# Patient Record
Sex: FEMALE | Race: WHITE | NOT HISPANIC OR LATINO | Employment: UNEMPLOYED | URBAN - METROPOLITAN AREA
[De-identification: names, ages, dates, MRNs, and addresses within clinical notes are randomized per-mention and may not be internally consistent; named-entity substitution may affect disease eponyms.]

---

## 2017-01-04 ENCOUNTER — OFFICE VISIT (OUTPATIENT)
Dept: HEMATOLOGY/ONCOLOGY | Facility: CLINIC | Age: 67
End: 2017-01-04
Payer: MEDICARE

## 2017-01-04 VITALS
TEMPERATURE: 98 F | BODY MASS INDEX: 31.56 KG/M2 | SYSTOLIC BLOOD PRESSURE: 145 MMHG | RESPIRATION RATE: 16 BRPM | DIASTOLIC BLOOD PRESSURE: 78 MMHG | HEART RATE: 103 BPM | WEIGHT: 195.56 LBS

## 2017-01-04 DIAGNOSIS — C50.512 MALIGNANT NEOPLASM OF LOWER-OUTER QUADRANT OF LEFT FEMALE BREAST: Primary | ICD-10-CM

## 2017-01-04 PROCEDURE — 99499 UNLISTED E&M SERVICE: CPT | Mod: S$GLB,,, | Performed by: INTERNAL MEDICINE

## 2017-01-04 PROCEDURE — 3077F SYST BP >= 140 MM HG: CPT | Mod: S$GLB,,, | Performed by: INTERNAL MEDICINE

## 2017-01-04 PROCEDURE — 99999 PR PBB SHADOW E&M-EST. PATIENT-LVL III: CPT | Mod: PBBFAC,,, | Performed by: INTERNAL MEDICINE

## 2017-01-04 PROCEDURE — 1160F RVW MEDS BY RX/DR IN RCRD: CPT | Mod: S$GLB,,, | Performed by: INTERNAL MEDICINE

## 2017-01-04 PROCEDURE — 1159F MED LIST DOCD IN RCRD: CPT | Mod: S$GLB,,, | Performed by: INTERNAL MEDICINE

## 2017-01-04 PROCEDURE — 99213 OFFICE O/P EST LOW 20 MIN: CPT | Mod: S$GLB,,, | Performed by: INTERNAL MEDICINE

## 2017-01-04 PROCEDURE — 3078F DIAST BP <80 MM HG: CPT | Mod: S$GLB,,, | Performed by: INTERNAL MEDICINE

## 2017-01-04 PROCEDURE — 1157F ADVNC CARE PLAN IN RCRD: CPT | Mod: S$GLB,,, | Performed by: INTERNAL MEDICINE

## 2017-01-04 PROCEDURE — 1126F AMNT PAIN NOTED NONE PRSNT: CPT | Mod: S$GLB,,, | Performed by: INTERNAL MEDICINE

## 2017-01-04 NOTE — PROGRESS NOTES
Subjective:       Patient ID: Lima Maldonado is a 66 y.o. female.    Chief Complaint: personal history of breast cancer    HPI Ms Maldonado return to clinic for follow-up of recent diagnosis of left breast cancer.    At the time of her last visit we discussed adjuvant chemotherapy as her Oncotype score was intermediate risk.    She underwent mammography on September 20 with results as follows:  There is a new irregular mass with spiculated margins seen in the left breast at  5 o'clock along the surgical scar site.    There is stable post-operative change of the right breast.    Digital tomosynthesis was performed and used in the interpretation of the images.    These images  were processed to produce digital images analyzed for potential  abnormalities.    ULTRASOUND  Targeted ultrasound was performed along the surgical scar in the left breast.  At 5 o'clock, at the superior margin of the surgical scar, there is a spiculated  hypoechoic solid mass measuring 8 x 8 x 7mm     A needle biopsy in September 27 showed infiltrating  carcinoma, histologic grade 3, nuclear grade 2, mitotic index 1.  Tumor was 90% ER positive, 70% WV positive, and 1+ HER-2.    MRI of the breast showed a 1.7 x 1.3 cm lesion in the lower outer quadrant of the left breast.  PET/CT on October 7 was negative for any evidence of distant metastasis.  In the  On November 16 bilateral mastectomies were performed.  Left pressure 1.5 cm intermediate grade carcinoma with ductal and lobular features.  There was focal dermal invasion.  Margins were negative.  The right breast was without abnormality.  Oncotype score returned 25 -  intermediate risk.    The her all is a is a call me in her a little call in all) right    Past history:left    breast, T1cN1, ER positive, status post mastectomy with reconstruction and   postoperative chemotherapy with four cycles of Adriamycin and Cytoxan and    five years of tamoxifen.  Her original diagnosis was in  1997  Review of Systems    Objective:      Physical Exam    Assessment:       1. Malignant neoplasm of lower-outer quadrant of left female breast        Plan:       After long discussion it was decided that we will attempt to proceed with adjuvant therapy.  In the interim, we will have her see wound care.  I'll see her again in 2 weeks.  Several

## 2017-01-09 DIAGNOSIS — M81.0 OSTEOPOROSIS, SENILE: Primary | ICD-10-CM

## 2017-01-10 RX ORDER — GABAPENTIN 300 MG/1
300 CAPSULE ORAL NIGHTLY
Qty: 30 CAPSULE | Refills: 1 | Status: SHIPPED | OUTPATIENT
Start: 2017-01-10 | End: 2017-03-22 | Stop reason: SDUPTHER

## 2017-01-13 ENCOUNTER — INFUSION (OUTPATIENT)
Dept: INFECTIOUS DISEASES | Facility: HOSPITAL | Age: 67
End: 2017-01-13
Attending: INTERNAL MEDICINE
Payer: MEDICARE

## 2017-01-13 VITALS — WEIGHT: 195 LBS | BODY MASS INDEX: 31.34 KG/M2 | HEIGHT: 66 IN

## 2017-01-13 DIAGNOSIS — M81.0 SENILE OSTEOPOROSIS: Primary | ICD-10-CM

## 2017-01-13 PROCEDURE — 63600175 PHARM REV CODE 636 W HCPCS: Performed by: INTERNAL MEDICINE

## 2017-01-13 PROCEDURE — 96401 CHEMO ANTI-NEOPL SQ/IM: CPT

## 2017-01-13 RX ADMIN — DENOSUMAB 60 MG: 60 INJECTION SUBCUTANEOUS at 09:01

## 2017-01-17 ENCOUNTER — OFFICE VISIT (OUTPATIENT)
Dept: WOUND CARE | Facility: CLINIC | Age: 67
End: 2017-01-17
Payer: MEDICARE

## 2017-01-17 ENCOUNTER — OFFICE VISIT (OUTPATIENT)
Dept: HEMATOLOGY/ONCOLOGY | Facility: CLINIC | Age: 67
End: 2017-01-17
Payer: MEDICARE

## 2017-01-17 VITALS
DIASTOLIC BLOOD PRESSURE: 69 MMHG | WEIGHT: 197.69 LBS | HEART RATE: 89 BPM | HEIGHT: 66 IN | BODY MASS INDEX: 31.77 KG/M2 | TEMPERATURE: 98 F | SYSTOLIC BLOOD PRESSURE: 140 MMHG

## 2017-01-17 VITALS
BODY MASS INDEX: 31.85 KG/M2 | TEMPERATURE: 98 F | WEIGHT: 197.31 LBS | DIASTOLIC BLOOD PRESSURE: 69 MMHG | HEART RATE: 91 BPM | SYSTOLIC BLOOD PRESSURE: 131 MMHG | RESPIRATION RATE: 17 BRPM

## 2017-01-17 DIAGNOSIS — T81.31XA DISRUPTION OF EXTERNAL OPERATION (SURGICAL) WOUND: ICD-10-CM

## 2017-01-17 DIAGNOSIS — C50.512 MALIGNANT NEOPLASM OF LOWER-OUTER QUADRANT OF LEFT FEMALE BREAST: Primary | ICD-10-CM

## 2017-01-17 DIAGNOSIS — Z51.11 ENCOUNTER FOR ANTINEOPLASTIC CHEMOTHERAPY: ICD-10-CM

## 2017-01-17 PROCEDURE — 1157F ADVNC CARE PLAN IN RCRD: CPT | Mod: S$GLB,,, | Performed by: NURSE PRACTITIONER

## 2017-01-17 PROCEDURE — 3077F SYST BP >= 140 MM HG: CPT | Mod: S$GLB,,, | Performed by: NURSE PRACTITIONER

## 2017-01-17 PROCEDURE — 1125F AMNT PAIN NOTED PAIN PRSNT: CPT | Mod: S$GLB,,, | Performed by: NURSE PRACTITIONER

## 2017-01-17 PROCEDURE — 3075F SYST BP GE 130 - 139MM HG: CPT | Mod: S$GLB,,, | Performed by: INTERNAL MEDICINE

## 2017-01-17 PROCEDURE — 99213 OFFICE O/P EST LOW 20 MIN: CPT | Mod: S$GLB,,, | Performed by: INTERNAL MEDICINE

## 2017-01-17 PROCEDURE — 1126F AMNT PAIN NOTED NONE PRSNT: CPT | Mod: S$GLB,,, | Performed by: INTERNAL MEDICINE

## 2017-01-17 PROCEDURE — 99499 UNLISTED E&M SERVICE: CPT | Mod: S$GLB,,, | Performed by: INTERNAL MEDICINE

## 2017-01-17 PROCEDURE — 3078F DIAST BP <80 MM HG: CPT | Mod: S$GLB,,, | Performed by: INTERNAL MEDICINE

## 2017-01-17 PROCEDURE — 99203 OFFICE O/P NEW LOW 30 MIN: CPT | Mod: S$GLB,,, | Performed by: NURSE PRACTITIONER

## 2017-01-17 PROCEDURE — 1157F ADVNC CARE PLAN IN RCRD: CPT | Mod: S$GLB,,, | Performed by: INTERNAL MEDICINE

## 2017-01-17 PROCEDURE — 1160F RVW MEDS BY RX/DR IN RCRD: CPT | Mod: S$GLB,,, | Performed by: NURSE PRACTITIONER

## 2017-01-17 PROCEDURE — 1159F MED LIST DOCD IN RCRD: CPT | Mod: S$GLB,,, | Performed by: INTERNAL MEDICINE

## 2017-01-17 PROCEDURE — 99999 PR PBB SHADOW E&M-EST. PATIENT-LVL III: CPT | Mod: PBBFAC,,, | Performed by: INTERNAL MEDICINE

## 2017-01-17 PROCEDURE — 3078F DIAST BP <80 MM HG: CPT | Mod: S$GLB,,, | Performed by: NURSE PRACTITIONER

## 2017-01-17 PROCEDURE — 99999 PR PBB SHADOW E&M-EST. PATIENT-LVL V: CPT | Mod: PBBFAC,,, | Performed by: NURSE PRACTITIONER

## 2017-01-17 PROCEDURE — 1160F RVW MEDS BY RX/DR IN RCRD: CPT | Mod: S$GLB,,, | Performed by: INTERNAL MEDICINE

## 2017-01-17 PROCEDURE — 1159F MED LIST DOCD IN RCRD: CPT | Mod: S$GLB,,, | Performed by: NURSE PRACTITIONER

## 2017-01-17 RX ORDER — DEXAMETHASONE 4 MG/1
8 TABLET ORAL 2 TIMES DAILY WITH MEALS
Qty: 40 TABLET | Refills: 0 | Status: SHIPPED | OUTPATIENT
Start: 2017-01-17 | End: 2017-01-27

## 2017-01-17 NOTE — PATIENT INSTRUCTIONS
Skin prep to periwound area.  Mepilex foam border dressing to bilateral gluteal cleft wounds.  Change dressing every 3 days.

## 2017-01-17 NOTE — PROGRESS NOTES
Subjective:       Patient ID: Lima Maldonado is a 66 y.o. female.    Chief Complaint: Wound Check    HPI   This is a 66 year old female referred by Dr. Bains for evaluation and management of a breast wound.  She is s/p Bilateral total complete mastectomies with skin sparing technique with therapeutic completion mastectomy on the left and prophylactic mastectomy on the right on 11/16/16 with Dr. Bains.  She then underwent a Left immediate breast reconstruction with profunda artery  flap, right immediate breast reconstruction with profunda artery  flap with Dr. Arboleda on 11/16/16.  She has been using the TRACY wound vac on the wounds.  The left gluteal cleft wound is healed and the right gluteal cleft wound is healing as evidenced by wound contracture.  She is afebrile.  She denies increased redness, swelling or purulent drainage.  She has no pain.  Review of Systems   Constitutional: Negative for chills, diaphoresis and fever.   HENT: Positive for rhinorrhea. Negative for hearing loss, postnasal drip, sinus pressure, sneezing, sore throat, tinnitus and trouble swallowing.    Eyes: Negative for visual disturbance.   Respiratory: Positive for apnea (on CPAP). Negative for cough, shortness of breath and wheezing.    Cardiovascular: Negative for chest pain, palpitations and leg swelling.   Gastrointestinal: Negative for constipation, diarrhea, nausea and vomiting.   Genitourinary: Negative for difficulty urinating, dysuria, frequency and hematuria.   Musculoskeletal: Positive for arthralgias. Negative for back pain and joint swelling.   Skin: Positive for wound.   Neurological: Negative for dizziness, weakness, light-headedness and headaches.   Hematological: Does not bruise/bleed easily.   Psychiatric/Behavioral: Positive for sleep disturbance. Negative for confusion, decreased concentration and dysphoric mood. The patient is nervous/anxious.        Objective:      Physical Exam    Constitutional: She is oriented to person, place, and time. She appears well-developed and well-nourished. No distress.   HENT:   Head: Normocephalic and atraumatic.   Mouth/Throat: Oropharynx is clear and moist.   Eyes: Conjunctivae and EOM are normal. Pupils are equal, round, and reactive to light. Right eye exhibits no discharge. Left eye exhibits no discharge. No scleral icterus.   Neck: Normal range of motion. Neck supple. No JVD present. No tracheal deviation present. No thyromegaly present.   Cardiovascular: Normal rate, regular rhythm and normal heart sounds.  Exam reveals no gallop and no friction rub.    No murmur heard.  Pulmonary/Chest: Effort normal and breath sounds normal. No respiratory distress. She has no wheezes. She has no rales.   Abdominal: Soft. Bowel sounds are normal. She exhibits no distension. There is no tenderness.   Musculoskeletal: Normal range of motion. She exhibits no edema or tenderness.        Legs:  Neurological: She is alert and oriented to person, place, and time.   Skin: Skin is warm and dry. No rash noted. She is not diaphoretic. No erythema.   Psychiatric: She has a normal mood and affect. Her behavior is normal. Judgment and thought content normal.   Nursing note and vitals reviewed.      Assessment:       1. Disruption of external operation (surgical) wound        Plan:           Discontinue TRACY vac.  Skin prep to periwound area.  Mepilex foam border dressing to bilateral gluteal cleft wounds.  Change dressing every 3 days.  Return to clinic in 2 weeks or prn if healed.

## 2017-01-17 NOTE — MR AVS SNAPSHOT
[facility] - Wound Care  [address]  [facility] [address]  Phone: [phone]                  Lima Maldonado   2017 8:00 AM   Office Visit    Description:  Female : 1950   Provider:  Ro Tyler NP   Department:  [facility] - [facility]           Reason for Visit     Wound Check           Diagnoses this Visit        Comments    Disruption of external operation (surgical) wound                To Do List           Future Appointments        Provider Department Dept Phone    2017 9:45 AM MD Kris Wong - Hematology Oncology [phone]    2017 9:40 AM VAISHALI Blake [facility] Wound Care [phone]    2017 10:15 AM ECHO, [facility] - Echo/Stress Lab [phone]    2017 9:30 AM HRA, [address] Internal Medicine [phone]    3/7/2017 10:15 AM Jimmy Bains MD [facility] Breast Surgery [phone]      Goals (5 Years of Data)     None      Follow-Up and Disposition     Return in about 2 weeks (around 2017) for Wound evaluation.      [facility]     [facility] On Call Nurse Care Line -  Assistance  Registered nurses in the [facility] On Call Center provide clinical advisement, health education, appointment booking, and other advisory services.  Call for this free service at [phone].             Medications           Message regarding Medications     Verify the changes and/or additions to your medication regime listed below are the same as discussed with your clinician today.  If any of these changes or additions are incorrect, please notify your healthcare provider.             Verify that the below list of medications is an accurate representation of the medications you are currently taking.  If none reported, the list may be blank. If incorrect, please contact your healthcare provider. Carry this list with you in case of emergency.           Current Medications     alprazolam (XANAX) 0.5 MG tablet Take 1  "tablet (0.5 mg total) by mouth nightly as needed for Insomnia.    ascorbic acid (VITAMIN C) 1000 MG tablet Take 1 tablet by mouth once daily.    aspirin 81 MG chewable tablet Take 1 tablet by mouth.    CALCIUM CARBONATE/VITAMIN D3 (CALTRATE 600 + D ORAL) Take 1 tablet by mouth 2 (two) times daily.     CALCIUM POLYCARBOPHIL (FIBERCON ORAL) Take 3 tablets by mouth 2 (two) times daily.     celecoxib (CELEBREX) 100 MG capsule Take 1 capsule (100 mg total) by mouth daily as needed.    coenzyme Q10-vitamin E (COQ10 ) 100-100 mg-unit Cap Take 1 capsule by mouth once daily.     estradiol (VAGIFEM) 10 mcg Tab PLACE 1 TABLET (10 MCG TOTAL) VAGINALLY TWICE A WEEK.    gabapentin (NEURONTIN) 300 MG capsule Take 1 capsule (300 mg total) by mouth every evening.    levothyroxine (SYNTHROID) 50 MCG tablet TAKE 1 TABLET (50 MCG TOTAL) BY MOUTH ONCE DAILY.    lidocaine (LIDODERM) 5 %(700 mg/patch) Place 2 patches onto the skin daily as needed. Remove & Discard patch within 12 hours or as directed by MD    lisinopril (PRINIVIL,ZESTRIL) 20 MG tablet Take 1 tablet (20 mg total) by mouth once daily.    metformin (GLUCOPHAGE) 500 MG tablet Take 500 mg by mouth 2 (two) times daily with meals.    MULTIVITAMIN ORAL once daily.     NON FORMULARY MEDICATION Take 1 capsule by mouth once daily. Tumeric    omega-3 fatty acids-vitamin E (FISH OIL) 1,000 mg Cap Take 1 capsule by mouth once daily.     ondansetron (ZOFRAN-ODT) 8 MG TbDL     pantoprazole (PROTONIX) 40 MG tablet Take 1 tablet (40 mg total) by mouth before breakfast.    simvastatin (ZOCOR) 20 MG tablet TAKE 1 TABLET (20 MG TOTAL) BY MOUTH EVERY EVENING.    vitamin D 1000 units Tab Take 1,000 Units by mouth once daily.            Clinical Reference Information           Vital Signs - Last Recorded  Most recent update: 1/17/2017  8:11 AM by Leti Smith LPN    BP Pulse Temp Ht Wt BMI    (!) 140/69 89 97.9 °F (36.6 °C) (Oral) 5' 6" (1.676 m) 89.7 kg (197 lb 11.2 oz) 31.91 kg/m2 "      Blood Pressure          Most Recent Value    BP  (!)  140/69      Allergies as of 1/17/2017     Erythromycin    Erythromycin (Bulk)    Oxycodone    Oxycodone-acetaminophen      Immunizations Administered on Date of Encounter - 1/17/2017     None      Instructions    Skin prep to periwound area.  Mepilex foam border dressing to bilateral gluteal cleft wounds.  Change dressing every 3 days.

## 2017-01-17 NOTE — PROGRESS NOTES
Subjective:       Patient ID: Lima Maldonado is a 66 y.o. female.    Chief Complaint: No chief complaint on file.    HPI Ms Maldonado return to clinic for follow-up of recent diagnosis of left breast cancer.  She had stage I ER positive disease with an intermediate risk Oncotype score.  Thumb her last visit we had an extensive discussion regarding risk and benefit.  At the end of that discussion she indicates she would like to go ahead and go forward with chemotherapy.  She has been having some problems with wound healing in her thighs and that has delayed moving forward with her treatment.    At the time of her last visit we discussed adjuvant chemotherapy as her Oncotype score was intermediate risk  Breast history: mammography on September 20, 2016 demonstrated a new irregular mass with spiculated margins seen in the left breast at  5 o'clock along the surgical scar site.   ultrasound DEMONSTRATED A SOLID 8 X 8 X 7 MM MASS.      A needle biopsy in September 27 showed infiltrating  carcinoma, histologic grade 3, nuclear grade 2, mitotic index 1.  Tumor was 90% ER positive, 70% FL positive, and 1+ HER-2.    MRI of the breast showed a 1.7 x 1.3 cm lesion in the lower outer quadrant of the left breast.  PET/CT on October 7 was negative for any evidence of distant metastasis.  In the  On November 16 bilateral mastectomies were performed.  Left pressure 1.5 cm intermediate grade carcinoma with ductal and lobular features.  There was focal dermal invasion.  Margins were negative.  The right breast was without abnormality.  Oncotype score returned 25 -  intermediate risk.    Past history:left    breast, T1cN1, ER positive, status post mastectomy with reconstruction and   postoperative chemotherapy with four cycles of Adriamycin and Cytoxan and    five years of tamoxifen.  Her original diagnosis was in 1997  Review of Systems    Objective:      Physical Exam    Assessment:       1. Malignant neoplasm of lower-outer  quadrant of left female breast    2. Encounter for antineoplastic chemotherapy        Plan:       I reviewed again plan for chemotherapy.  She will have 4 cycles of Taxotere and Cytoxan  by 3 weeks each.  She will have Neulasta on day 2.  She will need a port placed.

## 2017-01-17 NOTE — Clinical Note
To start Taxotere and Cytoxan therapy with day 2 Neulasta after port is placed by Dr. Bains, we'll need labs and a brief visit with me that day

## 2017-01-17 NOTE — LETTER
January 17, 2017      Francisco Baker MD  [address]  [facility] [address]           [facility] - Wound Care  [address]  [facility] [address]  Phone: [phone]          Patient: Lima Maldonado   MR Number: [id number]   YOB: 1950   Date of Visit: 1/17/2017       Dear Dr. Francisco Baker:    Thank you for referring Lima Maldonado to me for evaluation. Attached you will find relevant portions of my assessment and plan of care.    If you have questions, please do not hesitate to call me. I look forward to following Lima Maldonado along with you.    Sincerely,    Ro Tyler, VAISHALI    Enclosure  CC:  No Recipients    If you would like to receive this communication electronically, please contact [email or web address] or [phone] to request more information on [employer] Link access.    For providers and/or their staff who would like to refer a patient to [facility], please contact us through our one-stop-shop provider referral line, [facility], at [phone].    If you feel you have received this communication in error or would no longer like to receive these types of communications, please e-mail [email or web address]

## 2017-01-18 DIAGNOSIS — C50.812 MALIGNANT NEOPLASM OF OVERLAPPING SITES OF LEFT FEMALE BREAST: Primary | ICD-10-CM

## 2017-01-19 ENCOUNTER — TELEPHONE (OUTPATIENT)
Dept: HEMATOLOGY/ONCOLOGY | Facility: CLINIC | Age: 67
End: 2017-01-19

## 2017-01-19 DIAGNOSIS — C50.512 MALIGNANT NEOPLASM OF LOWER-OUTER QUADRANT OF LEFT FEMALE BREAST: Primary | ICD-10-CM

## 2017-01-20 ENCOUNTER — TELEPHONE (OUTPATIENT)
Dept: SURGERY | Facility: CLINIC | Age: 67
End: 2017-01-20

## 2017-01-20 NOTE — TELEPHONE ENCOUNTER
Spoke with pt she is to arrive to the 2nd floor DOCS for port placement with Dr Chau on 1/23/2016 @ 10 am , pt verbally stated understanding

## 2017-01-23 ENCOUNTER — SURGERY (OUTPATIENT)
Age: 67
End: 2017-01-23

## 2017-01-23 ENCOUNTER — TELEPHONE (OUTPATIENT)
Dept: CARDIOLOGY | Facility: CLINIC | Age: 67
End: 2017-01-23

## 2017-01-23 ENCOUNTER — ANESTHESIA (OUTPATIENT)
Dept: SURGERY | Facility: HOSPITAL | Age: 67
End: 2017-01-23
Payer: MEDICARE

## 2017-01-23 ENCOUNTER — HOSPITAL ENCOUNTER (OUTPATIENT)
Facility: HOSPITAL | Age: 67
Discharge: HOME | End: 2017-01-23
Attending: SURGERY | Admitting: SURGERY
Payer: MEDICARE

## 2017-01-23 ENCOUNTER — ANESTHESIA EVENT (OUTPATIENT)
Dept: SURGERY | Facility: HOSPITAL | Age: 67
End: 2017-01-23
Payer: MEDICARE

## 2017-01-23 DIAGNOSIS — E66.9 DIABETES MELLITUS TYPE 2 IN OBESE: Primary | ICD-10-CM

## 2017-01-23 DIAGNOSIS — C50.512 PRIMARY CANCER OF LOWER OUTER QUADRANT OF LEFT FEMALE BREAST: Primary | ICD-10-CM

## 2017-01-23 DIAGNOSIS — C50.912 BREAST CANCER, STAGE 1, LEFT: ICD-10-CM

## 2017-01-23 DIAGNOSIS — E11.69 DIABETES MELLITUS TYPE 2 IN OBESE: Primary | ICD-10-CM

## 2017-01-23 PROBLEM — C50.919 BREAST CANCER, STAGE 1: Status: ACTIVE | Noted: 2017-01-23

## 2017-01-23 LAB — POCT GLUCOSE: 132 MG/DL (ref 70–110)

## 2017-01-23 PROCEDURE — 25000003 PHARM REV CODE 250: Performed by: ANESTHESIOLOGY

## 2017-01-23 PROCEDURE — D9220A PRA ANESTHESIA: Mod: CRNA,,, | Performed by: NURSE ANESTHETIST, CERTIFIED REGISTERED

## 2017-01-23 PROCEDURE — 77001 FLUOROGUIDE FOR VEIN DEVICE: CPT | Mod: 26,LT,, | Performed by: SURGERY

## 2017-01-23 PROCEDURE — 37000009 HC ANESTHESIA EA ADD 15 MINS: Performed by: SURGERY

## 2017-01-23 PROCEDURE — 36561 INSERT TUNNELED CV CATH: CPT | Mod: 58,,, | Performed by: SURGERY

## 2017-01-23 PROCEDURE — 25000003 PHARM REV CODE 250: Performed by: NURSE ANESTHETIST, CERTIFIED REGISTERED

## 2017-01-23 PROCEDURE — 25000003 PHARM REV CODE 250: Performed by: SURGERY

## 2017-01-23 PROCEDURE — 71000044 HC DOSC ROUTINE RECOVERY FIRST HOUR: Performed by: SURGERY

## 2017-01-23 PROCEDURE — 37000008 HC ANESTHESIA 1ST 15 MINUTES: Performed by: SURGERY

## 2017-01-23 PROCEDURE — C1788 PORT, INDWELLING, IMP: HCPCS | Performed by: SURGERY

## 2017-01-23 PROCEDURE — 36000707: Performed by: SURGERY

## 2017-01-23 PROCEDURE — 71000015 HC POSTOP RECOV 1ST HR: Performed by: SURGERY

## 2017-01-23 PROCEDURE — 36000706: Performed by: SURGERY

## 2017-01-23 PROCEDURE — 63600175 PHARM REV CODE 636 W HCPCS: Performed by: NURSE ANESTHETIST, CERTIFIED REGISTERED

## 2017-01-23 PROCEDURE — D9220A PRA ANESTHESIA: Mod: ANES,,, | Performed by: ANESTHESIOLOGY

## 2017-01-23 DEVICE — KIT POWERPORT SINGLE 8FR: Type: IMPLANTABLE DEVICE | Site: CHEST | Status: FUNCTIONAL

## 2017-01-23 RX ORDER — MIDAZOLAM HYDROCHLORIDE 1 MG/ML
INJECTION, SOLUTION INTRAMUSCULAR; INTRAVENOUS
Status: DISCONTINUED | OUTPATIENT
Start: 2017-01-23 | End: 2017-01-23

## 2017-01-23 RX ORDER — LIDOCAINE HYDROCHLORIDE 10 MG/ML
1 INJECTION, SOLUTION EPIDURAL; INFILTRATION; INTRACAUDAL; PERINEURAL ONCE
Status: COMPLETED | OUTPATIENT
Start: 2017-01-23 | End: 2017-01-23

## 2017-01-23 RX ORDER — PROPOFOL 10 MG/ML
VIAL (ML) INTRAVENOUS CONTINUOUS PRN
Status: DISCONTINUED | OUTPATIENT
Start: 2017-01-23 | End: 2017-01-23

## 2017-01-23 RX ORDER — SODIUM CHLORIDE 9 MG/ML
INJECTION, SOLUTION INTRAVENOUS CONTINUOUS
Status: DISCONTINUED | OUTPATIENT
Start: 2017-01-23 | End: 2017-01-23 | Stop reason: HOSPADM

## 2017-01-23 RX ORDER — BUPIVACAINE HYDROCHLORIDE 5 MG/ML
INJECTION, SOLUTION EPIDURAL; INTRACAUDAL
Status: DISCONTINUED | OUTPATIENT
Start: 2017-01-23 | End: 2017-01-23 | Stop reason: HOSPADM

## 2017-01-23 RX ORDER — KETAMINE HYDROCHLORIDE 100 MG/ML
INJECTION, SOLUTION INTRAMUSCULAR; INTRAVENOUS
Status: DISCONTINUED | OUTPATIENT
Start: 2017-01-23 | End: 2017-01-23

## 2017-01-23 RX ORDER — HYDROCODONE BITARTRATE AND ACETAMINOPHEN 5; 325 MG/1; MG/1
1 TABLET ORAL EVERY 4 HOURS PRN
Qty: 6 TABLET | Refills: 0 | Status: SHIPPED | OUTPATIENT
Start: 2017-01-23 | End: 2017-02-16 | Stop reason: SDUPTHER

## 2017-01-23 RX ORDER — HEPARIN SODIUM (PORCINE) LOCK FLUSH IV SOLN 100 UNIT/ML 100 UNIT/ML
SOLUTION INTRAVENOUS
Status: DISCONTINUED | OUTPATIENT
Start: 2017-01-23 | End: 2017-01-23 | Stop reason: HOSPADM

## 2017-01-23 RX ORDER — HYDROCODONE BITARTRATE AND ACETAMINOPHEN 5; 325 MG/1; MG/1
1 TABLET ORAL EVERY 4 HOURS PRN
Status: DISCONTINUED | OUTPATIENT
Start: 2017-01-23 | End: 2017-01-23 | Stop reason: HOSPADM

## 2017-01-23 RX ADMIN — SODIUM CHLORIDE: 0.9 INJECTION, SOLUTION INTRAVENOUS at 03:01

## 2017-01-23 RX ADMIN — MIDAZOLAM HYDROCHLORIDE 2 MG: 1 INJECTION, SOLUTION INTRAMUSCULAR; INTRAVENOUS at 02:01

## 2017-01-23 RX ADMIN — HEPARIN SODIUM (PORCINE) LOCK FLUSH IV SOLN 100 UNIT/ML 2 ML: 100 SOLUTION at 03:01

## 2017-01-23 RX ADMIN — LIDOCAINE HYDROCHLORIDE 1 MG: 10 INJECTION, SOLUTION EPIDURAL; INFILTRATION; INTRACAUDAL; PERINEURAL at 01:01

## 2017-01-23 RX ADMIN — PROPOFOL 100 MCG/KG/MIN: 10 INJECTION, EMULSION INTRAVENOUS at 02:01

## 2017-01-23 RX ADMIN — KETAMINE HYDROCHLORIDE 10 MG: 100 INJECTION, SOLUTION, CONCENTRATE INTRAMUSCULAR; INTRAVENOUS at 02:01

## 2017-01-23 RX ADMIN — Medication 2 G: at 02:01

## 2017-01-23 RX ADMIN — BUPIVACAINE HYDROCHLORIDE 11 ML: 5 INJECTION, SOLUTION EPIDURAL; INTRACAUDAL; PERINEURAL at 03:01

## 2017-01-23 RX ADMIN — SODIUM CHLORIDE: 0.9 INJECTION, SOLUTION INTRAVENOUS at 01:01

## 2017-01-23 RX ADMIN — KETAMINE HYDROCHLORIDE 10 MG: 100 INJECTION, SOLUTION, CONCENTRATE INTRAMUSCULAR; INTRAVENOUS at 03:01

## 2017-01-23 NOTE — ANESTHESIA RELEASE NOTE
"Anesthesia Release from PACU Note    Patient: Lima Maldonado    Procedure(s) Performed: Procedure(s) (LRB):  [id number] (Right)    Anesthesia type: general    Post pain: Adequate analgesia    Post assessment: no apparent anesthetic complications, tolerated procedure well and no evidence of recall    Last Vitals:   Visit Vitals    /69    Pulse 84    Temp 36.6 °C (97.9 °F) (Oral)    Resp 16    Ht 5' 6" (1.676 m)    Wt 89.5 kg (197 lb 5 oz)    SpO2 95%    Breastfeeding No    BMI 31.85 kg/m2       Post vital signs: stable    Level of consciousness: awake, alert  and oriented    Nausea/Vomiting: no nausea/no vomiting    Complications: none    Airway Patency: patent    Respiratory: unassisted    Cardiovascular: stable and blood pressure at baseline    Hydration: euvolemic  "

## 2017-01-23 NOTE — TELEPHONE ENCOUNTER
----- Message from Spring Kieta MA sent at 1/23/2017 10:00 AM CST -----  Contact: patient called      ----- Message -----     From: Lisa Bergeron MA     Sent: 1/23/2017   9:24 AM       To: Jose RIVER Staff    Please call Jessie from  Pre service at ext. [phone] she need to talk to you about this patient. Thank you.

## 2017-01-23 NOTE — H&P (VIEW-ONLY)
Subjective:       Patient ID: Lima Maldonado is a 66 y.o. female.    Chief Complaint: No chief complaint on file.    HPI Ms Maldonado return to clinic for follow-up of recent diagnosis of left breast cancer.  She had stage I ER positive disease with an intermediate risk Oncotype score.  Thumb her last visit we had an extensive discussion regarding risk and benefit.  At the end of that discussion she indicates she would like to go ahead and go forward with chemotherapy.  She has been having some problems with wound healing in her thighs and that has delayed moving forward with her treatment.    At the time of her last visit we discussed adjuvant chemotherapy as her Oncotype score was intermediate risk  Breast history: mammography on September 20, 2016 demonstrated a new irregular mass with spiculated margins seen in the left breast at  5 o'clock along the surgical scar site.   ultrasound DEMONSTRATED A SOLID 8 X 8 X 7 MM MASS.      A needle biopsy in September 27 showed infiltrating  carcinoma, histologic grade 3, nuclear grade 2, mitotic index 1.  Tumor was 90% ER positive, 70% WI positive, and 1+ HER-2.    MRI of the breast showed a 1.7 x 1.3 cm lesion in the lower outer quadrant of the left breast.  PET/CT on October 7 was negative for any evidence of distant metastasis.  In the  On November 16 bilateral mastectomies were performed.  Left pressure 1.5 cm intermediate grade carcinoma with ductal and lobular features.  There was focal dermal invasion.  Margins were negative.  The right breast was without abnormality.  Oncotype score returned 25 -  intermediate risk.    Past history:left    breast, T1cN1, ER positive, status post mastectomy with reconstruction and   postoperative chemotherapy with four cycles of Adriamycin and Cytoxan and    five years of tamoxifen.  Her original diagnosis was in 1997  Review of Systems    Objective:      Physical Exam    Assessment:       1. Malignant neoplasm of lower-outer  quadrant of left female breast    2. Encounter for antineoplastic chemotherapy        Plan:       I reviewed again plan for chemotherapy.  She will have 4 cycles of Taxotere and Cytoxan  by 3 weeks each.  She will have Neulasta on day 2.  She will need a port placed.

## 2017-01-23 NOTE — BRIEF OP NOTE
[facility]  Brief Operative Note     SUMMARY     Surgery Date: 1/23/2017     Surgeon(s) and Role:     * Yue Hernandez MD - Resident - Assisting     * Jimmy Bains MD - Primary    Pre-op Diagnosis:  Malignant neoplasm of overlapping sites of left female breast [C50.812]    Post-op Diagnosis:  Post-Op Diagnosis Codes:     * Malignant neoplasm of overlapping sites of left female breast [C50.812]    Procedure(s) (LRB):  [id number] (Right)    Anesthesia: Local MAC    Description of the findings of the procedure: right- port placed, IJ approach. Tip visualized in SVC under fluoroscopy.     Estimated Blood Loss: * No values recorded between 1/23/2017  3:02 PM and 1/23/2017  4:00 PM *         Specimens:   Specimen     None        Discharge Note    SUMMARY     Admit Date: 1/23/2017    Discharge Date and Time: 1/23/2017 4:12 PM    Hospital Course (synopsis of major diagnoses, care, treatment, and services provided during the course of the hospital stay): Tolerated procedure well, was transferred to . No immediate complications. Post-op CXR with no evidence of pneumothorax.   Pain well controlled with oral medications.   Deemed suitable for discharge on Day of Surgery    Final Diagnosis: Post-Op Diagnosis Codes:     * Malignant neoplasm of overlapping sites of left female breast [C50.812]    Disposition: Home or Self Care    Medications:  Reconciled Home Medications:   Current Discharge Medication List      START taking these medications    Details   hydrocodone-acetaminophen 5-325mg (NORCO) 5-325 mg per tablet Take 1 tablet by mouth every 4 (four) hours as needed for Pain.  Qty: 6 tablet, Refills: 0         CONTINUE these medications which have NOT CHANGED    Details   alprazolam (XANAX) 0.5 MG tablet Take 1 tablet (0.5 mg total) by mouth nightly as needed for Insomnia.  Qty: 30 tablet, Refills: 2    Associated Diagnoses: Primary insomnia      ascorbic acid (VITAMIN C) 1000 MG  tablet Take 1 tablet by mouth once daily.      CALCIUM CARBONATE/VITAMIN D3 (CALTRATE 600 + D ORAL) Take 1 tablet by mouth 2 (two) times daily.       CALCIUM POLYCARBOPHIL (FIBERCON ORAL) Take 3 tablets by mouth 2 (two) times daily.       gabapentin (NEURONTIN) 300 MG capsule Take 1 capsule (300 mg total) by mouth every evening.  Qty: 30 capsule, Refills: 1      levothyroxine (SYNTHROID) 50 MCG tablet TAKE 1 TABLET (50 MCG TOTAL) BY MOUTH ONCE DAILY.  Qty: 90 tablet, Refills: 3      lisinopril (PRINIVIL,ZESTRIL) 20 MG tablet Take 1 tablet (20 mg total) by mouth once daily.  Qty: 90 tablet, Refills: 3    Associated Diagnoses: Diabetic peripheral neuropathy associated with type 2 diabetes mellitus      metformin (GLUCOPHAGE) 500 MG tablet Take 500 mg by mouth 2 (two) times daily with meals.      MULTIVITAMIN ORAL once daily.       NON FORMULARY MEDICATION Take 1 capsule by mouth once daily. Tumeric      pantoprazole (PROTONIX) 40 MG tablet Take 1 tablet (40 mg total) by mouth before breakfast.  Qty: 90 tablet, Refills: 3    Associated Diagnoses: Gastroesophageal reflux disease without esophagitis; Low bone mass; Use of proton pump inhibitor therapy      simvastatin (ZOCOR) 20 MG tablet TAKE 1 TABLET (20 MG TOTAL) BY MOUTH EVERY EVENING.  Qty: 90 tablet, Refills: 3      vitamin D 1000 units Tab Take 1,000 Units by mouth once daily.       aspirin 81 MG chewable tablet Take 1 tablet by mouth.      celecoxib (CELEBREX) 100 MG capsule Take 1 capsule (100 mg total) by mouth daily as needed.  Qty: 30 capsule, Refills: 6      coenzyme Q10-vitamin E (COQ10 ) 100-100 mg-unit Cap Take 1 capsule by mouth once daily.       dexamethasone (DECADRON) 4 MG Tab Take 2 tablets (8 mg total) by mouth 2 (two) times daily with meals. Take the day before and the day after chemotherapy  Qty: 40 tablet, Refills: 0    Associated Diagnoses: Malignant neoplasm of lower-outer quadrant of left female breast; Encounter for antineoplastic  chemotherapy      estradiol (VAGIFEM) 10 mcg Tab PLACE 1 TABLET (10 MCG TOTAL) VAGINALLY TWICE A WEEK.  Qty: 8 tablet, Refills: 12    Associated Diagnoses: Vaginal atrophy      lidocaine (LIDODERM) 5 %(700 mg/patch) Place 2 patches onto the skin daily as needed. Remove & Discard patch within 12 hours or as directed by MD      omega-3 fatty acids-vitamin E (FISH OIL) 1,000 mg Cap Take 1 capsule by mouth once daily.       ondansetron (ZOFRAN-ODT) 8 MG TbDL              Discharge Procedure Orders  Diet general     Shower on day dressing removed (No bath)   Order Comments: No baths or submerging incision in water x 2 weeks.     Call MD for:  temperature >100.4     Call MD for:  persistent nausea and vomiting     Call MD for:  severe uncontrolled pain     Call MD for:  redness, tenderness, or signs of infection (pain, swelling, redness, odor or green/yellow discharge around incision site)     Call MD for:  difficulty breathing, headache or visual disturbances     Remove dressing in 48 hours   Order Comments: Steri-strips will fall off in 7-10 days. If not, okay to remove.       Follow-up Information     Follow up with Jimmy Bains MD.    Specialty:  General Surgery    Why:  As needed    Contact information:    [address]  [facility] [address] [phone]

## 2017-01-23 NOTE — INTERVAL H&P NOTE
The patient has been examined and the H&P has been reviewed:    For right chest wall portacath insertion today with either SCV of IJV approach on right side.  Anesthesia/Surgery risks, benefits and alternative options discussed and understood by patient/family.          There are no hospital problems to display for this patient.

## 2017-01-23 NOTE — INTERVAL H&P NOTE
The patient has been examined and the H&P has been reviewed:    For right side portacath placement today for adjuvant systemic chemotherapy.    Anesthesia/Surgery risks, benefits and alternative options discussed and understood by patient/family.          There are no hospital problems to display for this patient.

## 2017-01-23 NOTE — ANESTHESIA POSTPROCEDURE EVALUATION
"Anesthesia Post Evaluation    Patient: Lima Maldonado    Procedure(s) Performed: Procedure(s) (LRB):  [id number] (Right)    Final Anesthesia Type: general  Patient location during evaluation: PACU  Patient participation: Yes- Able to Participate  Level of consciousness: awake and alert and oriented  Post-procedure vital signs: reviewed and stable  Pain management: adequate  Airway patency: patent  PONV status at discharge: No PONV  Anesthetic complications: no      Cardiovascular status: stable  Respiratory status: unassisted  Hydration status: euvolemic  Follow-up not needed.        Visit Vitals    /69    Pulse 84    Temp 36.6 °C (97.9 °F) (Oral)    Resp 16    Ht 5' 6" (1.676 m)    Wt 89.5 kg (197 lb 5 oz)    SpO2 95%    Breastfeeding No    BMI 31.85 kg/m2       Pain/Nolvia Score: Pain Assessment Performed: Yes (1/23/2017  4:05 PM)  Presence of Pain: denies (1/23/2017  4:05 PM)      "

## 2017-01-23 NOTE — TRANSFER OF CARE
"Anesthesia Transfer of Care Note    Patient: Lima Maldonado    Procedure(s) Performed: Procedure(s) (LRB):  [id number] (Right)    Patient location: PACU    Anesthesia Type: general    Transport from OR: Transported from OR on room air with adequate spontaneous ventilation    Post pain: adequate analgesia    Post assessment: no apparent anesthetic complications    Post vital signs: stable    Level of consciousness: awake, alert and oriented    Nausea/Vomiting: no nausea/vomiting    Complications: none          Last vitals:   Visit Vitals    BP (!) 144/63 (BP Location: Right arm, Patient Position: Lying, BP Method: Automatic)    Pulse 88    Temp 36.6 °C (97.8 °F) (Oral)    Resp 20    Ht 5' 6" (1.676 m)    Wt 89.5 kg (197 lb 5 oz)    SpO2 95%    Breastfeeding No    BMI 31.85 kg/m2     "

## 2017-01-23 NOTE — IP AVS SNAPSHOT
[facility]  [address]  [facility] [address]  Phone: [phone]           Patient Discharge Instructions     Our goal is to set you up for success. This packet includes information on your condition, medications, and your home care. It will help you to care for yourself so you don't get sicker and need to go back to the hospital.     Please ask your nurse if you have any questions.        There are many details to remember when preparing to leave the hospital. Here is what you will need to do:    1. Take your medicine. If you are prescribed medications, review your Medication List in the following pages. You may have new medications to  at the pharmacy and others that you'll need to stop taking. Review the instructions for how and when to take your medications. Talk with your doctor or nurses if you are unsure of what to do.     2. Go to your follow-up appointments. Specific follow-up information is listed in the following pages. Your may be contacted by a transition nurse or clinical provider about future appointments. Be sure we have all of the phone numbers to reach you, if needed. Please contact your provider's office if you are unable to make an appointment.     3. Watch for warning signs. Your doctor or nurse will give you detailed warning signs to watch for and when to call for assistance. These instructions may also include educational information about your condition. If you experience any of warning signs to your health, call your doctor.               [facility]  Unless otherwise directed by your provider, please contact [facility], our nurse care line that is available for 24/7 assistance.     [phone] (toll-free)    Registered nurses in the [facility] provide clinical advisement, health education, appointment booking, and other advisory services.                    ** Verify the list of medication(s) below is accurate and up  to date. Carry this with you in case of emergency. If your medications have changed, please notify your healthcare provider.             Medication List      START taking these medications        Additional Info                      hydrocodone-acetaminophen 5-325mg 5-325 mg per tablet   Commonly known as:  NORCO   Quantity:  6 tablet   Refills:  0   Dose:  1 tablet    Instructions:  Take 1 tablet by mouth every 4 (four) hours as needed for Pain.     Begin Date    AM    Noon    PM    Bedtime         CONTINUE taking these medications        Additional Info                      alprazolam 0.5 MG tablet   Commonly known as:  XANAX   Quantity:  30 tablet   Refills:  2   Dose:  0.5 mg    Instructions:  Take 1 tablet (0.5 mg total) by mouth nightly as needed for Insomnia.     Begin Date    AM    Noon    PM    Bedtime       aspirin 81 MG Chew   Refills:  0   Dose:  1 tablet    Instructions:  Take 1 tablet by mouth.     Begin Date    AM    Noon    PM    Bedtime       CALTRATE 600 + D ORAL   Refills:  0   Dose:  1 tablet    Instructions:  Take 1 tablet by mouth 2 (two) times daily.     Begin Date    AM    Noon    PM    Bedtime       celecoxib 100 MG capsule   Commonly known as:  CELEBREX   Quantity:  30 capsule   Refills:  6   Dose:  100 mg    Instructions:  Take 1 capsule (100 mg total) by mouth daily as needed.     Begin Date    AM    Noon    PM    Bedtime       COQ10  100-100 mg-unit Cap   Refills:  0   Dose:  1 capsule   Generic drug:  coenzyme Q10-vitamin E    Instructions:  Take 1 capsule by mouth once daily.     Begin Date    AM    Noon    PM    Bedtime       dexamethasone 4 MG Tab   Commonly known as:  DECADRON   Quantity:  40 tablet   Refills:  0   Dose:  8 mg    Instructions:  Take 2 tablets (8 mg total) by mouth 2 (two) times daily with meals. Take the day before and the day after chemotherapy     Begin Date    AM    Noon    PM    Bedtime       estradiol 10 mcg Tab   Commonly known as:  VAGIFEM   Quantity:   8 tablet   Refills:  12    Instructions:  PLACE 1 TABLET (10 MCG TOTAL) VAGINALLY TWICE A WEEK.     Begin Date    AM    Noon    PM    Bedtime       FIBERCON ORAL   Refills:  0   Dose:  3 tablet    Instructions:  Take 3 tablets by mouth 2 (two) times daily.     Begin Date    AM    Noon    PM    Bedtime       FISH OIL 1,000 mg Cap   Refills:  0   Dose:  1 capsule   Generic drug:  omega-3 fatty acids-vitamin E    Instructions:  Take 1 capsule by mouth once daily.     Begin Date    AM    Noon    PM    Bedtime       gabapentin 300 MG capsule   Commonly known as:  NEURONTIN   Quantity:  30 capsule   Refills:  1   Dose:  300 mg    Instructions:  Take 1 capsule (300 mg total) by mouth every evening.     Begin Date    AM    Noon    PM    Bedtime       levothyroxine 50 MCG tablet   Commonly known as:  SYNTHROID   Quantity:  90 tablet   Refills:  3    Instructions:  TAKE 1 TABLET (50 MCG TOTAL) BY MOUTH ONCE DAILY.     Begin Date    AM    Noon    PM    Bedtime       lidocaine 5 %   Commonly known as:  LIDODERM   Refills:  0   Dose:  2 patch    Instructions:  Place 2 patches onto the skin daily as needed. Remove & Discard patch within 12 hours or as directed by MD     Begin Date    AM    Noon    PM    Bedtime       lisinopril 20 MG tablet   Commonly known as:  PRINIVIL,ZESTRIL   Quantity:  90 tablet   Refills:  3   Dose:  20 mg    Instructions:  Take 1 tablet (20 mg total) by mouth once daily.     Begin Date    AM    Noon    PM    Bedtime       metformin 500 MG tablet   Commonly known as:  GLUCOPHAGE   Refills:  0   Dose:  500 mg    Instructions:  Take 500 mg by mouth 2 (two) times daily with meals.     Begin Date    AM    Noon    PM    Bedtime       MULTIVITAMIN ORAL   Refills:  0    Instructions:  once daily.     Begin Date    AM    Noon    PM    Bedtime       NON FORMULARY MEDICATION   Refills:  0   Dose:  1 capsule    Instructions:  Take 1 capsule by mouth once daily. Tumeric     Begin Date    AM    Noon    PM    Bedtime        ondansetron 8 MG Tbdl   Commonly known as:  ZOFRAN-ODT   Refills:  0      Begin Date    AM    Noon    PM    Bedtime       pantoprazole 40 MG tablet   Commonly known as:  PROTONIX   Quantity:  90 tablet   Refills:  3   Dose:  40 mg    Instructions:  Take 1 tablet (40 mg total) by mouth before breakfast.     Begin Date    AM    Noon    PM    Bedtime       simvastatin 20 MG tablet   Commonly known as:  ZOCOR   Quantity:  90 tablet   Refills:  3    Instructions:  TAKE 1 TABLET (20 MG TOTAL) BY MOUTH EVERY EVENING.     Begin Date    AM    Noon    PM    Bedtime       VITAMIN C 1000 MG tablet   Refills:  0   Dose:  1 tablet   Generic drug:  ascorbic acid (vitamin C)    Instructions:  Take 1 tablet by mouth once daily.     Begin Date    AM    Noon    PM    Bedtime       vitamin D 1000 units Tab   Refills:  0   Dose:  1000 Units    Instructions:  Take 1,000 Units by mouth once daily.     Begin Date    AM    Noon    PM    Bedtime            Where to Get Your Medications      You can get these medications from any pharmacy     Bring a paper prescription for each of these medications     hydrocodone-acetaminophen 5-325mg 5-325 mg per tablet                  Please bring to all follow up appointments:    1. A copy of your discharge instructions.  2. All medicines you are currently taking in their original bottles.  3. Identification and insurance card.    Please arrive 15 minutes ahead of scheduled appointment time.    Please call 24 hours in advance if you must reschedule your appointment and/or time.        Your Scheduled Appointments     Jan 23, 2017  4:40 PM CST   Diagnostic Xray with [facility] PORTD[facility] ([facility])    [address] [facility] [id number] [phone]            Jan 24, 2017  1:20 PM CST   Neurology - Established Patient with Eli Bolton NP   [facility] Neurology ([facility])    [address]  [facility] [address] [phone]            Jan  27, 2017  8:10 AM CST   Lab & Visit with LAB, HEMONC CANCER BLDG   [facility] ([facility])    [address]  [place] [id number] [phone]            Jan 27, 2017  9:30 AM CST   Established Patient Visit with MD Kris Wong - Hematology Oncology ([facility])    [facility]  [place] [id number] [phone]            Jan 27, 2017 10:30 AM CST   Infusion 240 Min with [facility], CHEMO   [facility] ([facility])    [address] [facility] [id number] [phone]              Follow-up Information     Follow up with Jimmy Bains MD.    Specialty:  General Surgery    Why:  As needed    Contact information:    [address] [facility] [address]  [phone]          Discharge Instructions     Future Orders    Call MD for:  difficulty breathing, headache or visual disturbances     Call MD for:  persistent nausea and vomiting     Call MD for:  redness, tenderness, or signs of infection (pain, swelling, redness, odor or green/yellow discharge around incision site)     Call MD for:  severe uncontrolled pain     Call MD for:  temperature >100.4     Diet general     Questions:    Total calories:      Fat restriction, if any:      Protein restriction, if any:      Na restriction, if any:      Fluid restriction:      Additional restrictions:      Remove dressing in 48 hours     Comments:    Steri-strips will fall off in 7-10 days. If not, okay to remove.    Shower on day dressing removed (No bath)     Comments:    No baths or submerging incision in water x 2 weeks.        Primary Diagnosis     Your primary diagnosis was:  Malignant Neoplasm Of Breast, Stage 1      Admission Information     Date & Time Provider Department CSN    1/23/2017 10:07 AM Jimmy Bains MD [facility] [id number]      Care Providers     Provider Role Specialty Primary office phone    Jimmy Bains MD  "Attending Provider General Surgery [phone]    Jimmy Bains MD Surgeon  General Surgery [phone]      Your Vitals Were     BP Pulse Temp Resp Height Weight    134/76 84 97.9 °F (36.6 °C) (Oral) 15 5' 6" (1.676 m) 89.5 kg (197 lb 5 oz)    SpO2 BMI             97% 31.85 kg/m2         Recent Lab Values        12/10/2007 11/3/2008 2/8/2011 8/9/2011 4/30/2013 5/9/2016 8/23/2016 10/26/2016      9:30 AM  9:10 AM  9:55 AM  8:40 AM  9:35 AM  9:33 AM  8:09 AM  9:27 AM    A1C 5.4 5.7 6.0 5.7 5.5 7.2 (H) 6.3 (H) 6.7 (H)    Comment for A1C at  8:09 AM on 8/23/2016:  According to ADA guidelines, hemoglobin A1C <7.0% represents  optimal control in non-pregnant diabetic patients.  Different  metrics may apply to specific populations.   Standards of Medical Care in Diabetes - 2016.  For the purpose of screening for the presence of diabetes:  <5.7%     Consistent with the absence of diabetes  5.7-6.4%  Consistent with increasing risk for diabetes   (prediabetes)  >or=6.5%  Consistent with diabetes  Currently no consensus exists for use of hemoglobin A1C  for diagnosis of diabetes for children.      Comment for A1C at  9:27 AM on 10/26/2016:  According to ADA guidelines, hemoglobin A1C <7.0% represents  optimal control in non-pregnant diabetic patients.  Different  metrics may apply to specific populations.   Standards of Medical Care in Diabetes - 2016.  For the purpose of screening for the presence of diabetes:  <5.7%     Consistent with the absence of diabetes  5.7-6.4%  Consistent with increasing risk for diabetes   (prediabetes)  >or=6.5%  Consistent with diabetes  Currently no consensus exists for use of hemoglobin A1C  for diagnosis of diabetes for children.        Allergies as of 1/23/2017        Reactions    Erythromycin     Other reaction(s): Nausea    Erythromycin (Bulk)     Other reaction(s): Nausea    Oxycodone Nausea And Vomiting    Oxycodone-acetaminophen Nausea And Vomiting    Other reaction(s): Nausea    "   Advance Directives     An advance directive is a document which, in the event you are no longer able to make decisions for yourself, tells your healthcare team what kind of treatment you do or do not want to receive, or who you would like to make those decisions for you.  If you do not currently have an advance directive, [facility] encourages you to create one.  For more information call:  [phone]), [phone] ([phone]),  or log on to [email or web address].        Smoking Cessation     If you would like to quit smoking:   You may be eligible for free services if you are a [place] resident and started smoking cigarettes before September 1, 1988.  Call the Smoking Cessation Trust (SCT) toll free at (458) 055-1201 or (762) 783-7402.   Call 4-479-QUIT-NOW if you do not meet the above criteria.            Language Assistance Services     ATTENTION: Language assistance services are available, free of charge. Please call [phone].      ATENCIÓN: Si habla español, tiene a abdi disposición servicios gratuitos de asistencia lingüística. Llame al [phone].     CHÚ Ý: N?u b?n nói Ti?ng Vi?t, có các d?ch v? h? tr? ngôn ng? mi?n phí dành cho b?n. G?i s? [phone].         [facility] complies with applicable Federal civil rights laws and does not discriminate on the basis of race, color, national origin, age, disability, or sex.

## 2017-01-23 NOTE — ANESTHESIA PREPROCEDURE EVALUATION
01/23/2017  Lima Maldonado is a 66 y.o., female.    [facility] Anesthesia Evaluation    I have reviewed the Patient Summary Reports.    I have reviewed the Nursing Notes.   I have reviewed the Medications.     Review of Systems      Physical Exam  General:  Well nourished    Airway/Jaw/Neck:  Airway Findings: Mouth Opening: Normal Tongue: Normal  General Airway Assessment: Average  Mallampati: III  Improves to II with phonation.  TM Distance: Normal, at least 6 cm  Jaw/Neck Findings:  Neck ROM: Normal ROM            Mental Status:  Mental Status Findings:  Alert and Oriented         Anesthesia Plan  Type of Anesthesia, risks & benefits discussed:  Anesthesia Type:  general, MAC  Patient's Preference:   Intra-op Monitoring Plan:   Intra-op Monitoring Plan Comments:   Post Op Pain Control Plan:   Post Op Pain Control Plan Comments:   Induction:   IV  Beta Blocker:  Patient is not currently on a Beta-Blocker (No further documentation required).       Informed Consent: Patient understands risks and agrees with Anesthesia plan.  Questions answered. Anesthesia consent signed with patient.  ASA Score: 2     Day of Surgery Review of History & Physical:    H&P update referred to the surgeon.         Ready For Surgery From Anesthesia Perspective.

## 2017-01-23 NOTE — PLAN OF CARE
Problem: Patient Care Overview  Goal: Plan of Care Review  Outcome: Outcome(s) achieved Date Met:  01/23/17  Awake and alert. VSS. Denies pain or nausea. Tolerating liquids well. Voiding well. DC instructions given to patient and family and they verbalize understanding.CXR reviewed by MD and states OK for discharge.

## 2017-01-23 NOTE — TELEPHONE ENCOUNTER
----- Message from Vandana Short sent at 1/20/2017  3:37 PM CST -----  Contact: Self  X   1st Request  _  2nd Request  _  3rd Request        Who: LORNE ALBA [[id number]]    Why: Pt states she needs a referral for diabetes management. Please call pt, thanks!    What Number to Call Back: [phone]    When to Expect a call back: (Before the end of the day)   -- if call after 3:00 call back will be tomorrow.

## 2017-01-24 ENCOUNTER — OFFICE VISIT (OUTPATIENT)
Dept: SLEEP MEDICINE | Facility: CLINIC | Age: 67
End: 2017-01-24
Payer: MEDICARE

## 2017-01-24 ENCOUNTER — TELEPHONE (OUTPATIENT)
Dept: SURGERY | Facility: CLINIC | Age: 67
End: 2017-01-24

## 2017-01-24 VITALS
HEART RATE: 85 BPM | RESPIRATION RATE: 20 BRPM | OXYGEN SATURATION: 97 % | BODY MASS INDEX: 31.71 KG/M2 | SYSTOLIC BLOOD PRESSURE: 124 MMHG | HEIGHT: 66 IN | TEMPERATURE: 98 F | DIASTOLIC BLOOD PRESSURE: 70 MMHG | WEIGHT: 197.31 LBS

## 2017-01-24 VITALS
WEIGHT: 199.06 LBS | BODY MASS INDEX: 31.99 KG/M2 | OXYGEN SATURATION: 95 % | DIASTOLIC BLOOD PRESSURE: 70 MMHG | SYSTOLIC BLOOD PRESSURE: 108 MMHG | HEIGHT: 66 IN | HEART RATE: 91 BPM

## 2017-01-24 DIAGNOSIS — G47.33 OBSTRUCTIVE SLEEP APNEA SYNDROME: Primary | ICD-10-CM

## 2017-01-24 PROCEDURE — 1159F MED LIST DOCD IN RCRD: CPT | Mod: S$GLB,,, | Performed by: NURSE PRACTITIONER

## 2017-01-24 PROCEDURE — 1160F RVW MEDS BY RX/DR IN RCRD: CPT | Mod: S$GLB,,, | Performed by: NURSE PRACTITIONER

## 2017-01-24 PROCEDURE — 3074F SYST BP LT 130 MM HG: CPT | Mod: S$GLB,,, | Performed by: NURSE PRACTITIONER

## 2017-01-24 PROCEDURE — 1126F AMNT PAIN NOTED NONE PRSNT: CPT | Mod: S$GLB,,, | Performed by: NURSE PRACTITIONER

## 2017-01-24 PROCEDURE — 3078F DIAST BP <80 MM HG: CPT | Mod: S$GLB,,, | Performed by: NURSE PRACTITIONER

## 2017-01-24 PROCEDURE — 99213 OFFICE O/P EST LOW 20 MIN: CPT | Mod: S$GLB,,, | Performed by: NURSE PRACTITIONER

## 2017-01-24 PROCEDURE — 99999 PR PBB SHADOW E&M-EST. PATIENT-LVL V: CPT | Mod: PBBFAC,,, | Performed by: NURSE PRACTITIONER

## 2017-01-24 PROCEDURE — 99499 UNLISTED E&M SERVICE: CPT | Mod: S$GLB,,, | Performed by: NURSE PRACTITIONER

## 2017-01-24 PROCEDURE — 1157F ADVNC CARE PLAN IN RCRD: CPT | Mod: S$GLB,,, | Performed by: NURSE PRACTITIONER

## 2017-01-24 NOTE — TELEPHONE ENCOUNTER
Pt stop by office today stating that she was recently dx with breast cancer again and wasn't able to make diabetic education class because she's starting chemo     Pt's DM education order has  please auth to have scheduled per pt's request    LCV 10/25/16    Please auth request

## 2017-01-24 NOTE — TELEPHONE ENCOUNTER
----- Message from Leonidas Bhatti sent at 1/24/2017 12:25 PM CST -----  Patient states that she needs to speak with nurse in ref to the area around the port that was placed being very red and soreness//please call back at [phone]//thank you

## 2017-01-24 NOTE — PATIENT INSTRUCTIONS
Your prescription for CPAP has been sent through our system. You should receive a call from [facility] in the next few days regarding your CPAP set up.

## 2017-01-24 NOTE — PROGRESS NOTES
"This 66 y.o. year-old female returns for management of obstructive sleep apnea and CPAP equipment check. Last seen in clinic by JUANCHO Bolton NP 10/12/2015. This is her initial visit with me.      The patient has symptoms of snoring, interrupted sleep, excessive daytime sleepiness now resolved with CPAP use.  Denies oral/nasal drying. Pressure feels good. Not a fan of CPAP but continues to wear it as she is experiencing symptomatic benefits from use.     Medical co-morbidities include: HTN, HLD, GERD    Have not been back to Sleep Clinic in over a year due to new dx of breast cancer. Had mastectomy 2016. Got port placed  to start chemo .     Overall CPAP use: nightly   Mask comfort/fit: Wisp mask with chin strap  Pressure tolerance: good  Humidification: set at 2  Nasal/Oral drying: Denied    CPAP Interrogation: Total usage: 6215 hours, nightly average 6.4 hours Manometer readin cm H20     On today's Wadsworth Sleepiness Scale the patient scores a 8.     Baseline Sleep Study: 11: +NELDA, AHI 54.2, low 65%, split, titrated 11 cm (AHI<5); wt 193 lbs     Review of Systems:   Sleep related symptoms as per HPI. Otherwise, a balance of 10 systems reviewed is negative.    Physical Exam:   Visit Vitals    /70    Pulse 91    Ht 5' 6" (1.676 m)    Wt 90.3 kg (199 lb 1.2 oz)    SpO2 95%    BMI 32.13 kg/m2        GENERAL: Well groomed  Wt 199 lb (prior 188 lb)    Assessment:     Obstructive sleep apnea, severe by AHI criteria,  with prior symptoms of  snoring, interrupted sleep, excessive daytime sleepiness , now resolved with CPAP use. The patient is adherent on CPAP and experiencing symptomatic benefit. Medical co-mobidities: HTN, HLD, GERD. CPAP machine has reached reasonable useful lifetime and needs to be replaced.     Plan:     - New CPAP machine set at 11 cm H20, DME: [facility]. Meanwhile, continue CPAP therapy at 11 cm H2O. RTC 31 - 90 days after set up of new machine.     -Education: During our " discussion today, we talked about the etiology of obstructive sleep apnea as well as the potential ramifications of untreated sleep apnea, which could include daytime sleepiness, hypertension, heart disease and/or stroke. We discussed potential treatment options, which could include weight loss, body positioning, continuous positive airway pressure (CPAP), or referral for surgical consideration.     -Behavior modification which includes losing weight, exercising, changing the sleep position, abstaining from alcohol, and avoiding certain medications    -Precautions: The patient was advised to abstain from driving should they feel sleepy or drowsy

## 2017-01-24 NOTE — TELEPHONE ENCOUNTER
Spoke with pt, she noted some redness around the taped area over the newly inserted port a cath, I instructed her to remove the Tegaderm  As thought she might have a reaction to it or the clora-prep she was cleaned with during surgery she will call back in it continues to the redness get worse

## 2017-01-24 NOTE — OP NOTE
DATE OF PROCEDURE:  01/23/2017.    PRIMARY SURGEON:  Jimmy Bains M.D.    ASSISTANT:  Yue Hernandez M.D. (RES).    PREOPERATIVE DIAGNOSIS:  Invasive carcinoma of the left breast lower outer   quadrant with need for adjuvant systemic cytotoxic chemotherapy and central   venous access.    POSTOPERATIVE DIAGNOSIS:  Invasive carcinoma of the left breast lower outer   quadrant with need for adjuvant systemic cytotoxic chemotherapy and central   venous access.    PROCEDURES PERFORMED:  Insertion of right internal jugular vein 8-Romanian MRI   compatible PowerPort Port-A-Cath with intraoperative ultrasound and   intraoperative fluoroscopy placed by Seldinger technique; attempted right   subclavian vein access.    PROCEDURE IN DETAIL:  The patient underwent informed consent.  The right side   was marked.  The history and physical examination was reviewed and updated.  She   was brought to the Operating Room.  She was in the supine position under   monitored anesthesia care and IV sedation.  A vertical roll was placed parallel   to her spine between her shoulder blades.  Both arms were tucked.  The right   neck and anterior chest were prepped and draped in a sterile fashion.  An Ioban   drape was placed.  Local anesthesia was infiltrated at the midclavicular line at   the infraclavicular region.  Attempted right subclavian vein access was   attempted percutaneously.  We achieved good venous blood return, but we could   not get the wire to cannulate into the central venous system.  After multiple   attempts, the approach was converted to the right internal jugular vein   approach.  Using ultrasound guidance, we identified the landmarks of the carotid   artery and right internal jugular vein.  Under local anesthesia and ultrasound   guidance, the percutaneous access needle was used to cannulate the internal   jugular vein without difficulty.  Good venous blood return was noted.  The   guidewire was advanced easily into  the central venous system on the right side.    The track was enlarged with #11 blade stab incision.  We removed the needle.    We then created a subcutaneous pocket on the right anterior chest wall at the   infraclavicular region at the midclavicular line and the subcutaneous pocket was   created with cautery, creating the skin flap and subcutaneous tissue   approximately 1/4 inch in thickness.  The port was anchored to the catheter with   the locking hub and flushed with injectable saline.  The port and catheter   system were tunneled from the pocket site on the right anterior chest wall up to   the right internal jugular vein access site in the subcutaneous fashion   anterior to the clavicle.  The tunneler was cut off of the catheter.  The port   was anchored into the pocket with interrupted 2-0 Vicryl suture x3 for   three-point fixation.  The catheter tip was cut to the appropriate length so its   tip would be at the superior vena cava near the right atrial junction.  The   dilator and peel-away sheath were advanced over the guidewire under fluoroscopic   guidance.  The dilator and guidewire were removed.  The catheter was advanced   through the peel-away sheath without difficulty.  The peel-away sheath was   withdrawn.  The port was accessed with the Maradiaga needle.  It flushed and   aspirated easily.  It was flushed with a final solution of 100 units of heparin   per milliliter of saline.  The percutaneous access site was closed with a 4-0   Monocryl subcuticular horizontal mattress suture.  The port pocket site was   closed in two layers with a deep dermal and subcutaneous 3-0 Vicryl suture layer   followed by running 4-0 Monocryl subcuticular skin closure.  Mastisol,   Steri-Strips, sterile Telfa gauze and Tegaderm dressings were applied to both   sites.  She tolerated the procedure well without complication.  Estimated blood   loss was minimal.  All needle, instrument and sponge counts were correct.  The    patient was turned over to Anesthesia for transport to the Recovery area in a   satisfactory condition.  Postop chest x-ray would be pending.      MARE/JE  dd: 01/24/2017 08:32:51 (CST)  td: 01/24/2017 09:14:43 (CST)  Doc ID   #[id number]  Job ID #[id number]    CC:

## 2017-01-25 NOTE — TELEPHONE ENCOUNTER
Contacted pt and informed her of her current order for diabetes education. Pt states that she will rtc to office when she's ready to schedule an appointment for diabetes education.Pt has no further questions/concerns at this time.

## 2017-01-27 ENCOUNTER — OFFICE VISIT (OUTPATIENT)
Dept: HEMATOLOGY/ONCOLOGY | Facility: CLINIC | Age: 67
End: 2017-01-27
Payer: MEDICARE

## 2017-01-27 ENCOUNTER — INFUSION (OUTPATIENT)
Dept: INFUSION THERAPY | Facility: HOSPITAL | Age: 67
End: 2017-01-27
Attending: INTERNAL MEDICINE
Payer: MEDICARE

## 2017-01-27 VITALS
RESPIRATION RATE: 16 BRPM | BODY MASS INDEX: 31.7 KG/M2 | WEIGHT: 196.44 LBS | TEMPERATURE: 98 F | DIASTOLIC BLOOD PRESSURE: 73 MMHG | HEART RATE: 116 BPM | SYSTOLIC BLOOD PRESSURE: 157 MMHG

## 2017-01-27 VITALS
BODY MASS INDEX: 31.85 KG/M2 | TEMPERATURE: 98 F | WEIGHT: 198.19 LBS | HEIGHT: 66 IN | HEART RATE: 94 BPM | SYSTOLIC BLOOD PRESSURE: 142 MMHG | DIASTOLIC BLOOD PRESSURE: 77 MMHG | RESPIRATION RATE: 18 BRPM

## 2017-01-27 DIAGNOSIS — Z51.11 ENCOUNTER FOR ANTINEOPLASTIC CHEMOTHERAPY: ICD-10-CM

## 2017-01-27 DIAGNOSIS — Z51.11 ENCOUNTER FOR ANTINEOPLASTIC CHEMOTHERAPY: Primary | ICD-10-CM

## 2017-01-27 DIAGNOSIS — C50.512 MALIGNANT NEOPLASM OF LOWER-OUTER QUADRANT OF LEFT FEMALE BREAST: ICD-10-CM

## 2017-01-27 DIAGNOSIS — C50.512 MALIGNANT NEOPLASM OF LOWER-OUTER QUADRANT OF LEFT FEMALE BREAST: Primary | ICD-10-CM

## 2017-01-27 PROCEDURE — 96375 TX/PRO/DX INJ NEW DRUG ADDON: CPT

## 2017-01-27 PROCEDURE — 1159F MED LIST DOCD IN RCRD: CPT | Mod: S$GLB,,, | Performed by: INTERNAL MEDICINE

## 2017-01-27 PROCEDURE — 99213 OFFICE O/P EST LOW 20 MIN: CPT | Mod: S$GLB,,, | Performed by: INTERNAL MEDICINE

## 2017-01-27 PROCEDURE — 96413 CHEMO IV INFUSION 1 HR: CPT

## 2017-01-27 PROCEDURE — 96417 CHEMO IV INFUS EACH ADDL SEQ: CPT

## 2017-01-27 PROCEDURE — 1157F ADVNC CARE PLAN IN RCRD: CPT | Mod: S$GLB,,, | Performed by: INTERNAL MEDICINE

## 2017-01-27 PROCEDURE — 96367 TX/PROPH/DG ADDL SEQ IV INF: CPT

## 2017-01-27 PROCEDURE — 63600175 PHARM REV CODE 636 W HCPCS: Performed by: INTERNAL MEDICINE

## 2017-01-27 PROCEDURE — 1160F RVW MEDS BY RX/DR IN RCRD: CPT | Mod: S$GLB,,, | Performed by: INTERNAL MEDICINE

## 2017-01-27 PROCEDURE — 99999 PR PBB SHADOW E&M-EST. PATIENT-LVL III: CPT | Mod: PBBFAC,,, | Performed by: INTERNAL MEDICINE

## 2017-01-27 PROCEDURE — 1126F AMNT PAIN NOTED NONE PRSNT: CPT | Mod: S$GLB,,, | Performed by: INTERNAL MEDICINE

## 2017-01-27 PROCEDURE — 99499 UNLISTED E&M SERVICE: CPT | Mod: S$GLB,,, | Performed by: INTERNAL MEDICINE

## 2017-01-27 PROCEDURE — 3077F SYST BP >= 140 MM HG: CPT | Mod: S$GLB,,, | Performed by: INTERNAL MEDICINE

## 2017-01-27 PROCEDURE — 3078F DIAST BP <80 MM HG: CPT | Mod: S$GLB,,, | Performed by: INTERNAL MEDICINE

## 2017-01-27 PROCEDURE — 25000003 PHARM REV CODE 250: Performed by: INTERNAL MEDICINE

## 2017-01-27 RX ORDER — ONDANSETRON 4 MG/1
4 TABLET, ORALLY DISINTEGRATING ORAL EVERY 8 HOURS PRN
Qty: 20 TABLET | Refills: 3 | Status: SHIPPED | OUTPATIENT
Start: 2017-01-27 | End: 2017-02-03

## 2017-01-27 RX ORDER — SODIUM CHLORIDE 0.9 % (FLUSH) 0.9 %
10 SYRINGE (ML) INJECTION
Status: CANCELLED | OUTPATIENT
Start: 2017-01-27

## 2017-01-27 RX ORDER — LORAZEPAM 2 MG/ML
1 INJECTION INTRAMUSCULAR
Status: DISCONTINUED | OUTPATIENT
Start: 2017-01-27 | End: 2017-02-03 | Stop reason: ALTCHOICE

## 2017-01-27 RX ORDER — HEPARIN 100 UNIT/ML
500 SYRINGE INTRAVENOUS
Status: DISCONTINUED | OUTPATIENT
Start: 2017-01-27 | End: 2017-01-27 | Stop reason: HOSPADM

## 2017-01-27 RX ORDER — HEPARIN 100 UNIT/ML
500 SYRINGE INTRAVENOUS
Status: CANCELLED | OUTPATIENT
Start: 2017-01-27

## 2017-01-27 RX ORDER — SODIUM CHLORIDE 0.9 % (FLUSH) 0.9 %
10 SYRINGE (ML) INJECTION
Status: DISCONTINUED | OUTPATIENT
Start: 2017-01-27 | End: 2017-01-27 | Stop reason: HOSPADM

## 2017-01-27 RX ORDER — LORAZEPAM 2 MG/ML
1 INJECTION INTRAMUSCULAR
Status: CANCELLED
Start: 2017-01-27

## 2017-01-27 RX ORDER — LORAZEPAM 1 MG/1
1 TABLET ORAL EVERY 6 HOURS PRN
Qty: 30 TABLET | Refills: 3 | Status: SHIPPED | OUTPATIENT
Start: 2017-01-27 | End: 2017-02-06

## 2017-01-27 RX ADMIN — SODIUM CHLORIDE 150 MG: 9 INJECTION, SOLUTION INTRAVENOUS at 12:01

## 2017-01-27 RX ADMIN — HEPARIN 500 UNITS: 100 SYRINGE at 03:01

## 2017-01-27 RX ADMIN — SODIUM CHLORIDE: 0.9 INJECTION, SOLUTION INTRAVENOUS at 11:01

## 2017-01-27 RX ADMIN — LORAZEPAM 1 MG: 2 INJECTION, SOLUTION INTRAMUSCULAR; INTRAVENOUS at 12:01

## 2017-01-27 RX ADMIN — CYCLOPHOSPHAMIDE 1230 MG: 1 INJECTION, POWDER, FOR SOLUTION INTRAVENOUS; ORAL at 02:01

## 2017-01-27 RX ADMIN — SODIUM CHLORIDE, PRESERVATIVE FREE 10 ML: 5 INJECTION INTRAVENOUS at 03:01

## 2017-01-27 RX ADMIN — DOCETAXEL ANHYDROUS 155 MG: 10 INJECTION, SOLUTION INTRAVENOUS at 12:01

## 2017-01-27 RX ADMIN — PALONOSETRON HYDROCHLORIDE 0.25 MG: 0.25 INJECTION INTRAVENOUS at 11:01

## 2017-01-27 NOTE — PLAN OF CARE
Problem: Patient Care Overview (Adult)  Goal: Plan of Care Review  Outcome: Ongoing (interventions implemented as appropriate)  All questions answered to patient's satisfaction. Pt. Tolerated treatment well. Discharged without complaints or signs of adverse effects. Pt. aware of Neulasta injection appointment for tomorrow.

## 2017-01-27 NOTE — MR AVS SNAPSHOT
Patient Information     Patient Name Sex Lima Tom Female 1950      Visit Information        Provider Department Dept Phone Center    2017 10:30 AM BERNY, CHEMO [facility] [phone] Kris Carver      Patient Instructions      Discharge Instructions for Chemotherapy  Your healthcare provider prescribed a type of medicine therapy for you called chemotherapy. Healthcare providers prescribe chemotherapy for many different types of illnesses, including cancer. There are many types of chemotherapy. This sheet provides general guidelines on how you can take care of yourself after your chemotherapy.  Mouth care  Dont be discouraged if you get mouth sores, even if you are following all your healthcare providers instructions. Many people get mouth sores as a side effect of chemotherapy. Heres what you can do to prevent mouth sores:  · Keep your mouth clean. Brush your teeth with a soft-bristle toothbrush after every meal.  · Ask if you should use a toothpaste with fluoride, or a mixture of 1 teaspoon of salt in 8-ounces of water to brush your teeth.   · Use an oral swab or special soft toothbrush if your gums bleed during regular brushing.  · Don't use dental floss if it causes your gums to bleed.  · Use any mouthwashes given to you as directed.  · If you cant tolerate regular methods, use salt and baking soda to clean your mouth. Mix 1 teaspoon of salt and 1 teaspoon of baking soda into an 8-ounce glass of warm water. Swish and spit.  · If you wear dentures, you may be told to wear them only when you eat, ask your healthcare provider. Clean dentures twice a day and soak in antimicrobial solution when you aren't wearing them. Rinse your mouth after each meal.   · Watch your mouth and tongue for white patches. This may be a sign of a type of yeast infection (thrush), a common side effect of chemotherapy. Be sure to tell your healthcare provider about these patches. Medicine can be  prescribed to treat it.  Other home care  Here's what else you can do:  · Try to exercise. Exercise keeps you strong and keeps your heart and lungs active. Walking and yoga are good types of exercise.   · Keep clean. During chemotherapy, your body cant fight infection very well. Take short baths or showers.  ¨ Wash your hands before you eat and after going to the bathroom.  ¨ Use moisturizing soap. Chemotherapy can make your skin dry.  ¨ Apply moisturizing lotion several times a day to help relieve dry skin.  ¨ Dont take very hot or very cold showers or baths.  · Dont be surprised if your chemotherapy causes slight burns to your skin--usually on the hands and feet. Some medicines used in high doses cause this to happen. Ask for a special cream to help relieve the burn and protect your skin.  · Avoid people who are sick with illnesses and diseases you could catch, such as colds, flu, measles, or chicken pox as well as people who have recently had vaccinations for these illnesses.   · Let your healthcare provider know if your throat is sore. You may have an infection that needs treatment.  · Remember, many patients feel sick and lose their appetites during treatment. Eat small meals several times a day to keep your strength up:  ¨ Choose bland foods with little taste or smell if you are reacting strongly to food.  ¨ Be sure to cook all food thoroughly. This kills bacteria and helps you avoid infection.  ¨ Eat foods that are soft. Soft foods are less likely to cause stomach irritation.  ¨ Try to eat a variety of foods for a well-balanced diet. Drink plenty of fluids and eat foods with fiber to avoid constipation.      When to call your healthcare provider  Call your healthcare provider right away if you have any of the following:  · Unexplained bleeding  · Trouble concentrating  · Ongoing fatigue  · Shortness of breath, wheezing, trouble breathing, or bad cough  · Rapid, irregular heartbeat, or chest  pain  · Dizziness, lightheadedness  · Constant feeling of being cold  · Hives or a cut or rash that swells, turns red, feels hot or painful, or begins to ooze  · Burning when you urinate  · Fever of 100.4°F (38°C) or higher, or chills   © 9285-1818 [employer]. [address] [place] [address]. All rights reserved. This information is not intended as a substitute for professional medical care. Always follow your healthcare professional's instructions.             Your Current Medications Are     alprazolam (XANAX) 0.5 MG tablet    ascorbic acid (VITAMIN C) 1000 MG tablet    aspirin 81 MG chewable tablet    CALCIUM CARBONATE/VITAMIN D3 (CALTRATE 600 + D ORAL)    CALCIUM POLYCARBOPHIL (FIBERCON ORAL)    celecoxib (CELEBREX) 100 MG capsule    coenzyme Q10-vitamin E (COQ10 ) 100-100 mg-unit Cap    dexamethasone (DECADRON) 4 MG Tab    estradiol (VAGIFEM) 10 mcg Tab    gabapentin (NEURONTIN) 300 MG capsule    hydrocodone-acetaminophen 5-325mg (NORCO) 5-325 mg per tablet    levothyroxine (SYNTHROID) 50 MCG tablet    lidocaine (LIDODERM) 5 %(700 mg/patch)    lisinopril (PRINIVIL,ZESTRIL) 20 MG tablet    metformin (GLUCOPHAGE) 500 MG tablet    MULTIVITAMIN ORAL    NON FORMULARY MEDICATION    omega-3 fatty acids-vitamin E (FISH OIL) 1,000 mg Cap    pantoprazole (PROTONIX) 40 MG tablet    simvastatin (ZOCOR) 20 MG tablet    vitamin D 1000 units Tab    ondansetron (ZOFRAN-ODT) 8 MG TbDL (Discontinued)    lorazepam (ATIVAN) 1 MG tablet    ondansetron (ZOFRAN-ODT) 4 MG TbDL      Facility-Administered Medications     cyclophosphamide (CYTOXAN) 600 mg/m2 = 1,230 mg in sodium chloride 0.9% 250 mL chemo infusion    denosumab (PROLIA) injection 60 mg    docetaxel (TAXOTERE) 75 mg/m2 = 155 mg in sodium chloride 0.9% 250 mL chemo infusion    fosaprepitant 150 mg in sodium chloride 0.9% 150 mL IVPB    heparin, porcine (PF) 100 unit/mL injection flush 500 Units    lorazepam injection 1 mg    palonosetron  0.25mg/dexamethasone 10mg IVPB    sodium chloride 0.9% 250 mL flush bag    sodium chloride 0.9% flush 10 mL      Appointments for Next Year     1/28/2017  9:45 AM INJECTION (15 min.) [facility], [facility]    Arrive at check-in approximately 15 minutes before your scheduled appointment time. Bring all outside medical records and imaging, along with a list of your current medications and insurance card.    [facility], 5th Floor    2/6/2017 10:15 AM COLOR FLOW DOPPLER ECHO (45 min.) [facility] E[facility], MAIN [place]    INSTRUCTIONS FOR COMPLETE ECHOCARDIOGRAM EXAM WITH COLOR FLOW DOPPLER You have been scheduled for a Complete Color Flow Doppler Echocardiogram. This test uses ultrasound waves (harmless sound waves) to give detailed images of heart size, function, and structure, and blood flow through the valves and great vessels.  NO PREPARATION IS NECESSARY. YOU MAY EAT AND TAKE YOUR MEDICATIONS AS USUAL.  Please wear a two-piece outfit. You will be asked to undress from the waist up. Women will put on a patient gown opening to the front. Small pads (electrodes) will be put on your chest to monitor your heart beat. You will be asked to lie on your left side, and will need to remain quiet during the exam.  A transducer coated with a cool gel will be moved firmly over your chest. This transducer creates sound waves that make images of your heart. A computer changes the sound waves into images that are seen on a screen. You may hear loud noises from the echo machine; this is a normal occurrence. You may be asked to exhale and hold your breath for a few seconds. Air in your lungs can affect the images.  The images of your heart are recorded on video tape so the cardiologist can review them later. Your doctor will be informed of the test results. The test will take approximately one hour to complete.  If you are unable to keep your appointment, please call us at  [phone] or Gee call [phone] so that another appointment can be scheduled for you. Please note that family members or friends are not allowed to accompany you while you are in the testing area.  Check your appointment slip for the location where the test will be done.    3rd Floor - [facility] [place]    2/14/2017  9:30 AM HEALTH ASSESSMENT (60 min.) [facility] HRA, [facility] 1    Arrive at check-in approximately 15 minutes before your scheduled appointment time. Bring all outside medical records and imaging, along with a list of your current medications and insurance card. We are committed to providing you with the care you need. One way we do this is through a free and personalized annual Health Risk Assessment (HRA). As a reminder, an HRA determines your current overall health status, identifies possible chronic diseases and potential risks, and supports development of a plan to maintain and improve your health. During this visit, you will be seen by an advanced practice provider who will provide your primary care physician with more detailed information than your regular annual physical.    [facility], 8th Floor, [address] Please park in [facility] Parking Garage.    2/21/2017  9:00 AM EDUCATION (60 min.) [facility] DIABETES EDUCATOR, INT MED 1    Arrive at check-in approximately 15 minutes before your scheduled appointment time. Bring all outside medical records and imaging, along with a list of your current medications and insurance card.    [facility] Bldg.    3/7/2017 10:15 AM ESTABLISHED PATIENT (15 min.) [facility] Jimmy Bains MD    Arrive at check-in approximately 15 minutes before your scheduled appointment time. Bring all outside medical records and imaging, along with a list of your current medications and insurance card.    [facility]    3/13/2017 10:00 AM NON  "FASTING LAB (5 min.) [facility], APPOINTMENT [place]    Arrive at check-in approximately 15 minutes before your scheduled appointment time. Bring all outside medical records and imaging, along with a list of your current medications and insurance card.    2nd Floor    3/21/2017  9:40 AM ESTABLISHED PATIENT (20 min.) [facility] Radha Beth NP    Arrive at check-in approximately 15 minutes before your scheduled appointment time. Bring all outside medical records and imaging, along with a list of your current medications and insurance card.    [facility] - 8th Floor Please park in [facility] Parking Garage.         Default Flowsheet Data (last 24 hours)      Amb Complex Vitals Rhys        01/27/17 1309 01/27/17 1253 01/27/17 1126 01/27/17 1010       Measurements    Weight   89.9 kg (198 lb 3.1 oz) 89.1 kg (196 lb 6.9 oz)     Height   5' 6" (1.676 m)      BSA (Calculated - sq m)   2.05 sq meters      BMI (Calculated)   32.1      BP (!)  142/77 138/68  (!)  157/73     Temp  97.8 °F (36.6 °C)  97.8 °F (36.6 °C)     Pulse 94 91  (!)  116     Resp 18 18  16     Pain Assessment    Pain Score Zero Zero  Zero             Allergies     Erythromycin     Other reaction(s): Nausea    Erythromycin (Bulk)     Other reaction(s): Nausea    Oxycodone Nausea And Vomiting    Oxycodone-acetaminophen Nausea And Vomiting    Other reaction(s): Nausea      Medications You Received from 01/26/2017 1533 to 01/27/2017 1533        Date/Time Order Dose Route Action     01/27/2017 1409 cyclophosphamide (CYTOXAN) 600 mg/m2 = 1,230 mg in sodium chloride 0.9% 250 mL chemo infusion 1,230 mg Intravenous New Bag     01/27/2017 1247 docetaxel (TAXOTERE) 75 mg/m2 = 155 mg in sodium chloride 0.9% 250 mL chemo infusion 155 mg Intravenous New Bag     01/27/2017 1210 fosaprepitant 150 mg in sodium chloride 0.9% 150 mL IVPB 150 mg Intravenous New Bag     01/27/2017 1530 heparin, porcine (PF) 100 unit/mL " injection flush 500 Units 500 Units Intravenous Given     01/27/2017 1242 lorazepam injection 1 mg 1 mg Intravenous Given     01/27/2017 1135 palonosetron 0.25mg/dexamethasone 10mg IVPB 0.25 mg Intravenous New Bag     01/27/2017 1130 sodium chloride 0.9% 250 mL flush bag   Intravenous New Bag     01/27/2017 1530 sodium chloride 0.9% flush 10 mL 10 mL Intravenous Given      Current Discharge Medication List     Cannot display discharge medications since this is not an admission.

## 2017-01-27 NOTE — PROGRESS NOTES
Subjective:       Patient ID: Lima Maldonado is a 66 y.o. female.    Chief Complaint: Chemotherapy    HPI Ms Maldonado return to clinic for follow-up of recent diagnosis of left breast cancer.  She had stage I ER positive disease with an intermediate risk Oncotype score.         At the time of her last visit we discussed adjuvant chemotherapy as her Oncotype score was intermediate risk  Breast history: mammography on September 20, 2016 demonstrated a new irregular mass with spiculated margins seen in the left breast at  5 o'clock along the surgical scar site.   ultrasound DEMONSTRATED A SOLID 8 X 8 X 7 MM MASS.      A needle biopsy in September 27 showed infiltrating  carcinoma, histologic grade 3, nuclear grade 2, mitotic index 1.  Tumor was 90% ER positive, 70% MD positive, and 1+ HER-2.    MRI of the breast showed a 1.7 x 1.3 cm lesion in the lower outer quadrant of the left breast.  PET/CT on October 7 was negative for any evidence of distant metastasis.  In the  On November 16 bilateral mastectomies were performed.  Left pressure 1.5 cm intermediate grade carcinoma with ductal and lobular features.  There was focal dermal invasion.  Margins were negative.  The right breast was without abnormality.  Oncotype score returned 25 -  intermediate risk.    Past history:left    breast, T1cN1, ER positive, status post mastectomy with reconstruction and   postoperative chemotherapy with four cycles of Adriamycin and Cytoxan and    five years of tamoxifen.  Her original diagnosis was in 1997  Review of Systems    Objective:      Physical Exam    Assessment:       1. Malignant neoplasm of lower-outer quadrant of left female breast    2. Encounter for antineoplastic chemotherapy        Plan:       I reviewed side effects of chemotherapy including alopecia, peripheral neuropathy, bone marrow and GI effects.  Written informed consent was signed.

## 2017-01-27 NOTE — PATIENT INSTRUCTIONS
Discharge Instructions for Chemotherapy  Your healthcare provider prescribed a type of medicine therapy for you called chemotherapy. Healthcare providers prescribe chemotherapy for many different types of illnesses, including cancer. There are many types of chemotherapy. This sheet provides general guidelines on how you can take care of yourself after your chemotherapy.  Mouth care  Dont be discouraged if you get mouth sores, even if you are following all your healthcare providers instructions. Many people get mouth sores as a side effect of chemotherapy. Heres what you can do to prevent mouth sores:  · Keep your mouth clean. Brush your teeth with a soft-bristle toothbrush after every meal.  · Ask if you should use a toothpaste with fluoride, or a mixture of 1 teaspoon of salt in 8-ounces of water to brush your teeth.   · Use an oral swab or special soft toothbrush if your gums bleed during regular brushing.  · Don't use dental floss if it causes your gums to bleed.  · Use any mouthwashes given to you as directed.  · If you cant tolerate regular methods, use salt and baking soda to clean your mouth. Mix 1 teaspoon of salt and 1 teaspoon of baking soda into an 8-ounce glass of warm water. Swish and spit.  · If you wear dentures, you may be told to wear them only when you eat, ask your healthcare provider. Clean dentures twice a day and soak in antimicrobial solution when you aren't wearing them. Rinse your mouth after each meal.   · Watch your mouth and tongue for white patches. This may be a sign of a type of yeast infection (thrush), a common side effect of chemotherapy. Be sure to tell your healthcare provider about these patches. Medicine can be prescribed to treat it.  Other home care  Here's what else you can do:  · Try to exercise. Exercise keeps you strong and keeps your heart and lungs active. Walking and yoga are good types of exercise.   · Keep clean. During chemotherapy, your body cant fight  infection very well. Take short baths or showers.  ¨ Wash your hands before you eat and after going to the bathroom.  ¨ Use moisturizing soap. Chemotherapy can make your skin dry.  ¨ Apply moisturizing lotion several times a day to help relieve dry skin.  ¨ Dont take very hot or very cold showers or baths.  · Dont be surprised if your chemotherapy causes slight burns to your skin--usually on the hands and feet. Some medicines used in high doses cause this to happen. Ask for a special cream to help relieve the burn and protect your skin.  · Avoid people who are sick with illnesses and diseases you could catch, such as colds, flu, measles, or chicken pox as well as people who have recently had vaccinations for these illnesses.   · Let your healthcare provider know if your throat is sore. You may have an infection that needs treatment.  · Remember, many patients feel sick and lose their appetites during treatment. Eat small meals several times a day to keep your strength up:  ¨ Choose bland foods with little taste or smell if you are reacting strongly to food.  ¨ Be sure to cook all food thoroughly. This kills bacteria and helps you avoid infection.  ¨ Eat foods that are soft. Soft foods are less likely to cause stomach irritation.  ¨ Try to eat a variety of foods for a well-balanced diet. Drink plenty of fluids and eat foods with fiber to avoid constipation.      When to call your healthcare provider  Call your healthcare provider right away if you have any of the following:  · Unexplained bleeding  · Trouble concentrating  · Ongoing fatigue  · Shortness of breath, wheezing, trouble breathing, or bad cough  · Rapid, irregular heartbeat, or chest pain  · Dizziness, lightheadedness  · Constant feeling of being cold  · Hives or a cut or rash that swells, turns red, feels hot or painful, or begins to ooze  · Burning when you urinate  · Fever of 100.4°F (38°C) or higher, or chills   © 1837-2577 [employer]  [address] [place] [address]. All rights reserved. This information is not intended as a substitute for professional medical care. Always follow your healthcare professional's instructions.

## 2017-01-28 ENCOUNTER — INFUSION (OUTPATIENT)
Dept: INFUSION THERAPY | Facility: HOSPITAL | Age: 67
End: 2017-01-28
Attending: INTERNAL MEDICINE
Payer: MEDICARE

## 2017-01-28 DIAGNOSIS — Z51.11 ENCOUNTER FOR ANTINEOPLASTIC CHEMOTHERAPY: Primary | ICD-10-CM

## 2017-01-28 DIAGNOSIS — C50.512 MALIGNANT NEOPLASM OF LOWER-OUTER QUADRANT OF LEFT FEMALE BREAST: ICD-10-CM

## 2017-01-28 PROCEDURE — 63600175 PHARM REV CODE 636 W HCPCS: Performed by: INTERNAL MEDICINE

## 2017-01-28 PROCEDURE — 96372 THER/PROPH/DIAG INJ SC/IM: CPT

## 2017-01-28 RX ADMIN — PEGFILGRASTIM 6 MG: 6 INJECTION SUBCUTANEOUS at 09:01

## 2017-01-30 ENCOUNTER — TELEPHONE (OUTPATIENT)
Dept: HEMATOLOGY/ONCOLOGY | Facility: CLINIC | Age: 67
End: 2017-01-30

## 2017-01-30 NOTE — TELEPHONE ENCOUNTER
Informed patient she can take pain meds left from surgery and she voiced understanding. Patient said she will check to see if she has any left and let us know if something need to be called in.

## 2017-01-30 NOTE — TELEPHONE ENCOUNTER
----- Message from Teresita Vines sent at 1/30/2017 10:31 AM CST -----  Contact: self  Pt is experiencing bone pain all over and has a few questions.    Contact number [phone]

## 2017-01-30 NOTE — TELEPHONE ENCOUNTER
Patient states she had bone pain last night #10 and today it is #7. She know it's from the neulasta, but does not know what she can take. She did take 2/ 500 mg tylenol and a ativan. She would like to know what she can do to help with the pain.

## 2017-02-02 ENCOUNTER — TELEPHONE (OUTPATIENT)
Dept: HEMATOLOGY/ONCOLOGY | Facility: CLINIC | Age: 67
End: 2017-02-02

## 2017-02-02 ENCOUNTER — HOSPITAL ENCOUNTER (INPATIENT)
Facility: HOSPITAL | Age: 67
LOS: 1 days | Discharge: HOME | DRG: 864 | End: 2017-02-03
Attending: EMERGENCY MEDICINE | Admitting: INTERNAL MEDICINE
Payer: MEDICARE

## 2017-02-02 DIAGNOSIS — Z51.11 ENCOUNTER FOR ANTINEOPLASTIC CHEMOTHERAPY: ICD-10-CM

## 2017-02-02 DIAGNOSIS — C50.512 PRIMARY CANCER OF LOWER OUTER QUADRANT OF LEFT FEMALE BREAST: ICD-10-CM

## 2017-02-02 DIAGNOSIS — T81.31XA DISRUPTION OF EXTERNAL OPERATION (SURGICAL) WOUND: ICD-10-CM

## 2017-02-02 DIAGNOSIS — K76.0 FATTY LIVER: ICD-10-CM

## 2017-02-02 DIAGNOSIS — M85.80 OSTEOPENIA: ICD-10-CM

## 2017-02-02 DIAGNOSIS — E66.9 OBESITY, CLASS I, BMI 30-34.9: ICD-10-CM

## 2017-02-02 DIAGNOSIS — C50.512 MALIGNANT NEOPLASM OF LOWER-OUTER QUADRANT OF LEFT FEMALE BREAST: ICD-10-CM

## 2017-02-02 DIAGNOSIS — R50.9 FEVER: ICD-10-CM

## 2017-02-02 DIAGNOSIS — Z85.3 PERSONAL HISTORY OF BREAST CANCER: Primary | ICD-10-CM

## 2017-02-02 DIAGNOSIS — R50.81 FEVER IN OTHER DISEASES: ICD-10-CM

## 2017-02-02 DIAGNOSIS — N95.2 VAGINAL ATROPHY: ICD-10-CM

## 2017-02-02 DIAGNOSIS — M81.0 SENILE OSTEOPOROSIS: ICD-10-CM

## 2017-02-02 DIAGNOSIS — F51.01 PRIMARY INSOMNIA: ICD-10-CM

## 2017-02-02 DIAGNOSIS — R16.0 HEPATOMEGALY: ICD-10-CM

## 2017-02-02 LAB
ALBUMIN SERPL BCP-MCNC: 3.8 G/DL
ALP SERPL-CCNC: 117 U/L
ALT SERPL W/O P-5'-P-CCNC: 35 U/L
ANION GAP SERPL CALC-SCNC: 10 MMOL/L
AST SERPL-CCNC: 31 U/L
BASOPHILS # BLD AUTO: ABNORMAL K/UL
BASOPHILS NFR BLD: 0 %
BILIRUB SERPL-MCNC: 1.1 MG/DL
BILIRUB UR QL STRIP: NEGATIVE
BUN SERPL-MCNC: 8 MG/DL
CALCIUM SERPL-MCNC: 9.2 MG/DL
CHLORIDE SERPL-SCNC: 101 MMOL/L
CLARITY UR REFRACT.AUTO: CLEAR
CO2 SERPL-SCNC: 24 MMOL/L
COLOR UR AUTO: YELLOW
CREAT SERPL-MCNC: 0.8 MG/DL
DIFFERENTIAL METHOD: ABNORMAL
EOSINOPHIL # BLD AUTO: ABNORMAL K/UL
EOSINOPHIL NFR BLD: 3 %
ERYTHROCYTE [DISTWIDTH] IN BLOOD BY AUTOMATED COUNT: 12.5 %
EST. GFR  (AFRICAN AMERICAN): >60 ML/MIN/1.73 M^2
EST. GFR  (NON AFRICAN AMERICAN): >60 ML/MIN/1.73 M^2
FLUAV AG SPEC QL IA: NEGATIVE
FLUBV AG SPEC QL IA: NEGATIVE
GLUCOSE SERPL-MCNC: 120 MG/DL
GLUCOSE UR QL STRIP: NEGATIVE
HCT VFR BLD AUTO: 41.5 %
HGB BLD-MCNC: 14.2 G/DL
HGB UR QL STRIP: NEGATIVE
HYALINE CASTS UR QL AUTO: 1 /LPF
INR PPP: 1
KETONES UR QL STRIP: NEGATIVE
LACTATE SERPL-SCNC: 1.3 MMOL/L
LEUKOCYTE ESTERASE UR QL STRIP: ABNORMAL
LYMPHOCYTES # BLD AUTO: ABNORMAL K/UL
LYMPHOCYTES NFR BLD: 27 %
MCH RBC QN AUTO: 28.7 PG
MCHC RBC AUTO-ENTMCNC: 34.2 %
MCV RBC AUTO: 84 FL
METAMYELOCYTES NFR BLD MANUAL: 5 %
MICROSCOPIC COMMENT: NORMAL
MONOCYTES # BLD AUTO: ABNORMAL K/UL
MONOCYTES NFR BLD: 18 %
MYELOCYTES NFR BLD MANUAL: 4 %
NEUTROPHILS NFR BLD: 41 %
NEUTS BAND NFR BLD MANUAL: 2 %
NITRITE UR QL STRIP: NEGATIVE
NRBC BLD-RTO: ABNORMAL /100 WBC
PH UR STRIP: 7 [PH] (ref 5–8)
PLATELET # BLD AUTO: 163 K/UL
PLATELET BLD QL SMEAR: ABNORMAL
PMV BLD AUTO: 10.2 FL
POTASSIUM SERPL-SCNC: 4.4 MMOL/L
PROT SERPL-MCNC: 7.1 G/DL
PROT UR QL STRIP: NEGATIVE
PROTHROMBIN TIME: 11.1 SEC
RBC # BLD AUTO: 4.94 M/UL
RBC #/AREA URNS AUTO: 2 /HPF (ref 0–4)
SODIUM SERPL-SCNC: 135 MMOL/L
SP GR UR STRIP: 1 (ref 1–1.03)
SPECIMEN SOURCE: NORMAL
SQUAMOUS #/AREA URNS AUTO: 1 /HPF
URN SPEC COLLECT METH UR: ABNORMAL
UROBILINOGEN UR STRIP-ACNC: NEGATIVE EU/DL
WBC # BLD AUTO: 3.38 K/UL
WBC #/AREA URNS AUTO: 3 /HPF (ref 0–5)

## 2017-02-02 PROCEDURE — 99285 EMERGENCY DEPT VISIT HI MDM: CPT | Mod: ,,, | Performed by: EMERGENCY MEDICINE

## 2017-02-02 PROCEDURE — 93010 ELECTROCARDIOGRAM REPORT: CPT | Mod: ,,, | Performed by: INTERNAL MEDICINE

## 2017-02-02 PROCEDURE — 87400 INFLUENZA A/B EACH AG IA: CPT | Mod: 59

## 2017-02-02 PROCEDURE — 25000003 PHARM REV CODE 250: Performed by: EMERGENCY MEDICINE

## 2017-02-02 PROCEDURE — 96361 HYDRATE IV INFUSION ADD-ON: CPT

## 2017-02-02 PROCEDURE — 85007 BL SMEAR W/DIFF WBC COUNT: CPT

## 2017-02-02 PROCEDURE — 93005 ELECTROCARDIOGRAM TRACING: CPT

## 2017-02-02 PROCEDURE — 63600175 PHARM REV CODE 636 W HCPCS: Performed by: EMERGENCY MEDICINE

## 2017-02-02 PROCEDURE — 96375 TX/PRO/DX INJ NEW DRUG ADDON: CPT

## 2017-02-02 PROCEDURE — 96366 THER/PROPH/DIAG IV INF ADDON: CPT

## 2017-02-02 PROCEDURE — 81001 URINALYSIS AUTO W/SCOPE: CPT

## 2017-02-02 PROCEDURE — 99285 EMERGENCY DEPT VISIT HI MDM: CPT | Mod: 25

## 2017-02-02 PROCEDURE — 96365 THER/PROPH/DIAG IV INF INIT: CPT | Mod: XS

## 2017-02-02 PROCEDURE — 87040 BLOOD CULTURE FOR BACTERIA: CPT

## 2017-02-02 PROCEDURE — 83605 ASSAY OF LACTIC ACID: CPT

## 2017-02-02 PROCEDURE — 87086 URINE CULTURE/COLONY COUNT: CPT

## 2017-02-02 PROCEDURE — 80053 COMPREHEN METABOLIC PANEL: CPT

## 2017-02-02 PROCEDURE — 20600001 HC STEP DOWN PRIVATE ROOM

## 2017-02-02 PROCEDURE — 87205 SMEAR GRAM STAIN: CPT

## 2017-02-02 PROCEDURE — 85610 PROTHROMBIN TIME: CPT

## 2017-02-02 PROCEDURE — 85027 COMPLETE CBC AUTOMATED: CPT

## 2017-02-02 PROCEDURE — 25000003 PHARM REV CODE 250: Performed by: INTERNAL MEDICINE

## 2017-02-02 RX ORDER — HYDROCODONE BITARTRATE AND ACETAMINOPHEN 5; 325 MG/1; MG/1
1 TABLET ORAL
Status: COMPLETED | OUTPATIENT
Start: 2017-02-02 | End: 2017-02-02

## 2017-02-02 RX ORDER — IPRATROPIUM BROMIDE AND ALBUTEROL SULFATE 2.5; .5 MG/3ML; MG/3ML
3 SOLUTION RESPIRATORY (INHALATION) EVERY 4 HOURS PRN
Status: DISCONTINUED | OUTPATIENT
Start: 2017-02-02 | End: 2017-02-03 | Stop reason: HOSPADM

## 2017-02-02 RX ORDER — INSULIN ASPART 100 [IU]/ML
0-5 INJECTION, SOLUTION INTRAVENOUS; SUBCUTANEOUS
Status: DISCONTINUED | OUTPATIENT
Start: 2017-02-02 | End: 2017-02-03 | Stop reason: HOSPADM

## 2017-02-02 RX ORDER — PANTOPRAZOLE SODIUM 40 MG/1
40 TABLET, DELAYED RELEASE ORAL
Status: DISCONTINUED | OUTPATIENT
Start: 2017-02-03 | End: 2017-02-03 | Stop reason: HOSPADM

## 2017-02-02 RX ORDER — IBUPROFEN 200 MG
24 TABLET ORAL
Status: DISCONTINUED | OUTPATIENT
Start: 2017-02-02 | End: 2017-02-03 | Stop reason: HOSPADM

## 2017-02-02 RX ORDER — ASCORBIC ACID 500 MG
1000 TABLET ORAL DAILY
Status: DISCONTINUED | OUTPATIENT
Start: 2017-02-03 | End: 2017-02-03

## 2017-02-02 RX ORDER — ONDANSETRON 8 MG/1
8 TABLET, ORALLY DISINTEGRATING ORAL EVERY 8 HOURS PRN
Status: DISCONTINUED | OUTPATIENT
Start: 2017-02-02 | End: 2017-02-03 | Stop reason: HOSPADM

## 2017-02-02 RX ORDER — GLUCAGON 1 MG
1 KIT INJECTION
Status: DISCONTINUED | OUTPATIENT
Start: 2017-02-02 | End: 2017-02-03 | Stop reason: HOSPADM

## 2017-02-02 RX ORDER — CEFEPIME HYDROCHLORIDE 2 G/50ML
2 INJECTION, SOLUTION INTRAVENOUS
Status: COMPLETED | OUTPATIENT
Start: 2017-02-02 | End: 2017-02-02

## 2017-02-02 RX ORDER — ONDANSETRON 2 MG/ML
4 INJECTION INTRAMUSCULAR; INTRAVENOUS EVERY 12 HOURS PRN
Status: DISCONTINUED | OUTPATIENT
Start: 2017-02-02 | End: 2017-02-03 | Stop reason: HOSPADM

## 2017-02-02 RX ORDER — CHOLECALCIFEROL (VITAMIN D3) 25 MCG
1000 TABLET ORAL DAILY
Status: DISCONTINUED | OUTPATIENT
Start: 2017-02-03 | End: 2017-02-03

## 2017-02-02 RX ORDER — HYDROCODONE BITARTRATE AND ACETAMINOPHEN 5; 325 MG/1; MG/1
1 TABLET ORAL EVERY 4 HOURS PRN
Status: DISCONTINUED | OUTPATIENT
Start: 2017-02-02 | End: 2017-02-03 | Stop reason: HOSPADM

## 2017-02-02 RX ORDER — LORAZEPAM 1 MG/1
1 TABLET ORAL EVERY 6 HOURS PRN
Status: DISCONTINUED | OUTPATIENT
Start: 2017-02-02 | End: 2017-02-03 | Stop reason: HOSPADM

## 2017-02-02 RX ORDER — ACETAMINOPHEN 325 MG/1
650 TABLET ORAL EVERY 6 HOURS PRN
Status: DISCONTINUED | OUTPATIENT
Start: 2017-02-02 | End: 2017-02-03 | Stop reason: HOSPADM

## 2017-02-02 RX ORDER — ENOXAPARIN SODIUM 100 MG/ML
40 INJECTION SUBCUTANEOUS EVERY 24 HOURS
Status: DISCONTINUED | OUTPATIENT
Start: 2017-02-03 | End: 2017-02-03

## 2017-02-02 RX ORDER — CEFEPIME HYDROCHLORIDE 2 G/50ML
2 INJECTION, SOLUTION INTRAVENOUS
Status: DISCONTINUED | OUTPATIENT
Start: 2017-02-03 | End: 2017-02-03

## 2017-02-02 RX ORDER — IBUPROFEN 200 MG
16 TABLET ORAL
Status: DISCONTINUED | OUTPATIENT
Start: 2017-02-02 | End: 2017-02-03 | Stop reason: HOSPADM

## 2017-02-02 RX ORDER — NAPROXEN SODIUM 220 MG/1
81 TABLET, FILM COATED ORAL DAILY
Status: DISCONTINUED | OUTPATIENT
Start: 2017-02-03 | End: 2017-02-03 | Stop reason: HOSPADM

## 2017-02-02 RX ORDER — LEVOTHYROXINE SODIUM 50 UG/1
50 TABLET ORAL
Status: DISCONTINUED | OUTPATIENT
Start: 2017-02-03 | End: 2017-02-03 | Stop reason: HOSPADM

## 2017-02-02 RX ORDER — SODIUM CHLORIDE 9 MG/ML
INJECTION, SOLUTION INTRAVENOUS CONTINUOUS
Status: ACTIVE | OUTPATIENT
Start: 2017-02-02 | End: 2017-02-03

## 2017-02-02 RX ORDER — ONDANSETRON 2 MG/ML
4 INJECTION INTRAMUSCULAR; INTRAVENOUS ONCE
Status: COMPLETED | OUTPATIENT
Start: 2017-02-02 | End: 2017-02-02

## 2017-02-02 RX ORDER — SIMVASTATIN 20 MG/1
20 TABLET, FILM COATED ORAL NIGHTLY
Status: DISCONTINUED | OUTPATIENT
Start: 2017-02-02 | End: 2017-02-03 | Stop reason: HOSPADM

## 2017-02-02 RX ORDER — LISINOPRIL 10 MG/1
20 TABLET ORAL DAILY
Status: DISCONTINUED | OUTPATIENT
Start: 2017-02-03 | End: 2017-02-03 | Stop reason: HOSPADM

## 2017-02-02 RX ORDER — GABAPENTIN 300 MG/1
300 CAPSULE ORAL NIGHTLY
Status: DISCONTINUED | OUTPATIENT
Start: 2017-02-02 | End: 2017-02-03 | Stop reason: HOSPADM

## 2017-02-02 RX ADMIN — HYDROCODONE BITARTRATE AND ACETAMINOPHEN 1 TABLET: 5; 325 TABLET ORAL at 08:02

## 2017-02-02 RX ADMIN — ONDANSETRON 4 MG: 2 INJECTION INTRAMUSCULAR; INTRAVENOUS at 08:02

## 2017-02-02 RX ADMIN — VANCOMYCIN HYDROCHLORIDE 1750 MG: 1 INJECTION, POWDER, LYOPHILIZED, FOR SOLUTION INTRAVENOUS at 10:02

## 2017-02-02 RX ADMIN — SODIUM CHLORIDE 2586 ML: 0.9 INJECTION, SOLUTION INTRAVENOUS at 08:02

## 2017-02-02 RX ADMIN — GABAPENTIN 300 MG: 300 CAPSULE ORAL at 10:02

## 2017-02-02 RX ADMIN — CEFEPIME HYDROCHLORIDE 2 G: 2 INJECTION, SOLUTION INTRAVENOUS at 08:02

## 2017-02-02 RX ADMIN — SIMVASTATIN 20 MG: 20 TABLET, FILM COATED ORAL at 10:02

## 2017-02-02 NOTE — TELEPHONE ENCOUNTER
----- Message from Teresita Vines sent at 2/2/2017  3:25 PM CST -----  Contact: Pt's  Emery  Pt has a temp of 100.5 and her  would like to know what to do    Contact number [phone]  Or [phone]

## 2017-02-02 NOTE — IP AVS SNAPSHOT
[facility]  [address]  [facility] [address]  Phone: [phone]           Patient Discharge Instructions     Our goal is to set you up for success. This packet includes information on your condition, medications, and your home care. It will help you to care for yourself so you don't get sicker and need to go back to the hospital.     Please ask your nurse if you have any questions.        There are many details to remember when preparing to leave the hospital. Here is what you will need to do:    1. Take your medicine. If you are prescribed medications, review your Medication List in the following pages. You may have new medications to  at the pharmacy and others that you'll need to stop taking. Review the instructions for how and when to take your medications. Talk with your doctor or nurses if you are unsure of what to do.     2. Go to your follow-up appointments. Specific follow-up information is listed in the following pages. Your may be contacted by a transition nurse or clinical provider about future appointments. Be sure we have all of the phone numbers to reach you, if needed. Please contact your provider's office if you are unable to make an appointment.     3. Watch for warning signs. Your doctor or nurse will give you detailed warning signs to watch for and when to call for assistance. These instructions may also include educational information about your condition. If you experience any of warning signs to your health, call your doctor.               [facility]  Unless otherwise directed by your provider, please contact [facility], our nurse care line that is available for 24/7 assistance.     [phone] (toll-free)    Registered nurses in the [facility] provide clinical advisement, health education, appointment booking, and other advisory services.                    ** Verify the list of medication(s) below is accurate and up  to date. Carry this with you in case of emergency. If your medications have changed, please notify your healthcare provider.             Medication List      START taking these medications        Additional Info                      amoxicillin-clavulanate 500-125mg 500-125 mg Tab   Commonly known as:  AUGMENTIN   Quantity:  27 tablet   Refills:  0   Dose:  1 tablet   Indications:  Opportunistic infection prophylaxis    Last time this was given:  500 mg on 2/3/2017  2:31 PM   Instructions:  Take 1 tablet (500 mg total) by mouth 3 (three) times daily.     Begin Date    AM    Noon    PM    Bedtime       ciprofloxacin HCl 500 MG tablet   Commonly known as:  CIPRO   Quantity:  18 tablet   Refills:  0   Dose:  500 mg   Indications:  Opportunistic infection prophylaxis    Last time this was given:  500 mg on 2/3/2017 10:54 AM   Instructions:  Take 1 tablet (500 mg total) by mouth every 12 (twelve) hours.     Begin Date    AM    Noon    PM    Bedtime         CONTINUE taking these medications        Additional Info                      aspirin 81 MG Chew   Refills:  0   Dose:  1 tablet    Last time this was given:  81 mg on 2/3/2017  8:38 AM   Instructions:  Take 1 tablet by mouth.     Begin Date    AM    Noon    PM    Bedtime       CALTRATE 600 + D ORAL   Refills:  0   Dose:  1 tablet    Instructions:  Take 1 tablet by mouth 2 (two) times daily.     Begin Date    AM    Noon    PM    Bedtime       celecoxib 100 MG capsule   Commonly known as:  CELEBREX   Quantity:  30 capsule   Refills:  6   Dose:  100 mg    Instructions:  Take 1 capsule (100 mg total) by mouth daily as needed.     Begin Date    AM    Noon    PM    Bedtime       COQ10  100-100 mg-unit Cap   Refills:  0   Dose:  1 capsule   Generic drug:  coenzyme Q10-vitamin E    Instructions:  Take 1 capsule by mouth once daily.     Begin Date    AM    Noon    PM    Bedtime       estradiol 10 mcg Tab   Commonly known as:  VAGIFEM   Quantity:  8 tablet   Refills:  12     Instructions:  PLACE 1 TABLET (10 MCG TOTAL) VAGINALLY TWICE A WEEK.     Begin Date    AM    Noon    PM    Bedtime       FISH OIL 1,000 mg Cap   Refills:  0   Dose:  1 capsule   Generic drug:  omega-3 fatty acids-vitamin E    Instructions:  Take 1 capsule by mouth once daily.     Begin Date    AM    Noon    PM    Bedtime       gabapentin 300 MG capsule   Commonly known as:  NEURONTIN   Quantity:  30 capsule   Refills:  1   Dose:  300 mg    Last time this was given:  300 mg on 2/2/2017 10:13 PM   Instructions:  Take 1 capsule (300 mg total) by mouth every evening.     Begin Date    AM    Noon    PM    Bedtime       hydrocodone-acetaminophen 5-325mg 5-325 mg per tablet   Commonly known as:  NORCO   Quantity:  6 tablet   Refills:  0   Dose:  1 tablet    Last time this was given:  1 tablet on 2/3/2017 10:54 AM   Instructions:  Take 1 tablet by mouth every 4 (four) hours as needed for Pain.     Begin Date    AM    Noon    PM    Bedtime       levothyroxine 50 MCG tablet   Commonly known as:  SYNTHROID   Quantity:  90 tablet   Refills:  3    Last time this was given:  50 mcg on 2/3/2017  5:53 AM   Instructions:  TAKE 1 TABLET (50 MCG TOTAL) BY MOUTH ONCE DAILY.     Begin Date    AM    Noon    PM    Bedtime       lidocaine 5 %   Commonly known as:  LIDODERM   Refills:  0   Dose:  2 patch    Instructions:  Place 2 patches onto the skin daily as needed. Remove & Discard patch within 12 hours or as directed by MD     Begin Date    AM    Noon    PM    Bedtime       lisinopril 20 MG tablet   Commonly known as:  PRINIVIL,ZESTRIL   Quantity:  90 tablet   Refills:  3   Dose:  20 mg    Last time this was given:  20 mg on 2/3/2017  8:38 AM   Instructions:  Take 1 tablet (20 mg total) by mouth once daily.     Begin Date    AM    Noon    PM    Bedtime       lorazepam 1 MG tablet   Commonly known as:  ATIVAN   Quantity:  30 tablet   Refills:  3   Dose:  1 mg    Instructions:  Take 1 tablet (1 mg total) by mouth every 6 (six) hours as  needed for Anxiety.     Begin Date    AM    Noon    PM    Bedtime       metformin 500 MG tablet   Commonly known as:  GLUCOPHAGE   Refills:  0   Dose:  500 mg    Instructions:  Take 500 mg by mouth 2 (two) times daily with meals.     Begin Date    AM    Noon    PM    Bedtime       MULTIVITAMIN ORAL   Refills:  0    Instructions:  once daily.     Begin Date    AM    Noon    PM    Bedtime       ondansetron 4 MG Tbdl   Commonly known as:  ZOFRAN-ODT   Quantity:  20 tablet   Refills:  3   Dose:  4 mg    Instructions:  Take 1 tablet (4 mg total) by mouth every 8 (eight) hours as needed.     Begin Date    AM    Noon    PM    Bedtime       pantoprazole 40 MG tablet   Commonly known as:  PROTONIX   Quantity:  90 tablet   Refills:  3   Dose:  40 mg    Last time this was given:  40 mg on 2/3/2017  5:53 AM   Instructions:  Take 1 tablet (40 mg total) by mouth before breakfast.     Begin Date    AM    Noon    PM    Bedtime       simvastatin 20 MG tablet   Commonly known as:  ZOCOR   Quantity:  90 tablet   Refills:  3    Last time this was given:  20 mg on 2/2/2017 10:13 PM   Instructions:  TAKE 1 TABLET (20 MG TOTAL) BY MOUTH EVERY EVENING.     Begin Date    AM    Noon    PM    Bedtime       VITAMIN C 1000 MG tablet   Refills:  0   Dose:  1 tablet   Generic drug:  ascorbic acid (vitamin C)    Instructions:  Take 1 tablet by mouth once daily.     Begin Date    AM    Noon    PM    Bedtime       vitamin D 1000 units Tab   Refills:  0   Dose:  1000 Units    Last time this was given:  1,000 Units on 2/3/2017  8:38 AM   Instructions:  Take 1,000 Units by mouth once daily.     Begin Date    AM    Noon    PM    Bedtime            Where to Get Your Medications      These medications were sent to [id number] - [place], [place] - [address] [facility]  [address], [facility] [address]     Phone:  [phone]     amoxicillin-clavulanate 500-125mg 500-125 mg Tab    ciprofloxacin HCl 500 MG tablet                   Please bring to all follow up appointments:    1. A copy of your discharge instructions.  2. All medicines you are currently taking in their original bottles.  3. Identification and insurance card.    Please arrive 15 minutes ahead of scheduled appointment time.    Please call 24 hours in advance if you must reschedule your appointment and/or time.        Your Scheduled Appointments     Feb 06, 2017 10:15 AM CST   Color Flow Doppler Echo with ECHO, [facility] - Echo/Stress Lab ([facility] )    [address]  [place] [id number] [phone]            Feb 14, 2017  9:30 AM CST   Health Assessment with ALTAGRACIA ESPINOZAT 1   [facility] Medicine ([facility])    [address]  [facility] [id number] [phone]            Feb 16, 2017  9:00 AM CST   Non-Fasting Lab with LAB, HEMONC CANCER BLDG   [facility] ([facility])    [address]  [place] [id number] [phone]            Feb 16, 2017 10:00 AM CST   Established Patient Visit with KAYLEN To - Hematology Oncology ([facility])    [address]  [place] [id number] [phone]            Feb 17, 2017 10:30 AM CST   Infusion 240 Min with NOMRADHA, CHEMO   [facility] ([facility])    [address] [facility] [id number] [phone]              Follow-up Information     Follow up with Melissa Lagunas PA-C.    Specialties:  Hematology and Oncology, Hematology    Why:  as scheduled     Contact information:    [address] [facility] [id number] [phone]          Discharge Instructions     Future Orders    Activity as tolerated     Call MD for:  temperature >100.4     Call MD for:  worsening rash     Diet general     Questions:    Total calories:      Fat restriction, if any:      Protein restriction, if any:      Na restriction, if any:      Fluid restriction:      Additional  "restrictions:          Primary Diagnosis     Your primary diagnosis was:  Fever      Admission Information     Date & Time Provider Department CSN    2/2/2017  6:51 PM Maicol Rendon MD [facility] [id number]      Care Providers     Provider Role Specialty Primary office phone    Maicol Rendon MD Attending Provider Hematology and Oncology [phone]    Nimisha Zeng MD Team Attending  Hematology and Oncology [phone]      Your Vitals Were     BP Pulse Temp Resp Height Weight    136/61 (BP Location: Right arm, Patient Position: Sitting, BP Method: Automatic) 103 98.3 °F (36.8 °C) (Oral) 18 5' 6" (1.676 m) 88 kg (194 lb 0.1 oz)    SpO2 BMI             95% 31.31 kg/m2         Recent Lab Values        11/3/2008 2/8/2011 8/9/2011 4/30/2013 5/9/2016 8/23/2016 10/26/2016 2/3/2017      9:10 AM  9:55 AM  8:40 AM  9:35 AM  9:33 AM  8:09 AM  9:27 AM  4:00 AM    A1C 5.7 6.0 5.7 5.5 7.2 (H) 6.3 (H) 6.7 (H) 6.7 (H)    Comment for A1C at  8:09 AM on 8/23/2016:  According to ADA guidelines, hemoglobin A1C <7.0% represents  optimal control in non-pregnant diabetic patients.  Different  metrics may apply to specific populations.   Standards of Medical Care in Diabetes - 2016.  For the purpose of screening for the presence of diabetes:  <5.7%     Consistent with the absence of diabetes  5.7-6.4%  Consistent with increasing risk for diabetes   (prediabetes)  >or=6.5%  Consistent with diabetes  Currently no consensus exists for use of hemoglobin A1C  for diagnosis of diabetes for children.      Comment for A1C at  9:27 AM on 10/26/2016:  According to ADA guidelines, hemoglobin A1C <7.0% represents  optimal control in non-pregnant diabetic patients.  Different  metrics may apply to specific populations.   Standards of Medical Care in Diabetes - 2016.  For the purpose of screening for the presence of diabetes:  <5.7%     Consistent with the absence of diabetes  5.7-6.4%  Consistent with increasing risk for " diabetes   (prediabetes)  >or=6.5%  Consistent with diabetes  Currently no consensus exists for use of hemoglobin A1C  for diagnosis of diabetes for children.      Comment for A1C at  4:00 AM on 2/3/2017:  According to ADA guidelines, hemoglobin A1C <7.0% represents  optimal control in non-pregnant diabetic patients.  Different  metrics may apply to specific populations.   Standards of Medical Care in Diabetes - 2016.  For the purpose of screening for the presence of diabetes:  <5.7%     Consistent with the absence of diabetes  5.7-6.4%  Consistent with increasing risk for diabetes   (prediabetes)  >or=6.5%  Consistent with diabetes  Currently no consensus exists for use of hemoglobin A1C  for diagnosis of diabetes for children.        Pending Labs     Order Current Status    Urine culture In process    Blood culture x two cultures. Draw prior to antibiotics. Preliminary result    Blood culture x two cultures. Draw prior to antibiotics. Preliminary result      Allergies as of 2/3/2017        Reactions    Erythromycin     Other reaction(s): Nausea    Erythromycin (Bulk)     Other reaction(s): Nausea    Oxycodone Nausea And Vomiting    Oxycodone-acetaminophen Nausea And Vomiting    Other reaction(s): Nausea      Advance Directives     An advance directive is a document which, in the event you are no longer able to make decisions for yourself, tells your healthcare team what kind of treatment you do or do not want to receive, or who you would like to make those decisions for you.  If you do not currently have an advance directive, [facility] encourages you to create one.  For more information call:  [phone]), [phone] ([phone]),  or log on to [email or web address].        Smoking Cessation     If you would like to quit smoking:   You may be eligible for free services if you are a [place] resident and started smoking cigarettes before September 1, 1988.  Call the Smoking Cessation Trust  (SCT) toll free at (682) 269-6481 or (950) 302-4205.   Call 1-800-QUIT-NOW if you do not meet the above criteria.            Language Assistance Services     ATTENTION: Language assistance services are available, free of charge. Please call [phone].      ATENCIÓN: Si habla fernando, tiene a abdi disposición servicios gratuitos de asistencia lingüística. Llame al [phone].     CHÚ Ý: N?u b?n nói Ti?ng Vi?t, có các d?ch v? h? tr? ngôn ng? mi?n phí dành cho b?n. G?i s? [phone].         [facility] complies with applicable Federal civil rights laws and does not discriminate on the basis of race, color, national origin, age, disability, or sex.

## 2017-02-03 ENCOUNTER — TELEPHONE (OUTPATIENT)
Dept: HEMATOLOGY/ONCOLOGY | Facility: CLINIC | Age: 67
End: 2017-02-03

## 2017-02-03 VITALS
RESPIRATION RATE: 18 BRPM | SYSTOLIC BLOOD PRESSURE: 143 MMHG | BODY MASS INDEX: 31.18 KG/M2 | DIASTOLIC BLOOD PRESSURE: 68 MMHG | TEMPERATURE: 99 F | OXYGEN SATURATION: 95 % | HEART RATE: 99 BPM | WEIGHT: 194 LBS | HEIGHT: 66 IN

## 2017-02-03 LAB
ALBUMIN SERPL BCP-MCNC: 3.1 G/DL
ALP SERPL-CCNC: 89 U/L
ALT SERPL W/O P-5'-P-CCNC: 26 U/L
ANION GAP SERPL CALC-SCNC: 10 MMOL/L
ANISOCYTOSIS BLD QL SMEAR: SLIGHT
AST SERPL-CCNC: 24 U/L
BASOPHILS # BLD AUTO: ABNORMAL K/UL
BASOPHILS NFR BLD: 1 %
BILIRUB SERPL-MCNC: 0.6 MG/DL
BUN SERPL-MCNC: 7 MG/DL
BURR CELLS BLD QL SMEAR: ABNORMAL
CALCIUM SERPL-MCNC: 7.8 MG/DL
CHLORIDE SERPL-SCNC: 105 MMOL/L
CO2 SERPL-SCNC: 21 MMOL/L
CREAT SERPL-MCNC: 0.7 MG/DL
DIFFERENTIAL METHOD: ABNORMAL
EOSINOPHIL # BLD AUTO: ABNORMAL K/UL
EOSINOPHIL NFR BLD: 1 %
ERYTHROCYTE [DISTWIDTH] IN BLOOD BY AUTOMATED COUNT: 12.6 %
EST. GFR  (AFRICAN AMERICAN): >60 ML/MIN/1.73 M^2
EST. GFR  (NON AFRICAN AMERICAN): >60 ML/MIN/1.73 M^2
ESTIMATED AVG GLUCOSE: 146 MG/DL
GLUCOSE SERPL-MCNC: 125 MG/DL
GRAM STN SPEC: NORMAL
HBA1C MFR BLD HPLC: 6.7 %
HCT VFR BLD AUTO: 35.6 %
HGB BLD-MCNC: 12.1 G/DL
HYPOCHROMIA BLD QL SMEAR: ABNORMAL
LYMPHOCYTES # BLD AUTO: ABNORMAL K/UL
LYMPHOCYTES NFR BLD: 28 %
MAGNESIUM SERPL-MCNC: 1.6 MG/DL
MCH RBC QN AUTO: 28.9 PG
MCHC RBC AUTO-ENTMCNC: 34 %
MCV RBC AUTO: 85 FL
METAMYELOCYTES NFR BLD MANUAL: 2 %
MONOCYTES # BLD AUTO: ABNORMAL K/UL
MONOCYTES NFR BLD: 16 %
MYELOCYTES NFR BLD MANUAL: 3 %
NEUTROPHILS NFR BLD: 37 %
NEUTS BAND NFR BLD MANUAL: 12 %
OVALOCYTES BLD QL SMEAR: ABNORMAL
PHOSPHATE SERPL-MCNC: 3.5 MG/DL
PLATELET # BLD AUTO: 154 K/UL
PMV BLD AUTO: 10 FL
POCT GLUCOSE: 103 MG/DL (ref 70–110)
POCT GLUCOSE: 155 MG/DL (ref 70–110)
POIKILOCYTOSIS BLD QL SMEAR: SLIGHT
POLYCHROMASIA BLD QL SMEAR: ABNORMAL
POTASSIUM SERPL-SCNC: 3.9 MMOL/L
PROT SERPL-MCNC: 5.7 G/DL
RBC # BLD AUTO: 4.19 M/UL
SODIUM SERPL-SCNC: 136 MMOL/L
WBC # BLD AUTO: 4.25 K/UL

## 2017-02-03 PROCEDURE — 25000003 PHARM REV CODE 250: Performed by: STUDENT IN AN ORGANIZED HEALTH CARE EDUCATION/TRAINING PROGRAM

## 2017-02-03 PROCEDURE — 99223 1ST HOSP IP/OBS HIGH 75: CPT | Mod: ,,, | Performed by: INTERNAL MEDICINE

## 2017-02-03 PROCEDURE — 25000003 PHARM REV CODE 250: Performed by: INTERNAL MEDICINE

## 2017-02-03 PROCEDURE — 85027 COMPLETE CBC AUTOMATED: CPT

## 2017-02-03 PROCEDURE — 83036 HEMOGLOBIN GLYCOSYLATED A1C: CPT

## 2017-02-03 PROCEDURE — 85007 BL SMEAR W/DIFF WBC COUNT: CPT

## 2017-02-03 PROCEDURE — 63600175 PHARM REV CODE 636 W HCPCS: Performed by: INTERNAL MEDICINE

## 2017-02-03 PROCEDURE — 83735 ASSAY OF MAGNESIUM: CPT

## 2017-02-03 PROCEDURE — 80053 COMPREHEN METABOLIC PANEL: CPT

## 2017-02-03 PROCEDURE — 84100 ASSAY OF PHOSPHORUS: CPT

## 2017-02-03 RX ORDER — AMOXICILLIN AND CLAVULANATE POTASSIUM 500; 125 MG/1; MG/1
1 TABLET, FILM COATED ORAL 3 TIMES DAILY
Qty: 27 TABLET | Refills: 0 | Status: SHIPPED | OUTPATIENT
Start: 2017-02-03 | End: 2017-02-12

## 2017-02-03 RX ORDER — CIPROFLOXACIN 500 MG/1
500 TABLET ORAL EVERY 12 HOURS
Qty: 18 TABLET | Refills: 0 | Status: SHIPPED | OUTPATIENT
Start: 2017-02-03 | End: 2017-02-12

## 2017-02-03 RX ORDER — HEPARIN 100 UNIT/ML
300 SYRINGE INTRAVENOUS ONCE AS NEEDED
Status: COMPLETED | OUTPATIENT
Start: 2017-02-03 | End: 2017-02-03

## 2017-02-03 RX ORDER — AMOXICILLIN AND CLAVULANATE POTASSIUM 500; 125 MG/1; MG/1
1 TABLET, FILM COATED ORAL 3 TIMES DAILY
Status: DISCONTINUED | OUTPATIENT
Start: 2017-02-03 | End: 2017-02-03 | Stop reason: HOSPADM

## 2017-02-03 RX ORDER — CIPROFLOXACIN 500 MG/1
500 TABLET ORAL EVERY 12 HOURS
Status: DISCONTINUED | OUTPATIENT
Start: 2017-02-03 | End: 2017-02-03 | Stop reason: HOSPADM

## 2017-02-03 RX ADMIN — PANTOPRAZOLE SODIUM 40 MG: 40 TABLET, DELAYED RELEASE ORAL at 05:02

## 2017-02-03 RX ADMIN — SODIUM CHLORIDE: 0.9 INJECTION, SOLUTION INTRAVENOUS at 12:02

## 2017-02-03 RX ADMIN — HYDROCODONE BITARTRATE AND ACETAMINOPHEN 1 TABLET: 5; 325 TABLET ORAL at 12:02

## 2017-02-03 RX ADMIN — CIPROFLOXACIN HYDROCHLORIDE 500 MG: 500 TABLET, FILM COATED ORAL at 10:02

## 2017-02-03 RX ADMIN — HEPARIN SODIUM (PORCINE) LOCK FLUSH IV SOLN 100 UNIT/ML 300 UNITS: 100 SOLUTION at 04:02

## 2017-02-03 RX ADMIN — CEFEPIME HYDROCHLORIDE 2 G: 2 INJECTION, SOLUTION INTRAVENOUS at 04:02

## 2017-02-03 RX ADMIN — HYDROCODONE BITARTRATE AND ACETAMINOPHEN 1 TABLET: 5; 325 TABLET ORAL at 10:02

## 2017-02-03 RX ADMIN — LISINOPRIL 20 MG: 10 TABLET ORAL at 08:02

## 2017-02-03 RX ADMIN — LEVOTHYROXINE SODIUM 50 MCG: 50 TABLET ORAL at 05:02

## 2017-02-03 RX ADMIN — AMOXICILLIN AND CLAVULANATE POTASSIUM 500 MG: 500; 125 TABLET, FILM COATED ORAL at 02:02

## 2017-02-03 RX ADMIN — VITAMIN D, TAB 1000IU (100/BT) 1000 UNITS: 25 TAB at 08:02

## 2017-02-03 RX ADMIN — ASPIRIN 81 MG CHEWABLE TABLET 81 MG: 81 TABLET CHEWABLE at 08:02

## 2017-02-03 RX ADMIN — HYDROCODONE BITARTRATE AND ACETAMINOPHEN 1 TABLET: 5; 325 TABLET ORAL at 04:02

## 2017-02-03 NOTE — PLAN OF CARE
Pt aaox4, VSS, up to chair, bed in locked low position, call light within reach, non slip socks on feet. Blood glucose ACHS. R. Subclavian port a cath accessed. NS continuous @ 100 cc/hr. Pt c/o bone pain- PRN medicine administered. Pt remains afebrile thus far. IV antibiotics transitioned to PO. Possible d/c today. Pt denies further pain or discomfort at this time. Will continue to monitor.

## 2017-02-03 NOTE — ED PROVIDER NOTES
"Encounter Date: 2/2/2017    SCRIBE #1 NOTE: I, Arianna Grewal, am scribing for, and in the presence of,  Dr. Hawthorne. I have scribed the entire note.       History     Chief Complaint   Patient presents with    Fever Post Chemo     temp at home 100.9, on chemo mask applied     Review of patient's allergies indicates:   Allergen Reactions    Erythromycin      Other reaction(s): Nausea    Erythromycin (bulk)      Other reaction(s): Nausea    Oxycodone Nausea And Vomiting    Oxycodone-acetaminophen Nausea And Vomiting     Other reaction(s): Nausea     HPI Comments: Time seen by provider: 6:55 PM    This is a 66 y.o. female with PM Hx of HTN and recent diagnosis of left breast cancer on chemotherapy who presents with complaint of fever of 100.9 at home. Last chemo six days ago. She c/o bone pain particularly hip pain, lower back pain, ankle pain, and shoulder pain. "Everything hurts." Pain 10/10. She also endorses nonproductive cough, dull HA, nausea, and decreased PO intake (particularly today). Pt denies CP, SOB, difficulty urinating, dysuria, constipation, diarrhea, or any other symptoms at this time.       The history is provided by the patient and medical records.     Past Medical History   Diagnosis Date    Arthritis     Back pain     Breast cancer      originally dx in 02/1997- treated with surgery, chemo and radiation     Elevated bilirubin 11/19/2013    Fatty liver     GERD (gastroesophageal reflux disease)     Glucose intolerance (impaired glucose tolerance)     Hyperlipidemia     Hypertension     Hypothyroid     Obesity     Osteopenia     Osteoporosis     Primary insomnia 11/2/2016    Sleep apnea      wears CPAP nightly     Squamous cell carcinoma 2011     right forearm, sx by Dr. Corinne Ray    Thyrotoxicosis without mention of goiter or other cause, without mention of thyrotoxic crisis or storm      hypothyroidism    Trouble in sleeping     Urinary incontinence     Well woman exam " with routine gynecological exam 11/2/2016     Past Medical History Pertinent Negatives   Diagnosis Date Noted    Acute leukemia 4/20/2016    Acute pancreatitis 4/20/2016    Alcohol dependence 4/20/2016    Alzheimer's disease 4/20/2016    Anemia 4/20/2016    Angina pectoris 4/20/2016    Anxiety 4/20/2016    Asthma 4/20/2016    Atrial fibrillation 4/20/2016    Bladder cancer 4/20/2016    Bone cancer 4/20/2016    Bone marrow transplant status 4/20/2016    Brain cancer 4/20/2016    Cancer of lymphatic and hematopoietic tissue 4/20/2016    Cerebral palsy 4/20/2016    Cervical cancer 4/20/2016    Chronic bronchitis 4/20/2016    Cirrhosis 4/20/2016    Colon cancer 4/20/2016    COPD (chronic obstructive pulmonary disease) 8/22/2014    Coronary artery disease 4/20/2016    Cystic fibrosis 4/20/2016    Depression 8/22/2014    Diabetes with neurologic complications 4/20/2016    Emphysema of lung 8/22/2014    Endometrial cancer 4/20/2016    Esophageal cancer 4/20/2016    Fallopian tube cancer, carcinoma 4/20/2016    Fibromyalgia 4/20/2016    Gastrostomy status 4/20/2016    Glaucoma 4/20/2016    Glomerulonephritis 4/20/2016    Heart failure 4/20/2016    Heart transplanted 4/20/2016    Hemolytic anemia 4/20/2016    Hepatitis B 4/20/2016    Hepatitis C 4/20/2016    HIV infection 4/20/2016    Immune deficiency disorder 4/20/2016    Inflammatory bowel disease 4/20/2016    Interstitial nephritis chronic 4/20/2016    Liver cancer 4/20/2016    Liver transplanted 4/20/2016    Lung cancer 4/20/2016    Lung transplanted 4/20/2016    Malnutrition 4/20/2016    Melanoma 4/20/2016    Multiple sclerosis 4/20/2016    Myocardial infarction 8/22/2014    Ovarian cancer 4/20/2016    Pancreatic cancer 4/20/2016    Paranoia 4/20/2016    Parkinson disease 4/20/2016    Peripheral vascular disease 4/20/2016    Pneumonia 4/20/2016    Pneumonia due to other staphylococcus 4/20/2016    Pulmonary  embolism 4/20/2016    Rectal cancer 4/20/2016    Renal cancer 4/20/2016    Renal manifestation of secondary diabetes mellitus 4/20/2016    Respiratory failure 4/20/2016    Rheumatoid arthritis 4/20/2016    Seizures 8/22/2014    Septic shock 4/20/2016    Sickle cell anemia 4/20/2016    Skin ulcer 4/20/2016    Small intestine cancer 4/20/2016    Stomach cancer 4/20/2016    Stroke 8/22/2014    Thyroid cancer 4/20/2016    Tobacco dependence 4/20/2016    Type 2 diabetes mellitus with ophthalmic manifestations 4/20/2016    Type 2 diabetes with peripheral circulatory disorder, controlled 4/20/2016    Vaginal cancer 4/20/2016    Vulvar cancer 4/20/2016     Past Surgical History   Procedure Laterality Date    Left modified radical mastectomy  Feb 1997     for treatment of breast cancer     Breast implant Bilateral 1998     implants removed in 2003    Left breast reconstruction  2005    Pelvic laparoscopy  1978     pt had endometriosis     Robotic lap  hysterectomy with bso  2008    Lipoma resection  2010     removed from upper back area     Reduction of mons pubis  2011    Hysterectomy  2008     benign    Pr removal of ovary/tube(s)  2008      benign    Modified radical mastectomy w/ axillary lymph node dissection  02/1997    Appendectomy  2008     removed during hysterectomy surgery     Breast surgery       see details below     Upper gastrointestinal endoscopy  8/05/2013     Family History   Problem Relation Age of Onset    Heart attack Father 51    Heart disease Father     Breast cancer Sister 51     BRCA 1/2 negative    Cancer Mother 65     malignant melanoma     No Known Problems Brother     No Known Problems Daughter     No Known Problems Son     No Known Problems Daughter     No Known Problems Son     Uterine cancer Neg Hx     Colon cancer Neg Hx     Celiac disease Neg Hx     Esophageal cancer Neg Hx     Inflammatory bowel disease Neg Hx     Liver cancer Neg Hx     Liver  disease Neg Hx     Stomach cancer Neg Hx     Ulcerative colitis Neg Hx     Hypertension Neg Hx     Cirrhosis Neg Hx     Crohn's disease Neg Hx     Rectal cancer Neg Hx     Ovarian cancer Neg Hx      Social History   Substance Use Topics    Smoking status: Former Smoker     Packs/day: 0.50     Years: 3.00     Start date: 4/15/1969     Quit date: 4/15/1972    Smokeless tobacco: Never Used      Comment: started at age 18 during college     Alcohol use 0.0 oz/week     0 Standard drinks or equivalent per week      Comment: occasional, 1 cocktail once weekly      Review of Systems   Constitutional: Positive for appetite change (decreased appetite and PO intake) and fever.   HENT: Negative for congestion and sore throat.    Eyes: Negative for visual disturbance.   Respiratory: Positive for cough (nonproductive).    Cardiovascular: Negative for chest pain.   Gastrointestinal: Positive for nausea. Negative for constipation, diarrhea and vomiting.   Genitourinary: Negative for difficulty urinating and dysuria.   Musculoskeletal: Positive for back pain (lower back pain).        (+) Bone pain: shoulders, ankles, shoulders, hips   Skin: Negative for rash.   Neurological: Positive for headaches (dull).       Physical Exam   Initial Vitals   BP Pulse Resp Temp SpO2   02/02/17 1729 02/02/17 1729 02/02/17 1729 02/02/17 1729 02/02/17 1729   146/73 120 18 99.7 °F (37.6 °C) 95 %     Physical Exam    Nursing note and vitals reviewed.  Constitutional: She appears well-developed and well-nourished. She is not diaphoretic. No distress.   HENT:   Head: Normocephalic and atraumatic.   Mouth/Throat: Oropharynx is clear and moist.   Neck: Normal range of motion. Neck supple. No JVD present.   Cardiovascular: Regular rhythm, normal heart sounds and intact distal pulses. Tachycardia present.    Pulmonary/Chest: Breath sounds normal. No respiratory distress. She has no wheezes. She has no rhonchi. She has no rales.   Abdominal: Soft.  She exhibits no distension. There is no tenderness.   Musculoskeletal: Normal range of motion. She exhibits no edema.   Lymphadenopathy:     She has no cervical adenopathy.   Neurological: She is alert and oriented to person, place, and time. She has normal strength. No cranial nerve deficit or sensory deficit.   Skin: Skin is warm and dry.         ED Course   Procedures  Labs Reviewed   CBC W/ AUTO DIFFERENTIAL - Abnormal; Notable for the following:        Result Value    WBC 3.38 (*)     nRBC 1@L=100 (*)     Mono% 18.0 (*)     All other components within normal limits   COMPREHENSIVE METABOLIC PANEL - Abnormal; Notable for the following:     Sodium 135 (*)     Glucose 120 (*)     Total Bilirubin 1.1 (*)     All other components within normal limits   LACTIC ACID, PLASMA   PROTIME-INR   INFLUENZA A AND B ANTIGEN     EKG Readings: (Independently Interpreted)   Sinus tachycardia at 118 with no ischemic changes. RBBB.        X-Rays:   Independently Interpreted Readings:   Chest X-Ray: No focal consolidation.      Medical Decision Making:   History:   Old Medical Records: I decided to obtain old medical records.  Initial Assessment:   Emergent evaluation of 65 yo female presenting with further evaluation of fever on chemotherapy.   Differential Diagnosis:   Differential dx includes neutropenic fever, UTI, PNA, flu, bacteremia, and dehydration. Labs ordered. Pt treated with vancomycin, cefepime, and IV fluids. Will continue to closely monitor. Once labs reviewed will consult oncology.   Independently Interpreted Test(s):   I have ordered and independently interpreted X-rays - see prior notes.  I have ordered and independently interpreted EKG Reading(s) - see prior notes  Clinical Tests:   Lab Tests: Ordered and Reviewed  Radiological Study: Ordered and Reviewed  ED Management:  Labs reviewed. No significant findings. Pending UA. White count 3.3. Oncology contacted, case discussed, and labs reviewed. Pt will be admitted  for further treatment and evaluation.   Other:   I have discussed this case with another health care provider.            Scribe Attestation:   Scribe #1: I performed the above scribed service and the documentation accurately describes the services I performed. I attest to the accuracy of the note.    Attending Attestation:           Physician Attestation for Scribe:  Physician Attestation Statement for Scribe #1: I, Dr. Hawthorne, reviewed documentation, as scribed by Arianna Grewal in my presence, and it is both accurate and complete.                 ED Course     Clinical Impression:   The primary encounter diagnosis was Personal history of breast cancer. Diagnoses of Fever, Fever in other diseases, Osteopenia, Hepatomegaly, Fatty liver, Vaginal atrophy, Obesity, Class I, BMI 30-34.9, Senile osteoporosis, Primary cancer of lower outer quadrant of left female breast, Primary insomnia, Malignant neoplasm of lower-outer quadrant of left female breast, Disruption of external operation (surgical) wound, and Encounter for antineoplastic chemotherapy were also pertinent to this visit.    Disposition:   Disposition: Admitted       Shayla Hawthorne MD  02/06/17 [id number]

## 2017-02-03 NOTE — PROGRESS NOTES
Progress Note      Patient Name: Lima Maldonado  YOB: 1950    Admit Date: 2/2/2017                     LOS: 1    SUBJECTIVE:     Reason for Admission:  Fever    This is Lima Maldonado     Over the night, the patient has no complain, no active event happened and change in the treatment plan occurred. Patient denies chest pain, SOB, difficulty in breathing. She has been afebrile since she came to the hospital.       OBJECTIVE:     Vital Signs (Most Recent):    Temp: 98.3 °F (36.8 °C) (02/03/17 0430)  Pulse: 96 (02/03/17 0430)  Resp: 18 (02/03/17 0430)  BP: 129/67 (02/03/17 0430)  SpO2: 95 % (02/03/17 0430) Vital Signs Range (Last 24H):  Temp:  [98.3 °F (36.8 °C)-99.7 °F (37.6 °C)]   Pulse:  []   Resp:  [16-18]   BP: (129-146)/(67-78)   SpO2:  [95 %-97 %]        I & O (Last 24H):  Intake/Output Summary (Last 24 hours) at 02/03/17 0652  Last data filed at 02/03/17 0500   Gross per 24 hour   Intake             1665 ml   Output             1225 ml   Net              440 ml    Body mass index is 31.31 kg/(m^2).     Physical Exam:  General: Well developed, oriented to person, place, and time. Not in distress  Cardiovascular: Normal rate, regular rhythm, normal heart sounds and intact distal pulses. No murmur heard. DP pulses 2+  Pulmonary: Effort normal and breath sounds normal. No respiratory distress. No wheezes, no rales, no rhonchi.  Abdominal: Bowel sounds are normal. exhibits no distension. There is no tenderness. There is no rebound and no guarding.    skin: Warm and dry, No bruising, rash, ulceration, or jaundice   Neurological: No obvious focal motor or sensory defects, normal muscle tone.   Chest wall: port on right upper area. No hotness, redness or tenderness     Diagnostic Results:    Labs:    Complete Blood Count Coagulation Profile       Recent Labs  Lab 01/27/17  0815 02/02/17  1955 02/03/17  0400   WBC 12.34 3.38*  --    HGB 14.7 14.2 12.1   HCT 42.0 41.5 35.6*    163 154    MCV 83 84 85   RDW 12.7 12.5 12.6      Recent Labs  Lab 02/02/17 1955   INR 1.0        Basic Metabolic Panel  Liver Function Test     Recent Labs  Lab 01/27/17 0815 02/02/17 1955    135*   K 4.8 4.4    101   CO2 27 24   BUN 20 8   CREATININE 0.9 0.8   CALCIUM 10.2 9.2      Recent Labs  Lab 01/27/17 0815 02/02/17 1955   PROT 8.2 7.1   BILITOT 1.2* 1.1*   ALKPHOS 92 117   ALT 40 35   AST 29 31        POCT Glucose: HbA1c:    No results for input(s): POCTGLUCOSE in the last 168 hours. Hemoglobin A1C   Date Value Ref Range Status   10/26/2016 6.7 (H) 4.5 - 6.2 % Final     Comment:     According to ADA guidelines, hemoglobin A1C <7.0% represents  optimal control in non-pregnant diabetic patients.  Different  metrics may apply to specific populations.   Standards of Medical Care in Diabetes - 2016.  For the purpose of screening for the presence of diabetes:  <5.7%     Consistent with the absence of diabetes  5.7-6.4%  Consistent with increasing risk for diabetes   (prediabetes)  >or=6.5%  Consistent with diabetes  Currently no consensus exists for use of hemoglobin A1C  for diagnosis of diabetes for children.     08/23/2016 6.3 (H) 4.5 - 6.2 % Final     Comment:     According to ADA guidelines, hemoglobin A1C <7.0% represents  optimal control in non-pregnant diabetic patients.  Different  metrics may apply to specific populations.   Standards of Medical Care in Diabetes - 2016.  For the purpose of screening for the presence of diabetes:  <5.7%     Consistent with the absence of diabetes  5.7-6.4%  Consistent with increasing risk for diabetes   (prediabetes)  >or=6.5%  Consistent with diabetes  Currently no consensus exists for use of hemoglobin A1C  for diagnosis of diabetes for children.     05/09/2016 7.2 (H) 4.5 - 6.2 % Final        Estimated Creatinine Clearance: 76.3 mL/min (based on Cr of 0.8).    Imaging:   Imaging Results         X-Ray Chest AP Portable (Final result) Result time:  02/02/17  19:53:17    Final result by Abdullahi Wong MD (02/02/17 19:53:17)    Impression:      Prominent interstitial attenuation, similar to the previous exam, no large focal consolidation.        Electronically signed by: ABDULLAHI WONG MD  Date:     02/02/17  Time:    19:53     Narrative:    Chest AP portable    Indication:Sepsis    Comparison:1/23/2017    Findings: Right chest wall port catheter tip projects over the proximal SVC. The cardiomediastinal silhouette is not enlarged node in postsurgical changes.  There is no pleural effusion.  The trachea is midline.  The lungs are symmetrically expanded bilaterally with coarse interstitial attenuation, similar to the previous exam. No large focal consolidation seen.  There is no pneumothorax.  The osseous structures are remarkable for degenerative changes of the spine and shoulders.              In-Hospital Medication:  Scheduled Meds:   ascorbic acid (vitamin C)  1,000 mg Oral Daily    aspirin  81 mg Oral Daily    ceFEPime (MAXIPIME) IVPB  2 g Intravenous Q8H    enoxaparin  40 mg Subcutaneous Daily    gabapentin  300 mg Oral QHS    levothyroxine  50 mcg Oral Before breakfast    lisinopril  20 mg Oral Daily    multivitamin  1 tablet Oral Daily    pantoprazole  40 mg Oral Before breakfast    simvastatin  20 mg Oral QHS    vitamin D  1,000 Units Oral Daily     Continuous Infusions:   sodium chloride 0.9% 100 mL/hr at 02/03/17 0021     PRN Meds:.acetaminophen, albuterol-ipratropium 2.5mg-0.5mg/3mL, dextrose 50%, dextrose 50%, glucagon (human recombinant), glucose, glucose, hydrocodone-acetaminophen 5-325mg, insulin aspart, lorazepam, ondansetron, ondansetron    ASSESSMENT/PLAN:     66 year old female with brest cancer s/p chemo Friday 01/27/2017 who presents with fevers.     #Fever  --100.9 at home. Currently afebrile  --Pt also with associated tachycardia on arrival to ED. Pt received 30 cc/kg with appropriate response in HR. Continue maintenance fluids.    --Pt received vanc/cefepime in the ED.  --Unclear source. Pt reporting upper respiratory symptoms. Possibly viral infection. Viral panel negative.   -- Continue cefepime for now.   --Blood cultures, urine Cx and respiratory cultures sent and pending. CXR negative for pna.   -- U/A -ve for infection   --WCC 4.25 with 12.0 bands      #L breast cancer  --Sees Dr. Baker in clinic  --Had first chemotherapy on Friday with neulasta injection on Saturday     #HTN  --Continue home lisinopril     #NELDA  --Continue home CPAP at night     #hypothyroidism  --Continue home synthroid     #HLD  --Continue home simvastatin     #T2DM  --Pt on metformin at home. Will hold while in hospital  --Hb A1c on 10/26 6.7  --Start low dose ssi  --Check HbA1c     #GERD  --continue home ppi         Diet: Diabetic diet  Code: Full  #DVT ppx: lovenox 40  #Dispo: Pending clinical improvement     Case discussed with Dr. SWARTZ during round     Helen Bautista MD  Internal Medicine Resident (PGY-I)    ATTENDING NOTE, ONCOLOGY INPATIENT TEAM    As above; events of last 24 hours noted.  Patient seen and examined, chart reviewed.  Appears comfortable, in NAD.  Lungs are clear to auscultation.  Abdomen is soft, nontender.  Labs reviewed.    PLAN  F/u bcxs, if negative, we will discharge home at the end of the day   Patient advised to remain ambulatory, no need for dvt ppx.   All questions were answered.   Discharge on cipro/augmentin x10 days.     Maicol Swartz MD

## 2017-02-03 NOTE — PLAN OF CARE
Problem: Fall Risk (Adult)  Goal: Absence of Falls  Patient will demonstrate the desired outcomes by discharge/transition of care.   Outcome: Ongoing (interventions implemented as appropriate)  -pt is free of falls/injuries during shift  -pt ambulates independently   -pt knows to call if assistance is needed and/or if feeling weak  -call light is within reach     Problem: Patient Care Overview  Goal: Plan of Care Review  Outcome: Ongoing (interventions implemented as appropriate)  -AAOx4  -Rt subclavian portacath  -NS @ 100cc/hr   -CXR done in ER did not show pneumonia or anything significant  -VSS  -c/o pain once so far, bone pain  -plan to continue to monitor for S/S of infection     Problem: Infection, Risk/Actual (Adult)  Goal: Identify Related Risk Factors and Signs and Symptoms  Related risk factors and signs and symptoms are identified upon initiation of Human Response Clinical Practice Guideline (CPG)   Outcome: Ongoing (interventions implemented as appropriate)  -pt is afebrile so far during night with Tmax of 98.5  -pt started on cefepime IVPB and received 1x dose of vanc in ED  -UA looks clear  -blood and urine cultures are still pending  -viral panel was negative   -respiratory culture still needs to be obtained     Problem: Diabetes, Type 2 (Adult)  Goal: Signs and Symptoms of Listed Potential Problems Will be Absent, Minimized or Managed (Diabetes, Type 2)  Signs and symptoms of listed potential problems will be absent, minimized or managed by discharge/transition of care (reference Diabetes, Type 2 (Adult) CPG).  Outcome: Ongoing (interventions implemented as appropriate)  -pt BG with nighttime labs was 120, no coverage needed

## 2017-02-03 NOTE — ED TRIAGE NOTES
Pt reports to ED with c/o fever post chemo. Last chemo was Friday, Pt also received Neulasta shot the next day. Pt reports generalized body aches and a dull headache. Pt reports fever up to 100.9 at home. Pt took Tylenol 1000mg, today at home to treat fever symptoms. Pt also reports taking Ativan.    LOC: Patient name and date of birth verified.  The patient is awake, alert and aware of environment with an appropriate affect, the patient is oriented x 3 and speaking appropriately.  Pt in NAD.    APPEARANCE: Patient resting comfortably and in no acute distress, patient is clean and well groomed, patient's clothing is properly fastened.  SKIN: The skin is warm and dry, color consistent with ethnicity, patient has normal skin turgor and moist mucus membranes, skin intact, no breakdown or bruising noted.  MUSCULOSKELETAL: Patient moving all extremities well, no obvious swelling or deformities noted. Pt reports generalized body aches, and bone pain  RESPIRATORY: Airway is open and patent, respirations are spontaneous, patient has a normal effort and rate, no accessory muscle use noted.  CARDIAC: Patient has a normal rate and rhythm, no peripheral edema noted, capillary refill < 3 seconds.  ABDOMEN: Soft and non tender to palpation, no distention noted. Bowel sounds present in all four quadrants.  NEUROLOGIC: Eyes open spontaneously, behavior appropriate to situation, follows commands, facial expression symmetrical, bilateral hand grasp equal and even, purposeful motor response noted, normal sensation in all extremities when touched with a finger. Pt reports dull headache.

## 2017-02-03 NOTE — DISCHARGE SUMMARY
"DISCHARGE SUMMARY  Medical Oncology     Team: Networked reference to record PCT     Patient Name: Lima Maldonado  YOB: 1950    Admit Date: 2/2/2017    Discharge Date: 02/03/2017    Discharge Attending Physician: Maicol Rendon MD     Resident on Service: Helen Centeno    Chief Complaint: fever and malaise     Princilpal Diagnoses:  Active Hospital Problems    Diagnosis  POA    *Fever [R50.9]  Yes    HTN (hypertension) [I10]  Yes    Hypothyroid [E03.9]  Yes    Sleep apnea [G47.30]  Yes    GERD (gastroesophageal reflux disease) [K21.9]  Yes    Hyperlipidemia [E78.5]  Yes    Personal history of breast cancer [Z85.3]  Not Applicable     Stage IIA carcinoma of the left breast 1997        Resolved Hospital Problems    Diagnosis Date Resolved POA   No resolved problems to display.       Discharged Condition: Admit problems have stabilized      HOSPITAL COURSE:      Initial Presentation:    Ms. Lima Maldonado is a 66 y.o. female with L breast cancer (last chemo last Friday 01/27/2017), HTN, T2DM, HLD, NELDA who presents with fevers and general malaise. Pt reports receiving chemo on Friday and a neulasta injection on Saturday. She reports developing generalized "bone pain" on Sunday. She also states temperatures that have been in the low 100s, but less than 100.4. She reports feeling "run down". She states that she had been feeling slightly better, but then felt worse today. She decided to take her temperature and found that it was 100.9. She took tylenol for the fever. She called Dr. Baker, who instructed pt to come to the ED. Pt also reports nasal congestion, nonproductive cough, mild frontal HA and nausea. She also reports decreased appetite that started today. She denies any chest pain, shortness of breath, urinary symptoms, constipation, diarrhea.    Course of Principle Problem for Admission:    #Fever  --100.9 at home. Currently afebrile  --Pt also with associated tachycardia on arrival to " ED. Pt received 30 cc/kg with appropriate response in HR. Continue maintenance fluids.   --Pt received vanc/cefepime in the ED.  --Unclear source. Pt reporting upper respiratory symptoms. Possibly viral infection. Viral panel negative.   -- d/c cefepime   -- F/u bcxs, if negative, we will discharge home at the end of the day    -- Discharge on cipro/augmentin x10 days starting date is 02/03/2017 .   --Blood cultures, urine Cx and respiratory cultures sent and pending. CXR negative for pna.   -- U/A -ve for infection   --WCC 4.25 with 12.0 bands . She is NOT neutropenic     Other Medical Problems Addressed in the Hospital:     #L breast cancer  --Sees Dr. Baker in clinic  --Had first chemotherapy on Friday 01/27/2017 with neulasta injection on Saturday      #HTN  --home lisinopril      #NELDA  --home CPAP at night      #hypothyroidism  --home synthroid      #HLD  --home simvastatin      #T2DM  --Pt on metformin at home. hold while in hospital  --Hb A1c on 10/26 6.7  --Start low dose ssi  --Check HbA1c      #GERD  -- home ppi        CONSULTS:   - none     Last CBC/BMP/HgbA1c (if applicable):  Recent Results (from the past 336 hour(s))   CBC with Automated Differential    Collection Time: 02/03/17  4:00 AM   Result Value Ref Range    WBC 4.25 3.90 - 12.70 K/uL    Hemoglobin 12.1 12.0 - 16.0 g/dL    Hematocrit 35.6 (L) 37.0 - 48.5 %    Platelets 154 150 - 350 K/uL   CBC auto differential    Collection Time: 02/02/17  7:55 PM   Result Value Ref Range    WBC 3.38 (L) 3.90 - 12.70 K/uL    Hemoglobin 14.2 12.0 - 16.0 g/dL    Hematocrit 41.5 37.0 - 48.5 %    Platelets 163 150 - 350 K/uL     No results found for this or any previous visit (from the past 336 hour(s)).  Lab Results   Component Value Date    HGBA1C 6.7 (H) 02/03/2017       Other Pertinent Lab Findings:       Gram stain [[id number]] Collected: 02/02/17 6391       Order Status: Completed Specimen: Urine from Urine Updated: 02/03/17 0513        Gram Stain Result No  WBC's         Rare Gram positive cocci         Rare Gram negative rods       Urine culture [[id number]] Collected: 02/02/17 2312       Order Status: Sent Specimen: Urine from Urine, Clean Catch Updated: 02/02/17 2318       Culture, Respiratory with Gram Stain [[id number]]        Order Status: No result Specimen: Respiratory        Blood culture x two cultures. Draw prior to antibiotics. [[id number]] Collected: 02/02/17 2018       Order Status: Completed Specimen: Blood from Peripheral, Hand, Right Updated: 02/03/17 0515        Blood Culture, Routine No Growth to date       Narrative:         Aerobic and anaerobic       Blood culture x two cultures. Draw prior to antibiotics. [[id number]] Collected: 02/02/17 1958       Order Status: Completed Specimen: Blood from Line, Port A Cath Updated: 02/03/17 0315        Blood Culture, Routine No Growth to date       Narrative:         Aerobic and anaerobic        Disposition:  Home      Future Scheduled Appointments:  Future Appointments  Date Time Provider Department Center   2/6/2017 10:15 AM ECHO, [facility] ECHOLAB [facility]   2/14/2017 9:30 AM HRA, Confucianism 1 [facility] IM [facility]   2/16/2017 9:00 AM LAB, HEMON CANCER BLDG [facility] [place] [facility]   2/16/2017 10:00 AM Melissa Lagunas PA-C [facility] HEM ONC [place] [facility]   2/17/2017 10:30 AM [facility], CHEMO [facility] CHE[facility] [place] [facility]   2/18/2017 10:00 AM [facility] [place]   2/21/2017 9:00 AM DIABETES EDUCATOR, INT MED 1 [facility] INTLDIA [facility] PCW   3/7/2017 10:15 AM Jimmy Bains MD [facility] BRSTSUR [facility] y   3/13/2017 10:00 AM LAB, APPOINTMENT [place] [facility]   3/21/2017 9:40 AM Radha Beth NP [facility] SLEEP [facility]         Discharge Medication List:     Lima Maldonado   Home Medication Instructions ANABELA:[id number]    Printed on:02/03/17 0399   Medication Information                      amoxicillin-clavulanate 500-125mg (AUGMENTIN) 500-125 mg Tab  Take 1  tablet (500 mg total) by mouth 3 (three) times daily.             ascorbic acid (VITAMIN C) 1000 MG tablet  Take 1 tablet by mouth once daily.             aspirin 81 MG chewable tablet  Take 1 tablet by mouth.             CALCIUM CARBONATE/VITAMIN D3 (CALTRATE 600 + D ORAL)  Take 1 tablet by mouth 2 (two) times daily.              celecoxib (CELEBREX) 100 MG capsule  Take 1 capsule (100 mg total) by mouth daily as needed.             ciprofloxacin HCl (CIPRO) 500 MG tablet  Take 1 tablet (500 mg total) by mouth every 12 (twelve) hours.             coenzyme Q10-vitamin E (COQ10 ) 100-100 mg-unit Cap  Take 1 capsule by mouth once daily.              estradiol (VAGIFEM) 10 mcg Tab  PLACE 1 TABLET (10 MCG TOTAL) VAGINALLY TWICE A WEEK.             gabapentin (NEURONTIN) 300 MG capsule  Take 1 capsule (300 mg total) by mouth every evening.             hydrocodone-acetaminophen 5-325mg (NORCO) 5-325 mg per tablet  Take 1 tablet by mouth every 4 (four) hours as needed for Pain.             levothyroxine (SYNTHROID) 50 MCG tablet  TAKE 1 TABLET (50 MCG TOTAL) BY MOUTH ONCE DAILY.             lidocaine (LIDODERM) 5 %(700 mg/patch)  Place 2 patches onto the skin daily as needed. Remove & Discard patch within 12 hours or as directed by MD             lisinopril (PRINIVIL,ZESTRIL) 20 MG tablet  Take 1 tablet (20 mg total) by mouth once daily.             lorazepam (ATIVAN) 1 MG tablet  Take 1 tablet (1 mg total) by mouth every 6 (six) hours as needed for Anxiety.             metformin (GLUCOPHAGE) 500 MG tablet  Take 500 mg by mouth 2 (two) times daily with meals.             MULTIVITAMIN ORAL  once daily.              omega-3 fatty acids-vitamin E (FISH OIL) 1,000 mg Cap  Take 1 capsule by mouth once daily.              ondansetron (ZOFRAN-ODT) 4 MG TbDL  Take 1 tablet (4 mg total) by mouth every 8 (eight) hours as needed.             pantoprazole (PROTONIX) 40 MG tablet  Take 1 tablet (40 mg total) by mouth before  breakfast.             simvastatin (ZOCOR) 20 MG tablet  TAKE 1 TABLET (20 MG TOTAL) BY MOUTH EVERY EVENING.             vitamin D 1000 units Tab  Take 1,000 Units by mouth once daily.                  Patient Instructions:    Discharge Procedure Orders  Diet general     Activity as tolerated     Call MD for:  temperature >100.4     Call MD for:  worsening rash         At the time of discharge patient was told to take all medications as prescribed, to keep all followup appointments, and to call their primary care physician or return to the emergency room if they have any worsening or concerning symptoms.    Signing Physician:   Helen Bautista M.D.  Internal Medicine, PGY-1

## 2017-02-03 NOTE — PROGRESS NOTES
Pt arrived to floor, VSS, no c/o of pain or discomfort at this time.  Vanc and last bag of NS bolus are currently infusing at this time, once finished, will start continuous NS @ 100cc/hr.

## 2017-02-03 NOTE — TELEPHONE ENCOUNTER
----- Message from Ignacio Garcia sent at 2/2/2017  4:43 PM CST -----  Contact: self  Pt states she was asked to call office back if temp goes over 100.4, pt has a temp of 100.9, pt would like to know what should she do at this point.  Contact number [phone]

## 2017-02-03 NOTE — PROGRESS NOTES
Pt given discharge instructions- pt verbalizes understanding, pt and  deny further questions at this time. Port a cath to r. Subclavian deaccessed- flushed with heparin per orders, pt tolerated well. VSS (see flowsheet). Pt refusing wheelchair- pt safely ambulated off unit with  at side.

## 2017-02-03 NOTE — H&P
"HISTORY & PHYSICAL  Hematology and Oncology    Team: Networked reference to record PCT     PRESENTING HISTORY     Chief Complaint/Reason for Admission:  fever    History of Present Illness:  Ms. Lima Maldonado is a 66 y.o. female with L breast cancer (last chemo 6 days ago), HTN, T2DM, HLD, NELDA who presents with fevers and general malaise. Pt reports receiving chemo on Friday and a neulasta injection on Saturday. She reports developing generalized "bone pain" on Sunday. She also states temperatures that have been in the low 100s, but less than 100.4. She reports feeling "run down". She states that she had been feeling slightly better, but then felt worse today. She decided to take her temperature and found that it was 100.9. She took tylenol for the fever. She called Dr. Baker, who instructed pt to come to the ED. Pt also reports nasal congestion, nonproductive cough, mild frontal HA and nausea. She also reports decreased appetite that started today. She denies any chest pain, shortness of breath, urinary symptoms, constipation, diarrhea.    Oncologic History (From Dr. Baker's clinic note 1/27/17): "mammography on September 20, 2016 demonstrated a new irregular mass with spiculated margins seen in the left breast at  5 o'clock along the surgical scar site.  ultrasound DEMONSTRATED A SOLID 8 X 8 X 7 MM MASS.    A needle biopsy in September 27 showed infiltrating carcinoma, histologic grade 3, nuclear grade 2, mitotic index 1. Tumor was 90% ER positive, 70% NJ positive, and 1+ HER-2.     MRI of the breast showed a 1.7 x 1.3 cm lesion in the lower outer quadrant of the left breast.  PET/CT on October 7 was negative for any evidence of distant metastasis.  In the  On November 16 bilateral mastectomies were performed. Left pressure 1.5 cm intermediate grade carcinoma with ductal and lobular features. There was focal dermal invasion. Margins were negative. The right breast was without abnormality.  Oncotype score " "returned 25 - intermediate risk."    Review of Systems:  Constitutional: positive for anorexia, fatigue, fevers and malaise, negative for chills and night sweats  Eyes: no visual changes  ENT: positive for nasal congestion, negative for ear drainage, earaches and epistaxis  Respiratory: positive for cough, negative for dyspnea on exertion, pleurisy/chest pain, sputum and wheezing  Cardiovascular: no chest pain or palpitations  Gastrointestinal: no nausea or vomiting, no abdominal pain or change in bowel habits  Genitourinary: no hematuria or dysuria  Hematologic/Lymphatic: no easy bruising or lymphadenopathy  Musculoskeletal: positive for arthralgias, back pain, bone pain and myalgias  Neurological: no seizures or tremors    PAST HISTORY:     Past Medical History   Diagnosis Date    Arthritis     Back pain     Breast cancer      originally dx in 02/1997- treated with surgery, chemo and radiation     Elevated bilirubin 11/19/2013    Fatty liver     GERD (gastroesophageal reflux disease)     Glucose intolerance (impaired glucose tolerance)     Hyperlipidemia     Hypertension     Hypothyroid     Obesity     Osteopenia     Osteoporosis     Primary insomnia 11/2/2016    Sleep apnea      wears CPAP nightly     Squamous cell carcinoma 2011     right forearm, sx by Dr. Corinne Ray    Thyrotoxicosis without mention of goiter or other cause, without mention of thyrotoxic crisis or storm      hypothyroidism    Trouble in sleeping     Urinary incontinence     Well woman exam with routine gynecological exam 11/2/2016       Past Surgical History   Procedure Laterality Date    Left modified radical mastectomy  Feb 1997     for treatment of breast cancer     Breast implant Bilateral 1998     implants removed in 2003    Left breast reconstruction  2005    Pelvic laparoscopy  1978     pt had endometriosis     Robotic lap  hysterectomy with bso  2008    Lipoma resection  2010     removed from upper back " area     Reduction of mons pubis  2011    Hysterectomy  2008     benign    Pr removal of ovary/tube(s)  2008      benign    Modified radical mastectomy w/ axillary lymph node dissection  02/1997    Appendectomy  2008     removed during hysterectomy surgery     Breast surgery       see details below     Upper gastrointestinal endoscopy  8/05/2013       Family History   Problem Relation Age of Onset    Heart attack Father 51    Heart disease Father     Breast cancer Sister 51     BRCA 1/2 negative    Cancer Mother 65     malignant melanoma     No Known Problems Brother     No Known Problems Daughter     No Known Problems Son     No Known Problems Daughter     No Known Problems Son     Uterine cancer Neg Hx     Colon cancer Neg Hx     Celiac disease Neg Hx     Esophageal cancer Neg Hx     Inflammatory bowel disease Neg Hx     Liver cancer Neg Hx     Liver disease Neg Hx     Stomach cancer Neg Hx     Ulcerative colitis Neg Hx     Hypertension Neg Hx     Cirrhosis Neg Hx     Crohn's disease Neg Hx     Rectal cancer Neg Hx     Ovarian cancer Neg Hx        Social History     Social History    Marital status:      Spouse name: N/A    Number of children: N/A    Years of education: N/A     Social History Main Topics    Smoking status: Former Smoker     Packs/day: 0.50     Years: 3.00     Start date: 4/15/1969     Quit date: 4/15/1972    Smokeless tobacco: Never Used      Comment: started at age 18 during college     Alcohol use 0.0 oz/week     0 Standard drinks or equivalent per week      Comment: occasional, 1 cocktail once weekly     Drug use: No    Sexual activity: Yes     Partners: Male     Other Topics Concern    None     Social History Narrative       MEDICATIONS & ALLERGIES:     Current Facility-Administered Medications on File Prior to Encounter   Medication Dose Route Frequency Provider Last Rate Last Dose    denosumab (PROLIA) injection 60 mg  60 mg Subcutaneous Q6  Months Kaleb Horta MD   60 mg at 01/13/17 0953    lorazepam injection 1 mg  1 mg Intravenous PRN Francisco Baker MD   1 mg at 01/27/17 1242     Current Outpatient Prescriptions on File Prior to Encounter   Medication Sig Dispense Refill    alprazolam (XANAX) 0.5 MG tablet Take 1 tablet (0.5 mg total) by mouth nightly as needed for Insomnia. 30 tablet 2    ascorbic acid (VITAMIN C) 1000 MG tablet Take 1 tablet by mouth once daily.      aspirin 81 MG chewable tablet Take 1 tablet by mouth.      CALCIUM CARBONATE/VITAMIN D3 (CALTRATE 600 + D ORAL) Take 1 tablet by mouth 2 (two) times daily.       CALCIUM POLYCARBOPHIL (FIBERCON ORAL) Take 3 tablets by mouth 2 (two) times daily.       celecoxib (CELEBREX) 100 MG capsule Take 1 capsule (100 mg total) by mouth daily as needed. 30 capsule 6    coenzyme Q10-vitamin E (COQ10 ) 100-100 mg-unit Cap Take 1 capsule by mouth once daily.       estradiol (VAGIFEM) 10 mcg Tab PLACE 1 TABLET (10 MCG TOTAL) VAGINALLY TWICE A WEEK. 8 tablet 12    gabapentin (NEURONTIN) 300 MG capsule Take 1 capsule (300 mg total) by mouth every evening. 30 capsule 1    hydrocodone-acetaminophen 5-325mg (NORCO) 5-325 mg per tablet Take 1 tablet by mouth every 4 (four) hours as needed for Pain. 6 tablet 0    levothyroxine (SYNTHROID) 50 MCG tablet TAKE 1 TABLET (50 MCG TOTAL) BY MOUTH ONCE DAILY. 90 tablet 3    lidocaine (LIDODERM) 5 %(700 mg/patch) Place 2 patches onto the skin daily as needed. Remove & Discard patch within 12 hours or as directed by MD      lisinopril (PRINIVIL,ZESTRIL) 20 MG tablet Take 1 tablet (20 mg total) by mouth once daily. 90 tablet 3    lorazepam (ATIVAN) 1 MG tablet Take 1 tablet (1 mg total) by mouth every 6 (six) hours as needed for Anxiety. 30 tablet 3    metformin (GLUCOPHAGE) 500 MG tablet Take 500 mg by mouth 2 (two) times daily with meals.      MULTIVITAMIN ORAL once daily.       NON FORMULARY MEDICATION Take 1 capsule by mouth once daily.  Tumeric      omega-3 fatty acids-vitamin E (FISH OIL) 1,000 mg Cap Take 1 capsule by mouth once daily.       ondansetron (ZOFRAN-ODT) 4 MG TbDL Take 1 tablet (4 mg total) by mouth every 8 (eight) hours as needed. 20 tablet 3    pantoprazole (PROTONIX) 40 MG tablet Take 1 tablet (40 mg total) by mouth before breakfast. 90 tablet 3    simvastatin (ZOCOR) 20 MG tablet TAKE 1 TABLET (20 MG TOTAL) BY MOUTH EVERY EVENING. 90 tablet 3    vitamin D 1000 units Tab Take 1,000 Units by mouth once daily.           Review of patient's allergies indicates:   Allergen Reactions    Erythromycin      Other reaction(s): Nausea    Erythromycin (bulk)      Other reaction(s): Nausea    Oxycodone Nausea And Vomiting    Oxycodone-acetaminophen Nausea And Vomiting     Other reaction(s): Nausea       OBJECTIVE:     Vital Signs:  Temp:  [99.7 °F (37.6 °C)] 99.7 °F (37.6 °C)  Pulse:  [110-120] 110  Resp:  [18] 18  SpO2:  [95 %-97 %] 97 %  BP: (145-146)/(73-78) 145/78  Body mass index is 30.67 kg/(m^2).     Physical Exam:  General: well developed, well nourished, no distress  Eyes: conjunctivae/corneas clear. PERRL..  HENT: Head:normocephalic, atraumatic. Ears:bilateral TM's and external ear canals normal. Nose: Nares normal. Septum midline. Mucosa normal. No drainage or sinus tenderness., no discharge, no sinus tenderness. Throat: lips, mucosa, and tongue normal; teeth and gums normal and no throat erythema.  Neck: supple, symmetrical, trachea midline, no JVD and thyroid not enlarged, symmetric, no tenderness/mass/nodules  Lungs:  clear to auscultation bilaterally and normal respiratory effort  Cardiovascular: Heart: regular rate and rhythm, S1, S2 normal, no murmur, click, rub or gallop. Chest Wall: no tenderness. Extremities: no cyanosis or edema, or clubbing. Pulses: 2+ and symmetric.  Abdomen/Rectal: Abdomen: soft, non-tender non-distented; bowel sounds normal; no masses,  no organomegaly. Rectal: not examined  Skin: Skin color,  texture, turgor normal. No rashes or lesions  Musculoskeletal:full range of motion of joints: right upper extremity, left upper extremity, right lower extremity and left lower extremity    Lymph Nodes: No cervical or supraclavicular adenopathy  Neurologic: Normal strength and tone. No focal numbness or weakness    Laboratory  Lab Results   Component Value Date    WBC 3.38 (L) 02/02/2017    HGB 14.2 02/02/2017    HCT 41.5 02/02/2017    MCV 84 02/02/2017     02/02/2017       Recent Labs  Lab 02/02/17  1955   *   *   K 4.4      CO2 24   BUN 8   CREATININE 0.8   CALCIUM 9.2     Lab Results   Component Value Date    INR 1.0 02/02/2017    INR 1.0 02/03/2014     Lab Results   Component Value Date    HGBA1C 6.7 (H) 10/26/2016     No results for input(s): POCTGLUCOSE in the last 72 hours.    Diagnostic Results:  ECG: Reviewed  X-Ray: Reviewed    ASSESSMENT & PLAN:     Current Problems List:  Active Hospital Problems    Diagnosis  POA    *Fever [R50.9]  Yes    HTN (hypertension) [I10]  Yes    Hypothyroid [E03.9]  Yes    Sleep apnea [G47.30]  Yes    GERD (gastroesophageal reflux disease) [K21.9]  Yes    Hyperlipidemia [E78.5]  Yes    Personal history of breast cancer [Z85.3]  Not Applicable     Stage IIA carcinoma of the left breast 1997        Resolved Hospital Problems    Diagnosis Date Resolved POA   No resolved problems to display.     Problem Assessment & Treatment Plan:  66 year old female who presents with fevers.    #Fever  --100.9 at home. Currently afebrile  --Pt also with associated tachycardia on arrival to ED. Pt received 30 cc/kg with appropriate response in HR. Continue maintenance fluids  --Pt received vanc/cefepime in the ED.  --Unclear source. Pt reporting upper respiratory symptoms. Possibly viral infection. Viral panel negative. Check respiratory cultures. Continue cefepime for now. Can likely de-escalate in the am.  --Blood cultures sent and pending. CXR negative for pna.  Will check U/A  --WCC 3.38 with 2% bands after neulasta injection on Saturday    #L breast cancer  --Sees Dr. Baker in clinic  --Had first chemotherapy on Friday with neulasta injection on Saturday    #HTN  --Continue home lisinopril    #NELDA  --Continue home CPAP at night    #hypothyroidism  --Continue home synthroid    #HLD  --Continue home simvastatin    #T2DM  --Pt on metformin at home. Will hold while in hospital  --Hb A1c on 10/26 6.7  --Start low dose ssi  --Check HbA1c    #GERD  --continue home ppi    #DVT ppx  --lovenox 40    #Dispo  --Pending clinical improvement.    Stephanie Hale MD PGY 3  [phone]

## 2017-02-04 LAB — BACTERIA UR CULT: NO GROWTH

## 2017-02-06 ENCOUNTER — PATIENT MESSAGE (OUTPATIENT)
Dept: ADMINISTRATIVE | Facility: OTHER | Age: 67
End: 2017-02-06

## 2017-02-07 LAB
BACTERIA BLD CULT: NORMAL
BACTERIA BLD CULT: NORMAL

## 2017-02-08 ENCOUNTER — TELEPHONE (OUTPATIENT)
Dept: INTERNAL MEDICINE | Facility: CLINIC | Age: 67
End: 2017-02-08

## 2017-02-16 ENCOUNTER — OFFICE VISIT (OUTPATIENT)
Dept: HEMATOLOGY/ONCOLOGY | Facility: CLINIC | Age: 67
End: 2017-02-16
Payer: MEDICARE

## 2017-02-16 ENCOUNTER — LAB VISIT (OUTPATIENT)
Dept: LAB | Facility: HOSPITAL | Age: 67
End: 2017-02-16
Attending: INTERNAL MEDICINE
Payer: MEDICARE

## 2017-02-16 VITALS
TEMPERATURE: 99 F | HEART RATE: 93 BPM | DIASTOLIC BLOOD PRESSURE: 79 MMHG | HEIGHT: 66 IN | SYSTOLIC BLOOD PRESSURE: 135 MMHG | RESPIRATION RATE: 20 BRPM | OXYGEN SATURATION: 97 % | WEIGHT: 192.25 LBS | BODY MASS INDEX: 30.9 KG/M2

## 2017-02-16 DIAGNOSIS — Z51.11 ENCOUNTER FOR ANTINEOPLASTIC CHEMOTHERAPY: ICD-10-CM

## 2017-02-16 DIAGNOSIS — C50.512 MALIGNANT NEOPLASM OF LOWER-OUTER QUADRANT OF LEFT FEMALE BREAST: ICD-10-CM

## 2017-02-16 LAB
ALBUMIN SERPL BCP-MCNC: 3.9 G/DL
ALP SERPL-CCNC: 79 U/L
ALT SERPL W/O P-5'-P-CCNC: 33 U/L
ANION GAP SERPL CALC-SCNC: 5 MMOL/L
AST SERPL-CCNC: 27 U/L
BASOPHILS # BLD AUTO: 0.15 K/UL
BASOPHILS NFR BLD: 2.4 %
BILIRUB SERPL-MCNC: 0.8 MG/DL
BUN SERPL-MCNC: 15 MG/DL
CALCIUM SERPL-MCNC: 9.2 MG/DL
CHLORIDE SERPL-SCNC: 106 MMOL/L
CO2 SERPL-SCNC: 28 MMOL/L
CREAT SERPL-MCNC: 0.8 MG/DL
DIFFERENTIAL METHOD: ABNORMAL
EOSINOPHIL # BLD AUTO: 0.1 K/UL
EOSINOPHIL NFR BLD: 1 %
ERYTHROCYTE [DISTWIDTH] IN BLOOD BY AUTOMATED COUNT: 13.3 %
EST. GFR  (AFRICAN AMERICAN): >60 ML/MIN/1.73 M^2
EST. GFR  (NON AFRICAN AMERICAN): >60 ML/MIN/1.73 M^2
GLUCOSE SERPL-MCNC: 151 MG/DL
HCT VFR BLD AUTO: 40.8 %
HGB BLD-MCNC: 13.7 G/DL
LYMPHOCYTES # BLD AUTO: 1.8 K/UL
LYMPHOCYTES NFR BLD: 28.3 %
MCH RBC QN AUTO: 28.5 PG
MCHC RBC AUTO-ENTMCNC: 33.6 %
MCV RBC AUTO: 85 FL
MONOCYTES # BLD AUTO: 0.6 K/UL
MONOCYTES NFR BLD: 9.7 %
NEUTROPHILS # BLD AUTO: 3.6 K/UL
NEUTROPHILS NFR BLD: 57.2 %
PLATELET # BLD AUTO: 226 K/UL
PMV BLD AUTO: 10.1 FL
POTASSIUM SERPL-SCNC: 4.6 MMOL/L
PROT SERPL-MCNC: 6.7 G/DL
RBC # BLD AUTO: 4.8 M/UL
SODIUM SERPL-SCNC: 139 MMOL/L
WBC # BLD AUTO: 6.21 K/UL

## 2017-02-16 PROCEDURE — 1157F ADVNC CARE PLAN IN RCRD: CPT | Mod: S$GLB,,, | Performed by: PHYSICIAN ASSISTANT

## 2017-02-16 PROCEDURE — 99499 UNLISTED E&M SERVICE: CPT | Mod: S$GLB,,, | Performed by: PHYSICIAN ASSISTANT

## 2017-02-16 PROCEDURE — 99999 PR PBB SHADOW E&M-EST. PATIENT-LVL IV: CPT | Mod: PBBFAC,,, | Performed by: PHYSICIAN ASSISTANT

## 2017-02-16 PROCEDURE — 85025 COMPLETE CBC W/AUTO DIFF WBC: CPT

## 2017-02-16 PROCEDURE — 1125F AMNT PAIN NOTED PAIN PRSNT: CPT | Mod: S$GLB,,, | Performed by: PHYSICIAN ASSISTANT

## 2017-02-16 PROCEDURE — 99214 OFFICE O/P EST MOD 30 MIN: CPT | Mod: S$GLB,,, | Performed by: PHYSICIAN ASSISTANT

## 2017-02-16 PROCEDURE — 80053 COMPREHEN METABOLIC PANEL: CPT

## 2017-02-16 PROCEDURE — 3075F SYST BP GE 130 - 139MM HG: CPT | Mod: S$GLB,,, | Performed by: PHYSICIAN ASSISTANT

## 2017-02-16 PROCEDURE — 36415 COLL VENOUS BLD VENIPUNCTURE: CPT

## 2017-02-16 PROCEDURE — 3078F DIAST BP <80 MM HG: CPT | Mod: S$GLB,,, | Performed by: PHYSICIAN ASSISTANT

## 2017-02-16 PROCEDURE — 1159F MED LIST DOCD IN RCRD: CPT | Mod: S$GLB,,, | Performed by: PHYSICIAN ASSISTANT

## 2017-02-16 PROCEDURE — 1160F RVW MEDS BY RX/DR IN RCRD: CPT | Mod: S$GLB,,, | Performed by: PHYSICIAN ASSISTANT

## 2017-02-16 RX ORDER — HEPARIN 100 UNIT/ML
500 SYRINGE INTRAVENOUS
Status: CANCELLED | OUTPATIENT
Start: 2017-02-17

## 2017-02-16 RX ORDER — HYDROCODONE BITARTRATE AND ACETAMINOPHEN 5; 325 MG/1; MG/1
1 TABLET ORAL EVERY 4 HOURS PRN
Qty: 40 TABLET | Refills: 0 | Status: SHIPPED | OUTPATIENT
Start: 2017-02-16 | End: 2017-09-27

## 2017-02-16 RX ORDER — SODIUM CHLORIDE 0.9 % (FLUSH) 0.9 %
10 SYRINGE (ML) INJECTION
Status: CANCELLED | OUTPATIENT
Start: 2017-02-17

## 2017-02-16 RX ORDER — LORAZEPAM 2 MG/ML
1 INJECTION INTRAMUSCULAR
Status: CANCELLED
Start: 2017-02-17

## 2017-02-16 NOTE — LETTER
[facility]  [address]  [facility] [address]  Phone: [phone] February 16, 2017    Lima Maldonado  [address] [facility] [address]      To Whom It May Concern:    Lima Maldonado is unable to participate in jury duty as she is currently receiving chemotherapy for breast cancer.     If you have any questions or concerns, please feel free to call my office.    Sincerely,        Melissa Lagunas PA-C

## 2017-02-16 NOTE — PROGRESS NOTES
Subjective:       Patient ID: Lima Maldonado is a 66 y.o. female.    Chief Complaint: Follow-up    HPI Comments: Dr. Baker's patient    She had stage I ER positive disease with an intermediate risk Oncotype score.     Here for adjuvant TC #2 today.    Had hospitalization after cycle #1 for fever.  Cultures were negative. She states she had general malaise and bone pain.  Cultures were negative and patient dc'd on oral antibiotics. Has been recuperating at home without further fever.  Generalized bone pain has resolved, she did have to take Norco for that pain initially; she also took claritin.  No nausea, vomiting, mucositis, neuropathy. Energy level and appetite are good. No breast/chest wall complaints.  Bowel and urinary function is normal.           Breast history: mammography on September 20, 2016 demonstrated a new irregular mass with spiculated margins seen in the left breast at  5 o'clock along the surgical scar site.  ultrasound DEMONSTRATED A SOLID 8 X 8 X 7 MM MASS.    A needle biopsy in September 27 showed infiltrating carcinoma, histologic grade 3, nuclear grade 2, mitotic index 1. Tumor was 90% ER positive, 70% VT positive, and 1+ HER-2.     MRI of the breast showed a 1.7 x 1.3 cm lesion in the lower outer quadrant of the left breast.  PET/CT on October 7 was negative for any evidence of distant metastasis.  In the  On November 16 bilateral mastectomies were performed. Left pressure 1.5 cm intermediate grade carcinoma with ductal and lobular features. There was focal dermal invasion. Margins were negative. The right breast was without abnormality.  Oncotype score returned 25 - intermediate risk    Review of Systems   Constitutional: Positive for fatigue. Negative for activity change, appetite change, chills, diaphoresis, fever and unexpected weight change.   HENT: Negative for congestion, rhinorrhea, sore throat and trouble swallowing.    Eyes: Negative for visual disturbance.   Respiratory: Negative  for cough, chest tightness and shortness of breath.    Cardiovascular: Negative for chest pain, palpitations and leg swelling.   Gastrointestinal: Negative for abdominal pain, blood in stool, constipation, diarrhea, nausea and vomiting.   Genitourinary: Negative for dysuria, hematuria and vaginal bleeding.   Musculoskeletal: Positive for arthralgias and myalgias. Negative for back pain.   Skin: Negative for pallor and rash.   Neurological: Negative for dizziness, weakness and headaches.   Hematological: Negative for adenopathy. Does not bruise/bleed easily.   Psychiatric/Behavioral: Negative for dysphoric mood and suicidal ideas. The patient is not nervous/anxious.        Objective:      Physical Exam   Constitutional: She is oriented to person, place, and time. She appears well-developed and well-nourished. No distress.   HENT:   Head: Normocephalic.   Mouth/Throat: Oropharynx is clear and moist. No oropharyngeal exudate.   Eyes: Conjunctivae are normal. Pupils are equal, round, and reactive to light. No scleral icterus.   Neck: Normal range of motion. Neck supple. No thyromegaly present.   Cardiovascular: Normal rate, regular rhythm, normal heart sounds and intact distal pulses.    Pulmonary/Chest: Effort normal and breath sounds normal. No respiratory distress.   Right breast reconstruction without mass or nodule.  Left breast with    Abdominal: Soft. Bowel sounds are normal. She exhibits no distension and no mass. There is no tenderness.   Musculoskeletal: Normal range of motion. She exhibits no edema or tenderness.   Lymphadenopathy:     She has no cervical adenopathy.   Neurological: She is alert and oriented to person, place, and time. No cranial nerve deficit.   No spinal or paraspinal tenderness to palpation     Skin: Skin is warm and dry.   Psychiatric: She has a normal mood and affect. Her behavior is normal. Thought content normal.   Vitals reviewed.      Assessment:       1. Malignant neoplasm of  lower-outer quadrant of left female breast    2. Encounter for antineoplastic chemotherapy        Plan:       Continue with adjuvant TC #2 tomorrow followed by neulasta.  We discussed scheduled use of Aleve for relief of neulasta related arthalgias. We also discussed monitoring temperature in between dosing. Patient knows to call if any infectious signs/symptoms.  RX for Norco in event pain is not controlled with Aleve; she will also continue claritin.   Return to clinic in 3 weeks with labs in anticipation of next treatment.

## 2017-02-16 NOTE — Clinical Note
Can you sign her TC #2? Thanks. She had pretty bad myalgias/arthalgias from chemo/neulasta combo- I assume you want to keep neulasta on. Also- please [facility] in [place] [facility] for bony pain, i've also asked her to try aleve this time.thanks.

## 2017-02-17 ENCOUNTER — INFUSION (OUTPATIENT)
Dept: INFUSION THERAPY | Facility: HOSPITAL | Age: 67
End: 2017-02-17
Attending: INTERNAL MEDICINE
Payer: MEDICARE

## 2017-02-17 VITALS
DIASTOLIC BLOOD PRESSURE: 62 MMHG | SYSTOLIC BLOOD PRESSURE: 128 MMHG | HEART RATE: 101 BPM | RESPIRATION RATE: 20 BRPM | TEMPERATURE: 99 F

## 2017-02-17 DIAGNOSIS — Z51.11 ENCOUNTER FOR ANTINEOPLASTIC CHEMOTHERAPY: Primary | ICD-10-CM

## 2017-02-17 DIAGNOSIS — C50.512 MALIGNANT NEOPLASM OF LOWER-OUTER QUADRANT OF LEFT FEMALE BREAST: ICD-10-CM

## 2017-02-17 PROCEDURE — 63600175 PHARM REV CODE 636 W HCPCS: Performed by: INTERNAL MEDICINE

## 2017-02-17 PROCEDURE — 96367 TX/PROPH/DG ADDL SEQ IV INF: CPT

## 2017-02-17 PROCEDURE — 96375 TX/PRO/DX INJ NEW DRUG ADDON: CPT

## 2017-02-17 PROCEDURE — 96413 CHEMO IV INFUSION 1 HR: CPT

## 2017-02-17 PROCEDURE — 96417 CHEMO IV INFUS EACH ADDL SEQ: CPT

## 2017-02-17 PROCEDURE — 25000003 PHARM REV CODE 250: Performed by: INTERNAL MEDICINE

## 2017-02-17 RX ORDER — LORAZEPAM 2 MG/ML
1 INJECTION INTRAMUSCULAR
Status: DISCONTINUED | OUTPATIENT
Start: 2017-02-17 | End: 2017-02-17 | Stop reason: HOSPADM

## 2017-02-17 RX ORDER — SODIUM CHLORIDE 0.9 % (FLUSH) 0.9 %
10 SYRINGE (ML) INJECTION
Status: DISCONTINUED | OUTPATIENT
Start: 2017-02-17 | End: 2017-02-17 | Stop reason: HOSPADM

## 2017-02-17 RX ORDER — HEPARIN 100 UNIT/ML
500 SYRINGE INTRAVENOUS
Status: DISCONTINUED | OUTPATIENT
Start: 2017-02-17 | End: 2017-02-17 | Stop reason: HOSPADM

## 2017-02-17 RX ADMIN — CYCLOPHOSPHAMIDE 1205 MG: 1 INJECTION, POWDER, FOR SOLUTION INTRAVENOUS; ORAL at 01:02

## 2017-02-17 RX ADMIN — LORAZEPAM 1 MG: 2 INJECTION, SOLUTION INTRAMUSCULAR; INTRAVENOUS at 11:02

## 2017-02-17 RX ADMIN — SODIUM CHLORIDE 150 MG: 9 INJECTION, SOLUTION INTRAVENOUS at 11:02

## 2017-02-17 RX ADMIN — SODIUM CHLORIDE, PRESERVATIVE FREE 10 ML: 5 INJECTION INTRAVENOUS at 02:02

## 2017-02-17 RX ADMIN — DEXAMETHASONE SODIUM PHOSPHATE 0.25 MG: 4 INJECTION, SOLUTION INTRAMUSCULAR; INTRAVENOUS at 11:02

## 2017-02-17 RX ADMIN — SODIUM CHLORIDE: 0.9 INJECTION, SOLUTION INTRAVENOUS at 10:02

## 2017-02-17 RX ADMIN — DOCETAXEL ANHYDROUS 150 MG: 10 INJECTION, SOLUTION INTRAVENOUS at 11:02

## 2017-02-17 RX ADMIN — HEPARIN 500 UNITS: 100 SYRINGE at 02:02

## 2017-02-17 NOTE — PLAN OF CARE
Problem: Patient Care Overview (Adult)  Goal: Plan of Care Review  Outcome: Ongoing (interventions implemented as appropriate)  Tolerated treatment well.  Advised to call MD for any problems or concerns.  AVS given.  RTC on tomorrow for Neulasta.  NAD noted upon discharge.

## 2017-02-17 NOTE — MR AVS SNAPSHOT
Patient Information     Patient Name Sex Lima Tom Female 1950      Visit Information        Provider Department Dept Phone Center    2017 10:30 AM [facility], CHEMO [facility] [phone] Kris Carver      Patient Instructions     None      Your Current Medications Are     ascorbic acid (VITAMIN C) 1000 MG tablet    aspirin 81 MG chewable tablet    CALCIUM CARBONATE/VITAMIN D3 (CALTRATE 600 + D ORAL)    celecoxib (CELEBREX) 100 MG capsule    coenzyme Q10-vitamin E (COQ10 ) 100-100 mg-unit Cap    estradiol (VAGIFEM) 10 mcg Tab    gabapentin (NEURONTIN) 300 MG capsule    hydrocodone-acetaminophen 5-325mg (NORCO) 5-325 mg per tablet    levothyroxine (SYNTHROID) 50 MCG tablet    lidocaine (LIDODERM) 5 %(700 mg/patch)    lisinopril (PRINIVIL,ZESTRIL) 20 MG tablet    lorazepam (ATIVAN) 1 MG tablet    metformin (GLUCOPHAGE) 500 MG tablet    MULTIVITAMIN ORAL    omega-3 fatty acids-vitamin E (FISH OIL) 1,000 mg Cap    pantoprazole (PROTONIX) 40 MG tablet    simvastatin (ZOCOR) 20 MG tablet    vitamin D 1000 units Tab      Facility-Administered Medications     cyclophosphamide (CYTOXAN) 600 mg/m2 = 1,205 mg in sodium chloride 0.9% 250 mL chemo infusion    docetaxel (TAXOTERE) 75 mg/m2 = 150 mg in sodium chloride 0.9% 250 mL chemo infusion    fosaprepitant 150 mg in sodium chloride 0.9% 150 mL IVPB    heparin, porcine (PF) 100 unit/mL injection flush 500 Units    lorazepam injection 1 mg    palonosetron 0.25mg/dexamethasone 10mg IVPB    sodium chloride 0.9% 250 mL flush bag    sodium chloride 0.9% flush 10 mL      Appointments for Next Year     2017 10:00 AM INJECTION (15 min.) [facility], [facility] INFUSION    Arrive at check-in approximately 15 minutes before your scheduled appointment time. Bring all outside medical records and imaging, along with a list of your current medications and insurance card.    [facility], 5th Floor    2017   9:00 AM EDUCATION (60 min.) [facility] DIABETES EDUCATOR, INT MED 1    Arrive at check-in approximately 15 minutes before your scheduled appointment time. Bring all outside medical records and imaging, along with a list of your current medications and insurance card.    [facility] Bldg.    3/7/2017 10:15 AM ESTABLISHED PATIENT (15 min.) [facility] Jimmy Bains MD    Arrive at check-in approximately 15 minutes before your scheduled appointment time. Bring all outside medical records and imaging, along with a list of your current medications and insurance card.    [facility]    3/9/2017  8:50 AM NON FASTING LAB (10 min.) [facility]    Arrive at check-in approximately 15 minutes before your scheduled appointment time. Bring all outside medical records and imaging, along with a list of your current medications and insurance card.    [facility] 3rd Floor    3/9/2017  9:45 AM ESTABLISHED PATIENT SHORT (15 min.) [facility] Hematology Oncology Francisco Baker MD    Arrive at check-in approximately 15 minutes before your scheduled appointment time. Bring all outside medical records and imaging, along with a list of your current medications and insurance card.    [facility] - 3rd Floor    3/10/2017 10:30 AM [facility]ION 240 MIN (240 min.) [facility]    Arrive at check-in approximately 15 minutes before your scheduled appointment time. Bring all outside medical records and imaging, along with a list of your current medications and insurance card.    [facility], 5th Floor    3/11/2017 10:15 AM [facility]CTION (15 min.) [facility]    Arrive at check-in approximately 15 minutes before your scheduled appointment time. Bring all outside medical records and imaging, along with a list of your  current medications and insurance card.    [facility], 5th Floor    3/13/2017 10:00 AM NON FASTING LAB (5 min.) [facility], APPOINTMENT [place]    Arrive at check-in approximately 15 minutes before your scheduled appointment time. Bring all outside medical records and imaging, along with a list of your current medications and insurance card.    2nd Floor    3/21/2017  9:40 AM ESTABLISHED PATIENT (20 min.) [facility] Radha Beth NP    Arrive at check-in approximately 15 minutes before your scheduled appointment time. Bring all outside medical records and imaging, along with a list of your current medications and insurance card.    [facility] - 8th Floor Please park in [facility] Parking [facility]age.         Default Flowsheet Data (last 24 hours)      Amb Complex Vitals Rhys        02/17/17 1033                Measurements    BP (!)  140/75        Temp 98.9 °F (37.2 °C)        Pulse (!)  116        Resp 20        Pain Assessment    Pain Score Zero                Allergies     Erythromycin     Other reaction(s): Nausea    Erythromycin (Bulk)     Other reaction(s): Nausea    Oxycodone Nausea And Vomiting    Oxycodone-acetaminophen Nausea And Vomiting    Other reaction(s): Nausea      Medications You Received from 02/16/2017 1152 to 02/17/2017 1152        Date/Time Order Dose Route Action     02/17/2017 1148 docetaxel (TAXOTERE) 75 mg/m2 = 150 mg in sodium chloride 0.9% 250 mL chemo infusion 150 mg Intravenous New Bag     02/17/2017 1130 fosaprepitant 150 mg in sodium chloride 0.9% 150 mL IVPB 150 mg Intravenous New Bag     02/17/2017 1108 lorazepam injection 1 mg 1 mg Intravenous Given     02/17/2017 1107 palonosetron 0.25mg/dexamethasone 10mg IVPB 0.25 mg Intravenous New Bag     02/17/2017 1050 sodium chloride 0.9% 250 mL flush bag   Intravenous New Bag      Current Discharge Medication List     Cannot display discharge medications since this is not an  admission.

## 2017-02-18 ENCOUNTER — INFUSION (OUTPATIENT)
Dept: INFUSION THERAPY | Facility: HOSPITAL | Age: 67
End: 2017-02-18
Attending: INTERNAL MEDICINE
Payer: MEDICARE

## 2017-02-18 DIAGNOSIS — C50.512 MALIGNANT NEOPLASM OF LOWER-OUTER QUADRANT OF LEFT FEMALE BREAST: ICD-10-CM

## 2017-02-18 DIAGNOSIS — Z51.11 ENCOUNTER FOR ANTINEOPLASTIC CHEMOTHERAPY: Primary | ICD-10-CM

## 2017-02-18 PROCEDURE — 96372 THER/PROPH/DIAG INJ SC/IM: CPT

## 2017-02-18 PROCEDURE — 63600175 PHARM REV CODE 636 W HCPCS: Performed by: INTERNAL MEDICINE

## 2017-02-18 RX ADMIN — PEGFILGRASTIM 6 MG: 6 INJECTION SUBCUTANEOUS at 10:02

## 2017-03-02 ENCOUNTER — PATIENT MESSAGE (OUTPATIENT)
Dept: HEMATOLOGY/ONCOLOGY | Facility: CLINIC | Age: 67
End: 2017-03-02

## 2017-03-06 ENCOUNTER — HOSPITAL ENCOUNTER (OUTPATIENT)
Dept: CARDIOLOGY | Facility: CLINIC | Age: 67
Discharge: HOME | End: 2017-03-06
Payer: MEDICARE

## 2017-03-06 DIAGNOSIS — I45.10 RBBB: ICD-10-CM

## 2017-03-06 LAB
DIASTOLIC DYSFUNCTION: NO
MITRAL VALVE MOBILITY: NORMAL
RETIRED EF AND QEF - SEE NOTES: 60 (ref 55–65)

## 2017-03-06 PROCEDURE — 93306 TTE W/DOPPLER COMPLETE: CPT | Mod: S$GLB,,, | Performed by: INTERNAL MEDICINE

## 2017-03-07 ENCOUNTER — OFFICE VISIT (OUTPATIENT)
Dept: SURGERY | Facility: CLINIC | Age: 67
End: 2017-03-07
Payer: MEDICARE

## 2017-03-07 VITALS
SYSTOLIC BLOOD PRESSURE: 128 MMHG | HEIGHT: 66 IN | HEART RATE: 118 BPM | TEMPERATURE: 98 F | BODY MASS INDEX: 30.7 KG/M2 | WEIGHT: 191 LBS | DIASTOLIC BLOOD PRESSURE: 68 MMHG

## 2017-03-07 DIAGNOSIS — C50.512 PRIMARY CANCER OF LOWER OUTER QUADRANT OF LEFT FEMALE BREAST: Primary | ICD-10-CM

## 2017-03-07 PROCEDURE — 1126F AMNT PAIN NOTED NONE PRSNT: CPT | Mod: S$GLB,,, | Performed by: SURGERY

## 2017-03-07 PROCEDURE — 3074F SYST BP LT 130 MM HG: CPT | Mod: S$GLB,,, | Performed by: SURGERY

## 2017-03-07 PROCEDURE — 1160F RVW MEDS BY RX/DR IN RCRD: CPT | Mod: S$GLB,,, | Performed by: SURGERY

## 2017-03-07 PROCEDURE — 99499 UNLISTED E&M SERVICE: CPT | Mod: S$PBB,,, | Performed by: SURGERY

## 2017-03-07 PROCEDURE — 3078F DIAST BP <80 MM HG: CPT | Mod: S$GLB,,, | Performed by: SURGERY

## 2017-03-07 PROCEDURE — 1159F MED LIST DOCD IN RCRD: CPT | Mod: S$GLB,,, | Performed by: SURGERY

## 2017-03-07 PROCEDURE — 1157F ADVNC CARE PLAN IN RCRD: CPT | Mod: S$GLB,,, | Performed by: SURGERY

## 2017-03-07 PROCEDURE — 99214 OFFICE O/P EST MOD 30 MIN: CPT | Mod: S$GLB,,, | Performed by: SURGERY

## 2017-03-07 PROCEDURE — 99999 PR PBB SHADOW E&M-EST. PATIENT-LVL III: CPT | Mod: PBBFAC,,, | Performed by: SURGERY

## 2017-03-07 RX ORDER — LORAZEPAM 1 MG/1
TABLET ORAL
COMMUNITY
Start: 2017-02-27 | End: 2017-09-27
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
## 2017-03-07 NOTE — LETTER
[facility]  [address]  [facility] [address]  Phone: [phone] March 27, 2017      Joie Mccall MD  [address]  [facility] [address]  [facility] [address]    Patient: Lima Maldonado   MR Number: [id number]   YOB: 1950   Date of Visit: 3/7/2017     Dear Dr. Mccall:    I saw your patient Lima Maldonado in my Breast Surgery Clinic. Below are the relevant portions of my assessment and plan of care.    Ms. Maldonado is a 66-year-old female with invasive lobular carcinoma of the left breast at the prior lumpectomy scar (ER/MN+, HER-2/harriett neg) status-post non-nipple sparing bilateral mastectomy with PAP flaps. Left breast submitted for Oncotype analysis which has not resulted. Uncomplicated post-op course.    Incision sites CDI, well healed without signs of infection. Some fat necrosis B status post B autologous tissue reconstruction.  Will need delayed fat grafting injection per Dr. Arboleda once completed CTX and recovered.  Oncotype score was 25 so patient receiving 4 cycles of TC of which she has 2 remaining via R PAC.  No signs of infection.  She has some bone pain related to Neulasta.  Will eventually get endocrine therapy with AI.  No role for XRT.    F/U with me in 6 months for B CBE.  Doing well, currently JACKI.    If you have questions, please do not hesitate to call me. I look forward to following Lima along with you.    Sincerely,      Jimmy Bains MD  Medical Director, [facility] [place]  Section of General and Oncologic Surgery  [facility]    RLC/hcr     CC  John T. Cole, MD Jules A. Walters, MD Kamran Khoobehi, MD

## 2017-03-07 NOTE — LETTER
[facility]  [address]  [facility] [address]  Phone: [phone] March 13, 2017      Krunal Arboleda MD  [address]   [address]  [place] [facility] [address]    Patient: Lima Maldonado   MR Number: [id number]   YOB: 1950   Date of Visit: 3/7/2017     Dear Dr. Arboleda:    I saw your patient Lima Maldonado in my Breast Surgery Clinic. Below are the relevant portions of my assessment and plan of care.    Lima Maldonado is a 66-year-old female who is status post-op bilateral non-nipple sparing mastectomy with PAP flaps 11/16/16 secondary to invasive lobular carcinoma of the left breast at the prior lumpectomy scar (ER/NE+, HER-2/harriett neg). No current complaints.      Patient is doing well today.  Incision sites CDI, well healed without signs of infection. Some fat necrosis B status post B autologous tissue reconstruction. Will need delayed fat grafting injection per Dr. Arboleda once completed CTX and recovered. Oncotype score was 25 so patient receiving 4 cycles of TC of which she has 2 remaining via R PAC.  No signs of infection.    Patient with some bone pain related to Neulasta.  Will eventually get endocrine therapy with AI.  No role for XRT.    F/U with me in 6 months for B CBE.  Doing well, currently JACKI.    If you have questions, please do not hesitate to call me. I look forward to following Lima along with you.    Sincerely,      Jimmy Bains MD  Medical Director, [facility]  Section of General and Oncologic Surgery  [facility]    RLC/hcr    CC  Kamran Khoobehi, MD

## 2017-03-07 NOTE — LETTER
March 7, 2017        Joie Mccall MD  [address]  [address]  [facility] [address]             [facility]  [address]  [facility] [address]  Phone: [phone]   Patient: Lima Maldonado   MR Number: [id number]   YOB: 1950   Date of Visit: 3/7/2017       Dear Dr. Mccall:    Thank you for referring Lima Maldonado to me for evaluation. Attached you will find relevant portions of my assessment and plan of care.    If you have questions, please do not hesitate to call me. I look forward to following Lima Maldonado along with you.    Sincerely,      Jimmy Bains MD              John T. Cole, MD Jules A. Walters, MD Kamran Khoobehi, MD    [facility]

## 2017-03-09 ENCOUNTER — OFFICE VISIT (OUTPATIENT)
Dept: HEMATOLOGY/ONCOLOGY | Facility: CLINIC | Age: 67
End: 2017-03-09
Payer: MEDICARE

## 2017-03-09 ENCOUNTER — LAB VISIT (OUTPATIENT)
Dept: LAB | Facility: HOSPITAL | Age: 67
End: 2017-03-09
Attending: INTERNAL MEDICINE
Payer: MEDICARE

## 2017-03-09 VITALS
BODY MASS INDEX: 31.14 KG/M2 | TEMPERATURE: 98 F | SYSTOLIC BLOOD PRESSURE: 123 MMHG | RESPIRATION RATE: 16 BRPM | HEART RATE: 106 BPM | DIASTOLIC BLOOD PRESSURE: 62 MMHG | WEIGHT: 192.88 LBS

## 2017-03-09 DIAGNOSIS — Z51.11 ENCOUNTER FOR ANTINEOPLASTIC CHEMOTHERAPY: ICD-10-CM

## 2017-03-09 DIAGNOSIS — C50.512 PRIMARY CANCER OF LOWER OUTER QUADRANT OF LEFT FEMALE BREAST: Primary | ICD-10-CM

## 2017-03-09 LAB
ALBUMIN SERPL BCP-MCNC: 3.8 G/DL
ALP SERPL-CCNC: 99 U/L
ALT SERPL W/O P-5'-P-CCNC: 33 U/L
ANION GAP SERPL CALC-SCNC: 9 MMOL/L
AST SERPL-CCNC: 32 U/L
BILIRUB SERPL-MCNC: 1.2 MG/DL
BUN SERPL-MCNC: 16 MG/DL
CALCIUM SERPL-MCNC: 9.2 MG/DL
CHLORIDE SERPL-SCNC: 106 MMOL/L
CO2 SERPL-SCNC: 25 MMOL/L
CREAT SERPL-MCNC: 0.8 MG/DL
ERYTHROCYTE [DISTWIDTH] IN BLOOD BY AUTOMATED COUNT: 14.9 %
EST. GFR  (AFRICAN AMERICAN): >60 ML/MIN/1.73 M^2
EST. GFR  (NON AFRICAN AMERICAN): >60 ML/MIN/1.73 M^2
GLUCOSE SERPL-MCNC: 167 MG/DL
HCT VFR BLD AUTO: 37.1 %
HGB BLD-MCNC: 12.6 G/DL
MCH RBC QN AUTO: 29 PG
MCHC RBC AUTO-ENTMCNC: 34 %
MCV RBC AUTO: 86 FL
NEUTROPHILS # BLD AUTO: 5.8 K/UL
PLATELET # BLD AUTO: 182 K/UL
PMV BLD AUTO: 9.5 FL
POTASSIUM SERPL-SCNC: 5.1 MMOL/L
PROT SERPL-MCNC: 6.7 G/DL
RBC # BLD AUTO: 4.34 M/UL
SODIUM SERPL-SCNC: 140 MMOL/L
WBC # BLD AUTO: 8.21 K/UL

## 2017-03-09 PROCEDURE — 99214 OFFICE O/P EST MOD 30 MIN: CPT | Mod: S$GLB,,, | Performed by: INTERNAL MEDICINE

## 2017-03-09 PROCEDURE — 1157F ADVNC CARE PLAN IN RCRD: CPT | Mod: S$GLB,,, | Performed by: INTERNAL MEDICINE

## 2017-03-09 PROCEDURE — 85027 COMPLETE CBC AUTOMATED: CPT

## 2017-03-09 PROCEDURE — 3074F SYST BP LT 130 MM HG: CPT | Mod: S$GLB,,, | Performed by: INTERNAL MEDICINE

## 2017-03-09 PROCEDURE — 80053 COMPREHEN METABOLIC PANEL: CPT

## 2017-03-09 PROCEDURE — 1159F MED LIST DOCD IN RCRD: CPT | Mod: S$GLB,,, | Performed by: INTERNAL MEDICINE

## 2017-03-09 PROCEDURE — 36415 COLL VENOUS BLD VENIPUNCTURE: CPT

## 2017-03-09 PROCEDURE — 99999 PR PBB SHADOW E&M-EST. PATIENT-LVL III: CPT | Mod: PBBFAC,,, | Performed by: INTERNAL MEDICINE

## 2017-03-09 PROCEDURE — 3078F DIAST BP <80 MM HG: CPT | Mod: S$GLB,,, | Performed by: INTERNAL MEDICINE

## 2017-03-09 PROCEDURE — 99499 UNLISTED E&M SERVICE: CPT | Mod: S$PBB,,, | Performed by: INTERNAL MEDICINE

## 2017-03-09 PROCEDURE — 1126F AMNT PAIN NOTED NONE PRSNT: CPT | Mod: S$GLB,,, | Performed by: INTERNAL MEDICINE

## 2017-03-09 RX ORDER — LORAZEPAM 2 MG/ML
1 INJECTION INTRAMUSCULAR
Status: CANCELLED
Start: 2017-03-10

## 2017-03-09 RX ORDER — HEPARIN 100 UNIT/ML
500 SYRINGE INTRAVENOUS
Status: CANCELLED | OUTPATIENT
Start: 2017-03-10

## 2017-03-09 RX ORDER — SODIUM CHLORIDE 0.9 % (FLUSH) 0.9 %
10 SYRINGE (ML) INJECTION
Status: CANCELLED | OUTPATIENT
Start: 2017-03-10

## 2017-03-09 NOTE — PROGRESS NOTES
Subjective:       Patient ID: Lima Maldonado is a 66 y.o. female.    Chief Complaint: No chief complaint on file.    HPI Ms Maldonado return to clinic for follow-up of recent diagnosis of left breast cancer.  She had stage I ER positive disease with an intermediate risk Oncotype score.   She has had 2 cycles of Taxotere and Cytoxan therapy.  Her initial cycle was completed by hospitalization for neutropenia and fever.  She tolerated her second cycle better.  She has had significant bone pain for 4-5 days after Neulasta which she took Claritin some pain medication.  She does have some mild constipation for which she took a stool softener.  She's had no shortness of breath which she has had some, chronic cough and congestion.  Energy is about a 7 out of 10.      Breast history: mammography on September 20, 2016 demonstrated a new irregular mass with spiculated margins seen in the left breast at  5 o'clock along the surgical scar site.   ultrasound DEMONSTRATED A SOLID 8 X 8 X 7 MM MASS.      A needle biopsy in September 27 showed infiltrating  carcinoma, histologic grade 3, nuclear grade 2, mitotic index 1.  Tumor was 90% ER positive, 70% VT positive, and 1+ HER-2.    MRI of the breast showed a 1.7 x 1.3 cm lesion in the lower outer quadrant of the left breast.  PET/CT on October 7 was negative for any evidence of distant metastasis.  In the  On November 16 bilateral mastectomies were performed.  Left pressure 1.5 cm intermediate grade carcinoma with ductal and lobular features.  There was focal dermal invasion.  Margins were negative.  The right breast was without abnormality.  Oncotype score returned 25 -  intermediate risk.    Past history:left    breast, T1cN1, ER positive, status post mastectomy with reconstruction and   postoperative chemotherapy with four cycles of Adriamycin and Cytoxan and    five years of tamoxifen.  Her original diagnosis was in 1997  Review of Systems   Constitutional: Positive for  fatigue. Negative for activity change, appetite change, fever and unexpected weight change.   HENT: Negative for mouth sores and trouble swallowing.    Respiratory: Positive for cough. Negative for shortness of breath.    Cardiovascular: Positive for chest pain.   Gastrointestinal: Positive for constipation and nausea (mild). Negative for abdominal pain and diarrhea.   Musculoskeletal: Negative for back pain.   Neurological: Negative for headaches.   Psychiatric/Behavioral: Negative for dysphoric mood. The patient is not nervous/anxious.        Objective:      Physical Exam   Constitutional: She is oriented to person, place, and time. She appears well-developed and well-nourished. No distress.   HENT:   Mouth/Throat: Oropharynx is clear and moist. No oropharyngeal exudate.   Eyes: No scleral icterus.   Cardiovascular: Normal rate, regular rhythm and normal heart sounds.    Pulmonary/Chest: Effort normal and breath sounds normal. She has no wheezes. She has no rales.       Abdominal: Soft. She exhibits no mass. There is no tenderness.   Lymphadenopathy:     She has no cervical adenopathy.     She has no axillary adenopathy.        Right: No supraclavicular adenopathy present.        Left: No supraclavicular adenopathy present.   Neurological: She is alert and oriented to person, place, and time.   Psychiatric: She has a normal mood and affect. Her behavior is normal. Thought content normal.   Vitals reviewed.      Assessment:       1. Primary cancer of lower outer quadrant of left female breast    2. Encounter for antineoplastic chemotherapy        Plan:       Cycle 3 of taxotere and cytoxan.

## 2017-03-10 ENCOUNTER — INFUSION (OUTPATIENT)
Dept: INFUSION THERAPY | Facility: HOSPITAL | Age: 67
End: 2017-03-10
Attending: INTERNAL MEDICINE
Payer: MEDICARE

## 2017-03-10 ENCOUNTER — TELEPHONE (OUTPATIENT)
Dept: HEMATOLOGY/ONCOLOGY | Facility: CLINIC | Age: 67
End: 2017-03-10

## 2017-03-10 VITALS
SYSTOLIC BLOOD PRESSURE: 134 MMHG | HEIGHT: 66 IN | TEMPERATURE: 98 F | HEART RATE: 96 BPM | WEIGHT: 193.13 LBS | DIASTOLIC BLOOD PRESSURE: 72 MMHG | BODY MASS INDEX: 31.04 KG/M2 | RESPIRATION RATE: 20 BRPM

## 2017-03-10 DIAGNOSIS — C50.512 MALIGNANT NEOPLASM OF LOWER-OUTER QUADRANT OF LEFT FEMALE BREAST: ICD-10-CM

## 2017-03-10 DIAGNOSIS — Z51.11 ENCOUNTER FOR ANTINEOPLASTIC CHEMOTHERAPY: Primary | ICD-10-CM

## 2017-03-10 DIAGNOSIS — C50.512 MALIGNANT NEOPLASM OF LOWER-OUTER QUADRANT OF LEFT FEMALE BREAST: Primary | ICD-10-CM

## 2017-03-10 PROCEDURE — 96413 CHEMO IV INFUSION 1 HR: CPT

## 2017-03-10 PROCEDURE — 25000003 PHARM REV CODE 250: Performed by: INTERNAL MEDICINE

## 2017-03-10 PROCEDURE — 96375 TX/PRO/DX INJ NEW DRUG ADDON: CPT

## 2017-03-10 PROCEDURE — 96417 CHEMO IV INFUS EACH ADDL SEQ: CPT

## 2017-03-10 PROCEDURE — 63600175 PHARM REV CODE 636 W HCPCS: Performed by: INTERNAL MEDICINE

## 2017-03-10 PROCEDURE — 96367 TX/PROPH/DG ADDL SEQ IV INF: CPT

## 2017-03-10 RX ORDER — SODIUM CHLORIDE 0.9 % (FLUSH) 0.9 %
10 SYRINGE (ML) INJECTION
Status: DISCONTINUED | OUTPATIENT
Start: 2017-03-10 | End: 2017-03-10 | Stop reason: HOSPADM

## 2017-03-10 RX ORDER — HEPARIN 100 UNIT/ML
500 SYRINGE INTRAVENOUS
Status: DISCONTINUED | OUTPATIENT
Start: 2017-03-10 | End: 2017-03-10 | Stop reason: HOSPADM

## 2017-03-10 RX ORDER — LORAZEPAM 2 MG/ML
1 INJECTION INTRAMUSCULAR
Status: DISCONTINUED | OUTPATIENT
Start: 2017-03-10 | End: 2017-03-10 | Stop reason: HOSPADM

## 2017-03-10 RX ADMIN — SODIUM CHLORIDE: 0.9 INJECTION, SOLUTION INTRAVENOUS at 10:03

## 2017-03-10 RX ADMIN — SODIUM CHLORIDE, PRESERVATIVE FREE 10 ML: 5 INJECTION INTRAVENOUS at 02:03

## 2017-03-10 RX ADMIN — HEPARIN 500 UNITS: 100 SYRINGE at 02:03

## 2017-03-10 RX ADMIN — SODIUM CHLORIDE 150 MG: 9 INJECTION, SOLUTION INTRAVENOUS at 11:03

## 2017-03-10 RX ADMIN — DOCETAXEL ANHYDROUS 152.5 MG: 10 INJECTION, SOLUTION INTRAVENOUS at 11:03

## 2017-03-10 RX ADMIN — CYCLOPHOSPHAMIDE 1210 MG: 1 INJECTION, POWDER, FOR SOLUTION INTRAVENOUS; ORAL at 12:03

## 2017-03-10 RX ADMIN — DEXAMETHASONE SODIUM PHOSPHATE 0.25 MG: 4 INJECTION, SOLUTION INTRAMUSCULAR; INTRAVENOUS at 10:03

## 2017-03-10 RX ADMIN — LORAZEPAM 1 MG: 2 INJECTION INTRAMUSCULAR; INTRAVENOUS at 10:03

## 2017-03-10 NOTE — MR AVS SNAPSHOT
Patient Information     Patient Name Sex Lima Tom Female 1950      Visit Information        Provider Department Dept Phone Center    3/10/2017 10:30 AM [facility], [facility] [phone] Kris Carver      Patient Instructions     None      Your Current Medications Are     ascorbic acid (VITAMIN C) 1000 MG tablet    aspirin 81 MG chewable tablet    CALCIUM CARBONATE/VITAMIN D3 (CALTRATE 600 + D ORAL)    celecoxib (CELEBREX) 100 MG capsule    coenzyme Q10-vitamin E (COQ10 ) 100-100 mg-unit Cap    estradiol (VAGIFEM) 10 mcg Tab    gabapentin (NEURONTIN) 300 MG capsule    hydrocodone-acetaminophen 5-325mg (NORCO) 5-325 mg per tablet    levothyroxine (SYNTHROID) 50 MCG tablet    lidocaine (LIDODERM) 5 %(700 mg/patch)    lisinopril (PRINIVIL,ZESTRIL) 20 MG tablet    lorazepam (ATIVAN) 1 MG tablet    metformin (GLUCOPHAGE) 500 MG tablet    MULTIVITAMIN ORAL    omega-3 fatty acids-vitamin E (FISH OIL) 1,000 mg Cap    pantoprazole (PROTONIX) 40 MG tablet    simvastatin (ZOCOR) 20 MG tablet    vitamin D 1000 units Tab      Facility-Administered Medications     alteplase injection 2 mg    cyclophosphamide (CYTOXAN) 600 mg/m2 = 1,210 mg in sodium chloride 0.9% 250 mL chemo infusion    docetaxel (TAXOTERE) 75 mg/m2 = 152.5 mg in sodium chloride 0.9% 250 mL chemo infusion    fosaprepitant 150 mg in sodium chloride 0.9% 150 mL IVPB    heparin, porcine (PF) 100 unit/mL injection flush 500 Units    lorazepam injection 1 mg    palonosetron 0.25mg/dexamethasone 10mg IVPB    sodium chloride 0.9% 250 mL flush bag    sodium chloride 0.9% flush 10 mL      Appointments for Next Year     3/11/2017 10:15 AM INJECTION (15 min.) [facility] INJECTION, [facility] INFUSION    Arrive at check-in approximately 15 minutes before your scheduled appointment time. Bring all outside medical records and imaging, along with a list of your current medications and insurance card.    [facility]  [place], 5th Floor    3/21/2017  1:40 PM ESTABLISHED PATIENT (20 min.) [facility] Radha Beth NP    Arrive at check-in approximately 15 minutes before your scheduled appointment time. Bring all outside medical records and imaging, along with a list of your current medications and insurance card.    [facility] - 8th Floor Please park in [place] [employer] [address] [address].    3/24/2017  8:15 AM NON FASTING LAB (5 min.) [facility], APPOINTMENT [place]    Arrive at check-in approximately 15 minutes before your scheduled appointment time. Bring all outside medical records and imaging, along with a list of your current medications and insurance card.    2nd Floor    3/30/2017  7:30 AM EDUCATION (60 min.) [facility] DIABETES EDUCATOR, INT MED 2    Arrive at check-in approximately 15 minutes before your scheduled appointment time. Bring all outside medical records and imaging, along with a list of your current medications and insurance card.    [facility] Bldg.    3/30/2017  2:30 PM NON FASTING LAB (10 min.) [facility], HEMONC CANCER BLDG    Arrive at check-in approximately 15 minutes before your scheduled appointment time. Bring all outside medical records and imaging, along with a list of your current medications and insurance card.    [facility] 3rd Floor    3/30/2017  3:30 PM ESTABLISHED PATIENT (30 min.) [facility] Hematology Oncology Melissa Lagunas PA-C    Arrive at check-in approximately 15 minutes before your scheduled appointment time. Bring all outside medical records and imaging, along with a list of your current medications and insurance card.    [facility] - 3rd Floor    3/31/2017 10:30 AM INFUSION 240 MIN (240 min.) [facility], CHEMO    Arrive at check-in approximately 15 minutes before your scheduled appointment time. Bring all outside  "medical records and imaging, along with a list of your current medications and insurance card.    [facility], 5th Floor    4/1/2017 10:15 AM INJECTION (15 min.) [facility] INJECTION, [facility]    Arrive at check-in approximately 15 minutes before your scheduled appointment time. Bring all outside medical records and imaging, along with a list of your current medications and insurance card.    [facility], 5th Floor         Default Flowsheet Data (last 24 hours)      Amb Complex Vitals Rhys        03/10/17 1251 03/10/17 1207 03/10/17 1047          Measurements    Weight   87.6 kg (193 lb 2 oz)      Height   5' 6" (1.676 m)      BSA (Calculated - sq m)   2.02 sq meters      BMI (Calculated)   31.2      /72   Taxotere infusion completed 128/76   Taxol infusing for 15 mins (!)  143/74      Temp   98.2 °F (36.8 °C)      Pulse 96 102 (!)  112      Resp   20      Pain Assessment    Pain Score   Zero              Allergies     Erythromycin     Other reaction(s): Nausea    Erythromycin (Bulk)     Other reaction(s): Nausea    Oxycodone Nausea And Vomiting    Oxycodone-acetaminophen Nausea And Vomiting    Other reaction(s): Nausea      Medications You Received from 03/09/2017 1353 to 03/10/2017 1353        Date/Time Order Dose Route Action     03/10/2017 1254 cyclophosphamide (CYTOXAN) 600 mg/m2 = 1,210 mg in sodium chloride 0.9% 250 mL chemo infusion 1,210 mg Intravenous New Bag     03/10/2017 1151 docetaxel (TAXOTERE) 75 mg/m2 = 152.5 mg in sodium chloride 0.9% 250 mL chemo infusion 152.5 mg Intravenous New Bag     03/10/2017 1110 fosaprepitant 150 mg in sodium chloride 0.9% 150 mL IVPB 150 mg Intravenous New Bag     03/10/2017 1056 lorazepam injection 1 mg 1 mg Intravenous Given     03/10/2017 1054 palonosetron 0.25mg/dexamethasone 10mg IVPB 0.25 mg Intravenous New Bag     03/10/2017 1054 sodium chloride 0.9% 250 mL flush bag   Intravenous New Bag      Current Discharge " Medication List     Cannot display discharge medications since this is not an admission.

## 2017-03-10 NOTE — PLAN OF CARE
Problem: Patient Care Overview (Adult)  Goal: Plan of Care Review  Outcome: Ongoing (interventions implemented as appropriate)  Pt tolerated Taxoter and Cytoxan infusion with VSS. Pt instructed to call MD with any problems and RTC tomorrow for injection. AVS given and pt discharged home

## 2017-03-11 ENCOUNTER — INFUSION (OUTPATIENT)
Dept: INFUSION THERAPY | Facility: HOSPITAL | Age: 67
End: 2017-03-11
Attending: INTERNAL MEDICINE
Payer: MEDICARE

## 2017-03-11 DIAGNOSIS — C50.512 MALIGNANT NEOPLASM OF LOWER-OUTER QUADRANT OF LEFT FEMALE BREAST: ICD-10-CM

## 2017-03-11 DIAGNOSIS — Z51.11 ENCOUNTER FOR ANTINEOPLASTIC CHEMOTHERAPY: Primary | ICD-10-CM

## 2017-03-11 PROCEDURE — 63600175 PHARM REV CODE 636 W HCPCS: Performed by: INTERNAL MEDICINE

## 2017-03-11 PROCEDURE — 96372 THER/PROPH/DIAG INJ SC/IM: CPT

## 2017-03-11 RX ADMIN — PEGFILGRASTIM 6 MG: 6 INJECTION SUBCUTANEOUS at 09:03

## 2017-03-17 ENCOUNTER — TELEPHONE (OUTPATIENT)
Dept: CARDIOLOGY | Facility: CLINIC | Age: 67
End: 2017-03-17

## 2017-03-21 ENCOUNTER — OFFICE VISIT (OUTPATIENT)
Dept: SLEEP MEDICINE | Facility: CLINIC | Age: 67
End: 2017-03-21
Payer: MEDICARE

## 2017-03-21 ENCOUNTER — TELEPHONE (OUTPATIENT)
Dept: SURGERY | Facility: CLINIC | Age: 67
End: 2017-03-21

## 2017-03-21 ENCOUNTER — PATIENT MESSAGE (OUTPATIENT)
Dept: SURGERY | Facility: CLINIC | Age: 67
End: 2017-03-21

## 2017-03-21 VITALS
BODY MASS INDEX: 31.04 KG/M2 | HEIGHT: 66 IN | SYSTOLIC BLOOD PRESSURE: 132 MMHG | WEIGHT: 193.13 LBS | DIASTOLIC BLOOD PRESSURE: 60 MMHG | HEART RATE: 102 BPM

## 2017-03-21 DIAGNOSIS — E66.9 OBESITY, CLASS I, BMI 30-34.9: ICD-10-CM

## 2017-03-21 DIAGNOSIS — G47.33 OBSTRUCTIVE SLEEP APNEA ON CPAP: Primary | ICD-10-CM

## 2017-03-21 PROCEDURE — 1159F MED LIST DOCD IN RCRD: CPT | Mod: S$GLB,,, | Performed by: NURSE PRACTITIONER

## 2017-03-21 PROCEDURE — 3075F SYST BP GE 130 - 139MM HG: CPT | Mod: S$GLB,,, | Performed by: NURSE PRACTITIONER

## 2017-03-21 PROCEDURE — 1126F AMNT PAIN NOTED NONE PRSNT: CPT | Mod: S$GLB,,, | Performed by: NURSE PRACTITIONER

## 2017-03-21 PROCEDURE — 99214 OFFICE O/P EST MOD 30 MIN: CPT | Mod: S$GLB,,, | Performed by: NURSE PRACTITIONER

## 2017-03-21 PROCEDURE — 99499 UNLISTED E&M SERVICE: CPT | Mod: S$PBB,,, | Performed by: NURSE PRACTITIONER

## 2017-03-21 PROCEDURE — 1160F RVW MEDS BY RX/DR IN RCRD: CPT | Mod: S$GLB,,, | Performed by: NURSE PRACTITIONER

## 2017-03-21 PROCEDURE — 3078F DIAST BP <80 MM HG: CPT | Mod: S$GLB,,, | Performed by: NURSE PRACTITIONER

## 2017-03-21 PROCEDURE — 1157F ADVNC CARE PLAN IN RCRD: CPT | Mod: S$GLB,,, | Performed by: NURSE PRACTITIONER

## 2017-03-21 PROCEDURE — 99999 PR PBB SHADOW E&M-EST. PATIENT-LVL III: CPT | Mod: PBBFAC,,, | Performed by: NURSE PRACTITIONER

## 2017-03-21 RX ORDER — DOXYCYCLINE HYCLATE 100 MG
100 TABLET ORAL 2 TIMES DAILY
COMMUNITY
Start: 2017-03-21 | End: 2017-03-31

## 2017-03-21 NOTE — PROGRESS NOTES
Lima Maldonado  66 y.o. year-old female returns for management of obstructive sleep apnea and CPAP equipment check.      2017 ELIANA Beth NP The patient has symptoms of snoring, interrupted sleep, excessive daytime sleepiness now resolved with CPAP use.  Denies oral/nasal drying. Pressure feels good. Not a fan of CPAP but continues to wear it as she is experiencing symptomatic benefits from use.     Medical co-morbidities include: HTN, HLD, GERD    Have not been back to Sleep Clinic in over a year due to new dx of breast cancer. Had mastectomy 2016. Got port placed  to start chemo .     Overall CPAP use: nightly   Mask comfort/fit: Wisp mask with chin strap  Pressure tolerance: good  Humidification: set at 2  Nasal/Oral drying: Denied    CPAP Interrogation: Total usage: 6215 hours, nightly average 6.4 hours Manometer readin cm H20     On today's [place] Sleepiness Scale the patient scores a 8.    INTERVAL HISTORY:    2017 ELIANA Beth NP: Patient returns today after set up of new CPAP machine on 2017. Patient complaints of snoring, interrupted sleep, excessive daytime sleepiness now resolved with CPAP use. Initially had issue getting used to nasal pillows + chin strap combo, but able to use it without issues now. Due for 4th cycle of chemo 2017.     Overall CPAP use: nightly   Mask comfort/fit: nasal pillows  Pressure tolerance: great  Humidification: 3  Nasal/Oral drying: Denied    CPAP Interrogation:   Set up date: 2017  Total usage: 219.7 hours   Total therapeutic: 219.3 hours   -day avg usage: 6 hours  Days >4 hours usage: 26 days   Manometer readin cm H20   AHI (predicted): 0.4   Mask fit: 100%  Periodic breathin%  Machine condition: like new    Baseline Sleep Study: 11: +NELDA, AHI 54.2, low 65%, split, titrated 11 cm (AHI<5); wt 193 lbs     Review of Systems:   Sleep related symptoms as per HPI. Otherwise, a balance of 10 systems reviewed is  "negative.    Physical Exam:   /60  Pulse (!) 126  Ht 5' 6" (1.676 m)  Wt 87.6 kg (193 lb 2 oz)  BMI 31.17 kg/m2     GENERAL: Well groomed  Wt 193 lb (prior 199 lb)    Assessment:     Obstructive sleep apnea, severe by AHI criteria,  with prior symptoms of  snoring, interrupted sleep, excessive daytime sleepiness , now resolved with CPAP use. The patient is adherent on CPAP and experiencing symptomatic benefit. Medical co-mobidities: HTN, HLD, GERD. This warrants continued treatment for NELDA.      Obesity, BMI 31.17    Plan:     - Continue CPAP 11 cm. RTC 12 months, sooner if needed.     -Education: During our discussion today, we talked about the etiology of obstructive sleep apnea as well as the potential ramifications of untreated sleep apnea, which could include daytime sleepiness, hypertension, heart disease and/or stroke. We discussed potential treatment options, which could include weight loss, body positioning, continuous positive airway pressure (CPAP), or referral for surgical consideration.     -Behavior modification which includes losing weight, exercising, changing the sleep position, abstaining from alcohol, and avoiding certain medications    -Precautions: The patient was advised to abstain from driving should they feel sleepy or drowsy            "

## 2017-03-21 NOTE — TELEPHONE ENCOUNTER
----- Message from Júnior Florentino sent at 3/21/2017 11:08 AM CDT -----  [phone]//pt states that she needs to speak with nurse in ref to a post that she thinks might be infected//please call//thank you

## 2017-03-21 NOTE — TELEPHONE ENCOUNTER
Pt called with an infection of port site, pt sent pictures, showed picture to Dr Bains who verbally gave orders to prescribe antibiotics , called in to listed pharmacy, notified pt

## 2017-03-22 RX ORDER — GABAPENTIN 300 MG/1
300 CAPSULE ORAL NIGHTLY
Qty: 30 CAPSULE | Refills: 1 | Status: SHIPPED | OUTPATIENT
Start: 2017-03-22 | End: 2017-05-31 | Stop reason: SDUPTHER

## 2017-03-24 ENCOUNTER — LAB VISIT (OUTPATIENT)
Dept: LAB | Facility: HOSPITAL | Age: 67
End: 2017-03-24
Attending: INTERNAL MEDICINE
Payer: MEDICARE

## 2017-03-24 DIAGNOSIS — E11.42 DIABETIC PERIPHERAL NEUROPATHY ASSOCIATED WITH TYPE 2 DIABETES MELLITUS: ICD-10-CM

## 2017-03-24 LAB
ESTIMATED AVG GLUCOSE: 151 MG/DL
HBA1C MFR BLD HPLC: 6.9 %

## 2017-03-24 PROCEDURE — 83036 HEMOGLOBIN GLYCOSYLATED A1C: CPT

## 2017-03-24 PROCEDURE — 36415 COLL VENOUS BLD VENIPUNCTURE: CPT

## 2017-03-30 ENCOUNTER — LAB VISIT (OUTPATIENT)
Dept: LAB | Facility: HOSPITAL | Age: 67
End: 2017-03-30
Attending: INTERNAL MEDICINE
Payer: MEDICARE

## 2017-03-30 ENCOUNTER — OFFICE VISIT (OUTPATIENT)
Dept: HEMATOLOGY/ONCOLOGY | Facility: CLINIC | Age: 67
End: 2017-03-30
Payer: MEDICARE

## 2017-03-30 ENCOUNTER — CLINICAL SUPPORT (OUTPATIENT)
Dept: DIABETES | Facility: CLINIC | Age: 67
End: 2017-03-30
Payer: MEDICARE

## 2017-03-30 VITALS
HEART RATE: 113 BPM | RESPIRATION RATE: 18 BRPM | WEIGHT: 194 LBS | TEMPERATURE: 99 F | SYSTOLIC BLOOD PRESSURE: 131 MMHG | HEIGHT: 66 IN | DIASTOLIC BLOOD PRESSURE: 73 MMHG | BODY MASS INDEX: 31.18 KG/M2

## 2017-03-30 DIAGNOSIS — M89.9 DISORDER OF BONE AND CARTILAGE: ICD-10-CM

## 2017-03-30 DIAGNOSIS — E11.9 TYPE II OR UNSPECIFIED TYPE DIABETES MELLITUS WITHOUT MENTION OF COMPLICATION, NOT STATED AS UNCONTROLLED: ICD-10-CM

## 2017-03-30 DIAGNOSIS — C50.512 MALIGNANT NEOPLASM OF LOWER-OUTER QUADRANT OF LEFT FEMALE BREAST: Primary | ICD-10-CM

## 2017-03-30 DIAGNOSIS — C50.512 MALIGNANT NEOPLASM OF LOWER-OUTER QUADRANT OF LEFT FEMALE BREAST: ICD-10-CM

## 2017-03-30 DIAGNOSIS — Z51.81 ENCOUNTER FOR MONITORING LONG-TERM PROTON PUMP INHIBITOR THERAPY: ICD-10-CM

## 2017-03-30 DIAGNOSIS — Z51.11 ENCOUNTER FOR ANTINEOPLASTIC CHEMOTHERAPY: ICD-10-CM

## 2017-03-30 DIAGNOSIS — M94.9 DISORDER OF BONE AND CARTILAGE: ICD-10-CM

## 2017-03-30 DIAGNOSIS — Z79.899 ENCOUNTER FOR MONITORING LONG-TERM PROTON PUMP INHIBITOR THERAPY: ICD-10-CM

## 2017-03-30 LAB
25(OH)D3+25(OH)D2 SERPL-MCNC: 54 NG/ML
ALBUMIN SERPL BCP-MCNC: 3.8 G/DL
ALP SERPL-CCNC: 91 U/L
ALT SERPL W/O P-5'-P-CCNC: 24 U/L
ANION GAP SERPL CALC-SCNC: 9 MMOL/L
AST SERPL-CCNC: 33 U/L
BILIRUB SERPL-MCNC: 0.9 MG/DL
BUN SERPL-MCNC: 11 MG/DL
CALCIUM SERPL-MCNC: 9.4 MG/DL
CHLORIDE SERPL-SCNC: 106 MMOL/L
CO2 SERPL-SCNC: 23 MMOL/L
CREAT SERPL-MCNC: 0.8 MG/DL
ERYTHROCYTE [DISTWIDTH] IN BLOOD BY AUTOMATED COUNT: 16.5 %
EST. GFR  (AFRICAN AMERICAN): >60 ML/MIN/1.73 M^2
EST. GFR  (NON AFRICAN AMERICAN): >60 ML/MIN/1.73 M^2
GLUCOSE SERPL-MCNC: 180 MG/DL
HCT VFR BLD AUTO: 37.1 %
HGB BLD-MCNC: 12.2 G/DL
MAGNESIUM SERPL-MCNC: 1.9 MG/DL
MCH RBC QN AUTO: 28.8 PG
MCHC RBC AUTO-ENTMCNC: 32.9 %
MCV RBC AUTO: 88 FL
NEUTROPHILS # BLD AUTO: 8.3 K/UL
PLATELET # BLD AUTO: 209 K/UL
PMV BLD AUTO: 9.4 FL
POTASSIUM SERPL-SCNC: 4.7 MMOL/L
PROT SERPL-MCNC: 6.7 G/DL
RBC # BLD AUTO: 4.24 M/UL
SODIUM SERPL-SCNC: 138 MMOL/L
VIT B12 SERPL-MCNC: >2000 PG/ML
WBC # BLD AUTO: 10.18 K/UL

## 2017-03-30 PROCEDURE — 1159F MED LIST DOCD IN RCRD: CPT | Mod: S$GLB,,, | Performed by: PHYSICIAN ASSISTANT

## 2017-03-30 PROCEDURE — 82306 VITAMIN D 25 HYDROXY: CPT

## 2017-03-30 PROCEDURE — 3078F DIAST BP <80 MM HG: CPT | Mod: S$GLB,,, | Performed by: PHYSICIAN ASSISTANT

## 2017-03-30 PROCEDURE — 1157F ADVNC CARE PLAN IN RCRD: CPT | Mod: S$GLB,,, | Performed by: PHYSICIAN ASSISTANT

## 2017-03-30 PROCEDURE — 36415 COLL VENOUS BLD VENIPUNCTURE: CPT

## 2017-03-30 PROCEDURE — 1126F AMNT PAIN NOTED NONE PRSNT: CPT | Mod: S$GLB,,, | Performed by: PHYSICIAN ASSISTANT

## 2017-03-30 PROCEDURE — 85027 COMPLETE CBC AUTOMATED: CPT

## 2017-03-30 PROCEDURE — 3075F SYST BP GE 130 - 139MM HG: CPT | Mod: S$GLB,,, | Performed by: PHYSICIAN ASSISTANT

## 2017-03-30 PROCEDURE — 99999 PR PBB SHADOW E&M-EST. PATIENT-LVL II: CPT | Mod: PBBFAC,,,

## 2017-03-30 PROCEDURE — 82607 VITAMIN B-12: CPT

## 2017-03-30 PROCEDURE — 83735 ASSAY OF MAGNESIUM: CPT

## 2017-03-30 PROCEDURE — 99499 UNLISTED E&M SERVICE: CPT | Mod: S$PBB,,, | Performed by: PHYSICIAN ASSISTANT

## 2017-03-30 PROCEDURE — 99999 PR PBB SHADOW E&M-EST. PATIENT-LVL III: CPT | Mod: PBBFAC,,, | Performed by: PHYSICIAN ASSISTANT

## 2017-03-30 PROCEDURE — G0108 DIAB MANAGE TRN  PER INDIV: HCPCS | Mod: S$GLB,,, | Performed by: INTERNAL MEDICINE

## 2017-03-30 PROCEDURE — 99214 OFFICE O/P EST MOD 30 MIN: CPT | Mod: S$GLB,,, | Performed by: PHYSICIAN ASSISTANT

## 2017-03-30 PROCEDURE — 80053 COMPREHEN METABOLIC PANEL: CPT

## 2017-03-30 RX ORDER — SODIUM CHLORIDE 0.9 % (FLUSH) 0.9 %
10 SYRINGE (ML) INJECTION
Status: CANCELLED | OUTPATIENT
Start: 2017-03-31

## 2017-03-30 RX ORDER — LORAZEPAM 2 MG/ML
1 INJECTION INTRAMUSCULAR
Status: CANCELLED
Start: 2017-03-31

## 2017-03-30 RX ORDER — HEPARIN 100 UNIT/ML
500 SYRINGE INTRAVENOUS
Status: CANCELLED | OUTPATIENT
Start: 2017-03-31

## 2017-03-30 NOTE — PROGRESS NOTES
Diabetes Education  Author: Jodie Garcia RD, CDE  Date: 3/30/2017    Diabetes Education Visit  Diabetes Education Record Assessment/Progress: Initial    Diabetes Type  Diabetes Type : Type II    Diabetes History  Diabetes Diagnosis: 0-1 year    Nutrition  Meal Planning: 3 meals per day, snacks between meal, water, diet drinks, artificial sweeteners, eats out often (Coffee and tea w/ splenda; picking up food more lately since going through treatment for breast CA)    Monitoring   Monitoring: Contour Next EZ  Self Monitoring :  (Checks daily usually first thing in the morning)  Blood Glucose Logs: No    Exercise   Exercise Type:  (None at this time)    Current Diabetes Treatment   Current Treatment: Oral Medication (Metformin - denies missing doses of Metformin)    Social History  Preferred Learning Method: Face to Face, Demonstration, Hands On, Reading Materials  Primary Support: Self  Smoking Status: Ex Smoker  Alcohol Use: Never                             Barriers to Change  Barriers to Change: None  Learning Challenges : None    Readiness to Learn   Readiness to Learn : Acceptance    Cultural Influences  Cultural Influences: No    Diabetes Education Assessment/Progress  Acute Complications (preventing, detecting, and treating acute complications): Instructed, Discussion, Individual Session, Written Materials Provided  Chronic Complications (preventing, detecting, and treating chronic complications): Instructed, Discussion, Individual Session, Written Materials Provided (UTD)  Diabetes Disease Process (diabetes disease process and treatment options): Instructed, Discussion, Individual Session, Written Materials Provided  Nutrition (Incorporating nutritional management into one's lifestyle): Instructed, Discussion, Individual Session, Written Materials Provided  Physical Activity (incorporating physical activity into one's lifestyle): Instructed, Discussion, Individual Session, Written Materials  Provided  Medications (states correct name, dose, onset, peak, duration, side effects & timing of meds): Instructed, Discussion, Individual Session, Written Materials Provided  Monitoring (monitoring blood glucose/other parameters & using results): Instructed, Discussion, Individual Session, Written Materials Provided (Patient using meter purchased from store; offered meter and Rx for testing supplies covered by [employer], but patient declined at this time)  Goal Setting and Problem Solving (verbalizes behavior change strategies & sets realistic goals): Instructed, Discussion, Individual Session  Behavior Change (developing personal strategies to health & behavior change): Instructed, Discussion, Individual Session  Psychosocial Issues (developing personal srategies to address psychosocial concerns): Instructed, Discussion, Individual Session    Goals  Other:  (Patient did not set goal at this visit)         Diabetes Care Plan/Intervention  Education Plan/Intervention:  (6 month DE follow up)    Diabetes Meal Plan  Carbohydrate Per Meal: 30-45g  Carbohydrate Per Snack : 7-15g    Education Units of Time   Time Spent: 45 min      Health Maintenance Topics with due status: Not Due       Topic Last Completion Date    TETANUS VACCINE 12/03/2013    DEXA SCAN 08/14/2015    Pneumococcal (65+) 10/12/2015    Lipid Panel 08/23/2016    Mammogram 09/27/2016     Health Maintenance Due   Topic Date Due    Colonoscopy  10/06/2016

## 2017-03-30 NOTE — Clinical Note
Samuel in about 4 weeks (doesn't have to be exact date) with cbc/cmp to discusse endocrine therapy Me in 12 weeks for 1 hour survivorship visit- please link to referral that has been placed.

## 2017-03-31 ENCOUNTER — INFUSION (OUTPATIENT)
Dept: INFUSION THERAPY | Facility: HOSPITAL | Age: 67
End: 2017-03-31
Attending: INTERNAL MEDICINE
Payer: MEDICARE

## 2017-03-31 VITALS
RESPIRATION RATE: 18 BRPM | SYSTOLIC BLOOD PRESSURE: 112 MMHG | HEART RATE: 95 BPM | DIASTOLIC BLOOD PRESSURE: 57 MMHG | TEMPERATURE: 99 F

## 2017-03-31 DIAGNOSIS — Z51.11 ENCOUNTER FOR ANTINEOPLASTIC CHEMOTHERAPY: Primary | ICD-10-CM

## 2017-03-31 DIAGNOSIS — C50.512 MALIGNANT NEOPLASM OF LOWER-OUTER QUADRANT OF LEFT FEMALE BREAST: ICD-10-CM

## 2017-03-31 PROCEDURE — 63600175 PHARM REV CODE 636 W HCPCS: Performed by: INTERNAL MEDICINE

## 2017-03-31 PROCEDURE — 25000003 PHARM REV CODE 250: Performed by: INTERNAL MEDICINE

## 2017-03-31 PROCEDURE — 96413 CHEMO IV INFUSION 1 HR: CPT

## 2017-03-31 PROCEDURE — 96367 TX/PROPH/DG ADDL SEQ IV INF: CPT

## 2017-03-31 PROCEDURE — 96375 TX/PRO/DX INJ NEW DRUG ADDON: CPT

## 2017-03-31 PROCEDURE — 96417 CHEMO IV INFUS EACH ADDL SEQ: CPT

## 2017-03-31 RX ORDER — LORAZEPAM 2 MG/ML
1 INJECTION INTRAMUSCULAR
Status: DISCONTINUED | OUTPATIENT
Start: 2017-03-31 | End: 2017-03-31 | Stop reason: HOSPADM

## 2017-03-31 RX ORDER — HEPARIN 100 UNIT/ML
500 SYRINGE INTRAVENOUS
Status: DISCONTINUED | OUTPATIENT
Start: 2017-03-31 | End: 2017-03-31 | Stop reason: HOSPADM

## 2017-03-31 RX ORDER — SODIUM CHLORIDE 0.9 % (FLUSH) 0.9 %
10 SYRINGE (ML) INJECTION
Status: DISCONTINUED | OUTPATIENT
Start: 2017-03-31 | End: 2017-03-31 | Stop reason: HOSPADM

## 2017-03-31 RX ADMIN — DOCETAXEL ANHYDROUS 150 MG: 10 INJECTION, SOLUTION INTRAVENOUS at 11:03

## 2017-03-31 RX ADMIN — SODIUM CHLORIDE, PRESERVATIVE FREE 500 UNITS: 5 INJECTION INTRAVENOUS at 01:03

## 2017-03-31 RX ADMIN — SODIUM CHLORIDE 150 MG: 9 INJECTION, SOLUTION INTRAVENOUS at 11:03

## 2017-03-31 RX ADMIN — SODIUM CHLORIDE, PRESERVATIVE FREE 10 ML: 5 INJECTION INTRAVENOUS at 01:03

## 2017-03-31 RX ADMIN — LORAZEPAM 1 MG: 2 INJECTION, SOLUTION INTRAMUSCULAR; INTRAVENOUS at 10:03

## 2017-03-31 RX ADMIN — CYCLOPHOSPHAMIDE 1210 MG: 1 INJECTION, POWDER, FOR SOLUTION INTRAVENOUS; ORAL at 12:03

## 2017-03-31 RX ADMIN — SODIUM CHLORIDE: 0.9 INJECTION, SOLUTION INTRAVENOUS at 10:03

## 2017-03-31 RX ADMIN — DEXAMETHASONE SODIUM PHOSPHATE 0.25 MG: 4 INJECTION, SOLUTION INTRAMUSCULAR; INTRAVENOUS at 10:03

## 2017-03-31 NOTE — PLAN OF CARE
Problem: Patient Care Overview (Adult)  Goal: Plan of Care Review  Outcome: Ongoing (interventions implemented as appropriate)  Tolerated treatment well.  Advised to call MD for any problems or concerns.  AVS given.  Treatment plan completed.  Bell Ringing celebration done.  NAD noted upon discharge.

## 2017-03-31 NOTE — MR AVS SNAPSHOT
Patient Information     Patient Name Sex Lima Tom Female 1950      Visit Information        Provider Department Dept Phone Center    3/31/2017 10:30 AM [facility], [facility]MO [facility] [phone] Kris Carver      Patient Instructions     None      Your Current Medications Are     ascorbic acid (VITAMIN C) 1000 MG tablet    CALCIUM CARBONATE/VITAMIN D3 (CALTRATE 600 + D ORAL)    celecoxib (CELEBREX) 100 MG capsule    coenzyme Q10-vitamin E (COQ10 ) 100-100 mg-unit Cap    doxycycline (VIBRA-TABS) 100 MG tablet    gabapentin (NEURONTIN) 300 MG capsule    hydrocodone-acetaminophen 5-325mg (NORCO) 5-325 mg per tablet    levothyroxine (SYNTHROID) 50 MCG tablet    lidocaine (LIDODERM) 5 %(700 mg/patch)    lisinopril (PRINIVIL,ZESTRIL) 20 MG tablet    lorazepam (ATIVAN) 1 MG tablet    metformin (GLUCOPHAGE) 500 MG tablet    MULTIVITAMIN ORAL    omega-3 fatty acids-vitamin E (FISH OIL) 1,000 mg Cap    pantoprazole (PROTONIX) 40 MG tablet    simvastatin (ZOCOR) 20 MG tablet    vitamin D 1000 units Tab      Facility-Administered Medications     cyclophosphamide (CYTOXAN) 600 mg/m2 = 1,210 mg in sodium chloride 0.9% 250 mL chemo infusion    docetaxel (TAXOTERE) 75 mg/m2 = 150 mg in sodium chloride 0.9% 250 mL chemo infusion    fosaprepitant 150 mg in sodium chloride 0.9% 150 mL IVPB    heparin, porcine (PF) 100 unit/mL injection flush 500 Units    lorazepam injection 1 mg    palonosetron 0.25mg/dexamethasone 10mg IVPB    sodium chloride 0.9% 250 mL flush bag    sodium chloride 0.9% flush 10 mL      Appointments for Next Year     2017 10:15 AM INJECTION (15 min.) [facility] INJECTION, [facility]    Arrive at check-in approximately 15 minutes before your scheduled appointment time. Bring all outside medical records and imaging, along with a list of your current medications and insurance card.    [facility], 5th Floor    2017  7:10 AM NON FASTING LAB (10  "min.) [facility] LAB, HEMON CANCER BLDG    Arrive at check-in approximately 15 minutes before your scheduled appointment time. Bring all outside medical records and imaging, along with a list of your current medications and insurance card.    [facility] 3rd Floor    4/26/2017  8:15 AM ESTABLISHED PATIENT (30 min.) [facility] - Hematology Oncology Francisco Baker MD    Arrive at check-in approximately 15 minutes before your scheduled appointment time. Bring all outside medical records and imaging, along with a list of your current medications and insurance card.    [facility] - 3rd Floor         Default Flowsheet Data (last 24 hours)      Amb Complex Vitals Rhys        03/31/17 1247 03/31/17 1150 03/31/17 1134 03/31/17 1037 03/30/17 1449    Measurements    Weight     88 kg (194 lb 0.1 oz)    Height     5' 6" (1.676 m)    BSA (Calculated - sq m)     2.02 sq meters    BMI (Calculated)     31.4    BP (!)  112/57   taxotere completed. (!)  110/58   15 minutes of Taxotere (!)  111/57   Start Taxotere 138/64 131/73    Temp    98.9 °F (37.2 °C) 99.3 °F (37.4 °C)    Pulse 95 104 105 (!)  116 (!)  113    Resp    18 18    Pain Assessment    Pain Score    Zero Zero            Allergies     Erythromycin     Other reaction(s): Nausea    Erythromycin (Bulk)     Other reaction(s): Nausea    Oxycodone Nausea And Vomiting    Oxycodone-acetaminophen Nausea And Vomiting    Other reaction(s): Nausea      Medications You Received from 03/30/2017 1350 to 03/31/2017 1350        Date/Time Order Dose Route Action     03/31/2017 1247 cyclophosphamide (CYTOXAN) 600 mg/m2 = 1,210 mg in sodium chloride 0.9% 250 mL chemo infusion 1,210 mg Intravenous New Bag     03/31/2017 1135 docetaxel (TAXOTERE) 75 mg/m2 = 150 mg in sodium chloride 0.9% 250 mL chemo infusion 150 mg Intravenous New Bag     03/31/2017 1116 fosaprepitant 150 mg in sodium chloride 0.9% 150 mL IVPB 150 mg Intravenous New Bag     03/31/2017 1056 " lorazepam injection 1 mg 1 mg Intravenous Given     03/31/2017 1055 palonosetron 0.25mg/dexamethasone 10mg IVPB 0.25 mg Intravenous New Bag     03/31/2017 1049 sodium chloride 0.9% 250 mL flush bag   Intravenous New Bag      Current Discharge Medication List     Cannot display discharge medications since this is not an admission.

## 2017-04-01 ENCOUNTER — INFUSION (OUTPATIENT)
Dept: INFUSION THERAPY | Facility: HOSPITAL | Age: 67
End: 2017-04-01
Attending: INTERNAL MEDICINE
Payer: MEDICARE

## 2017-04-01 DIAGNOSIS — Z51.11 ENCOUNTER FOR ANTINEOPLASTIC CHEMOTHERAPY: Primary | ICD-10-CM

## 2017-04-01 DIAGNOSIS — C50.512 MALIGNANT NEOPLASM OF LOWER-OUTER QUADRANT OF LEFT FEMALE BREAST: ICD-10-CM

## 2017-04-01 PROCEDURE — 63600175 PHARM REV CODE 636 W HCPCS: Performed by: INTERNAL MEDICINE

## 2017-04-01 PROCEDURE — 96372 THER/PROPH/DIAG INJ SC/IM: CPT

## 2017-04-01 RX ADMIN — PEGFILGRASTIM 6 MG: 6 INJECTION SUBCUTANEOUS at 10:04

## 2017-04-26 ENCOUNTER — OFFICE VISIT (OUTPATIENT)
Dept: HEMATOLOGY/ONCOLOGY | Facility: CLINIC | Age: 67
End: 2017-04-26
Payer: MEDICARE

## 2017-04-26 ENCOUNTER — LAB VISIT (OUTPATIENT)
Dept: LAB | Facility: HOSPITAL | Age: 67
End: 2017-04-26
Attending: INTERNAL MEDICINE
Payer: MEDICARE

## 2017-04-26 VITALS
HEIGHT: 66 IN | BODY MASS INDEX: 31.22 KG/M2 | WEIGHT: 194.25 LBS | SYSTOLIC BLOOD PRESSURE: 137 MMHG | DIASTOLIC BLOOD PRESSURE: 62 MMHG | TEMPERATURE: 98 F | OXYGEN SATURATION: 97 % | HEART RATE: 94 BPM | RESPIRATION RATE: 14 BRPM

## 2017-04-26 DIAGNOSIS — T38.6X5A OSTEOPOROSIS DUE TO AROMATASE INHIBITOR: ICD-10-CM

## 2017-04-26 DIAGNOSIS — C50.512 MALIGNANT NEOPLASM OF LOWER-OUTER QUADRANT OF LEFT FEMALE BREAST: Primary | ICD-10-CM

## 2017-04-26 DIAGNOSIS — M81.8 OSTEOPOROSIS DUE TO AROMATASE INHIBITOR: ICD-10-CM

## 2017-04-26 DIAGNOSIS — C50.512 MALIGNANT NEOPLASM OF LOWER-OUTER QUADRANT OF LEFT FEMALE BREAST: ICD-10-CM

## 2017-04-26 PROBLEM — Z51.11 ENCOUNTER FOR ANTINEOPLASTIC CHEMOTHERAPY: Status: RESOLVED | Noted: 2017-01-17 | Resolved: 2017-04-26

## 2017-04-26 LAB
ALBUMIN SERPL BCP-MCNC: 3.7 G/DL
ALP SERPL-CCNC: 66 U/L
ALT SERPL W/O P-5'-P-CCNC: 22 U/L
ANION GAP SERPL CALC-SCNC: 10 MMOL/L
AST SERPL-CCNC: 24 U/L
BILIRUB SERPL-MCNC: 1.2 MG/DL
BUN SERPL-MCNC: 12 MG/DL
CALCIUM SERPL-MCNC: 8.9 MG/DL
CHLORIDE SERPL-SCNC: 107 MMOL/L
CO2 SERPL-SCNC: 21 MMOL/L
CREAT SERPL-MCNC: 0.7 MG/DL
ERYTHROCYTE [DISTWIDTH] IN BLOOD BY AUTOMATED COUNT: 16.4 %
EST. GFR  (AFRICAN AMERICAN): >60 ML/MIN/1.73 M^2
EST. GFR  (NON AFRICAN AMERICAN): >60 ML/MIN/1.73 M^2
GLUCOSE SERPL-MCNC: 130 MG/DL
HCT VFR BLD AUTO: 37.4 %
HGB BLD-MCNC: 11.8 G/DL
MCH RBC QN AUTO: 29 PG
MCHC RBC AUTO-ENTMCNC: 31.6 %
MCV RBC AUTO: 92 FL
NEUTROPHILS # BLD AUTO: 2.4 K/UL
PLATELET # BLD AUTO: 167 K/UL
PMV BLD AUTO: 8.9 FL
POTASSIUM SERPL-SCNC: 4.4 MMOL/L
PROT SERPL-MCNC: 6.1 G/DL
RBC # BLD AUTO: 4.07 M/UL
SODIUM SERPL-SCNC: 138 MMOL/L
WBC # BLD AUTO: 4.1 K/UL

## 2017-04-26 PROCEDURE — 1126F AMNT PAIN NOTED NONE PRSNT: CPT | Mod: S$GLB,,, | Performed by: INTERNAL MEDICINE

## 2017-04-26 PROCEDURE — 3075F SYST BP GE 130 - 139MM HG: CPT | Mod: S$GLB,,, | Performed by: INTERNAL MEDICINE

## 2017-04-26 PROCEDURE — 1160F RVW MEDS BY RX/DR IN RCRD: CPT | Mod: S$GLB,,, | Performed by: INTERNAL MEDICINE

## 2017-04-26 PROCEDURE — 3078F DIAST BP <80 MM HG: CPT | Mod: S$GLB,,, | Performed by: INTERNAL MEDICINE

## 2017-04-26 PROCEDURE — 99999 PR PBB SHADOW E&M-EST. PATIENT-LVL III: CPT | Mod: PBBFAC,,, | Performed by: INTERNAL MEDICINE

## 2017-04-26 PROCEDURE — 1159F MED LIST DOCD IN RCRD: CPT | Mod: S$GLB,,, | Performed by: INTERNAL MEDICINE

## 2017-04-26 PROCEDURE — 99213 OFFICE O/P EST LOW 20 MIN: CPT | Mod: S$GLB,,, | Performed by: INTERNAL MEDICINE

## 2017-04-26 RX ORDER — DOXYCYCLINE 100 MG/1
CAPSULE ORAL
COMMUNITY
Start: 2017-03-21 | End: 2017-08-09 | Stop reason: ALTCHOICE

## 2017-04-26 RX ORDER — ALPRAZOLAM 0.5 MG/1
TABLET ORAL
COMMUNITY
Start: 2017-04-20 | End: 2017-09-27

## 2017-04-26 RX ORDER — LETROZOLE 2.5 MG/1
2.5 TABLET, FILM COATED ORAL DAILY
Qty: 30 TABLET | Refills: 11 | Status: SHIPPED | OUTPATIENT
Start: 2017-04-26 | End: 2018-03-27 | Stop reason: SDUPTHER

## 2017-04-26 RX ORDER — ONDANSETRON 4 MG/1
TABLET, ORALLY DISINTEGRATING ORAL
COMMUNITY
Start: 2017-04-01 | End: 2017-09-27

## 2017-04-26 NOTE — PROGRESS NOTES
Subjective:       Patient ID: Lima Maldonado is a 66 y.o. female.    Chief Complaint: No chief complaint on file.    HPI Ms Maldonado return to clinic for follow-up of recent diagnosis of left breast cancer.  She had stage I ER positive disease with an intermediate risk Oncotype score.   She completed 4 cycles of TC on 3/31/17.    She had significant fatigue after her last cycle but that is improving.  She has peripheral neuropathy in her hands and feet with the feet being worse but that seems to be a bit better as well.  She's continuing on gabapentin.  She has some exertional dyspnea.  Appetite and bowel function has been stable.      At the time of her last visit we discussed adjuvant chemotherapy as her Oncotype score was intermediate risk  Breast history: mammography on September 20, 2016 demonstrated a new irregular mass with spiculated margins seen in the left breast at  5 o'clock along the surgical scar site.   ultrasound DEMONSTRATED A SOLID 8 X 8 X 7 MM MASS.      A needle biopsy in September 27 showed infiltrating  carcinoma, histologic grade 3, nuclear grade 2, mitotic index 1.  Tumor was 90% ER positive, 70% MA positive, and 1+ HER-2.    MRI of the breast showed a 1.7 x 1.3 cm lesion in the lower outer quadrant of the left breast.  PET/CT on October 7 was negative for any evidence of distant metastasis.  In the  On November 16 bilateral mastectomies were performed.  Left pressure 1.5 cm intermediate grade carcinoma with ductal and lobular features.  There was focal dermal invasion.  Margins were negative.  The right breast was without abnormality.  Oncotype score returned 25 -  intermediate risk.    Past history:left    breast, T1cN1, ER positive, status post mastectomy with reconstruction and   postoperative chemotherapy with four cycles of Adriamycin and Cytoxan and    five years of tamoxifen.  Her original diagnosis was in 1997  Review of Systems   Constitutional: Positive for fatigue. Negative for  activity change, appetite change and unexpected weight change.   Respiratory: Positive for shortness of breath. Negative for cough.    Gastrointestinal: Negative for abdominal pain, constipation, diarrhea and nausea.   Neurological: Positive for numbness. Negative for headaches.   Psychiatric/Behavioral: Negative for dysphoric mood. The patient is not hyperactive.        Objective:      Physical Exam   Constitutional: She appears well-developed and well-nourished. No distress.   HENT:   Mouth/Throat: No oropharyngeal exudate.   Eyes: No scleral icterus.   Cardiovascular: Normal rate, regular rhythm and normal heart sounds.    Pulmonary/Chest: Effort normal and breath sounds normal. She has no wheezes. She has no rales.       Abdominal: Soft. She exhibits no mass. There is no tenderness.   Lymphadenopathy:     She has no cervical adenopathy.   Psychiatric: She has a normal mood and affect. Her behavior is normal. Thought content normal.   Vitals reviewed.      Assessment:       1. Malignant neoplasm of lower-outer quadrant of left female breast        Plan:     Will pl start aromatase inhibitor therapy with letrozole.  Side effects were discussed and written information was provided to her.    She can have her port removed.  Bone density.  Return in 3 months to survivorship clinic.

## 2017-05-02 ENCOUNTER — PATIENT MESSAGE (OUTPATIENT)
Dept: INTERNAL MEDICINE | Facility: CLINIC | Age: 67
End: 2017-05-02

## 2017-05-02 DIAGNOSIS — E11.9 CONTROLLED TYPE 2 DIABETES MELLITUS WITHOUT COMPLICATION, WITHOUT LONG-TERM CURRENT USE OF INSULIN: Primary | ICD-10-CM

## 2017-05-07 ENCOUNTER — PATIENT MESSAGE (OUTPATIENT)
Dept: HEMATOLOGY/ONCOLOGY | Facility: CLINIC | Age: 67
End: 2017-05-07

## 2017-05-08 ENCOUNTER — OFFICE VISIT (OUTPATIENT)
Dept: HEMATOLOGY/ONCOLOGY | Facility: CLINIC | Age: 67
End: 2017-05-08
Payer: MEDICARE

## 2017-05-08 ENCOUNTER — PATIENT MESSAGE (OUTPATIENT)
Dept: HEMATOLOGY/ONCOLOGY | Facility: CLINIC | Age: 67
End: 2017-05-08

## 2017-05-08 VITALS
DIASTOLIC BLOOD PRESSURE: 73 MMHG | RESPIRATION RATE: 16 BRPM | HEART RATE: 91 BPM | SYSTOLIC BLOOD PRESSURE: 146 MMHG | TEMPERATURE: 61 F | WEIGHT: 193.13 LBS | BODY MASS INDEX: 31.04 KG/M2 | HEIGHT: 66 IN

## 2017-05-08 DIAGNOSIS — B02.9 HERPES ZOSTER WITHOUT COMPLICATION: Primary | ICD-10-CM

## 2017-05-08 DIAGNOSIS — Z85.3 PERSONAL HISTORY OF BREAST CANCER: ICD-10-CM

## 2017-05-08 PROCEDURE — 1159F MED LIST DOCD IN RCRD: CPT | Mod: S$GLB,,, | Performed by: PHYSICIAN ASSISTANT

## 2017-05-08 PROCEDURE — 3078F DIAST BP <80 MM HG: CPT | Mod: S$GLB,,, | Performed by: PHYSICIAN ASSISTANT

## 2017-05-08 PROCEDURE — 3077F SYST BP >= 140 MM HG: CPT | Mod: S$GLB,,, | Performed by: PHYSICIAN ASSISTANT

## 2017-05-08 PROCEDURE — 1160F RVW MEDS BY RX/DR IN RCRD: CPT | Mod: S$GLB,,, | Performed by: PHYSICIAN ASSISTANT

## 2017-05-08 PROCEDURE — 99213 OFFICE O/P EST LOW 20 MIN: CPT | Mod: S$GLB,,, | Performed by: PHYSICIAN ASSISTANT

## 2017-05-08 PROCEDURE — 1125F AMNT PAIN NOTED PAIN PRSNT: CPT | Mod: S$GLB,,, | Performed by: PHYSICIAN ASSISTANT

## 2017-05-08 PROCEDURE — 99999 PR PBB SHADOW E&M-EST. PATIENT-LVL IV: CPT | Mod: PBBFAC,,, | Performed by: PHYSICIAN ASSISTANT

## 2017-05-08 RX ORDER — VALACYCLOVIR HYDROCHLORIDE 1 G/1
1000 TABLET, FILM COATED ORAL 3 TIMES DAILY
Qty: 21 TABLET | Refills: 1 | Status: SHIPPED | OUTPATIENT
Start: 2017-05-08 | End: 2017-12-26 | Stop reason: ALTCHOICE

## 2017-05-08 NOTE — PROGRESS NOTES
Subjective:       Patient ID: Lima Maldonado is a 66 y.o. female.    Chief Complaint: No chief complaint on file.    HPI Comments: Ms Maldonado return to clinic for follow-up of recent diagnosis of left breast cancer. She had stage I ER positive disease with an intermediate risk Oncotype score.   She completed 4 cycles of TC on 3/31/17.    Patient today complaining of red rash that developed yesterday on her left cheek below her eye.  Rash spread to tip of nose and to left scalp. Rash with small fluid filled vesicles and associated stinging. Her  had Acyclovir and she took 1000 mg last night.  No fever, chills, nausea, vomiting.   Had shingles vaccine several years ago, has not had shingles in the past.      .          Breast history: mammography on September 20, 2016 demonstrated a new irregular mass with spiculated margins seen in the left breast at  5 o'clock along the surgical scar site.  ultrasound DEMONSTRATED A SOLID 8 X 8 X 7 MM MASS.    A needle biopsy in September 27 showed infiltrating carcinoma, histologic grade 3, nuclear grade 2, mitotic index 1. Tumor was 90% ER positive, 70% MS positive, and 1+ HER-2.     MRI of the breast showed a 1.7 x 1.3 cm lesion in the lower outer quadrant of the left breast.  PET/CT on October 7 was negative for any evidence of distant metastasis.  In the  On November 16 bilateral mastectomies were performed. Left pressure 1.5 cm intermediate grade carcinoma with ductal and lobular features. There was focal dermal invasion. Margins were negative. The right breast was without abnormality.  Oncotype score returned 25 - intermediate risk.  Adjuvant TC X 4 completed 3/31/17.    Review of Systems   Constitutional: Negative for fever.   HENT: Negative for mouth sores.    Eyes: Negative for pain, redness and visual disturbance.   Skin: Positive for rash.       Objective:      Physical Exam   Constitutional: She appears well-developed and well-nourished.   HENT:   Head:  Normocephalic.   Eyes: EOM are normal. Pupils are equal, round, and reactive to light.   Cardiovascular: Normal rate and regular rhythm.    Pulmonary/Chest: Effort normal and breath sounds normal.   Skin: Skin is warm and dry. Rash noted.   Vesicular lesions on erythematous base at left scalp.  Additionally same lesions at left tip of nose.  No involvement of eye, one satellite lesion (although could be excoriation) to left of eye and on cheek.        Assessment:       1. Herpes zoster without complication    2. Personal history of breast cancer        Plan:       RX for Valtrex sent to patient's pharmacy.  She knows to call if shingles do not resolve or if there appears to be any eye involvement.  Return in June for previously scheduled breast cancer follow up.

## 2017-05-10 ENCOUNTER — PATIENT MESSAGE (OUTPATIENT)
Dept: HEMATOLOGY/ONCOLOGY | Facility: CLINIC | Age: 67
End: 2017-05-10

## 2017-05-10 ENCOUNTER — TELEPHONE (OUTPATIENT)
Dept: SURGERY | Facility: CLINIC | Age: 67
End: 2017-05-10

## 2017-05-10 NOTE — TELEPHONE ENCOUNTER
----- Message from Júnior Florentino sent at 5/10/2017  8:35 AM CDT -----  [phone]//pt states that she needs to speak with nurse in ref to her having shingles and wondering will they be able to remove her port//please call//thank you

## 2017-05-10 NOTE — TELEPHONE ENCOUNTER
Spoke with to and rescheduled pt for next removal day in hope that her shingles are cleared up, she will call closer to that date if she will need to cancel

## 2017-05-11 ENCOUNTER — OFFICE VISIT (OUTPATIENT)
Dept: OPHTHALMOLOGY | Facility: CLINIC | Age: 67
End: 2017-05-11
Payer: MEDICARE

## 2017-05-11 DIAGNOSIS — Z79.899 USE OF PROTON PUMP INHIBITOR THERAPY: ICD-10-CM

## 2017-05-11 DIAGNOSIS — K21.9 GASTROESOPHAGEAL REFLUX DISEASE WITHOUT ESOPHAGITIS: ICD-10-CM

## 2017-05-11 DIAGNOSIS — B02.39 DERMATITIS OF EYELID OF LEFT EYE DUE TO HERPES ZOSTER: ICD-10-CM

## 2017-05-11 DIAGNOSIS — M85.80 LOW BONE MASS: ICD-10-CM

## 2017-05-11 PROCEDURE — 92004 COMPRE OPH EXAM NEW PT 1/>: CPT | Mod: S$GLB,,, | Performed by: OPHTHALMOLOGY

## 2017-05-11 PROCEDURE — 99999 PR PBB SHADOW E&M-EST. PATIENT-LVL II: CPT | Mod: PBBFAC,,, | Performed by: OPHTHALMOLOGY

## 2017-05-11 RX ORDER — PANTOPRAZOLE SODIUM 40 MG/1
40 TABLET, DELAYED RELEASE ORAL
Qty: 90 TABLET | Refills: 3 | Status: SHIPPED | OUTPATIENT
Start: 2017-05-11 | End: 2018-06-20 | Stop reason: SDUPTHER

## 2017-05-11 NOTE — PROGRESS NOTES
HPI     Eye Problem    Additional comments: Shingles           Comments   Triage pt  Patient dx w/shingles x 4 days ago. Pt states OS stinging and irritated.   Few bumps around the OS.  3 on pain scale. No vision problems at this time.    No eye drops. On Valtrex TID.    I have personally interviewed the patient, reviewed the history and   examined the patient and agree with the technician's exam.         Last edited by Deven Zaman MD on 5/11/2017 10:44 AM. (History)        ROS     Positive for: Gastrointestinal, Neurological, Genitourinary,   Musculoskeletal, Endocrine, Cardiovascular    Negative for: Constitutional, Skin, HENT, Eyes, Respiratory, Psychiatric,   Allergic/Imm, Heme/Lymph    Last edited by Deven Zaman MD on 5/11/2017 10:44 AM. (History)        Assessment /Plan     For exam results, see Encounter Report.    Dermatitis of eyelid of left eye due to herpes zoster      I found no involvement of the left cornea or internal eye structures. Finish course of Valtrex. Return if any concerns.

## 2017-05-11 NOTE — MR AVS SNAPSHOT
[facility] - Ophthalmology  [address]  [facility] [address]  Phone: [phone]  Fax: [phone]                  Lima Maldonado   2017 10:30 AM   Office Visit    Description:  Female : 1950   Provider:  Deven Zaman MD   Department:  [facility]           Reason for Visit     Eye Problem           Diagnoses this Visit        Comments    Dermatitis of eyelid of left eye due to herpes zoster                To Do List           Future Appointments        Provider Department Dept Phone    2017 10:00 AM Jimmy Bains MD [facility] - General Surgery [phone]    2017 9:20 AM [facility], DEXA1 [facility]-Bone Mineral Density [phone]    2017 8:00 AM [facility] - Hematology Oncology [phone]    2017 8:40 AM LAB, APPOINTMENT [place] [facility] [phone]    2017 9:30 AM Joie Mccall MD [facility] [place] Internal Medicine [phone]      Goals (5 Years of Data)     None      Follow-Up and Disposition     Return if symptoms worsen or fail to improve.      [facility]     [facility] Nurse Care Line - 24/ Assistance  Unless otherwise directed by your provider, please contact [facility], our nurse care line that is available for / assistance.     Registered nurses in the [facility] Center provide: appointment scheduling, clinical advisement, health education, and other advisory services.  Call: [phone] (toll free)               Medications           Message regarding Medications     Verify the changes and/or additions to your medication regime listed below are the same as discussed with your clinician today.  If any of these changes or additions are incorrect, please notify your healthcare provider.             Verify that the below list of medications is an accurate representation of the medications you are currently taking.  If none reported, the list may be blank. If  incorrect, please contact your healthcare provider. Carry this list with you in case of emergency.           Current Medications     alprazolam (XANAX) 0.5 MG tablet     ascorbic acid (VITAMIN C) 1000 MG tablet Take 1 tablet by mouth once daily.    CALCIUM CARBONATE/VITAMIN D3 (CALTRATE 600 + D ORAL) Take 1 tablet by mouth 2 (two) times daily.     celecoxib (CELEBREX) 100 MG capsule Take 1 capsule (100 mg total) by mouth daily as needed.    coenzyme Q10-vitamin E (COQ10 ) 100-100 mg-unit Cap Take 1 capsule by mouth once daily.     doxycycline (VIBRAMYCIN) 100 MG Cap     gabapentin (NEURONTIN) 300 MG capsule Take 1 capsule (300 mg total) by mouth every evening.    hydrocodone-acetaminophen 5-325mg (NORCO) 5-325 mg per tablet Take 1 tablet by mouth every 4 (four) hours as needed for Pain.    letrozole (FEMARA) 2.5 mg Tab Take 1 tablet (2.5 mg total) by mouth once daily.    levothyroxine (SYNTHROID) 50 MCG tablet TAKE 1 TABLET (50 MCG TOTAL) BY MOUTH ONCE DAILY.    lidocaine (LIDODERM) 5 %(700 mg/patch) Place 2 patches onto the skin daily as needed. Remove & Discard patch within 12 hours or as directed by MD    lisinopril (PRINIVIL,ZESTRIL) 20 MG tablet Take 1 tablet (20 mg total) by mouth once daily.    lorazepam (ATIVAN) 1 MG tablet     metformin (GLUCOPHAGE) 500 MG tablet Take 500 mg by mouth 2 (two) times daily with meals.    MULTIVITAMIN ORAL once daily.     omega-3 fatty acids-vitamin E (FISH OIL) 1,000 mg Cap Take 1 capsule by mouth once daily.     ondansetron (ZOFRAN-ODT) 4 MG TbDL     pantoprazole (PROTONIX) 40 MG tablet Take 1 tablet (40 mg total) by mouth before breakfast.    simvastatin (ZOCOR) 20 MG tablet TAKE 1 TABLET (20 MG TOTAL) BY MOUTH EVERY EVENING.    valacyclovir (VALTREX) 1000 MG tablet Take 1 tablet (1,000 mg total) by mouth 3 (three) times daily.    vitamin D 1000 units Tab Take 1,000 Units by mouth once daily.            Clinical Reference Information           Allergies as of 5/11/2017      Erythromycin    Erythromycin (Bulk)    Oxycodone    Oxycodone-acetaminophen      Immunizations Administered on Date of Encounter - 5/11/2017     None      Instructions    Finish course of Valtrex  Return to me as needed.       Language Assistance Services     ATTENTION: Language assistance services are available, free of charge. Please call [phone].      ATENCIÓN: Si habla fernando, tiene a abdi disposición servicios gratuitos de asistencia lingüística. Llame al [phone].     CHÚ Ý: N?u b?n nói Ti?ng Vi?t, có các d?ch v? h? tr? ngôn ng? mi?n phí dành cho b?n. G?i s? [phone].         [facility] complies with applicable Federal civil rights laws and does not discriminate on the basis of race, color, national origin, age, disability, or sex.

## 2017-05-18 RX ORDER — GABAPENTIN 300 MG/1
300 CAPSULE ORAL NIGHTLY
Qty: 30 CAPSULE | Refills: 1 | Status: CANCELLED | OUTPATIENT
Start: 2017-05-18 | End: 2018-05-18

## 2017-05-31 ENCOUNTER — PATIENT MESSAGE (OUTPATIENT)
Dept: HEMATOLOGY/ONCOLOGY | Facility: CLINIC | Age: 67
End: 2017-05-31

## 2017-05-31 DIAGNOSIS — G62.9 NEUROPATHY: Primary | ICD-10-CM

## 2017-05-31 RX ORDER — GABAPENTIN 300 MG/1
300 CAPSULE ORAL NIGHTLY
Qty: 30 CAPSULE | Refills: 11 | Status: SHIPPED | OUTPATIENT
Start: 2017-05-31 | End: 2018-03-29 | Stop reason: SDUPTHER

## 2017-06-09 ENCOUNTER — PROCEDURE VISIT (OUTPATIENT)
Dept: SURGERY | Facility: CLINIC | Age: 67
End: 2017-06-09
Payer: MEDICARE

## 2017-06-09 VITALS
HEART RATE: 81 BPM | DIASTOLIC BLOOD PRESSURE: 68 MMHG | SYSTOLIC BLOOD PRESSURE: 126 MMHG | WEIGHT: 185 LBS | HEIGHT: 66 IN | TEMPERATURE: 98 F | BODY MASS INDEX: 29.73 KG/M2

## 2017-06-09 DIAGNOSIS — Z85.3 PERSONAL HISTORY OF BREAST CANCER: Primary | ICD-10-CM

## 2017-06-09 PROCEDURE — 36590 REMOVAL TUNNELED CV CATH: CPT | Mod: S$GLB,,, | Performed by: SURGERY

## 2017-06-09 NOTE — PROCEDURES
Procedures        PREOPERATIVE DIAGNOSIS: Left invasive breast cancer, status post systemic    chemotherapy treatment.     PROCEDURE: Removal of Right internal jugular vein anterior chest wall, 8-Polish PowerPort Port-A-Cath.     POSTOPERATIVE DIAGNOSIS: Same       PRIMARY SURGEON: Jimmy Bains M.D.     ASSISTANT: none     ANESTHESIA: Local anesthesia.     PROCEDURE IN DETAIL: The patient was seen in the clinic. She underwent    informed consent for right-sided Port-A-Cath removal. She was brought to the    minor procedure room. She was placed in a supine position. The right anterior    chest was prepped and draped in a sterile fashion after the area was marked.    Local anesthesia was infiltrated at the prior Port-A-Cath insertion site. Skin    was incised sharply with a 15 blade scalpel. The subcutaneous tissue was    dissected with electrocautery to open the fibrous capsule from around the port    and catheter system. The catheter tract was identified. A figure-of-eight 3-0    Vicryl suture was placed around the catheter tract and this was ligated as the    catheter was pulled while the patient underwent Valsalva maneuver to minimize    risk of back bleeding or air embolism. The port and catheter system were    removed intact. The port and catheter were shown for gross identification. The    deep dermal and subcutaneous layers were reapproximated with interrupted deep    three-point fixation sutures down to the posterior fibrous capsule to obliterate  any potential dead space to minimize risk of post-procedure fluid or seroma    buildup. This was done with three interrupted sutures of this kind. With the    skin reapproximated, the skin itself was closed with a running 4-0 Monocryl    subcuticular skin closure. Mastisol, Steri-Strips, sterile Telfa gauze and    Tegaderm dressing were applied. Postop wound care and analgesic instructions    were provided. The patient will follow up in clinic prn. She will  monitor for any    signs of infection. She tolerated the procedure well without complication.  No specimens were sent.

## 2017-06-28 ENCOUNTER — HOSPITAL ENCOUNTER (OUTPATIENT)
Dept: RADIOLOGY | Facility: CLINIC | Age: 67
Discharge: HOME | End: 2017-06-28
Attending: INTERNAL MEDICINE
Payer: MEDICARE

## 2017-06-28 DIAGNOSIS — T38.6X5A OSTEOPOROSIS DUE TO AROMATASE INHIBITOR: ICD-10-CM

## 2017-06-28 DIAGNOSIS — M81.8 OSTEOPOROSIS DUE TO AROMATASE INHIBITOR: ICD-10-CM

## 2017-06-28 PROCEDURE — 77080 DXA BONE DENSITY AXIAL: CPT | Mod: 26,,, | Performed by: INTERNAL MEDICINE

## 2017-06-28 PROCEDURE — 77080 DXA BONE DENSITY AXIAL: CPT | Mod: TC

## 2017-06-29 ENCOUNTER — OFFICE VISIT (OUTPATIENT)
Dept: HEMATOLOGY/ONCOLOGY | Facility: CLINIC | Age: 67
End: 2017-06-29
Payer: MEDICARE

## 2017-06-29 VITALS
BODY MASS INDEX: 29.73 KG/M2 | SYSTOLIC BLOOD PRESSURE: 125 MMHG | HEIGHT: 66 IN | RESPIRATION RATE: 18 BRPM | DIASTOLIC BLOOD PRESSURE: 61 MMHG | HEART RATE: 87 BPM | TEMPERATURE: 99 F | OXYGEN SATURATION: 100 % | WEIGHT: 185 LBS

## 2017-06-29 DIAGNOSIS — C50.512 MALIGNANT NEOPLASM OF LOWER-OUTER QUADRANT OF LEFT FEMALE BREAST: Primary | ICD-10-CM

## 2017-06-29 DIAGNOSIS — Z12.11 COLON CANCER SCREENING: ICD-10-CM

## 2017-06-29 PROCEDURE — 1159F MED LIST DOCD IN RCRD: CPT | Mod: S$GLB,,, | Performed by: PHYSICIAN ASSISTANT

## 2017-06-29 PROCEDURE — 99499 UNLISTED E&M SERVICE: CPT | Mod: S$GLB,,, | Performed by: PHYSICIAN ASSISTANT

## 2017-06-29 PROCEDURE — 99999 PR PBB SHADOW E&M-EST. PATIENT-LVL IV: CPT | Mod: PBBFAC,,, | Performed by: PHYSICIAN ASSISTANT

## 2017-06-29 PROCEDURE — 99215 OFFICE O/P EST HI 40 MIN: CPT | Mod: S$GLB,,, | Performed by: PHYSICIAN ASSISTANT

## 2017-06-29 PROCEDURE — 1126F AMNT PAIN NOTED NONE PRSNT: CPT | Mod: S$GLB,,, | Performed by: PHYSICIAN ASSISTANT

## 2017-06-29 NOTE — LETTER
July 1, 2017      Francisco Baker MD  [address]  [facility] [address]           [facility] - Hematology Oncology  [address]  [facility] [address]  Phone: [phone]          Patient: Lima Maldonado   MR Number: [id number]   YOB: 1950   Date of Visit: 6/29/2017       Dear Dr. Francisco Baker:    Thank you for referring Lima Maldonado to me for evaluation. Attached you will find relevant portions of my assessment and plan of care.    If you have questions, please do not hesitate to call me. I look forward to following Lima Maldonado along with you.    Sincerely,    Melissa Lagunas PA-C    Enclosure  CC:  No Recipients    If you would like to receive this communication electronically, please contact [email or web address] or [phone] to request more information on [facility] [employer] access.    For providers and/or their staff who would like to refer a patient to [facility], please contact us through our one-stop-shop provider referral line, [facility], at [phone].    If you feel you have received this communication in error or would no longer like to receive these types of communications, please e-mail [email or web address]

## 2017-06-29 NOTE — PROGRESS NOTES
Subjective:       Patient ID: Lima Maldonado is a 66 y.o. female.    Chief Complaint: No chief complaint on file.    Ms Maldonado return to clinic for follow-up of recent diagnosis of left breast cancer. She had stage I ER positive disease with an intermediate risk Oncotype score.   She completed 4 cycles of TC on 3/31/17.    Patient feeling well. NO fever, chills, nausea, vomiting or shortness of breath. She is tolerating letrozole well. She has some mild arthritic complaints but these do not effect ADLs. No hot flashes.  She does have some peripheral neuropathy.  Patient recently with shingles but that resolved and no post herpetic neuralgia.   Patient remains on Prolia under care of Dr. Horta for history of osteoporosis.           Breast history: mammography on September 20, 2016 demonstrated a new irregular mass with spiculated margins seen in the left breast at  5 o'clock along the surgical scar site.ultrasound DEMONSTRATED A SOLID 8 X 8 X 7 MM MASS.    A needle biopsy in September 27 showed infiltrating carcinoma, histologic grade 3, nuclear grade 2, mitotic index 1. Tumor was 90% ER positive, 70% KS positive, and 1+ HER-2.     MRI of the breast showed a 1.7 x 1.3 cm lesion in the lower outer quadrant of the left breast.  PET/CT on October 7 was negative for any evidence of distant metastasis.    On November 16 bilateral mastectomies were performed. Left pressure 1.5 cm intermediate grade carcinoma with ductal and lobular features. There was focal dermal invasion. Margins were negative. The right breast was without abnormality.  Oncotype score returned 25 - intermediate risk.  Adjuvant TC X 4 completed 3/31/17.    Patient with history of left breast cancer in 1997 when she underwent left lumpectomy with complete axillary dissection at age 46 for a pT1cN1 breast cancer (Stage IIA).  47 nodes were removed, 1 of which was positive.  She received adjuvant chemotherapy, radiation and tamoxifen.       Review of  Systems   Constitutional: Negative.  Negative for fever.   HENT: Negative for congestion, mouth sores, rhinorrhea, sore throat and trouble swallowing.    Eyes: Negative for pain, redness and visual disturbance.   Respiratory: Negative for cough, chest tightness and shortness of breath.    Cardiovascular: Negative for chest pain, palpitations and leg swelling.   Gastrointestinal: Negative for abdominal pain, blood in stool, constipation, diarrhea, nausea and vomiting.   Genitourinary: Negative for dysuria, hematuria and vaginal bleeding.   Musculoskeletal: Negative for arthralgias, back pain and myalgias.   Skin: Positive for rash. Negative for pallor.   Neurological: Negative for dizziness, weakness and headaches.   Hematological: Negative for adenopathy. Does not bruise/bleed easily.   Psychiatric/Behavioral: Negative for dysphoric mood and suicidal ideas. The patient is not nervous/anxious.        Objective:      Physical Exam   Constitutional: She is oriented to person, place, and time. She appears well-developed and well-nourished. No distress.   ECOG 0  Presents alone   HENT:   Head: Normocephalic.   Mouth/Throat: Oropharynx is clear and moist. No oropharyngeal exudate.   Eyes: Conjunctivae and EOM are normal. Pupils are equal, round, and reactive to light. No scleral icterus.   Neck: Normal range of motion. Neck supple. No thyromegaly present.   Cardiovascular: Normal rate, regular rhythm, normal heart sounds and intact distal pulses.    Pulmonary/Chest: Effort normal and breath sounds normal. No respiratory distress.   Bilateral breast reconstruction without mass, nodule or skin changes. No axillary or supraclavicular adenopathy.    Abdominal: Soft. Bowel sounds are normal. She exhibits no distension and no mass. There is no tenderness.   No hepatosplenomegaly     Musculoskeletal: Normal range of motion. She exhibits no edema or tenderness.   No spinal or paraspinal tenderness to palpation      Lymphadenopathy:     She has no cervical adenopathy.   Neurological: She is alert and oriented to person, place, and time. No cranial nerve deficit.   No spinal or paraspinal tenderness to palpation     Skin: Skin is warm and dry. No rash noted.   Psychiatric: She has a normal mood and affect. Her behavior is normal. Judgment and thought content normal.   Vitals reviewed.      Assessment:       1. Malignant neoplasm of lower-outer quadrant of left female breast        Plan:       Continue letrozole and return to clinic in 3 months.     Continue Prolia as h/o osteoporosis and currently on Aromatase inhibitor therapy.  She remains on calcium/vitamin D.    Patient due for surveillance colonoscopy.  .  We discussed the role of the survivorship clinic in her care.  Today we reviewed all aspects of treatment and the potential long term side effects of treatment.  We also discussed the emotional/psychological impact of diagnosis and treatment and I let her know about our psychosocial services (dedicated  and Psychologist).  She declines referral to these services at this time.       Written material on eating well, exercise, fear of recurrence, living with uncertainty and sadness depression provided to patient.     60 mins spent with patient, over 50% of which was in counseling and completion of treatment summary.     Please see Summary Treatment and Care plan filed under same date.

## 2017-07-16 ENCOUNTER — PATIENT MESSAGE (OUTPATIENT)
Dept: HEMATOLOGY/ONCOLOGY | Facility: CLINIC | Age: 67
End: 2017-07-16

## 2017-07-16 ENCOUNTER — PATIENT MESSAGE (OUTPATIENT)
Dept: GYNECOLOGIC ONCOLOGY | Facility: CLINIC | Age: 67
End: 2017-07-16

## 2017-07-17 ENCOUNTER — PATIENT MESSAGE (OUTPATIENT)
Dept: HEMATOLOGY/ONCOLOGY | Facility: CLINIC | Age: 67
End: 2017-07-17

## 2017-07-17 ENCOUNTER — CLINICAL SUPPORT (OUTPATIENT)
Dept: GYNECOLOGIC ONCOLOGY | Facility: CLINIC | Age: 67
End: 2017-07-17
Payer: MEDICARE

## 2017-07-17 DIAGNOSIS — N30.00 ACUTE CYSTITIS WITHOUT HEMATURIA: ICD-10-CM

## 2017-07-17 PROCEDURE — 87088 URINE BACTERIA CULTURE: CPT

## 2017-07-17 PROCEDURE — 87086 URINE CULTURE/COLONY COUNT: CPT

## 2017-07-17 PROCEDURE — 99999 PR PBB SHADOW E&M-EST. PATIENT-LVL II: CPT | Mod: PBBFAC,,,

## 2017-07-17 PROCEDURE — 87077 CULTURE AEROBIC IDENTIFY: CPT

## 2017-07-17 PROCEDURE — 87186 SC STD MICRODIL/AGAR DIL: CPT

## 2017-07-17 NOTE — ADDENDUM NOTE
Encounter addended by: Melissa Lagunas PA-C on: 7/17/2017  2:12 PM<BR>    Actions taken: Pend clinical note

## 2017-07-17 NOTE — PROGRESS NOTES
Pt came into clinic today to give a urine specimen. Pt advised Dr. Rascon will contact her with results.

## 2017-07-18 ENCOUNTER — INFUSION (OUTPATIENT)
Dept: INFECTIOUS DISEASES | Facility: HOSPITAL | Age: 67
End: 2017-07-18
Attending: INTERNAL MEDICINE
Payer: MEDICARE

## 2017-07-18 VITALS — WEIGHT: 185 LBS | HEIGHT: 66 IN | BODY MASS INDEX: 29.73 KG/M2

## 2017-07-18 DIAGNOSIS — M81.0 SENILE OSTEOPOROSIS: Primary | ICD-10-CM

## 2017-07-18 PROCEDURE — 63600175 PHARM REV CODE 636 W HCPCS: Performed by: INTERNAL MEDICINE

## 2017-07-18 PROCEDURE — 96401 CHEMO ANTI-NEOPL SQ/IM: CPT

## 2017-07-18 RX ADMIN — DENOSUMAB 60 MG: 60 INJECTION SUBCUTANEOUS at 08:07

## 2017-07-19 ENCOUNTER — PATIENT MESSAGE (OUTPATIENT)
Dept: GYNECOLOGIC ONCOLOGY | Facility: CLINIC | Age: 67
End: 2017-07-19

## 2017-07-19 DIAGNOSIS — N30.00 ACUTE CYSTITIS WITHOUT HEMATURIA: Primary | ICD-10-CM

## 2017-07-19 DIAGNOSIS — N95.2 VAGINAL ATROPHY: ICD-10-CM

## 2017-07-19 RX ORDER — ESTRADIOL 10 UG/1
10 INSERT VAGINAL
Qty: 8 TABLET | Refills: 12 | Status: SHIPPED | OUTPATIENT
Start: 2017-07-20 | End: 2018-01-11 | Stop reason: SDUPTHER

## 2017-07-19 RX ORDER — CIPROFLOXACIN 500 MG/1
500 TABLET ORAL 2 TIMES DAILY
Qty: 14 TABLET | Refills: 0 | Status: SHIPPED | OUTPATIENT
Start: 2017-07-19 | End: 2017-07-20

## 2017-07-20 DIAGNOSIS — N30.00 ACUTE CYSTITIS WITHOUT HEMATURIA: ICD-10-CM

## 2017-07-20 LAB — BACTERIA UR CULT: NORMAL

## 2017-07-20 RX ORDER — NITROFURANTOIN 25; 75 MG/1; MG/1
100 CAPSULE ORAL 2 TIMES DAILY
Qty: 14 CAPSULE | Refills: 0 | Status: SHIPPED | OUTPATIENT
Start: 2017-07-20 | End: 2017-07-21 | Stop reason: SDUPTHER

## 2017-07-21 DIAGNOSIS — N30.00 ACUTE CYSTITIS WITHOUT HEMATURIA: ICD-10-CM

## 2017-07-21 RX ORDER — NITROFURANTOIN 25; 75 MG/1; MG/1
100 CAPSULE ORAL 2 TIMES DAILY
Qty: 14 CAPSULE | Refills: 0 | Status: SHIPPED | OUTPATIENT
Start: 2017-07-21 | End: 2017-07-28

## 2017-08-08 ENCOUNTER — LAB VISIT (OUTPATIENT)
Dept: LAB | Facility: HOSPITAL | Age: 67
End: 2017-08-08
Attending: INTERNAL MEDICINE
Payer: MEDICARE

## 2017-08-08 DIAGNOSIS — E11.9 CONTROLLED TYPE 2 DIABETES MELLITUS WITHOUT COMPLICATION, WITHOUT LONG-TERM CURRENT USE OF INSULIN: ICD-10-CM

## 2017-08-08 LAB
ALBUMIN SERPL BCP-MCNC: 4 G/DL
ALP SERPL-CCNC: 69 U/L
ALT SERPL W/O P-5'-P-CCNC: 22 U/L
ANION GAP SERPL CALC-SCNC: 7 MMOL/L
AST SERPL-CCNC: 21 U/L
BILIRUB SERPL-MCNC: 1.6 MG/DL
BUN SERPL-MCNC: 9 MG/DL
CALCIUM SERPL-MCNC: 9.2 MG/DL
CHLORIDE SERPL-SCNC: 108 MMOL/L
CHOLEST/HDLC SERPL: 4.1 {RATIO}
CO2 SERPL-SCNC: 30 MMOL/L
CREAT SERPL-MCNC: 0.8 MG/DL
EST. GFR  (AFRICAN AMERICAN): >60 ML/MIN/1.73 M^2
EST. GFR  (NON AFRICAN AMERICAN): >60 ML/MIN/1.73 M^2
ESTIMATED AVG GLUCOSE: 137 MG/DL
GLUCOSE SERPL-MCNC: 110 MG/DL
HBA1C MFR BLD HPLC: 6.4 %
HDL/CHOLESTEROL RATIO: 24.5 %
HDLC SERPL-MCNC: 143 MG/DL
HDLC SERPL-MCNC: 35 MG/DL
LDLC SERPL CALC-MCNC: 75 MG/DL
NONHDLC SERPL-MCNC: 108 MG/DL
POTASSIUM SERPL-SCNC: 4.9 MMOL/L
PROT SERPL-MCNC: 6.7 G/DL
SODIUM SERPL-SCNC: 145 MMOL/L
TRIGL SERPL-MCNC: 165 MG/DL

## 2017-08-08 PROCEDURE — 80061 LIPID PANEL: CPT

## 2017-08-08 PROCEDURE — 80053 COMPREHEN METABOLIC PANEL: CPT

## 2017-08-08 PROCEDURE — 83036 HEMOGLOBIN GLYCOSYLATED A1C: CPT

## 2017-08-08 PROCEDURE — 36415 COLL VENOUS BLD VENIPUNCTURE: CPT

## 2017-08-09 ENCOUNTER — LAB VISIT (OUTPATIENT)
Dept: LAB | Facility: OTHER | Age: 67
End: 2017-08-09
Attending: INTERNAL MEDICINE
Payer: MEDICARE

## 2017-08-09 ENCOUNTER — OFFICE VISIT (OUTPATIENT)
Dept: INTERNAL MEDICINE | Facility: CLINIC | Age: 67
End: 2017-08-09
Attending: INTERNAL MEDICINE
Payer: MEDICARE

## 2017-08-09 VITALS
BODY MASS INDEX: 29.76 KG/M2 | WEIGHT: 185.19 LBS | DIASTOLIC BLOOD PRESSURE: 76 MMHG | HEIGHT: 66 IN | SYSTOLIC BLOOD PRESSURE: 128 MMHG | HEART RATE: 70 BPM

## 2017-08-09 DIAGNOSIS — E11.42 TYPE 2 DIABETES, CONTROLLED, WITH PERIPHERAL NEUROPATHY: ICD-10-CM

## 2017-08-09 DIAGNOSIS — Z01.818 PRE-OP EXAMINATION: Primary | ICD-10-CM

## 2017-08-09 DIAGNOSIS — I10 ESSENTIAL HYPERTENSION: ICD-10-CM

## 2017-08-09 DIAGNOSIS — E03.9 HYPOTHYROIDISM, UNSPECIFIED TYPE: ICD-10-CM

## 2017-08-09 DIAGNOSIS — Z01.818 PRE-OP EXAMINATION: ICD-10-CM

## 2017-08-09 DIAGNOSIS — E78.2 MIXED HYPERLIPIDEMIA: ICD-10-CM

## 2017-08-09 PROBLEM — R50.9 FEVER: Status: RESOLVED | Noted: 2017-02-02 | Resolved: 2017-08-09

## 2017-08-09 PROBLEM — N30.00 ACUTE CYSTITIS WITHOUT HEMATURIA: Status: RESOLVED | Noted: 2017-07-17 | Resolved: 2017-08-09

## 2017-08-09 PROBLEM — N30.00 ACUTE CYSTITIS WITHOUT HEMATURIA: Status: RESOLVED | Noted: 2017-07-20 | Resolved: 2017-08-09

## 2017-08-09 LAB
BASOPHILS # BLD AUTO: 0.02 K/UL
BASOPHILS NFR BLD: 0.4 %
DIFFERENTIAL METHOD: NORMAL
EOSINOPHIL # BLD AUTO: 0.1 K/UL
EOSINOPHIL NFR BLD: 1.6 %
ERYTHROCYTE [DISTWIDTH] IN BLOOD BY AUTOMATED COUNT: 14.2 %
HCT VFR BLD AUTO: 41.3 %
HGB BLD-MCNC: 13.7 G/DL
LYMPHOCYTES # BLD AUTO: 1.8 K/UL
LYMPHOCYTES NFR BLD: 32.8 %
MCH RBC QN AUTO: 28.1 PG
MCHC RBC AUTO-ENTMCNC: 33.2 G/DL
MCV RBC AUTO: 85 FL
MONOCYTES # BLD AUTO: 0.3 K/UL
MONOCYTES NFR BLD: 5.3 %
NEUTROPHILS # BLD AUTO: 3.3 K/UL
NEUTROPHILS NFR BLD: 59.5 %
PLATELET # BLD AUTO: 185 K/UL
PMV BLD AUTO: 10 FL
RBC # BLD AUTO: 4.87 M/UL
TSH SERPL DL<=0.005 MIU/L-ACNC: 1.67 UIU/ML
WBC # BLD AUTO: 5.48 K/UL

## 2017-08-09 PROCEDURE — 1159F MED LIST DOCD IN RCRD: CPT | Mod: S$GLB,,, | Performed by: INTERNAL MEDICINE

## 2017-08-09 PROCEDURE — 3008F BODY MASS INDEX DOCD: CPT | Mod: S$GLB,,, | Performed by: INTERNAL MEDICINE

## 2017-08-09 PROCEDURE — 99999 PR PBB SHADOW E&M-EST. PATIENT-LVL III: CPT | Mod: PBBFAC,,, | Performed by: INTERNAL MEDICINE

## 2017-08-09 PROCEDURE — 84443 ASSAY THYROID STIM HORMONE: CPT

## 2017-08-09 PROCEDURE — 85025 COMPLETE CBC W/AUTO DIFF WBC: CPT

## 2017-08-09 PROCEDURE — 99499 UNLISTED E&M SERVICE: CPT | Mod: S$GLB,,, | Performed by: INTERNAL MEDICINE

## 2017-08-09 PROCEDURE — 3078F DIAST BP <80 MM HG: CPT | Mod: S$GLB,,, | Performed by: INTERNAL MEDICINE

## 2017-08-09 PROCEDURE — 99214 OFFICE O/P EST MOD 30 MIN: CPT | Mod: S$GLB,,, | Performed by: INTERNAL MEDICINE

## 2017-08-09 PROCEDURE — 3074F SYST BP LT 130 MM HG: CPT | Mod: S$GLB,,, | Performed by: INTERNAL MEDICINE

## 2017-08-09 PROCEDURE — 4010F ACE/ARB THERAPY RXD/TAKEN: CPT | Mod: S$GLB,,, | Performed by: INTERNAL MEDICINE

## 2017-08-09 PROCEDURE — 3044F HG A1C LEVEL LT 7.0%: CPT | Mod: S$GLB,,, | Performed by: INTERNAL MEDICINE

## 2017-08-09 PROCEDURE — 36415 COLL VENOUS BLD VENIPUNCTURE: CPT

## 2017-08-09 PROCEDURE — 1126F AMNT PAIN NOTED NONE PRSNT: CPT | Mod: S$GLB,,, | Performed by: INTERNAL MEDICINE

## 2017-08-09 RX ORDER — LETROZOLE 2.5 MG/1
TABLET, FILM COATED ORAL
COMMUNITY
Start: 2017-07-19 | End: 2018-04-10 | Stop reason: SDUPTHER

## 2017-08-09 NOTE — PROGRESS NOTES
Subjective:       Patient ID: Lima Maldonado is a 67 y.o. female.    Chief Complaint: Flank Pain    HPI   Pt here for pre op eval and diabetes f/u.   Planning on reconstructive breast surgery after completing chemo and breast surgery..   Attended Diab educ 2-3 months ago per her  mag med and A1c improved and at goal  HTN: controlled on acei  HLD: contolled on statin  Paperwork for pre op reviewed and pt needs cbc, tsh  Other labs reviewed with pt in clinic today  Hx of hyperbili and fatty liver seen by hep in past - she reports also needs to schedule cscope - order placed this year.   She had shingles outbreak earlier this year - this has healed and no residual pain - required f/u with ophtho as involved left eye per pt - no changes in vision.   Hx of osteoporosis on prolia ordered by rheum   Hx of kelley on cpap nightly    Patient has no personal or family history of hypercoagulable disorders, pulmonary embolisms, DVT, or bleeding disorders. No family history of sudden death. No personal or family history of difficulty with anesthesia. Patient is able to climb a flight of stairs without difficulty breathing. No cp or sob. Reports able to clean home without issues.   Pt seen by [facility]ia 11/2016 after ekg showed new RBBB and echo performed and stable - no new issues since then and feeling well overall    Review of Systems    Objective:      Physical Exam   Constitutional: She is oriented to person, place, and time. She appears well-developed and well-nourished.   HENT:   Head: Normocephalic and atraumatic.   Right Ear: External ear normal.   Left Ear: External ear normal.   Nose: Nose normal.   Mouth/Throat: Oropharynx is clear and moist. No oropharyngeal exudate.   No carotid bruits   Eyes: Conjunctivae and EOM are normal.   Neck: Neck supple. No JVD present. No tracheal deviation present. No thyromegaly present.   Cardiovascular: Normal rate, regular rhythm, normal heart sounds and intact distal pulses.    Pulses:        Dorsalis pedis pulses are 2+ on the right side, and 2+ on the left side.        Posterior tibial pulses are 2+ on the right side, and 2+ on the left side.   Pulmonary/Chest: Effort normal and breath sounds normal.   Abdominal: Soft. Normal appearance and bowel sounds are normal.   Musculoskeletal: She exhibits no edema or tenderness.        Right foot: There is normal range of motion and no deformity.        Left foot: There is normal range of motion and no deformity.   Feet:   Right Foot:   Protective Sensation: 4 sites tested. 4 sites sensed.   Skin Integrity: Negative for ulcer, blister, skin breakdown, erythema, warmth, callus or dry skin.   Left Foot:   Protective Sensation: 4 sites tested. 4 sites sensed.   Skin Integrity: Negative for ulcer, blister, skin breakdown, erythema, warmth, callus or dry skin.   Lymphadenopathy:     She has no cervical adenopathy.   Neurological: She is alert and oriented to person, place, and time. She has normal strength. Coordination and gait normal.   Skin: Skin is warm, dry and intact. No cyanosis. Nails show no clubbing.   Psychiatric: She has a normal mood and affect. Her speech is normal and behavior is normal. Judgment and thought content normal. Cognition and memory are normal.   Vitals reviewed.    reduced sensation of bilateral feet at 4/4 sites   Assessment:     Pre-op examination: check cbc and tsh - Pt will need close monitoring of bg pre and post op. Hold metformin day of procedure. Will need close monitoring of resp status post op with hx of kelley. Will fill out paperwork after studies return and fax to her MD's office - pt is maximally medically managed and low risk for procedure. olivarez risk 0.03%  -     CBC auto differential; Future; Expected date: 08/09/2017    Mixed hyperlipidemia: stable, cont med    Essential hypertension: controlled, cont med    Hypothyroidism, unspecified type: cont med and check level  -     TSH; Future; Expected date: 08/09/2017    Type 2  diabetes, controlled, with peripheral neuropathy: controlled, cont med - refer to podiatry  -     Ambulatory Referral to Podiatry    Fatty liver; stable

## 2017-08-10 ENCOUNTER — TELEPHONE (OUTPATIENT)
Dept: INTERNAL MEDICINE | Facility: CLINIC | Age: 67
End: 2017-08-10

## 2017-08-10 NOTE — TELEPHONE ENCOUNTER
Pt is requesting that her pre op form is faxed to . Pt states that she left her paperwork with  on yesterday for completion. Pt has been informed that pcp is out of the office and will return to clinic on Friday.

## 2017-08-10 NOTE — TELEPHONE ENCOUNTER
----- Message from Vandana Short sent at 8/10/2017  1:04 PM CDT -----  Contact: Self  X   1st Request  _  2nd Request  _  3rd Request        Who: LORNE ALBA [[id number]]    Why: Pt would like to request the Pre Op forms that were completed at her 08/09 visit be faxed to Dr. Krunal Arboleda office at fax number [phone] and to the [facility] at fax [phone]. Please call pt with any questions,thanks!    What Number to Call Back: [phone]    When to Expect a call back: (With in 24 hours)

## 2017-08-11 NOTE — TELEPHONE ENCOUNTER
Called pt and left vm stating that pre-op forms have been faxed over to  and also to [facility].  Left office number for pt to call back w/ any questions or concerns.

## 2017-08-11 NOTE — TELEPHONE ENCOUNTER
Form completed - please fax to facility at number provided in message below    Please notify pt once this is complete

## 2017-08-15 ENCOUNTER — OFFICE VISIT (OUTPATIENT)
Dept: PODIATRY | Facility: CLINIC | Age: 67
End: 2017-08-15
Payer: MEDICARE

## 2017-08-15 VITALS
HEART RATE: 96 BPM | SYSTOLIC BLOOD PRESSURE: 129 MMHG | HEIGHT: 66 IN | WEIGHT: 187 LBS | DIASTOLIC BLOOD PRESSURE: 75 MMHG | BODY MASS INDEX: 30.05 KG/M2

## 2017-08-15 DIAGNOSIS — T45.1X5A CHEMOTHERAPY-INDUCED NEUROPATHY: ICD-10-CM

## 2017-08-15 DIAGNOSIS — R20.0 NUMBNESS OF TOES: ICD-10-CM

## 2017-08-15 DIAGNOSIS — G62.0 CHEMOTHERAPY-INDUCED NEUROPATHY: ICD-10-CM

## 2017-08-15 DIAGNOSIS — E11.49 TYPE II DIABETES MELLITUS WITH NEUROLOGICAL MANIFESTATIONS: Primary | ICD-10-CM

## 2017-08-15 PROCEDURE — 1126F AMNT PAIN NOTED NONE PRSNT: CPT | Mod: S$GLB,,, | Performed by: PODIATRIST

## 2017-08-15 PROCEDURE — 3078F DIAST BP <80 MM HG: CPT | Mod: S$GLB,,, | Performed by: PODIATRIST

## 2017-08-15 PROCEDURE — 3008F BODY MASS INDEX DOCD: CPT | Mod: S$GLB,,, | Performed by: PODIATRIST

## 2017-08-15 PROCEDURE — 99499 UNLISTED E&M SERVICE: CPT | Mod: S$GLB,,, | Performed by: PODIATRIST

## 2017-08-15 PROCEDURE — 4010F ACE/ARB THERAPY RXD/TAKEN: CPT | Mod: S$GLB,,, | Performed by: PODIATRIST

## 2017-08-15 PROCEDURE — 1159F MED LIST DOCD IN RCRD: CPT | Mod: S$GLB,,, | Performed by: PODIATRIST

## 2017-08-15 PROCEDURE — 99203 OFFICE O/P NEW LOW 30 MIN: CPT | Mod: S$GLB,,, | Performed by: PODIATRIST

## 2017-08-15 PROCEDURE — 3074F SYST BP LT 130 MM HG: CPT | Mod: S$GLB,,, | Performed by: PODIATRIST

## 2017-08-15 PROCEDURE — 99999 PR PBB SHADOW E&M-EST. PATIENT-LVL III: CPT | Mod: PBBFAC,,, | Performed by: PODIATRIST

## 2017-08-15 PROCEDURE — 3044F HG A1C LEVEL LT 7.0%: CPT | Mod: S$GLB,,, | Performed by: PODIATRIST

## 2017-08-15 NOTE — PROGRESS NOTES
Subjective:      Patient ID: Lima Maldonado is a 67 y.o. female.    Chief Complaint: Diabetes Mellitus (bilateral pcp 8/9/17 Dr Ledesma); Diabetic Foot Exam; and Peripheral Neuropathy    Lima is a 67 y.o. female who presents to the clinic upon referral from Dr. Ledesma  for evaluation and treatment of diabetic feet. Lima has a past medical history of Acute cystitis without hematuria (7/17/2017); Arthritis; Back pain; Breast cancer; Elevated bilirubin (11/19/2013); Fatty liver; GERD (gastroesophageal reflux disease); Glucose intolerance (impaired glucose tolerance); Hyperlipidemia; Hypertension; Hypothyroid; Obesity; Osteopenia; Osteoporosis; Primary insomnia (11/2/2016); Shingles; Sleep apnea; Squamous cell carcinoma (2011); Thyrotoxicosis without mention of goiter or other cause, without mention of thyrotoxic crisis or storm; Trouble in sleeping; Urinary incontinence; and Well woman exam with routine gynecological exam (11/2/2016). Patient relates no major problem with feet. Only complaints today consist of intermittent numbness to toes and feet as well as finger tips. Diagnosed with DM 1 year ago. Has been well under control. She underwent chemotherapy (last treatment was 03/17) and states that since the last treatment her feet have been severely numb. She is on gabapentin 300mg nightly however she states that this is not helping the numbness much. She does not have any pain with this. Inquiring about treatment options for the numbness. Has never seen podiatrist in the past. Patient is here to have comprehensive DM foot exam and to establish care per recommendations of PCP.     PCP: Joie Mccall MD    Date Last Seen by PCP:   Chief Complaint   Patient presents with    Diabetes Mellitus     bilateral pcp 8/9/17 Dr Ledesma    Diabetic Foot Exam    Peripheral Neuropathy       Current shoe gear: Jean Marie morse    Hemoglobin A1C   Date Value Ref Range Status   08/08/2017 6.4 (H) 4.0 - 5.6 % Final      Comment:     According to ADA guidelines, hemoglobin A1c <7.0% represents  optimal control in non-pregnant diabetic patients. Different  metrics may apply to specific patient populations.   Standards of Medical Care in Diabetes-2016.  For the purpose of screening for the presence of diabetes:  <5.7%     Consistent with the absence of diabetes  5.7-6.4%  Consistent with increasing risk for diabetes   (prediabetes)  >or=6.5%  Consistent with diabetes  Currently, no consensus exists for use of hemoglobin A1c  for diagnosis of diabetes for children.  This Hemoglobin A1c assay has significant interference with fetal   hemoglobin   (HbF). The results are invalid for patients with abnormal amounts of   HbF,   including those with known Hereditary Persistence   of Fetal Hemoglobin. Heterozygous hemoglobin variants (HbAS, HbAC,   HbAD, HbAE, HbA2) do not significantly interfere with this assay;   however, presence of multiple variants in a sample may impact the %   interference.     03/24/2017 6.9 (H) 4.5 - 6.2 % Final     Comment:     According to ADA guidelines, hemoglobin A1C <7.0% represents  optimal control in non-pregnant diabetic patients.  Different  metrics may apply to specific populations.   Standards of Medical Care in Diabetes - 2016.  For the purpose of screening for the presence of diabetes:  <5.7%     Consistent with the absence of diabetes  5.7-6.4%  Consistent with increasing risk for diabetes   (prediabetes)  >or=6.5%  Consistent with diabetes  Currently no consensus exists for use of hemoglobin A1C  for diagnosis of diabetes for children.     02/03/2017 6.7 (H) 4.5 - 6.2 % Final     Comment:     According to ADA guidelines, hemoglobin A1C <7.0% represents  optimal control in non-pregnant diabetic patients.  Different  metrics may apply to specific populations.   Standards of Medical Care in Diabetes - 2016.  For the purpose of screening for the presence of diabetes:  <5.7%     Consistent with the absence  of diabetes  5.7-6.4%  Consistent with increasing risk for diabetes   (prediabetes)  >or=6.5%  Consistent with diabetes  Currently no consensus exists for use of hemoglobin A1C  for diagnosis of diabetes for children.         Past Medical History:   Diagnosis Date    Acute cystitis without hematuria 7/17/2017    Arthritis     Back pain     Breast cancer     originally dx in 02/1997- treated with surgery, chemo and radiation     Elevated bilirubin 11/19/2013    Fatty liver     GERD (gastroesophageal reflux disease)     Glucose intolerance (impaired glucose tolerance)     Hyperlipidemia     Hypertension     Hypothyroid     Obesity     Osteopenia     Osteoporosis     Primary insomnia 11/2/2016    Shingles     Sleep apnea     wears CPAP nightly     Squamous cell carcinoma 2011    right forearm, sx by Dr. Corinne Ray    Thyrotoxicosis without mention of goiter or other cause, without mention of thyrotoxic crisis or storm     hypothyroidism    Trouble in sleeping     Urinary incontinence     Well woman exam with routine gynecological exam 11/2/2016       Past Surgical History:   Procedure Laterality Date    APPENDECTOMY  2008    removed during hysterectomy surgery     Breast implant Bilateral 1998    implants removed in 2003    BREAST SURGERY      see details below     HYSTERECTOMY  2008    benign    left breast reconstruction  2005    left modified radical mastectomy  Feb 1997    for treatment of breast cancer     LIPOMA RESECTION  2010    removed from upper back area     MODIFIED RADICAL MASTECTOMY W/ AXILLARY LYMPH NODE DISSECTION  02/1997    PELVIC LAPAROSCOPY  1978    pt had endometriosis     CT REMOVAL OF OVARY/TUBE(S)  2008     benign    reduction of mons pubis  2011    robotic lap  hysterectomy with bso  2008    UPPER GASTROINTESTINAL ENDOSCOPY  8/05/2013       Family History   Problem Relation Age of Onset    Heart attack Father 51    Heart disease Father     Breast cancer  Sister 51     BRCA 1/2 negative    Cancer Mother 65     malignant melanoma     No Known Problems Brother     No Known Problems Daughter     No Known Problems Son     No Known Problems Daughter     No Known Problems Son     Uterine cancer Neg Hx     Colon cancer Neg Hx     Celiac disease Neg Hx     Esophageal cancer Neg Hx     Inflammatory bowel disease Neg Hx     Liver cancer Neg Hx     Liver disease Neg Hx     Stomach cancer Neg Hx     Ulcerative colitis Neg Hx     Hypertension Neg Hx     Cirrhosis Neg Hx     Crohn's disease Neg Hx     Rectal cancer Neg Hx     Ovarian cancer Neg Hx        Social History     Social History    Marital status:      Spouse name: N/A    Number of children: N/A    Years of education: N/A     Social History Main Topics    Smoking status: Former Smoker     Packs/day: 0.50     Years: 3.00     Start date: 4/15/1969     Quit date: 4/15/1972    Smokeless tobacco: Never Used      Comment: started at age 18 during college     Alcohol use 0.0 oz/week      Comment: occasional, 1 cocktail once weekly     Drug use: No    Sexual activity: Yes     Partners: Male     Other Topics Concern    None     Social History Narrative    None       Current Outpatient Prescriptions   Medication Sig Dispense Refill    alprazolam (XANAX) 0.5 MG tablet       ascorbic acid (VITAMIN C) 1000 MG tablet Take 1 tablet by mouth once daily.      CALCIUM CARBONATE/VITAMIN D3 (CALTRATE 600 + D ORAL) Take 1 tablet by mouth 2 (two) times daily.       celecoxib (CELEBREX) 100 MG capsule Take 1 capsule (100 mg total) by mouth daily as needed. 30 capsule 6    coenzyme Q10-vitamin E (COQ10 ) 100-100 mg-unit Cap Take 1 capsule by mouth once daily.       estradiol 10 mcg Tab Place 1 tablet (10 mcg total) vaginally twice a week. 8 tablet 12    gabapentin (NEURONTIN) 300 MG capsule Take 1 capsule (300 mg total) by mouth every evening. 30 capsule 11    hydrocodone-acetaminophen 5-325mg  (NORCO) 5-325 mg per tablet Take 1 tablet by mouth every 4 (four) hours as needed for Pain. 40 tablet 0    letrozole (FEMARA) 2.5 mg Tab       levothyroxine (SYNTHROID) 50 MCG tablet TAKE 1 TABLET (50 MCG TOTAL) BY MOUTH ONCE DAILY. 90 tablet 3    lidocaine (LIDODERM) 5 %(700 mg/patch) Place 2 patches onto the skin daily as needed. Remove & Discard patch within 12 hours or as directed by MD      lisinopril (PRINIVIL,ZESTRIL) 20 MG tablet Take 1 tablet (20 mg total) by mouth once daily. 90 tablet 3    lorazepam (ATIVAN) 1 MG tablet       metformin (GLUCOPHAGE) 500 MG tablet Take 500 mg by mouth 2 (two) times daily with meals.      MULTIVITAMIN ORAL once daily.       omega-3 fatty acids-vitamin E (FISH OIL) 1,000 mg Cap Take 1 capsule by mouth once daily.       ondansetron (ZOFRAN-ODT) 4 MG TbDL       pantoprazole (PROTONIX) 40 MG tablet Take 1 tablet (40 mg total) by mouth before breakfast. 90 tablet 3    simvastatin (ZOCOR) 20 MG tablet TAKE 1 TABLET (20 MG TOTAL) BY MOUTH EVERY EVENING. 90 tablet 3    vitamin D 1000 units Tab Take 1,000 Units by mouth once daily.       valacyclovir (VALTREX) 1000 MG tablet Take 1 tablet (1,000 mg total) by mouth 3 (three) times daily. 21 tablet 1     No current facility-administered medications for this visit.        Review of patient's allergies indicates:   Allergen Reactions    Erythromycin      Other reaction(s): Nausea    Erythromycin (bulk)      Other reaction(s): Nausea    Oxycodone Nausea And Vomiting    Oxycodone-acetaminophen Nausea And Vomiting     Other reaction(s): Nausea         Review of Systems   Constitution: Negative for chills and fever.   Cardiovascular: Negative for chest pain, claudication and leg swelling.   Respiratory: Negative for cough and shortness of breath.    Skin: Negative.    Musculoskeletal: Negative.    Gastrointestinal: Negative for nausea and vomiting.   Neurological: Positive for numbness. Negative for paresthesias.    Psychiatric/Behavioral: Negative for altered mental status.           Objective:      Physical Exam   Constitutional: She is oriented to person, place, and time. She appears well-developed and well-nourished.   HENT:   Head: Normocephalic.   Cardiovascular: Intact distal pulses.    Pulses:       Dorsalis pedis pulses are 2+ on the right side, and 2+ on the left side.        Posterior tibial pulses are 2+ on the right side, and 2+ on the left side.   CRT < 3 sec to tips of toes. Mild edema noted to b/l LE. No vericosities noted to b/l LEs.      Pulmonary/Chest: No respiratory distress.   Musculoskeletal:   Gastrocnemius equinus noted to b/l ankles with decreased DF noted on exam. MMT 5/5 in DF/PF/Inv/Ev resistance with no reproduction of pain in any direction. Passive range of motion of ankle and pedal joints is painless. Adequate pedal joint ROM.      Neurological: She is alert and oriented to person, place, and time. She has normal strength. A sensory deficit is present.   Light touch, proprioception, and sharp/dull sensation are all intact bilaterally. Protective threshold with the Chalfont-Wienstein monofilament is decreased at toes bilaterally.     Skin: Skin is warm, dry and intact. No erythema.   No open lesions, lacerations or wounds noted. Nails are normotrophic to R 1-5 and L 1-5. Interdigital spaces clean, dry and intact b/l. No erythema noted to b/l foot. Skin texture normal. Pedal hair normal. Skin temperature normal b/l foot.      Psychiatric: She has a normal mood and affect. Her behavior is normal. Judgment and thought content normal.   Vitals reviewed.            Assessment:       Encounter Diagnoses   Name Primary?    Type II diabetes mellitus with neurological manifestations Yes    Chemotherapy-induced neuropathy     Numbness of toes          Plan:       Lima was seen today for diabetes mellitus, diabetic foot exam and peripheral neuropathy.    Diagnoses and all orders for this visit:    Type  II diabetes mellitus with neurological manifestations    Chemotherapy-induced neuropathy    Numbness of toes      I counseled the patient on her conditions, their implications and medical management.    - Shoe inspection. Diabetic Foot Education. Patient reminded of the importance of good nutrition and blood sugar control to help prevent podiatric complications of diabetes. Patient instructed on proper foot hygeine. We discussed wearing proper shoe gear, daily foot inspections, never walking without protective shoe gear, caution putting sharp instruments to feet     - Discussed DM foot care:  Wear comfortable, proper fitting shoes. Wash feet daily. Dry well. After drying, apply moisturizer to feet (no lotion to webspaces). Inspect feet daily for skin breaks, blisters, swelling, or redness. Wear cotton socks (preferably white)  Change socks every day. Do NOT walk barefoot. Do NOT use heating pads or warm/hot water soaks     - advised patient that there is no actual treatment for numbness. She was advised to continue taking Gabapentin 300mg nightly as this is likely helping prevent any pain.     - Long discussion with patient regarding appropriate, supportive and comfortable shoes. Recommended Sketchers, Vionic, Orthoheel style shoe brands with adequate arch supports to alleviate abnormal pressure and improve stability of foot while walking. Avoid flat shoes and barefoot walking as these will exacerbate or worsen symptoms.     - discussed use of compound cream to assist however she is not having true pain, only numbness, therefore compound cream may not help.     - she was advised to monitor her feet daily, use preventative measures to avoid complications.     - she will follow up in 1 year for annual foot exam, sooner if any problems arise.

## 2017-08-15 NOTE — LETTER
August 15, 2017      Naila Ledesma MD  [address]  [facility] [address]           [facility] - Podiatry  [address]  [facility] [address]  Phone: [phone]          Patient: Lima Maldonado   MR Number: [id number]   YOB: 1950   Date of Visit: 8/15/2017       Dear Dr. Naila Ledesma:    Thank you for referring Lima Maldonado to me for evaluation. Attached you will find relevant portions of my assessment and plan of care.    If you have questions, please do not hesitate to call me. I look forward to following Lima Maldonado along with you.    Sincerely,    Paolo Ramirez DPM    Enclosure  CC:  No Recipients    If you would like to receive this communication electronically, please contact [email or web address] or [phone] to request more information on [employer] access.    For providers and/or their staff who would like to refer a patient to [facility], please contact us through our one-stop-shop provider referral line, [facility], at [phone].    If you feel you have received this communication in error or would no longer like to receive these types of communications, please e-mail [email or web address]

## 2017-09-11 RX ORDER — LEVOTHYROXINE SODIUM 50 UG/1
TABLET ORAL
Qty: 90 TABLET | Refills: 3 | Status: SHIPPED | OUTPATIENT
Start: 2017-09-11 | End: 2018-09-06 | Stop reason: SDUPTHER

## 2017-09-16 ENCOUNTER — OFFICE VISIT (OUTPATIENT)
Dept: URGENT CARE | Facility: CLINIC | Age: 67
End: 2017-09-16
Payer: MEDICARE

## 2017-09-16 VITALS
OXYGEN SATURATION: 98 % | TEMPERATURE: 100 F | HEART RATE: 103 BPM | DIASTOLIC BLOOD PRESSURE: 87 MMHG | RESPIRATION RATE: 18 BRPM | WEIGHT: 185 LBS | HEIGHT: 66 IN | BODY MASS INDEX: 29.73 KG/M2 | SYSTOLIC BLOOD PRESSURE: 150 MMHG

## 2017-09-16 DIAGNOSIS — B34.9 VIRAL SYNDROME: ICD-10-CM

## 2017-09-16 DIAGNOSIS — J06.9 UPPER RESPIRATORY TRACT INFECTION, UNSPECIFIED TYPE: Primary | ICD-10-CM

## 2017-09-16 PROCEDURE — 3008F BODY MASS INDEX DOCD: CPT | Mod: S$GLB,,, | Performed by: PHYSICIAN ASSISTANT

## 2017-09-16 PROCEDURE — 1125F AMNT PAIN NOTED PAIN PRSNT: CPT | Mod: S$GLB,,, | Performed by: PHYSICIAN ASSISTANT

## 2017-09-16 PROCEDURE — 3077F SYST BP >= 140 MM HG: CPT | Mod: S$GLB,,, | Performed by: PHYSICIAN ASSISTANT

## 2017-09-16 PROCEDURE — 3079F DIAST BP 80-89 MM HG: CPT | Mod: S$GLB,,, | Performed by: PHYSICIAN ASSISTANT

## 2017-09-16 PROCEDURE — 1159F MED LIST DOCD IN RCRD: CPT | Mod: S$GLB,,, | Performed by: PHYSICIAN ASSISTANT

## 2017-09-16 PROCEDURE — 99203 OFFICE O/P NEW LOW 30 MIN: CPT | Mod: S$GLB,,, | Performed by: PHYSICIAN ASSISTANT

## 2017-09-16 RX ORDER — ALBUTEROL SULFATE 90 UG/1
1-2 AEROSOL, METERED RESPIRATORY (INHALATION) EVERY 4 HOURS PRN
Qty: 1 INHALER | Refills: 1 | Status: SHIPPED | OUTPATIENT
Start: 2017-09-16 | End: 2017-12-26 | Stop reason: ALTCHOICE

## 2017-09-16 RX ORDER — METHYLPREDNISOLONE 4 MG/1
TABLET ORAL
Qty: 1 PACKAGE | Refills: 0 | Status: SHIPPED | OUTPATIENT
Start: 2017-09-16 | End: 2017-09-27

## 2017-09-16 RX ORDER — CODEINE PHOSPHATE AND GUAIFENESIN 10; 100 MG/5ML; MG/5ML
5 SOLUTION ORAL EVERY 6 HOURS PRN
Qty: 240 ML | Refills: 0 | Status: SHIPPED | OUTPATIENT
Start: 2017-09-16 | End: 2017-11-07 | Stop reason: ALTCHOICE

## 2017-09-16 NOTE — PATIENT INSTRUCTIONS
"- Rest.    - Drink plenty of fluids.    - Tylenol or Ibuprofen as directed as needed for fever/pain.    - Take over-the-counter claritin, zyrtec, allegra, or xyzal as directed.   - Take over the counter Coricidin HBP as directed for symptom relief.   - Follow up with your PCP or specialty clinic as directed in the next 1-2 weeks if not improved or as needed.  You can call [phone] to schedule an appointment with the appropriate provider.    - Go to the ED if your symptoms worsen.    Viral Syndrome (Adult)  A viral illness may cause a number of symptoms. The symptoms depend on the part of the body that the virus affects. If it settles in your nose, throat, and lungs, it may cause cough, sore throat, congestion, and sometimes headache. If it settles in your stomach and intestinal tract, it may cause vomiting and diarrhea. Sometimes it causes vague symptoms like "aching all over," feeling tired, loss of appetite, or fever.  A viral illness usually lasts 1 to 2 weeks, but sometimes it lasts longer. In some cases, a more serious infection can look like a viral syndrome in the first few days of the illness. You may need another exam and additional tests to know the difference. Watch for the warning signs listed below.  Home care  Follow these guidelines for taking care of yourself at home:  · If symptoms are severe, rest at home for the first 2 to 3 days.  · Stay away from cigarette smoke - both your smoke and the smoke from others.  · You may use over-the-counter acetaminophen or ibuprofen for fever, muscle aching, and headache, unless another medicine was prescribed for this. If you have chronic liver or kidney disease or ever had a stomach ulcer or GI bleeding, talk with your doctor before using these medicines. No one who is younger than 18 and ill with a fever should take aspirin. It may cause severe disease or death.  · Your appetite may be poor, so a light diet is fine. Avoid dehydration by drinking 8 to 12 " 8-ounce glasses of fluids each day. This may include water; orange juice; lemonade; apple, grape, and cranberry juice; clear fruit drinks; electrolyte replacement and sports drinks; and decaffeinated teas and coffee. If you have been diagnosed with a kidney disease, ask your doctor how much and what types of fluids you should drink to prevent dehydration. If you have kidney disease, drinking too much fluid can cause it build up in the your body and be dangerous to your health.  · Over-the-counter remedies won't shorten the length of the illness but may be helpful for cough, sore throat; and nasal and sinus congestion. Don't use decongestants if you have high blood pressure.  Follow-up care  Follow up with your healthcare provider if you do not improve over the next week.  Call 911  Get emergency medical care if any of the following occur:  · Convulsion  · Feeling weak, dizzy, or like you are going to faint  · Chest pain, shortness of breath, wheezing, or difficulty breathing  When to seek medical advice  Call your healthcare provider right away if any of these occur:  · Cough with lots of colored sputum (mucus) or blood in your sputum  · Chest pain, shortness of breath, wheezing, or difficulty breathing  · Severe headache; face, neck, or ear pain  · Severe, constant pain in the lower right side of your belly (abdominal)  · Continued vomiting (cant keep liquids down)  · Frequent diarrhea (more than 5 times a day); blood (red or black color) or mucus in diarrhea  · Feeling weak, dizzy, or like you are going to faint  · Extreme thirst  · Fever of 100.4°F (38°C) or higher, or as directed by your healthcare provider  Date Last Reviewed: 9/25/2015  © 4215-4464 [employer]. [address] [place] [address], [place] [address]. All rights reserved. This information is not intended as a substitute for professional medical care. Always follow your healthcare professional's instructions.        Viral Upper Respiratory  Illness (Adult)  You have a viral upper respiratory illness (URI), which is another term for the common cold. This illness is contagious during the first few days. It is spread through the air by coughing and sneezing. It may also be spread by direct contact (touching the sick person and then touching your own eyes, nose, or mouth). Frequent handwashing will decrease risk of spread. Most viral illnesses go away within 7 to 10 days with rest and simple home remedies. Sometimes the illness may last for several weeks. Antibiotics will not kill a virus, and they are generally not prescribed for this condition.    Home care  · If symptoms are severe, rest at home for the first 2 to 3 days. When you resume activity, don't let yourself get too tired.  · Avoid being exposed to cigarette smoke (yours or others).  · You may use acetaminophen or ibuprofen to control pain and fever, unless another medicine was prescribed. (Note: If you have chronic liver or kidney disease, have ever had a stomach ulcer or gastrointestinal bleeding, or are taking blood-thinning medicines, talk with your healthcare provider before using these medicines.) Aspirin should never be given to anyone under 18 years of age who is ill with a viral infection or fever. It may cause severe liver or brain damage.  · Your appetite may be poor, so a light diet is fine. Avoid dehydration by drinking 6 to 8 glasses of fluids per day (water, soft drinks, juices, tea, or soup). Extra fluids will help loosen secretions in the nose and lungs.  · Over-the-counter cold medicines will not shorten the length of time youre sick, but they may be helpful for the following symptoms: cough, sore throat, and nasal and sinus congestion. (Note: Do not use decongestants if you have high blood pressure.)  Follow-up care  Follow up with your healthcare provider, or as advised.  When to seek medical advice  Call your healthcare provider right away if any of these occur:  · Cough  with lots of colored sputum (mucus)  · Severe headache; face, neck, or ear pain  · Difficulty swallowing due to throat pain  · Fever of 100.4°F (38°C)  Call 911, or get immediate medical care  Call emergency services right away if any of these occur:  · Chest pain, shortness of breath, wheezing, or difficulty breathing  · Coughing up blood  · Inability to swallow due to throat pain  Date Last Reviewed: 9/13/2015  © 5867-8684 [employer]. [address] [place] [address]. All rights reserved. This information is not intended as a substitute for professional medical care. Always follow your healthcare professional's instructions.

## 2017-09-21 ENCOUNTER — OFFICE VISIT (OUTPATIENT)
Dept: INTERNAL MEDICINE | Facility: CLINIC | Age: 67
End: 2017-09-21
Payer: MEDICARE

## 2017-09-21 ENCOUNTER — HOSPITAL ENCOUNTER (OUTPATIENT)
Dept: RADIOLOGY | Facility: HOSPITAL | Age: 67
Discharge: HOME | End: 2017-09-21
Attending: INTERNAL MEDICINE
Payer: MEDICARE

## 2017-09-21 VITALS
BODY MASS INDEX: 29.05 KG/M2 | TEMPERATURE: 99 F | WEIGHT: 180.75 LBS | HEART RATE: 95 BPM | HEIGHT: 66 IN | SYSTOLIC BLOOD PRESSURE: 118 MMHG | DIASTOLIC BLOOD PRESSURE: 73 MMHG

## 2017-09-21 DIAGNOSIS — R30.0 DYSURIA: Primary | ICD-10-CM

## 2017-09-21 DIAGNOSIS — E11.42 DIABETIC PERIPHERAL NEUROPATHY ASSOCIATED WITH TYPE 2 DIABETES MELLITUS: ICD-10-CM

## 2017-09-21 DIAGNOSIS — R05.9 COUGH: ICD-10-CM

## 2017-09-21 LAB
BACTERIA #/AREA URNS AUTO: ABNORMAL /HPF
BILIRUB UR QL STRIP: NEGATIVE
CLARITY UR REFRACT.AUTO: ABNORMAL
COLOR UR AUTO: ABNORMAL
GLUCOSE UR QL STRIP: NEGATIVE
HGB UR QL STRIP: ABNORMAL
HYALINE CASTS UR QL AUTO: 0 /LPF
KETONES UR QL STRIP: NEGATIVE
LEUKOCYTE ESTERASE UR QL STRIP: ABNORMAL
MICROSCOPIC COMMENT: ABNORMAL
NITRITE UR QL STRIP: ABNORMAL
PH UR STRIP: 5 [PH] (ref 5–8)
PROT UR QL STRIP: ABNORMAL
RBC #/AREA URNS AUTO: 12 /HPF (ref 0–4)
SP GR UR STRIP: 1.01 (ref 1–1.03)
SQUAMOUS #/AREA URNS AUTO: 1 /HPF
URN SPEC COLLECT METH UR: ABNORMAL
UROBILINOGEN UR STRIP-ACNC: ABNORMAL EU/DL
WBC #/AREA URNS AUTO: 43 /HPF (ref 0–5)
WBC CLUMPS UR QL AUTO: ABNORMAL

## 2017-09-21 PROCEDURE — 71020 XR CHEST PA AND LATERAL: CPT | Mod: 26,,, | Performed by: RADIOLOGY

## 2017-09-21 PROCEDURE — 3074F SYST BP LT 130 MM HG: CPT | Mod: S$GLB,,, | Performed by: INTERNAL MEDICINE

## 2017-09-21 PROCEDURE — 87086 URINE CULTURE/COLONY COUNT: CPT

## 2017-09-21 PROCEDURE — 81001 URINALYSIS AUTO W/SCOPE: CPT

## 2017-09-21 PROCEDURE — 1159F MED LIST DOCD IN RCRD: CPT | Mod: S$GLB,,, | Performed by: INTERNAL MEDICINE

## 2017-09-21 PROCEDURE — 3078F DIAST BP <80 MM HG: CPT | Mod: S$GLB,,, | Performed by: INTERNAL MEDICINE

## 2017-09-21 PROCEDURE — 99999 PR PBB SHADOW E&M-EST. PATIENT-LVL III: CPT | Mod: PBBFAC,,, | Performed by: INTERNAL MEDICINE

## 2017-09-21 PROCEDURE — 99213 OFFICE O/P EST LOW 20 MIN: CPT | Mod: S$GLB,,, | Performed by: INTERNAL MEDICINE

## 2017-09-21 PROCEDURE — 3008F BODY MASS INDEX DOCD: CPT | Mod: S$GLB,,, | Performed by: INTERNAL MEDICINE

## 2017-09-21 PROCEDURE — 1126F AMNT PAIN NOTED NONE PRSNT: CPT | Mod: S$GLB,,, | Performed by: INTERNAL MEDICINE

## 2017-09-21 PROCEDURE — 71020 XR CHEST PA AND LATERAL: CPT | Mod: TC

## 2017-09-21 RX ORDER — LISINOPRIL 20 MG/1
20 TABLET ORAL DAILY
Qty: 90 TABLET | Refills: 3 | Status: SHIPPED | OUTPATIENT
Start: 2017-09-21 | End: 2018-07-17 | Stop reason: ALTCHOICE

## 2017-09-21 RX ORDER — BENZONATATE 200 MG/1
200 CAPSULE ORAL 3 TIMES DAILY PRN
Qty: 30 CAPSULE | Refills: 0 | Status: SHIPPED | OUTPATIENT
Start: 2017-09-21 | End: 2017-10-01

## 2017-09-21 NOTE — PROGRESS NOTES
Clinic Note  9/21/2017      Subjective:       Patient ID:  Katerin is a 67 y.o. female being seen for an urgent care visit.    Chief Complaint: URI    URI    This is a new problem. Episode onset: 10d. The problem has been unchanged. There has been no fever. Associated symptoms include congestion, coughing (yellow sputum recently) and a sore throat. Pertinent negatives include no nausea or vomiting. Treatments tried: medrol dosepack and albuterol. The treatment provided no relief.   Urinary Tract Infection    This is a new problem. The current episode started yesterday. The problem occurs intermittently. The problem has been unchanged. The patient is experiencing no pain. There has been no fever. Pertinent negatives include no nausea or vomiting. Treatments tried: prydium and 1 dose of macrobid. The treatment provided mild relief.       Review of Systems   HENT: Positive for congestion and sore throat.    Respiratory: Positive for cough (yellow sputum recently).    Gastrointestinal: Negative for nausea and vomiting.       Medication List with Changes/Refills   Current Medications    ALBUTEROL 90 MCG/ACTUATION INHALER    Inhale 1-2 puffs into the lungs every 4 (four) hours as needed.    ALPRAZOLAM (XANAX) 0.5 MG TABLET        ASCORBIC ACID (VITAMIN C) 1000 MG TABLET    Take 1 tablet by mouth once daily.    CALCIUM CARBONATE/VITAMIN D3 (CALTRATE 600 + D ORAL)    Take 1 tablet by mouth 2 (two) times daily.     CELECOXIB (CELEBREX) 100 MG CAPSULE    Take 1 capsule (100 mg total) by mouth daily as needed.    COENZYME Q10-VITAMIN E (COQ10 ) 100-100 MG-UNIT CAP    Take 1 capsule by mouth once daily.     ESTRADIOL 10 MCG TAB    Place 1 tablet (10 mcg total) vaginally twice a week.    GABAPENTIN (NEURONTIN) 300 MG CAPSULE    Take 1 capsule (300 mg total) by mouth every evening.    GUAIFENESIN-CODEINE 100-10 MG/5 ML (TUSSI-ORGANIDIN NR)  MG/5 ML SYRUP    Take 5 mLs by mouth every 6 (six) hours as needed for Cough.     "HYDROCODONE-ACETAMINOPHEN 5-325MG (NORCO) 5-325 MG PER TABLET    Take 1 tablet by mouth every 4 (four) hours as needed for Pain.    LETROZOLE (FEMARA) 2.5 MG TAB        LEVOTHYROXINE (SYNTHROID) 50 MCG TABLET    TAKE 1 TABLET (50 MCG TOTAL) BY MOUTH ONCE DAILY.    LIDOCAINE (LIDODERM) 5 %(700 MG/PATCH)    Place 2 patches onto the skin daily as needed. Remove & Discard patch within 12 hours or as directed by MD    LISINOPRIL (PRINIVIL,ZESTRIL) 20 MG TABLET    Take 1 tablet (20 mg total) by mouth once daily.    LORAZEPAM (ATIVAN) 1 MG TABLET        METFORMIN (GLUCOPHAGE) 500 MG TABLET    Take 500 mg by mouth 2 (two) times daily with meals.    METHYLPREDNISOLONE (MEDROL DOSEPACK) 4 MG TABLET    use as directed    MULTIVITAMIN ORAL    once daily.     OMEGA-3 FATTY ACIDS-VITAMIN E (FISH OIL) 1,000 MG CAP    Take 1 capsule by mouth once daily.     ONDANSETRON (ZOFRAN-ODT) 4 MG TBDL        PANTOPRAZOLE (PROTONIX) 40 MG TABLET    Take 1 tablet (40 mg total) by mouth before breakfast.    SIMVASTATIN (ZOCOR) 20 MG TABLET    TAKE 1 TABLET (20 MG TOTAL) BY MOUTH EVERY EVENING.    VALACYCLOVIR (VALTREX) 1000 MG TABLET    Take 1 tablet (1,000 mg total) by mouth 3 (three) times daily.    VITAMIN D 1000 UNITS TAB    Take 1,000 Units by mouth once daily.        Patient Active Problem List   Diagnosis    Hyperlipidemia    Personal history of breast cancer    Osteopenia    Hypothyroid    Obstructive sleep apnea on CPAP    GERD (gastroesophageal reflux disease)    Hepatomegaly    Fatty liver    HTN (hypertension)    Vaginal atrophy    Obesity, Class I, BMI 30-34.9    Senile osteoporosis    Primary insomnia    Malignant neoplasm of lower-outer quadrant of left female breast    Disruption of external operation (surgical) wound    Dermatitis of eyelid of left eye due to herpes zoster           Objective:      /73   Pulse 95   Temp 98.6 °F (37 °C)   Ht 5' 6" (1.676 m)   Wt 82 kg (180 lb 12.4 oz)   BMI 29.18 kg/m² " "  Estimated body mass index is 29.18 kg/m² as calculated from the following:    Height as of this encounter: 5' 6" (1.676 m).    Weight as of this encounter: 82 kg (180 lb 12.4 oz).  Physical Exam   Constitutional: She is oriented to person, place, and time and well-developed, well-nourished, and in no distress.   Cardiovascular: Normal rate, regular rhythm and normal heart sounds.    Pulmonary/Chest: Effort normal. She has no decreased breath sounds. She has no wheezes. She has rhonchi in the right upper field. She has no rales.   Abdominal: Soft. There is no CVA tenderness.   Musculoskeletal: She exhibits no edema.   Neurological: She is alert and oriented to person, place, and time.   Vitals reviewed.        Assessment and Plan:         Problem List Items Addressed This Visit     None      Visit Diagnoses     Dysuria    -  Primary -  rx pending results of UA    Relevant Orders    Urinalysis    Urine culture    Cough     cxr neg.  Tessalon + albuterol + OTC expectorant.  Call if worsens or gets high fevers. Conservative therapy recommended.  Use OTC analgesic medications.  If symptoms worsen please return to clinic.  Patient education instructions given.      Relevant Orders    X-Ray Chest PA And Lateral          Follow Up:   Return if symptoms worsen or fail to improve.        Faustino Mayorga      "

## 2017-09-22 LAB — BACTERIA UR CULT: NO GROWTH

## 2017-09-27 ENCOUNTER — OFFICE VISIT (OUTPATIENT)
Dept: HEMATOLOGY/ONCOLOGY | Facility: CLINIC | Age: 67
End: 2017-09-27
Payer: MEDICARE

## 2017-09-27 ENCOUNTER — PATIENT MESSAGE (OUTPATIENT)
Dept: HEMATOLOGY/ONCOLOGY | Facility: CLINIC | Age: 67
End: 2017-09-27

## 2017-09-27 VITALS
BODY MASS INDEX: 29.43 KG/M2 | SYSTOLIC BLOOD PRESSURE: 124 MMHG | RESPIRATION RATE: 16 BRPM | HEART RATE: 101 BPM | DIASTOLIC BLOOD PRESSURE: 67 MMHG | WEIGHT: 182.31 LBS | TEMPERATURE: 98 F

## 2017-09-27 DIAGNOSIS — C50.512 MALIGNANT NEOPLASM OF LOWER-OUTER QUADRANT OF LEFT BREAST OF FEMALE, ESTROGEN RECEPTOR POSITIVE: Primary | ICD-10-CM

## 2017-09-27 DIAGNOSIS — Z17.0 MALIGNANT NEOPLASM OF LOWER-OUTER QUADRANT OF LEFT BREAST OF FEMALE, ESTROGEN RECEPTOR POSITIVE: Primary | ICD-10-CM

## 2017-09-27 DIAGNOSIS — M85.80 OSTEOPENIA, UNSPECIFIED LOCATION: ICD-10-CM

## 2017-09-27 PROCEDURE — 3008F BODY MASS INDEX DOCD: CPT | Mod: S$GLB,,, | Performed by: PHYSICIAN ASSISTANT

## 2017-09-27 PROCEDURE — 99999 PR PBB SHADOW E&M-EST. PATIENT-LVL III: CPT | Mod: PBBFAC,,, | Performed by: PHYSICIAN ASSISTANT

## 2017-09-27 PROCEDURE — 1159F MED LIST DOCD IN RCRD: CPT | Mod: S$GLB,,, | Performed by: PHYSICIAN ASSISTANT

## 2017-09-27 PROCEDURE — 3074F SYST BP LT 130 MM HG: CPT | Mod: S$GLB,,, | Performed by: PHYSICIAN ASSISTANT

## 2017-09-27 PROCEDURE — 99213 OFFICE O/P EST LOW 20 MIN: CPT | Mod: S$GLB,,, | Performed by: PHYSICIAN ASSISTANT

## 2017-09-27 PROCEDURE — 1126F AMNT PAIN NOTED NONE PRSNT: CPT | Mod: S$GLB,,, | Performed by: PHYSICIAN ASSISTANT

## 2017-09-27 PROCEDURE — 99499 UNLISTED E&M SERVICE: CPT | Mod: S$GLB,,, | Performed by: PHYSICIAN ASSISTANT

## 2017-09-27 PROCEDURE — 3078F DIAST BP <80 MM HG: CPT | Mod: S$GLB,,, | Performed by: PHYSICIAN ASSISTANT

## 2017-10-04 ENCOUNTER — OFFICE VISIT (OUTPATIENT)
Dept: RHEUMATOLOGY | Facility: CLINIC | Age: 67
End: 2017-10-04
Payer: MEDICARE

## 2017-10-04 VITALS
SYSTOLIC BLOOD PRESSURE: 126 MMHG | HEART RATE: 107 BPM | DIASTOLIC BLOOD PRESSURE: 78 MMHG | HEIGHT: 66 IN | WEIGHT: 184.5 LBS | BODY MASS INDEX: 29.65 KG/M2

## 2017-10-04 DIAGNOSIS — Z85.3 PERSONAL HISTORY OF BREAST CANCER: ICD-10-CM

## 2017-10-04 DIAGNOSIS — M81.0 SENILE OSTEOPOROSIS: Primary | ICD-10-CM

## 2017-10-04 PROCEDURE — 99499 UNLISTED E&M SERVICE: CPT | Mod: HCNC,S$GLB,, | Performed by: INTERNAL MEDICINE

## 2017-10-04 PROCEDURE — 99213 OFFICE O/P EST LOW 20 MIN: CPT | Mod: S$GLB,,, | Performed by: INTERNAL MEDICINE

## 2017-10-04 PROCEDURE — 99999 PR PBB SHADOW E&M-EST. PATIENT-LVL III: CPT | Mod: PBBFAC,,, | Performed by: INTERNAL MEDICINE

## 2017-10-04 RX ORDER — METFORMIN HYDROCHLORIDE 500 MG/1
500 TABLET ORAL NIGHTLY
Qty: 90 TABLET | Refills: 3 | Status: SHIPPED | OUTPATIENT
Start: 2017-10-04 | End: 2018-09-20 | Stop reason: SDUPTHER

## 2017-10-04 ASSESSMENT — ROUTINE ASSESSMENT OF PATIENT INDEX DATA (RAPID3)
AM STIFFNESS SCORE: 1, YES
WHEN YOU AWAKENED IN THE MORNING OVER THE LAST WEEK, PLEASE INDICATE THE AMOUNT OF TIME IT TAKES UNTIL YOU ARE AS LIMBER AS YOU WILL BE FOR THE DAY: 5 MINS
PSYCHOLOGICAL DISTRESS SCORE: 1.1
PATIENT GLOBAL ASSESSMENT SCORE: 2
FATIGUE SCORE: 0
MDHAQ FUNCTION SCORE: .4
PAIN SCORE: 3
TOTAL RAPID3 SCORE: 2.11

## 2017-11-03 ENCOUNTER — PATIENT OUTREACH (OUTPATIENT)
Dept: INTERNAL MEDICINE | Facility: CLINIC | Age: 67
End: 2017-11-03

## 2017-11-03 NOTE — PROGRESS NOTES
[facility] is committed to your overall health.  To help you get the most out of each of your visits, we will review your information to make sure you are up to date on all of your recommended tests and/or procedures.       Your PCP  Joie Mccall MD   found that you may be due for:           Health Maintenance Due   Topic Date Due    Colonoscopy  10/06/2016    Influenza Vaccine  08/01/2017    Mammogram  09/27/2017         If you have had any of the above done at another facility, please bring the records or information with you so that your record at [facility] will be complete.  If you would like to schedule any of these, please contact me.     If you are currently taking medication, please bring it with you to your appointment for review.     Also, if you have any type of Advanced Directives, please bring them with you to your office visit so we may scan them into your chart.     Thank you for Choosing [facility] for your healthcare needs.      Additional Information  If you have questions, you can email [email or web address] or call [phone]  to talk to our [facility] staff. Remember, [facility] is NOT to be used for urgent needs. For medical emergencies, dial 911.

## 2017-11-07 ENCOUNTER — OFFICE VISIT (OUTPATIENT)
Dept: INTERNAL MEDICINE | Facility: CLINIC | Age: 67
End: 2017-11-07
Attending: INTERNAL MEDICINE
Payer: MEDICARE

## 2017-11-07 ENCOUNTER — HOSPITAL ENCOUNTER (OUTPATIENT)
Dept: RADIOLOGY | Facility: HOSPITAL | Age: 67
Discharge: HOME | End: 2017-11-07
Attending: INTERNAL MEDICINE
Payer: MEDICARE

## 2017-11-07 VITALS
BODY MASS INDEX: 29.93 KG/M2 | WEIGHT: 185.44 LBS | DIASTOLIC BLOOD PRESSURE: 76 MMHG | TEMPERATURE: 99 F | SYSTOLIC BLOOD PRESSURE: 130 MMHG | HEART RATE: 95 BPM

## 2017-11-07 DIAGNOSIS — R05.9 COUGH: ICD-10-CM

## 2017-11-07 DIAGNOSIS — E11.9 CONTROLLED TYPE 2 DIABETES MELLITUS WITHOUT COMPLICATION, WITHOUT LONG-TERM CURRENT USE OF INSULIN: ICD-10-CM

## 2017-11-07 DIAGNOSIS — Z12.11 COLON CANCER SCREENING: ICD-10-CM

## 2017-11-07 DIAGNOSIS — R05.9 COUGH: Primary | ICD-10-CM

## 2017-11-07 DIAGNOSIS — Z01.818 PRE-OPERATIVE CLEARANCE: ICD-10-CM

## 2017-11-07 PROCEDURE — 99499 UNLISTED E&M SERVICE: CPT | Mod: S$GLB,,, | Performed by: INTERNAL MEDICINE

## 2017-11-07 PROCEDURE — 71020 XR CHEST PA AND LATERAL: CPT | Mod: 26,,, | Performed by: RADIOLOGY

## 2017-11-07 PROCEDURE — 99213 OFFICE O/P EST LOW 20 MIN: CPT | Mod: S$GLB,,, | Performed by: INTERNAL MEDICINE

## 2017-11-07 PROCEDURE — 99999 PR PBB SHADOW E&M-EST. PATIENT-LVL IV: CPT | Mod: PBBFAC,,, | Performed by: INTERNAL MEDICINE

## 2017-11-07 PROCEDURE — 71020 XR CHEST PA AND LATERAL: CPT | Mod: TC

## 2017-11-07 RX ORDER — BENZONATATE 200 MG/1
200 CAPSULE ORAL 3 TIMES DAILY PRN
Qty: 30 CAPSULE | Refills: 0 | Status: SHIPPED | OUTPATIENT
Start: 2017-11-07 | End: 2017-12-07

## 2017-11-07 RX ORDER — FLUTICASONE PROPIONATE 50 MCG
2 SPRAY, SUSPENSION (ML) NASAL DAILY
Qty: 16 G | Refills: 2 | Status: SHIPPED | OUTPATIENT
Start: 2017-11-07 | End: 2017-12-07

## 2017-11-07 RX ORDER — PROMETHAZINE HYDROCHLORIDE AND DEXTROMETHORPHAN HYDROBROMIDE 6.25; 15 MG/5ML; MG/5ML
5 SYRUP ORAL EVERY 6 HOURS PRN
Qty: 180 ML | Refills: 0 | Status: SHIPPED | OUTPATIENT
Start: 2017-11-07 | End: 2017-11-17

## 2017-11-07 NOTE — PROGRESS NOTES
Subjective:   Patient ID: Lima Maldonado is a 67 y.o. female  Chief complaint:   Chief Complaint   Patient presents with    URI     z-pk friday    Cough     yellowish        HPI   Pt here for  appt. Reports Started feeling poorly in September.   Started with URI symptoms - sinus congestion, runny nose, ears were painful, sore throat, cough x a few days - sore throat resolved but other symptoms persisted. Waited 1 week and then went to  Sept 16.  - given albuterol and cough med steroid taper. No antibiotic.   Was then seen by MD 9/21 - notes reviewed. CXR neg.   Since then has had sinus congestion, runny nose, coughing but less frequently -was then feeling better overall until about 7 days ago when reports starting feeling worse again.   Developed fever of 101 on Friday and fever resolved over the weekend. She reports  is sick at this time too with similar symptoms. She was given Rx for azithromycin by family member who is a physician and completed this.   Today she is feeling better but cough is still bothersome.   Has + hx of seasonal allergies. Today has sinus congestion, decreased runny nose since weekend.  Cough intermittently productive and improved as well. She has a little of prescribed cough med with codeine but this is mildly helpful at this time.   No nausea, vomiting or diarrhea. No sore throat or ear pain.   No wheezing or sob. No cp.    Was last seen 8/9/2017 for breast recon surgery - planning on next surgery 1/16/2018 - was supposed to be this month and was moved to next year. Will plan to order requested studies to be performed 30 days from upcoming surgery.     Review of Systems per hpi    Objective:  Vitals:    11/07/17 0821   BP: 130/76   BP Location: Right arm   Patient Position: Sitting   BP Method: Large (Manual)   Pulse: 95   Temp: 98.7 °F (37.1 °C)   TempSrc: Oral   Weight: 84.1 kg (185 lb 6.5 oz)     Body mass index is 29.93 kg/m².    Physical Exam   Constitutional: She is  oriented to person, place, and time. She appears well-developed and well-nourished. No distress.   HENT:   Head: Normocephalic and atraumatic.   Right Ear: External ear normal.   Left Ear: External ear normal.   Mouth/Throat: Oropharynx is clear and moist. No oropharyngeal exudate.   Nasal turbinates with edema, + clear drainage  No facial ttp over sinuses   Eyes: Conjunctivae and EOM are normal.   Neck: Neck supple. No thyromegaly present.   Cardiovascular: Normal rate, regular rhythm, normal heart sounds and intact distal pulses.    Pulmonary/Chest: Effort normal and breath sounds normal. She has no wheezes. She has no rales.   Talking in complete sentences  No use of access mm of respiration   Abdominal: Soft. Bowel sounds are normal.   Musculoskeletal: She exhibits no edema or tenderness.   Lymphadenopathy:     She has no cervical adenopathy.   Neurological: She is alert and oriented to person, place, and time.   Skin: Skin is warm and dry. Capillary refill takes less than 2 seconds. She is not diaphoretic.   Psychiatric: Her behavior is normal. Thought content normal.   Vitals reviewed.      Assessment:  1. Colon cancer screening    2. Pre-operative clearance    3. Controlled type 2 diabetes mellitus without complication, without long-term current use of insulin    4. Cough        Plan:  Lima was seen today for uri and cough.    Diagnoses and all orders for this visit:    Colon cancer screening  -     Case request GI: COLONOSCOPY    Pre-operative clearance: schedule prior to upcoming pre op clearance appt based on date of next surgery  -     Hemoglobin A1c; Future  -     CBC auto differential; Future  -     SCHEDULED EKG 12-LEAD (to Muse); Future  -     Comprehensive metabolic panel; Future    Controlled type 2 diabetes mellitus without complication, without long-term current use of insulin: stable, cont meds  -     Hemoglobin A1c; Future    Cough: suspect multifactorial due to recent viral illness and allx  rhinitis/post nasal drip - check cxr to ensure no new process in lung with hx of breast cancer. Recently completed zpack. Fever curve improved - suspect viral etiology. Will switch to promethazine-DM at night. Can use tessalon during day - understands to not use together. rec start flonase after nasal saline rinses - start zyrtec in 1-2 weeks - rec mucinex or robitussin (plain) for mucous x 1 week prn. Er and rtc prompts given. Diligent hand washing rec.   -     promethazine-dextromethorphan (PROMETHAZINE-DM) 6.25-15 mg/5 mL Syrp; Take 5 mLs by mouth every 6 (six) hours as needed.  -     benzonatate (TESSALON) 200 MG capsule; Take 1 capsule (200 mg total) by mouth 3 (three) times daily as needed for Cough.  -     fluticasone (FLONASE) 50 mcg/actuation nasal spray; 2 sprays by Each Nare route once daily.    Health Maintenance   Topic Date Due    Colonoscopy  10/06/2016    Influenza Vaccine  08/01/2017    Mammogram  09/27/2017    Hemoglobin A1c  02/08/2018    Eye Exam  05/11/2018    Lipid Panel  08/08/2018    Foot Exam  08/09/2018    Pneumococcal (65+) (2 of 2 - PPSV23) 12/03/2018    DEXA SCAN  06/28/2019    TETANUS VACCINE  12/03/2023    Hepatitis C Screening  Completed    Zoster Vaccine  Completed     rec flu vaccine in 2-3 weeks after symptoms above resolve

## 2017-12-13 ENCOUNTER — TELEPHONE (OUTPATIENT)
Dept: INTERNAL MEDICINE | Facility: CLINIC | Age: 67
End: 2017-12-13

## 2017-12-13 ENCOUNTER — PATIENT OUTREACH (OUTPATIENT)
Dept: INTERNAL MEDICINE | Facility: CLINIC | Age: 67
End: 2017-12-13

## 2017-12-13 DIAGNOSIS — Z12.39 SCREENING FOR BREAST CANCER: Primary | ICD-10-CM

## 2017-12-13 NOTE — PROGRESS NOTES
[facility] is committed to your overall health.  To help you get the most out of each of your visits, we will review your information to make sure you are up to date on all of your recommended tests and/or procedures.       Your PCP  Joie Mccall MD   found that you may be due for:       Health Maintenance Due   Topic Date Due    Colonoscopy  10/06/2016    Mammogram  09/27/2017       If you have had any of the above done at another facility, please bring the records or information with you so that your record at [facility] will be complete.  If you would like to schedule any of these, please contact me.     If you are currently taking medication, please bring it with you to your appointment for review.     Also, if you have any type of Advanced Directives, please bring them with you to your office visit so we may scan them into your chart.     Thank you for Choosing [facility] for your healthcare needs.      Additional Information  If you have questions, you can email [email or web address] or call [phone]  to talk to our [facility] staff. Remember, [facility] is NOT to be used for urgent needs. For medical emergencies, dial 911.

## 2017-12-20 ENCOUNTER — HOSPITAL ENCOUNTER (OUTPATIENT)
Dept: CARDIOLOGY | Facility: OTHER | Age: 67
Discharge: HOME | End: 2017-12-20
Attending: INTERNAL MEDICINE
Payer: MEDICARE

## 2017-12-20 DIAGNOSIS — Z01.818 PRE-OPERATIVE CLEARANCE: ICD-10-CM

## 2017-12-20 PROCEDURE — 93010 ELECTROCARDIOGRAM REPORT: CPT | Mod: ,,, | Performed by: INTERNAL MEDICINE

## 2017-12-20 PROCEDURE — 93005 ELECTROCARDIOGRAM TRACING: CPT

## 2017-12-26 ENCOUNTER — OFFICE VISIT (OUTPATIENT)
Dept: INTERNAL MEDICINE | Facility: CLINIC | Age: 67
End: 2017-12-26
Payer: MEDICARE

## 2017-12-26 VITALS
HEIGHT: 66 IN | WEIGHT: 190.5 LBS | HEART RATE: 94 BPM | SYSTOLIC BLOOD PRESSURE: 122 MMHG | DIASTOLIC BLOOD PRESSURE: 64 MMHG | BODY MASS INDEX: 30.62 KG/M2

## 2017-12-26 DIAGNOSIS — Z00.00 ENCOUNTER FOR PREVENTIVE HEALTH EXAMINATION: Primary | ICD-10-CM

## 2017-12-26 DIAGNOSIS — M85.80 OSTEOPENIA, UNSPECIFIED LOCATION: ICD-10-CM

## 2017-12-26 DIAGNOSIS — G62.0 CHEMOTHERAPY-INDUCED NEUROPATHY: ICD-10-CM

## 2017-12-26 DIAGNOSIS — E11.42 TYPE 2 DIABETES MELLITUS WITH DIABETIC POLYNEUROPATHY, WITHOUT LONG-TERM CURRENT USE OF INSULIN: ICD-10-CM

## 2017-12-26 DIAGNOSIS — T45.1X5A CHEMOTHERAPY-INDUCED NEUROPATHY: ICD-10-CM

## 2017-12-26 DIAGNOSIS — I70.0 AORTIC ATHEROSCLEROSIS: ICD-10-CM

## 2017-12-26 DIAGNOSIS — Z17.0 MALIGNANT NEOPLASM OF LOWER-OUTER QUADRANT OF LEFT BREAST OF FEMALE, ESTROGEN RECEPTOR POSITIVE: ICD-10-CM

## 2017-12-26 DIAGNOSIS — C50.512 MALIGNANT NEOPLASM OF LOWER-OUTER QUADRANT OF LEFT BREAST OF FEMALE, ESTROGEN RECEPTOR POSITIVE: ICD-10-CM

## 2017-12-26 DIAGNOSIS — E03.4 HYPOTHYROIDISM DUE TO ACQUIRED ATROPHY OF THYROID: ICD-10-CM

## 2017-12-26 PROCEDURE — G0439 PPPS, SUBSEQ VISIT: HCPCS | Mod: S$GLB,,, | Performed by: NURSE PRACTITIONER

## 2017-12-26 PROCEDURE — 99499 UNLISTED E&M SERVICE: CPT | Mod: S$GLB,,, | Performed by: NURSE PRACTITIONER

## 2017-12-26 PROCEDURE — 99999 PR PBB SHADOW E&M-EST. PATIENT-LVL V: CPT | Mod: PBBFAC,,, | Performed by: NURSE PRACTITIONER

## 2017-12-26 RX ORDER — SIMVASTATIN 20 MG/1
TABLET, FILM COATED ORAL
Qty: 90 TABLET | Refills: 3 | Status: SHIPPED | OUTPATIENT
Start: 2017-12-26 | End: 2018-12-19 | Stop reason: SDUPTHER

## 2017-12-26 NOTE — PROGRESS NOTES
"Lima Maldonado presented for a  Medicare AWV and comprehensive Health Risk Assessment today. The following components were reviewed and updated:    · Medical history  · Family History  · Social history  · Allergies and Current Medications  · Health Risk Assessment  · Health Maintenance  · Care Team     ** See Completed Assessments for Annual Wellness Visit within the encounter summary.**       The following assessments were completed:  · Living Situation  · CAGE  · Depression Screening  · Timed Get Up and Go  · Whisper Test  · Cognitive Function Screening  · Nutrition Screening  · ADL Screening  · PAQ Screening    Vitals:    12/26/17 1007   BP: 122/64   Pulse: 94   Weight: 86.4 kg (190 lb 7.6 oz)   Height: 5' 6" (1.676 m)     Body mass index is 30.74 kg/m².  Physical Exam   Constitutional: She is oriented to person, place, and time. She appears well-developed. No distress.   HENT:   Head: Normocephalic and atraumatic.   Eyes: Lids are normal. Pupils are equal, round, and reactive to light. No scleral icterus.   Neck: Normal range of motion. Neck supple.   Cardiovascular: Normal rate, regular rhythm and normal heart sounds.  Exam reveals no gallop and no friction rub.    No murmur heard.  Pulmonary/Chest: Effort normal and breath sounds normal. No respiratory distress. She has no wheezes. She has no rales.   Abdominal: Soft. Bowel sounds are normal. There is no hepatosplenomegaly. There is no tenderness. There is no CVA tenderness.   Musculoskeletal: Normal range of motion.   Neurological: She is alert and oriented to person, place, and time.   Skin: Skin is warm, dry and intact. She is not diaphoretic.   Psychiatric: She has a normal mood and affect. Her speech is normal and behavior is normal.   Nursing note and vitals reviewed.        Diagnoses and health risks identified today and associated recommendations/orders:    1. Encounter for preventive health examination  - Screenings performed, as noted above. " Personal preventative testing needs reviewed.     2. Malignant neoplasm of lower-outer quadrant of left breast of female, estrogen receptor positive  - Recurrent, s/p chemotherapy on lotrazole. Followed by oncology    3. Aortic atherosclerosis  - Stable, chronic finding. BP and lipids are managed by PCP    4. Type 2 diabetes mellitus with diabetic polyneuropathy, without long-term current use of insulin  - Controlled, followed by PCP.    5. Chemotherapy-induced neuropathy  - Worsening of pre-existing diabetic neuropathy following chemotherapy earlier this year.  Followed by PCP and oncology    6. Hypothyroidism due to acquired atrophy of thyroid  - Stable, followed by PCP    7. Osteopenia, unspecified location  - Stable, followed by PCP      Provided Lima with a 5-10 year written screening schedule and personal prevention plan. Recommendations were developed using the USPSTF age appropriate recommendations. Education, counseling, and referrals were provided as needed. After Visit Summary printed and given to patient which includes a list of additional screenings\tests needed.    Return in about 1 year (around 12/26/2018) for your next annual wellness visit.    Awa Olivares NP

## 2017-12-26 NOTE — PATIENT INSTRUCTIONS
Counseling and Referral of Other Preventative  (Italic type indicates deductible and co-insurance are waived)    Patient Name: Lima Maldonado  Today's Date: 12/26/2017      SERVICE LIMITATIONS RECOMMENDATION    Vaccines    · Pneumococcal (once after 65)    · Influenza (annually)    · Hepatitis B (if medium/high risk)    · Prevnar 13      Hepatitis B medium/high risk factors:       - End-stage renal disease       - Hemophiliacs who received Factor VII or         IX concentrates       - Clients of institutions for the mentally             retarded       - Persons who live in the same house as          a HepB carrier       - Homosexual men       - Illicit injectable drug abusers     Pneumococcal: Done, no repeat necessary     Influenza: Done, repeat in one year     Hepatitis B: Done, no repeat necessary     Prevnar 13: Done, no repeat necessary    Mammogram (biennial age 50-74)  Annually (age 40 or over)  N/A    Pap (up to age 70 and after 70 if unknown history or abnormal study last 10 years)    N/A     The USPSTF recommends against screening for cervical cancer in women who have had a hysterectomy with removal of the cervix and who do not have a history of a high-grade precancerous lesion (cervical intraepithelial neoplasia [FABIANA] grade 2 or 3) or cervical cancer.     Colorectal cancer screening (to age 75)    · Fecal occult blood test (annual)  · Flexible sigmoidoscopy (5y)  · Screening colonoscopy (10y)  · Barium enema   Ordered, please call [phone] to schedule with the GI department.     Diabetes self-management training (no USPSTF recommendations)  Requires referral by treating physician for patient with diabetes or renal disease. 10 hours of initial DSMT sessions of no less than 30 minutes each in a continuous 12-month period. 2 hours of follow-up DSMT in subsequent years.  Well managed diabetes, not needed at this time    Bone mass measurements (age 65 & older, biennial)  Requires diagnosis related to  osteoporosis or estrogen deficiency. Biennial benefit unless patient has history of long-term glucocorticoid  Last done 6/28/17, recommend to repeat every 2-4  years    Glaucoma screening (no USPSTF recommendation)  Diabetes mellitus, family history   , age 50 or over    American, age 65 or over  Done this year, repeat every year    Medical nutrition therapy for diabetes or renal disease (no recommended schedule)  Requires referral by treating physician for patient with diabetes or renal disease or kidney transplant within the past 3 years.  Can be provided in same year as diabetes self-management training (DSMT), and CMS recommends medical nutrition therapy take place after DSMT. Up to 3 hours for initial year and 2 hours in subsequent years. Well managed diabetes, not needed at this time    Cardiovascular screening blood tests (every 5 years)  · Fasting lipid panel  Order as a panel if possible  Done this year, repeat every year    Diabetes screening tests (at least every 3 years, Medicare covers annually or at 6-month intervals for prediabetic patients)  · Fasting blood sugar (FBS) or glucose tolerance test (GTT)  Patient must be diagnosed with one of the following:       - Hypertension       - Dyslipidemia       - Obesity (BMI 30kg/m2)       - Previous elevated impaired FBS or GTT       ... or any two of the following:       - Overweight (BMI 25 but <30)       - Family history of diabetes       - Age 65 or older       - History of gestational diabetes or birth of baby weighing more than 9 pounds  N/A    HIV screening (annually for increased risk patients)  · HIV-1 and HIV-2 by EIA, or PAULINA, rapid antibody test or oral mucosa transudate  Patients must be at increased risk for HIV infection per USPSTF guidelines or pregnant. Tests covered annually for patient at increased risk or as requested by the patient. Pregnant patients may receive up to 3 tests during pregnancy.  Risks discussed,  screening is not recommended    Smoking cessation counseling (up to 8 sessions per year)  Patients must be asymptomatic of tobacco-related conditions to receive as a preventative service.  Non-smoker    Subsequent annual wellness visit  At least 12 months since last AWV  Return in one year     The following information is provided to all patients.  This information is to help you find resources for any of the problems found today that may be affecting your health:                Living healthy guide: www.Count includes the [facility].[email or web address].[facility]      Understanding Diabetes: www.diabetes.org      Eating healthy: www.cdc.gov/healthyweight      [facility] home safety checklist: www.cdc.gov/steadi/patient.html      Agency on Aging: www.goea.[place].[facility]      Alcoholics anonymous (AA): www.aa.org      Physical Activity: www.lata.nih.gov/ws4infr      Tobacco use: www.quitwithusla.org

## 2017-12-27 ENCOUNTER — DOCUMENTATION ONLY (OUTPATIENT)
Dept: INTERNAL MEDICINE | Facility: CLINIC | Age: 67
End: 2017-12-27

## 2017-12-27 ENCOUNTER — HOSPITAL ENCOUNTER (OUTPATIENT)
Dept: CARDIOLOGY | Facility: OTHER | Age: 67
Discharge: HOME | End: 2017-12-27
Attending: INTERNAL MEDICINE
Payer: MEDICARE

## 2017-12-27 ENCOUNTER — OFFICE VISIT (OUTPATIENT)
Dept: INTERNAL MEDICINE | Facility: CLINIC | Age: 67
End: 2017-12-27
Attending: INTERNAL MEDICINE
Payer: MEDICARE

## 2017-12-27 VITALS
SYSTOLIC BLOOD PRESSURE: 120 MMHG | BODY MASS INDEX: 30.82 KG/M2 | DIASTOLIC BLOOD PRESSURE: 68 MMHG | OXYGEN SATURATION: 96 % | HEART RATE: 104 BPM | WEIGHT: 191.81 LBS | HEIGHT: 66 IN

## 2017-12-27 DIAGNOSIS — E03.4 HYPOTHYROIDISM DUE TO ACQUIRED ATROPHY OF THYROID: ICD-10-CM

## 2017-12-27 DIAGNOSIS — M81.0 SENILE OSTEOPOROSIS: ICD-10-CM

## 2017-12-27 DIAGNOSIS — E78.2 MIXED HYPERLIPIDEMIA: ICD-10-CM

## 2017-12-27 DIAGNOSIS — Z01.818 PRE-OPERATIVE CLEARANCE: ICD-10-CM

## 2017-12-27 DIAGNOSIS — E11.42 TYPE 2 DIABETES MELLITUS WITH DIABETIC POLYNEUROPATHY, WITHOUT LONG-TERM CURRENT USE OF INSULIN: ICD-10-CM

## 2017-12-27 DIAGNOSIS — Z12.11 SCREENING FOR COLON CANCER: ICD-10-CM

## 2017-12-27 DIAGNOSIS — I10 ESSENTIAL HYPERTENSION: ICD-10-CM

## 2017-12-27 DIAGNOSIS — G47.33 OBSTRUCTIVE SLEEP APNEA ON CPAP: ICD-10-CM

## 2017-12-27 DIAGNOSIS — Z01.818 PRE-OPERATIVE CLEARANCE: Primary | ICD-10-CM

## 2017-12-27 PROCEDURE — 99214 OFFICE O/P EST MOD 30 MIN: CPT | Mod: S$GLB,,, | Performed by: INTERNAL MEDICINE

## 2017-12-27 PROCEDURE — 99499 UNLISTED E&M SERVICE: CPT | Mod: S$GLB,,, | Performed by: INTERNAL MEDICINE

## 2017-12-27 PROCEDURE — 93005 ELECTROCARDIOGRAM TRACING: CPT

## 2017-12-27 PROCEDURE — 93010 ELECTROCARDIOGRAM REPORT: CPT | Mod: ,,, | Performed by: INTERNAL MEDICINE

## 2017-12-27 PROCEDURE — 99999 PR PBB SHADOW E&M-EST. PATIENT-LVL III: CPT | Mod: PBBFAC,,, | Performed by: INTERNAL MEDICINE

## 2017-12-27 NOTE — PROGRESS NOTES
Subjective:   Patient ID: Lima Maldonado is a 67 y.o. female  Chief complaint:   Chief Complaint   Patient presents with    Pre-op Exam       HPI  Pt here for pre op evaluation for breast reconstructive surgery   Diabetes: A1c 6.7% and stable on metformin   Hypothyroidism: stable on levothyroxine - last tsh wnl 8/2017  HTN: controlled on acei  HLD: contolled on statin  Paperwork for pre op reviewed and pt needs cbc, tsh  Other labs reviewed with pt in clinic today  Hx of hyperbili and fatty liver seen by hep in past - she reports also needs to schedule cscope - order placed this year.   Hx of osteoporosis on prolia ordered by rheum   Hx of kelley on cpap nightly     Patient has no personal or family history of hypercoagulable disorders, pulmonary embolisms, DVT, or bleeding disorders. No family history of sudden death. No personal or family history of difficulty with anesthesia. Patient is able to climb a flight of stairs without difficulty breathing. Reports able to clean home without issues.  No cp or sob, palpitations, orthopnea, PND, LE edema.  Pt seen by livia 11/2016 after ekg showed new RBBB and echo performed and stable - no new issues since then and feeling well overall.   Had EKG recently for pre-op and reviewed results with pt. She denies any cardiac symptoms as above.     Review of Systems   Constitutional: Negative for activity change and unexpected weight change.   HENT: Negative for hearing loss, rhinorrhea and trouble swallowing.    Eyes: Negative for discharge and visual disturbance.   Respiratory: Negative for chest tightness and wheezing.    Cardiovascular: Negative for chest pain and palpitations.   Gastrointestinal: Negative for blood in stool, constipation, diarrhea and vomiting.   Endocrine: Negative for polydipsia and polyuria.   Genitourinary: Positive for dysuria. Negative for difficulty urinating, hematuria and menstrual problem.   Musculoskeletal: Positive for arthralgias. Negative for  "neck pain.   Neurological: Negative for weakness and headaches.   Psychiatric/Behavioral: Negative for confusion and dysphoric mood.     Objective:  Vitals:    12/27/17 1456   BP: 120/68   Pulse: 104   SpO2: 96%   Weight: 87 kg (191 lb 12.8 oz)   Height: 5' 6" (1.676 m)     Body mass index is 30.96 kg/m².    Physical Exam   Constitutional: She is oriented to person, place, and time. She appears well-developed and well-nourished.   HENT:   Head: Normocephalic and atraumatic.   Right Ear: External ear normal.   Left Ear: External ear normal.   Nose: Nose normal.   Mouth/Throat: Oropharynx is clear and moist. No oropharyngeal exudate.   Eyes: Conjunctivae and EOM are normal.   Neck: Normal range of motion. Neck supple.   Cardiovascular: Normal rate, regular rhythm, normal heart sounds and intact distal pulses.  Exam reveals no friction rub.    Pulmonary/Chest: Effort normal and breath sounds normal.   Abdominal: Soft. Normal appearance and bowel sounds are normal.   Neurological: She is alert and oriented to person, place, and time. She has normal strength. Gait normal.   Skin: Skin is warm, dry and intact. Capillary refill takes less than 2 seconds. No cyanosis. Nails show no clubbing.   Psychiatric: She has a normal mood and affect. Her speech is normal and behavior is normal. Cognition and memory are normal.   Vitals reviewed.    Assessment:  1. Pre-operative clearance    2. Screening for colon cancer    3. Obstructive sleep apnea on CPAP    4. Senile osteoporosis    5. Essential hypertension    6. Mixed hyperlipidemia    7. Type 2 diabetes mellitus with diabetic polyneuropathy, without long-term current use of insulin    8. Hypothyroidism due to acquired atrophy of thyroid        Plan:  Lima was seen today for pre-op exam.    Diagnoses and all orders for this visit:    Pre-operative clearance  -     SCHEDULED EKG 12-LEAD (to Muse); Future    Screening for colon cancer  -     Fecal Immunochemical Test (iFOBT); " Future    Obstructive sleep apnea on CPAP    Senile osteoporosis    Essential hypertension    Mixed hyperlipidemia    Type 2 diabetes mellitus with diabetic polyneuropathy, without long-term current use of insulin    Hypothyroidism due to acquired atrophy of thyroid    All issues above stable - cont current meds  Pt will need close monitoring of bg pre and post op. Hold metformin day of procedure. Will need close monitoring of resp status post op with hx of kelley.   rec repeat EKG - suspect findings are due to lead placement - if still with septal infarct will consider referral to cards  Will complete paperwork if ekg stable compared to prior ekgs and notify pt    Health Maintenance   Topic Date Due    Colonoscopy  10/06/2016    Mammogram  09/27/2017    Eye Exam  05/11/2018    Hemoglobin A1c  06/20/2018    Lipid Panel  08/08/2018    Foot Exam  08/09/2018    Pneumococcal (65+) (2 of 2 - PPSV23) 12/03/2018    DEXA SCAN  06/28/2019    TETANUS VACCINE  12/03/2023    Hepatitis C Screening  Completed    Zoster Vaccine  Completed    Influenza Vaccine  Addressed

## 2017-12-29 ENCOUNTER — TELEPHONE (OUTPATIENT)
Dept: INTERNAL MEDICINE | Facility: CLINIC | Age: 67
End: 2017-12-29

## 2017-12-29 DIAGNOSIS — Z01.818 PRE-OPERATIVE CLEARANCE: ICD-10-CM

## 2017-12-29 DIAGNOSIS — R94.31 ABNORMAL EKG: Primary | ICD-10-CM

## 2018-01-02 ENCOUNTER — TELEPHONE (OUTPATIENT)
Dept: INTERNAL MEDICINE | Facility: CLINIC | Age: 68
End: 2018-01-02

## 2018-01-02 NOTE — TELEPHONE ENCOUNTER
----- Message from Nora Kinsey sent at 1/2/2018  9:27 AM CST -----  _  1st Request  _  2nd Request  _  3rd Request        Who: patient     Why: Returning a call from your office  What Number to Call Back: [phone]    When to Expect a call back: (Within 24 hours)    Please return the call at earliest convenience. Thanks!

## 2018-01-04 ENCOUNTER — OFFICE VISIT (OUTPATIENT)
Dept: CARDIOLOGY | Facility: CLINIC | Age: 68
End: 2018-01-04
Payer: MEDICARE

## 2018-01-04 VITALS
SYSTOLIC BLOOD PRESSURE: 131 MMHG | HEART RATE: 107 BPM | HEIGHT: 66 IN | WEIGHT: 192.88 LBS | DIASTOLIC BLOOD PRESSURE: 76 MMHG | BODY MASS INDEX: 31 KG/M2

## 2018-01-04 DIAGNOSIS — C50.512 MALIGNANT NEOPLASM OF LOWER-OUTER QUADRANT OF LEFT BREAST OF FEMALE, ESTROGEN RECEPTOR POSITIVE: ICD-10-CM

## 2018-01-04 DIAGNOSIS — I70.0 AORTIC ATHEROSCLEROSIS: ICD-10-CM

## 2018-01-04 DIAGNOSIS — I10 ESSENTIAL HYPERTENSION: ICD-10-CM

## 2018-01-04 DIAGNOSIS — G47.33 OBSTRUCTIVE SLEEP APNEA ON CPAP: ICD-10-CM

## 2018-01-04 DIAGNOSIS — Z17.0 MALIGNANT NEOPLASM OF LOWER-OUTER QUADRANT OF LEFT BREAST OF FEMALE, ESTROGEN RECEPTOR POSITIVE: ICD-10-CM

## 2018-01-04 DIAGNOSIS — E11.42 TYPE 2 DIABETES MELLITUS WITH DIABETIC POLYNEUROPATHY, WITHOUT LONG-TERM CURRENT USE OF INSULIN: ICD-10-CM

## 2018-01-04 DIAGNOSIS — E78.2 MIXED HYPERLIPIDEMIA: ICD-10-CM

## 2018-01-04 DIAGNOSIS — Z01.810 PREOPERATIVE CARDIOVASCULAR EXAMINATION: Primary | ICD-10-CM

## 2018-01-04 DIAGNOSIS — E66.9 OBESITY, CLASS I, BMI 30-34.9: ICD-10-CM

## 2018-01-04 PROCEDURE — 99214 OFFICE O/P EST MOD 30 MIN: CPT | Mod: S$GLB,,, | Performed by: INTERNAL MEDICINE

## 2018-01-04 PROCEDURE — 99499 UNLISTED E&M SERVICE: CPT | Mod: S$GLB,,, | Performed by: INTERNAL MEDICINE

## 2018-01-04 PROCEDURE — 99999 PR PBB SHADOW E&M-EST. PATIENT-LVL III: CPT | Mod: PBBFAC,,, | Performed by: INTERNAL MEDICINE

## 2018-01-04 NOTE — LETTER
January 4, 2018      Naila Ledesma MD  [address]  [facility] [address]           [facility] - Cardiology  [address]  [place] [address]  Phone: [phone]          Patient: Lima Maldonado   MR Number: [id number]   YOB: 1950   Date of Visit: 1/4/2018       Dear Dr. Naila Ledesma:    Thank you for referring Lima Maldonado to me for evaluation. Attached you will find relevant portions of my assessment and plan of care.    If you have questions, please do not hesitate to call me. I look forward to following Lima Maldonado along with you.    Sincerely,    Abhishek Sher MD    Enclosure  CC:  No Recipients    If you would like to receive this communication electronically, please contact [email or web address] or [phone] to request more information on [employer] access.    For providers and/or their staff who would like to refer a patient to [facility], please contact us through our one-stop-shop provider referral line, [facility], at [phone].    If you feel you have received this communication in error or would no longer like to receive these types of communications, please e-mail [email or web address]

## 2018-01-04 NOTE — PROGRESS NOTES
Subjective:   Patient ID:  Lima Maldonado is a 67 y.o. female who presents for evaluation of Abnormal ECG and Pre-op Exam (breast reconstruction)      HPI: Very pleasant woman with a history as described below here for preoperative evaluation.  She's having further breast reconstruction after double mastectomy.    She feels well and can climb a flight of stairs without stopping.  She denies chest discomfort, ADAMS, palpitations, PND/orthopnea, lightheadedness and syncope.  She does feel a little out of shape, but she's still climbing stairs whenever she can.    Her DM is well controlled generally but she did have a nonfasting glucose well over 200 recently.    Her  is Dheeraj.    Dr. Alexandria Garcia' 2016 HPI:  Ms. Maldonado is a 67 yo F with a history of HTN, DM type 2, NELDA on cpap and HLD who is followed by Dr. Keller who presents for preoperative evaluation.     She was recently diagnosed with L breast cancer and had had a previous L breast cancer 19 years ago.  She had a lumpectomy, chemotherapy and radiation.  She is planning to have bilateral mastectomies with reconstruction.     She was seen by her PCP who ordered and EKG that showed new RBBB.  She presents today for evaluation. She is asymptomatic and climbed three flights of stairs for the appointment today with no difficulties.     She denies any chest pain, dyspnea, orthopnea, PND, lower extremity swelling, palpitations, or syncope.    Past Medical History:   Diagnosis Date    Acute cystitis without hematuria 7/17/2017    Arthritis     Back pain     Breast cancer     originally dx in 02/1997- treated with surgery, chemo and radiation     Elevated bilirubin 11/19/2013    Fatty liver     GERD (gastroesophageal reflux disease)     Glucose intolerance (impaired glucose tolerance)     Hyperlipidemia     Hypertension     Hypothyroid     Obesity     Osteopenia     Osteoporosis     Primary insomnia 11/2/2016    Shingles     Sleep apnea      wears CPAP nightly     Squamous cell carcinoma 2011    right forearm, sx by Dr. Corinne Ray    Thyrotoxicosis without mention of goiter or other cause, without mention of thyrotoxic crisis or storm     hypothyroidism    Trouble in sleeping     Type 2 diabetes mellitus with diabetic polyneuropathy, without long-term current use of insulin 12/26/2017    Urinary incontinence     Well woman exam with routine gynecological exam 11/2/2016       Past Surgical History:   Procedure Laterality Date    APPENDECTOMY  2008    removed during hysterectomy surgery     Breast implant Bilateral 1998    implants removed in 2003    BREAST SURGERY      see details below     HYSTERECTOMY  2008    benign    left breast reconstruction  2005    left modified radical mastectomy  Feb 1997    for treatment of breast cancer     LIPOMA RESECTION  2010    removed from upper back area     MODIFIED RADICAL MASTECTOMY W/ AXILLARY LYMPH NODE DISSECTION  02/1997    PELVIC LAPAROSCOPY  1978    pt had endometriosis     VT REMOVAL OF OVARY/TUBE(S)  2008     benign    reduction of mons pubis  2011    robotic lap  hysterectomy with bso  2008    UPPER GASTROINTESTINAL ENDOSCOPY  8/05/2013       Social History   Substance Use Topics    Smoking status: Former Smoker     Packs/day: 0.50     Years: 3.00     Start date: 4/15/1969     Quit date: 4/15/1972    Smokeless tobacco: Never Used      Comment: started at age 18 during college     Alcohol use 0.0 oz/week      Comment: occasional, 1 cocktail once monthly       Family History   Problem Relation Age of Onset    Heart attack Father 51    Heart disease Father     Breast cancer Sister 51     BRCA 1/2 negative    Cancer Sister      breast    Cancer Mother 65     malignant melanoma     No Known Problems Brother     No Known Problems Daughter     No Known Problems Son     No Known Problems Daughter     No Known Problems Son     Uterine cancer Neg Hx     Colon cancer Neg Hx      Celiac disease Neg Hx     Esophageal cancer Neg Hx     Inflammatory bowel disease Neg Hx     Liver cancer Neg Hx     Liver disease Neg Hx     Stomach cancer Neg Hx     Ulcerative colitis Neg Hx     Hypertension Neg Hx     Cirrhosis Neg Hx     Crohn's disease Neg Hx     Rectal cancer Neg Hx     Ovarian cancer Neg Hx        Current Outpatient Prescriptions   Medication Sig    ascorbic acid (VITAMIN C) 1000 MG tablet Take 1 tablet by mouth once daily.    CALCIUM CARBONATE/VITAMIN D3 (CALTRATE 600 + D ORAL) Take 1 tablet by mouth 2 (two) times daily.     celecoxib (CELEBREX) 100 MG capsule Take 1 capsule (100 mg total) by mouth daily as needed.    coenzyme Q10-vitamin E (COQ10 ) 100-100 mg-unit Cap Take 1 capsule by mouth once daily.     estradiol 10 mcg Tab Place 1 tablet (10 mcg total) vaginally twice a week.    gabapentin (NEURONTIN) 300 MG capsule Take 1 capsule (300 mg total) by mouth every evening.    letrozole (FEMARA) 2.5 mg Tab     levothyroxine (SYNTHROID) 50 MCG tablet TAKE 1 TABLET (50 MCG TOTAL) BY MOUTH ONCE DAILY.    lidocaine (LIDODERM) 5 %(700 mg/patch) Place 2 patches onto the skin daily as needed. Remove & Discard patch within 12 hours or as directed by MD    lisinopril (PRINIVIL,ZESTRIL) 20 MG tablet TAKE 1 TABLET (20 MG TOTAL) BY MOUTH ONCE DAILY.    MULTIVITAMIN ORAL once daily.     omega-3 fatty acids-vitamin E (FISH OIL) 1,000 mg Cap Take 1 capsule by mouth once daily.     pantoprazole (PROTONIX) 40 MG tablet Take 1 tablet (40 mg total) by mouth before breakfast.    simvastatin (ZOCOR) 20 MG tablet TAKE 1 TABLET (20 MG TOTAL) BY MOUTH EVERY EVENING.    vitamin D 1000 units Tab Take 1,000 Units by mouth once daily.     metformin (GLUCOPHAGE) 500 MG tablet TAKE 1 TABLET (500 MG TOTAL) BY MOUTH EVERY EVENING.     No current facility-administered medications for this visit.      ASA 81mg daily    Review of patient's allergies indicates:   Allergen Reactions     Erythromycin      Other reaction(s): Nausea    Erythromycin (bulk)      Other reaction(s): Nausea    Oxycodone Nausea And Vomiting    Oxycodone-acetaminophen Nausea And Vomiting     Other reaction(s): Nausea       Review of Systems   Constitution: Negative.   HENT: Negative.    Eyes: Negative.    Cardiovascular: Negative.  Negative for chest pain, dyspnea on exertion, near-syncope, orthopnea and palpitations.   Respiratory: Negative.  Negative for cough, hemoptysis and shortness of breath.    Endocrine: Negative.    Hematologic/Lymphatic: Negative.    Skin: Negative.    Musculoskeletal: Negative.    Gastrointestinal: Negative.    Genitourinary: Negative.    Neurological: Negative.    Psychiatric/Behavioral: Negative.      Objective:   Physical Exam   Constitutional: She is oriented to person, place, and time. She appears well-developed and well-nourished.   HENT:   Head: Normocephalic and atraumatic.   Mouth/Throat: Oropharynx is clear and moist.   Eyes: Conjunctivae and EOM are normal. No scleral icterus.   Neck: Normal range of motion. Neck supple. No JVD present.   Cardiovascular: Regular rhythm, normal heart sounds and intact distal pulses.  Exam reveals no gallop and no friction rub.    No murmur heard.  She's tachycardic at 105-110 - regular   Pulmonary/Chest: Effort normal and breath sounds normal. She has no wheezes. She has no rales.   Abdominal: Soft. Bowel sounds are normal. She exhibits no distension. There is no tenderness.   Musculoskeletal: Normal range of motion. She exhibits no edema.   Neurological: She is alert and oriented to person, place, and time.   Skin: Skin is warm and dry. No rash noted. No erythema.   Psychiatric: She has a normal mood and affect. Her behavior is normal. Judgment and thought content normal.   Vitals reviewed.      Lab Results   Component Value Date    WBC 4.59 12/20/2017    HGB 13.1 12/20/2017    HCT 39.0 12/20/2017    MCV 85 12/20/2017     12/20/2017          "Chemistry        Component Value Date/Time     12/20/2017 0746    K 4.1 12/20/2017 0746     12/20/2017 0746    CO2 23 12/20/2017 0746    BUN 14 12/20/2017 0746    CREATININE 0.8 12/20/2017 0746     (H) 12/20/2017 0746        Component Value Date/Time    CALCIUM 9.4 12/20/2017 0746    ALKPHOS 104 12/20/2017 0746    AST 38 12/20/2017 0746    ALT 47 (H) 12/20/2017 0746    BILITOT 1.0 12/20/2017 0746    ESTGFRAFRICA >60 12/20/2017 0746    EGFRNONAA >60 12/20/2017 0746            Lab Results   Component Value Date    CHOL 143 08/08/2017    CHOL 145 08/23/2016    CHOL 145 10/05/2015     Lab Results   Component Value Date    HDL 35 (L) 08/08/2017    HDL 43 08/23/2016    HDL 36 (L) 10/05/2015     Lab Results   Component Value Date    LDLCALC 75.0 08/08/2017    LDLCALC 62.6 (L) 08/23/2016    LDLCALC 78.8 10/05/2015     Lab Results   Component Value Date    TRIG 165 (H) 08/08/2017    TRIG 197 (H) 08/23/2016    TRIG 151 (H) 10/05/2015     Lab Results   Component Value Date    CHOLHDL 24.5 08/08/2017    CHOLHDL 29.7 08/23/2016    CHOLHDL 24.8 10/05/2015       Lab Results   Component Value Date    TSH 1.674 08/09/2017       Lab Results   Component Value Date    HGBA1C 6.7 (H) 12/20/2017         Assessment:     1. Preoperative cardiovascular examination    2. Malignant neoplasm of lower-outer quadrant of left breast of female, estrogen receptor positive    3. Essential hypertension    4. Mixed hyperlipidemia    5. Aortic atherosclerosis    6. Obstructive sleep apnea on CPAP    7. Obesity, Class I, BMI 30-34.9    8. Type 2 diabetes mellitus with diabetic polyneuropathy, without long-term current use of insulin        Plan:     She has no angina, heart failure, or unstable arrhythmia.  No further cardiovascular testing is required prior to proceeding to the operating room.    Specifically, there is no evidence of prior MI.  The "septal infarct" read on the ECG is a consequence of perceived Q waves in V1 and V2, " and this is not diagnostic.  She has an incomplete RBBB as well.  These findings are of no particular clinical concern.    Continue current medicines.    Diet/exercise goals reinforced.    F/U PRN

## 2018-01-08 ENCOUNTER — PATIENT MESSAGE (OUTPATIENT)
Dept: INTERNAL MEDICINE | Facility: CLINIC | Age: 68
End: 2018-01-08

## 2018-01-08 NOTE — PROGRESS NOTES
Subjective:       Patient ID: Lima Maldonado is a 67 y.o. female.    Chief Complaint: No chief complaint on file.    HPI Ms Maldonado return to clinic for follow-up of recent diagnosis of left breast cancer.  She had stage I ER positive disease with an intermediate risk Oncotype score.   She completed 4 cycles of TC on 3/31/17.  She is currently on letrozole therapy which was started in April 2017.    She is going to have some additional reconstructive surgery and saw cardiology for preoperative clearance.  The recommended no specific interventions or additional evaluation.  She had a respiratory tract infection which lingered for several months in the fall.  As a consequent her exercise has decreased.  Appetite and bowel function have been good.  She said some mild sternal pain which recently occurred that's going away on its own.  Otherwise she has no pain.        At the time of her last visit we discussed adjuvant chemotherapy as her Oncotype score was intermediate risk  Breast history: mammography on September 20, 2016 demonstrated a new irregular mass with spiculated margins seen in the left breast at  5 o'clock along the surgical scar site.   ultrasound DEMONSTRATED A SOLID 8 X 8 X 7 MM MASS.      A needle biopsy in September 27 showed infiltrating  carcinoma, histologic grade 3, nuclear grade 2, mitotic index 1.  Tumor was 90% ER positive, 70% NC positive, and 1+ HER-2.    MRI of the breast showed a 1.7 x 1.3 cm lesion in the lower outer quadrant of the left breast.  PET/CT on October 7 was negative for any evidence of distant metastasis.  In the  On November 16 bilateral mastectomies were performed.  Left pressure 1.5 cm intermediate grade carcinoma with ductal and lobular features.  There was focal dermal invasion.  Margins were negative.  The right breast was without abnormality.  Oncotype score returned 25 -  intermediate risk.    Past history:left    breast, T1cN1, ER positive, status post mastectomy  with reconstruction and   postoperative chemotherapy with four cycles of Adriamycin and Cytoxan and    five years of tamoxifen.  Her original diagnosis was in 1997  Review of Systems   Constitutional: Positive for fatigue. Negative for activity change, appetite change and unexpected weight change.   Eyes: Negative for visual disturbance.   Respiratory: Negative for cough and shortness of breath.    Cardiovascular: Negative for chest pain.   Gastrointestinal: Negative for abdominal pain, constipation, diarrhea and nausea.   Genitourinary: Negative for frequency.   Musculoskeletal: Negative for back pain.   Skin: Negative for rash.   Neurological: Positive for numbness. Negative for headaches.   Hematological: Negative for adenopathy.   Psychiatric/Behavioral: Negative for dysphoric mood. The patient is not nervous/anxious and is not hyperactive.        Objective:      Physical Exam   Constitutional: She appears well-developed and well-nourished. No distress.   HENT:   Mouth/Throat: No oropharyngeal exudate.   Eyes: No scleral icterus.   Cardiovascular: Normal rate, regular rhythm and normal heart sounds.    Pulmonary/Chest: Effort normal and breath sounds normal. She has no wheezes. She has no rales.       Abdominal: Soft. She exhibits no mass. There is no tenderness.   Lymphadenopathy:     She has no cervical adenopathy.   Psychiatric: She has a normal mood and affect. Her behavior is normal. Thought content normal.   Vitals reviewed.      Assessment:       1. Malignant neoplasm of lower-outer quadrant of left breast of female, estrogen receptor positive        Plan:     Continue current endocrine therapy and return in 3 months.      Distress Screening Results: Psychosocial Distress screening score of Distress Score: 5 noted and reviewed. No intervention indicated.

## 2018-01-09 ENCOUNTER — PATIENT OUTREACH (OUTPATIENT)
Dept: INTERNAL MEDICINE | Facility: CLINIC | Age: 68
End: 2018-01-09

## 2018-01-09 ENCOUNTER — OFFICE VISIT (OUTPATIENT)
Dept: HEMATOLOGY/ONCOLOGY | Facility: CLINIC | Age: 68
End: 2018-01-09
Payer: MEDICARE

## 2018-01-09 VITALS
HEART RATE: 101 BPM | RESPIRATION RATE: 17 BRPM | WEIGHT: 188.5 LBS | SYSTOLIC BLOOD PRESSURE: 138 MMHG | DIASTOLIC BLOOD PRESSURE: 77 MMHG | TEMPERATURE: 98 F | BODY MASS INDEX: 30.42 KG/M2

## 2018-01-09 DIAGNOSIS — C50.512 MALIGNANT NEOPLASM OF LOWER-OUTER QUADRANT OF LEFT BREAST OF FEMALE, ESTROGEN RECEPTOR POSITIVE: Primary | ICD-10-CM

## 2018-01-09 DIAGNOSIS — Z17.0 MALIGNANT NEOPLASM OF LOWER-OUTER QUADRANT OF LEFT BREAST OF FEMALE, ESTROGEN RECEPTOR POSITIVE: Primary | ICD-10-CM

## 2018-01-09 PROCEDURE — 99999 PR PBB SHADOW E&M-EST. PATIENT-LVL III: CPT | Mod: PBBFAC,,, | Performed by: INTERNAL MEDICINE

## 2018-01-09 PROCEDURE — 99499 UNLISTED E&M SERVICE: CPT | Mod: S$GLB,,, | Performed by: INTERNAL MEDICINE

## 2018-01-09 PROCEDURE — 99213 OFFICE O/P EST LOW 20 MIN: CPT | Mod: S$GLB,,, | Performed by: INTERNAL MEDICINE

## 2018-01-09 NOTE — TELEPHONE ENCOUNTER
Form completed and given to staff - please fax to number provided and notify pt when this is complete

## 2018-01-09 NOTE — TELEPHONE ENCOUNTER
Spoke w/ pt and confirmed that form has been filled and faxed to both numbers pt provided.  Pt verbally understands and has no further questions

## 2018-01-09 NOTE — PROGRESS NOTES
Patient returned call. Spoke with patient regarding her fit kit completion. Patient did not complete it but stated that she will complete it by the end of the week. Questions and concerns addressed during call. Understanding verbalized.

## 2018-01-09 NOTE — PROGRESS NOTES
Contacted patient in efforts to encourage the completion and return of fitkit on 1/09/18. Will f/u at a later time.  Left voicemail message on cell. Awaiting call back.

## 2018-01-11 ENCOUNTER — TELEPHONE (OUTPATIENT)
Dept: UROGYNECOLOGY | Facility: CLINIC | Age: 68
End: 2018-01-11

## 2018-01-11 ENCOUNTER — OFFICE VISIT (OUTPATIENT)
Dept: GYNECOLOGIC ONCOLOGY | Facility: CLINIC | Age: 68
End: 2018-01-11
Payer: MEDICARE

## 2018-01-11 VITALS
WEIGHT: 189 LBS | BODY MASS INDEX: 30.51 KG/M2 | DIASTOLIC BLOOD PRESSURE: 58 MMHG | SYSTOLIC BLOOD PRESSURE: 122 MMHG | HEART RATE: 101 BPM

## 2018-01-11 DIAGNOSIS — N95.2 VAGINAL ATROPHY: ICD-10-CM

## 2018-01-11 DIAGNOSIS — Z01.419 WELL WOMAN EXAM WITH ROUTINE GYNECOLOGICAL EXAM: Primary | ICD-10-CM

## 2018-01-11 DIAGNOSIS — N39.3 STRESS INCONTINENCE OF URINE: ICD-10-CM

## 2018-01-11 DIAGNOSIS — Z12.89 ENCOUNTER FOR PELVIC SCREENING FOR CANCER: ICD-10-CM

## 2018-01-11 PROCEDURE — 99999 PR PBB SHADOW E&M-EST. PATIENT-LVL IV: CPT | Mod: PBBFAC,,, | Performed by: OBSTETRICS & GYNECOLOGY

## 2018-01-11 PROCEDURE — G0101 CA SCREEN;PELVIC/BREAST EXAM: HCPCS | Mod: S$GLB,,, | Performed by: OBSTETRICS & GYNECOLOGY

## 2018-01-11 RX ORDER — ESTRADIOL 10 UG/1
10 INSERT VAGINAL
Qty: 24 TABLET | Refills: 3 | Status: SHIPPED | OUTPATIENT
Start: 2018-01-11 | End: 2019-01-11 | Stop reason: SDUPTHER

## 2018-01-11 NOTE — PROGRESS NOTES
Subjective:       Patient ID: Lima Maldonado is a 67 y.o. female.    Chief Complaint: Well Woman    HPI     Patient comes in today for her WWE.       In Oct  2016 she was diagnosed with a new left breast cancer. She had a left breast cancer 19 years ago. S/P lumpectomy, chemotherapy and radiation.   Had bilateral mastectomies with Dr. Bains.   Post op cytoxan and docetaxel.       She has been diagnosed with Type 2 diabetes.     BRCA testing was negative . Sister was tested and is negative.    Has urinary discomfort every few days. Not related to intercourse.   Takes water and cranberry juice and this resolves.   Has problems with JOSE when she had a bad cold in Nov. This is better but still needs to wear a pad because she is afraid that she may leak.      Last Mammogram:Sept. 2016: normal    Colonoscopy: Oct 2006: normal.          Review of Systems   Constitutional: Negative for chills, fatigue and fever.   Respiratory: Positive for cough. Negative for shortness of breath and wheezing.    Cardiovascular: Negative for chest pain, palpitations and leg swelling.   Gastrointestinal: Negative for abdominal pain, constipation, diarrhea, nausea and vomiting.   Genitourinary: Negative for difficulty urinating, dysuria, frequency, genital sores, hematuria, urgency, vaginal bleeding, vaginal discharge and vaginal pain.   Skin: Negative.    Neurological: Negative for weakness.   Hematological: Negative for adenopathy. Does not bruise/bleed easily.   Psychiatric/Behavioral: The patient is not nervous/anxious.        Objective:   BP (!) 122/58   Pulse 101   Wt 85.7 kg (189 lb)   BMI 30.51 kg/m²      Physical Exam   Constitutional: She is oriented to person, place, and time. She appears well-developed and well-nourished.   HENT:   Head: Normocephalic and atraumatic.   Eyes: No scleral icterus.   Neck: No tracheal deviation present. No thyroid mass and no thyromegaly present.   Cardiovascular: Normal rate and regular  rhythm.    Pulmonary/Chest: Effort normal and breath sounds normal. She has no wheezes. Right breast exhibits no mass, no nipple discharge, no skin change and no tenderness. Left breast exhibits no mass, no nipple discharge, no skin change and no tenderness.       Abdominal: She exhibits no distension and no mass. There is no hepatosplenomegaly. There is no tenderness. There is no rebound and no guarding.   Genitourinary:   Genitourinary Comments: Bimanual exam:  Vulva: no lesions. Normal appearance  Urethra: Normal size and location. No lesions  Bladder: No masses or tenderness.  Vagina: normal mucosa. No lesion  Cervix: absent.   Uterus: absent.  Adnexa: no masses.  Rectovaginal: No posterior cul de sac thickening or nodularity.  Rectal: no masses. Nontender. Normal tone.      Musculoskeletal: She exhibits no edema or tenderness.   Lymphadenopathy:     She has no cervical adenopathy.     She has no axillary adenopathy.        Right: No inguinal and no supraclavicular adenopathy present.        Left: No inguinal and no supraclavicular adenopathy present.   Neurological: She is alert and oriented to person, place, and time.   Skin: Skin is warm and dry. No rash noted.   Psychiatric: She has a normal mood and affect. Her behavior is normal. Judgment and thought content normal.       Assessment:       1. Well woman exam with routine gynecological exam    2. Vaginal atrophy    3. Encounter for pelvic screening for cancer    4. Stress incontinence of urine        Plan:   Well woman exam with routine gynecological exam  Counseling time of 10 minutes discussing calcium, vitamin D and exercise. Questions answered.   RTC in 1 year.   Vaginal atrophy  -     estradiol 10 mcg Tab; Place 1 tablet (10 mcg total) vaginally twice a week.  Dispense: 24 tablet; Refill: 3    Encounter for pelvic screening for cancer    Stress incontinence of urine  -     Ambulatory consult to Urogynecology

## 2018-01-11 NOTE — TELEPHONE ENCOUNTER
----- Message from Joie Clifton sent at 1/11/2018  3:14 PM CST -----  Contact: LORNE ALBA [[id number]]  x_  1st Request  _  2nd Request  _  3rd Request        Who: LORNE ALBA [[id number]]    Why: Requesting a call back in regards to scheduling an appointment for her Dr. Rascon is referring her. Dx. Stress Incontinence     What Number to Call Back: [phone]    When to Expect a call back: (Within 24 hours)    Please return the call at earliest convenience. Thanks!

## 2018-01-11 NOTE — TELEPHONE ENCOUNTER
Returned pt call regarding scheduling an consultation apt, no answer, Left voice message for pt to give the office a call back at [phone].

## 2018-01-26 ENCOUNTER — PATIENT OUTREACH (OUTPATIENT)
Dept: INTERNAL MEDICINE | Facility: CLINIC | Age: 68
End: 2018-01-26

## 2018-01-26 NOTE — PROGRESS NOTES
Contacted patient in efforts to encourage the completion and return of fitkit ( colon/rectal screening test) on 1/26/18. Will f/u at a later time.

## 2018-02-07 ENCOUNTER — LAB VISIT (OUTPATIENT)
Dept: LAB | Facility: HOSPITAL | Age: 68
End: 2018-02-07
Attending: INTERNAL MEDICINE
Payer: MEDICARE

## 2018-02-07 DIAGNOSIS — Z12.11 SCREENING FOR COLON CANCER: ICD-10-CM

## 2018-02-07 LAB — HEMOCCULT STL QL IA: NEGATIVE

## 2018-02-07 PROCEDURE — 82274 ASSAY TEST FOR BLOOD FECAL: CPT

## 2018-02-08 ENCOUNTER — TELEPHONE (OUTPATIENT)
Dept: INTERNAL MEDICINE | Facility: CLINIC | Age: 68
End: 2018-02-08

## 2018-02-08 NOTE — TELEPHONE ENCOUNTER
Message sent to pt via my chart with lab results and updates to plan.     fitkit received > 15 days from collection  fitkit needs to be repeated.     Please notify pt and arrange repeat fitkit. Thanks!

## 2018-03-27 DIAGNOSIS — C50.512 MALIGNANT NEOPLASM OF LOWER-OUTER QUADRANT OF LEFT FEMALE BREAST: ICD-10-CM

## 2018-03-27 RX ORDER — LETROZOLE 2.5 MG/1
2.5 TABLET, FILM COATED ORAL DAILY
Qty: 30 TABLET | Refills: 11 | Status: SHIPPED | OUTPATIENT
Start: 2018-03-27 | End: 2019-03-11 | Stop reason: SDUPTHER

## 2018-03-29 DIAGNOSIS — G62.9 NEUROPATHY: ICD-10-CM

## 2018-03-29 RX ORDER — GABAPENTIN 300 MG/1
300 CAPSULE ORAL NIGHTLY
Qty: 30 CAPSULE | Refills: 5 | Status: SHIPPED | OUTPATIENT
Start: 2018-03-29 | End: 2018-09-16 | Stop reason: SDUPTHER

## 2018-04-04 ENCOUNTER — PES CALL (OUTPATIENT)
Dept: ADMINISTRATIVE | Facility: CLINIC | Age: 68
End: 2018-04-04

## 2018-04-10 ENCOUNTER — OFFICE VISIT (OUTPATIENT)
Dept: HEMATOLOGY/ONCOLOGY | Facility: CLINIC | Age: 68
End: 2018-04-10
Payer: MEDICARE

## 2018-04-10 ENCOUNTER — TELEPHONE (OUTPATIENT)
Dept: INTERNAL MEDICINE | Facility: CLINIC | Age: 68
End: 2018-04-10

## 2018-04-10 VITALS
OXYGEN SATURATION: 94 % | BODY MASS INDEX: 30.33 KG/M2 | TEMPERATURE: 98 F | HEIGHT: 66 IN | SYSTOLIC BLOOD PRESSURE: 125 MMHG | DIASTOLIC BLOOD PRESSURE: 61 MMHG | HEART RATE: 83 BPM | RESPIRATION RATE: 16 BRPM | WEIGHT: 188.69 LBS

## 2018-04-10 DIAGNOSIS — C50.512 MALIGNANT NEOPLASM OF LOWER-OUTER QUADRANT OF LEFT BREAST OF FEMALE, ESTROGEN RECEPTOR POSITIVE: Primary | ICD-10-CM

## 2018-04-10 DIAGNOSIS — Z12.11 SCREENING FOR COLON CANCER: Primary | ICD-10-CM

## 2018-04-10 DIAGNOSIS — Z17.0 MALIGNANT NEOPLASM OF LOWER-OUTER QUADRANT OF LEFT BREAST OF FEMALE, ESTROGEN RECEPTOR POSITIVE: Primary | ICD-10-CM

## 2018-04-10 DIAGNOSIS — E11.42 TYPE 2 DIABETES MELLITUS WITH DIABETIC POLYNEUROPATHY, WITHOUT LONG-TERM CURRENT USE OF INSULIN: ICD-10-CM

## 2018-04-10 PROCEDURE — 3078F DIAST BP <80 MM HG: CPT | Mod: CPTII,S$GLB,, | Performed by: PHYSICIAN ASSISTANT

## 2018-04-10 PROCEDURE — 99499 UNLISTED E&M SERVICE: CPT | Mod: S$GLB,,, | Performed by: PHYSICIAN ASSISTANT

## 2018-04-10 PROCEDURE — 99999 PR PBB SHADOW E&M-EST. PATIENT-LVL IV: CPT | Mod: PBBFAC,,, | Performed by: PHYSICIAN ASSISTANT

## 2018-04-10 PROCEDURE — 3074F SYST BP LT 130 MM HG: CPT | Mod: CPTII,S$GLB,, | Performed by: PHYSICIAN ASSISTANT

## 2018-04-10 PROCEDURE — 3044F HG A1C LEVEL LT 7.0%: CPT | Mod: CPTII,S$GLB,, | Performed by: PHYSICIAN ASSISTANT

## 2018-04-10 PROCEDURE — 99214 OFFICE O/P EST MOD 30 MIN: CPT | Mod: S$GLB,,, | Performed by: PHYSICIAN ASSISTANT

## 2018-04-10 NOTE — Clinical Note
Hi- patient said she was hesitant to get c-scope in the past but is now amenable to it- I think you had previously put the orders in, just wanted to let you know she's a go. Thanks. anurag

## 2018-04-10 NOTE — PROGRESS NOTES
Subjective:       Patient ID: Lima Maldonado is a 67 y.o. female.    Chief Complaint: Malignant neoplasm of lower-outer quadrant of left breast of    Ms Maldonado return to clinic for follow-up of  diagnosis of left breast cancer. She had stage I ER positive disease with an intermediate risk Oncotype score.   She completed 4 cycles of TC on 3/31/17 and is currently on Femara.       Patient reports she is not exercising as much as she should and has had a poor diet recently and blood sugars have been up, knows she needs to get back on track.  She has an off and on cough, non-productive. No fever, chills, nausea, vomiting or shortness of breath.   She continues with some peripheral neuropathy in her feet and fingertips for which she takes gabapentin, she is unsure if that is providing any relief.   Patient remains on Prolia under care of Dr. Horta for history of osteoporosis.   Is planning on scheduling screening C-scope through her PCP.        Breast history: mammography on September 20, 2016 demonstrated a new irregular mass with spiculated margins seen in the left breast at  5 o'clock along the surgical scar site.ultrasound DEMONSTRATED A SOLID 8 X 8 X 7 MM MASS.    A needle biopsy in September 27 showed infiltrating carcinoma, histologic grade 3, nuclear grade 2, mitotic index 1. Tumor was 90% ER positive, 70% [place] positive, and 1+ HER-2.     MRI of the breast showed a 1.7 x 1.3 cm lesion in the lower outer quadrant of the left breast.  PET/CT on October 7 was negative for any evidence of distant metastasis.    On November 16 bilateral mastectomies were performed. Left pressure 1.5 cm intermediate grade carcinoma with ductal and lobular features. There was focal dermal invasion. Margins were negative. The right breast was without abnormality.  Oncotype score returned 25 - intermediate risk.  Adjuvant TC X 4 completed 3/31/17.    Patient with history of left breast cancer in 1997 when she underwent left lumpectomy  with complete axillary dissection at age 46 for a pT1cN1 breast cancer (Stage IIA).  47 nodes were removed, 1 of which was positive.  She received adjuvant chemotherapy, radiation and tamoxifen.       Review of Systems   Constitutional: Negative for appetite change, fatigue, fever and unexpected weight change.   HENT: Negative for congestion, rhinorrhea, sore throat and trouble swallowing.    Eyes: Negative for visual disturbance.   Respiratory: Positive for cough (off and on, non-productive). Negative for shortness of breath.    Cardiovascular: Negative for chest pain.   Gastrointestinal: Negative for abdominal pain, blood in stool, constipation, diarrhea and vomiting.   Genitourinary: Negative for dysuria and frequency.   Musculoskeletal: Negative for back pain.   Skin: Negative for rash.   Neurological: Negative for headaches.   Hematological: Negative for adenopathy.   Psychiatric/Behavioral: The patient is not nervous/anxious.        Objective:      Physical Exam   Constitutional: She is oriented to person, place, and time. She appears well-developed and well-nourished. No distress.   ECOG 0  Presents alone   HENT:   Head: Normocephalic.   Mouth/Throat: Oropharynx is clear and moist. No oropharyngeal exudate.   Eyes: Conjunctivae and EOM are normal. Pupils are equal, round, and reactive to light. No scleral icterus.   Neck: Normal range of motion. Neck supple. No thyromegaly present.   Cardiovascular: Normal rate, regular rhythm, normal heart sounds and intact distal pulses.    Pulmonary/Chest: Effort normal and breath sounds normal. No respiratory distress. She has no wheezes. She has no rales. She exhibits no tenderness.   Right breast reconstruction without mass, nodule or skin changes. Left breast reconstruction with firmness at medial aspect- consistent with scar tissue. No  No axillary or supraclavicular adenopathy.   Lungs clear to auscultation bilaterally, no wheezing.   Abdominal: Soft. Bowel sounds  are normal. She exhibits no distension and no mass. There is no tenderness.   No hepatosplenomegaly     Musculoskeletal: Normal range of motion. She exhibits no edema or tenderness.   No spinal or paraspinal tenderness to palpation     Lymphadenopathy:     She has no cervical adenopathy.   Neurological: She is alert and oriented to person, place, and time. No cranial nerve deficit.   No spinal or paraspinal tenderness to palpation     Skin: Skin is warm and dry. No rash noted.   Psychiatric: She has a normal mood and affect. Her behavior is normal. Judgment and thought content normal.   Vitals reviewed.      Assessment:       1. Malignant neoplasm of lower-outer quadrant of left breast of female, estrogen receptor positive    2. Type 2 diabetes mellitus with diabetic polyneuropathy, without long-term current use of insulin        Plan:       1)Continue letrozole and return to clinic in 3 months.  2)we discussed importance of diet/exercise as it pertains to better blood sugar control. We also discussed that given she already has some neuropathy from chemotherapy, did not want that to be exacerbated with diabetes.      Distress Screening Results: Psychosocial Distress screening score of Distress Score: 2 noted and reviewed. No intervention indicated.

## 2018-04-10 NOTE — TELEPHONE ENCOUNTER
Received message from H/O that pt is amenable to cscope at this time - will order     Please notify pt that order was placed - she should expect a call to schedule within 1 week of this

## 2018-04-11 NOTE — TELEPHONE ENCOUNTER
LM stating that referral was ordered and that she should recieve a call in a week to get schedule.

## 2018-04-12 DIAGNOSIS — Z12.11 SPECIAL SCREENING FOR MALIGNANT NEOPLASMS, COLON: Primary | ICD-10-CM

## 2018-04-12 RX ORDER — SODIUM, POTASSIUM,MAG SULFATES 17.5-3.13G
SOLUTION, RECONSTITUTED, ORAL ORAL
Qty: 1 BOTTLE | Refills: 0 | Status: SHIPPED | OUTPATIENT
Start: 2018-04-12 | End: 2018-07-12

## 2018-05-04 DIAGNOSIS — K21.9 GASTROESOPHAGEAL REFLUX DISEASE WITHOUT ESOPHAGITIS: ICD-10-CM

## 2018-05-04 DIAGNOSIS — Z79.899 USE OF PROTON PUMP INHIBITOR THERAPY: ICD-10-CM

## 2018-05-04 DIAGNOSIS — M85.80 LOW BONE MASS: ICD-10-CM

## 2018-05-04 RX ORDER — PANTOPRAZOLE SODIUM 40 MG/1
40 TABLET, DELAYED RELEASE ORAL
Qty: 90 TABLET | Refills: 3 | OUTPATIENT
Start: 2018-05-04

## 2018-05-14 ENCOUNTER — ANESTHESIA EVENT (OUTPATIENT)
Dept: ENDOSCOPY | Facility: HOSPITAL | Age: 68
End: 2018-05-14
Payer: MEDICARE

## 2018-05-15 ENCOUNTER — ANESTHESIA (OUTPATIENT)
Dept: ENDOSCOPY | Facility: HOSPITAL | Age: 68
End: 2018-05-15
Payer: MEDICARE

## 2018-05-15 ENCOUNTER — SURGERY (OUTPATIENT)
Age: 68
End: 2018-05-15

## 2018-05-15 ENCOUNTER — HOSPITAL ENCOUNTER (OUTPATIENT)
Facility: HOSPITAL | Age: 68
Discharge: HOME | End: 2018-05-15
Attending: COLON & RECTAL SURGERY | Admitting: COLON & RECTAL SURGERY
Payer: MEDICARE

## 2018-05-15 VITALS
WEIGHT: 180 LBS | OXYGEN SATURATION: 97 % | DIASTOLIC BLOOD PRESSURE: 61 MMHG | RESPIRATION RATE: 15 BRPM | BODY MASS INDEX: 28.93 KG/M2 | HEIGHT: 66 IN | HEART RATE: 81 BPM | TEMPERATURE: 98 F | SYSTOLIC BLOOD PRESSURE: 126 MMHG

## 2018-05-15 DIAGNOSIS — Z12.11 SCREENING FOR COLON CANCER: ICD-10-CM

## 2018-05-15 LAB
GLUCOSE SERPL-MCNC: 152 MG/DL (ref 70–110)
POCT GLUCOSE: 152 MG/DL (ref 70–110)

## 2018-05-15 PROCEDURE — 37000008 HC ANESTHESIA 1ST 15 MINUTES: Performed by: COLON & RECTAL SURGERY

## 2018-05-15 PROCEDURE — G0121 COLON CA SCRN NOT HI RSK IND: HCPCS | Performed by: COLON & RECTAL SURGERY

## 2018-05-15 PROCEDURE — E9220 PRA ENDO ANESTHESIA: HCPCS | Mod: ,,, | Performed by: NURSE ANESTHETIST, CERTIFIED REGISTERED

## 2018-05-15 PROCEDURE — G0121 COLON CA SCRN NOT HI RSK IND: HCPCS | Mod: GC,,, | Performed by: COLON & RECTAL SURGERY

## 2018-05-15 PROCEDURE — 37000009 HC ANESTHESIA EA ADD 15 MINS: Performed by: COLON & RECTAL SURGERY

## 2018-05-15 PROCEDURE — 25000003 PHARM REV CODE 250: Performed by: NURSE PRACTITIONER

## 2018-05-15 PROCEDURE — 63600175 PHARM REV CODE 636 W HCPCS: Performed by: NURSE ANESTHETIST, CERTIFIED REGISTERED

## 2018-05-15 RX ORDER — SODIUM CHLORIDE 9 MG/ML
INJECTION, SOLUTION INTRAVENOUS CONTINUOUS
Status: DISCONTINUED | OUTPATIENT
Start: 2018-05-15 | End: 2018-05-15 | Stop reason: HOSPADM

## 2018-05-15 RX ORDER — LIDOCAINE HCL/PF 100 MG/5ML
SYRINGE (ML) INTRAVENOUS
Status: DISCONTINUED | OUTPATIENT
Start: 2018-05-15 | End: 2018-05-15

## 2018-05-15 RX ORDER — PROPOFOL 10 MG/ML
VIAL (ML) INTRAVENOUS
Status: DISCONTINUED | OUTPATIENT
Start: 2018-05-15 | End: 2018-05-15

## 2018-05-15 RX ORDER — PROPOFOL 10 MG/ML
VIAL (ML) INTRAVENOUS CONTINUOUS PRN
Status: DISCONTINUED | OUTPATIENT
Start: 2018-05-15 | End: 2018-05-15

## 2018-05-15 RX ADMIN — LIDOCAINE HYDROCHLORIDE 60 MG: 20 INJECTION, SOLUTION INTRAVENOUS at 09:05

## 2018-05-15 RX ADMIN — PROPOFOL 60 MG: 10 INJECTION, EMULSION INTRAVENOUS at 09:05

## 2018-05-15 RX ADMIN — PROPOFOL 200 MCG/KG/MIN: 10 INJECTION, EMULSION INTRAVENOUS at 09:05

## 2018-05-15 RX ADMIN — SODIUM CHLORIDE: 9 INJECTION, SOLUTION INTRAVENOUS at 09:05

## 2018-05-15 NOTE — ANESTHESIA PREPROCEDURE EVALUATION
05/15/2018  Lima Maldonado is a 67 y.o., female.    Patient Active Problem List   Diagnosis    Hyperlipidemia    Personal history of breast cancer    Hypothyroid    Obstructive sleep apnea on CPAP    GERD (gastroesophageal reflux disease)    Hepatomegaly    Fatty liver    HTN (hypertension)    Vaginal atrophy    Obesity, Class I, BMI 30-34.9    Senile osteoporosis    Primary insomnia    Malignant neoplasm of lower-outer quadrant of left female breast    Disruption of external operation (surgical) wound    Dermatitis of eyelid of left eye due to herpes zoster    Pre-operative clearance    Aortic atherosclerosis    Type 2 diabetes mellitus with diabetic polyneuropathy, without long-term current use of insulin    Chemotherapy-induced neuropathy    Stress incontinence of urine     Past Surgical History:   Procedure Laterality Date    APPENDECTOMY  2008    removed during hysterectomy surgery     Breast implant Bilateral 1998    implants removed in 2003    BREAST SURGERY      see details below     HYSTERECTOMY  2008    benign    left breast reconstruction  2005    left modified radical mastectomy  Feb 1997    for treatment of breast cancer     LIPOMA RESECTION  2010    removed from upper back area     MODIFIED RADICAL MASTECTOMY W/ AXILLARY LYMPH NODE DISSECTION  02/1997    PELVIC LAPAROSCOPY  1978    pt had endometriosis     [place] REMOVAL OF OVARY/TUBE(S)  2008     benign    reduction of mons pubis  2011    robotic lap  hysterectomy with bso  2008    UPPER GASTROINTESTINAL ENDOSCOPY  8/05/2013     Past Medical History:   Diagnosis Date    Acute cystitis without hematuria 7/17/2017    Arthritis     Back pain     Breast cancer     originally dx in 02/1997- treated with surgery, chemo and radiation     Elevated bilirubin 11/19/2013    Fatty liver     GERD (gastroesophageal  reflux disease)     Glucose intolerance (impaired glucose tolerance)     Hyperlipidemia     Hypertension     Hypothyroid     Obesity     Osteopenia     Osteoporosis     Primary insomnia 11/2/2016    Shingles     Sleep apnea     wears CPAP nightly     Squamous cell carcinoma 2011    right forearm, sx by Dr. Corinne Ray    Stress incontinence of urine 1/11/2018    Thyrotoxicosis without mention of goiter or other cause, without mention of thyrotoxic crisis or storm     hypothyroidism    Trouble in sleeping     Type 2 diabetes mellitus with diabetic polyneuropathy, without long-term current use of insulin 12/26/2017    Urinary incontinence     Well woman exam with routine gynecological exam 11/2/2016       Anesthesia Evaluation    I have reviewed the Patient Summary Reports.     I have reviewed the Medications.     Review of Systems  Anesthesia Hx:  No problems with previous Anesthesia Hx of Anesthetic complications PONV Neg history of prior surgery. Denies Family Hx of Anesthesia complications.  Personal Hx of Anesthesia complications, Post-Operative Nausea/Vomiting, in the past, but not with recent anesthetics / prophylaxis   Hematology/Oncology:  Hematology Normal      Current/Recent Cancer. Breast surgery   EENT/Dental:EENT/Dental Normal   Cardiovascular:   Exercise tolerance: good Hypertension, well controlled    Pulmonary:   Sleep Apnea, CPAP    Renal/:  Renal/ Normal     Hepatic/GI:   GERD Denies Liver Disease.    Musculoskeletal:   Arthritis     Neurological:   Denies Neuromuscular Disease.    Endocrine:   Diabetes, well controlled, type 2 Hypothyroidism Denies Hyperthyroidism.    Dermatological:  Skin Normal    Psych:  Psychiatric Normal           Physical Exam  General:  Well nourished    Airway/Jaw/Neck:  Airway Findings: Mouth Opening: Normal Tongue: Normal  General Airway Assessment: Adult  Mallampati: II  TM Distance: Normal, at least 6 cm  Jaw/Neck Findings:  Micrognathia: Negative  Mandibular Fracture: Negative    Neck ROM: Normal ROM  Neck Findings: Normal    Eyes/Ears/Nose:  EYES/EARS/NOSE FINDINGS: Normal   Dental:  Dental Findings: In tact   Chest/Lungs:  Chest/Lungs Findings: Clear to auscultation, Normal Respiratory Rate     Heart/Vascular:  Heart Findings: Rate: Normal  Rhythm: Regular Rhythm  Sounds: Normal  Vascular Findings: Normal    Abdomen:  Abdomen Findings:  Normal, Soft, Nontender     Musculoskeletal:  Musculoskeletal Findings: Normal   Skin:  Skin Findings: Normal    Mental Status:  Mental Status Findings:  Cooperative, Alert and Oriented         Anesthesia Plan  Type of Anesthesia, risks & benefits discussed:  Anesthesia Type:  general  Patient's Preference:   Intra-op Monitoring Plan: standard ASA monitors  Intra-op Monitoring Plan Comments:   Post Op Pain Control Plan:   Post Op Pain Control Plan Comments:   Induction:   IV  Beta Blocker:  Patient is not currently on a Beta-Blocker (No further documentation required).       Informed Consent: Patient understands risks and agrees with Anesthesia plan.  Questions answered. Anesthesia consent signed with patient.  ASA Score: 2     Day of Surgery Review of History & Physical:    H&P update referred to the provider.         Ready For Surgery From Anesthesia Perspective.

## 2018-05-15 NOTE — ANESTHESIA POSTPROCEDURE EVALUATION
"Anesthesia Post Evaluation    Patient: Lima Maldonado    Procedure(s) Performed: Procedure(s) (LRB):  COLONOSCOPY (N/A)    Final Anesthesia Type: general  Patient location during evaluation: GI PACU  Patient participation: Yes- Able to Participate  Level of consciousness: awake and alert  Post-procedure vital signs: reviewed and stable  Pain management: adequate  Airway patency: patent  PONV status at discharge: No PONV  Anesthetic complications: no      Cardiovascular status: hemodynamically stable  Respiratory status: unassisted  Hydration status: euvolemic  Follow-up not needed.        Visit Vitals  /61   Pulse 81   Temp 36.6 °C (97.9 °F)   Resp 15   Ht 5' 6" (1.676 m)   Wt 81.6 kg (180 lb)   SpO2 97%   Breastfeeding? No   BMI 29.05 kg/m²       Pain/Nolvia Score: Pain Assessment Performed: Yes (5/15/2018 10:20 AM)  Presence of Pain: denies (5/15/2018 10:20 AM)  Nolvia Score: 10 (5/15/2018 10:20 AM)      "

## 2018-05-15 NOTE — DISCHARGE INSTRUCTIONS
Colonoscopy     A camera attached to a flexible tube with a viewing lens is used to take video pictures.     Colonoscopy is a test to view the inside of your lower digestive tract (colon and rectum). Sometimes it can show the last part of the small intestine (ileum). During the test, small pieces of tissue may be removed for testing. This is called a biopsy. Small growths, such as polyps, may also be removed.   Why is colonoscopy done?  The test is done to help look for colon cancer. And it can help find the source of abdominal pain, bleeding, and changes in bowel habits. It may be needed once a year, depending on factors such as your:  · Age  · Health history  · Family health history  · Symptoms  · Results from any prior colonoscopy  Risks and possible complications  These include:  · Bleeding               · A puncture or tear in the colon   · Risks of anesthesia  · A cancer lesion not being seen  Getting ready   To prepare for the test:  · Talk with your healthcare provider about the risks of the test (see below). Also ask your healthcare provider about alternatives to the test.  · Tell your healthcare provider about any medicines you take. Also tell him or her about any health conditions you may have.  · Make sure your rectum and colon are empty for the test. Follow the diet and bowel prep instructions exactly. If you dont, the test may need to be rescheduled.  · Plan for a friend or family member to drive you home after the test.     Colonoscopy provides an inside view of the entire colon.     You may discuss the results with your doctor right away or at a future visit.  During the test   The test is usually done in the hospital on an outpatient basis. This means you go home the same day. The procedure takes about 30 minutes. During that time:  · You are given relaxing (sedating) medicine through an IV line. You may be drowsy, or fully asleep.  · The healthcare provider will first give you a physical exam to  check for anal and rectal problems.  · Then the anus is lubricated and the scope inserted.  · If you are awake, you may have a feeling similar to needing to have a bowel movement. You may also feel pressure as air is pumped into the colon. Its OK to pass gas during the procedure.  · Biopsy, polyp removal, or other treatments may be done during the test.  After the test   You may have gas right after the test. It can help to try to pass it to help prevent later bloating. Your healthcare provider may discuss the results with you right away. Or you may need to schedule a follow-up visit to talk about the results. After the test, you can go back to your normal eating and other activities. You may be tired from the sedation and need to rest for a few hours.  Date Last Reviewed: 11/1/2016 © 2000-2017 [employer]. [address] [place] [address], [place] [address]. All rights reserved. This information is not intended as a substitute for professional medical care. Always follow your healthcare professional's instructions.

## 2018-05-15 NOTE — TRANSFER OF CARE
"Anesthesia Transfer of Care Note    Patient: Lima Maldonado    Procedure(s) Performed: Procedure(s) (LRB):  COLONOSCOPY (N/A)    Patient location: GI    Anesthesia Type: general    Transport from OR: Transported from OR on 6-10 L/min O2 by face mask with adequate spontaneous ventilation    Post pain: adequate analgesia    Post assessment: no apparent anesthetic complications and tolerated procedure well    Post vital signs: stable    Level of consciousness: awake, alert and oriented    Nausea/Vomiting: no nausea/vomiting    Complications: none    Transfer of care protocol was followed      Last vitals:   Visit Vitals  /78 (BP Location: Right arm, Patient Position: Lying)   Pulse 90   Temp 36.7 °C (98.1 °F) (Temporal)   Resp 14   Ht 5' 6" (1.676 m)   Wt 81.6 kg (180 lb)   SpO2 96%   Breastfeeding? No   BMI 29.05 kg/m²     "

## 2018-05-15 NOTE — PROVATION PATIENT INSTRUCTIONS
Discharge Summary/Instructions after an Endoscopic Procedure  Patient Name: Lima Maldonado  Patient MRN: [id number]  Patient YOB: 1950  Tuesday, May 15, 2018  Roldan Gonzales MD  RESTRICTIONS:  During your procedure today, you received medications for sedation.  These   medications may affect your judgment, balance and coordination.  Therefore,   for 24 hours, you have the following restrictions:   - DO NOT drive a car, operate machinery, make legal/financial decisions,   sign important papers or drink alcohol.    ACTIVITY:  The following day: return to full activity including work, except no heavy   lifting, straining or running for 3 days if polyps were removed.  DIET:  Eat and drink normally unless instructed otherwise.     TREATMENT FOR COMMON SIDE EFFECTS:  - Mild abdominal pain, nausea, belching, bloating or excessive gas:  rest,   eat lightly and use a heating pad.  - Sore Throat: treat with throat lozenges and/or gargle with warm salt   water.  - Because air was used during the procedure, expelling large amounts of air   from your rectum or belching is normal.  - If a bowel prep was taken, you may not have a bowel movement for 1-3 days.    This is normal.  SYMPTOMS TO WATCH FOR AND REPORT TO YOUR PHYSICIAN:  1. Abdominal pain or bloating, other than gas cramps.  2. Chest pain.  3. Back pain.  4. Signs of infection such as: chills or fever occurring within 24 hours   after the procedure.  5. Rectal bleeding, which would show as bright red, maroon, or black stools.   (A tablespoon of blood from the rectum is not serious, especially if   hemorrhoids are present.)  6. Vomiting.  7. Weakness or dizziness.  GO DIRECTLY TO THE NEAREST EMERGENCY ROOM IF YOU HAVE ANY OF THE FOLLOWING:      Difficulty breathing              Chills and/or fever over 101 F   Persistent vomiting and/or vomiting blood   Severe abdominal pain   Severe chest pain   Black, tarry stools   Bleeding- more than one tablespoon   Any other  symptom or condition that you feel may need urgent attention  Your doctor recommends these additional instructions:  If any biopsies were taken, your doctors clinic will contact you in 1 to 2   weeks with any results.  - Discharge patient to home (ambulatory).   - Repeat colonoscopy in 10 years for screening purposes.  For questions, problems or results please call your physician - Roldan Gonzales MD at Work:  [phone].  [facility], EMERGENCY ROOM PHONE NUMBER: [phone]  IF A COMPLICATION OR EMERGENCY SITUATION ARISES AND YOU ARE UNABLE TO REACH   YOUR PHYSICIAN - GO DIRECTLY TO THE EMERGENCY ROOM.  Roldan Gonzales MD  5/15/2018 9:53:19 AM  This report has been verified and signed electronically.

## 2018-05-15 NOTE — H&P
Endoscopy H&P    Procedure : Colonoscopy      asymptomatic screening exam      Past Medical History:   Diagnosis Date    Acute cystitis without hematuria 7/17/2017    Arthritis     Back pain     Breast cancer     originally dx in 02/1997- treated with surgery, chemo and radiation     Elevated bilirubin 11/19/2013    Fatty liver     GERD (gastroesophageal reflux disease)     Glucose intolerance (impaired glucose tolerance)     Hyperlipidemia     Hypertension     Hypothyroid     Obesity     Osteopenia     Osteoporosis     Primary insomnia 11/2/2016    Shingles     Sleep apnea     wears CPAP nightly     Squamous cell carcinoma 2011    right forearm, sx by Dr. Corinne Ray    Stress incontinence of urine 1/11/2018    Thyrotoxicosis without mention of goiter or other cause, without mention of thyrotoxic crisis or storm     hypothyroidism    Trouble in sleeping     Type 2 diabetes mellitus with diabetic polyneuropathy, without long-term current use of insulin 12/26/2017    Urinary incontinence     Well woman exam with routine gynecological exam 11/2/2016         Review of patient's allergies indicates:   Allergen Reactions    Erythromycin      Other reaction(s): Nausea    Erythromycin (bulk)      Other reaction(s): Nausea    Oxycodone Nausea And Vomiting    Oxycodone-acetaminophen Nausea And Vomiting     Other reaction(s): Nausea         No current facility-administered medications on file prior to encounter.      Current Outpatient Prescriptions on File Prior to Encounter   Medication Sig Dispense Refill    ascorbic acid (VITAMIN C) 1000 MG tablet Take 1 tablet by mouth once daily.      CALCIUM CARBONATE/VITAMIN D3 (CALTRATE 600 + D ORAL) Take 1 tablet by mouth 2 (two) times daily.       coenzyme Q10-vitamin E (COQ10 ) 100-100 mg-unit Cap Take 1 capsule by mouth once daily.       estradiol 10 mcg Tab Place 1 tablet (10 mcg total) vaginally twice a week. 24 tablet 3    gabapentin  (NEURONTIN) 300 MG capsule TAKE 1 CAPSULE (300 MG TOTAL) BY MOUTH EVERY EVENING. 30 capsule 5    levothyroxine (SYNTHROID) 50 MCG tablet TAKE 1 TABLET (50 MCG TOTAL) BY MOUTH ONCE DAILY. 90 tablet 3    lisinopril (PRINIVIL,ZESTRIL) 20 MG tablet TAKE 1 TABLET (20 MG TOTAL) BY MOUTH ONCE DAILY. 90 tablet 3    MULTIVITAMIN ORAL once daily.       omega-3 fatty acids-vitamin E (FISH OIL) 1,000 mg Cap Take 1 capsule by mouth once daily.       pantoprazole (PROTONIX) 40 MG tablet Take 1 tablet (40 mg total) by mouth before breakfast. 90 tablet 3    simvastatin (ZOCOR) 20 MG tablet TAKE 1 TABLET (20 MG TOTAL) BY MOUTH EVERY EVENING. 90 tablet 3    vitamin D 1000 units Tab Take 1,000 Units by mouth once daily.       celecoxib (CELEBREX) 100 MG capsule Take 1 capsule (100 mg total) by mouth daily as needed. 30 capsule 6    lidocaine (LIDODERM) 5 %(700 mg/patch) Place 2 patches onto the skin daily as needed. Remove & Discard patch within 12 hours or as directed by MD      metformin (GLUCOPHAGE) 500 MG tablet TAKE 1 TABLET (500 MG TOTAL) BY MOUTH EVERY EVENING. 90 tablet 3         Review of Systems -ROS:  GENERAL: No fever, chills, fatigability or weight loss.  CHEST: Denies ADAMS, cyanosis, wheezing, cough and sputum production.  CARDIOVASCULAR: Denies chest pain, PND, orthopnea or reduced exercise tolerance.   Musculoskeletal ROS: negative for - gait disturbance or joint pain  Neurological ROS: negative for - confusion or memory loss        Physical Exam:  General: well developed, well nourished, no distress  Head: normocephalic  Neck: supple, symmetrical, trachea midline  Lungs:  clear to auscultation bilaterally and normal respiratory effort  Heart: regular rate and rhythm, S1, S2 normal, no murmur, rub or gallop and regular rate and rhythm  Abdomen: soft, non-tender non-distented; bowel sounds normal; no masses,  no organomegaly  Extremities: no cyanosis or edema, or clubbing       Moderate Sedation (choice):  Mallampati Score per anesthesia    ASA : II    IMP: asymptomatic screening exam    Plan: Colonoscopy with Moderate sedation.  I have explained the procedure including indications, alternatives, expected outcomes and potential complications. The patient appears to understand and gives informed consent. The patient is medically ready for surgery.      Awa Sanchez MD

## 2018-05-22 ENCOUNTER — TELEPHONE (OUTPATIENT)
Dept: ENDOSCOPY | Facility: HOSPITAL | Age: 68
End: 2018-05-22

## 2018-06-14 ENCOUNTER — TELEPHONE (OUTPATIENT)
Dept: HEMATOLOGY/ONCOLOGY | Facility: CLINIC | Age: 68
End: 2018-06-14

## 2018-06-14 NOTE — TELEPHONE ENCOUNTER
Called and spoke with patient and explained we needed to change her appointment on 7/12 due to unavoidable circumstances. Patient voiced understanding. New apt made and mailed out

## 2018-06-20 ENCOUNTER — PATIENT MESSAGE (OUTPATIENT)
Dept: HEPATOLOGY | Facility: CLINIC | Age: 68
End: 2018-06-20

## 2018-06-20 DIAGNOSIS — M85.80 LOW BONE MASS: ICD-10-CM

## 2018-06-20 DIAGNOSIS — Z79.899 USE OF PROTON PUMP INHIBITOR THERAPY: ICD-10-CM

## 2018-06-20 DIAGNOSIS — K21.9 GASTROESOPHAGEAL REFLUX DISEASE WITHOUT ESOPHAGITIS: ICD-10-CM

## 2018-06-20 RX ORDER — PANTOPRAZOLE SODIUM 40 MG/1
40 TABLET, DELAYED RELEASE ORAL
Qty: 90 TABLET | Refills: 3 | Status: SHIPPED | OUTPATIENT
Start: 2018-06-20 | End: 2019-06-05 | Stop reason: SDUPTHER

## 2018-07-11 ENCOUNTER — PES CALL (OUTPATIENT)
Dept: ADMINISTRATIVE | Facility: CLINIC | Age: 68
End: 2018-07-11

## 2018-07-12 ENCOUNTER — OFFICE VISIT (OUTPATIENT)
Dept: HEMATOLOGY/ONCOLOGY | Facility: CLINIC | Age: 68
End: 2018-07-12
Payer: MEDICARE

## 2018-07-12 ENCOUNTER — PATIENT MESSAGE (OUTPATIENT)
Dept: HEMATOLOGY/ONCOLOGY | Facility: CLINIC | Age: 68
End: 2018-07-12

## 2018-07-12 VITALS
HEART RATE: 111 BPM | WEIGHT: 183.63 LBS | OXYGEN SATURATION: 94 % | BODY MASS INDEX: 29.51 KG/M2 | TEMPERATURE: 99 F | RESPIRATION RATE: 16 BRPM | HEIGHT: 66 IN | DIASTOLIC BLOOD PRESSURE: 79 MMHG | SYSTOLIC BLOOD PRESSURE: 136 MMHG

## 2018-07-12 DIAGNOSIS — R05.9 COUGH: ICD-10-CM

## 2018-07-12 DIAGNOSIS — C50.512 MALIGNANT NEOPLASM OF LOWER-OUTER QUADRANT OF LEFT BREAST OF FEMALE, ESTROGEN RECEPTOR POSITIVE: Primary | ICD-10-CM

## 2018-07-12 DIAGNOSIS — Z17.0 MALIGNANT NEOPLASM OF LOWER-OUTER QUADRANT OF LEFT BREAST OF FEMALE, ESTROGEN RECEPTOR POSITIVE: Primary | ICD-10-CM

## 2018-07-12 PROCEDURE — 99999 PR PBB SHADOW E&M-EST. PATIENT-LVL IV: CPT | Mod: PBBFAC,,, | Performed by: PHYSICIAN ASSISTANT

## 2018-07-12 PROCEDURE — 99213 OFFICE O/P EST LOW 20 MIN: CPT | Mod: S$GLB,,, | Performed by: PHYSICIAN ASSISTANT

## 2018-07-12 PROCEDURE — 3075F SYST BP GE 130 - 139MM HG: CPT | Mod: CPTII,S$GLB,, | Performed by: PHYSICIAN ASSISTANT

## 2018-07-12 PROCEDURE — 3078F DIAST BP <80 MM HG: CPT | Mod: CPTII,S$GLB,, | Performed by: PHYSICIAN ASSISTANT

## 2018-07-12 NOTE — Clinical Note
Hi- patient is having chronic cough for several months, lungs are clear on exam.  Was wondering if possibly related to lisinopril?  Let me know if you think worth changing up or I might send her to pulmonary. Thanks!

## 2018-07-12 NOTE — PROGRESS NOTES
Subjective:       Patient ID: Lima Maldonado is a 67 y.o. female.    Chief Complaint: Malignant neoplasm of lower-outer quadrant of left breast of    Ms Mladonado return to clinic for follow-up of  diagnosis of left breast cancer. She had stage I ER positive disease with an intermediate risk Oncotype score.   She completed 4 cycles of TC on 3/31/17 and is currently on Femara.      Tolerating Femara well.  Patient reports persistent cough- at first was dry now with productive cough.   She has had some wheezing off and on.  Not currently febrile.   Two weeks ago had GI illness which only exacerbated symptoms.    No fever, chills, nausea, vomiting or shortness of breath.   She continues with some peripheral neuropathy in her feet and fingertips for which she takes gabapentin, she is unsure if that is providing any relief.   Patient remains on Prolia under care of Dr. Horta for history of osteoporosis.       Had c-scope in May that was normal.   Traveling to [place] in a few weeks for an art show.         Breast history: mammography on September 20, 2016 demonstrated a new irregular mass with spiculated margins seen in the left breast at  5 o'clock along the surgical scar site.ultrasound DEMONSTRATED A SOLID 8 X 8 X 7 MM MASS.    A needle biopsy in September 27 showed infiltrating carcinoma, histologic grade 3, nuclear grade 2, mitotic index 1. Tumor was 90% ER positive, 70% MI positive, and 1+ HER-2.     MRI of the breast showed a 1.7 x 1.3 cm lesion in the lower outer quadrant of the left breast.  PET/CT on October 7 was negative for any evidence of distant metastasis.    On November 16 bilateral mastectomies were performed. Left pressure 1.5 cm intermediate grade carcinoma with ductal and lobular features. There was focal dermal invasion. Margins were negative. The right breast was without abnormality.  Oncotype score returned 25 - intermediate risk.  Adjuvant TC X 4 completed 3/31/17.    Patient with history of left  breast cancer in 1997 when she underwent left lumpectomy with complete axillary dissection at age 46 for a pT1cN1 breast cancer (Stage IIA).  47 nodes were removed, 1 of which was positive.  She received adjuvant chemotherapy, radiation and tamoxifen.       Review of Systems   Constitutional: Negative for appetite change and unexpected weight change.   HENT: Negative for congestion.    Eyes: Negative for visual disturbance.   Respiratory: Positive for cough (X several months). Negative for shortness of breath.    Cardiovascular: Negative for chest pain.   Gastrointestinal: Negative for abdominal pain and diarrhea.   Genitourinary: Negative for frequency.   Musculoskeletal: Negative for back pain.   Skin: Negative for rash.   Neurological: Negative for headaches.   Hematological: Negative for adenopathy.   Psychiatric/Behavioral: The patient is not nervous/anxious.        Objective:      Physical Exam   Constitutional: She is oriented to person, place, and time. She appears well-developed and well-nourished. No distress.   ECOG 0  Presents alone   HENT:   Head: Normocephalic.   Mouth/Throat: Oropharynx is clear and moist. No oropharyngeal exudate.   Eyes: Conjunctivae and EOM are normal. Pupils are equal, round, and reactive to light. No scleral icterus.   Neck: Normal range of motion. Neck supple. No thyromegaly present.   Cardiovascular: Normal rate, regular rhythm, normal heart sounds and intact distal pulses.    Pulmonary/Chest: Effort normal and breath sounds normal. No respiratory distress. She has no wheezes. She has no rales. She exhibits no tenderness.   Right breast reconstruction without mass, nodule or skin changes. Left breast reconstruction with firmness at medial aspect- consistent with scar tissue. No  No axillary or supraclavicular adenopathy.   Lungs clear to auscultation bilaterally, no wheezing; no labored breathing   Abdominal: Soft. Bowel sounds are normal. She exhibits no distension and no  mass. There is no tenderness.   No hepatosplenomegaly     Musculoskeletal: Normal range of motion. She exhibits no edema or tenderness.   No spinal or paraspinal tenderness to palpation     Lymphadenopathy:     She has no cervical adenopathy.   Neurological: She is alert and oriented to person, place, and time. No cranial nerve deficit.   No spinal or paraspinal tenderness to palpation     Skin: Skin is warm and dry. No rash noted.   Psychiatric: She has a normal mood and affect. Her behavior is normal. Judgment and thought content normal.   Vitals reviewed.      Assessment:       1. Malignant neoplasm of lower-outer quadrant of left breast of female, estrogen receptor positive    2. Cough        Plan:       1)Continue Femara and return to clinic in 3 months. She has further reconstructive work with Dr. Arboleda this August.  2)will reach out to PCP about chronic cough- lungs clear on exam, possible relationship to ACE inhibitor?    Distress Screening Results: Psychosocial Distress screening score of Distress Score: 5 noted and reviewed. No intervention indicated.

## 2018-07-17 ENCOUNTER — OFFICE VISIT (OUTPATIENT)
Dept: INTERNAL MEDICINE | Facility: CLINIC | Age: 68
End: 2018-07-17
Attending: INTERNAL MEDICINE
Payer: MEDICARE

## 2018-07-17 ENCOUNTER — HOSPITAL ENCOUNTER (OUTPATIENT)
Dept: RADIOLOGY | Facility: OTHER | Age: 68
Discharge: HOME | End: 2018-07-17
Attending: INTERNAL MEDICINE
Payer: MEDICARE

## 2018-07-17 VITALS
HEIGHT: 66 IN | OXYGEN SATURATION: 97 % | BODY MASS INDEX: 29.72 KG/M2 | SYSTOLIC BLOOD PRESSURE: 130 MMHG | WEIGHT: 184.94 LBS | DIASTOLIC BLOOD PRESSURE: 60 MMHG | HEART RATE: 90 BPM | RESPIRATION RATE: 12 BRPM

## 2018-07-17 DIAGNOSIS — R05.9 COUGH: Primary | ICD-10-CM

## 2018-07-17 DIAGNOSIS — H72.92 EAR DRUM PERFORATION, LEFT: ICD-10-CM

## 2018-07-17 DIAGNOSIS — R05.9 COUGH: ICD-10-CM

## 2018-07-17 PROCEDURE — 3075F SYST BP GE 130 - 139MM HG: CPT | Mod: CPTII,S$GLB,, | Performed by: INTERNAL MEDICINE

## 2018-07-17 PROCEDURE — 99999 PR PBB SHADOW E&M-EST. PATIENT-LVL IV: CPT | Mod: PBBFAC,,, | Performed by: INTERNAL MEDICINE

## 2018-07-17 PROCEDURE — 3078F DIAST BP <80 MM HG: CPT | Mod: CPTII,S$GLB,, | Performed by: INTERNAL MEDICINE

## 2018-07-17 PROCEDURE — 99214 OFFICE O/P EST MOD 30 MIN: CPT | Mod: S$GLB,,, | Performed by: INTERNAL MEDICINE

## 2018-07-17 PROCEDURE — 71046 X-RAY EXAM CHEST 2 VIEWS: CPT | Mod: TC,FY

## 2018-07-17 PROCEDURE — 71046 X-RAY EXAM CHEST 2 VIEWS: CPT | Mod: 26,,, | Performed by: RADIOLOGY

## 2018-07-17 RX ORDER — LOSARTAN POTASSIUM 50 MG/1
50 TABLET ORAL DAILY
Qty: 90 TABLET | Refills: 0 | Status: SHIPPED | OUTPATIENT
Start: 2018-07-17 | End: 2018-10-15 | Stop reason: SDUPTHER

## 2018-07-17 RX ORDER — CODEINE PHOSPHATE AND GUAIFENESIN 10; 100 MG/5ML; MG/5ML
10 SOLUTION ORAL EVERY 8 HOURS PRN
Qty: 180 ML | Refills: 0 | Status: SHIPPED | OUTPATIENT
Start: 2018-07-17 | End: 2018-07-27

## 2018-07-17 NOTE — PATIENT INSTRUCTIONS
Twice daily nasal saline rinses (Architectural Daily nasal saline 3-4 sprays per nostril twice daily) and gently blow nose followed by flonase 1 spray twice daily for 1 month to look for improvement. Flonase takes about 1 week to start working.     mucinex

## 2018-07-17 NOTE — PROGRESS NOTES
"Subjective:   Patient ID: Lima Maldonado is a 67 y.o. female  Chief complaint:   Chief Complaint   Patient presents with    Follow-up     c/o cough       HPI  Pt here today for UC appt for cough  Pcp: Dr. Mccall    Reports cough lingered on for many months since Nov 2017  - reports in May it had improved but still present   Then 2-3 weeks had viral illness with nausea, vomiting diarrhea, those symptoms resolved   Had cough and wheezing at that time   Has coughing spells that are difficult to stop     Had oncology f/u and reported chronic cough  Received message from H/O NP about concern that lisinopril may be contributing to this.   No significant post nasal drip over past week until 2-3 days ago  Now 2-3 days had more sinus congestion, post nasal drip, runny nose with clear mucous. Taking mucinex which is minimally effective   No fevers, facial or ear pain  + productive cough - clear and green at times   No current wheezing   Using flonase intermittently   No facial or sinus pain   No fevers or chills    Hx of GERD: taking protonix rec - occ with GERD symptoms but largely controlled     Review of Systems    Objective:  Vitals:    07/17/18 1306   BP: 130/60   BP Location: Right arm   Patient Position: Sitting   BP Method: Large (Manual)   Pulse: (!) 117   SpO2: 95%   Weight: 83.9 kg (184 lb 15.5 oz)   Height: 5' 6" (1.676 m)     Body mass index is 29.85 kg/m².    Physical Exam   Constitutional: She is oriented to person, place, and time. She appears well-developed and well-nourished.   HENT:   Head: Normocephalic and atraumatic.   Right Ear: External ear normal.   Mouth/Throat: Oropharynx is clear and moist.   Nasal turbinates with edema and mild erythema, clear drainage  Left TM with small perf 1mm, no erythema or drainage present    Eyes: Conjunctivae and EOM are normal.   Neck: Normal range of motion. Neck supple.   Cardiovascular: Normal rate, regular rhythm and intact distal pulses.    Pulmonary/Chest: " Effort normal and breath sounds normal. No respiratory distress. She has no wheezes. She has no rales.   Abdominal: Soft. Normal appearance and bowel sounds are normal.   Musculoskeletal: She exhibits no edema or tenderness.   Neurological: She is alert and oriented to person, place, and time. She has normal strength. Gait normal.   Skin: Skin is warm, dry and intact. No cyanosis. Nails show no clubbing.   Psychiatric: She has a normal mood and affect. Her speech is normal and behavior is normal. Cognition and memory are normal.   Vitals reviewed.      Assessment:  1. Cough    2. Ear drum perforation, left        Plan:  Lima was seen today for follow-up.    Diagnoses and all orders for this visit:    Cough  -     X-Ray Chest PA And Lateral; Future  -     Ambulatory Referral to ENT  -     guaifenesin-codeine 100-10 mg/5 ml (TUSSI-ORGANIDIN NR)  mg/5 mL syrup; Take 10 mLs by mouth every 8 (eight) hours as needed.  - approp use of cough med reviewed     Ear drum perforation, left    Other orders  -     losartan (COZAAR) 50 MG tablet; Take 1 tablet (50 mg total) by mouth once daily.    Stop lisinopril  Start losartan  rtc in 2 weeks for bp check  rec nasal saline rinses bid and gently blow nose followed by flonase  otc anti- histamine daily in 1-2 weeks  Cont ppi and GERD diet restrictions  Refer to ENT for eval of TM and eval for ? LPR as cause of cough if does not resolve with plan above     Health Maintenance   Topic Date Due    Mammogram  09/27/2017    Foot Exam  08/09/2018    Eye Exam  08/15/2018    Influenza Vaccine  08/01/2018    Lipid Panel  08/08/2018    Hemoglobin A1c  10/10/2018    Pneumococcal (65+) (2 of 2 - PPSV23) 12/03/2018    Fecal Occult Blood Test (FOBT)/[employer]  02/07/2019    DEXA SCAN  06/28/2019    TETANUS VACCINE  12/03/2023    Colonoscopy  05/15/2028    Hepatitis C Screening  Completed    Zoster Vaccine  Completed

## 2018-07-24 ENCOUNTER — PES CALL (OUTPATIENT)
Dept: ADMINISTRATIVE | Facility: CLINIC | Age: 68
End: 2018-07-24

## 2018-07-31 ENCOUNTER — CLINICAL SUPPORT (OUTPATIENT)
Dept: INTERNAL MEDICINE | Facility: CLINIC | Age: 68
End: 2018-07-31
Payer: MEDICARE

## 2018-07-31 VITALS — DIASTOLIC BLOOD PRESSURE: 62 MMHG | SYSTOLIC BLOOD PRESSURE: 122 MMHG | HEART RATE: 86 BPM | OXYGEN SATURATION: 96 %

## 2018-07-31 PROCEDURE — 99999 PR PBB SHADOW E&M-EST. PATIENT-LVL II: CPT | Mod: PBBFAC,,,

## 2018-07-31 NOTE — PROGRESS NOTES
Lima Maldonado 67 y.o. female is here today for Blood Pressure check.   History of HTN yes.    Review of patient's allergies indicates:   Allergen Reactions    Erythromycin      Other reaction(s): Nausea    Erythromycin (bulk)      Other reaction(s): Nausea    Oxycodone Nausea And Vomiting    Oxycodone-acetaminophen Nausea And Vomiting     Other reaction(s): Nausea     Creatinine   Date Value Ref Range Status   12/20/2017 0.8 0.5 - 1.4 mg/dL Final     Sodium   Date Value Ref Range Status   12/20/2017 137 136 - 145 mmol/L Final     Potassium   Date Value Ref Range Status   12/20/2017 4.1 3.5 - 5.1 mmol/L Final   ]  Patient verifies taking blood pressure medications on a regular basis at the same time of the day.     Current Outpatient Prescriptions:     losartan (COZAAR) 50 MG tablet, Take 1 tablet (50 mg total) by mouth once daily., Disp: 90 tablet, Rfl: 0    ascorbic acid (VITAMIN C) 1000 MG tablet, Take 1 tablet by mouth once daily., Disp: , Rfl:     CALCIUM CARBONATE/VITAMIN D3 (CALTRATE 600 + D ORAL), Take 1 tablet by mouth 2 (two) times daily. , Disp: , Rfl:     celecoxib (CELEBREX) 100 MG capsule, Take 1 capsule (100 mg total) by mouth daily as needed., Disp: 30 capsule, Rfl: 6    coenzyme Q10-vitamin E (COQ10 ) 100-100 mg-unit Cap, Take 1 capsule by mouth once daily. , Disp: , Rfl:     estradiol 10 mcg Tab, Place 1 tablet (10 mcg total) vaginally twice a week., Disp: 24 tablet, Rfl: 3    gabapentin (NEURONTIN) 300 MG capsule, TAKE 1 CAPSULE (300 MG TOTAL) BY MOUTH EVERY EVENING., Disp: 30 capsule, Rfl: 5    levothyroxine (SYNTHROID) 50 MCG tablet, TAKE 1 TABLET (50 MCG TOTAL) BY MOUTH ONCE DAILY., Disp: 90 tablet, Rfl: 3    lidocaine (LIDODERM) 5 %(700 mg/patch), Place 2 patches onto the skin daily as needed. Remove & Discard patch within 12 hours or as directed by MD, Disp: , Rfl:     metformin (GLUCOPHAGE) 500 MG tablet, TAKE 1 TABLET (500 MG TOTAL) BY MOUTH EVERY EVENING., Disp: 90  tablet, Rfl: 3    MULTIVITAMIN ORAL, once daily. , Disp: , Rfl:     omega-3 fatty acids-vitamin E (FISH OIL) 1,000 mg Cap, Take 1 capsule by mouth once daily. , Disp: , Rfl:     pantoprazole (PROTONIX) 40 MG tablet, Take 1 tablet (40 mg total) by mouth before breakfast., Disp: 90 tablet, Rfl: 3    simvastatin (ZOCOR) 20 MG tablet, TAKE 1 TABLET (20 MG TOTAL) BY MOUTH EVERY EVENING., Disp: 90 tablet, Rfl: 3    vitamin D 1000 units Tab, Take 1,000 Units by mouth once daily. , Disp: , Rfl:   Does patient have record of home blood pressure readings yes. Readings have been averaging 133/75 - 141/83.   Last dose of blood pressure medication was taken at 8:00pm on 07/30/2018.  Patient is asymptomatic.   Complains of slight cough which she is being treated for states it has gotten better.    BP: 122/62 , Pulse: 86 .      Dr. Ledesma notified.

## 2018-08-23 ENCOUNTER — OFFICE VISIT (OUTPATIENT)
Dept: OTOLARYNGOLOGY | Facility: CLINIC | Age: 68
End: 2018-08-23
Attending: INTERNAL MEDICINE
Payer: MEDICARE

## 2018-08-23 ENCOUNTER — CLINICAL SUPPORT (OUTPATIENT)
Dept: OTOLARYNGOLOGY | Facility: CLINIC | Age: 68
End: 2018-08-23
Payer: MEDICARE

## 2018-08-23 VITALS
DIASTOLIC BLOOD PRESSURE: 81 MMHG | BODY MASS INDEX: 29.72 KG/M2 | WEIGHT: 184.94 LBS | TEMPERATURE: 98 F | HEIGHT: 66 IN | SYSTOLIC BLOOD PRESSURE: 137 MMHG | HEART RATE: 93 BPM

## 2018-08-23 DIAGNOSIS — J34.2 NASAL SEPTAL DEVIATION: ICD-10-CM

## 2018-08-23 DIAGNOSIS — H93.13 TINNITUS OF BOTH EARS: ICD-10-CM

## 2018-08-23 DIAGNOSIS — J30.9 ALLERGIC RHINITIS, UNSPECIFIED SEASONALITY, UNSPECIFIED TRIGGER: Primary | ICD-10-CM

## 2018-08-23 DIAGNOSIS — H69.93 DYSFUNCTION OF BOTH EUSTACHIAN TUBES: ICD-10-CM

## 2018-08-23 DIAGNOSIS — H93.293 ABNORMAL AUDITORY PERCEPTION OF BOTH EARS: ICD-10-CM

## 2018-08-23 DIAGNOSIS — H65.92 MIDDLE EAR EFFUSION, LEFT: Primary | ICD-10-CM

## 2018-08-23 PROCEDURE — 92567 TYMPANOMETRY: CPT | Mod: S$GLB,,, | Performed by: AUDIOLOGIST-HEARING AID FITTER

## 2018-08-23 PROCEDURE — 99204 OFFICE O/P NEW MOD 45 MIN: CPT | Mod: S$GLB,,, | Performed by: SPECIALIST

## 2018-08-23 PROCEDURE — 3075F SYST BP GE 130 - 139MM HG: CPT | Mod: CPTII,S$GLB,, | Performed by: SPECIALIST

## 2018-08-23 PROCEDURE — 3079F DIAST BP 80-89 MM HG: CPT | Mod: CPTII,S$GLB,, | Performed by: SPECIALIST

## 2018-08-23 RX ORDER — LETROZOLE 2.5 MG/1
2.5 TABLET, FILM COATED ORAL NIGHTLY
COMMUNITY
Start: 2018-08-18 | End: 2019-03-11 | Stop reason: SDUPTHER

## 2018-08-23 RX ORDER — HYDROCODONE BITARTRATE AND ACETAMINOPHEN 10; 325 MG/1; MG/1
TABLET ORAL
COMMUNITY
Start: 2018-07-31 | End: 2019-01-11 | Stop reason: ALTCHOICE

## 2018-08-23 RX ORDER — AZELASTINE 1 MG/ML
SPRAY, METERED NASAL
Qty: 30 ML | Refills: 11 | Status: SHIPPED | OUTPATIENT
Start: 2018-08-23 | End: 2021-05-19

## 2018-08-23 RX ORDER — ONDANSETRON 8 MG/1
TABLET, ORALLY DISINTEGRATING ORAL
COMMUNITY
Start: 2018-07-31 | End: 2019-01-11 | Stop reason: ALTCHOICE

## 2018-08-23 NOTE — LETTER
August 23, 2018      Naila Ledesma MD  [address]  [facility] [address]           [facility] - Otorhinolaryngology  [address], [facility] [address]  [facility] [address]  Phone: [phone]  Fax: [phone]          Patient: Lima Maldonado   MR Number: [id number]   YOB: 1950   Date of Visit: 8/23/2018       Dear Dr. Naila Ledesma:    Thank you for referring Lima Maldonado to me for evaluation. Attached you will find relevant portions of my assessment and plan of care.    If you have questions, please do not hesitate to call me. I look forward to following Lima Maldonado along with you.    Sincerely,    RENA Myers MD    Enclosure  CC:  No Recipients    If you would like to receive this communication electronically, please contact [email or web address] or [phone] to request more information on [employer] access.    For providers and/or their staff who would like to refer a patient to [facility], please contact us through our one-stop-shop provider referral line, [facility], at [phone].    If you feel you have received this communication in error or would no longer like to receive these types of communications, please e-mail [email or web address]

## 2018-08-24 ENCOUNTER — TELEPHONE (OUTPATIENT)
Dept: OTOLARYNGOLOGY | Facility: CLINIC | Age: 68
End: 2018-08-24

## 2018-08-24 NOTE — TELEPHONE ENCOUNTER
Attempted to call pt today letting her know per Dr. Myers faxed Rx azelastine (ASTELIN) 137 mcg (0.1 %) nasal spray to   [facility]/pharmacy #[id number] - [facility] [address] AT [facility] [phone] (Phone)  [phone] (Fax)     Per Dr. Myers.

## 2018-08-24 NOTE — PROGRESS NOTES
Serena Munoz, CCC-A  Audiologist - [facility] [address] [place] [address]  [email or web address]  [phone]    Patient: Lima Maldonado   MRN: [id number]  : 1950  ADAMS: 2018      AUDIOLOGICAL EVALUATION    CASE HISTORY:    Lima Maldonado, 68 y.o., was seen for tympanometry only, per Dr. Myers's orders during today's office visit.    TEST RESULTS:    -Immittance testing revealed normal middle ear compliance (Type A tympanogram) in the right ear and a flat (Type B tympanogram) in the left ear.    IMPRESSION:   Audiological testing suggested that Lima Maldonado has middle ear effusion in the left ear.     RECOMMENDATIONS:   It is recommended that she:  Follow up medically with a physician to assess abnormal tympanogram in the left ear.  Use precaution and/or hearing protection in noisy environments.    If you should have any questions or concerns regarding the above information, please do not hesitate to contact me at [phone].      _______________________________  Serena Munoz, CCC-A  Audiologist    enclosure: audiogram

## 2018-08-24 NOTE — PROGRESS NOTES
Subjective:       Patient ID: Lima Maldonado is a 68 y.o. female.    Chief Complaint: Hold In Left Ear    The patient is referred to me by her primary care physician for evaluation of abnormal otoscopic findings suggesting a perforation of left tympanic membrane. She has no drainage from the ear.  She is not having tenderness in the ear she is not having pain in the ear. She does have bilateral tinnitus and notices significant problems with her hearing when there is background noise.  She particularly has problems in restaurants.  She does turn television excessively loud.  She is also having headaches in the forehead and brow area.  She has been using Flonase, which has been beneficial to controlling and diminishing her symptoms.    For the last year she has had a chronic cough in addition to her sinus symptoms.  It would very in intensity.  She was recently taken off of lisinopril and the cough has continuously improved.  Her cough has improved since she started using Flonase and her allergy symptoms have diminished.      Review of Systems   Constitutional: Positive for fatigue. Negative for activity change, appetite change, chills, fever and unexpected weight change.   HENT: Positive for congestion, hearing loss, postnasal drip, rhinorrhea and tinnitus. Negative for ear discharge, ear pain, facial swelling, mouth sores, sinus pressure, sinus pain, sneezing, sore throat, trouble swallowing and voice change.    Eyes: Negative for photophobia, pain, discharge, redness, itching and visual disturbance.   Respiratory: Positive for cough and shortness of breath. Negative for apnea, choking and wheezing.    Cardiovascular: Negative for chest pain and palpitations.   Gastrointestinal: Negative for abdominal distention, abdominal pain, nausea and vomiting.   Musculoskeletal: Negative for arthralgias, myalgias, neck pain and neck stiffness.   Skin: Negative.  Negative for color change, pallor and rash.    Allergic/Immunologic: Negative for environmental allergies, food allergies and immunocompromised state.   Neurological: Positive for headaches. Negative for dizziness, facial asymmetry, speech difficulty, weakness, light-headedness and numbness.   Hematological: Negative for adenopathy. Does not bruise/bleed easily.   Psychiatric/Behavioral: Negative for confusion, decreased concentration and sleep disturbance.       Objective:      Physical Exam   Constitutional: She is oriented to person, place, and time. She appears well-developed and well-nourished. She is cooperative.   HENT:   Head: Normocephalic.   Right Ear: External ear and ear canal normal. Tympanic membrane is retracted.   Left Ear: External ear and ear canal normal. Tympanic membrane is retracted (In the anterior/inferior quadrant as picture it in red). Tympanic membrane is not scarred ( monomeric membrane in the anterior inferior quadrant as picture did black).   Ears:    Nose: Mucosal edema (cyanotic, boggy inferior turbinates bilaterally), rhinorrhea (clear mucus bilaterally) and septal deviation present.   Mouth/Throat: Uvula is midline, oropharynx is clear and moist and mucous membranes are normal. No oral lesions.   Eyes: EOM and lids are normal. Pupils are equal, round, and reactive to light. Right eye exhibits no discharge and no exudate. Left eye exhibits no discharge and no exudate. Right conjunctiva is injected. Left conjunctiva is injected.   Neck: Trachea normal and normal range of motion. No muscular tenderness present. No tracheal deviation present. No thyroid mass and no thyromegaly present.   Cardiovascular: Normal rate, regular rhythm, normal heart sounds and normal pulses.   Pulmonary/Chest: Effort normal and breath sounds normal. No stridor. She has no decreased breath sounds. She has no wheezes. She has no rhonchi. She has no rales.   Abdominal: Soft. Bowel sounds are normal. There is no tenderness.   Musculoskeletal: Normal range  of motion.   Lymphadenopathy:        Head (right side): No submental, no submandibular, no preauricular, no posterior auricular and no occipital adenopathy present.        Head (left side): No submental, no submandibular, no preauricular, no posterior auricular and no occipital adenopathy present.     She has no cervical adenopathy.   Neurological: She is alert and oriented to person, place, and time. She has normal strength. No cranial nerve deficit or sensory deficit. Gait normal.   Skin: Skin is warm and dry. No petechiae and no rash noted. No cyanosis. Nails show no clubbing.   Psychiatric: She has a normal mood and affect. Her speech is normal and behavior is normal. Judgment and thought content normal. Cognition and memory are normal.            Assessment:       1. Allergic rhinitis, unspecified seasonality, unspecified trigger    2. Dysfunction of both eustachian tubes    3. Abnormal auditory perception of both ears    4. Tinnitus of both ears    5. Nasal septal deviation        Plan:       I am placing the patient on Astelin in addition to her nasal steroid.  Both are to be used twice daily.  I will recheck her in 2 weeks.  She is to undergo complete audiologic evaluation prior to seeing me on that day.

## 2018-09-04 ENCOUNTER — CLINICAL SUPPORT (OUTPATIENT)
Dept: OTOLARYNGOLOGY | Facility: CLINIC | Age: 68
End: 2018-09-04
Payer: MEDICARE

## 2018-09-04 DIAGNOSIS — H72.92 UNSPECIFIED PERFORATION OF TYMPANIC MEMBRANE, LEFT EAR: ICD-10-CM

## 2018-09-04 DIAGNOSIS — H90.A31 MIXED CONDUCTIVE AND SENSORINEURAL HEARING LOSS OF RIGHT EAR WITH RESTRICTED HEARING OF LEFT EAR: Primary | ICD-10-CM

## 2018-09-04 DIAGNOSIS — H90.A22 SENSORINEURAL HEARING LOSS (SNHL) OF LEFT EAR WITH RESTRICTED HEARING OF RIGHT EAR: ICD-10-CM

## 2018-09-04 DIAGNOSIS — H74.8X2 TYPE B TYMPANOGRAM, LEFT: ICD-10-CM

## 2018-09-04 PROCEDURE — 92550 TYMPANOMETRY & REFLEX THRESH: CPT | Mod: S$GLB,,, | Performed by: AUDIOLOGIST-HEARING AID FITTER

## 2018-09-04 PROCEDURE — 92557 COMPREHENSIVE HEARING TEST: CPT | Mod: S$GLB,,, | Performed by: AUDIOLOGIST-HEARING AID FITTER

## 2018-09-06 RX ORDER — LEVOTHYROXINE SODIUM 50 UG/1
TABLET ORAL
Qty: 90 TABLET | Refills: 0 | Status: SHIPPED | OUTPATIENT
Start: 2018-09-06 | End: 2018-12-01 | Stop reason: SDUPTHER

## 2018-09-06 NOTE — PROGRESS NOTES
Serena Munoz, CCC-A  Audiologist - [facility] [address] [place] [address]  [email or web address]  [phone]    Patient: Lima Maldonado   MRN: [id number]  : 1950  ADAMS: 2018      AUDIOLOGICAL EVALUATION              RECOMMENDATIONS:   It is recommended that she:  Follow up medically with a physician.    Receive binaural hearing aids to improve speech understanding.  Continue to receive audiological monitoring annually.  Use precaution and/or hearing protection in noisy environments.    If you should have any questions or concerns regarding the above information, please do not hesitate to contact me at [phone].      _______________________________  Serena Munoz, CCC-A  Audiologist    enclosure: audiogram

## 2018-09-07 ENCOUNTER — OFFICE VISIT (OUTPATIENT)
Dept: OTOLARYNGOLOGY | Facility: CLINIC | Age: 68
End: 2018-09-07
Payer: MEDICARE

## 2018-09-07 VITALS
DIASTOLIC BLOOD PRESSURE: 74 MMHG | HEART RATE: 98 BPM | WEIGHT: 184 LBS | HEIGHT: 66 IN | SYSTOLIC BLOOD PRESSURE: 119 MMHG | BODY MASS INDEX: 29.57 KG/M2 | TEMPERATURE: 99 F

## 2018-09-07 DIAGNOSIS — J34.2 NASAL SEPTAL DEVIATION: ICD-10-CM

## 2018-09-07 DIAGNOSIS — H93.13 TINNITUS OF BOTH EARS: ICD-10-CM

## 2018-09-07 DIAGNOSIS — H90.A22 SENSORINEURAL HEARING LOSS (SNHL) OF LEFT EAR WITH RESTRICTED HEARING OF RIGHT EAR: Primary | ICD-10-CM

## 2018-09-07 DIAGNOSIS — H72.92 PERFORATION OF LEFT TYMPANIC MEMBRANE: ICD-10-CM

## 2018-09-07 DIAGNOSIS — H69.91 DYSFUNCTION OF RIGHT EUSTACHIAN TUBE: ICD-10-CM

## 2018-09-07 DIAGNOSIS — J30.9 ALLERGIC RHINITIS, UNSPECIFIED SEASONALITY, UNSPECIFIED TRIGGER: ICD-10-CM

## 2018-09-07 PROCEDURE — 3078F DIAST BP <80 MM HG: CPT | Mod: CPTII,S$GLB,, | Performed by: SPECIALIST

## 2018-09-07 PROCEDURE — 99214 OFFICE O/P EST MOD 30 MIN: CPT | Mod: S$GLB,,, | Performed by: SPECIALIST

## 2018-09-07 PROCEDURE — 1101F PT FALLS ASSESS-DOCD LE1/YR: CPT | Mod: CPTII,S$GLB,, | Performed by: SPECIALIST

## 2018-09-07 PROCEDURE — 3074F SYST BP LT 130 MM HG: CPT | Mod: CPTII,S$GLB,, | Performed by: SPECIALIST

## 2018-09-07 RX ORDER — MONTELUKAST SODIUM 10 MG/1
10 TABLET ORAL NIGHTLY
Qty: 30 TABLET | Refills: 11 | Status: SHIPPED | OUTPATIENT
Start: 2018-09-07 | End: 2019-07-23 | Stop reason: SDUPTHER

## 2018-09-07 RX ORDER — FLUTICASONE PROPIONATE 50 MCG
1 SPRAY, SUSPENSION (ML) NASAL 2 TIMES DAILY
COMMUNITY
Start: 2018-08-29

## 2018-09-07 NOTE — PATIENT INSTRUCTIONS
Eardrum Rupture (Perforation)    Your eardrum is a thin membrane between your outer and middle ear. Sound waves entering your ear cause the membrane to vibrate. This helps you hear. An injury or infection can cause your eardrum to tear (rupture). This creates a hole (perforation) that may affect your hearing.  Causes of eardrum perforation  Causes of a ruptured eardrum include:  · Pressure from an ear infection  · Putting an object such as a cotton swab or pencil into the ear  · A very loud noise such as a gunshot close to the ear  · Rapid changes in air pressure. These can happen during scuba diving or traveling at high altitudes.  · A slap or blow to the ear  When to go to the emergency room (ER)  Seek medical care right away if you:  · Have severe pain, bleeding, or ringing in your ear.  · Lose your hearing suddenly.  · Become very dizzy for no reason.  · Have an object lodged in your ear.  A ruptured eardrum from an ear infection usually isn't an emergency. In fact, the rupture often relieves pressure and pain. It usually heals within hours or days. But you should have the ear looked at by a healthcare provider within 24 hours.  What to expect in the ER  Your ear will be examined. Treatment will depend on how severe the damage is. Small holes often heal on their own. A small patch may be placed over a minor eardrum tear. Large tears may need to be repaired during an operation. If you are very dizzy or have severe hearing loss, you are likely to stay in the hospital for treatment for one or more days.  Don't clean inside the ear canal with cotton swabs or any other object.   Date Last Reviewed: 10/1/2016  © 1083-2939 [employer]. [address] [place] [address], [place] [address]. All rights reserved. This information is not intended as a substitute for professional medical care. Always follow your healthcare professional's instructions.

## 2018-09-08 PROBLEM — H93.13 TINNITUS OF BOTH EARS: Status: ACTIVE | Noted: 2018-09-08

## 2018-09-08 PROBLEM — J30.9 ALLERGIC RHINITIS: Status: ACTIVE | Noted: 2018-09-08

## 2018-09-08 PROBLEM — J34.2 NASAL SEPTAL DEVIATION: Status: ACTIVE | Noted: 2018-09-08

## 2018-09-08 PROBLEM — H90.A22 SENSORINEURAL HEARING LOSS (SNHL) OF LEFT EAR WITH RESTRICTED HEARING OF RIGHT EAR: Status: ACTIVE | Noted: 2018-09-08

## 2018-09-08 PROBLEM — H72.92 PERFORATION OF LEFT TYMPANIC MEMBRANE: Status: ACTIVE | Noted: 2018-09-08

## 2018-09-08 NOTE — PROGRESS NOTES
Subjective:       Patient ID: Lima Maldonado is a 68 y.o. female.    Chief Complaint: Follow-up    The patient is returning for follow-up visit.  She has not had any further drainage from her left ear.  She is not having any pain in the left ear. She continues to have some congestion and coughing.  There is no fever.  She is also having headaches in the forehead and brow area.  She feels that her hearing is not as good as it used to be.  There is no recent history of ear infection, although, her problems really began after she developed the sinus problems a few months ago.  She is taking Astelin and Flonase on a regular basis.  They are helping with her nasal congestion.  Nasal secretions are currently clear.      Review of Systems   Constitutional: Negative for activity change, appetite change, chills, fatigue, fever and unexpected weight change.   HENT: Positive for congestion, ear discharge, ear pain, hearing loss, postnasal drip, rhinorrhea, sinus pressure, sinus pain and sore throat. Negative for facial swelling, mouth sores, sneezing, tinnitus, trouble swallowing and voice change.    Eyes: Negative for photophobia, pain, discharge, redness, itching and visual disturbance.   Respiratory: Positive for cough. Negative for apnea, choking, shortness of breath and wheezing.    Cardiovascular: Negative for chest pain and palpitations.   Gastrointestinal: Negative for abdominal distention, abdominal pain, nausea and vomiting.   Musculoskeletal: Negative for arthralgias, myalgias, neck pain and neck stiffness.   Skin: Negative.  Negative for color change, pallor and rash.   Allergic/Immunologic: Negative for environmental allergies, food allergies and immunocompromised state.   Neurological: Positive for headaches. Negative for dizziness, facial asymmetry, speech difficulty, weakness, light-headedness and numbness.   Hematological: Negative for adenopathy. Does not bruise/bleed easily.   Psychiatric/Behavioral:  Negative for confusion, decreased concentration and sleep disturbance.       Objective:      Physical Exam   Constitutional: She is oriented to person, place, and time. She appears well-developed and well-nourished. She is cooperative.   HENT:   Head: Normocephalic.   Right Ear: External ear and ear canal normal. Tympanic membrane is perforated (Small anterior inferior central perforation).   Left Ear: External ear and ear canal normal. Tympanic membrane is retracted.   Ears:    Nose: Mucosal edema (cyanotic, boggy inferior turbinates bilaterally), rhinorrhea (clear mucus bilaterally) and septal deviation present.   Mouth/Throat: Uvula is midline, oropharynx is clear and moist and mucous membranes are normal. No oral lesions.   Eyes: EOM and lids are normal. Pupils are equal, round, and reactive to light. Right eye exhibits no discharge and no exudate. Left eye exhibits no discharge and no exudate. Right conjunctiva is injected. Left conjunctiva is injected.   Neck: Trachea normal and normal range of motion. No muscular tenderness present. No tracheal deviation present. No thyroid mass and no thyromegaly present.   Cardiovascular: Normal rate, regular rhythm, normal heart sounds and normal pulses.   Pulmonary/Chest: Effort normal and breath sounds normal. No stridor. She has no decreased breath sounds. She has no wheezes. She has no rhonchi. She has no rales.   Abdominal: Soft. Bowel sounds are normal. There is no tenderness.   Musculoskeletal: Normal range of motion.   Lymphadenopathy:        Head (right side): No submental, no submandibular, no preauricular, no posterior auricular and no occipital adenopathy present.        Head (left side): No submental, no submandibular, no preauricular, no posterior auricular and no occipital adenopathy present.     She has no cervical adenopathy.   Neurological: She is alert and oriented to person, place, and time. She has normal strength. No cranial nerve deficit or sensory  deficit. Gait normal.   Skin: Skin is warm and dry. No petechiae and no rash noted. No cyanosis. Nails show no clubbing.   Psychiatric: She has a normal mood and affect. Her speech is normal and behavior is normal. Judgment and thought content normal. Cognition and memory are normal.                 Assessment:       1. Sensorineural hearing loss (SNHL) of left ear with restricted hearing of right ear    2. Perforation of left tympanic membrane    3. Allergic rhinitis, unspecified seasonality, unspecified trigger    4. Nasal septal deviation    5. Dysfunction of right eustachian tube    6. Tinnitus of both ears        Plan:       I am encouraging the patient to use water protection in her right ear when she washes her hair.  I am adding an nighttime dose of Singulair to her twice daily Astelin Flonase.  I will recheck her in 4 weeks

## 2018-09-16 DIAGNOSIS — G62.9 NEUROPATHY: ICD-10-CM

## 2018-09-17 RX ORDER — GABAPENTIN 300 MG/1
300 CAPSULE ORAL NIGHTLY
Qty: 30 CAPSULE | Refills: 11 | Status: SHIPPED | OUTPATIENT
Start: 2018-09-17 | End: 2019-05-10

## 2018-09-20 RX ORDER — METFORMIN HYDROCHLORIDE 500 MG/1
500 TABLET ORAL NIGHTLY
Qty: 90 TABLET | Refills: 3 | Status: SHIPPED | OUTPATIENT
Start: 2018-09-20 | End: 2018-12-13

## 2018-10-04 ENCOUNTER — PATIENT MESSAGE (OUTPATIENT)
Dept: OTOLARYNGOLOGY | Facility: CLINIC | Age: 68
End: 2018-10-04

## 2018-10-04 ENCOUNTER — TELEPHONE (OUTPATIENT)
Dept: OTOLARYNGOLOGY | Facility: CLINIC | Age: 68
End: 2018-10-04

## 2018-10-11 NOTE — PROGRESS NOTES
Subjective:       Patient ID: Lima Maldonado is a 68 y.o. female.    Chief Complaint: No chief complaint on file.    HPI Ms Maldonado return to clinic for follow-up of recent diagnosis of left breast cancer.  She had stage I ER positive disease with an intermediate risk Oncotype score.     She is currently on letrozole therapy which was started in April 2017.    She reports she has had several different side effects from her medication.  This includes some pain in her ankles and feet, some hot flashes, and some thinning of her hair.  She had some additional breast reconstructive surgery about 5 weeks ago.      Breast history: mammography on September 20, 2016 demonstrated a new irregular mass with spiculated margins seen in the left breast at  5 o'clock along the surgical scar site.   ultrasound DEMONSTRATED A SOLID 8 X 8 X 7 MM MASS.      A needle biopsy in September 27 showed infiltrating  carcinoma, histologic grade 3, nuclear grade 2, mitotic index 1.  Tumor was 90% ER positive, 70% VT positive, and 1+ HER-2.    MRI of the breast showed a 1.7 x 1.3 cm lesion in the lower outer quadrant of the left breast.  PET/CT on October 7 was negative for any evidence of distant metastasis.  In the  On November 16 bilateral mastectomies were performed.  Left pressure 1.5 cm intermediate grade carcinoma with ductal and lobular features.  There was focal dermal invasion.  Margins were negative.  The right breast was without abnormality.  Oncotype score returned 25 -  intermediate risk.    She received 4 cycles of adjuvant docetaxel and cyclophosphamide completed March 31, 2017.    Past history:left    breast, T1cN1, ER positive, status post mastectomy with reconstruction and   postoperative chemotherapy with four cycles of Adriamycin and Cytoxan and    five years of tamoxifen.  Her original diagnosis was in 1997  Review of Systems   Constitutional: Positive for fatigue. Negative for activity change, appetite change and  unexpected weight change.        Hot flashes   Eyes: Negative for visual disturbance.   Respiratory: Negative for cough and shortness of breath.    Cardiovascular: Negative for chest pain.   Gastrointestinal: Negative for abdominal pain, constipation, diarrhea and nausea.   Genitourinary: Negative for frequency.   Musculoskeletal: Positive for arthralgias (Ft and ankles). Negative for back pain.   Skin: Negative for rash.   Neurological: Positive for numbness. Negative for headaches.   Hematological: Negative for adenopathy.   Psychiatric/Behavioral: Negative for dysphoric mood. The patient is not nervous/anxious and is not hyperactive.        Objective:      Physical Exam   Constitutional: She appears well-developed and well-nourished. No distress.   HENT:   Mouth/Throat: No oropharyngeal exudate.   Eyes: No scleral icterus.   Cardiovascular: Normal rate, regular rhythm and normal heart sounds.   Pulmonary/Chest: Effort normal and breath sounds normal. She has no wheezes. She has no rales.       Abdominal: Soft. She exhibits no mass. There is no tenderness.   Lymphadenopathy:     She has no cervical adenopathy.   Psychiatric: She has a normal mood and affect. Her behavior is normal. Thought content normal.   Vitals reviewed.      Assessment:       1. Malignant neoplasm of lower-outer quadrant of left breast of female, estrogen receptor positive        Plan:     Continue current endocrine therapy and return in 3 months.

## 2018-10-12 ENCOUNTER — OFFICE VISIT (OUTPATIENT)
Dept: HEMATOLOGY/ONCOLOGY | Facility: CLINIC | Age: 68
End: 2018-10-12
Payer: MEDICARE

## 2018-10-12 VITALS
BODY MASS INDEX: 30.08 KG/M2 | SYSTOLIC BLOOD PRESSURE: 135 MMHG | RESPIRATION RATE: 18 BRPM | TEMPERATURE: 98 F | DIASTOLIC BLOOD PRESSURE: 76 MMHG | OXYGEN SATURATION: 95 % | WEIGHT: 187.19 LBS | HEART RATE: 86 BPM | HEIGHT: 66 IN

## 2018-10-12 DIAGNOSIS — C50.512 MALIGNANT NEOPLASM OF LOWER-OUTER QUADRANT OF LEFT BREAST OF FEMALE, ESTROGEN RECEPTOR POSITIVE: Primary | ICD-10-CM

## 2018-10-12 DIAGNOSIS — Z17.0 MALIGNANT NEOPLASM OF LOWER-OUTER QUADRANT OF LEFT BREAST OF FEMALE, ESTROGEN RECEPTOR POSITIVE: Primary | ICD-10-CM

## 2018-10-12 PROCEDURE — 3078F DIAST BP <80 MM HG: CPT | Mod: CPTII,,, | Performed by: INTERNAL MEDICINE

## 2018-10-12 PROCEDURE — 3075F SYST BP GE 130 - 139MM HG: CPT | Mod: CPTII,,, | Performed by: INTERNAL MEDICINE

## 2018-10-12 PROCEDURE — 1101F PT FALLS ASSESS-DOCD LE1/YR: CPT | Mod: CPTII,,, | Performed by: INTERNAL MEDICINE

## 2018-10-12 PROCEDURE — 99999 PR PBB SHADOW E&M-EST. PATIENT-LVL IV: CPT | Mod: PBBFAC,,, | Performed by: INTERNAL MEDICINE

## 2018-10-12 PROCEDURE — 99214 OFFICE O/P EST MOD 30 MIN: CPT | Mod: PBBFAC | Performed by: INTERNAL MEDICINE

## 2018-10-12 PROCEDURE — 99213 OFFICE O/P EST LOW 20 MIN: CPT | Mod: S$PBB,,, | Performed by: INTERNAL MEDICINE

## 2018-10-16 RX ORDER — LOSARTAN POTASSIUM 50 MG/1
TABLET ORAL
Qty: 90 TABLET | Refills: 3 | Status: SHIPPED | OUTPATIENT
Start: 2018-10-16 | End: 2019-12-23

## 2018-10-17 RX ORDER — LOSARTAN POTASSIUM 50 MG/1
TABLET ORAL
Qty: 90 TABLET | Refills: 0 | Status: SHIPPED | OUTPATIENT
Start: 2018-10-17 | End: 2018-11-29

## 2018-10-29 ENCOUNTER — PATIENT MESSAGE (OUTPATIENT)
Dept: INTERNAL MEDICINE | Facility: CLINIC | Age: 68
End: 2018-10-29

## 2018-10-29 DIAGNOSIS — E03.9 HYPOTHYROIDISM, UNSPECIFIED TYPE: ICD-10-CM

## 2018-10-29 DIAGNOSIS — E11.9 CONTROLLED TYPE 2 DIABETES MELLITUS WITHOUT COMPLICATION, WITHOUT LONG-TERM CURRENT USE OF INSULIN: Primary | ICD-10-CM

## 2018-10-30 ENCOUNTER — PATIENT MESSAGE (OUTPATIENT)
Dept: INTERNAL MEDICINE | Facility: CLINIC | Age: 68
End: 2018-10-30

## 2018-10-30 NOTE — TELEPHONE ENCOUNTER
Sent message to inform pt that her lab work is scheduled 11/20/18 and she can get her flu shot at any drug store   complains of pain/discomfort

## 2018-11-06 DIAGNOSIS — E11.42 TYPE 2 DIABETES MELLITUS WITH DIABETIC POLYNEUROPATHY, WITHOUT LONG-TERM CURRENT USE OF INSULIN: Primary | ICD-10-CM

## 2018-11-07 ENCOUNTER — OFFICE VISIT (OUTPATIENT)
Dept: SLEEP MEDICINE | Facility: CLINIC | Age: 68
End: 2018-11-07
Payer: MEDICARE

## 2018-11-07 VITALS
SYSTOLIC BLOOD PRESSURE: 148 MMHG | BODY MASS INDEX: 30.11 KG/M2 | WEIGHT: 187.38 LBS | HEIGHT: 66 IN | HEART RATE: 101 BPM | DIASTOLIC BLOOD PRESSURE: 80 MMHG

## 2018-11-07 DIAGNOSIS — G47.33 OBSTRUCTIVE SLEEP APNEA ON CPAP: Primary | ICD-10-CM

## 2018-11-07 PROCEDURE — 99999 PR PBB SHADOW E&M-EST. PATIENT-LVL IV: CPT | Mod: PBBFAC,HCNC,, | Performed by: NURSE PRACTITIONER

## 2018-11-07 PROCEDURE — 1101F PT FALLS ASSESS-DOCD LE1/YR: CPT | Mod: CPTII,HCNC,S$GLB, | Performed by: NURSE PRACTITIONER

## 2018-11-07 PROCEDURE — 3077F SYST BP >= 140 MM HG: CPT | Mod: CPTII,HCNC,S$GLB, | Performed by: NURSE PRACTITIONER

## 2018-11-07 PROCEDURE — 3079F DIAST BP 80-89 MM HG: CPT | Mod: CPTII,HCNC,S$GLB, | Performed by: NURSE PRACTITIONER

## 2018-11-07 PROCEDURE — 99214 OFFICE O/P EST MOD 30 MIN: CPT | Mod: HCNC,S$GLB,, | Performed by: NURSE PRACTITIONER

## 2018-11-07 NOTE — PROGRESS NOTES
Lima Maldonado  68 y.o. year-old female returns for management of obstructive sleep apnea and CPAP equipment check.     INTERVAL HISTORY:    2018 ELIANA Beth NP: Continues to use CPAP regularly without breakthrough symptoms. Denies oral/nasal drying with nasal pillows/chinstrap combo. Denies pressure intolerance or air hunger. ESS 7.    CPAP Interrogation: Dreamstation CPAP 11 cm, Percentage of Days >=4 Hours: 86.7%, Average AHI: 0.5, Average % of Night in Large Leak: 0.2%, Average % of Night in PB: 0.8%, Machine condition: water cover chamber dirty     2017 ELIANA Beth NP: Patient returns today after set up of new CPAP machine on 2017. Patient complaints of snoring, interrupted sleep, excessive daytime sleepiness now resolved with CPAP use. Initially had issue getting used to nasal pillows + chin strap combo, but able to use it without issues now. Due for 4th cycle of chemo 2017.     Overall CPAP use: nightly   Mask comfort/fit: nasal pillows  Pressure tolerance: great  Humidification: 3  Nasal/Oral drying: Denied    CPAP Interrogation:   Set up date: 2017  Total usage: 219.7 hours   Total therapeutic: 219.3 hours   -day avg usage: 6 hours  Days >4 hours usage: 26 days   Manometer readin cm H20   AHI (predicted): 0.4   Mask fit: 100%  Periodic breathin%  Machine condition: like new     2017 ELIANA Beth NP The patient has symptoms of snoring, interrupted sleep, excessive daytime sleepiness now resolved with CPAP use.  Denies oral/nasal drying. Pressure feels good. Not a fan of CPAP but continues to wear it as she is experiencing symptomatic benefits from use.     Medical co-morbidities include: HTN, HLD, GERD    Have not been back to Sleep Clinic in over a year due to new dx of breast cancer. Had mastectomy 2016. Got port placed  to start chemo .     Overall CPAP use: nightly   Mask comfort/fit: Wisp mask with chin strap  Pressure tolerance:  "good  Humidification: set at 2  Nasal/Oral drying: Denied    CPAP Interrogation: Total usage: 6215 hours, nightly average 6.4 hours Manometer readin cm H20     On today's [place] Sleepiness Scale the patient scores a 8.      Baseline Sleep Study: 11: +NELDA, AHI 54.2, low 65%, split, titrated 11 cm (AHI<5); wt 193 lbs     Review of Systems:   Sleep related symptoms as per HPI. Otherwise, a balance of 10 systems reviewed is negative.    Physical Exam:   BP (!) 148/80 (BP Location: Right arm, Patient Position: Sitting)   Pulse 101   Ht 5' 6" (1.676 m)   Wt 85 kg (187 lb 6.3 oz)   BMI 30.25 kg/m²      GENERAL: Well groomed  Wt 187 lb (prior 193lb)    Assessment:     Obstructive sleep apnea, severe by AHI criteria,  with prior symptoms of  snoring, interrupted sleep, excessive daytime sleepiness , now resolved with CPAP use. The patient is adherent on CPAP and experiencing symptomatic benefit. Medical co-mobidities: HTN, HLD, GERD. This warrants continued treatment for NELDA.      Obesity, BMI > 30, discussed relationship between NELDA and weight     Plan:     Continue CPAP 11 cm. Clean water chamber well and replace all supplies including water chamber.  RTC 12 months, sooner if needed.     Education: During our discussion today, we talked about the etiology of obstructive sleep apnea as well as the potential ramifications of untreated sleep apnea, which could include daytime sleepiness, hypertension, heart disease and/or stroke. We discussed potential treatment options, which could include weight loss, body positioning, continuous positive airway pressure (CPAP), or referral for surgical consideration.     Behavior modification which includes losing weight, exercising, changing the sleep position, abstaining from alcohol, and avoiding certain medications    Precautions: The patient was advised to abstain from driving should they feel sleepy or drowsy            "

## 2018-11-20 ENCOUNTER — LAB VISIT (OUTPATIENT)
Dept: LAB | Facility: HOSPITAL | Age: 68
End: 2018-11-20
Attending: INTERNAL MEDICINE
Payer: MEDICARE

## 2018-11-20 DIAGNOSIS — E11.9 CONTROLLED TYPE 2 DIABETES MELLITUS WITHOUT COMPLICATION, WITHOUT LONG-TERM CURRENT USE OF INSULIN: ICD-10-CM

## 2018-11-20 LAB
ALBUMIN/CREAT UR: 16.7 UG/MG
CREAT UR-MCNC: 114 MG/DL
MICROALBUMIN UR DL<=1MG/L-MCNC: 19 UG/ML

## 2018-11-20 PROCEDURE — 82043 UR ALBUMIN QUANTITATIVE: CPT | Mod: HCNC

## 2018-11-27 ENCOUNTER — OFFICE VISIT (OUTPATIENT)
Dept: OTOLARYNGOLOGY | Facility: CLINIC | Age: 68
End: 2018-11-27
Payer: MEDICARE

## 2018-11-27 VITALS
HEART RATE: 91 BPM | DIASTOLIC BLOOD PRESSURE: 72 MMHG | HEIGHT: 66 IN | WEIGHT: 187 LBS | SYSTOLIC BLOOD PRESSURE: 121 MMHG | BODY MASS INDEX: 30.05 KG/M2

## 2018-11-27 DIAGNOSIS — H72.92 PERFORATION OF LEFT TYMPANIC MEMBRANE: Primary | ICD-10-CM

## 2018-11-27 DIAGNOSIS — J30.9 ALLERGIC RHINITIS, UNSPECIFIED SEASONALITY, UNSPECIFIED TRIGGER: ICD-10-CM

## 2018-11-27 DIAGNOSIS — J34.2 NASAL SEPTAL DEVIATION: ICD-10-CM

## 2018-11-27 DIAGNOSIS — H93.13 TINNITUS OF BOTH EARS: ICD-10-CM

## 2018-11-27 DIAGNOSIS — H90.A22 SENSORINEURAL HEARING LOSS (SNHL) OF LEFT EAR WITH RESTRICTED HEARING OF RIGHT EAR: ICD-10-CM

## 2018-11-27 DIAGNOSIS — R05.9 COUGH: ICD-10-CM

## 2018-11-27 PROCEDURE — 3078F DIAST BP <80 MM HG: CPT | Mod: CPTII,S$GLB,, | Performed by: SPECIALIST

## 2018-11-27 PROCEDURE — 99214 OFFICE O/P EST MOD 30 MIN: CPT | Mod: S$GLB,,, | Performed by: SPECIALIST

## 2018-11-27 PROCEDURE — 1101F PT FALLS ASSESS-DOCD LE1/YR: CPT | Mod: CPTII,S$GLB,, | Performed by: SPECIALIST

## 2018-11-27 PROCEDURE — 3074F SYST BP LT 130 MM HG: CPT | Mod: CPTII,S$GLB,, | Performed by: SPECIALIST

## 2018-11-28 NOTE — PROGRESS NOTES
Subjective:       Patient ID: Lima Maldonado is a 68 y.o. female.    Chief Complaint: Follow-up    The patient is returning for a follow-up visit.  She has had no pain or drainage from her left ear. She has been having some nasal congestion and postnasal drip and coughing.  She takes Astelin, Flonase and Singulair on a daily basis.  She does have some tightness in her chest when she coughs.  Nasal secretions are clear.  She is not having fever.  Overall, she is particularly  functioning at a normal level.      Review of Systems   Constitutional: Positive for fatigue. Negative for activity change, appetite change, chills, fever and unexpected weight change.   HENT: Positive for congestion, hearing loss, postnasal drip, rhinorrhea and tinnitus. Negative for ear discharge, ear pain, facial swelling, mouth sores, sinus pressure, sinus pain, sneezing, sore throat, trouble swallowing and voice change.    Eyes: Negative for photophobia, pain, discharge, redness, itching and visual disturbance.   Respiratory: Positive for cough and shortness of breath. Negative for apnea, choking and wheezing.    Cardiovascular: Negative for chest pain and palpitations.   Gastrointestinal: Negative for abdominal distention, abdominal pain, nausea and vomiting.   Musculoskeletal: Negative for arthralgias, myalgias, neck pain and neck stiffness.   Skin: Negative.  Negative for color change, pallor and rash.   Allergic/Immunologic: Positive for environmental allergies. Negative for food allergies and immunocompromised state.   Neurological: Positive for headaches. Negative for dizziness, facial asymmetry, speech difficulty, weakness, light-headedness and numbness.   Hematological: Negative for adenopathy. Does not bruise/bleed easily.   Psychiatric/Behavioral: Negative for confusion, decreased concentration and sleep disturbance.       Objective:      Physical Exam   Constitutional: She is oriented to person, place, and time. She appears  well-developed and well-nourished. She is cooperative.   HENT:   Head: Normocephalic.   Right Ear: External ear and ear canal normal. Tympanic membrane is retracted.   Left Ear: External ear and ear canal normal. Tympanic membrane is retracted.   Nose: Mucosal edema (cyanotic, boggy inferior turbinates bilaterally), rhinorrhea (clear mucus bilaterally) and septal deviation (To the right) present.   Mouth/Throat: Uvula is midline, oropharynx is clear and moist and mucous membranes are normal. No oral lesions.   Eyes: EOM and lids are normal. Pupils are equal, round, and reactive to light. Right eye exhibits no discharge and no exudate. Left eye exhibits no discharge and no exudate. Right conjunctiva is injected. Left conjunctiva is injected.   Neck: Trachea normal and normal range of motion. No muscular tenderness present. No tracheal deviation present. No thyroid mass and no thyromegaly present.   Cardiovascular: Normal rate, regular rhythm, normal heart sounds and normal pulses.   Pulmonary/Chest: Effort normal. No stridor. She has decreased breath sounds. She has no wheezes. She has no rhonchi. She has no rales.   Abdominal: Soft. Bowel sounds are normal. There is no tenderness.   Musculoskeletal: Normal range of motion.   Lymphadenopathy:        Head (right side): No submental, no submandibular, no preauricular, no posterior auricular and no occipital adenopathy present.        Head (left side): No submental, no submandibular, no preauricular, no posterior auricular and no occipital adenopathy present.     She has no cervical adenopathy.   Neurological: She is alert and oriented to person, place, and time. She has normal strength. No cranial nerve deficit or sensory deficit. Gait normal.   Skin: Skin is warm and dry. No petechiae and no rash noted. No cyanosis. Nails show no clubbing.   Psychiatric: She has a normal mood and affect. Her speech is normal and behavior is normal. Judgment and thought content normal.  Cognition and memory are normal.       Assessment:       1. Perforation of left tympanic membrane    2. Sensorineural hearing loss (SNHL) of left ear with restricted hearing of right ear    3. Tinnitus of both ears    4. Nasal septal deviation    5. Allergic rhinitis, unspecified seasonality, unspecified trigger    6. Cough        Plan:       The patient's tympanic membrane perforation has healed.  I had a long discussion with her about her persistent cough.  It may repeat represent sino-pulmonary syndrome and reactive airway disease .  I explained how important it was for her to control her postnasal drip.  She is currently using 3 drugs for We discussed a tear to proceed allergy management with her using Flonase as a primary drug, Astelin as a 2nd drug (1st to be added on) and Singulair as a tertiary drug when other symptoms persist.  When symptoms are quite she can try dropping off 1st Singulair and then the Astelin and then the Flonase.  I will recheck her in 3 months, or sooner on an as-needed basis.  If her ier chronic coughing continues I winter return earlier.  She may need to be referred for pulmonology evaluation for asthma.

## 2018-11-29 ENCOUNTER — OFFICE VISIT (OUTPATIENT)
Dept: INTERNAL MEDICINE | Facility: CLINIC | Age: 68
End: 2018-11-29
Attending: INTERNAL MEDICINE
Payer: MEDICARE

## 2018-11-29 VITALS
WEIGHT: 188.5 LBS | DIASTOLIC BLOOD PRESSURE: 66 MMHG | OXYGEN SATURATION: 95 % | HEART RATE: 111 BPM | BODY MASS INDEX: 30.29 KG/M2 | SYSTOLIC BLOOD PRESSURE: 119 MMHG | HEIGHT: 66 IN

## 2018-11-29 DIAGNOSIS — I70.0 AORTIC ATHEROSCLEROSIS: ICD-10-CM

## 2018-11-29 DIAGNOSIS — E11.42 TYPE 2 DIABETES MELLITUS WITH DIABETIC POLYNEUROPATHY, WITHOUT LONG-TERM CURRENT USE OF INSULIN: Primary | ICD-10-CM

## 2018-11-29 DIAGNOSIS — C50.512 MALIGNANT NEOPLASM OF LOWER-OUTER QUADRANT OF LEFT BREAST OF FEMALE, ESTROGEN RECEPTOR POSITIVE: ICD-10-CM

## 2018-11-29 DIAGNOSIS — E78.00 PURE HYPERCHOLESTEROLEMIA: ICD-10-CM

## 2018-11-29 DIAGNOSIS — Z17.0 MALIGNANT NEOPLASM OF LOWER-OUTER QUADRANT OF LEFT BREAST OF FEMALE, ESTROGEN RECEPTOR POSITIVE: ICD-10-CM

## 2018-11-29 DIAGNOSIS — G62.0 CHEMOTHERAPY-INDUCED NEUROPATHY: ICD-10-CM

## 2018-11-29 DIAGNOSIS — E66.09 CLASS 1 OBESITY DUE TO EXCESS CALORIES WITH SERIOUS COMORBIDITY AND BODY MASS INDEX (BMI) OF 30.0 TO 30.9 IN ADULT: ICD-10-CM

## 2018-11-29 DIAGNOSIS — T45.1X5A CHEMOTHERAPY-INDUCED NEUROPATHY: ICD-10-CM

## 2018-11-29 DIAGNOSIS — I10 ESSENTIAL HYPERTENSION: ICD-10-CM

## 2018-11-29 PROBLEM — E66.811 CLASS 1 OBESITY DUE TO EXCESS CALORIES WITH SERIOUS COMORBIDITY AND BODY MASS INDEX (BMI) OF 30.0 TO 30.9 IN ADULT: Status: ACTIVE | Noted: 2018-11-29

## 2018-11-29 PROCEDURE — 3074F SYST BP LT 130 MM HG: CPT | Mod: CPTII,S$GLB,, | Performed by: INTERNAL MEDICINE

## 2018-11-29 PROCEDURE — 99999 PR PBB SHADOW E&M-EST. PATIENT-LVL IV: CPT | Mod: PBBFAC,,, | Performed by: INTERNAL MEDICINE

## 2018-11-29 PROCEDURE — 3045F PR MOST RECENT HEMOGLOBIN A1C LEVEL 7.0-9.0%: CPT | Mod: CPTII,S$GLB,, | Performed by: INTERNAL MEDICINE

## 2018-11-29 PROCEDURE — 1101F PT FALLS ASSESS-DOCD LE1/YR: CPT | Mod: CPTII,S$GLB,, | Performed by: INTERNAL MEDICINE

## 2018-11-29 PROCEDURE — 3078F DIAST BP <80 MM HG: CPT | Mod: CPTII,S$GLB,, | Performed by: INTERNAL MEDICINE

## 2018-11-29 PROCEDURE — 99214 OFFICE O/P EST MOD 30 MIN: CPT | Mod: S$GLB,,, | Performed by: INTERNAL MEDICINE

## 2018-11-29 NOTE — PROGRESS NOTES
Subjective:       Patient ID: Lima Maldonado is a 68 y.o. female.    Chief Complaint: Follow-up     Lima Maldonado is a 68 y.o.  female who presents for Follow-up  .  Pt has a history of HTN.  Counseled on low salt diet and graded exercise program. Denies CP, SOB, orthopnea or PND.  Currently treated with antihypertensives listed in med card and compliant most of the time. No side effects from medication noted by patient.         Diabetes   She presents for her follow-up diabetic visit. She has type 2 diabetes mellitus. Her disease course has been worsening. There are no hypoglycemic associated symptoms. Pertinent negatives for hypoglycemia include no confusion, dizziness, pallor, seizures or speech difficulty. Associated symptoms include fatigue and polyuria. Pertinent negatives for diabetes include no chest pain, no polydipsia, no polyphagia, no weakness and no weight loss. There are no hypoglycemic complications.   Thyroid Problem   Presents for follow-up visit. Symptoms include fatigue. Patient reports no cold intolerance, constipation, depressed mood, palpitations, weight gain or weight loss. The symptoms have been stable.     Review of Systems   Constitutional: Positive for fatigue. Negative for chills, fever, weight gain and weight loss.   Eyes: Negative for pain and redness.   Respiratory: Negative for cough and shortness of breath.    Cardiovascular: Negative for chest pain and palpitations.   Gastrointestinal: Negative for constipation, nausea and vomiting.   Endocrine: Positive for polyuria. Negative for cold intolerance, polydipsia and polyphagia.   Genitourinary: Negative for dysuria and hematuria.   Skin: Negative for pallor.   Neurological: Negative for dizziness, seizures, speech difficulty and weakness.   Psychiatric/Behavioral: Negative for confusion.       Patient Active Problem List   Diagnosis    Hyperlipidemia    Hypothyroid    Obstructive sleep apnea on CPAP    GERD (gastroesophageal  reflux disease)    Hepatomegaly    Fatty liver    HTN (hypertension)    Vaginal atrophy    Senile osteoporosis    Primary insomnia    Malignant neoplasm of lower-outer quadrant of left female breast    Disruption of external operation (surgical) wound    Dermatitis of eyelid of left eye due to herpes zoster    Aortic atherosclerosis    Type 2 diabetes mellitus with diabetic polyneuropathy, without long-term current use of insulin    Chemotherapy-induced neuropathy    Stress incontinence of urine    Screening for colon cancer    Sensorineural hearing loss (SNHL) of left ear with restricted hearing of right ear    Perforation of left tympanic membrane    Allergic rhinitis    Nasal septal deviation    Tinnitus of both ears    Class 1 obesity due to excess calories with serious comorbidity and body mass index (BMI) of 30.0 to 30.9 in adult    Diabetic polyneuropathy associated with type 2 diabetes mellitus    Obesity, diabetes, and hypertension syndrome       Past Medical History:   Diagnosis Date    Acute cystitis without hematuria 7/17/2017    Arthritis     Back pain     Breast cancer     originally dx in 02/1997- treated with surgery, chemo and radiation     Elevated bilirubin 11/19/2013    Fatty liver     GERD (gastroesophageal reflux disease)     Glucose intolerance (impaired glucose tolerance)     Hyperlipidemia     Hypertension     Hypothyroid     Malignant neoplasm of lower-outer quadrant of left female breast 11/15/2016    Obesity     Osteopenia     Osteoporosis     Primary insomnia 11/2/2016    Shingles     Sleep apnea     wears CPAP nightly     Squamous cell carcinoma 2011    right forearm, sx by Dr. Corinne Ray    Stress incontinence of urine 1/11/2018    Thyrotoxicosis without mention of goiter or other cause, without mention of thyrotoxic crisis or storm     hypothyroidism    Trouble in sleeping     Type 2 diabetes mellitus with diabetic polyneuropathy,  without long-term current use of insulin 12/26/2017    Urinary incontinence     Well woman exam with routine gynecological exam 11/2/2016       Past Surgical History:   Procedure Laterality Date    APPENDECTOMY  2008    removed during hysterectomy surgery     Breast implant Bilateral 1998    implants removed in 2003    BREAST SURGERY      see details below     COLONOSCOPY N/A 5/15/2018    [facility] ([facility])    EGD (ESOPHAGOGASTRODUODENOSCOPY) N/A 8/5/2013    [facility] Omar [facility]riz [facility] at [facility] (4TH [facility])    EGD (ESOPHAGOGASTRODUODENOSCOPY) N/A 6/4/2013    [facility] at [facility] (4TH F[facility])    HYSTERECTOMY  2008    benign    OUEILUTRU-ZTHO-G-CATH Right 1/23/2017    [facility] at [facility] 2ND FLR    left breast reconstruction  2005    left modified radical mastectomy  Feb 1997    for treatment of breast cancer     LIPOMA RESECTION  2010    removed from upper back area     MASTECTOMY-BREAST-BILATERAL NON-NIPPLE SPARING Bilateral 11/16/2016    [facility]    MODIFIED RADICAL MASTECTOMY W/ AXILLARY LYMPH NODE DISSECTION  02/1997    PELVIC LAPAROSCOPY  1978    pt had endometriosis     PERFUNDA ARTERY  FREE FLAP Bilateral 11/16/2016    [facility]    MA REMOVAL OF OVARY/TUBE(S)  2008     benign    reduction of mons pubis  2011    robotic lap  hysterectomy with bso  2008    UPPER GASTROINTESTINAL ENDOSCOPY  8/05/2013       Family History   Problem Relation Age of Onset    Heart attack Father 51    Heart disease Father     Breast cancer Sister 51        BRCA 1/2 negative    Cancer Sister         breast    Cancer Mother 65        malignant melanoma     No Known Problems Brother     No Known Problems Daughter     No Known Problems Son     No Known Problems Daughter     No Known Problems Son     Uterine cancer Neg Hx     Colon  "cancer Neg Hx     Celiac disease Neg Hx     Esophageal cancer Neg Hx     Inflammatory bowel disease Neg Hx     Liver cancer Neg Hx     Liver disease Neg Hx     Stomach cancer Neg Hx     Ulcerative colitis Neg Hx     Hypertension Neg Hx     Cirrhosis Neg Hx     Crohn's disease Neg Hx     Rectal cancer Neg Hx     Ovarian cancer Neg Hx        Social History     Tobacco Use    Smoking status: Former Smoker     Packs/day: 0.50     Years: 3.00     Pack years: 1.50     Start date: 4/15/1969     Last attempt to quit: 4/15/1972     Years since quittin.7    Smokeless tobacco: Never Used    Tobacco comment: started at age 18 during college    Substance Use Topics    Alcohol use: Yes     Alcohol/week: 0.0 oz     Comment: occasional, 1 cocktail once monthly    Drug use: No       Objective:   Blood pressure 119/66, pulse (!) 111, height 5' 6" (1.676 m), weight 85.5 kg (188 lb 7.9 oz), SpO2 95 %.     Physical Exam   Constitutional: She is oriented to person, place, and time. She appears well-developed and well-nourished.   HENT:   Head: Normocephalic and atraumatic.   Neck: Carotid bruit is not present. No thyromegaly present.   Cardiovascular: Normal rate and normal heart sounds. Exam reveals no gallop and no friction rub.   No murmur heard.  Pulmonary/Chest: Effort normal and breath sounds normal. She has no wheezes. She has no rales.   Abdominal: Soft. Bowel sounds are normal. She exhibits no distension. There is no tenderness.   Musculoskeletal: She exhibits no edema or tenderness.   Lymphadenopathy:     She has no cervical adenopathy.        Right: No supraclavicular adenopathy present.        Left: No supraclavicular adenopathy present.   Neurological: She is alert and oriented to person, place, and time. She has normal reflexes.   Skin: Skin is warm and dry.   Psychiatric: She has a normal mood and affect. Her behavior is normal.       Prior labs reviewed  Assessment/Plan:        Lima was seen today " for follow-up.    Diagnoses and all orders for this visit:    Type 2 diabetes mellitus with diabetic polyneuropathy, without long-term current use of insulin  -     Diabetes Digital Medicine (DDMP) Enrollment Order  -     Hemoglobin A1c; Future  -     Ambulatory Referral to Diabetes Education  -     Ambulatory Referral to Podiatry  Uncontrolled, increase metformin to 500 mg bid dosing  Increase exercise, refer to diabetic education    Aortic atherosclerosis  On statin, ASA  Recommend heart healthy plant based diet    Chemotherapy-induced neuropathy  Managed by heme/onc  Stable    Essential hypertension  -     Hypertension Digital Medicine (HDMP) Enrollment Order  -     Hypertension Digital Medicine (HDMP): Assign Onboarding Questionnaires  Well controlled  Cont current BP medication(s) and low salt diet    Class 1 obesity due to excess calories with serious comorbidity and body mass index (BMI) of 30.0 to 30.9 in adult     - rec diet and exercise  - increase intensity and duration of CV exercise to continue weight loss  - goal wt loss one pound per week  - portion control, healthy choices        Pure hypercholesterolemia  -     Comprehensive metabolic panel; Future  -     Lipid panel; Future  Above goal, strict dietary compliance    Malignant neoplasm of lower-outer quadrant of left breast of female, estrogen receptor positive  Stable  On letrozole  Followed by heme/onc           Medication List           Accurate as of 11/29/18 11:59 PM. If you have any questions, ask your nurse or doctor.               CHANGE how you take these medications    losartan 50 MG tablet  Commonly known as:  COZAAR  TAKE 1 TABLET BY MOUTH EVERY DAY  What changed:  Another medication with the same name was removed. Continue taking this medication, and follow the directions you see here.  Changed by:  Joie Mccall MD        CONTINUE taking these medications    azelastine 137 mcg (0.1 %) nasal spray  Commonly known as:  ASTELIN  Two  sprays in each nostril, sniff gently until absorbed and then follow with 1 spray of fluticasone, use both sprays twice daily as needed to control allergy symptoms     CALTRATE 600 + D ORAL     celecoxib 100 MG capsule  Commonly known as:  CeleBREX  Take 1 capsule (100 mg total) by mouth daily as needed.     COQ10  100-100 mg-unit Cap  Generic drug:  coenzyme Q10-vitamin E     estradiol 10 mcg Tab  Commonly known as:  VAGIFEM  Place 1 tablet (10 mcg total) vaginally twice a week.     FISH OIL 1,000 mg Cap  Generic drug:  omega-3 fatty acids-vitamin E     fluticasone 50 mcg/actuation nasal spray  Commonly known as:  FLONASE     gabapentin 300 MG capsule  Commonly known as:  NEURONTIN  TAKE 1 CAPSULE (300 MG TOTAL) BY MOUTH EVERY EVENING.     HYDROcodone-acetaminophen  mg per tablet  Commonly known as:  NORCO     letrozole 2.5 mg Tab  Commonly known as:  FEMARA     levothyroxine 50 MCG tablet  Commonly known as:  SYNTHROID  TAKE 1 TABLET (50 MCG TOTAL) BY MOUTH ONCE DAILY.     metFORMIN 500 MG tablet  Commonly known as:  GLUCOPHAGE  TAKE 1 TABLET (500 MG TOTAL) BY MOUTH EVERY EVENING.     MULTIVITAMIN ORAL     ondansetron 8 MG Tbdl  Commonly known as:  ZOFRAN-ODT     pantoprazole 40 MG tablet  Commonly known as:  PROTONIX  Take 1 tablet (40 mg total) by mouth before breakfast.     simvastatin 20 MG tablet  Commonly known as:  ZOCOR  TAKE 1 TABLET (20 MG TOTAL) BY MOUTH EVERY EVENING.     VITAMIN C 1000 MG tablet  Generic drug:  ascorbic acid (vitamin C)     vitamin D 1000 units Tab  Commonly known as:  VITAMIN D3

## 2018-11-30 ENCOUNTER — TELEPHONE (OUTPATIENT)
Dept: INTERNAL MEDICINE | Facility: CLINIC | Age: 68
End: 2018-11-30

## 2018-11-30 NOTE — TELEPHONE ENCOUNTER
Left voice message for patient to schedule appointment from referral to Podiatry Clinic.  Goyo MALHOTRA  [phone]

## 2018-12-03 RX ORDER — LEVOTHYROXINE SODIUM 50 UG/1
TABLET ORAL
Qty: 90 TABLET | Refills: 3 | Status: SHIPPED | OUTPATIENT
Start: 2018-12-03 | End: 2019-11-18 | Stop reason: SDUPTHER

## 2018-12-11 ENCOUNTER — OFFICE VISIT (OUTPATIENT)
Dept: PODIATRY | Facility: CLINIC | Age: 68
End: 2018-12-11
Payer: MEDICARE

## 2018-12-11 VITALS
HEIGHT: 66 IN | DIASTOLIC BLOOD PRESSURE: 78 MMHG | BODY MASS INDEX: 30.42 KG/M2 | SYSTOLIC BLOOD PRESSURE: 128 MMHG | HEART RATE: 102 BPM

## 2018-12-11 DIAGNOSIS — E11.9 COMPREHENSIVE DIABETIC FOOT EXAMINATION, TYPE 2 DM, ENCOUNTER FOR: ICD-10-CM

## 2018-12-11 DIAGNOSIS — E11.42 TYPE 2 DIABETES MELLITUS WITH DIABETIC POLYNEUROPATHY, WITHOUT LONG-TERM CURRENT USE OF INSULIN: Primary | ICD-10-CM

## 2018-12-11 DIAGNOSIS — R20.2 PARESTHESIA OF BOTH LOWER EXTREMITIES: ICD-10-CM

## 2018-12-11 DIAGNOSIS — T45.1X5A CHEMOTHERAPY-INDUCED NEUROPATHY: ICD-10-CM

## 2018-12-11 DIAGNOSIS — G62.0 CHEMOTHERAPY-INDUCED NEUROPATHY: ICD-10-CM

## 2018-12-11 PROCEDURE — 1101F PT FALLS ASSESS-DOCD LE1/YR: CPT | Mod: CPTII,HCNC,S$GLB, | Performed by: PODIATRIST

## 2018-12-11 PROCEDURE — 3074F SYST BP LT 130 MM HG: CPT | Mod: CPTII,HCNC,S$GLB, | Performed by: PODIATRIST

## 2018-12-11 PROCEDURE — 99213 OFFICE O/P EST LOW 20 MIN: CPT | Mod: HCNC,S$GLB,, | Performed by: PODIATRIST

## 2018-12-11 PROCEDURE — 99999 PR PBB SHADOW E&M-EST. PATIENT-LVL III: CPT | Mod: PBBFAC,HCNC,, | Performed by: PODIATRIST

## 2018-12-11 PROCEDURE — 3045F PR MOST RECENT HEMOGLOBIN A1C LEVEL 7.0-9.0%: CPT | Mod: CPTII,HCNC,S$GLB, | Performed by: PODIATRIST

## 2018-12-11 PROCEDURE — 3078F DIAST BP <80 MM HG: CPT | Mod: CPTII,HCNC,S$GLB, | Performed by: PODIATRIST

## 2018-12-11 NOTE — LETTER
December 12, 2018      Joie Mccall MD  [address]  [address]  [facility] [address]           [facility] - Podiatry  [address]  [facility] [address]  Phone: [phone]          Patient: Lima Maldonado   MR Number: [id number]   YOB: 1950   Date of Visit: 12/11/2018       Dear Dr. Joie Mccall:    Thank you for referring Lima Maldonado to me for evaluation. Attached you will find relevant portions of my assessment and plan of care.    If you have questions, please do not hesitate to call me. I look forward to following Lima Maldonado along with you.    Sincerely,    Paolo Ramirez DPM    Enclosure  CC:  No Recipients    If you would like to receive this communication electronically, please contact [email or web address] or [phone] to request more information on [email or web address] access.    For providers and/or their staff who would like to refer a patient to [facility], please contact us through our one-stop-shop provider referral line, [facility], at [phone].    If you feel you have received this communication in error or would no longer like to receive these types of communications, please e-mail [email or web address]

## 2018-12-11 NOTE — PROGRESS NOTES
Subjective:      Patient ID: Lima Maldonado is a 68 y.o. female.    Chief Complaint: Diabetes Mellitus (PCP Dr. Mccall 11/29/18); Diabetic Foot Exam; Routine Foot Care; and Peripheral Neuropathy    Lima is a 68 y.o. female who presents to the clinic upon referral from Dr. Mccall  for evaluation and treatment of diabetic feet. Lima has a past medical history of Acute cystitis without hematuria (7/17/2017), Arthritis, Back pain, Breast cancer, Elevated bilirubin (11/19/2013), Fatty liver, GERD (gastroesophageal reflux disease), Glucose intolerance (impaired glucose tolerance), Hyperlipidemia, Hypertension, Hypothyroid, Obesity, Osteopenia, Osteoporosis, Primary insomnia (11/2/2016), Shingles, Sleep apnea, Squamous cell carcinoma (2011), Stress incontinence of urine (1/11/2018), Thyrotoxicosis without mention of goiter or other cause, without mention of thyrotoxic crisis or storm, Trouble in sleeping, Type 2 diabetes mellitus with diabetic polyneuropathy, without long-term current use of insulin (12/26/2017), Urinary incontinence, and Well woman exam with routine gynecological exam (11/2/2016). She is here today for annual DM foot exam. Patient relates no major problem with feet. Only complaints today consist of continued numbness to toes and feet which she feels has gotten worse with time. Now she sometimes feels burning/tingling pain in the feet as well. She has also undergone chemotherapy which she feels made her neuropathy worse. She is on gabapentin 300mg nightly however she states that she is not sure if it is helping much. She is inquiring about treatment options for the occasional pain other than pills.     PCP: Joie Mccall MD    Date Last Seen by PCP:   Chief Complaint   Patient presents with    Diabetes Mellitus     PCP Dr. Mccall 11/29/18    Diabetic Foot Exam    Routine Foot Care    Peripheral Neuropathy       Current shoe gear: Casual shoes    Hemoglobin A1C   Date Value Ref Range Status    11/20/2018 7.7 (H) 4.0 - 5.6 % Final     Comment:     ADA Screening Guidelines:  5.7-6.4%  Consistent with prediabetes  >or=6.5%  Consistent with diabetes  High levels of fetal hemoglobin interfere with the HbA1C  assay. Heterozygous hemoglobin variants (HbS, HgC, etc)do  not significantly interfere with this assay.   However, presence of multiple variants may affect accuracy.     12/20/2017 6.7 (H) 4.0 - 5.6 % Final     Comment:     According to ADA guidelines, hemoglobin A1c <7.0% represents  optimal control in non-pregnant diabetic patients. Different  metrics may apply to specific patient populations.   Standards of Medical Care in Diabetes-2016.  For the purpose of screening for the presence of diabetes:  <5.7%     Consistent with the absence of diabetes  5.7-6.4%  Consistent with increasing risk for diabetes   (prediabetes)  >or=6.5%  Consistent with diabetes  Currently, no consensus exists for use of hemoglobin A1c  for diagnosis of diabetes for children.  This Hemoglobin A1c assay has significant interference with fetal   hemoglobin   (HbF). The results are invalid for patients with abnormal amounts of   HbF,   including those with known Hereditary Persistence   of Fetal Hemoglobin. Heterozygous hemoglobin variants (HbAS, HbAC,   HbAD, HbAE, HbA2) do not significantly interfere with this assay;   however, presence of multiple variants in a sample may impact the %   interference.     08/08/2017 6.4 (H) 4.0 - 5.6 % Final     Comment:     According to ADA guidelines, hemoglobin A1c <7.0% represents  optimal control in non-pregnant diabetic patients. Different  metrics may apply to specific patient populations.   Standards of Medical Care in Diabetes-2016.  For the purpose of screening for the presence of diabetes:  <5.7%     Consistent with the absence of diabetes  5.7-6.4%  Consistent with increasing risk for diabetes   (prediabetes)  >or=6.5%  Consistent with diabetes  Currently, no consensus exists for use  of hemoglobin A1c  for diagnosis of diabetes for children.  This Hemoglobin A1c assay has significant interference with fetal   hemoglobin   (HbF). The results are invalid for patients with abnormal amounts of   HbF,   including those with known Hereditary Persistence   of Fetal Hemoglobin. Heterozygous hemoglobin variants (HbAS, HbAC,   HbAD, HbAE, HbA2) do not significantly interfere with this assay;   however, presence of multiple variants in a sample may impact the %   interference.         Past Medical History:   Diagnosis Date    Acute cystitis without hematuria 7/17/2017    Arthritis     Back pain     Breast cancer     originally dx in 02/1997- treated with surgery, chemo and radiation     Elevated bilirubin 11/19/2013    Fatty liver     GERD (gastroesophageal reflux disease)     Glucose intolerance (impaired glucose tolerance)     Hyperlipidemia     Hypertension     Hypothyroid     Obesity     Osteopenia     Osteoporosis     Primary insomnia 11/2/2016    Shingles     Sleep apnea     wears CPAP nightly     Squamous cell carcinoma 2011    right forearm, sx by Dr. Corinne Ray    Stress incontinence of urine 1/11/2018    Thyrotoxicosis without mention of goiter or other cause, without mention of thyrotoxic crisis or storm     hypothyroidism    Trouble in sleeping     Type 2 diabetes mellitus with diabetic polyneuropathy, without long-term current use of insulin 12/26/2017    Urinary incontinence     Well woman exam with routine gynecological exam 11/2/2016       Past Surgical History:   Procedure Laterality Date    APPENDECTOMY  2008    removed during hysterectomy surgery     Breast implant Bilateral 1998    implants removed in 2003    BREAST SURGERY      see details below     COLONOSCOPY N/A 5/15/2018    Procedure: COLONOSCOPY;  Surgeon: Roldan Gonzales MD;  Location: [address]);  Service: Endoscopy;  Laterality: N/A;    COLONOSCOPY N/A 5/15/2018    Performed by Roldan STEPHENS  [facility] (4TH FLR)    EGD (ESOPHAGOGASTRODUODENOSCOPY) N/A 8/5/2013    Performed by Omar [facility]riz [facility] ENDO (4TH FLR)    EGD (ESOPHAGOGASTRODUODENOSCOPY) N/A 6/4/2013    Per[facility] by [facility] ENDO (4TH FLR)    HYSTERECTOMY  2008    benign    [id number] Right 1/23/2017    Performed by Jimmy [facility]ains [facility] OR 2ND FLR    left breast reconstruction  2005    left modified radical mastectomy  Feb 1997    for treatment of breast cancer     LIPOMA RESECTION  2010    removed from upper back area     MASTECTOMY-BREAST-BILATERAL NON-NIPPLE SPARING Bilateral 11/16/2016    [facility]    MODIFIED RADICAL MASTECTOMY W/ AXILLARY LYMPH NODE DISSECTION  02/1997    PELVIC LAPAROSCOPY  1978    pt had endometriosis     PERFUNDA ARTERY  FREE FLAP Bilateral 11/16/2016    Per[facility] Krunal Arboleda MD at [facility] OR    [place] REMOVAL OF OVARY/TUBE(S)  2008     benign    reduction of mons pubis  2011    robotic lap  hysterectomy with bso  2008    UPPER GASTROINTESTINAL ENDOSCOPY  8/05/2013       Family History   Problem Relation Age of Onset    Heart attack Father 51    Heart disease Father     Breast cancer Sister 51        BRCA 1/2 negative    Cancer Sister         breast    Cancer Mother 65        malignant melanoma     No Known Problems Brother     No Known Problems Daughter     No Known Problems Son     No Known Problems Daughter     No Known Problems Son     Uterine cancer Neg Hx     Colon cancer Neg Hx     Celiac disease Neg Hx     Esophageal cancer Neg Hx     Inflammatory bowel disease Neg Hx     Liver cancer Neg Hx     Liver disease Neg Hx     Stomach cancer Neg Hx     Ulcerative colitis Neg Hx     Hypertension Neg Hx     Cirrhosis Neg Hx     Crohn's disease Neg Hx     Rectal cancer Neg Hx     Ovarian cancer Neg Hx        Social History     Socioeconomic History    Marital status:       Spouse name: None    Number of children: None    Years of education: None    Highest education level: None   Social Needs    Financial resource strain: None    Food insecurity - worry: None    Food insecurity - inability: None    Transportation needs - medical: None    Transportation needs - non-medical: None   Occupational History    None   Tobacco Use    Smoking status: Former Smoker     Packs/day: 0.50     Years: 3.00     Pack years: 1.50     Start date: 4/15/1969     Last attempt to quit: 4/15/1972     Years since quittin.6    Smokeless tobacco: Never Used    Tobacco comment: started at age 18 during college    Substance and Sexual Activity    Alcohol use: Yes     Alcohol/week: 0.0 oz     Comment: occasional, 1 cocktail once monthly    Drug use: No    Sexual activity: Not Currently     Partners: Male   Other Topics Concern    None   Social History Narrative    None       Current Outpatient Medications   Medication Sig Dispense Refill    ascorbic acid (VITAMIN C) 1000 MG tablet Take 1 tablet by mouth once daily.      azelastine (ASTELIN) 137 mcg (0.1 %) nasal spray Two sprays in each nostril, sniff gently until absorbed and then follow with 1 spray of fluticasone, use both sprays twice daily as needed to control allergy symptoms 30 mL 11    CALCIUM CARBONATE/VITAMIN D3 (CALTRATE 600 + D ORAL) Take 1 tablet by mouth 2 (two) times daily.       celecoxib (CELEBREX) 100 MG capsule Take 1 capsule (100 mg total) by mouth daily as needed. 30 capsule 6    coenzyme Q10-vitamin E (COQ10 ) 100-100 mg-unit Cap Take 1 capsule by mouth once daily.       estradiol 10 mcg Tab Place 1 tablet (10 mcg total) vaginally twice a week. 24 tablet 3    fluticasone (FLONASE) 50 mcg/actuation nasal spray       gabapentin (NEURONTIN) 300 MG capsule TAKE 1 CAPSULE (300 MG TOTAL) BY MOUTH EVERY EVENING. 30 capsule 11    HYDROcodone-acetaminophen (NORCO)  mg per tablet       letrozole (FEMARA)  2.5 mg Tab       levothyroxine (SYNTHROID) 50 MCG tablet TAKE 1 TABLET BY MOUTH EVERY DAY 90 tablet 3    losartan (COZAAR) 50 MG tablet TAKE 1 TABLET BY MOUTH EVERY DAY 90 tablet 3    MULTIVITAMIN ORAL once daily.       omega-3 fatty acids-vitamin E (FISH OIL) 1,000 mg Cap Take 1 capsule by mouth once daily.       ondansetron (ZOFRAN-ODT) 8 MG TbDL       pantoprazole (PROTONIX) 40 MG tablet Take 1 tablet (40 mg total) by mouth before breakfast. 90 tablet 3    simvastatin (ZOCOR) 20 MG tablet TAKE 1 TABLET (20 MG TOTAL) BY MOUTH EVERY EVENING. 90 tablet 3    vitamin D 1000 units Tab Take 1,000 Units by mouth once daily.       metFORMIN (GLUCOPHAGE) 500 MG tablet TAKE 1 TABLET (500 MG TOTAL) BY MOUTH EVERY EVENING. 90 tablet 3     No current facility-administered medications for this visit.        Review of patient's allergies indicates:   Allergen Reactions    Erythromycin      Other reaction(s): Nausea    Erythromycin (bulk)      Other reaction(s): Nausea    Oxycodone Nausea And Vomiting    Oxycodone-acetaminophen Nausea And Vomiting     Other reaction(s): Nausea         Review of Systems   Constitution: Negative for chills and fever.   Cardiovascular: Negative for chest pain, claudication and leg swelling.   Respiratory: Negative for cough and shortness of breath.    Skin: Negative.    Musculoskeletal: Negative.    Gastrointestinal: Negative for nausea and vomiting.   Neurological: Positive for numbness. Negative for paresthesias.   Psychiatric/Behavioral: Negative for altered mental status.           Objective:      Physical Exam   Constitutional: She is oriented to person, place, and time. She appears well-developed and well-nourished.   HENT:   Head: Normocephalic.   Cardiovascular: Intact distal pulses.   Pulses:       Dorsalis pedis pulses are 2+ on the right side, and 2+ on the left side.        Posterior tibial pulses are 2+ on the right side, and 2+ on the left side.   CRT < 3 sec to tips of  toes. Mild edema noted to b/l LE. No vericosities noted to b/l LEs.      Pulmonary/Chest: No respiratory distress.   Musculoskeletal:   Gastrocnemius equinus noted to b/l ankles with decreased DF noted on exam. MMT 5/5 in DF/PF/Inv/Ev resistance with no reproduction of pain in any direction. Passive range of motion of ankle and pedal joints is painless. Adequate pedal joint ROM.      Neurological: She is alert and oriented to person, place, and time. She has normal strength. A sensory deficit is present.   Light touch, proprioception, and sharp/dull sensation are all intact bilaterally. Protective threshold with the [place] monofilament is intact at toes bilaterally. Subjective paresthesias with no clearly identifiable source or trigger.      Skin: Skin is warm, dry and intact. No erythema.   No open lesions, lacerations or wounds noted. Nails are normotrophic to R 1-5 and L 1-5. Interdigital spaces clean, dry and intact b/l. No erythema noted to b/l foot. Skin texture normal. Pedal hair normal. Skin temperature normal b/l foot.      Psychiatric: She has a normal mood and affect. Her behavior is normal. Judgment and thought content normal.   Vitals reviewed.            Assessment:       Encounter Diagnoses   Name Primary?    Type 2 diabetes mellitus with diabetic polyneuropathy, without long-term current use of insulin Yes    Chemotherapy-induced neuropathy     Paresthesia of both lower extremities     Comprehensive diabetic foot examination, type 2 DM, encounter for          Plan:       Lima was seen today for diabetes mellitus, diabetic foot exam, routine foot care and peripheral neuropathy.    Diagnoses and all orders for this visit:    Type 2 diabetes mellitus with diabetic polyneuropathy, without long-term current use of insulin    Chemotherapy-induced neuropathy    Paresthesia of both lower extremities    Comprehensive diabetic foot examination, type 2 DM, encounter for      I counseled the  patient on her conditions, their implications and medical management.    - Shoe inspection. Diabetic Foot Education. Patient reminded of the importance of good nutrition and blood sugar control to help prevent podiatric complications of diabetes. Patient instructed on proper foot hygeine. We discussed wearing proper shoe gear, daily foot inspections, never walking without protective shoe gear, caution putting sharp instruments to feet     - Discussed DM foot care:  Wear comfortable, proper fitting shoes. Wash feet daily. Dry well. After drying, apply moisturizer to feet (no lotion to webspaces). Inspect feet daily for skin breaks, blisters, swelling, or redness. Wear cotton socks (preferably white)  Change socks every day. Do NOT walk barefoot. Do NOT use heating pads or warm/hot water soaks     - advised patient that there is no actual treatment for numbness. She was advised to continue taking Gabapentin 300mg nightly as directed by PCP. Ok to wean off this medication as a trial to see if it makes pain worse but I advised her to continue taking it for now since she is having pain.     Rx compound pain cream to be applied to areas of paresthesias up to 4-5 x daily as needed for pain. Prescription faxed to [facility].     - Long discussion with patient regarding appropriate, supportive and comfortable shoes. Recommended Sketchers, Vionic, Orthoheel style shoe brands with adequate arch supports to alleviate abnormal pressure and improve stability of foot while walking. Avoid flat shoes and barefoot walking as these will exacerbate or worsen symptoms.     - she was advised to monitor her feet daily, use preventative measures to avoid complications.     - she will follow up in 1 year for annual foot exam, sooner if any problems arise.

## 2018-12-12 ENCOUNTER — HOSPITAL ENCOUNTER (OUTPATIENT)
Dept: DIABETES | Facility: OTHER | Age: 68
Discharge: HOME | End: 2018-12-12
Attending: INTERNAL MEDICINE
Payer: MEDICARE

## 2018-12-12 ENCOUNTER — OFFICE VISIT (OUTPATIENT)
Dept: INTERNAL MEDICINE | Facility: CLINIC | Age: 68
End: 2018-12-12
Payer: MEDICARE

## 2018-12-12 ENCOUNTER — CLINICAL SUPPORT (OUTPATIENT)
Dept: INTERNAL MEDICINE | Facility: CLINIC | Age: 68
End: 2018-12-12
Payer: MEDICARE

## 2018-12-12 VITALS
WEIGHT: 190.25 LBS | WEIGHT: 190.56 LBS | BODY MASS INDEX: 30.63 KG/M2 | BODY MASS INDEX: 30.58 KG/M2 | HEART RATE: 88 BPM | HEIGHT: 66 IN | DIASTOLIC BLOOD PRESSURE: 80 MMHG | SYSTOLIC BLOOD PRESSURE: 142 MMHG | HEIGHT: 66 IN

## 2018-12-12 DIAGNOSIS — I70.0 AORTIC ATHEROSCLEROSIS: ICD-10-CM

## 2018-12-12 DIAGNOSIS — E66.9 OBESITY, DIABETES, AND HYPERTENSION SYNDROME: ICD-10-CM

## 2018-12-12 DIAGNOSIS — Z23 NEED FOR 23-POLYVALENT PNEUMOCOCCAL POLYSACCHARIDE VACCINE: Primary | ICD-10-CM

## 2018-12-12 DIAGNOSIS — N39.3 STRESS INCONTINENCE OF URINE: ICD-10-CM

## 2018-12-12 DIAGNOSIS — E78.2 MIXED HYPERLIPIDEMIA: ICD-10-CM

## 2018-12-12 DIAGNOSIS — E03.4 HYPOTHYROIDISM DUE TO ACQUIRED ATROPHY OF THYROID: ICD-10-CM

## 2018-12-12 DIAGNOSIS — G47.33 OBSTRUCTIVE SLEEP APNEA ON CPAP: ICD-10-CM

## 2018-12-12 DIAGNOSIS — Z17.0 MALIGNANT NEOPLASM OF LOWER-OUTER QUADRANT OF LEFT BREAST OF FEMALE, ESTROGEN RECEPTOR POSITIVE: ICD-10-CM

## 2018-12-12 DIAGNOSIS — C50.512 MALIGNANT NEOPLASM OF LOWER-OUTER QUADRANT OF LEFT BREAST OF FEMALE, ESTROGEN RECEPTOR POSITIVE: ICD-10-CM

## 2018-12-12 DIAGNOSIS — E11.42 DIABETIC POLYNEUROPATHY ASSOCIATED WITH TYPE 2 DIABETES MELLITUS: ICD-10-CM

## 2018-12-12 DIAGNOSIS — M81.0 SENILE OSTEOPOROSIS: ICD-10-CM

## 2018-12-12 DIAGNOSIS — E66.09 CLASS 1 OBESITY DUE TO EXCESS CALORIES WITH SERIOUS COMORBIDITY AND BODY MASS INDEX (BMI) OF 30.0 TO 30.9 IN ADULT: ICD-10-CM

## 2018-12-12 DIAGNOSIS — I10 ESSENTIAL HYPERTENSION: ICD-10-CM

## 2018-12-12 DIAGNOSIS — T45.1X5A CHEMOTHERAPY-INDUCED NEUROPATHY: ICD-10-CM

## 2018-12-12 DIAGNOSIS — K76.0 FATTY LIVER: ICD-10-CM

## 2018-12-12 DIAGNOSIS — I15.2 OBESITY, DIABETES, AND HYPERTENSION SYNDROME: ICD-10-CM

## 2018-12-12 DIAGNOSIS — K21.9 GASTROESOPHAGEAL REFLUX DISEASE WITHOUT ESOPHAGITIS: ICD-10-CM

## 2018-12-12 DIAGNOSIS — Z00.00 ENCOUNTER FOR PREVENTIVE HEALTH EXAMINATION: Primary | ICD-10-CM

## 2018-12-12 DIAGNOSIS — E11.42 TYPE 2 DIABETES MELLITUS WITH DIABETIC POLYNEUROPATHY, WITHOUT LONG-TERM CURRENT USE OF INSULIN: ICD-10-CM

## 2018-12-12 DIAGNOSIS — E11.69 OBESITY, DIABETES, AND HYPERTENSION SYNDROME: ICD-10-CM

## 2018-12-12 DIAGNOSIS — E11.59 OBESITY, DIABETES, AND HYPERTENSION SYNDROME: ICD-10-CM

## 2018-12-12 DIAGNOSIS — G62.0 CHEMOTHERAPY-INDUCED NEUROPATHY: ICD-10-CM

## 2018-12-12 PROCEDURE — G0009 ADMIN PNEUMOCOCCAL VACCINE: HCPCS | Mod: HCNC,S$GLB,, | Performed by: NURSE PRACTITIONER

## 2018-12-12 PROCEDURE — 3045F PR MOST RECENT HEMOGLOBIN A1C LEVEL 7.0-9.0%: CPT | Mod: CPTII,HCNC,S$GLB, | Performed by: NURSE PRACTITIONER

## 2018-12-12 PROCEDURE — G0439 PPPS, SUBSEQ VISIT: HCPCS | Mod: HCNC,S$GLB,, | Performed by: NURSE PRACTITIONER

## 2018-12-12 PROCEDURE — 3079F DIAST BP 80-89 MM HG: CPT | Mod: CPTII,HCNC,S$GLB, | Performed by: NURSE PRACTITIONER

## 2018-12-12 PROCEDURE — 99999 PR PBB SHADOW E&M-EST. PATIENT-LVL V: CPT | Mod: PBBFAC,HCNC,, | Performed by: NURSE PRACTITIONER

## 2018-12-12 PROCEDURE — 3077F SYST BP >= 140 MM HG: CPT | Mod: CPTII,HCNC,S$GLB, | Performed by: NURSE PRACTITIONER

## 2018-12-12 PROCEDURE — 90732 PPSV23 VACC 2 YRS+ SUBQ/IM: CPT | Mod: HCNC,S$GLB,, | Performed by: NURSE PRACTITIONER

## 2018-12-12 PROCEDURE — G0108 DIAB MANAGE TRN  PER INDIV: HCPCS | Mod: HCNC

## 2018-12-12 NOTE — PROGRESS NOTES
"Lima Maldonado presented for a  Medicare AWV and comprehensive Health Risk Assessment today. The following components were reviewed and updated:    · Medical history  · Family History  · Social history  · Allergies and Current Medications  · Health Risk Assessment  · Health Maintenance  · Care Team     ** See Completed Assessments for Annual Wellness Visit within the encounter summary.**       The following assessments were completed:  · Living Situation  · CAGE  · Depression Screening  · Timed Get Up and Go  · Whisper Test  · Cognitive Function Screening  · Nutrition Screening  · ADL Screening  · PAQ Screening          Vitals:    12/12/18 0942 12/12/18 1020   BP: (!) 144/76 (!) 142/80   BP Location: Right arm Right arm   Patient Position: Sitting Sitting   Pulse: 88    Weight: 86.4 kg (190 lb 9.4 oz)    Height: 5' 6" (1.676 m)      Body mass index is 30.76 kg/m².  Physical Exam   Constitutional: She is oriented to person, place, and time. She appears well-developed and well-nourished. No distress.   HENT:   Head: Normocephalic and atraumatic.   Eyes: No scleral icterus.   Neck: Normal range of motion. Neck supple.   Cardiovascular: Normal rate, regular rhythm, normal heart sounds and intact distal pulses.   Pulmonary/Chest: Effort normal and breath sounds normal. No respiratory distress.   Abdominal: She exhibits no distension.   Obese stature   Musculoskeletal: Normal range of motion.   Neurological: She is alert and oriented to person, place, and time.   Skin: Skin is warm and dry. She is not diaphoretic.   Psychiatric: She has a normal mood and affect. Her behavior is normal.       Diagnoses and health risks identified today and associated recommendations/orders:    1. Encounter for preventive health examination  Assessments completed  Preventative health recommendations reviewed    2. Type 2 diabetes mellitus with diabetic polyneuropathy, without long-term current use of insulin  HgbA1C increased at " 7.7  Controlled with current medical therapy  Followed by PCP.   Seeing diabetes educator today    3. Chemotherapy-induced neuropathy  Stable.   Controlled with current medical therapy  Followed by PCP.     4. Diabetic polyneuropathy associated with type 2 diabetes mellitus  Stable.   Controlled with current medical therapy  Followed by PCP and podiatry     5. Aortic atherosclerosis  Stable. Drrn on cxr from 09/21/17  Controlled with current medical therapy  Followed by PCP.     6. Malignant neoplasm of lower-outer quadrant of left breast of female, estrogen receptor positive  Stable.   Currently on letrozole  Controlled with current medical therapy  Followed by PCP.     7. Essential hypertension  Mildly elevated at today's visit.  Per patient, has been well controlled.  She did not sleep well last night.  Instructed to maintain hydration, avoid high salt meals, avoid excess caffeine.  Pt asymptomatic.    Controlled with current medical therapy  Followed by PCP.     8. Class 1 obesity due to excess calories with serious comorbidity and body mass index (BMI) of 30.0 to 30.9 in adult  Stable.   Healthy diet and exercise encouraged  Followed by PCP.     9. Obesity, diabetes, and hypertension syndrome  Stable.   Healthy diet and exercise encouraged  Followed by PCP.     10. Mixed hyperlipidemia  Stable.   Controlled with current medical therapy  Followed by PCP.     11. Stress incontinence of urine  Stable.   Followed by PCP.     12. Hypothyroidism due to acquired atrophy of thyroid  Stable.   Controlled with current medical therapy  Followed by PCP.     13. Fatty liver  Stable.   Followed by PCP.     14. Gastroesophageal reflux disease without esophagitis  Stable.   Controlled with current medical therapy  Followed by PCP.     15. Senile osteoporosis  Stable. Was doing prolia injections at one time.    Controlled with current medical therapy  Followed by PCP.     16. Obstructive sleep apnea on CPAP  Stable.   Uses a  cpap machine  Followed by sleep medicine.     Provided Lima with a 5-10 year written screening schedule and personal prevention plan. Recommendations were developed using the USPSTF age appropriate recommendations. Education, counseling, and referrals were provided as needed. After Visit Summary printed and given to patient which includes a list of additional screenings\tests needed.    Follow-up in about 4 months (around 4/12/2019) for a routine visit with your primary care provider or sooner if problems arise. .    Cesia Lange, NP

## 2018-12-12 NOTE — PATIENT INSTRUCTIONS
Counseling and Referral of Other Preventative  (Italic type indicates deductible and co-insurance are waived)    Patient Name: Lima Maldonado  Today's Date: 12/12/2018    Health Maintenance       Date Due Completion Date    Pneumococcal Vaccine (65+ High/Highest Risk) (2 of 2 - PPSV23) 12/03/2018 10/12/2015    Fecal Occult Blood Test (FOBT)/[employer] 02/07/2019 2/7/2018    Hemoglobin A1c 05/20/2019 11/20/2018    DEXA SCAN 06/28/2019 6/28/2017    Lipid Panel 11/20/2019 11/20/2018    Eye Exam 11/29/2019 11/29/2018    Foot Exam 12/11/2019 12/11/2018 (Done)    Override on 12/11/2018: Done    Override on 8/9/2017: Done    High Dose Statin 12/12/2019 12/12/2018    TETANUS VACCINE 12/03/2023 12/3/2013    Colonoscopy 05/15/2028 5/15/2018    Override on 5/15/2018: Done    Override on 10/6/2006: Done        No orders of the defined types were placed in this encounter.    The following information is provided to all patients.  This information is to help you find resources for any of the problems found today that may be affecting your health:                Living healthy guide: www.[facility].[place].gov      Understanding Diabetes: www.diabetes.org      Eating healthy: www.cdc.gov/healthyweight      [facility] home safety checklist: www.cdc.gov/steadi/patient.html      Agency on Aging: www.goea.[place].gov      Alcoholics anonymous (AA): www.aa.org      Physical Activity: www.lata.nih.gov/si0uzpr      Tobacco use: www.quitwithusla.org

## 2018-12-13 DIAGNOSIS — E11.8 TYPE 2 DIABETES MELLITUS WITH COMPLICATION, WITHOUT LONG-TERM CURRENT USE OF INSULIN: Primary | ICD-10-CM

## 2018-12-13 RX ORDER — METFORMIN HYDROCHLORIDE 500 MG/1
500 TABLET ORAL 2 TIMES DAILY WITH MEALS
Qty: 180 TABLET | Refills: 3 | Status: SHIPPED | OUTPATIENT
Start: 2018-12-13 | End: 2019-04-10

## 2018-12-13 NOTE — PROGRESS NOTES
Diabetes Education  Author: Berenice Agustin RN  Date: 2018    Diabetes Care Management Summary  Diabetes Education Record Assessment/Progress: Initial  Current Diabetes Risk Level: Low         Diabetes Type  Diabetes Type : Type II    Diabetes History  Diabetes Diagnosis: 1-3 years  Current Treatment: Oral Medication  Reviewed Problem List with Patient: Yes    Health Maintenance was reviewed today with patient. Discussed with patient importance of routine eye exams, foot exams/foot care, blood work (i.e.: A1c, microalbumin, and lipid), dental visits, yearly flu vaccine, and pneumonia vaccine as indicated by PCP. Patient verbalized understanding.     Health Maintenance Topics with due status: Not Due       Topic Last Completion Date    TETANUS VACCINE 2013    DEXA SCAN 2017    Fecal Occult Blood Test (FOBT)/FitKit 2018    Colonoscopy 05/15/2018    Lipid Panel 2018    Hemoglobin A1c 2018    Eye Exam 2018    Foot Exam 2018    High Dose Statin 2018     There are no preventive care reminders to display for this patient.    Nutrition  Meal Plan 24 Hour Recall - Breakfast: honey bunches of oats w/ milk, added walnuts, cranraisins, blueberries - coffee w/ splenda and cream   Meal Plan 24 Hour Recall - Lunch: ham sandwich, 2 slices whole wheat bread w/ 1 slice cheese, meeks and mustard - diet dr. hannah   Meal Plan 24 Hour Recall - Dinner: 1 cup chili w/ beans, 2 hot dogs - no bun, just salsa, onions, sauerkraut, cheese - water   Meal Plan 24 Hour Recall - Snack: homemade milkshake w/ ice cream and milk     Monitoring   Self Monitoring : SMBG: AM fastin, - last 6 months has been cheating w/ meals a lot - fastin-200's   Blood Glucose Logs: No  Do you use a personal continuous glucose monitor?: No  In the last month, how often have you had a low blood sugar reaction?: never  Can you tell when your blood sugar is too high?: no    Exercise   Frequency: Never    Current  Diabetes Treatment   Current Treatment: Oral Medication    Social History  Preferred Learning Method: Reading Materials  Primary Support: Spouse  Educational Level: College Graduate  Occupation: not working   Smoking Status: Ex Smoker(only a few years in college)  Alcohol Use: Monthly                                Barriers to Change  Barriers to Change: None  Learning Challenges : None    Readiness to Learn   Readiness to Learn : Acceptance    Cultural Influences  Cultural Influences: No    Diabetes Education Assessment/Progress  Diabetes Disease Process (diabetes disease process and treatment options): Discussion, Requires Assistance Family/SO, Individual Session(ed on how DM can change over time, ed on insulin resistance and weight gain and importance of lifestyle changes and medication management)  Nutrition (Incorporating nutritional management into one's lifestyle): Discussion, Individual Session, Requires Assistance Family/SO, Demonstration(ed on importance of restricting added sugar, label reading exercise done, discussed tips to reduce added sugar in diet and create healthier, satisfying snacks)  Physical Activity (incorporating physical activity into one's lifestyle): Discussion, Individual Session, Requires Assistance Family/SO(ed on importance of activity to reduce insulin resistance - reviewed ADA recs for physical activity - encouraged to start w/ gentle activity and gradually increase intensity)  Medications (states correct name, dose, onset, peak, duration, side effects & timing of meds): Discussion, Requires Assistance Family/SO, Individual Session(discussed increasing metformin to 500mg BID, pt thought PCP had recommended previously, discussed max dose of metformin is 1000mg BID, pt very willing to work on lifestyle changes first)  Monitoring (monitoring blood glucose/other parameters & using results): Discussion, Individual Session, Requires Assistance Family/SO(discussed SMBG schedule and how to  use BG results to guide diet changes etc - reviewed BG goals )  Acute Complications (preventing, detecting, and treating acute complications): Discussion, Individual Session, Requires Assistance Family/SO(discussed low risk of low BG w/ monotherapy, discussed how to prevent and treat - encouraged to not skip meals)  Chronic Complications (preventing, detecting, and treating chronic complications): Discussion, Individual Session, Requires Assistance Family/SO(reviewed current A1C of 7.7% and goal A1C of less than 7%, discussed significant role of diet in these results)  Clinical (diabetes, other pertinent medical history, and relevant comorbidities reviewed during visit): Discussion, Individual Session, Requires Assistance Family/SO(reviewed role of weight management in blood sugar control, discussed how loss of 5-10% BF would be beneficial for BG control (11-19 pounds))  Cognitive (knowledge of self-management skills, functional health literacy): Discussion, Individual Session, Requires Assistance Family/SO  Psychosocial (emotional response to diabetes): Discussion, Individual Session, Requires Assistance Family/SO  Diabetes Distress and Support Systems: Discussion, Individual Session, Requires Assistance Family/SO  Behavioral (readiness for change, lifestyle practices, self-care behaviors): Discussion, Individual Session, Requires Assistance Family/SO(pt very ready to get remotivated to make a plan to make healthier choices, is willing to start with a few small changes and work towards getting better control)    Goals  Patient has selected/evaluated goals during today's session: Yes, selected  Healthy Eating: Set(pt will begin working on reducing added sugar to diet)  Start Date: 12/12/18  Monitoring: Set(pt will SMBG BID and bring logs to all future appointments)  Start Date: 12/12/18  Medications: Set(pt will increase metformin to BID dosing)  Start Date: 12/12/18         Diabetes Care  Plan/Intervention  Education Plan/Intervention: Individual Follow-Up DSMT    Diabetes Meal Plan  Restrictions: Restricted Carbohydrate  Carbohydrate Per Meal: 30-45g    Today's Self-Management Care Plan was developed with the patient's input and is based on barriers identified during today's assessment.    The long and short-term goals in the care plan were written with the patient/caregiver's input. The patient has agreed to work toward these goals to improve her overall diabetes control.      The patient received a copy of today's self-management plan and verbalized understanding of the care plan, goals, and all of today's instructions.      The patient was encouraged to communicate with her physician and care team regarding her condition(s) and treatment.  I provided the patient with my contact information today and encouraged her to contact me via phone or patient portal as needed.     Education Units of Time   Time Spent: 60 min

## 2018-12-19 RX ORDER — SIMVASTATIN 20 MG/1
TABLET, FILM COATED ORAL
Qty: 90 TABLET | Refills: 0 | Status: SHIPPED | OUTPATIENT
Start: 2018-12-19 | End: 2019-03-17 | Stop reason: SDUPTHER

## 2019-01-10 ENCOUNTER — OFFICE VISIT (OUTPATIENT)
Dept: HEMATOLOGY/ONCOLOGY | Facility: CLINIC | Age: 69
End: 2019-01-10
Payer: MEDICARE

## 2019-01-10 VITALS
OXYGEN SATURATION: 95 % | BODY MASS INDEX: 30.17 KG/M2 | HEART RATE: 92 BPM | DIASTOLIC BLOOD PRESSURE: 73 MMHG | WEIGHT: 186.94 LBS | RESPIRATION RATE: 20 BRPM | TEMPERATURE: 98 F | SYSTOLIC BLOOD PRESSURE: 142 MMHG

## 2019-01-10 DIAGNOSIS — Z17.0 MALIGNANT NEOPLASM OF LOWER-OUTER QUADRANT OF LEFT BREAST OF FEMALE, ESTROGEN RECEPTOR POSITIVE: Primary | ICD-10-CM

## 2019-01-10 DIAGNOSIS — C50.512 MALIGNANT NEOPLASM OF LOWER-OUTER QUADRANT OF LEFT BREAST OF FEMALE, ESTROGEN RECEPTOR POSITIVE: Primary | ICD-10-CM

## 2019-01-10 PROCEDURE — 99499 UNLISTED E&M SERVICE: CPT | Mod: HCNC,S$GLB,, | Performed by: PHYSICIAN ASSISTANT

## 2019-01-10 PROCEDURE — 99213 PR OFFICE/OUTPT VISIT, EST, LEVL III, 20-29 MIN: ICD-10-PCS | Mod: HCNC,S$GLB,, | Performed by: PHYSICIAN ASSISTANT

## 2019-01-10 PROCEDURE — 1101F PR PT FALLS ASSESS DOC 0-1 FALLS W/OUT INJ PAST YR: ICD-10-PCS | Mod: CPTII,HCNC,S$GLB, | Performed by: PHYSICIAN ASSISTANT

## 2019-01-10 PROCEDURE — 99499 RISK ADDL DX/OHS AUDIT: ICD-10-PCS | Mod: HCNC,S$GLB,, | Performed by: PHYSICIAN ASSISTANT

## 2019-01-10 PROCEDURE — 1101F PT FALLS ASSESS-DOCD LE1/YR: CPT | Mod: CPTII,HCNC,S$GLB, | Performed by: PHYSICIAN ASSISTANT

## 2019-01-10 PROCEDURE — 3078F PR MOST RECENT DIASTOLIC BLOOD PRESSURE < 80 MM HG: ICD-10-PCS | Mod: CPTII,HCNC,S$GLB, | Performed by: PHYSICIAN ASSISTANT

## 2019-01-10 PROCEDURE — 99999 PR PBB SHADOW E&M-EST. PATIENT-LVL IV: CPT | Mod: PBBFAC,HCNC,, | Performed by: PHYSICIAN ASSISTANT

## 2019-01-10 PROCEDURE — 3077F PR MOST RECENT SYSTOLIC BLOOD PRESSURE >= 140 MM HG: ICD-10-PCS | Mod: CPTII,HCNC,S$GLB, | Performed by: PHYSICIAN ASSISTANT

## 2019-01-10 PROCEDURE — 99999 PR PBB SHADOW E&M-EST. PATIENT-LVL IV: ICD-10-PCS | Mod: PBBFAC,HCNC,, | Performed by: PHYSICIAN ASSISTANT

## 2019-01-10 PROCEDURE — 3078F DIAST BP <80 MM HG: CPT | Mod: CPTII,HCNC,S$GLB, | Performed by: PHYSICIAN ASSISTANT

## 2019-01-10 PROCEDURE — 3077F SYST BP >= 140 MM HG: CPT | Mod: CPTII,HCNC,S$GLB, | Performed by: PHYSICIAN ASSISTANT

## 2019-01-10 PROCEDURE — 99213 OFFICE O/P EST LOW 20 MIN: CPT | Mod: HCNC,S$GLB,, | Performed by: PHYSICIAN ASSISTANT

## 2019-01-10 NOTE — PROGRESS NOTES
"Subjective:       Patient ID: Lima Maldonado is a 68 y.o. female.    Chief Complaint: Follow-up    Ms Maldonado return to clinic for follow-up of  diagnosis of left breast cancer. She had stage I ER positive disease with an intermediate risk Oncotype score.   She completed 4 cycles of TC on 3/31/17 and is currently on Femara.      Tolerating Femara well other than some mild generalized arthralgias.   She has an intermittent "ache" in the left upper quadrant, lasts for a few hours than resolves.    No fever, chills, nausea, vomiting or shortness of breath.   She continues with some peripheral neuropathy in her feet and fingertips for which she takes gabapentin.  She was previously on Prolia but has not received that recently.  Bone density from 6/2017 was normal.   On metformin BID for diabetes which has not been optimally controlled.   She is not exercising regularly.           Breast history: mammography on September 20, 2016 demonstrated a new irregular mass with spiculated margins seen in the left breast at  5 o'clock along the surgical scar site.ultrasound DEMONSTRATED A SOLID 8 X 8 X 7 MM MASS.    A needle biopsy in September 27 showed infiltrating carcinoma, histologic grade 3, nuclear grade 2, mitotic index 1. Tumor was 90% ER positive, 70% KY positive, and 1+ HER-2.     MRI of the breast showed a 1.7 x 1.3 cm lesion in the lower outer quadrant of the left breast.  PET/CT on October 7 was negative for any evidence of distant metastasis.    On November 16 bilateral mastectomies were performed. Left pressure 1.5 cm intermediate grade carcinoma with ductal and lobular features. There was focal dermal invasion. Margins were negative. The right breast was without abnormality.  Oncotype score returned 25 - intermediate risk.  Adjuvant TC X 4 completed 3/31/17.    Patient with history of left breast cancer in 1997 when she underwent left lumpectomy with complete axillary dissection at age 46 for a pT1cN1 breast " cancer (Stage IIA).  47 nodes were removed, 1 of which was positive.  She received adjuvant chemotherapy, radiation and tamoxifen.       Review of Systems   Constitutional: Negative for appetite change and unexpected weight change.   HENT: Negative for congestion.    Eyes: Negative for visual disturbance.   Respiratory: Negative for cough and shortness of breath.    Cardiovascular: Negative for chest pain.   Gastrointestinal: Negative for abdominal pain and diarrhea.   Genitourinary: Negative for frequency.   Musculoskeletal: Negative for back pain.   Skin: Negative for rash.   Neurological: Negative for headaches.   Hematological: Negative for adenopathy.   Psychiatric/Behavioral: The patient is not nervous/anxious.        Objective:      Physical Exam   Constitutional: She is oriented to person, place, and time. She appears well-developed and well-nourished. No distress.   ECOG 0  Presents alone   HENT:   Head: Normocephalic.   Mouth/Throat: Oropharynx is clear and moist. No oropharyngeal exudate.   Eyes: Conjunctivae and EOM are normal. Pupils are equal, round, and reactive to light. No scleral icterus.   Neck: Normal range of motion. Neck supple. No thyromegaly present.   Cardiovascular: Normal rate, regular rhythm, normal heart sounds and intact distal pulses.   Pulmonary/Chest: Effort normal and breath sounds normal. No respiratory distress. She has no wheezes. She has no rales. She exhibits no tenderness.   Right breast reconstruction without mass, nodule or skin changes. Left breast reconstruction with firmness at medial aspect- consistent with scar tissue. No  No axillary or supraclavicular adenopathy.   Lungs clear to auscultation bilaterally   Abdominal: Soft. Bowel sounds are normal. She exhibits no distension and no mass. There is no tenderness.   No hepatosplenomegaly     Musculoskeletal: Normal range of motion. She exhibits no edema or tenderness.   No spinal or paraspinal tenderness to palpation      Lymphadenopathy:     She has no cervical adenopathy.   Neurological: She is alert and oriented to person, place, and time. No cranial nerve deficit.   No spinal or paraspinal tenderness to palpation     Skin: Skin is warm and dry. No rash noted.   Psychiatric: She has a normal mood and affect. Her behavior is normal. Judgment and thought content normal.   Vitals reviewed.      Assessment:       1. Malignant neoplasm of lower-outer quadrant of left breast of female, estrogen receptor positive        Plan:       1)Continue Femara and return to clinic in 4 months      Bone density due in June 2019.      Distress Screening Results: Psychosocial Distress screening score of Distress Score: 0 noted and reviewed. No intervention indicated.

## 2019-01-11 ENCOUNTER — OFFICE VISIT (OUTPATIENT)
Dept: GYNECOLOGIC ONCOLOGY | Facility: CLINIC | Age: 69
End: 2019-01-11
Payer: MEDICARE

## 2019-01-11 VITALS
HEART RATE: 81 BPM | SYSTOLIC BLOOD PRESSURE: 138 MMHG | WEIGHT: 188.94 LBS | DIASTOLIC BLOOD PRESSURE: 81 MMHG | BODY MASS INDEX: 30.36 KG/M2 | HEIGHT: 66 IN

## 2019-01-11 DIAGNOSIS — C50.512 MALIGNANT NEOPLASM OF LOWER-OUTER QUADRANT OF LEFT BREAST OF FEMALE, ESTROGEN RECEPTOR POSITIVE: ICD-10-CM

## 2019-01-11 DIAGNOSIS — Z01.419 WELL WOMAN EXAM WITH ROUTINE GYNECOLOGICAL EXAM: Primary | ICD-10-CM

## 2019-01-11 DIAGNOSIS — N95.2 VAGINAL ATROPHY: ICD-10-CM

## 2019-01-11 DIAGNOSIS — Z17.0 MALIGNANT NEOPLASM OF LOWER-OUTER QUADRANT OF LEFT BREAST OF FEMALE, ESTROGEN RECEPTOR POSITIVE: ICD-10-CM

## 2019-01-11 DIAGNOSIS — M19.90 ARTHRITIS: ICD-10-CM

## 2019-01-11 DIAGNOSIS — N39.3 STRESS INCONTINENCE OF URINE: ICD-10-CM

## 2019-01-11 PROCEDURE — 99999 PR PBB SHADOW E&M-EST. PATIENT-LVL III: CPT | Mod: PBBFAC,HCNC,, | Performed by: OBSTETRICS & GYNECOLOGY

## 2019-01-11 PROCEDURE — G0101 CA SCREEN;PELVIC/BREAST EXAM: HCPCS | Mod: HCNC,S$GLB,, | Performed by: OBSTETRICS & GYNECOLOGY

## 2019-01-11 PROCEDURE — G0101 PR CA SCREEN;PELVIC/BREAST EXAM: ICD-10-PCS | Mod: HCNC,S$GLB,, | Performed by: OBSTETRICS & GYNECOLOGY

## 2019-01-11 PROCEDURE — 99999 PR PBB SHADOW E&M-EST. PATIENT-LVL III: ICD-10-PCS | Mod: PBBFAC,HCNC,, | Performed by: OBSTETRICS & GYNECOLOGY

## 2019-01-11 RX ORDER — CELECOXIB 100 MG/1
100 CAPSULE ORAL DAILY PRN
Qty: 30 CAPSULE | Refills: 6 | Status: SHIPPED | OUTPATIENT
Start: 2019-01-11 | End: 2019-07-05 | Stop reason: SDUPTHER

## 2019-01-11 RX ORDER — ESTRADIOL 10 UG/1
10 INSERT VAGINAL
Qty: 24 TABLET | Refills: 3 | Status: SHIPPED | OUTPATIENT
Start: 2019-01-14 | End: 2019-05-14 | Stop reason: SDUPTHER

## 2019-01-11 NOTE — PROGRESS NOTES
"Subjective:       Patient ID: Lima Maldonado is a 68 y.o. female.    Chief Complaint: Well Woman    HPI     Patient comes in today for her WWE.         Oct  2016:  she was diagnosed with a new left breast cancer. She had a left breast cancer 19 years ago. S/P lumpectomy, chemotherapy and radiation.   Had bilateral mastectomies with Dr. Bains.   Post op cytoxan and docetaxel. completed in March 2017.         BRCA testing was negative . Sister was tested and is negative.     Has urinary discomfort every few days. Not related to intercourse.   Takes water and cranberry juice and this resolves.   Has problems with JOSE when she had a bad cold in Nov. This is better but still needs to wear a pad because she is afraid that she may leak.      Last Mammogram: no longer getting  Colonoscopy: May 2010: normal. Repeat in 10 years       Review of Systems   Constitutional: Negative for chills, fatigue and fever.   Eyes: Negative for visual disturbance.   Respiratory: Negative for cough, shortness of breath and wheezing.    Cardiovascular: Negative for chest pain, palpitations and leg swelling.   Gastrointestinal: Negative for abdominal distention, abdominal pain, constipation, diarrhea, nausea and vomiting.   Genitourinary: Negative for difficulty urinating, dysuria, frequency, genital sores, hematuria, pelvic pain, urgency, vaginal bleeding, vaginal discharge and vaginal pain.   Musculoskeletal: Negative for gait problem and neck stiffness.   Skin: Negative for rash.   Neurological: Negative for seizures and weakness.   Hematological: Negative for adenopathy. Does not bruise/bleed easily.   Psychiatric/Behavioral: The patient is not nervous/anxious.        Objective:   /81   Pulse 81   Ht 5' 6" (1.676 m)   Wt 85.7 kg (188 lb 15 oz)   BMI 30.49 kg/m²      Physical Exam   Constitutional: She is oriented to person, place, and time. She appears well-developed and well-nourished.   HENT:   Head: Normocephalic and " atraumatic.   Eyes: No scleral icterus.   Neck: Neck supple. No tracheal deviation present. No thyroid mass and no thyromegaly present.   Cardiovascular: Normal rate and regular rhythm.   Pulmonary/Chest: Effort normal and breath sounds normal. She has no wheezes.   Abdominal: Soft. She exhibits no distension and no mass. There is no hepatosplenomegaly. There is no tenderness. There is no rebound and no guarding.   Genitourinary:   Genitourinary Comments: Bimanual exam:  Vulva: no lesions. Normal appearance  Urethra: Normal size and location. No lesions  Bladder: No masses or tenderness.  Vagina: normal mucosa. No lesion  Cervix: absent.   Uterus: absent.  Adnexa: no masses.  Rectovaginal: No posterior cul de sac thickening or nodularity.  Rectal: no masses. Nontender. Normal tone.      Musculoskeletal: She exhibits no edema or tenderness.   Lymphadenopathy:     She has no cervical adenopathy.     She has no axillary adenopathy.        Right: No inguinal and no supraclavicular adenopathy present.        Left: No inguinal and no supraclavicular adenopathy present.   Neurological: She is alert and oriented to person, place, and time.   Skin: Skin is warm and dry.   Psychiatric: She has a normal mood and affect. Her behavior is normal. Judgment and thought content normal.       Assessment:       1. Well woman exam with routine gynecological exam    2. Malignant neoplasm of lower-outer quadrant of left breast of female, estrogen receptor positive    3. Vaginal atrophy    4. Stress incontinence of urine    5. Arthritis        Plan:   Well woman exam with routine gynecological exam  Counseling time of 10 minutes discussing calcium, vitamin D and exercise. Questions answered.   Instructed in Kegel exercises.   RTC in 1 year  Malignant neoplasm of lower-outer quadrant of left breast of female, estrogen receptor positive    Vaginal atrophy  -     estradiol (VAGIFEM) 10 mcg Tab; Place 1 tablet (10 mcg total) vaginally twice a  week.  Dispense: 24 tablet; Refill: 3    Stress incontinence of urine    Arthritis  -     celecoxib (CELEBREX) 100 MG capsule; Take 1 capsule (100 mg total) by mouth daily as needed.  Dispense: 30 capsule; Refill: 6

## 2019-01-15 ENCOUNTER — HOSPITAL ENCOUNTER (OUTPATIENT)
Dept: DIABETES | Facility: OTHER | Age: 69
Discharge: HOME | End: 2019-01-15
Attending: INTERNAL MEDICINE
Payer: MEDICARE

## 2019-01-15 VITALS — WEIGHT: 185.63 LBS | BODY MASS INDEX: 29.96 KG/M2

## 2019-01-15 DIAGNOSIS — E66.09 CLASS 1 OBESITY DUE TO EXCESS CALORIES WITH SERIOUS COMORBIDITY AND BODY MASS INDEX (BMI) OF 30.0 TO 30.9 IN ADULT: ICD-10-CM

## 2019-01-15 DIAGNOSIS — E11.42 DIABETIC POLYNEUROPATHY ASSOCIATED WITH TYPE 2 DIABETES MELLITUS: Primary | ICD-10-CM

## 2019-01-15 PROCEDURE — G0108 DIAB MANAGE TRN  PER INDIV: HCPCS | Mod: HCNC

## 2019-01-16 NOTE — PROGRESS NOTES
Diabetes Education  Author: Berenice Agustin RN  Date: 2019    Diabetes Care Management Summary  Diabetes Education Record Assessment/Progress: Comprehensive/Group  Current Diabetes Risk Level: Low         Diabetes Type  Diabetes Type : Type II    Diabetes History  Current Treatment: Oral Medication    Health Maintenance was reviewed today with patient. Discussed with patient importance of routine eye exams, foot exams/foot care, blood work (i.e.: A1c, microalbumin, and lipid), dental visits, yearly flu vaccine, and pneumonia vaccine as indicated by PCP. Patient verbalized understanding.     Health Maintenance Topics with due status: Not Due       Topic Last Completion Date    TETANUS VACCINE 2013    DEXA SCAN 2017    Fecal Occult Blood Test (FOBT)/FitKit 2018    Colonoscopy 05/15/2018    Lipid Panel 2018    Hemoglobin A1c 2018    Eye Exam 2018    Foot Exam 2018    High Dose Statin 2019     There are no preventive care reminders to display for this patient.    Nutrition  Meal Plan 24 Hour Recall - Breakfast: skipped, just coffee w/ splenda and cream - yesterday had: 2 eggs, tomatoes and green onion, shredded cheese, 2 slices luevano  Meal Plan 24 Hour Recall - Lunch: renaldo - w/ shrimp (only put half noodles) - diet dr. hannah   Meal Plan 24 Hour Recall - Dinner: taco salad w/ lettuce, ground meat, tomatoes, cheese, green onions, salsa, sour cream and 1/2 hard taco shell - water   Meal Plan 24 Hour Recall - Snack: small piece of cake     Monitoring   Self Monitoring : SMBG: AM fastin, 187, 193, 194, 208, 181, 152, 156, 160, 173, 143, 153, 178, 192 2 hr PP: 127, 152, 183 146, 152, 152, 197 Before Dinner: 125, 179, 128, 160, 126  Blood Glucose Logs: Yes  Do you use a personal continuous glucose monitor?: No    Exercise   Frequency: Never    Current Diabetes Treatment   Current Treatment: Oral Medication                                                    Diabetes  Education Assessment/Progress  Diabetes Disease Process (diabetes disease process and treatment options): Not Covered/Deferred  Nutrition (Incorporating nutritional management into one's lifestyle): Discussion, Individual Session, Comprehends Key Points(pt has made excellent diet changes, is restricting cho's more, discussed healthy snacks to keep during busy days to help with meal planning and prevent binge eating at dinner)  Physical Activity (incorporating physical activity into one's lifestyle): Discussion, Individual Session, Requires Assistance Family/SO(pt goal to get back into walking habits)  Medications (states correct name, dose, onset, peak, duration, side effects & timing of meds): Discussion, Comprehends Key Points, Individual Session, Lecture(tolerating metformin well, only at 500mg BID, so room to increase should BG need further assistance)  Monitoring (monitoring blood glucose/other parameters & using results): Discussion, Individual Session, Comprehends Key Points, Written Materials Provided(BG generally improving - )  Acute Complications (preventing, detecting, and treating acute complications): Not Covered/Deferred  Chronic Complications (preventing, detecting, and treating chronic complications): Not Covered/Deferred  Clinical (diabetes, other pertinent medical history, and relevant comorbidities reviewed during visit): Discussion, Individual Session, Requires Assistance Family/SO(pt has already lost 5 pounds!)  Cognitive (knowledge of self-management skills, functional health literacy): Not Covered/Deferred  Psychosocial (emotional response to diabetes): Not Covered/Deferred  Diabetes Distress and Support Systems: Not Covered/Deferred  Behavioral (readiness for change, lifestyle practices, self-care behaviors): Discussion, Individual Session, Requires Assistance Family/SO, Comprehends Key Points(pt is very motivated to continue working hard to modify lifestyle and keep BG  controlled)    Goals  Patient has selected/evaluated goals during today's session: Yes, evaluated  Healthy Eating: In Progress  Monitoring: In Progress  Medications: In Progress         Diabetes Care Plan/Intervention  Education Plan/Intervention: Individual Follow-Up DSMT    Diabetes Meal Plan  Restrictions: Restricted Carbohydrate  Carbohydrate Per Meal: 30-45g    Today's Self-Management Care Plan was developed with the patient's input and is based on barriers identified during today's assessment.    The long and short-term goals in the care plan were written with the patient/caregiver's input. The patient has agreed to work toward these goals to improve her overall diabetes control.      The patient received a copy of today's self-management plan and verbalized understanding of the care plan, goals, and all of today's instructions.      The patient was encouraged to communicate with her physician and care team regarding her condition(s) and treatment.  I provided the patient with my contact information today and encouraged her to contact me via phone or patient portal as needed.     Education Units of Time   Time Spent: 30 min

## 2019-02-06 DIAGNOSIS — N95.2 VAGINAL ATROPHY: ICD-10-CM

## 2019-02-06 RX ORDER — ESTRADIOL 10 UG/1
TABLET VAGINAL
Qty: 24 TABLET | Refills: 3 | Status: SHIPPED | OUTPATIENT
Start: 2019-02-06 | End: 2020-01-15 | Stop reason: SDUPTHER

## 2019-02-12 ENCOUNTER — LAB VISIT (OUTPATIENT)
Dept: LAB | Facility: HOSPITAL | Age: 69
End: 2019-02-12
Attending: INTERNAL MEDICINE
Payer: MEDICARE

## 2019-02-12 DIAGNOSIS — E11.42 TYPE 2 DIABETES MELLITUS WITH DIABETIC POLYNEUROPATHY, WITHOUT LONG-TERM CURRENT USE OF INSULIN: ICD-10-CM

## 2019-02-12 DIAGNOSIS — E78.00 PURE HYPERCHOLESTEROLEMIA: ICD-10-CM

## 2019-02-12 LAB
ALBUMIN SERPL BCP-MCNC: 4.1 G/DL
ALP SERPL-CCNC: 122 U/L
ALT SERPL W/O P-5'-P-CCNC: 37 U/L
ANION GAP SERPL CALC-SCNC: 10 MMOL/L
AST SERPL-CCNC: 37 U/L
BILIRUB SERPL-MCNC: 1.5 MG/DL
BUN SERPL-MCNC: 13 MG/DL
CALCIUM SERPL-MCNC: 9.9 MG/DL
CHLORIDE SERPL-SCNC: 106 MMOL/L
CHOLEST SERPL-MCNC: 132 MG/DL
CHOLEST/HDLC SERPL: 3.8 {RATIO}
CO2 SERPL-SCNC: 25 MMOL/L
CREAT SERPL-MCNC: 0.8 MG/DL
EST. GFR  (AFRICAN AMERICAN): >60 ML/MIN/1.73 M^2
EST. GFR  (NON AFRICAN AMERICAN): >60 ML/MIN/1.73 M^2
ESTIMATED AVG GLUCOSE: 148 MG/DL
GLUCOSE SERPL-MCNC: 159 MG/DL
HBA1C MFR BLD HPLC: 6.8 %
HDLC SERPL-MCNC: 35 MG/DL
HDLC SERPL: 26.5 %
LDLC SERPL CALC-MCNC: 58.4 MG/DL
NONHDLC SERPL-MCNC: 97 MG/DL
POTASSIUM SERPL-SCNC: 4.4 MMOL/L
PROT SERPL-MCNC: 7.1 G/DL
SODIUM SERPL-SCNC: 141 MMOL/L
TRIGL SERPL-MCNC: 193 MG/DL

## 2019-02-12 PROCEDURE — 80053 COMPREHEN METABOLIC PANEL: CPT | Mod: HCNC

## 2019-02-12 PROCEDURE — 80061 LIPID PANEL: CPT | Mod: HCNC

## 2019-02-12 PROCEDURE — 83036 HEMOGLOBIN GLYCOSYLATED A1C: CPT | Mod: HCNC

## 2019-02-12 PROCEDURE — 36415 COLL VENOUS BLD VENIPUNCTURE: CPT | Mod: HCNC

## 2019-02-20 ENCOUNTER — HOSPITAL ENCOUNTER (OUTPATIENT)
Dept: DIABETES | Facility: OTHER | Age: 69
Discharge: HOME | End: 2019-02-20
Attending: INTERNAL MEDICINE
Payer: MEDICARE

## 2019-02-20 VITALS — WEIGHT: 184.75 LBS | BODY MASS INDEX: 29.82 KG/M2

## 2019-02-20 DIAGNOSIS — E11.42 TYPE 2 DIABETES MELLITUS WITH DIABETIC POLYNEUROPATHY, WITHOUT LONG-TERM CURRENT USE OF INSULIN: Primary | ICD-10-CM

## 2019-02-20 PROCEDURE — G0108 DIAB MANAGE TRN  PER INDIV: HCPCS | Mod: HCNC

## 2019-02-20 NOTE — PROGRESS NOTES
Diabetes Education  Author: Berenice Agustin RN  Date: 2019    Diabetes Care Management Summary  Diabetes Education Record Assessment/Progress: Comprehensive/Group  Current Diabetes Risk Level: Low         Diabetes Type  Diabetes Type : Type II    Diabetes History  Current Treatment: Oral Medication  Reviewed Problem List with Patient: Yes    Health Maintenance was reviewed today with patient. Discussed with patient importance of routine eye exams, foot exams/foot care, blood work (i.e.: A1c, microalbumin, and lipid), dental visits, yearly flu vaccine, and pneumonia vaccine as indicated by PCP. Patient verbalized understanding.     Health Maintenance Topics with due status: Not Due       Topic Last Completion Date    TETANUS VACCINE 2013    DEXA SCAN 2017    Colonoscopy 05/15/2018    Eye Exam 2018    Foot Exam 2018    High Dose Statin 2019    Lipid Panel 2019    Hemoglobin A1c 2019     Health Maintenance Due   Topic Date Due    Fecal Occult Blood Test (FOBT)/[employer]  2019       Nutrition  Meal Plan 24 Hour Recall - Breakfast: skipped, yesterday: omelet - 2 egg, tomato, green onion, cheese, 1 strip luevano - coffee w/ cream and splenda   Meal Plan 24 Hour Recall - Lunch: chicken gumbo, small portion rice - iced tea, unsweetened   Meal Plan 24 Hour Recall - Dinner: half order renaldo w/ shrimp, only used a few noodles - diet dr. hannah   Meal Plan 24 Hour Recall - Snack: hummus, cut up veggies (cucumber, celery, red bell pepper), a few crackers    Monitoring   Self Monitoring : SMBG: AM Fastin, 144, 194, 163, 163, 145, 147, 155, 149, 151, 160, 168, 166, 159, 143, 159 Before Dinner: 193, 136, 143, 178, 152, 134, 132,    Blood Glucose Logs: Yes  Do you use a personal continuous glucose monitor?: No  In the last month, how often have you had a low blood sugar reaction?: never    Exercise   Frequency: Never    Current Diabetes Treatment   Current Treatment: Oral  Medication                                                    Diabetes Education Assessment/Progress  Diabetes Disease Process (diabetes disease process and treatment options): Not Covered/Deferred  Nutrition (Incorporating nutritional management into one's lifestyle): Discussion, Individual Session, Requires Assistance Family/SO, Comprehends Key Points(pt skipping meals less, is using things like hummus+veggies for snacks, has found reliable nutritious go-to meals to incorporate)  Physical Activity (incorporating physical activity into one's lifestyle): Discussion, Individual Session, Requires Assistance Family/SO(brainstormed ways to increase activity/reduce reasons not to exercise, discussed routine and how to work in 20-30 minutes of exercise 5 days per week )  Medications (states correct name, dose, onset, peak, duration, side effects & timing of meds): Discussion, Requires Assistance Family/SO, Comprehends Key Points, Individual Session(fasting BG improving since taking 500mg metformin BID - fastings 140-160's now - has only been taking about 3 weeks this way)  Monitoring (monitoring blood glucose/other parameters & using results): Not Covered/Deferred  Acute Complications (preventing, detecting, and treating acute complications): Not Covered/Deferred  Chronic Complications (preventing, detecting, and treating chronic complications): Not Covered/Deferred  Clinical (diabetes, other pertinent medical history, and relevant comorbidities reviewed during visit): Discussion, Individual Session, Requires Assistance Family/SO(pt lost 1 pound, but feels is within fluctuation of weight on home scale - encouraged to add activity and continue w/ good habits)  Cognitive (knowledge of self-management skills, functional health literacy): Not Covered/Deferred  Psychosocial (emotional response to diabetes): Not Covered/Deferred  Diabetes Distress and Support Systems: Not Covered/Deferred  Behavioral (readiness for change,  lifestyle practices, self-care behaviors): Discussion, Individual Session, Requires Assistance Family/SO(pt was very excited to see new A1C of 6.8%!! )    Goals  Patient has selected/evaluated goals during today's session: Yes, evaluated  Healthy Eating: In Progress  Monitoring: In Progress  Medications: In Progress         Diabetes Care Plan/Intervention  Education Plan/Intervention: Individual Follow-Up DSMT    Diabetes Meal Plan  Restrictions: Restricted Carbohydrate  Carbohydrate Per Meal: 30-45g    Today's Self-Management Care Plan was developed with the patient's input and is based on barriers identified during today's assessment.    The long and short-term goals in the care plan were written with the patient/caregiver's input. The patient has agreed to work toward these goals to improve her overall diabetes control.      The patient received a copy of today's self-management plan and verbalized understanding of the care plan, goals, and all of today's instructions.      The patient was encouraged to communicate with her physician and care team regarding her condition(s) and treatment.  I provided the patient with my contact information today and encouraged her to contact me via phone or patient portal as needed.     Education Units of Time   Time Spent: 60 min

## 2019-02-26 ENCOUNTER — PATIENT MESSAGE (OUTPATIENT)
Dept: OTOLARYNGOLOGY | Facility: CLINIC | Age: 69
End: 2019-02-26

## 2019-03-11 DIAGNOSIS — C50.512 MALIGNANT NEOPLASM OF LOWER-OUTER QUADRANT OF LEFT FEMALE BREAST: ICD-10-CM

## 2019-03-11 RX ORDER — LETROZOLE 2.5 MG/1
2.5 TABLET, FILM COATED ORAL DAILY
Qty: 30 TABLET | Refills: 11 | Status: SHIPPED | OUTPATIENT
Start: 2019-03-11 | End: 2019-04-10

## 2019-03-18 RX ORDER — SIMVASTATIN 20 MG/1
TABLET, FILM COATED ORAL
Qty: 90 TABLET | Refills: 2 | Status: SHIPPED | OUTPATIENT
Start: 2019-03-18 | End: 2019-12-14 | Stop reason: SDUPTHER

## 2019-04-02 ENCOUNTER — HOSPITAL ENCOUNTER (OUTPATIENT)
Dept: DIABETES | Facility: OTHER | Age: 69
Discharge: HOME | End: 2019-04-02
Attending: INTERNAL MEDICINE
Payer: MEDICARE

## 2019-04-02 VITALS — WEIGHT: 184.94 LBS | BODY MASS INDEX: 29.85 KG/M2

## 2019-04-02 DIAGNOSIS — E11.42 TYPE 2 DIABETES MELLITUS WITH DIABETIC POLYNEUROPATHY, WITHOUT LONG-TERM CURRENT USE OF INSULIN: Primary | ICD-10-CM

## 2019-04-02 DIAGNOSIS — E66.09 CLASS 1 OBESITY DUE TO EXCESS CALORIES WITH SERIOUS COMORBIDITY AND BODY MASS INDEX (BMI) OF 30.0 TO 30.9 IN ADULT: ICD-10-CM

## 2019-04-02 PROCEDURE — G0108 DIAB MANAGE TRN  PER INDIV: HCPCS | Mod: HCNC

## 2019-04-04 NOTE — PROGRESS NOTES
Diabetes Education  Author: Berenice Agustin RN  Date: 2019    Diabetes Care Management Summary  Diabetes Education Record Assessment/Progress: Comprehensive/Group         Diabetes Type  Diabetes Type : Type II    Diabetes History  Current Treatment: Oral Medication  Reviewed Problem List with Patient: Yes    Health Maintenance was reviewed today with patient. Discussed with patient importance of routine eye exams, foot exams/foot care, blood work (i.e.: A1c, microalbumin, and lipid), dental visits, yearly flu vaccine, and pneumonia vaccine as indicated by PCP. Patient verbalized understanding.     Health Maintenance Topics with due status: Not Due       Topic Last Completion Date    TETANUS VACCINE 2013    DEXA SCAN 2017    Colonoscopy 05/15/2018    Eye Exam 2018    Foot Exam 2018    Lipid Panel 2019    Hemoglobin A1c 2019    High Dose Statin 2019     There are no preventive care reminders to display for this patient.    Nutrition  Meal Plan 24 Hour Recall - Breakfast: shake - high protein, coffee   Meal Plan 24 Hour Recall - Lunch: salad/taboulli  Meal Plan 24 Hour Recall - Dinner: steak, potatoes, small portion rice, asparagus, green salad - small spoon ice cream/cake +'s birthday dinner   Meal Plan 24 Hour Recall - Snack: cucumber slices w/ hummus, half apple w/ peanut butter    Monitoring   Self Monitoring : AM Fastin, 161, 160, 165, 175, 167, 150, 155, 172, 172, 175, 177 2hr PP lunch: 150 Before Dinner: 132, 152, 154   Blood Glucose Logs: Yes  Do you use a personal continuous glucose monitor?: No    Exercise   Exercise Type: walking(gardening)  Intensity: Low  Frequency: (1-2x/week)  Duration: 30 min    Current Diabetes Treatment   Current Treatment: Oral Medication                                                    Diabetes Education Assessment/Progress  Diabetes Disease Process (diabetes disease process and treatment options): Not  Covered/Deferred  Nutrition (Incorporating nutritional management into one's lifestyle): Discussion, Individual Session, Requires Assistance Family/SO(pt continue to watch what she eats and working on making habit changes, had more trouble when going out of town for workshop, discussed strategies to stay on track and how often the occassional treat is ok)  Physical Activity (incorporating physical activity into one's lifestyle): Discussion, Individual Session, Requires Assistance Family/SO(pt still working to figure out a time to fit into schedule)  Medications (states correct name, dose, onset, peak, duration, side effects & timing of meds): Discussion, Requires Assistance Family/SO, Individual Session(discussed next option of either increasing metformin to 1000mg BID (if tolerated) or to try a GLP-1 to help w/ weight loss + BG control)  Monitoring (monitoring blood glucose/other parameters & using results): Not Covered/Deferred  Acute Complications (preventing, detecting, and treating acute complications): Not Covered/Deferred  Chronic Complications (preventing, detecting, and treating chronic complications): Not Covered/Deferred  Clinical (diabetes, other pertinent medical history, and relevant comorbidities reviewed during visit): Discussion, Individual Session, Requires Assistance Family/SO(pt continuing to work on weight loss)  Cognitive (knowledge of self-management skills, functional health literacy): Not Covered/Deferred  Psychosocial (emotional response to diabetes): Not Covered/Deferred  Diabetes Distress and Support Systems: Not Covered/Deferred  Behavioral (readiness for change, lifestyle practices, self-care behaviors): Discussion, Individual Session, Requires Assistance Family/SO(pt motivated to continue trying new things to work on healthier habits)    Goals  Patient has selected/evaluated goals during today's session: Yes, evaluated  Healthy Eating: In Progress  Monitoring: In Progress  Medications:  In Progress         Diabetes Care Plan/Intervention  Education Plan/Intervention: Individual Follow-Up DSMT    Diabetes Meal Plan  Restrictions: Restricted Carbohydrate  Carbohydrate Per Meal: 30-45g    Today's Self-Management Care Plan was developed with the patient's input and is based on barriers identified during today's assessment.    The long and short-term goals in the care plan were written with the patient/caregiver's input. The patient has agreed to work toward these goals to improve her overall diabetes control.      The patient received a copy of today's self-management plan and verbalized understanding of the care plan, goals, and all of today's instructions.      The patient was encouraged to communicate with her physician and care team regarding her condition(s) and treatment.  I provided the patient with my contact information today and encouraged her to contact me via phone or patient portal as needed.     Education Units of Time   Time Spent: 30 min

## 2019-04-10 DIAGNOSIS — E11.8 TYPE 2 DIABETES MELLITUS WITH COMPLICATION, WITHOUT LONG-TERM CURRENT USE OF INSULIN: ICD-10-CM

## 2019-04-10 RX ORDER — METFORMIN HYDROCHLORIDE 500 MG/1
1000 TABLET ORAL 2 TIMES DAILY WITH MEALS
Qty: 180 TABLET | Refills: 3 | Status: SHIPPED | OUTPATIENT
Start: 2019-04-10 | End: 2019-08-22

## 2019-05-09 ENCOUNTER — PATIENT MESSAGE (OUTPATIENT)
Dept: INTERNAL MEDICINE | Facility: CLINIC | Age: 69
End: 2019-05-09

## 2019-05-09 NOTE — PROGRESS NOTES
Subjective:       Patient ID: Lima Maldonado is a 68 y.o. female.    Chief Complaint: No chief complaint on file.    HPI Ms Maldonado return to clinic for follow-up of recent diagnosis of left breast cancer.  She had stage I ER positive disease with an intermediate risk Oncotype score.     She is currently on letrozole therapy which was started in April 2017.    She has had some problems with pain in feet and ankles.  She has been using some topical medication for that.  She was having some leg cramps but those have resolved since she started using CBD oil.  She is planning to start a diet to lose weight.  Her only other pain is some occasional left chest wall pain.  She does note some occasional dyspnea on exertion and reports that she has been exercising only sporadically      Breast history: mammography on September 20, 2016 demonstrated a new irregular mass with spiculated margins seen in the left breast at  5 o'clock along the surgical scar site.   ultrasound DEMONSTRATED A SOLID 8 X 8 X 7 MM MASS.      A needle biopsy in September 27 showed infiltrating  carcinoma, histologic grade 3, nuclear grade 2, mitotic index 1.  Tumor was 90% ER positive, 70% KY positive, and 1+ HER-2.    MRI of the breast showed a 1.7 x 1.3 cm lesion in the lower outer quadrant of the left breast.  PET/CT on October 7 was negative for any evidence of distant metastasis.  In the  On November 16, 2016 bilateral mastectomies were performed.  Left pressure 1.5 cm intermediate grade carcinoma with ductal and lobular features.  There was focal dermal invasion.  Margins were negative.  The right breast was without abnormality.  Oncotype score returned 25 -  intermediate risk.    She received 4 cycles of adjuvant docetaxel and cyclophosphamide completed March 31, 2017.    Past history:left    breast, T1cN1, ER positive, status post mastectomy with reconstruction and   postoperative chemotherapy with four cycles of Adriamycin and Cytoxan and     five years of tamoxifen.  Her original diagnosis was in 1997  Review of Systems   Constitutional: Positive for fatigue and unexpected weight change (Gain). Negative for activity change and appetite change.        Hot flashes   Eyes: Negative for visual disturbance.   Respiratory: Positive for shortness of breath ( with exertion). Negative for cough.    Cardiovascular: Negative for chest pain.   Gastrointestinal: Negative for abdominal pain, constipation, diarrhea and nausea.   Genitourinary: Negative for frequency.   Musculoskeletal: Positive for arthralgias (Ft and ankles). Negative for back pain.   Skin: Negative for rash.   Neurological: Positive for numbness. Negative for headaches.   Hematological: Negative for adenopathy.   Psychiatric/Behavioral: Negative for dysphoric mood. The patient is not nervous/anxious and is not hyperactive.        Objective:      Physical Exam   Constitutional: She appears well-developed and well-nourished. No distress.   HENT:   Mouth/Throat: No oropharyngeal exudate.   Eyes: No scleral icterus.   Cardiovascular: Normal rate, regular rhythm and normal heart sounds.   Pulmonary/Chest: Effort normal and breath sounds normal. She has no wheezes. She has no rales.       Abdominal: Soft. She exhibits no mass. There is no tenderness.   Lymphadenopathy:     She has no cervical adenopathy.   Psychiatric: She has a normal mood and affect. Her behavior is normal. Thought content normal.   Vitals reviewed.      Assessment:       1. Malignant neoplasm of lower-outer quadrant of left breast of female, estrogen receptor positive        Plan:     Continue current endocrine therapy and return in 3 months.  Increase gabapentin to 600 mg.  Bone density.

## 2019-05-10 ENCOUNTER — OFFICE VISIT (OUTPATIENT)
Dept: HEMATOLOGY/ONCOLOGY | Facility: CLINIC | Age: 69
End: 2019-05-10
Payer: MEDICARE

## 2019-05-10 ENCOUNTER — TELEPHONE (OUTPATIENT)
Dept: HEMATOLOGY/ONCOLOGY | Facility: CLINIC | Age: 69
End: 2019-05-10

## 2019-05-10 VITALS
OXYGEN SATURATION: 95 % | WEIGHT: 187.38 LBS | DIASTOLIC BLOOD PRESSURE: 77 MMHG | RESPIRATION RATE: 20 BRPM | HEART RATE: 97 BPM | SYSTOLIC BLOOD PRESSURE: 147 MMHG | TEMPERATURE: 98 F | BODY MASS INDEX: 30.11 KG/M2 | HEIGHT: 66 IN

## 2019-05-10 DIAGNOSIS — C50.512 MALIGNANT NEOPLASM OF LOWER-OUTER QUADRANT OF LEFT BREAST OF FEMALE, ESTROGEN RECEPTOR POSITIVE: Primary | ICD-10-CM

## 2019-05-10 DIAGNOSIS — Z17.0 MALIGNANT NEOPLASM OF LOWER-OUTER QUADRANT OF LEFT BREAST OF FEMALE, ESTROGEN RECEPTOR POSITIVE: Primary | ICD-10-CM

## 2019-05-10 DIAGNOSIS — G62.9 NEUROPATHY: ICD-10-CM

## 2019-05-10 DIAGNOSIS — T38.6X5A OSTEOPOROSIS DUE TO AROMATASE INHIBITOR: ICD-10-CM

## 2019-05-10 DIAGNOSIS — M81.8 OSTEOPOROSIS DUE TO AROMATASE INHIBITOR: ICD-10-CM

## 2019-05-10 PROCEDURE — 99213 OFFICE O/P EST LOW 20 MIN: CPT | Mod: HCNC,S$GLB,, | Performed by: INTERNAL MEDICINE

## 2019-05-10 PROCEDURE — 3078F DIAST BP <80 MM HG: CPT | Mod: HCNC,CPTII,S$GLB, | Performed by: INTERNAL MEDICINE

## 2019-05-10 PROCEDURE — 99999 PR PBB SHADOW E&M-EST. PATIENT-LVL III: ICD-10-PCS | Mod: PBBFAC,HCNC,, | Performed by: INTERNAL MEDICINE

## 2019-05-10 PROCEDURE — 1101F PT FALLS ASSESS-DOCD LE1/YR: CPT | Mod: HCNC,CPTII,S$GLB, | Performed by: INTERNAL MEDICINE

## 2019-05-10 PROCEDURE — 3077F PR MOST RECENT SYSTOLIC BLOOD PRESSURE >= 140 MM HG: ICD-10-PCS | Mod: HCNC,CPTII,S$GLB, | Performed by: INTERNAL MEDICINE

## 2019-05-10 PROCEDURE — 3078F PR MOST RECENT DIASTOLIC BLOOD PRESSURE < 80 MM HG: ICD-10-PCS | Mod: HCNC,CPTII,S$GLB, | Performed by: INTERNAL MEDICINE

## 2019-05-10 PROCEDURE — 99999 PR PBB SHADOW E&M-EST. PATIENT-LVL III: CPT | Mod: PBBFAC,HCNC,, | Performed by: INTERNAL MEDICINE

## 2019-05-10 PROCEDURE — 99213 PR OFFICE/OUTPT VISIT, EST, LEVL III, 20-29 MIN: ICD-10-PCS | Mod: HCNC,S$GLB,, | Performed by: INTERNAL MEDICINE

## 2019-05-10 PROCEDURE — 3077F SYST BP >= 140 MM HG: CPT | Mod: HCNC,CPTII,S$GLB, | Performed by: INTERNAL MEDICINE

## 2019-05-10 PROCEDURE — 1101F PR PT FALLS ASSESS DOC 0-1 FALLS W/OUT INJ PAST YR: ICD-10-PCS | Mod: HCNC,CPTII,S$GLB, | Performed by: INTERNAL MEDICINE

## 2019-05-10 RX ORDER — GABAPENTIN 300 MG/1
600 CAPSULE ORAL NIGHTLY
Qty: 60 CAPSULE | Refills: 11 | Status: SHIPPED | OUTPATIENT
Start: 2019-05-10 | End: 2020-05-07

## 2019-05-10 NOTE — TELEPHONE ENCOUNTER
----- Message from Francisco Baker MD sent at 5/10/2019 11:09 AM CDT -----  Bone density next available

## 2019-05-14 ENCOUNTER — OFFICE VISIT (OUTPATIENT)
Dept: OTOLARYNGOLOGY | Facility: CLINIC | Age: 69
End: 2019-05-14
Payer: MEDICARE

## 2019-05-14 ENCOUNTER — HOSPITAL ENCOUNTER (OUTPATIENT)
Dept: DIABETES | Facility: OTHER | Age: 69
Discharge: HOME | End: 2019-05-14
Attending: INTERNAL MEDICINE
Payer: MEDICARE

## 2019-05-14 VITALS
HEIGHT: 66 IN | WEIGHT: 182.75 LBS | WEIGHT: 182 LBS | BODY MASS INDEX: 29.5 KG/M2 | SYSTOLIC BLOOD PRESSURE: 120 MMHG | HEART RATE: 105 BPM | DIASTOLIC BLOOD PRESSURE: 75 MMHG | BODY MASS INDEX: 29.25 KG/M2

## 2019-05-14 DIAGNOSIS — R05.9 COUGH: ICD-10-CM

## 2019-05-14 DIAGNOSIS — E11.42 TYPE 2 DIABETES MELLITUS WITH DIABETIC POLYNEUROPATHY, WITHOUT LONG-TERM CURRENT USE OF INSULIN: ICD-10-CM

## 2019-05-14 DIAGNOSIS — E66.09 CLASS 1 OBESITY DUE TO EXCESS CALORIES WITH SERIOUS COMORBIDITY AND BODY MASS INDEX (BMI) OF 30.0 TO 30.9 IN ADULT: Primary | ICD-10-CM

## 2019-05-14 DIAGNOSIS — R09.82 PND (POST-NASAL DRIP): ICD-10-CM

## 2019-05-14 DIAGNOSIS — J01.90 ACUTE NON-RECURRENT SINUSITIS, UNSPECIFIED LOCATION: Primary | ICD-10-CM

## 2019-05-14 DIAGNOSIS — J30.9 ALLERGIC RHINITIS, UNSPECIFIED SEASONALITY, UNSPECIFIED TRIGGER: ICD-10-CM

## 2019-05-14 DIAGNOSIS — J34.2 NASAL SEPTAL DEVIATION: ICD-10-CM

## 2019-05-14 PROCEDURE — 3074F PR MOST RECENT SYSTOLIC BLOOD PRESSURE < 130 MM HG: ICD-10-PCS | Mod: CPTII,S$GLB,, | Performed by: SPECIALIST

## 2019-05-14 PROCEDURE — 96372 PR INJECTION,THERAP/PROPH/DIAG2ST, IM OR SUBCUT: ICD-10-PCS | Mod: S$GLB,,, | Performed by: SPECIALIST

## 2019-05-14 PROCEDURE — 1101F PT FALLS ASSESS-DOCD LE1/YR: CPT | Mod: CPTII,S$GLB,, | Performed by: SPECIALIST

## 2019-05-14 PROCEDURE — 99214 PR OFFICE/OUTPT VISIT, EST, LEVL IV, 30-39 MIN: ICD-10-PCS | Mod: 25,S$GLB,, | Performed by: SPECIALIST

## 2019-05-14 PROCEDURE — 1101F PR PT FALLS ASSESS DOC 0-1 FALLS W/OUT INJ PAST YR: ICD-10-PCS | Mod: CPTII,S$GLB,, | Performed by: SPECIALIST

## 2019-05-14 PROCEDURE — 3078F PR MOST RECENT DIASTOLIC BLOOD PRESSURE < 80 MM HG: ICD-10-PCS | Mod: CPTII,S$GLB,, | Performed by: SPECIALIST

## 2019-05-14 PROCEDURE — G0108 DIAB MANAGE TRN  PER INDIV: HCPCS | Mod: HCNC

## 2019-05-14 PROCEDURE — 99214 OFFICE O/P EST MOD 30 MIN: CPT | Mod: 25,S$GLB,, | Performed by: SPECIALIST

## 2019-05-14 PROCEDURE — 3078F DIAST BP <80 MM HG: CPT | Mod: CPTII,S$GLB,, | Performed by: SPECIALIST

## 2019-05-14 PROCEDURE — 3074F SYST BP LT 130 MM HG: CPT | Mod: CPTII,S$GLB,, | Performed by: SPECIALIST

## 2019-05-14 PROCEDURE — 96372 THER/PROPH/DIAG INJ SC/IM: CPT | Mod: S$GLB,,, | Performed by: SPECIALIST

## 2019-05-14 RX ORDER — TRIAMCINOLONE ACETONIDE 40 MG/ML
40 INJECTION, SUSPENSION INTRA-ARTICULAR; INTRAMUSCULAR ONCE
Status: COMPLETED | OUTPATIENT
Start: 2019-05-14 | End: 2019-05-14

## 2019-05-14 RX ORDER — BENZONATATE 200 MG/1
200 CAPSULE ORAL 3 TIMES DAILY PRN
Qty: 40 CAPSULE | Refills: 3 | Status: SHIPPED | OUTPATIENT
Start: 2019-05-14 | End: 2019-05-24

## 2019-05-14 RX ORDER — PROMETHAZINE HYDROCHLORIDE AND CODEINE PHOSPHATE 6.25; 1 MG/5ML; MG/5ML
5 SOLUTION ORAL EVERY 4 HOURS PRN
Qty: 240 ML | Refills: 0 | Status: SHIPPED | OUTPATIENT
Start: 2019-05-14 | End: 2019-05-24

## 2019-05-14 RX ORDER — AMOXICILLIN AND CLAVULANATE POTASSIUM 875; 125 MG/1; MG/1
TABLET, FILM COATED ORAL
Qty: 20 TABLET | Refills: 0 | Status: SHIPPED | OUTPATIENT
Start: 2019-05-14 | End: 2019-08-15

## 2019-05-14 RX ORDER — CEFTRIAXONE 1 G/1
1 INJECTION, POWDER, FOR SOLUTION INTRAMUSCULAR; INTRAVENOUS
Status: COMPLETED | OUTPATIENT
Start: 2019-05-14 | End: 2019-05-14

## 2019-05-14 RX ORDER — LIDOCAINE HYDROCHLORIDE 10 MG/ML
2 INJECTION INFILTRATION; PERINEURAL
Status: COMPLETED | OUTPATIENT
Start: 2019-05-14 | End: 2019-05-14

## 2019-05-14 RX ADMIN — TRIAMCINOLONE ACETONIDE 40 MG: 40 INJECTION, SUSPENSION INTRA-ARTICULAR; INTRAMUSCULAR at 10:05

## 2019-05-14 RX ADMIN — CEFTRIAXONE 1 G: 1 INJECTION, POWDER, FOR SOLUTION INTRAMUSCULAR; INTRAVENOUS at 10:05

## 2019-05-14 RX ADMIN — LIDOCAINE HYDROCHLORIDE 2 ML: 10 INJECTION INFILTRATION; PERINEURAL at 10:05

## 2019-05-14 NOTE — PROGRESS NOTES
"Subjective:       Patient ID: Lima Maldonado is a 68 y.o. female.    Chief Complaint: Sinus Problem; Cough; Sinusitis; and Ear Fullness    The patient is having congestion, headache, postnasal drip and coughing.  She does not have fever.  She has had a recent flare and her allergy symptoms for the last few weeks and more recently her  came down with a" cold".  Nasal secretions are yellow.    Review of Systems   Constitutional: Positive for fatigue. Negative for activity change, appetite change, chills and fever.   HENT: Positive for congestion, ear pain, postnasal drip, rhinorrhea, sinus pressure, sinus pain and sore throat. Negative for ear discharge, facial swelling, hearing loss, mouth sores, sneezing, tinnitus, trouble swallowing and voice change.    Eyes: Negative for photophobia, pain, discharge, redness, itching and visual disturbance.   Respiratory: Positive for cough and shortness of breath. Negative for apnea, choking and wheezing.    Cardiovascular: Negative for chest pain and palpitations.   Gastrointestinal: Negative for abdominal distention, abdominal pain, nausea and vomiting.   Musculoskeletal: Negative for arthralgias, myalgias, neck pain and neck stiffness.   Skin: Negative.  Negative for color change, pallor and rash.   Allergic/Immunologic: Positive for environmental allergies. Negative for food allergies and immunocompromised state.   Neurological: Positive for headaches. Negative for dizziness, speech difficulty, weakness and light-headedness.   Hematological: Negative for adenopathy. Does not bruise/bleed easily.   Psychiatric/Behavioral: Negative for agitation, confusion, decreased concentration and sleep disturbance.       Objective:      Physical Exam   Constitutional: She is oriented to person, place, and time. She appears well-developed and well-nourished.   HENT:   Head: Normocephalic.   Right Ear: External ear and ear canal normal. Tympanic membrane is retracted. Tympanic " membrane mobility is abnormal.   Left Ear: External ear and ear canal normal. Tympanic membrane is retracted. Tympanic membrane mobility is abnormal.   Nose: Mucosal edema (with inflamed turbinates bilaterall), rhinorrhea (yellow pus bilaterally) and septal deviation (To the right) present. No nasal deformity.   Mouth/Throat: Uvula is midline and mucous membranes are normal. No oral lesions. Posterior oropharyngeal erythema ( mild) present. No oropharyngeal exudate (erythema turbid mucus from the nasopharynx). No tonsillar exudate.   Eyes: Pupils are equal, round, and reactive to light. EOM and lids are normal. Right eye exhibits no discharge. Left eye exhibits no discharge. Right conjunctiva is injected. Left conjunctiva is injected.   Neck: Trachea normal, normal range of motion, full passive range of motion without pain and phonation normal. Neck supple. No neck rigidity. No thyroid mass and no thyromegaly present.   Cardiovascular: Normal rate, regular rhythm and normal heart sounds.   Pulmonary/Chest: No respiratory distress. She has decreased breath sounds ( Diffusely). She has no wheezes. She has no rhonchi. She has no rales.   Abdominal: Soft. Bowel sounds are normal. There is no tenderness.   Musculoskeletal: Normal range of motion.        Right shoulder: Normal.   Lymphadenopathy:        Head (right side): No occipital adenopathy present.        Head (left side): No occipital adenopathy present.     She has no cervical adenopathy.   Neurological: She is alert and oriented to person, place, and time. She has normal strength. No cranial nerve deficit or sensory deficit. Gait normal.   Skin: Skin is warm and dry. No lesion, no petechiae and no rash noted. No cyanosis. Nails show no clubbing.   Psychiatric: She has a normal mood and affect. Her speech is normal and behavior is normal. Cognition and memory are normal.       Assessment:       1. Acute non-recurrent sinusitis, unspecified location    2. PND  (post-nasal drip)    3. Cough    4. Allergic rhinitis, unspecified seasonality, unspecified trigger    5. Nasal septal deviation        Plan:       I will have the patient use the Phenergan with codeine at night to control coughing while sleeping in the [place] during the daytime.  She needs to be recheck at the end of her antibiotics on an as-needed basis.  I have given her an Afrin for flying sheet with full instructions.

## 2019-05-16 DIAGNOSIS — E11.8 TYPE 2 DIABETES MELLITUS WITH COMPLICATION, WITHOUT LONG-TERM CURRENT USE OF INSULIN: Primary | ICD-10-CM

## 2019-05-16 NOTE — PROGRESS NOTES
Diabetes Education  Author: Berenice Agustin RN  Date: 2019    Diabetes Care Management Summary  Diabetes Education Record Assessment/Progress: Comprehensive/Group  Current Diabetes Risk Level: Low         Diabetes Type  Diabetes Type : Type II    Diabetes History  Current Treatment: Oral Medication  Reviewed Problem List with Patient: Yes    Health Maintenance was reviewed today with patient. Discussed with patient importance of routine eye exams, foot exams/foot care, blood work (i.e.: A1c, microalbumin, and lipid), dental visits, yearly flu vaccine, and pneumonia vaccine as indicated by PCP. Patient verbalized understanding.     Health Maintenance Topics with due status: Not Due       Topic Last Completion Date    TETANUS VACCINE 2013    DEXA SCAN 2017    Colonoscopy 05/15/2018    Influenza Vaccine 2018    Eye Exam 2018    Foot Exam 2018    Lipid Panel 2019    Hemoglobin A1c 2019    High Dose Statin 2019     There are no preventive care reminders to display for this patient.    Nutrition  Meal Planning: (pt has started new diet plan: [employer] to help w/ structure of meals - higher protein, lower fat, low carb)  Meal Plan 24 Hour Recall - Breakfast: biscuit w/ coffee   Meal Plan 24 Hour Recall - Lunch: chicken noodle soup - water   Meal Plan 24 Hour Recall - Dinner: grilled chicken breast, bell pepper, green salad w/ lettuce, tomato, green onion, celery w/ oil & vinegar dressing - diet sprite   Meal Plan 24 Hour Recall - Snack: protein/granola bar, protein shake, small pack snack crackers, protein brownie     Monitoring   Self Monitoring : AM Fastin, 131, 117, 149, 143, 174, 164, 194, 145, 213, 165, 173, 165, 172 Bedtime: 121, 118, 135, 1858, 184, 154, 143, 149   Blood Glucose Logs: Yes  Do you use a personal continuous glucose monitor?: No         Current Diabetes Treatment   Current Treatment: Oral Medication                                                     Diabetes Education Assessment/Progress  Diabetes Disease Process (diabetes disease process and treatment options): Not Covered/Deferred  Nutrition (Incorporating nutritional management into one's lifestyle): Discussion, Requires Assistance Family/SO, Individual Session(pt wanting to try new diet plan - [employer] - it is more structed w/ snack times and higher protein options - likes the checklist etc. hoping this will help her build good habits and stay on track more)  Physical Activity (incorporating physical activity into one's lifestyle): Discussion, Requires Assistance Family/SO, Individual Session(still working on increasing frequency/building consistency in exercise plan)  Medications (states correct name, dose, onset, peak, duration, side effects & timing of meds): Discussion, Requires Assistance Family/SO, Individual Session(could not tolerate 1000mg metformin BID, ok w/ 500mg in AM and 1000mg in PM )  Monitoring (monitoring blood glucose/other parameters & using results): Not Covered/Deferred  Acute Complications (preventing, detecting, and treating acute complications): Not Covered/Deferred  Chronic Complications (preventing, detecting, and treating chronic complications): Not Covered/Deferred  Clinical (diabetes, other pertinent medical history, and relevant comorbidities reviewed during visit): Discussion, Requires Assistance Family/SO, Individual Session(is down 2 pounds in last 6 weeks - pt hoping new diet plan will help, has noticed BG better since starting this week)  Cognitive (knowledge of self-management skills, functional health literacy): Not Covered/Deferred  Psychosocial (emotional response to diabetes): Not Covered/Deferred  Diabetes Distress and Support Systems: Not Covered/Deferred  Behavioral (readiness for change, lifestyle practices, self-care behaviors): Discussion, Requires Assistance Family/SO, Individual Session(pt continuing to try to regimes to help get her on track and motivated  to lose weight and stay on track w/ BG)    Goals  Patient has selected/evaluated goals during today's session: Yes, evaluated  Healthy Eating: In Progress  Monitoring: In Progress  Medications: In Progress         Diabetes Care Plan/Intervention  Education Plan/Intervention: Individual Follow-Up DSMT    Diabetes Meal Plan  Restrictions: Restricted Carbohydrate  Carbohydrate Per Meal: 30-45g    Today's Self-Management Care Plan was developed with the patient's input and is based on barriers identified during today's assessment.    The long and short-term goals in the care plan were written with the patient/caregiver's input. The patient has agreed to work toward these goals to improve her overall diabetes control.      The patient received a copy of today's self-management plan and verbalized understanding of the care plan, goals, and all of today's instructions.      The patient was encouraged to communicate with her physician and care team regarding her condition(s) and treatment.  I provided the patient with my contact information today and encouraged her to contact me via phone or patient portal as needed.     Education Units of Time   Time Spent: 30 min

## 2019-06-05 ENCOUNTER — HOSPITAL ENCOUNTER (OUTPATIENT)
Dept: RADIOLOGY | Facility: CLINIC | Age: 69
Discharge: HOME | End: 2019-06-05
Attending: INTERNAL MEDICINE
Payer: MEDICARE

## 2019-06-05 DIAGNOSIS — M85.80 LOW BONE MASS: ICD-10-CM

## 2019-06-05 DIAGNOSIS — C50.512 MALIGNANT NEOPLASM OF LOWER-OUTER QUADRANT OF LEFT BREAST OF FEMALE, ESTROGEN RECEPTOR POSITIVE: ICD-10-CM

## 2019-06-05 DIAGNOSIS — K21.9 GASTROESOPHAGEAL REFLUX DISEASE WITHOUT ESOPHAGITIS: ICD-10-CM

## 2019-06-05 DIAGNOSIS — Z79.899 USE OF PROTON PUMP INHIBITOR THERAPY: ICD-10-CM

## 2019-06-05 DIAGNOSIS — Z17.0 MALIGNANT NEOPLASM OF LOWER-OUTER QUADRANT OF LEFT BREAST OF FEMALE, ESTROGEN RECEPTOR POSITIVE: ICD-10-CM

## 2019-06-05 DIAGNOSIS — T38.6X5A OSTEOPOROSIS DUE TO AROMATASE INHIBITOR: ICD-10-CM

## 2019-06-05 DIAGNOSIS — M81.8 OSTEOPOROSIS DUE TO AROMATASE INHIBITOR: ICD-10-CM

## 2019-06-05 PROCEDURE — 77080 DEXA BONE DENSITY SPINE HIP: ICD-10-PCS | Mod: 26,HCNC,, | Performed by: INTERNAL MEDICINE

## 2019-06-05 PROCEDURE — 77080 DXA BONE DENSITY AXIAL: CPT | Mod: 26,HCNC,, | Performed by: INTERNAL MEDICINE

## 2019-06-05 PROCEDURE — 77080 DXA BONE DENSITY AXIAL: CPT | Mod: TC,HCNC

## 2019-06-05 RX ORDER — PANTOPRAZOLE SODIUM 40 MG/1
40 TABLET, DELAYED RELEASE ORAL
Qty: 90 TABLET | Refills: 3 | Status: SHIPPED | OUTPATIENT
Start: 2019-06-05 | End: 2020-05-31 | Stop reason: SDUPTHER

## 2019-06-10 ENCOUNTER — TELEPHONE (OUTPATIENT)
Dept: HEMATOLOGY/ONCOLOGY | Facility: CLINIC | Age: 69
End: 2019-06-10

## 2019-06-10 DIAGNOSIS — C50.512 MALIGNANT NEOPLASM OF LOWER-OUTER QUADRANT OF LEFT BREAST OF FEMALE, ESTROGEN RECEPTOR POSITIVE: Primary | ICD-10-CM

## 2019-06-10 DIAGNOSIS — Z17.0 MALIGNANT NEOPLASM OF LOWER-OUTER QUADRANT OF LEFT BREAST OF FEMALE, ESTROGEN RECEPTOR POSITIVE: Primary | ICD-10-CM

## 2019-06-10 NOTE — TELEPHONE ENCOUNTER
----- Message from Katherine Paul RN sent at 6/10/2019 12:43 PM CDT -----      ----- Message -----  From: Francisco Baker MD  Sent: 6/10/2019  12:26 PM  To: Katherine Paul RN    Will need a calcium before - set up next avail  ----- Message -----  From: Katherine Paul RN  Sent: 6/10/2019  10:48 AM  To: Francisco Baker MD        ----- Message -----  From: Orin Mariano  Sent: 6/10/2019  10:41 AM  To: [facility]OR[facility] Staff    prolia approved. When should patient get? Does she need labs or f/u    Message   Received: Today   Message Contents   MD Orin Wong           Set up [employer]

## 2019-06-10 NOTE — TELEPHONE ENCOUNTER
----- Message from Yaakov Velasquez sent at 6/10/2019  2:25 PM CDT -----  Contact: Patient  Pt returning a missed call, please contact to assist.      Contact:: [phone]

## 2019-06-12 ENCOUNTER — OFFICE VISIT (OUTPATIENT)
Dept: PODIATRY | Facility: CLINIC | Age: 69
End: 2019-06-12
Payer: MEDICARE

## 2019-06-12 ENCOUNTER — INFUSION (OUTPATIENT)
Dept: INFUSION THERAPY | Facility: HOSPITAL | Age: 69
End: 2019-06-12
Attending: INTERNAL MEDICINE
Payer: MEDICARE

## 2019-06-12 VITALS
DIASTOLIC BLOOD PRESSURE: 68 MMHG | WEIGHT: 182 LBS | HEIGHT: 67 IN | BODY MASS INDEX: 28.56 KG/M2 | HEART RATE: 83 BPM | SYSTOLIC BLOOD PRESSURE: 110 MMHG

## 2019-06-12 DIAGNOSIS — M81.0 SENILE OSTEOPOROSIS: Primary | ICD-10-CM

## 2019-06-12 DIAGNOSIS — L60.0 INGROWN NAIL: Primary | ICD-10-CM

## 2019-06-12 PROCEDURE — 99499 UNLISTED E&M SERVICE: CPT | Mod: HCNC,S$GLB,, | Performed by: PODIATRIST

## 2019-06-12 PROCEDURE — 99999 PR PBB SHADOW E&M-EST. PATIENT-LVL IV: ICD-10-PCS | Mod: PBBFAC,HCNC,, | Performed by: PODIATRIST

## 2019-06-12 PROCEDURE — 3078F DIAST BP <80 MM HG: CPT | Mod: HCNC,CPTII,S$GLB, | Performed by: PODIATRIST

## 2019-06-12 PROCEDURE — 96372 THER/PROPH/DIAG INJ SC/IM: CPT | Mod: HCNC

## 2019-06-12 PROCEDURE — 1101F PR PT FALLS ASSESS DOC 0-1 FALLS W/OUT INJ PAST YR: ICD-10-PCS | Mod: HCNC,CPTII,S$GLB, | Performed by: PODIATRIST

## 2019-06-12 PROCEDURE — 99213 OFFICE O/P EST LOW 20 MIN: CPT | Mod: HCNC,S$GLB,, | Performed by: PODIATRIST

## 2019-06-12 PROCEDURE — 63600175 PHARM REV CODE 636 W HCPCS: Mod: HCNC,JG | Performed by: INTERNAL MEDICINE

## 2019-06-12 PROCEDURE — 3078F PR MOST RECENT DIASTOLIC BLOOD PRESSURE < 80 MM HG: ICD-10-PCS | Mod: HCNC,CPTII,S$GLB, | Performed by: PODIATRIST

## 2019-06-12 PROCEDURE — 99499 RISK ADDL DX/OHS AUDIT: ICD-10-PCS | Mod: HCNC,S$GLB,, | Performed by: PODIATRIST

## 2019-06-12 PROCEDURE — 3074F SYST BP LT 130 MM HG: CPT | Mod: HCNC,CPTII,S$GLB, | Performed by: PODIATRIST

## 2019-06-12 PROCEDURE — 1101F PT FALLS ASSESS-DOCD LE1/YR: CPT | Mod: HCNC,CPTII,S$GLB, | Performed by: PODIATRIST

## 2019-06-12 PROCEDURE — 3074F PR MOST RECENT SYSTOLIC BLOOD PRESSURE < 130 MM HG: ICD-10-PCS | Mod: HCNC,CPTII,S$GLB, | Performed by: PODIATRIST

## 2019-06-12 PROCEDURE — 99213 PR OFFICE/OUTPT VISIT, EST, LEVL III, 20-29 MIN: ICD-10-PCS | Mod: HCNC,S$GLB,, | Performed by: PODIATRIST

## 2019-06-12 PROCEDURE — 99999 PR PBB SHADOW E&M-EST. PATIENT-LVL IV: CPT | Mod: PBBFAC,HCNC,, | Performed by: PODIATRIST

## 2019-06-12 RX ORDER — CEPHALEXIN 500 MG/1
500 CAPSULE ORAL EVERY 8 HOURS
Qty: 30 CAPSULE | Refills: 0 | Status: SHIPPED | OUTPATIENT
Start: 2019-06-12 | End: 2019-08-22

## 2019-06-12 RX ADMIN — DENOSUMAB 60 MG: 60 INJECTION SUBCUTANEOUS at 10:06

## 2019-06-12 NOTE — NURSING
Pt here for prolia injection. Injection given subcu in abd without complications. Pt has received medication in past. Amb off unit independently.

## 2019-06-12 NOTE — PATIENT INSTRUCTIONS
Ingrown Toenail, Infected (Antibiotics, No Excision)  An ingrown toenail occurs when the nail grows sideways into the skin alongside the nail. This can cause pain. It can also lead to an infection with redness, swelling, and sometimes drainage.  The most common cause of an ingrown toenail is trimming your nails wrong. Most people trim the nails too close to the skin and try to round the nail too tightly around the shape of the toe. When you do this, the nail can grow into the skin of your toe. It is safer to trim the nail ending in a straight line rather than a curve.  Other causes include injury or wearing shoes that are too short or tight. This can cause the same problem that happens when trimming your nails. Your genetics can also make this more likely to happen.  The following are the most common symptoms of an ingrown toenail:   · Pain  · Redness  · Swelling  · Drainage  If the infection is mild, you may be able to take care of it at home with the following measures:  · Frequent warm water soaks  · Keeping it clean  · Wearing loose, comfortable shoes or sandals  Another method involves using a small piece of cotton or waxed dental floss to gently lift up the corner of the problem nail. Change the cotton or floss frequently, especially if it gets dirty.  If your infection is mild, and the above methods arent working, or if the infection gets worse, see your healthcare provider. Signs of worsening infection include:  · Swelling  · Redness  · Pus drainage  In some cases, you may need antibiotics along with warm soaks. If after 2 to 3 days of antibiotics the toenail doesn't get better or gets worse, part of the nail may need to be removed to drain the infection. With treatment, it can take 1 to 2 weeks to clear up completely.  Home care  Wound care  For the next 3 days, soak and clean your toe in warm water a few times a day.  · Once a day for the first 3 days, clean and soak the toe as follows:  1. Soak your  foot in a tub of warm water and epsom salt for 5-10 minutes. Clean any remaining crust away with soap and water using a cotton swab.  2. Put a small amount of antibiotic ointment on the infected area.  · Change the dressing or bandage every time you soak or clean it, or whenever it becomes wet or dirty.  · If you were prescribed antibiotics, take them as directed until they are all gone.  · Wear comfortable shoes with a lot of toe room, or open-toe sandals, while your toe is healing.  Medicines  · You can take over-the-counter medicine for pain, unless you were given a different pain medicine to use. Note: Talk with your provider before using these medicines if you have chronic liver or kidney disease, ever had a stomach ulcer or GI (gastrointestinal) bleeding, or are taking blood thinner medicines.  · If you were given antibiotics, take them until they are used up or your provider tells you to stop, even if the wound looks better. This ensures that the infection clears up.  Prevention  To prevent ingrown toenails:  · Wear shoes that fit well. Avoid shoes that pinch the toes together.  · When you trim your toenails, do not cut them too short. Cut straight across at the top and dont round the edges.  · Dont use a sharp object to clean under your nail since this might cause an infection.  · If the toenail starts to grow into the skin again, put a small piece of waxed dental floss or cotton under that side of the nail to help it grow out straight.  Follow-up care  Follow up with your healthcare provider, or as advised.  When to seek medical advice  Call your healthcare provider right away if any of the following occur:  · Increasing redness, pain, or swelling of the toe  · Red streaks in the skin leading away from the wound  · Pus or fluid drainage  · Fever of 100.4°F (38°C) or higher, or as directed by your provider  Date Last Reviewed: 12/1/2016  © 7911-1886 [employer]. [address] [place] [address],  YURI[place] Mann [address]. All rights reserved. This information is not intended as a substitute for professional medical care. Always follow your healthcare professional's instructions.

## 2019-06-19 NOTE — PROGRESS NOTES
Subjective:      Patient ID: Lima Maldonado is a 68 y.o. female.    Chief Complaint: Diabetic Foot Exam (12/12/2018/dr agusto holder) and Diabetes Mellitus    Lima is a 68 y.o. female who presents to the clinic complaining of painful ingrown toenail on the left foot.    Review of Systems   Constitution: Negative for chills, fever and malaise/fatigue.   HENT: Negative for hearing loss.    Cardiovascular: Negative for claudication.   Respiratory: Negative for shortness of breath.    Skin: Positive for nail changes. Negative for flushing and rash.   Musculoskeletal: Negative for joint pain and myalgias.   Neurological: Negative for loss of balance, numbness, paresthesias and sensory change.   Psychiatric/Behavioral: Negative for altered mental status.           Objective:      Physical Exam   Cardiovascular:   Pulses:       Dorsalis pedis pulses are 2+ on the right side, and 2+ on the left side.        Posterior tibial pulses are 2+ on the right side, and 2+ on the left side.   No edema noted to b/L LEs   Musculoskeletal:   Adequate joint ROM noted to all lower extremity muscle groups with no pain or crepitation noted. Muscle strength is 5/5 in all groups bilaterally.     Feet:   Right Foot:   Protective Sensation: 5 sites tested. 5 sites sensed.   Left Foot:   Protective Sensation: 5 sites tested. 5 sites sensed.   Neurological:   Intact gross sensation noted to b/L LEs   Skin:   Left hallux, lateral border ingrown with erythema and paronychia.   Painful to palpation.             Assessment:       Encounter Diagnosis   Name Primary?    Ingrown nail - Left Foot Yes         Plan:       Lima was seen today for diabetic foot exam and diabetes mellitus.    Diagnoses and all orders for this visit:    Ingrown nail - Left Foot    Other orders  -     cephALEXin (KEFLEX) 500 MG capsule; Take 1 capsule (500 mg total) by mouth every 8 (eight) hours.      I counseled the patient on her conditions, their implications and  medical management.        Utilizing sterile toenail clippers I aggressively debrided the offending nail border approximately 3 mm from its edge and carried the nail plate incision down at an angle in order to wedge out the offending cryptotic portion of the nail plate.  The area was cleansed with betadine and DSD applied. Patient tolerated the procedure well and related significant relief. Pt advised bc of the local infection, she would need abx before a more permanent procedure was done  Rx keflex  Home instructions given on AVS  Pt will RTC in 10 days for nail avulsion procedure or sooner if condition worsens or if any new pedal problems arise.

## 2019-06-26 ENCOUNTER — OFFICE VISIT (OUTPATIENT)
Dept: PODIATRY | Facility: CLINIC | Age: 69
End: 2019-06-26
Payer: MEDICARE

## 2019-06-26 ENCOUNTER — HOSPITAL ENCOUNTER (OUTPATIENT)
Dept: DIABETES | Facility: OTHER | Age: 69
Discharge: HOME | End: 2019-06-26
Attending: INTERNAL MEDICINE
Payer: MEDICARE

## 2019-06-26 VITALS
DIASTOLIC BLOOD PRESSURE: 70 MMHG | SYSTOLIC BLOOD PRESSURE: 115 MMHG | HEART RATE: 81 BPM | WEIGHT: 164 LBS | HEIGHT: 67 IN | BODY MASS INDEX: 25.74 KG/M2

## 2019-06-26 VITALS — WEIGHT: 164.88 LBS | BODY MASS INDEX: 26.5 KG/M2 | HEIGHT: 66 IN

## 2019-06-26 DIAGNOSIS — E66.09 CLASS 1 OBESITY DUE TO EXCESS CALORIES WITH SERIOUS COMORBIDITY AND BODY MASS INDEX (BMI) OF 30.0 TO 30.9 IN ADULT: ICD-10-CM

## 2019-06-26 DIAGNOSIS — L60.0 INGROWN NAIL: Primary | ICD-10-CM

## 2019-06-26 DIAGNOSIS — E11.42 TYPE 2 DIABETES MELLITUS WITH DIABETIC POLYNEUROPATHY, WITHOUT LONG-TERM CURRENT USE OF INSULIN: Primary | ICD-10-CM

## 2019-06-26 PROCEDURE — 3078F PR MOST RECENT DIASTOLIC BLOOD PRESSURE < 80 MM HG: ICD-10-PCS | Mod: HCNC,CPTII,S$GLB, | Performed by: PODIATRIST

## 2019-06-26 PROCEDURE — 99999 PR PBB SHADOW E&M-EST. PATIENT-LVL IV: ICD-10-PCS | Mod: PBBFAC,HCNC,, | Performed by: PODIATRIST

## 2019-06-26 PROCEDURE — 99999 PR PBB SHADOW E&M-EST. PATIENT-LVL IV: CPT | Mod: PBBFAC,HCNC,, | Performed by: PODIATRIST

## 2019-06-26 PROCEDURE — 99213 OFFICE O/P EST LOW 20 MIN: CPT | Mod: HCNC,S$GLB,, | Performed by: PODIATRIST

## 2019-06-26 PROCEDURE — 1101F PR PT FALLS ASSESS DOC 0-1 FALLS W/OUT INJ PAST YR: ICD-10-PCS | Mod: HCNC,CPTII,S$GLB, | Performed by: PODIATRIST

## 2019-06-26 PROCEDURE — 1101F PT FALLS ASSESS-DOCD LE1/YR: CPT | Mod: HCNC,CPTII,S$GLB, | Performed by: PODIATRIST

## 2019-06-26 PROCEDURE — 3074F PR MOST RECENT SYSTOLIC BLOOD PRESSURE < 130 MM HG: ICD-10-PCS | Mod: HCNC,CPTII,S$GLB, | Performed by: PODIATRIST

## 2019-06-26 PROCEDURE — G0108 DIAB MANAGE TRN  PER INDIV: HCPCS | Mod: HCNC

## 2019-06-26 PROCEDURE — 99213 PR OFFICE/OUTPT VISIT, EST, LEVL III, 20-29 MIN: ICD-10-PCS | Mod: HCNC,S$GLB,, | Performed by: PODIATRIST

## 2019-06-26 PROCEDURE — 3074F SYST BP LT 130 MM HG: CPT | Mod: HCNC,CPTII,S$GLB, | Performed by: PODIATRIST

## 2019-06-26 PROCEDURE — 3078F DIAST BP <80 MM HG: CPT | Mod: HCNC,CPTII,S$GLB, | Performed by: PODIATRIST

## 2019-06-28 NOTE — PROGRESS NOTES
Diabetes Education  Author: Berenice Agustin RN  Date: 2019    Diabetes Care Management Summary  Diabetes Education Record Assessment/Progress: Comprehensive/Group  Current Diabetes Risk Level: Low         Diabetes Type  Diabetes Type : Type II    Diabetes History  Current Treatment: Oral Medication, Diet, Exercise  Reviewed Problem List with Patient: Yes    Health Maintenance was reviewed today with patient. Discussed with patient importance of routine eye exams, foot exams/foot care, blood work (i.e.: A1c, microalbumin, and lipid), dental visits, yearly flu vaccine, and pneumonia vaccine as indicated by PCP. Patient verbalized understanding.     Health Maintenance Topics with due status: Not Due       Topic Last Completion Date    TETANUS VACCINE 2013    Colonoscopy 05/15/2018    Influenza Vaccine 2018    Eye Exam 2018    Foot Exam 2018    Lipid Panel 2019    Hemoglobin A1c 2019    DEXA SCAN 2019    High Dose Statin 2019     There are no preventive care reminders to display for this patient.    Nutrition  Meal Plan 24 Hour Recall - Breakfast: yesterday: small protein pancakes, coffee   Meal Plan 24 Hour Recall - Lunch: soup mix - water   Meal Plan 24 Hour Recall - Dinner: shrimp stir olivier, veggies: bell pepper, sugar snap peas, broccoli, cauliflower, water chestnuts - water   Meal Plan 24 Hour Recall - Snack: protein bar, protein shake     Monitoring   Self Monitoring : AM Fastin,114, 107, 105, 103, 110, 109, 109, 110, 112, 106, 102, 103, 109, 109, 110, 119, 119, 117 Bedtime: 112, 98, 94, 121, 100, 105, 105, 104, 113, 101, 99, 108, 108, 90, 121, 106, 110, 116   Blood Glucose Logs: Yes  Do you use a personal continuous glucose monitor?: No    Exercise   Exercise Type: (is working on trying to be more consistent)  Frequency: Never    Current Diabetes Treatment   Current Treatment: Oral Medication, Diet, Exercise                                                 "    Diabetes Education Assessment/Progress  Diabetes Disease Process (diabetes disease process and treatment options): Not Covered/Deferred  Nutrition (Incorporating nutritional management into one's lifestyle): Discussion, Demonstrates Understanding/Competency (verbalizes/demonstrates), Individual Session(pt using Optiva diet plan w/ small high protein "fuelings" 4-5 times per day + 1-2 meals w/ lean protein and veggies - feeling very satisfied and helping stick to routine)  Physical Activity (incorporating physical activity into one's lifestyle): Discussion, Requires Assistance Family/SO, Individual Session  Medications (states correct name, dose, onset, peak, duration, side effects & timing of meds): Not Covered/Deferred  Monitoring (monitoring blood glucose/other parameters & using results): Discussion, Demonstrates Understanding/Competency (verbalizes/demonstrates), Individual Session(log sheets provided, pt able to correlate diet changes w/ BG improvement and is very motivated by numbers!)  Acute Complications (preventing, detecting, and treating acute complications): Not Covered/Deferred  Chronic Complications (preventing, detecting, and treating chronic complications): Not Covered/Deferred  Clinical (diabetes, other pertinent medical history, and relevant comorbidities reviewed during visit): Discussion, Comprehends Key Points, Individual Session(pt has lost 18 pounds in past 6 weeks!!! Feeling fantastic, BG responding, ed on ideal body weight and reviewed BMI charts)  Cognitive (knowledge of self-management skills, functional health literacy): Not Covered/Deferred  Psychosocial (emotional response to diabetes): Not Covered/Deferred  Diabetes Distress and Support Systems: Not Covered/Deferred  Behavioral (readiness for change, lifestyle practices, self-care behaviors): Discussion, Comprehends Key Points, Individual Session(pt feeling fantastic and very motivated to continue on and work on increasing " activity)    Goals  Patient has selected/evaluated goals during today's session: Yes, evaluated  Healthy Eating: In Progress  Monitoring: % Met  Met Percentage : 100%  Medications: % Met  Met Percentage : 100%         Diabetes Care Plan/Intervention  Education Plan/Intervention: Individual Follow-Up DSMT    Diabetes Meal Plan  Restrictions: Restricted Carbohydrate  Carbohydrate Per Meal: 30-45g    Today's Self-Management Care Plan was developed with the patient's input and is based on barriers identified during today's assessment.    The long and short-term goals in the care plan were written with the patient/caregiver's input. The patient has agreed to work toward these goals to improve her overall diabetes control.      The patient received a copy of today's self-management plan and verbalized understanding of the care plan, goals, and all of today's instructions.      The patient was encouraged to communicate with her physician and care team regarding her condition(s) and treatment.  I provided the patient with my contact information today and encouraged her to contact me via phone or patient portal as needed.     Education Units of Time   Time Spent: 60 min

## 2019-07-05 DIAGNOSIS — M19.90 ARTHRITIS: ICD-10-CM

## 2019-07-05 DIAGNOSIS — G62.9 NEUROPATHY: ICD-10-CM

## 2019-07-05 RX ORDER — GABAPENTIN 300 MG/1
300 CAPSULE ORAL NIGHTLY
Qty: 90 CAPSULE | Refills: 3 | Status: SHIPPED | OUTPATIENT
Start: 2019-07-05 | End: 2019-08-15

## 2019-07-05 RX ORDER — CELECOXIB 100 MG/1
100 CAPSULE ORAL DAILY PRN
Qty: 90 CAPSULE | Refills: 2 | Status: SHIPPED | OUTPATIENT
Start: 2019-07-05 | End: 2020-03-31

## 2019-07-08 ENCOUNTER — TELEPHONE (OUTPATIENT)
Dept: SLEEP MEDICINE | Facility: CLINIC | Age: 69
End: 2019-07-08

## 2019-07-08 NOTE — PROGRESS NOTES
Subjective:      Patient ID: Lima Maldonado is a 68 y.o. female.    Chief Complaint: Diabetes Mellitus (12/12/2018dr faby caban) and Diabetic Foot Exam    Lima is a 68 y.o. female who presents to the clinic complaining of painful ingrown toenail on the left foot.      6/26/2019 Pt presents today with left ingrown toenail, pt has been taking abx and soaking the foot. Pt states it has gotten much better and pain has resolved.     Review of Systems   Constitution: Negative for chills, fever and malaise/fatigue.   HENT: Negative for hearing loss.    Cardiovascular: Negative for claudication.   Respiratory: Negative for shortness of breath.    Skin: Positive for nail changes. Negative for flushing and rash.   Musculoskeletal: Negative for joint pain and myalgias.   Neurological: Negative for loss of balance, numbness, paresthesias and sensory change.   Psychiatric/Behavioral: Negative for altered mental status.           Objective:      Physical Exam   Cardiovascular:   Pulses:       Dorsalis pedis pulses are 2+ on the right side, and 2+ on the left side.        Posterior tibial pulses are 2+ on the right side, and 2+ on the left side.   No edema noted to b/L LEs   Musculoskeletal:   Adequate joint ROM noted to all lower extremity muscle groups with no pain or crepitation noted. Muscle strength is 5/5 in all groups bilaterally.     Feet:   Right Foot:   Protective Sensation: 5 sites tested. 5 sites sensed.   Left Foot:   Protective Sensation: 5 sites tested. 5 sites sensed.   Neurological:   Intact gross sensation noted to b/L LEs   Skin:   Left hallux, lateral border ingrown erythema resolved.              Assessment:       Encounter Diagnosis   Name Primary?    Ingrown nail - Left Foot Yes         Plan:       Lima was seen today for diabetes mellitus and diabetic foot exam.    Diagnoses and all orders for this visit:    Ingrown nail - Left Foot      I counseled the patient on her conditions, their  implications and medical management.      Pt states that since the toenail is doing much better and pain has resolved, no nail avulsion needed at this time.  Pt advised to complete course of abx  Call or return to clinic prn if these symptoms worsen or fail to improve as anticipated.

## 2019-07-10 ENCOUNTER — TELEPHONE (OUTPATIENT)
Dept: SLEEP MEDICINE | Facility: CLINIC | Age: 69
End: 2019-07-10

## 2019-07-10 DIAGNOSIS — G47.33 OBSTRUCTIVE SLEEP APNEA ON CPAP: Primary | ICD-10-CM

## 2019-07-10 NOTE — TELEPHONE ENCOUNTER
----- Message from Radha John sent at 7/10/2019 11:43 AM CDT -----  Contact: Khalida with [facility] [phone]  Needs a call about a cpap machine. Please advise

## 2019-07-10 NOTE — TELEPHONE ENCOUNTER
Dean, I placed an updated order for CPAP supplies. If they need anything else, please let me know.

## 2019-07-10 NOTE — TELEPHONE ENCOUNTER
Called pt to let her know that her cpap supplies were updated. She can now call her [employer] company.

## 2019-07-11 ENCOUNTER — TELEPHONE (OUTPATIENT)
Dept: SLEEP MEDICINE | Facility: CLINIC | Age: 69
End: 2019-07-11

## 2019-07-23 RX ORDER — MONTELUKAST SODIUM 10 MG/1
TABLET ORAL
Qty: 90 TABLET | Refills: 3 | Status: SHIPPED | OUTPATIENT
Start: 2019-07-23 | End: 2020-07-07

## 2019-08-14 ENCOUNTER — PES CALL (OUTPATIENT)
Dept: ADMINISTRATIVE | Facility: CLINIC | Age: 69
End: 2019-08-14

## 2019-08-15 ENCOUNTER — OFFICE VISIT (OUTPATIENT)
Dept: HEMATOLOGY/ONCOLOGY | Facility: CLINIC | Age: 69
End: 2019-08-15
Payer: MEDICARE

## 2019-08-15 ENCOUNTER — PATIENT MESSAGE (OUTPATIENT)
Dept: HEMATOLOGY/ONCOLOGY | Facility: CLINIC | Age: 69
End: 2019-08-15

## 2019-08-15 VITALS
HEART RATE: 81 BPM | TEMPERATURE: 98 F | SYSTOLIC BLOOD PRESSURE: 131 MMHG | OXYGEN SATURATION: 98 % | BODY MASS INDEX: 24.6 KG/M2 | WEIGHT: 156.75 LBS | DIASTOLIC BLOOD PRESSURE: 62 MMHG | RESPIRATION RATE: 18 BRPM | HEIGHT: 67 IN

## 2019-08-15 DIAGNOSIS — L98.8 SKIN LESION OF BREAST: ICD-10-CM

## 2019-08-15 DIAGNOSIS — Z17.0 MALIGNANT NEOPLASM OF LOWER-OUTER QUADRANT OF LEFT BREAST OF FEMALE, ESTROGEN RECEPTOR POSITIVE: Primary | ICD-10-CM

## 2019-08-15 DIAGNOSIS — C50.512 MALIGNANT NEOPLASM OF LOWER-OUTER QUADRANT OF LEFT BREAST OF FEMALE, ESTROGEN RECEPTOR POSITIVE: Primary | ICD-10-CM

## 2019-08-15 PROCEDURE — 3078F DIAST BP <80 MM HG: CPT | Mod: HCNC,CPTII,S$GLB, | Performed by: PHYSICIAN ASSISTANT

## 2019-08-15 PROCEDURE — 1101F PT FALLS ASSESS-DOCD LE1/YR: CPT | Mod: HCNC,CPTII,S$GLB, | Performed by: PHYSICIAN ASSISTANT

## 2019-08-15 PROCEDURE — 1101F PR PT FALLS ASSESS DOC 0-1 FALLS W/OUT INJ PAST YR: ICD-10-PCS | Mod: HCNC,CPTII,S$GLB, | Performed by: PHYSICIAN ASSISTANT

## 2019-08-15 PROCEDURE — 99999 PR PBB SHADOW E&M-EST. PATIENT-LVL V: ICD-10-PCS | Mod: PBBFAC,HCNC,, | Performed by: PHYSICIAN ASSISTANT

## 2019-08-15 PROCEDURE — 3078F PR MOST RECENT DIASTOLIC BLOOD PRESSURE < 80 MM HG: ICD-10-PCS | Mod: HCNC,CPTII,S$GLB, | Performed by: PHYSICIAN ASSISTANT

## 2019-08-15 PROCEDURE — 3075F SYST BP GE 130 - 139MM HG: CPT | Mod: HCNC,CPTII,S$GLB, | Performed by: PHYSICIAN ASSISTANT

## 2019-08-15 PROCEDURE — 99213 OFFICE O/P EST LOW 20 MIN: CPT | Mod: HCNC,S$GLB,, | Performed by: PHYSICIAN ASSISTANT

## 2019-08-15 PROCEDURE — 3075F PR MOST RECENT SYSTOLIC BLOOD PRESS GE 130-139MM HG: ICD-10-PCS | Mod: HCNC,CPTII,S$GLB, | Performed by: PHYSICIAN ASSISTANT

## 2019-08-15 PROCEDURE — 99999 PR PBB SHADOW E&M-EST. PATIENT-LVL V: CPT | Mod: PBBFAC,HCNC,, | Performed by: PHYSICIAN ASSISTANT

## 2019-08-15 PROCEDURE — 99213 PR OFFICE/OUTPT VISIT, EST, LEVL III, 20-29 MIN: ICD-10-PCS | Mod: HCNC,S$GLB,, | Performed by: PHYSICIAN ASSISTANT

## 2019-08-15 RX ORDER — LETROZOLE 2.5 MG/1
2.5 TABLET, FILM COATED ORAL DAILY
Refills: 11 | COMMUNITY
Start: 2019-07-06 | End: 2019-12-31

## 2019-08-15 NOTE — Clinical Note
Samuel or myself in 6 months; please refer to dermatology for lesion at left breast- can you let me know who is available- any chance we can get her in with dr. buckley (vets) or Heather (main), thanks.

## 2019-08-15 NOTE — PROGRESS NOTES
Subjective:       Patient ID: Lima Maldonado is a 69 y.o. female.    Chief Complaint: Malignant neoplasm of lower-outer quadrant of left breast of    Ms Maldonado return to clinic for follow-up of  diagnosis of left breast cancer. She had stage I ER positive disease with an intermediate risk Oncotype score.   She completed 4 cycles of TC on 3/31/17 and is currently on Femara.    She has lost 30 lbs through diet (Optivia) which is pre-packaged meals.   She is feeling great.   No fever, chills, nausea, vomiting, shortness of breath.     She continues on Femara.   Tolerating Femara well other than some mild generalized arthralgias- also improved with weight loss.   She was previously on Prolia but has not received that recently.  Bone density from 6/2017 was normal.   Notes small red area on left breast that scabs over, goes away and then comes back, has some associated pruritus.                Breast history: mammography on September 20, 2016 demonstrated a new irregular mass with spiculated margins seen in the left breast at  5 o'clock along the surgical scar site.ultrasound DEMONSTRATED A SOLID 8 X 8 X 7 MM MASS.    A needle biopsy in September 27 showed infiltrating carcinoma, histologic grade 3, nuclear grade 2, mitotic index 1. Tumor was 90% ER positive, 70% NV positive, and 1+ HER-2.     MRI of the breast showed a 1.7 x 1.3 cm lesion in the lower outer quadrant of the left breast.  PET/CT on October 7 was negative for any evidence of distant metastasis.    On November 16 bilateral mastectomies were performed. Left pressure 1.5 cm intermediate grade carcinoma with ductal and lobular features. There was focal dermal invasion. Margins were negative. The right breast was without abnormality.  Oncotype score returned 25 - intermediate risk.  Adjuvant TC X 4 completed 3/31/17.    Patient with history of left breast cancer in 1997 when she underwent left lumpectomy with complete axillary dissection at age 46 for a  pT1cN1 breast cancer (Stage IIA).  47 nodes were removed, 1 of which was positive.  She received adjuvant chemotherapy, radiation and tamoxifen.     Review of Systems   Constitutional: Negative for activity change, appetite change, fatigue and unexpected weight change (intentinal weight loss).   HENT: Negative for congestion and postnasal drip.    Eyes: Negative for visual disturbance.   Respiratory: Negative for cough and shortness of breath.    Cardiovascular: Negative for chest pain.   Gastrointestinal: Negative for abdominal pain and diarrhea.   Genitourinary: Negative for frequency.   Musculoskeletal: Negative for back pain.   Skin: Negative for rash.   Neurological: Negative for headaches.   Hematological: Negative for adenopathy.   Psychiatric/Behavioral: The patient is not nervous/anxious.        Objective:      Physical Exam   Constitutional: She is oriented to person, place, and time. She appears well-developed and well-nourished. No distress.   ECOG 0  Presents alone   HENT:   Head: Normocephalic.   Mouth/Throat: Oropharynx is clear and moist. No oropharyngeal exudate.   Eyes: Pupils are equal, round, and reactive to light. Conjunctivae and EOM are normal. No scleral icterus.   Neck: Normal range of motion. Neck supple. No thyromegaly present.   Cardiovascular: Normal rate, regular rhythm, normal heart sounds and intact distal pulses.   Pulmonary/Chest: Effort normal and breath sounds normal. No respiratory distress. She has no wheezes. She has no rales. She exhibits no tenderness.   Right breast reconstruction without mass, nodule or skin changes. Left breast reconstruction with firmness at medial aspect- consistent with scar tissue. No  No axillary or supraclavicular adenopathy.   4 mm erythematous area at upper inner quadrant of left breast, no scale.   Lungs clear to auscultation bilaterally   Abdominal: Soft. Bowel sounds are normal. She exhibits no distension and no mass. There is no tenderness.    No hepatosplenomegaly     Musculoskeletal: Normal range of motion. She exhibits no edema or tenderness.   No spinal or paraspinal tenderness to palpation     Lymphadenopathy:     She has no cervical adenopathy.   Neurological: She is alert and oriented to person, place, and time. No cranial nerve deficit.   No spinal or paraspinal tenderness to palpation     Skin: Skin is warm and dry. No rash noted.   Psychiatric: She has a normal mood and affect. Her behavior is normal. Judgment and thought content normal.   Vitals reviewed.      Assessment:       1. Malignant neoplasm of lower-outer quadrant of left breast of female, estrogen receptor positive    2. Skin lesion of breast        Plan:       Continue Femara and return to clinic in 6 months.  Referral to dermatology for evaluation of erythematous lesion at left breast and for general skin check.

## 2019-08-20 ENCOUNTER — HOSPITAL ENCOUNTER (OUTPATIENT)
Dept: DIABETES | Facility: OTHER | Age: 69
Discharge: HOME | End: 2019-08-20
Attending: INTERNAL MEDICINE
Payer: MEDICARE

## 2019-08-20 VITALS — WEIGHT: 154.13 LBS | HEIGHT: 66 IN | BODY MASS INDEX: 24.77 KG/M2

## 2019-08-20 DIAGNOSIS — E11.8 TYPE 2 DIABETES MELLITUS WITH COMPLICATION, WITHOUT LONG-TERM CURRENT USE OF INSULIN: Primary | ICD-10-CM

## 2019-08-20 DIAGNOSIS — E11.42 TYPE 2 DIABETES MELLITUS WITH DIABETIC POLYNEUROPATHY, WITHOUT LONG-TERM CURRENT USE OF INSULIN: Primary | ICD-10-CM

## 2019-08-20 PROCEDURE — G0108 DIAB MANAGE TRN  PER INDIV: HCPCS | Mod: HCNC

## 2019-08-22 ENCOUNTER — OFFICE VISIT (OUTPATIENT)
Dept: INTERNAL MEDICINE | Facility: CLINIC | Age: 69
End: 2019-08-22
Attending: INTERNAL MEDICINE
Payer: MEDICARE

## 2019-08-22 VITALS
BODY MASS INDEX: 24.8 KG/M2 | SYSTOLIC BLOOD PRESSURE: 110 MMHG | WEIGHT: 153.69 LBS | DIASTOLIC BLOOD PRESSURE: 68 MMHG | HEART RATE: 83 BPM | OXYGEN SATURATION: 98 %

## 2019-08-22 DIAGNOSIS — E11.42 DIABETIC POLYNEUROPATHY ASSOCIATED WITH TYPE 2 DIABETES MELLITUS: Primary | ICD-10-CM

## 2019-08-22 DIAGNOSIS — E78.2 MIXED HYPERLIPIDEMIA: ICD-10-CM

## 2019-08-22 DIAGNOSIS — E11.8 TYPE 2 DIABETES MELLITUS WITH COMPLICATION, WITHOUT LONG-TERM CURRENT USE OF INSULIN: ICD-10-CM

## 2019-08-22 DIAGNOSIS — E03.9 HYPOTHYROIDISM, UNSPECIFIED TYPE: ICD-10-CM

## 2019-08-22 DIAGNOSIS — M81.0 SENILE OSTEOPOROSIS: ICD-10-CM

## 2019-08-22 PROBLEM — E66.9 OBESITY, DIABETES, AND HYPERTENSION SYNDROME: Status: RESOLVED | Noted: 2018-12-12 | Resolved: 2019-08-22

## 2019-08-22 PROBLEM — E11.69 OBESITY, DIABETES, AND HYPERTENSION SYNDROME: Status: RESOLVED | Noted: 2018-12-12 | Resolved: 2019-08-22

## 2019-08-22 PROBLEM — E66.09 CLASS 1 OBESITY DUE TO EXCESS CALORIES WITH SERIOUS COMORBIDITY AND BODY MASS INDEX (BMI) OF 30.0 TO 30.9 IN ADULT: Status: RESOLVED | Noted: 2018-11-29 | Resolved: 2019-08-22

## 2019-08-22 PROBLEM — I15.2 OBESITY, DIABETES, AND HYPERTENSION SYNDROME: Status: RESOLVED | Noted: 2018-12-12 | Resolved: 2019-08-22

## 2019-08-22 PROBLEM — E11.59 OBESITY, DIABETES, AND HYPERTENSION SYNDROME: Status: RESOLVED | Noted: 2018-12-12 | Resolved: 2019-08-22

## 2019-08-22 PROBLEM — E66.811 CLASS 1 OBESITY DUE TO EXCESS CALORIES WITH SERIOUS COMORBIDITY AND BODY MASS INDEX (BMI) OF 30.0 TO 30.9 IN ADULT: Status: RESOLVED | Noted: 2018-11-29 | Resolved: 2019-08-22

## 2019-08-22 PROCEDURE — 99999 PR PBB SHADOW E&M-EST. PATIENT-LVL III: ICD-10-PCS | Mod: PBBFAC,HCNC,, | Performed by: INTERNAL MEDICINE

## 2019-08-22 PROCEDURE — 1101F PR PT FALLS ASSESS DOC 0-1 FALLS W/OUT INJ PAST YR: ICD-10-PCS | Mod: HCNC,CPTII,S$GLB, | Performed by: INTERNAL MEDICINE

## 2019-08-22 PROCEDURE — 3078F PR MOST RECENT DIASTOLIC BLOOD PRESSURE < 80 MM HG: ICD-10-PCS | Mod: HCNC,CPTII,S$GLB, | Performed by: INTERNAL MEDICINE

## 2019-08-22 PROCEDURE — 99214 PR OFFICE/OUTPT VISIT, EST, LEVL IV, 30-39 MIN: ICD-10-PCS | Mod: HCNC,S$GLB,, | Performed by: INTERNAL MEDICINE

## 2019-08-22 PROCEDURE — 3044F HG A1C LEVEL LT 7.0%: CPT | Mod: HCNC,CPTII,S$GLB, | Performed by: INTERNAL MEDICINE

## 2019-08-22 PROCEDURE — 1101F PT FALLS ASSESS-DOCD LE1/YR: CPT | Mod: HCNC,CPTII,S$GLB, | Performed by: INTERNAL MEDICINE

## 2019-08-22 PROCEDURE — 99999 PR PBB SHADOW E&M-EST. PATIENT-LVL III: CPT | Mod: PBBFAC,HCNC,, | Performed by: INTERNAL MEDICINE

## 2019-08-22 PROCEDURE — 3074F PR MOST RECENT SYSTOLIC BLOOD PRESSURE < 130 MM HG: ICD-10-PCS | Mod: HCNC,CPTII,S$GLB, | Performed by: INTERNAL MEDICINE

## 2019-08-22 PROCEDURE — 3074F SYST BP LT 130 MM HG: CPT | Mod: HCNC,CPTII,S$GLB, | Performed by: INTERNAL MEDICINE

## 2019-08-22 PROCEDURE — 3078F DIAST BP <80 MM HG: CPT | Mod: HCNC,CPTII,S$GLB, | Performed by: INTERNAL MEDICINE

## 2019-08-22 PROCEDURE — 99214 OFFICE O/P EST MOD 30 MIN: CPT | Mod: HCNC,S$GLB,, | Performed by: INTERNAL MEDICINE

## 2019-08-22 PROCEDURE — 3044F PR MOST RECENT HEMOGLOBIN A1C LEVEL <7.0%: ICD-10-PCS | Mod: HCNC,CPTII,S$GLB, | Performed by: INTERNAL MEDICINE

## 2019-08-22 RX ORDER — METFORMIN HYDROCHLORIDE 500 MG/1
500 TABLET ORAL 2 TIMES DAILY WITH MEALS
Qty: 180 TABLET | Refills: 3 | Status: SHIPPED | OUTPATIENT
Start: 2019-08-22 | End: 2020-04-15

## 2019-08-22 NOTE — PROGRESS NOTES
Diabetes Education  Author: Berenice Agustin RN  Date: 8/22/2019    Diabetes Care Management Summary  Diabetes Education Record Assessment/Progress: Comprehensive/Group  Current Diabetes Risk Level: Low         Diabetes Type  Diabetes Type : Type II    Diabetes History  Current Treatment: Oral Medication  Reviewed Problem List with Patient: Yes    Health Maintenance was reviewed today with patient. Discussed with patient importance of routine eye exams, foot exams/foot care, blood work (i.e.: A1c, microalbumin, and lipid), dental visits, yearly flu vaccine, and pneumonia vaccine as indicated by PCP. Patient verbalized understanding.     Health Maintenance Topics with due status: Not Due       Topic Last Completion Date    TETANUS VACCINE 12/03/2013    Colonoscopy 05/15/2018    Influenza Vaccine 11/07/2018    Eye Exam 11/29/2018    Foot Exam 12/11/2018    Lipid Panel 02/12/2019    DEXA SCAN 06/05/2019    High Dose Statin 08/15/2019    Hemoglobin A1c 08/20/2019     There are no preventive care reminders to display for this patient.    Nutrition  Meal Plan 24 Hour Recall - Breakfast: protein bar, coffee   Meal Plan 24 Hour Recall - Lunch: soup - water   Meal Plan 24 Hour Recall - Dinner: meatless meatballs, on cauliflower, green salad - iced tea w/ splenda   Meal Plan 24 Hour Recall - Snack: using meal plan pre-made snacks (protein shake, veggies + guacamole)      Monitoring   Self Monitoring : AM Fasting: usually low 100's, sometimes 80-90's - Max 110/120's (bedtime the same)   Blood Glucose Logs: No  Do you use a personal continuous glucose monitor?: No  In the last month, how often have you had a low blood sugar reaction?: never    Exercise   Frequency: Never    Current Diabetes Treatment   Current Treatment: Oral Medication                                                    Diabetes Education Assessment/Progress  Diabetes Disease Process (diabetes disease process and treatment options): Not Covered/Deferred  Nutrition  (Incorporating nutritional management into one's lifestyle): Discussion, Comprehends Key Points, Individual Session(doing well with optiva plan, transitioning to incorporating more homecooked meal options, feeling more confident with shopping list and cooking options)  Physical Activity (incorporating physical activity into one's lifestyle): Discussion, Requires Assistance Family/SO, Individual Session(working to be more consistent with activity, discussed importance of exercise to reduce insulin resistance if pt wants to work towards weaning medication)  Medications (states correct name, dose, onset, peak, duration, side effects & timing of meds): Discussion, Requires Assistance Family/SO, Individual Session(rec discussing w/ PCP option to wean/trial w/o metformin now that BG in 90's and pt almost to goal BMI)  Monitoring (monitoring blood glucose/other parameters & using results): Discussion, Demonstrates Understanding/Competency (verbalizes/demonstrates), Individual Session(pt doing very well w/ logs - BG significantly improved)  Acute Complications (preventing, detecting, and treating acute complications): Discussion, Requires Assistance Family/SO, Individual Session(reviewed s/s of low BG and how to prevent/treat and potential for needing to reduce med dose)  Chronic Complications (preventing, detecting, and treating chronic complications): Discussion, Requires Assistance Family/SO, Individual Session(new A1C done today)  Clinical (diabetes, other pertinent medical history, and relevant comorbidities reviewed during visit): Discussion, Requires Assistance Family/SO, Individual Session, Comprehends Key Points(reviewed goal BMI, pt would like to lose last 4 pounds to achieve ideal body weight - is feeling fantastic! has lost total of 36 pounds so far!)  Cognitive (knowledge of self-management skills, functional health literacy): Not Covered/Deferred  Psychosocial (emotional response to diabetes): Not  Covered/Deferred  Diabetes Distress and Support Systems: Not Covered/Deferred  Behavioral (readiness for change, lifestyle practices, self-care behaviors): Discussion, Requires Assistance Family/SO, Individual Session(pt is very motivated to begin incorporating routine exercise and work towards reaching goal BW and wean off medication)    Goals  Patient has selected/evaluated goals during today's session: Yes, evaluated  Healthy Eating: In Progress         Diabetes Care Plan/Intervention  Education Plan/Intervention: Individual Follow-Up DSMT    Diabetes Meal Plan  Restrictions: Restricted Carbohydrate  Carbohydrate Per Meal: 30-45g    Today's Self-Management Care Plan was developed with the patient's input and is based on barriers identified during today's assessment.    The long and short-term goals in the care plan were written with the patient/caregiver's input. The patient has agreed to work toward these goals to improve her overall diabetes control.      The patient received a copy of today's self-management plan and verbalized understanding of the care plan, goals, and all of today's instructions.      The patient was encouraged to communicate with her physician and care team regarding her condition(s) and treatment.  I provided the patient with my contact information today and encouraged her to contact me via phone or patient portal as needed.     Education Units of Time   Time Spent: 30 min

## 2019-08-22 NOTE — PROGRESS NOTES
Subjective:       Patient ID: Lima Maldonado is a 69 y.o. female.    Chief Complaint: Hypertension (f/u)     Lima Maldonado is a 69 y.o.  female who presents for Hypertension (f/u)  .  Patient has a history of hypertension. she is complaint with antihypertensive medications listed in medication list. she attempts to follow a low salt diet and has been exercising regularly.  Denies any chest pain, SOB, orthopnea or PND.       Diabetes   She presents for her follow-up diabetic visit. She has type 2 diabetes mellitus. Her disease course has been improving. Pertinent negatives for hypoglycemia include no confusion or headaches. Associated symptoms include foot paresthesias. Pertinent negatives for diabetes include no chest pain, no polydipsia, no polyuria and no weakness. Diabetic complications include peripheral neuropathy. Risk factors for coronary artery disease include diabetes mellitus, dyslipidemia, hypertension, post-menopausal and sedentary lifestyle. Current diabetic treatment includes oral agent (monotherapy). She is compliant with treatment all of the time. Her weight is decreasing rapidly (doing meal plan controlled diet). She is following a generally healthy diet. She rarely participates in exercise. Her home blood glucose trend is decreasing steadily. Her breakfast blood glucose range is generally  mg/dl. An ACE inhibitor/angiotensin II receptor blocker is being taken.     Review of Systems   Constitutional: Negative for activity change and unexpected weight change.   HENT: Negative for hearing loss, rhinorrhea and trouble swallowing.    Eyes: Negative for discharge and visual disturbance.   Respiratory: Negative for chest tightness and wheezing.    Cardiovascular: Negative for chest pain and palpitations.   Gastrointestinal: Negative for blood in stool, constipation, diarrhea and vomiting.   Endocrine: Negative for polydipsia and polyuria.   Genitourinary: Negative for difficulty urinating,  dysuria, hematuria and menstrual problem.   Musculoskeletal: Negative for arthralgias, joint swelling and neck pain.   Neurological: Negative for weakness and headaches.   Psychiatric/Behavioral: Negative for confusion and dysphoric mood.       Patient Active Problem List   Diagnosis    Hyperlipidemia    Hypothyroid    Obstructive sleep apnea on CPAP    GERD (gastroesophageal reflux disease)    Hepatomegaly    Fatty liver    HTN (hypertension)    Vaginal atrophy    Senile osteoporosis    Primary insomnia    Malignant neoplasm of lower-outer quadrant of left female breast    Disruption of external operation (surgical) wound    Dermatitis of eyelid of left eye due to herpes zoster    Aortic atherosclerosis    Type 2 diabetes mellitus with diabetic polyneuropathy, without long-term current use of insulin    Chemotherapy-induced neuropathy    Stress incontinence of urine    Screening for colon cancer    Sensorineural hearing loss (SNHL) of left ear with restricted hearing of right ear    Perforation of left tympanic membrane    Allergic rhinitis    Nasal septal deviation    Tinnitus of both ears    Diabetic polyneuropathy associated with type 2 diabetes mellitus       Past Medical History:   Diagnosis Date    Acute cystitis without hematuria 7/17/2017    Arthritis     Back pain     Breast cancer     originally dx in 02/1997- treated with surgery, chemo and radiation     Class 1 obesity due to excess calories with serious comorbidity and body mass index (BMI) of 30.0 to 30.9 in adult 11/29/2018    Elevated bilirubin 11/19/2013    Fatty liver     GERD (gastroesophageal reflux disease)     Glucose intolerance (impaired glucose tolerance)     Hyperlipidemia     Hypertension     Hypothyroid     Malignant neoplasm of lower-outer quadrant of left female breast 11/15/2016    Obesity     Obesity, diabetes, and hypertension syndrome 12/12/2018    Osteopenia     Osteoporosis     Primary  insomnia 11/2/2016    Shingles     Sleep apnea     wears CPAP nightly     Squamous cell carcinoma 2011    right forearm, sx by Dr. Corinne Ray    Stress incontinence of urine 1/11/2018    Thyrotoxicosis without mention of goiter or other cause, without mention of thyrotoxic crisis or storm     hypothyroidism    Trouble in sleeping     Type 2 diabetes mellitus with diabetic polyneuropathy, without long-term current use of insulin 12/26/2017    Urinary incontinence     Well woman exam with routine gynecological exam 11/2/2016       Past Surgical History:   Procedure Laterality Date    APPENDECTOMY  2008    removed during hysterectomy surgery     Breast implant Bilateral 1998    implants removed in 2003    BREAST SURGERY      see details below     COLONOSCOPY N/A 5/15/2018    [facility]formed [facility] at [facility] ([facility] F[facility])    EGD (ESOPHAGOGASTRODUODENOSCOPY) N/A 8/5/2013    Performed by Omar Ambriz [facility] at [facility] (4TH FLR)    EGD (ESOPHAGOGASTRODUODENOSCOPY) N/A 6/4/2013    Performed by Omar [facility]riz [facility] at [facility] (4TH FLR)    HYSTERECTOMY  2008    benign    [id number] Right 1/23/2017    Performed by Jimmy [facility]ains [facility] at [facility] OR 2ND FLR    left breast reconstruction  2005    left modified radical mastectomy  Feb 1997    for treatment of breast cancer     LIPOMA RESECTION  2010    removed from upper back area     MASTECTOMY-BREAST-BILATERAL NON-NIPPLE SPARING Bilateral 11/16/2016    [facility]formed [facility] at [facility] OR    MODIFIED RADICAL MASTECTOMY W/ AXILLARY LYMPH NODE DISSECTION  02/1997    PELVIC LAPAROSCOPY  1978    pt had endometriosis     PERFUNDA ARTERY  FREE FLAP Bilateral 11/16/2016    Performed by Krunal Arboleda MD at [facility] OR    SD REMOVAL OF OVARY/TUBE(S)  2008     benign    reduction of mons pubis  2011    robotic lap  hysterectomy with bso  2008    UPPER GASTROINTESTINAL ENDOSCOPY  8/05/2013       Family History    Problem Relation Age of Onset    Heart attack Father 51    Heart disease Father     Breast cancer Sister 51        BRCA 1/2 negative    Cancer Sister         breast    Cancer Mother 65        malignant melanoma     No Known Problems Brother     No Known Problems Daughter     No Known Problems Son     No Known Problems Daughter     No Known Problems Son     Uterine cancer Neg Hx     Colon cancer Neg Hx     Celiac disease Neg Hx     Esophageal cancer Neg Hx     Inflammatory bowel disease Neg Hx     Liver cancer Neg Hx     Liver disease Neg Hx     Stomach cancer Neg Hx     Ulcerative colitis Neg Hx     Hypertension Neg Hx     Cirrhosis Neg Hx     Crohn's disease Neg Hx     Rectal cancer Neg Hx     Ovarian cancer Neg Hx        Social History     Tobacco Use    Smoking status: Former Smoker     Packs/day: 0.50     Years: 3.00     Pack years: 1.50     Start date: 4/15/1969     Last attempt to quit: 4/15/1972     Years since quittin.3    Smokeless tobacco: Never Used    Tobacco comment: started at age 18 during college    Substance Use Topics    Alcohol use: Yes     Alcohol/week: 0.0 oz     Frequency: Monthly or less     Drinks per session: 1 or 2     Binge frequency: Never     Comment: occasional, 1 cocktail once monthly    Drug use: No       Objective:   Blood pressure 110/68, pulse 83, weight 69.7 kg (153 lb 10.6 oz), SpO2 98 %.     Physical Exam   Constitutional: She is oriented to person, place, and time. She appears well-developed and well-nourished. No distress.   HENT:   Head: Normocephalic and atraumatic.   Right Ear: External ear normal.   Left Ear: External ear normal.   Eyes: Conjunctivae are normal. No scleral icterus.   Neck: No JVD present. Carotid bruit is not present. No thyromegaly present.   Cardiovascular: Normal rate and normal heart sounds. Exam reveals no gallop and no friction rub.   No murmur heard.  Pulmonary/Chest: Effort normal and breath sounds normal. She  has no wheezes. She has no rales.   Abdominal: Soft. Bowel sounds are normal. She exhibits no distension. There is no tenderness.   Musculoskeletal: She exhibits no edema or tenderness.   Lymphadenopathy:     She has no cervical adenopathy.        Right: No supraclavicular adenopathy present.        Left: No supraclavicular adenopathy present.   Neurological: She is alert and oriented to person, place, and time. She has normal reflexes.   Skin: Skin is warm and dry.   Psychiatric: She has a normal mood and affect. Her behavior is normal. Thought content normal.       Prior labs reviewed  Assessment/Plan:        Lima was seen today for hypertension.    Diagnoses and all orders for this visit:    Diabetic polyneuropathy associated with type 2 diabetes mellitus  Type 2 diabetes mellitus with complication, without long-term current use of insulin  Comments:  Very well controlled  decrease to 500 mg bid from 1000mg and 500mg daily  repeat in 6 mo  hypoglycemic precautions  Orders:  -     metFORMIN (GLUCOPHAGE) 500 MG tablet; Take 1 tablet (500 mg total) by mouth 2 (two) times daily with meals.  -     Hemoglobin A1c; Future    Mixed hyperlipidemia  Comments:  Previously well controlled  cont statin  repeat labs in 6 mo  Orders:  -     Lipid panel; Future  -     Comprehensive metabolic panel; Future    Hypothyroidism, unspecified type  Comments:  clinicially euthyroid  labs in 6 mo  stable  Orders:  -     TSH; Future  -     T4, free; Future    Senile osteoporosis  Comments:  being treated by heme/onc  stable        Medication List with Changes/Refills   Current Medications    ASCORBIC ACID (VITAMIN C) 1000 MG TABLET    Take 1 tablet by mouth once daily.    AZELASTINE (ASTELIN) 137 MCG (0.1 %) NASAL SPRAY    Two sprays in each nostril, sniff gently until absorbed and then follow with 1 spray of fluticasone, use both sprays twice daily as needed to control allergy symptoms    CALCIUM CARBONATE/VITAMIN D3 (CALTRATE 600 + D  ORAL)    Take 1 tablet by mouth 2 (two) times daily.     CELECOXIB (CELEBREX) 100 MG CAPSULE    TAKE 1 CAPSULE (100 MG TOTAL) BY MOUTH DAILY AS NEEDED.    COENZYME Q10-VITAMIN E (COQ10 ) 100-100 MG-UNIT CAP    Take 1 capsule by mouth once daily.     FLUTICASONE (FLONASE) 50 MCG/ACTUATION NASAL SPRAY    1 spray by Each Nare route 2 (two) times daily.     GABAPENTIN (NEURONTIN) 300 MG CAPSULE    Take 2 capsules (600 mg total) by mouth every evening.    LETROZOLE (FEMARA) 2.5 MG TAB    Take 2.5 mg by mouth once daily.    LEVOTHYROXINE (SYNTHROID) 50 MCG TABLET    TAKE 1 TABLET BY MOUTH EVERY DAY    LOSARTAN (COZAAR) 50 MG TABLET    TAKE 1 TABLET BY MOUTH EVERY DAY    MONTELUKAST (SINGULAIR) 10 MG TABLET    TAKE 1 TABLET BY MOUTH EVERY DAY IN THE EVENING    MULTIVITAMIN ORAL    Take 1 tablet by mouth once daily.     OMEGA-3 FATTY ACIDS-VITAMIN E (FISH OIL) 1,000 MG CAP    Take 1 capsule by mouth once daily.     PANTOPRAZOLE (PROTONIX) 40 MG TABLET    TAKE 1 TABLET (40 MG TOTAL) BY MOUTH BEFORE BREAKFAST.    SIMVASTATIN (ZOCOR) 20 MG TABLET    TAKE 1 TABLET BY MOUTH EVERY DAY IN THE EVENING    VITAMIN D 1000 UNITS TAB    Take 1,000 Units by mouth once daily.     YUVAFEM 10 MCG TAB    PLACE 1 TABLET (10 MCG TOTAL) VAGINALLY TWICE A WEEK.   Changed and/or Refilled Medications    Modified Medication Previous Medication    METFORMIN (GLUCOPHAGE) 500 MG TABLET metFORMIN (GLUCOPHAGE) 500 MG tablet       Take 1 tablet (500 mg total) by mouth 2 (two) times daily with meals.    Take 2 tablets (1,000 mg total) by mouth 2 (two) times daily with meals.   Discontinued Medications    CEPHALEXIN (KEFLEX) 500 MG CAPSULE    Take 1 capsule (500 mg total) by mouth every 8 (eight) hours.

## 2019-08-27 ENCOUNTER — OFFICE VISIT (OUTPATIENT)
Dept: DERMATOLOGY | Facility: CLINIC | Age: 69
End: 2019-08-27
Payer: MEDICARE

## 2019-08-27 DIAGNOSIS — D48.5 NEOPLASM OF UNCERTAIN BEHAVIOR OF SKIN: Primary | ICD-10-CM

## 2019-08-27 DIAGNOSIS — D18.00 ANGIOMA: ICD-10-CM

## 2019-08-27 DIAGNOSIS — D23.9 DERMATOFIBROMA: ICD-10-CM

## 2019-08-27 DIAGNOSIS — L82.1 SK (SEBORRHEIC KERATOSIS): ICD-10-CM

## 2019-08-27 DIAGNOSIS — Z85.828 HISTORY OF NONMELANOMA SKIN CANCER: ICD-10-CM

## 2019-08-27 DIAGNOSIS — L91.8 SKIN TAG: ICD-10-CM

## 2019-08-27 PROCEDURE — 1101F PT FALLS ASSESS-DOCD LE1/YR: CPT | Mod: HCNC,CPTII,S$GLB, | Performed by: DERMATOLOGY

## 2019-08-27 PROCEDURE — 88305 TISSUE EXAM BY PATHOLOGIST: CPT | Mod: HCNC,59 | Performed by: PATHOLOGY

## 2019-08-27 PROCEDURE — 88342 TISSUE SPECIMEN TO PATHOLOGY, DERMATOLOGY: ICD-10-PCS | Mod: 26,HCNC,, | Performed by: PATHOLOGY

## 2019-08-27 PROCEDURE — 88342 IMHCHEM/IMCYTCHM 1ST ANTB: CPT | Mod: 26,HCNC,, | Performed by: PATHOLOGY

## 2019-08-27 PROCEDURE — 88312 SPECIAL STAINS GROUP 1: CPT | Mod: HCNC,59 | Performed by: PATHOLOGY

## 2019-08-27 PROCEDURE — 99203 OFFICE O/P NEW LOW 30 MIN: CPT | Mod: 25,HCNC,S$GLB, | Performed by: DERMATOLOGY

## 2019-08-27 PROCEDURE — 99999 PR PBB SHADOW E&M-EST. PATIENT-LVL III: ICD-10-PCS | Mod: PBBFAC,HCNC,, | Performed by: DERMATOLOGY

## 2019-08-27 PROCEDURE — 11103 PR TANGENTIAL BIOPSY, SKIN, EA ADDTL LESION: ICD-10-PCS | Mod: 59,HCNC,S$GLB, | Performed by: DERMATOLOGY

## 2019-08-27 PROCEDURE — 88305 TISSUE EXAM BY PATHOLOGIST: CPT | Mod: 26,HCNC,, | Performed by: PATHOLOGY

## 2019-08-27 PROCEDURE — 11104 PUNCH BX SKIN SINGLE LESION: CPT | Mod: HCNC,S$GLB,, | Performed by: DERMATOLOGY

## 2019-08-27 PROCEDURE — 88312 TISSUE SPECIMEN TO PATHOLOGY, DERMATOLOGY: ICD-10-PCS | Mod: 26,HCNC,, | Performed by: PATHOLOGY

## 2019-08-27 PROCEDURE — 88312 SPECIAL STAINS GROUP 1: CPT | Mod: 26,HCNC,, | Performed by: PATHOLOGY

## 2019-08-27 PROCEDURE — 11103 TANGNTL BX SKIN EA SEP/ADDL: CPT | Mod: 59,HCNC,S$GLB, | Performed by: DERMATOLOGY

## 2019-08-27 PROCEDURE — 99999 PR PBB SHADOW E&M-EST. PATIENT-LVL III: CPT | Mod: PBBFAC,HCNC,, | Performed by: DERMATOLOGY

## 2019-08-27 PROCEDURE — 88305 TISSUE SPECIMEN TO PATHOLOGY, DERMATOLOGY: ICD-10-PCS | Mod: 26,HCNC,, | Performed by: PATHOLOGY

## 2019-08-27 PROCEDURE — 1101F PR PT FALLS ASSESS DOC 0-1 FALLS W/OUT INJ PAST YR: ICD-10-PCS | Mod: HCNC,CPTII,S$GLB, | Performed by: DERMATOLOGY

## 2019-08-27 PROCEDURE — 99203 PR OFFICE/OUTPT VISIT, NEW, LEVL III, 30-44 MIN: ICD-10-PCS | Mod: 25,HCNC,S$GLB, | Performed by: DERMATOLOGY

## 2019-08-27 PROCEDURE — 11104 PR PUNCH BIOPSY, SKIN, SINGLE LESION: ICD-10-PCS | Mod: HCNC,S$GLB,, | Performed by: DERMATOLOGY

## 2019-08-27 NOTE — PROGRESS NOTES
Subjective:       Patient ID:  Lima Maldonado is a 69 y.o. female who presents for   Chief Complaint   Patient presents with    Skin Check     tbse    Lesion     right breast     Here for TBSE    H/o nmsc - followed by dr. FLORES in past (last seen 4 years ago)    Mother had MM - dies 2/2 to MM (30 years ago)    This is a high risk patient here to check for the development of new lesions.    Patient complains of lesion(s)  Location: right breast  Duration: several mnths  Symptoms: red and flares  Relieving factors/Previous treatments: none        Review of Systems   Skin: Positive for activity-related sunscreen use. Negative for daily sunscreen use and recent sunburn.   Hematologic/Lymphatic: Does not bruise/bleed easily.        Objective:    Physical Exam   Constitutional: She appears well-developed and well-nourished. No distress.   Neurological: She is alert and oriented to person, place, and time. She is not disoriented.   Psychiatric: She has a normal mood and affect.   Skin:   Areas Examined (abnormalities noted in diagram):   Scalp / Hair Palpated and Inspected  Head / Face Inspection Performed  Neck Inspection Performed  Chest / Axilla Inspection Performed  Abdomen Inspection Performed  Genitals / Buttocks / Groin Inspection Performed  Back Inspection Performed  RUE Inspected  LUE Inspection Performed  RLE Inspected  LLE Inspection Performed  Nails and Digits Inspection Performed                       Diagram Legend     Erythematous scaling macule/papule c/w actinic keratosis       Vascular papule c/w angioma      Pigmented verrucoid papule/plaque c/w seborrheic keratosis      Yellow umbilicated papule c/w sebaceous hyperplasia      Irregularly shaped tan macule c/w lentigo     1-2 mm smooth white papules consistent with Milia      Movable subcutaneous cyst with punctum c/w epidermal inclusion cyst      Subcutaneous movable cyst c/w pilar cyst      Firm pink to brown papule c/w dermatofibroma       Pedunculated fleshy papule(s) c/w skin tag(s)      Evenly pigmented macule c/w junctional nevus     Mildly variegated pigmented, slightly irregular-bordered macule c/w mildly atypical nevus      Flesh colored to evenly pigmented papule c/w intradermal nevus       Pink pearly papule/plaque c/w basal cell carcinoma      Erythematous hyperkeratotic cursted plaque c/w SCC      Surgical scar with no sign of skin cancer recurrence      Open and closed comedones      Inflammatory papules and pustules      Verrucoid papule consistent consistent with wart     Erythematous eczematous patches and plaques     Dystrophic onycholytic nail with subungual debris c/w onychomycosis     Umbilicated papule    Erythematous-base heme-crusted tan verrucoid plaque consistent with inflamed seborrheic keratosis     Erythematous Silvery Scaling Plaque c/w Psoriasis     See annotation              Assessment / Plan:      Pathology Orders:     Normal Orders This Visit    Tissue Specimen To Pathology, Dermatology     Questions:    Directional Terms:  Other(comment)    Clinical Information:  r/o scar vs other (pt with h/o breast cancer)    Specific Site:  right chest    Tissue Specimen To Pathology, Dermatology     Questions:    Directional Terms:  Other(comment)    Clinical Information:  r/o idn vs bcc vs other    Specific Site:  right back        Neoplasm of uncertain behavior of skin  -     Tissue Specimen To Pathology, Dermatology  -     Tissue Specimen To Pathology, Dermatology    Punch biopsy procedure note right chest  Punch biopsy performed after verbal consent obtained. Area marked and prepped with alcohol. Approximately 1cc of 1% lidocaine with epinephrine injected. 3 mm disposable punch used to remove lesion. Hemostasis obtained and biopsy site closed with 1 - 2 Prolene sutures. Wound care instructions reviewed with patient and handout given.    Shave biopsy procedure note right back    Shave biopsy performed after verbal consent  including risk of infection, scar, recurrence, need for additional treatment of site. Area prepped with alcohol, anesthetized with approximately 1.0cc of 1% lidocaine with epinephrine. Lesional tissue shaved with razor blade. Hemostasis achieved with application of aluminum chloride followed by hyfrecation. No complications. Dressing applied. Wound care explained.      Angiomas  This is a benign vascular lesion. Reassurance given. No treatment required.       SK (seborrheic keratosis)  These are benign inherited growths without a malignant potential. Reassurance given to patient. No treatment is necessary.       Dermatofibroma - right shoulder  This is a benign scar-like lesion secondary to minor trauma. No treatment required.       Skin tag  Reassurance given to patient. No treatment is necessary.   Treatment of benign, asymptomatic lesions may be considered cosmetic.      History of nonmelanoma skin cancer  Area(s) of previous NMSC evaluated with no signs of recurrence.  Total body skin examination performed today including at least 12 points as noted in physical examination. Suspicious lesions noted.               Follow up in about 1 year (around 8/27/2020).

## 2019-08-27 NOTE — PATIENT INSTRUCTIONS

## 2019-09-10 ENCOUNTER — CLINICAL SUPPORT (OUTPATIENT)
Dept: DERMATOLOGY | Facility: CLINIC | Age: 69
End: 2019-09-10
Payer: MEDICARE

## 2019-09-10 DIAGNOSIS — Z48.02 ENCOUNTER FOR REMOVAL OF SUTURES: Primary | ICD-10-CM

## 2019-09-10 PROCEDURE — 99999 PR PBB SHADOW E&M-EST. PATIENT-LVL I: CPT | Mod: PBBFAC,HCNC,,

## 2019-09-10 PROCEDURE — 99999 PR PBB SHADOW E&M-EST. PATIENT-LVL I: ICD-10-PCS | Mod: PBBFAC,HCNC,,

## 2019-09-10 PROCEDURE — 99024 POSTOP FOLLOW-UP VISIT: CPT | Mod: HCNC,S$GLB,, | Performed by: DERMATOLOGY

## 2019-09-10 PROCEDURE — 99024 PR POST-OP FOLLOW-UP VISIT: ICD-10-PCS | Mod: HCNC,S$GLB,, | Performed by: DERMATOLOGY

## 2019-09-10 NOTE — PROGRESS NOTES
Suture Removal note:  CC: 69 y.o. female patient is here for suture removal.         HPI: Patient is s/p punch biopsy from the right chest and right back on 08/27/19.  Patient reports no problems.    FINAL PATHOLOGIC DIAGNOSIS  1. Skin, right chest, punch biopsy:  -GRANULOMATOUS INFLAMMATION, see comment  COMMENT: There is granulomatous inflammation in the dermis and multifocally in the deep dermis/subcutaneous  tissue. Although AFB and PAS stains are negative for mycobacterial and fungal organisms , if an infectious etiology  is suspected, correlation with cultures is recommended. CK7 is negative for an occult breast carcinoma. Negative  for polarizable foreign material. Could represent a previously ruptured hair follicle, cyst, or sequela of prior surgery  to the area. Clinical correlation is recommended.  2. Skin, right back, shave biopsy:  -TRICHOLEMMAL CYST (ISTHMUS-CATAGEN), SURFACE OF  Diagnosed by: Stew Pulido  (Electronically Signed: 2019-09-03 16:52:52)    WOUND PE:  Sutures intact.  Wound healing well.  Good approximation of skin edges.  No signs or symptoms of infection.    IMPRESSION:  Punch biopsy from the right chest and right back on 08/27/19.    PLAN:  Sutures removed today.  Continue wound care.    RTC: In 6 months.

## 2019-09-20 ENCOUNTER — PATIENT OUTREACH (OUTPATIENT)
Dept: ADMINISTRATIVE | Facility: OTHER | Age: 69
End: 2019-09-20

## 2019-09-24 ENCOUNTER — OFFICE VISIT (OUTPATIENT)
Dept: PODIATRY | Facility: CLINIC | Age: 69
End: 2019-09-24
Payer: MEDICARE

## 2019-09-24 VITALS
SYSTOLIC BLOOD PRESSURE: 114 MMHG | BODY MASS INDEX: 24.01 KG/M2 | HEIGHT: 67 IN | HEART RATE: 84 BPM | DIASTOLIC BLOOD PRESSURE: 70 MMHG | WEIGHT: 153 LBS

## 2019-09-24 DIAGNOSIS — E11.42 DIABETIC POLYNEUROPATHY ASSOCIATED WITH TYPE 2 DIABETES MELLITUS: Primary | ICD-10-CM

## 2019-09-24 DIAGNOSIS — B35.1 ONYCHOMYCOSIS DUE TO DERMATOPHYTE: ICD-10-CM

## 2019-09-24 PROCEDURE — 99499 UNLISTED E&M SERVICE: CPT | Mod: HCNC,S$GLB,, | Performed by: PODIATRIST

## 2019-09-24 PROCEDURE — 99499 RISK ADDL DX/OHS AUDIT: ICD-10-PCS | Mod: HCNC,S$GLB,, | Performed by: PODIATRIST

## 2019-09-24 PROCEDURE — 99999 PR PBB SHADOW E&M-EST. PATIENT-LVL IV: ICD-10-PCS | Mod: PBBFAC,HCNC,, | Performed by: PODIATRIST

## 2019-09-24 PROCEDURE — 11721 DEBRIDE NAIL 6 OR MORE: CPT | Mod: Q9,HCNC,S$GLB, | Performed by: PODIATRIST

## 2019-09-24 PROCEDURE — 11721 PR DEBRIDEMENT OF NAILS, 6 OR MORE: ICD-10-PCS | Mod: Q9,HCNC,S$GLB, | Performed by: PODIATRIST

## 2019-09-24 PROCEDURE — 99999 PR PBB SHADOW E&M-EST. PATIENT-LVL IV: CPT | Mod: PBBFAC,HCNC,, | Performed by: PODIATRIST

## 2019-09-25 ENCOUNTER — TELEPHONE (OUTPATIENT)
Dept: PODIATRY | Facility: CLINIC | Age: 69
End: 2019-09-25

## 2019-09-25 NOTE — TELEPHONE ENCOUNTER
----- Message from Abbey Trevizo sent at 9/25/2019  2:10 PM CDT -----  Contact: Cally / [employer]  Cally called in regards to an Compounded medication, stated quantity is needed  and instruction           Cally can be reached at:  [phone]

## 2019-09-26 ENCOUNTER — TELEPHONE (OUTPATIENT)
Dept: PODIATRY | Facility: CLINIC | Age: 69
End: 2019-09-26

## 2019-09-26 NOTE — TELEPHONE ENCOUNTER
----- Message from Chapito Oumouj luis sent at 9/26/2019  3:38 PM CDT -----  Contact: Davina with [facility]  Rx Refill/Request     Is this a Refill or New Rx:  Refill    Rx Name and Strength:  Liquid mixture of several medications that was called in on 9/24/19    Preferred Pharmacy with phone number: [facility]    Communication Preference:[phone]    Additional Information: The caller states that they received a call back yesterday with the quantity to take, but not the directions on how to take it. Please contact the caller.

## 2019-09-30 ENCOUNTER — TELEPHONE (OUTPATIENT)
Dept: PODIATRY | Facility: CLINIC | Age: 69
End: 2019-09-30

## 2019-09-30 NOTE — TELEPHONE ENCOUNTER
----- Message from Abbey Trevizo sent at 9/30/2019 12:54 PM CDT -----  Contact: Cally / [facility]   Cally called in requesting directions on compounded medication , stated this is the 4th time calling would really like to speak with someone concerning medication         Cally can be reached at: [phone]

## 2019-10-21 DIAGNOSIS — E11.8 TYPE 2 DIABETES MELLITUS WITH COMPLICATION, WITHOUT LONG-TERM CURRENT USE OF INSULIN: ICD-10-CM

## 2019-10-21 RX ORDER — METFORMIN HYDROCHLORIDE 500 MG/1
TABLET ORAL
Qty: 180 TABLET | Refills: 3 | Status: SHIPPED | OUTPATIENT
Start: 2019-10-21 | End: 2019-10-23 | Stop reason: ALTCHOICE

## 2019-10-22 ENCOUNTER — TELEPHONE (OUTPATIENT)
Dept: INTERNAL MEDICINE | Facility: CLINIC | Age: 69
End: 2019-10-22

## 2019-10-23 ENCOUNTER — OFFICE VISIT (OUTPATIENT)
Dept: INTERNAL MEDICINE | Facility: CLINIC | Age: 69
End: 2019-10-23
Payer: MEDICARE

## 2019-10-23 ENCOUNTER — IMMUNIZATION (OUTPATIENT)
Dept: PHARMACY | Facility: CLINIC | Age: 69
End: 2019-10-23
Payer: MEDICARE

## 2019-10-23 ENCOUNTER — IMMUNIZATION (OUTPATIENT)
Dept: PHARMACY | Facility: CLINIC | Age: 69
End: 2019-10-23

## 2019-10-23 VITALS
BODY MASS INDEX: 23.67 KG/M2 | SYSTOLIC BLOOD PRESSURE: 116 MMHG | WEIGHT: 150.81 LBS | HEIGHT: 67 IN | HEART RATE: 82 BPM | DIASTOLIC BLOOD PRESSURE: 68 MMHG

## 2019-10-23 DIAGNOSIS — E11.42 DIABETIC POLYNEUROPATHY ASSOCIATED WITH TYPE 2 DIABETES MELLITUS: ICD-10-CM

## 2019-10-23 DIAGNOSIS — I10 ESSENTIAL HYPERTENSION: ICD-10-CM

## 2019-10-23 DIAGNOSIS — M81.0 SENILE OSTEOPOROSIS: ICD-10-CM

## 2019-10-23 DIAGNOSIS — G62.0 CHEMOTHERAPY-INDUCED NEUROPATHY: ICD-10-CM

## 2019-10-23 DIAGNOSIS — G47.33 OBSTRUCTIVE SLEEP APNEA ON CPAP: ICD-10-CM

## 2019-10-23 DIAGNOSIS — N39.3 STRESS INCONTINENCE OF URINE: ICD-10-CM

## 2019-10-23 DIAGNOSIS — I70.0 AORTIC ATHEROSCLEROSIS: ICD-10-CM

## 2019-10-23 DIAGNOSIS — Z17.0 MALIGNANT NEOPLASM OF LOWER-OUTER QUADRANT OF LEFT BREAST OF FEMALE, ESTROGEN RECEPTOR POSITIVE: ICD-10-CM

## 2019-10-23 DIAGNOSIS — E78.5 HYPERLIPIDEMIA, UNSPECIFIED HYPERLIPIDEMIA TYPE: ICD-10-CM

## 2019-10-23 DIAGNOSIS — K21.9 GASTROESOPHAGEAL REFLUX DISEASE, ESOPHAGITIS PRESENCE NOT SPECIFIED: ICD-10-CM

## 2019-10-23 DIAGNOSIS — T45.1X5A CHEMOTHERAPY-INDUCED NEUROPATHY: ICD-10-CM

## 2019-10-23 DIAGNOSIS — E11.42 TYPE 2 DIABETES MELLITUS WITH DIABETIC POLYNEUROPATHY, WITHOUT LONG-TERM CURRENT USE OF INSULIN: ICD-10-CM

## 2019-10-23 DIAGNOSIS — C50.512 MALIGNANT NEOPLASM OF LOWER-OUTER QUADRANT OF LEFT BREAST OF FEMALE, ESTROGEN RECEPTOR POSITIVE: ICD-10-CM

## 2019-10-23 DIAGNOSIS — K76.0 FATTY LIVER: ICD-10-CM

## 2019-10-23 DIAGNOSIS — Z00.00 ENCOUNTER FOR PREVENTIVE HEALTH EXAMINATION: Primary | ICD-10-CM

## 2019-10-23 PROBLEM — T81.31XA DISRUPTION OF EXTERNAL SURGICAL WOUND: Status: RESOLVED | Noted: 2017-01-17 | Resolved: 2019-10-23

## 2019-10-23 PROBLEM — Z12.11 SCREENING FOR COLON CANCER: Status: RESOLVED | Noted: 2018-05-15 | Resolved: 2019-10-23

## 2019-10-23 PROBLEM — E11.40 TYPE 2 DIABETES MELLITUS WITH DIABETIC NEUROPATHY: Status: ACTIVE | Noted: 2019-10-23

## 2019-10-23 PROCEDURE — 3074F SYST BP LT 130 MM HG: CPT | Mod: HCNC,CPTII,S$GLB, | Performed by: NURSE PRACTITIONER

## 2019-10-23 PROCEDURE — 3078F PR MOST RECENT DIASTOLIC BLOOD PRESSURE < 80 MM HG: ICD-10-PCS | Mod: HCNC,CPTII,S$GLB, | Performed by: NURSE PRACTITIONER

## 2019-10-23 PROCEDURE — 3044F PR MOST RECENT HEMOGLOBIN A1C LEVEL <7.0%: ICD-10-PCS | Mod: HCNC,CPTII,S$GLB, | Performed by: NURSE PRACTITIONER

## 2019-10-23 PROCEDURE — G0439 PR MEDICARE ANNUAL WELLNESS SUBSEQUENT VISIT: ICD-10-PCS | Mod: HCNC,S$GLB,, | Performed by: NURSE PRACTITIONER

## 2019-10-23 PROCEDURE — 3074F PR MOST RECENT SYSTOLIC BLOOD PRESSURE < 130 MM HG: ICD-10-PCS | Mod: HCNC,CPTII,S$GLB, | Performed by: NURSE PRACTITIONER

## 2019-10-23 PROCEDURE — 3078F DIAST BP <80 MM HG: CPT | Mod: HCNC,CPTII,S$GLB, | Performed by: NURSE PRACTITIONER

## 2019-10-23 PROCEDURE — G0439 PPPS, SUBSEQ VISIT: HCPCS | Mod: HCNC,S$GLB,, | Performed by: NURSE PRACTITIONER

## 2019-10-23 PROCEDURE — 99999 PR PBB SHADOW E&M-EST. PATIENT-LVL V: ICD-10-PCS | Mod: PBBFAC,HCNC,, | Performed by: NURSE PRACTITIONER

## 2019-10-23 PROCEDURE — 99999 PR PBB SHADOW E&M-EST. PATIENT-LVL V: CPT | Mod: PBBFAC,HCNC,, | Performed by: NURSE PRACTITIONER

## 2019-10-23 PROCEDURE — 3044F HG A1C LEVEL LT 7.0%: CPT | Mod: HCNC,CPTII,S$GLB, | Performed by: NURSE PRACTITIONER

## 2019-10-23 NOTE — PROGRESS NOTES
"Lima Maldonado presented for a  Medicare AWV and comprehensive Health Risk Assessment today. The following components were reviewed and updated:    · Medical history  · Family History  · Social history  · Allergies and Current Medications  · Health Risk Assessment  · Health Maintenance  · Care Team     ** See Completed Assessments for Annual Wellness Visit within the encounter summary.**       The following assessments were completed:  · Living Situation  · CAGE  · Depression Screening  · Timed Get Up and Go  · Whisper Test  · Cognitive Function Screening      ·   · Nutrition Screening  · ADL Screening  · PAQ Screening    Vitals:    10/23/19 0959   BP: 116/68   BP Location: Right arm   Pulse: 82   Weight: 68.4 kg (150 lb 12.7 oz)   Height: 5' 7.2" (1.707 m)     Body mass index is 23.48 kg/m².  Physical Exam   Constitutional: She is oriented to person, place, and time. She appears well-developed and well-nourished.   HENT:   Head: Normocephalic.   Cardiovascular: Normal rate, regular rhythm and intact distal pulses.   Pulmonary/Chest: Effort normal and breath sounds normal.   Abdominal: Soft. Bowel sounds are normal.   Musculoskeletal: Normal range of motion. She exhibits no edema.   Neurological: She is alert and oriented to person, place, and time.   Skin: Skin is warm and dry.   Psychiatric: She has a normal mood and affect.   Nursing note and vitals reviewed.        Diagnoses and health risks identified today and associated recommendations/orders:    1. Encounter for preventive health examination  Here for Health Risk Assessment/Annual Wellness Visit.  Health maintenance reviewed and updated. Follow up in one year..chn    2. Essential hypertension  Chronic, stable on current medications. Followed by PCP.    3. Aortic atherosclerosis  Chronic, stable on current medications. Noted CXR 9/21/17. Followed by PCP.    4. Hyperlipidemia, unspecified hyperlipidemia type  Chronic, stable on current medications. Followed " by PCP.    5. Type 2 diabetes mellitus with diabetic polyneuropathy, without long-term current use of insulin  Chronic, stable on current medication. Weight decreased 40 pounds from AWV/HRA 12/2018 with [employer] diet program. Last A1c 5.2. Followed by PCP.    6. Diabetic polyneuropathy associated with type 2 diabetes mellitus  Chronic, stable on current medications. Followed by PCP.    7. Chemotherapy-induced neuropathy  Chronic, stable on current medication. Followed by PCP.    8. Malignant neoplasm of lower-outer quadrant of left breast of female, estrogen receptor positive  Chronic, stable with letrozole. Followed by PCP, Oncology.    9. Stress incontinence of urine  Chronic, stable. Followed by PCP.    10. Gastroesophageal reflux disease, esophagitis presence not specified  Chronic, stable on current medication. Followed by PCP.    11. Fatty liver  Chronic, stable. Followed by PCP.    12. Senile osteoporosis  Chronic, stable on current medications. Followed by PCP.    13. Obstructive sleep apnea on CPAP  Chronic, stable. Followed by PCP, Sleep medicine.      Provided Lima with a 5-10 year written screening schedule and personal prevention plan. Recommendations were developed using the USPSTF age appropriate recommendations. Education, counseling, and referrals were provided as needed. After Visit Summary printed and given to patient which includes a list of additional screenings\tests needed.    Follow up in about 4 months (around 2/18/2020).with PCP    Romy Rosales NP

## 2019-10-23 NOTE — PATIENT INSTRUCTIONS
Counseling and Referral of Other Preventative  (Italic type indicates deductible and co-insurance are waived)    Patient Name: Lima Maldonado  Today's Date: 10/23/2019    Health Maintenance       Date Due Completion Date    Shingles Vaccine (2 of 3) 08/27/2010 7/2/2010 - Obtain new shingles vaccine - SHINGRIX - when available    Influenza Vaccine (1) 09/01/2019 11/7/2018    Override on 11/7/2017: Not Clinically Appropriate    Override on 9/23/2015: Done    Eye Exam 11/29/2019 11/29/2018    Lipid Panel 02/12/2020 2/12/2019    Hemoglobin A1c 02/20/2020 8/20/2019    High Dose Statin 09/24/2020 9/24/2019    Foot Exam 09/24/2020 9/24/2019 (Done)    Override on 9/24/2019: Done    Override on 12/11/2018: Done    Override on 8/9/2017: Done    DEXA SCAN 06/05/2021 6/5/2019    TETANUS VACCINE 12/03/2023 12/3/2013    Colonoscopy 05/15/2028 5/15/2018    Override on 10/6/2006: Done        No orders of the defined types were placed in this encounter.    The following information is provided to all patients.  This information is to help you find resources for any of the problems found today that may be affecting your health:                Living healthy guide: www.[facility].[place].gov      Understanding Diabetes: www.diabetes.org      Eating healthy: www.cdc.gov/healthyweight      CDC home safety checklist: www.cdc.gov/steadi/patient.html      Agency on Aging: www.goea.[place].gov      Alcoholics anonymous (AA): www.aa.org      Physical Activity: www.lata.nih.gov/dz1oqhu      Tobacco use: www.quitwithusla.org

## 2019-10-28 ENCOUNTER — HOSPITAL ENCOUNTER (OUTPATIENT)
Dept: DIABETES | Facility: OTHER | Age: 69
Discharge: HOME | End: 2019-10-28
Attending: INTERNAL MEDICINE
Payer: MEDICARE

## 2019-10-28 VITALS — HEIGHT: 66 IN | BODY MASS INDEX: 24.27 KG/M2 | WEIGHT: 151 LBS

## 2019-10-28 DIAGNOSIS — E11.8 TYPE 2 DIABETES MELLITUS WITH COMPLICATION, WITHOUT LONG-TERM CURRENT USE OF INSULIN: Primary | ICD-10-CM

## 2019-10-28 PROCEDURE — G0108 DIAB MANAGE TRN  PER INDIV: HCPCS | Mod: HCNC

## 2019-10-28 NOTE — PROGRESS NOTES
Diabetes Education  Author: Berenice Agustin RN  Date: 10/28/2019    Diabetes Care Management Summary  Diabetes Education Record Assessment/Progress: Post Program/Follow-up  Current Diabetes Risk Level: Low         Diabetes Type  Diabetes Type : Type II    Diabetes History  Current Treatment: Oral Medication, Diet, Exercise  Reviewed Problem List with Patient: Yes    Health Maintenance was reviewed today with patient. Discussed with patient importance of routine eye exams, foot exams/foot care, blood work (i.e.: A1c, microalbumin, and lipid), dental visits, yearly flu vaccine, and pneumonia vaccine as indicated by PCP. Patient verbalized understanding.     Health Maintenance Topics with due status: Not Due       Topic Last Completion Date    TETANUS VACCINE 2013    Colonoscopy 05/15/2018    Eye Exam 2018    Lipid Panel 2019    DEXA SCAN 2019    Hemoglobin A1c 2019    Foot Exam 2019    High Dose Statin 10/23/2019    Shingles Vaccine 10/23/2019     There are no preventive care reminders to display for this patient.    Nutrition  Meal Plan 24 Hour Recall - Breakfast: breakfast bar, coffee   Meal Plan 24 Hour Recall - Lunch: chicken soup - water   Meal Plan 24 Hour Recall - Dinner: flank steak, mashed potatoes, green beans, a small garbanzo bean salad - diet soda   Meal Plan 24 Hour Recall - Snack: peanuts, protein bar, celery, protein shake     Monitoring   Self Monitoring : AM Fastin-110's Bedtime: 100-110's   Blood Glucose Logs: No(forgot at home)  Do you use a personal continuous glucose monitor?: No    Exercise   Frequency: Never(has been inconsistent with exercise)    Current Diabetes Treatment   Current Treatment: Oral Medication, Diet, Exercise                                                    Diabetes Education Assessment/Progress  Diabetes Disease Process (diabetes disease process and treatment options): Not Covered/Deferred  Nutrition (Incorporating nutritional management  into one's lifestyle): Not Covered/Deferred  Physical Activity (incorporating physical activity into one's lifestyle): Discussion, Requires Assistance Family/SO, Individual Session(pt has decided to commit to scheduled classes at a gym to help get into regular exercise routine)  Medications (states correct name, dose, onset, peak, duration, side effects & timing of meds): Discussion, Requires Assistance Family/SO, Individual Session(pt has reduce metformin to 500mg BID)  Monitoring (monitoring blood glucose/other parameters & using results): Not Covered/Deferred  Acute Complications (preventing, detecting, and treating acute complications): Discussion, Requires Assistance Family/SO, Individual Session(reviewed s/s of low BG and when to call clinic for further medication reduction d/t low BG)  Chronic Complications (preventing, detecting, and treating chronic complications): Discussion, Requires Assistance Family/SO, Individual Session(most recent A1C 5.2%!! pt thrilled, is continuing to work on reducing medication)  Clinical (diabetes, other pertinent medical history, and relevant comorbidities reviewed during visit): Discussion, Requires Assistance Family/SO, Individual Session(pt BMI is now within normal range at 24.4, goal is to increase weight bearing activity to reduce complications of osteoporosis, build lean muscle mass and maintain weight)  Cognitive (knowledge of self-management skills, functional health literacy): Not Covered/Deferred  Psychosocial (emotional response to diabetes): Not Covered/Deferred  Diabetes Distress and Support Systems: Not Covered/Deferred  Behavioral (readiness for change, lifestyle practices, self-care behaviors): Discussion, Requires Assistance Family/SO, Individual Session(pt motivated to continue working towards weaning off meds and increasing activity)    Goals  Patient has selected/evaluated goals during today's session: Yes, evaluated  Healthy Eating: In Progress    Diabetes  Self-Management Support Plan  Diabetes Learning: DM websites  Exercise/Nutrition: join a gym  Stress Management: family, hobbies  Review Status: Patient has selected and agrees to support plan., Patient was provided Diabetes Self-Management Support Plan document that includes support options.         Diabetes Meal Plan  Restrictions: Restricted Carbohydrate  Carbohydrate Per Meal: 30-45g    Today's Self-Management Care Plan was developed with the patient's input and is based on barriers identified during today's assessment.    The long and short-term goals in the care plan were written with the patient/caregiver's input. The patient has agreed to work toward these goals to improve her overall diabetes control.      The patient received a copy of today's self-management plan and verbalized understanding of the care plan, goals, and all of today's instructions.      The patient was encouraged to communicate with her physician and care team regarding her condition(s) and treatment.  I provided the patient with my contact information today and encouraged her to contact me via phone or patient portal as needed.     Education Units of Time   Time Spent: 30 min

## 2019-10-28 NOTE — LETTER
October 28, 2019      Joie Mccall MD  [address]  [facility] [address]  [facility] [address]         Patient: Katerin Maldonado   MR Number: [id number]   YOB: 1950   Date of Visit: 10/28/2019       Dear Dr. Mccall:    Thank you for referring Katerin for diabetes self-management education and support. She has completed all components of our Diabetes Management Program and her Self-Management Support Plan. Below is a summary of her clinical outcomes and goal progress.    Patient Outcomes:    A1c Status:   Lab Results   Component Value Date    HGBA1C 5.2 08/20/2019    HGBA1C 6.8 (H) 02/12/2019     Goals  Patient has selected/evaluated goals during today's session: Yes, evaluated  Healthy Eating: In Progress    Diabetes Self-Management Support Plan  Diabetes Learning: DM websites  Exercise/Nutrition: join a gym  Stress Management: family, hobbies  Review Status: Patient has selected and agrees to support plan., Patient was provided Diabetes Self-Management Support Plan document that includes support options.    Follow up:   · Katerin to follow diabetes support plan indicated above  · Katerin to attend medical appointments as scheduled  · Katerin to update you on her DM education progress as needed      If you have questions, please do not hesitate to call me. I look forward to providing additional education and support as needed.    Sincerely,    Berenice Agustin RN

## 2019-11-15 RX ORDER — METFORMIN HYDROCHLORIDE 500 MG/1
500 TABLET ORAL NIGHTLY
Qty: 90 TABLET | Refills: 3 | Status: SHIPPED | OUTPATIENT
Start: 2019-11-15 | End: 2019-12-15

## 2019-11-18 RX ORDER — LEVOTHYROXINE SODIUM 50 UG/1
TABLET ORAL
Qty: 90 TABLET | Refills: 3 | Status: SHIPPED | OUTPATIENT
Start: 2019-11-18 | End: 2020-11-18

## 2019-11-19 ENCOUNTER — PROCEDURE VISIT (OUTPATIENT)
Dept: DERMATOLOGY | Facility: CLINIC | Age: 69
End: 2019-11-19

## 2019-11-19 ENCOUNTER — PATIENT MESSAGE (OUTPATIENT)
Dept: PHARMACY | Facility: CLINIC | Age: 69
End: 2019-11-19

## 2019-11-19 DIAGNOSIS — Z41.1 ELECTIVE PROCEDURE FOR UNACCEPTABLE COSMETIC APPEARANCE: Primary | ICD-10-CM

## 2019-11-19 PROCEDURE — 17110 PR DESTRUCTION BENIGN LESIONS UP TO 14: ICD-10-PCS | Mod: CSM,S$GLB,, | Performed by: DERMATOLOGY

## 2019-11-19 PROCEDURE — 99499 NO LOS: ICD-10-PCS | Mod: S$GLB,,, | Performed by: DERMATOLOGY

## 2019-11-19 PROCEDURE — 17110 DESTRUCTION B9 LES UP TO 14: CPT | Mod: CSM,S$GLB,, | Performed by: DERMATOLOGY

## 2019-11-19 PROCEDURE — 99499 UNLISTED E&M SERVICE: CPT | Mod: S$GLB,,, | Performed by: DERMATOLOGY

## 2019-11-19 NOTE — PATIENT INSTRUCTIONS

## 2019-11-19 NOTE — PROGRESS NOTES
Here for cosmetic destruction of angiomas and snip to skin tag on chest and back.    Procedure note for destruction via hyfrecation and curettage:    Verbal consent obtained. Risks of recurrence and scarring discussed.   4 lesions cleaned with alcohol and anesthetized with 1% lidocaine with epinephrine. Areas then lightly hyfrecated and curetted to remove gross lesion. Hemostasis achieved with aluminum chloride application. No complications.   Areas dressed with Vaseline jelly and bandage. Wound care discussed. $150.00    Pt tolerated well    F/u prn

## 2019-11-26 ENCOUNTER — OFFICE VISIT (OUTPATIENT)
Dept: PODIATRY | Facility: CLINIC | Age: 69
End: 2019-11-26
Payer: MEDICARE

## 2019-11-26 VITALS
HEART RATE: 91 BPM | SYSTOLIC BLOOD PRESSURE: 125 MMHG | BODY MASS INDEX: 23.7 KG/M2 | DIASTOLIC BLOOD PRESSURE: 65 MMHG | HEIGHT: 67 IN | WEIGHT: 151 LBS

## 2019-11-26 DIAGNOSIS — E11.42 DIABETIC POLYNEUROPATHY ASSOCIATED WITH TYPE 2 DIABETES MELLITUS: Primary | ICD-10-CM

## 2019-11-26 DIAGNOSIS — B35.1 ONYCHOMYCOSIS DUE TO DERMATOPHYTE: ICD-10-CM

## 2019-11-26 PROCEDURE — 99999 PR PBB SHADOW E&M-EST. PATIENT-LVL IV: ICD-10-PCS | Mod: PBBFAC,HCNC,, | Performed by: PODIATRIST

## 2019-11-26 PROCEDURE — 99499 NO LOS: ICD-10-PCS | Mod: HCNC,S$GLB,, | Performed by: PODIATRIST

## 2019-11-26 PROCEDURE — 99499 UNLISTED E&M SERVICE: CPT | Mod: HCNC,S$GLB,, | Performed by: PODIATRIST

## 2019-11-26 PROCEDURE — 99999 PR PBB SHADOW E&M-EST. PATIENT-LVL IV: CPT | Mod: PBBFAC,HCNC,, | Performed by: PODIATRIST

## 2019-11-26 PROCEDURE — 11721 DEBRIDE NAIL 6 OR MORE: CPT | Mod: Q9,HCNC,S$GLB, | Performed by: PODIATRIST

## 2019-11-26 PROCEDURE — 11721 PR DEBRIDEMENT OF NAILS, 6 OR MORE: ICD-10-PCS | Mod: Q9,HCNC,S$GLB, | Performed by: PODIATRIST

## 2019-12-05 NOTE — PROGRESS NOTES
Subjective:      Patient ID: Lima Maldonado is a 69 y.o. female.    Chief Complaint: Diabetic Foot Exam (08/22/2019 dr joie holder) and Diabetes Mellitus    Lima is a 69 y.o. female who presents to the clinic for evaluation and treatment of high risk feet. Lima has a past medical history of Acute cystitis without hematuria (7/17/2017), Arthritis, Back pain, Breast cancer, Class 1 obesity due to excess calories with serious comorbidity and body mass index (BMI) of 30.0 to 30.9 in adult (11/29/2018), Elevated bilirubin (11/19/2013), Fatty liver, GERD (gastroesophageal reflux disease), Glucose intolerance (impaired glucose tolerance), Hyperlipidemia, Hypertension, Hypothyroid, Malignant neoplasm of lower-outer quadrant of left female breast (11/15/2016), Obesity, Obesity, diabetes, and hypertension syndrome (12/12/2018), Osteopenia, Osteoporosis, Primary insomnia (11/2/2016), Shingles, Sleep apnea, Squamous cell carcinoma (2011), Stress incontinence of urine (1/11/2018), Thyrotoxicosis without mention of goiter or other cause, without mention of thyrotoxic crisis or storm, Trouble in sleeping, Type 2 diabetes mellitus with diabetic polyneuropathy, without long-term current use of insulin (12/26/2017), Urinary incontinence, and Well woman exam with routine gynecological exam (11/2/2016). The patient's chief complaint is long, thick toenails. This patient has documented high risk feet requiring routine maintenance secondary to peripheral neuropathy.    PCP: Joie Holder MD    Date Last Seen by PCP: 08/22/2019 dr joie holder    Current shoe gear:  Affected Foot: Tennis shoes     Unaffected Foot: Tennis shoes    Hemoglobin A1C   Date Value Ref Range Status   08/20/2019 5.2 4.0 - 5.6 % Final     Comment:     ADA Screening Guidelines:  5.7-6.4%  Consistent with prediabetes  >or=6.5%  Consistent with diabetes  High levels of fetal hemoglobin interfere with the HbA1C  assay. Heterozygous hemoglobin variants  (HbS, HgC, etc)do  not significantly interfere with this assay.   However, presence of multiple variants may affect accuracy.     02/12/2019 6.8 (H) 4.0 - 5.6 % Final     Comment:     ADA Screening Guidelines:  5.7-6.4%  Consistent with prediabetes  >or=6.5%  Consistent with diabetes  High levels of fetal hemoglobin interfere with the HbA1C  assay. Heterozygous hemoglobin variants (HbS, HgC, etc)do  not significantly interfere with this assay.   However, presence of multiple variants may affect accuracy.     11/20/2018 7.7 (H) 4.0 - 5.6 % Final     Comment:     ADA Screening Guidelines:  5.7-6.4%  Consistent with prediabetes  >or=6.5%  Consistent with diabetes  High levels of fetal hemoglobin interfere with the HbA1C  assay. Heterozygous hemoglobin variants (HbS, HgC, etc)do  not significantly interfere with this assay.   However, presence of multiple variants may affect accuracy.         Review of Systems   Constitution: Negative for chills, fever and malaise/fatigue.   HENT: Negative for hearing loss.    Cardiovascular: Negative for claudication.   Respiratory: Negative for shortness of breath.    Skin: Positive for color change, dry skin and nail changes. Negative for flushing and rash.   Musculoskeletal: Negative for joint pain and myalgias.   Neurological: Positive for sensory change. Negative for loss of balance, numbness and paresthesias.   Psychiatric/Behavioral: Negative for altered mental status.           Objective:      Physical Exam   Constitutional: She appears well-developed and well-nourished. She is cooperative.   Cardiovascular:   Pulses:       Dorsalis pedis pulses are 2+ on the right side, and 2+ on the left side.        Posterior tibial pulses are 2+ on the right side, and 2+ on the left side.   Musculoskeletal:        Right ankle: She exhibits decreased range of motion and abnormal pulse. Achilles tendon exhibits normal Ceja's test results.        Left ankle: She exhibits decreased range of  motion and abnormal pulse. Achilles tendon exhibits normal Ceja's test results.        Right foot: There is decreased range of motion.        Left foot: There is decreased range of motion.   Feet:   Right Foot:   Protective Sensation: 5 sites tested. 2 sites sensed.   Left Foot:   Protective Sensation: 5 sites tested. 2 sites sensed.   Neurological: She is alert.   diminished sensation noted to b/L lower extremities   Skin: Skin is dry. Capillary refill takes more than 3 seconds.   Psychiatric: Her behavior is normal.   Vitals reviewed.      Nails x10 are elongated by  4-7mm's, thickened by 2-3 mm's, dystrophic, and are yellowish in  coloration . Xerosis Bilaterally. No open lesions noted.             Assessment:       Encounter Diagnoses   Name Primary?    Diabetic polyneuropathy associated with type 2 diabetes mellitus Yes    Onychomycosis due to dermatophyte          Plan:       Lima was seen today for diabetic foot exam and diabetes mellitus.    Diagnoses and all orders for this visit:    Diabetic polyneuropathy associated with type 2 diabetes mellitus    Onychomycosis due to dermatophyte      I counseled the patient on her conditions, their implications and medical management.        - Shoe inspection. Diabetic Foot Education. Patient reminded of the importance of good nutrition and blood sugar control to help prevent podiatric complications of diabetes. Patient instructed on proper foot hygeine. We discussed wearing proper shoe gear, daily foot inspections, never walking without protective shoe gear, never putting sharp instruments to feet, routine podiatric nail visits every 2-3 months.      - With patient's permission, nails were aggressively reduced and debrided x 10 to their soft tissue attachment mechanically and with electric , removing all offending nail and debris. Patient relates relief following the procedure. She will continue to monitor the areas daily, inspect her feet, wear  protective shoe gear when ambulatory, moisturizer to maintain skin integrity and follow in this office in approximately 2-3 months, sooner p.r.n.

## 2019-12-16 RX ORDER — SIMVASTATIN 20 MG/1
TABLET, FILM COATED ORAL
Qty: 90 TABLET | Refills: 3 | Status: SHIPPED | OUTPATIENT
Start: 2019-12-16 | End: 2020-12-21

## 2019-12-23 RX ORDER — LOSARTAN POTASSIUM 50 MG/1
TABLET ORAL
Qty: 90 TABLET | Refills: 0 | Status: SHIPPED | OUTPATIENT
Start: 2019-12-23 | End: 2020-03-19

## 2019-12-31 DIAGNOSIS — Z79.811 USE OF AROMATASE INHIBITORS: Primary | ICD-10-CM

## 2019-12-31 RX ORDER — LETROZOLE 2.5 MG/1
TABLET, FILM COATED ORAL
Qty: 90 TABLET | Refills: 0 | Status: SHIPPED | OUTPATIENT
Start: 2019-12-31 | End: 2020-03-30

## 2020-01-14 ENCOUNTER — TELEPHONE (OUTPATIENT)
Dept: ADMINISTRATIVE | Facility: OTHER | Age: 70
End: 2020-01-14

## 2020-01-15 ENCOUNTER — OFFICE VISIT (OUTPATIENT)
Dept: GYNECOLOGIC ONCOLOGY | Facility: CLINIC | Age: 70
End: 2020-01-15
Payer: MEDICARE

## 2020-01-15 VITALS
BODY MASS INDEX: 24.85 KG/M2 | HEIGHT: 67 IN | HEART RATE: 85 BPM | SYSTOLIC BLOOD PRESSURE: 150 MMHG | WEIGHT: 158.31 LBS | DIASTOLIC BLOOD PRESSURE: 70 MMHG

## 2020-01-15 DIAGNOSIS — Z01.419 WELL WOMAN EXAM WITH ROUTINE GYNECOLOGICAL EXAM: Primary | ICD-10-CM

## 2020-01-15 DIAGNOSIS — N95.2 VAGINAL ATROPHY: ICD-10-CM

## 2020-01-15 PROCEDURE — 99999 PR PBB SHADOW E&M-EST. PATIENT-LVL III: CPT | Mod: PBBFAC,HCNC,, | Performed by: OBSTETRICS & GYNECOLOGY

## 2020-01-15 PROCEDURE — G0101 CA SCREEN;PELVIC/BREAST EXAM: HCPCS | Mod: HCNC,S$GLB,, | Performed by: OBSTETRICS & GYNECOLOGY

## 2020-01-15 PROCEDURE — 99999 PR PBB SHADOW E&M-EST. PATIENT-LVL III: ICD-10-PCS | Mod: PBBFAC,HCNC,, | Performed by: OBSTETRICS & GYNECOLOGY

## 2020-01-15 PROCEDURE — 3077F SYST BP >= 140 MM HG: CPT | Mod: HCNC,CPTII,S$GLB, | Performed by: OBSTETRICS & GYNECOLOGY

## 2020-01-15 PROCEDURE — 3077F PR MOST RECENT SYSTOLIC BLOOD PRESSURE >= 140 MM HG: ICD-10-PCS | Mod: HCNC,CPTII,S$GLB, | Performed by: OBSTETRICS & GYNECOLOGY

## 2020-01-15 PROCEDURE — G0101 PR CA SCREEN;PELVIC/BREAST EXAM: ICD-10-PCS | Mod: HCNC,S$GLB,, | Performed by: OBSTETRICS & GYNECOLOGY

## 2020-01-15 PROCEDURE — 3078F DIAST BP <80 MM HG: CPT | Mod: HCNC,CPTII,S$GLB, | Performed by: OBSTETRICS & GYNECOLOGY

## 2020-01-15 PROCEDURE — 3078F PR MOST RECENT DIASTOLIC BLOOD PRESSURE < 80 MM HG: ICD-10-PCS | Mod: HCNC,CPTII,S$GLB, | Performed by: OBSTETRICS & GYNECOLOGY

## 2020-01-15 RX ORDER — ESTRADIOL 10 UG/1
INSERT VAGINAL
Qty: 24 TABLET | Refills: 3 | Status: SHIPPED | OUTPATIENT
Start: 2020-01-15 | End: 2021-01-14 | Stop reason: SDUPTHER

## 2020-01-15 NOTE — PROGRESS NOTES
"Subjective:       Patient ID: Lima Maldonado is a 69 y.o. female.    Chief Complaint: well woman exam with routine gynecological exam    HPI     Patient comes in today for her WWE.         Oct  2016:  she was diagnosed with a new left breast cancer. She had a left breast cancer 19 years ago. S/P lumpectomy, chemotherapy and radiation.   Had bilateral mastectomies with Dr. Bains.   Post op cytoxan and docetaxel. completed in March 2017.          BRCA testing was negative . Sister was tested and is negative.       Last Mammogram: no longer getting  Colonoscopy: May 2018: normal. Repeat in 10 years       Review of Systems   Constitutional: Negative for chills, fatigue and fever.   Eyes: Negative for visual disturbance.   Respiratory: Negative for cough, shortness of breath and wheezing.    Cardiovascular: Negative for chest pain, palpitations and leg swelling.   Gastrointestinal: Negative for abdominal distention, abdominal pain, constipation, diarrhea, nausea and vomiting.   Genitourinary: Negative for difficulty urinating, dysuria, frequency, genital sores, hematuria, pelvic pain, urgency, vaginal bleeding, vaginal discharge and vaginal pain.   Musculoskeletal: Negative for gait problem and neck stiffness.   Skin: Negative for rash.   Neurological: Negative for seizures and weakness.   Hematological: Negative for adenopathy. Does not bruise/bleed easily.   Psychiatric/Behavioral: The patient is not nervous/anxious.        Objective:   BP (!) 150/70   Pulse 85   Ht 5' 7" (1.702 m)   Wt 71.8 kg (158 lb 4.6 oz)   BMI 24.79 kg/m²      Physical Exam   Constitutional: She is oriented to person, place, and time. She appears well-developed and well-nourished.   HENT:   Head: Normocephalic and atraumatic.   Eyes: No scleral icterus.   Neck: No tracheal deviation present. No thyroid mass and no thyromegaly present.   Cardiovascular: Normal rate and regular rhythm.   Pulmonary/Chest: Effort normal and breath sounds " normal. She has no wheezes.   Bilateral breast reconstruction. Healed scars. No masses.    Abdominal: She exhibits no distension and no mass. There is no hepatosplenomegaly. There is no tenderness. There is no rebound and no guarding.   Genitourinary:   Genitourinary Comments: Bimanual exam:  Vulva: no lesions. Normal appearance  Urethra: Normal size and location. No lesions  Bladder: No masses or tenderness.  Vagina: normal mucosa. No lesion  Cervix: absent.   Uterus: absent.  Adnexa: no masses.  Rectovaginal: No posterior cul de sac thickening or nodularity.  Rectal: no masses. Nontender. Normal tone.      Musculoskeletal: She exhibits no edema or tenderness.   Lymphadenopathy:     She has no cervical adenopathy.     She has no axillary adenopathy.        Right: No inguinal and no supraclavicular adenopathy present.        Left: No inguinal and no supraclavicular adenopathy present.   Neurological: She is alert and oriented to person, place, and time.   Skin: Skin is warm and dry. No rash noted.   Psychiatric: She has a normal mood and affect. Her behavior is normal. Judgment and thought content normal.       Assessment:       1. Well woman exam with routine gynecological exam    2. Vaginal atrophy        Plan:   Well woman exam with routine gynecological exam  Counseling time of 10 minutes discussing calcium, vitamin D and exercise. Questions answered.     Vaginal atrophy  -     estradiol (YUVAFEM) 10 mcg Tab; PLACE 1 TABLET (10 MCG TOTAL) VAGINALLY TWICE A WEEK.  Dispense: 24 tablet; Refill: 3

## 2020-02-17 ENCOUNTER — OFFICE VISIT (OUTPATIENT)
Dept: HEMATOLOGY/ONCOLOGY | Facility: CLINIC | Age: 70
End: 2020-02-17
Payer: MEDICARE

## 2020-02-17 VITALS
HEIGHT: 67 IN | DIASTOLIC BLOOD PRESSURE: 73 MMHG | OXYGEN SATURATION: 98 % | SYSTOLIC BLOOD PRESSURE: 146 MMHG | BODY MASS INDEX: 25.19 KG/M2 | WEIGHT: 160.5 LBS | HEART RATE: 83 BPM | RESPIRATION RATE: 16 BRPM | TEMPERATURE: 98 F

## 2020-02-17 DIAGNOSIS — M81.0 OSTEOPOROSIS, UNSPECIFIED OSTEOPOROSIS TYPE, UNSPECIFIED PATHOLOGICAL FRACTURE PRESENCE: ICD-10-CM

## 2020-02-17 DIAGNOSIS — Z17.0 MALIGNANT NEOPLASM OF LOWER-OUTER QUADRANT OF LEFT BREAST OF FEMALE, ESTROGEN RECEPTOR POSITIVE: Primary | ICD-10-CM

## 2020-02-17 DIAGNOSIS — C50.512 MALIGNANT NEOPLASM OF LOWER-OUTER QUADRANT OF LEFT BREAST OF FEMALE, ESTROGEN RECEPTOR POSITIVE: Primary | ICD-10-CM

## 2020-02-17 PROCEDURE — 1101F PT FALLS ASSESS-DOCD LE1/YR: CPT | Mod: HCNC,CPTII,S$GLB, | Performed by: PHYSICIAN ASSISTANT

## 2020-02-17 PROCEDURE — 99999 PR PBB SHADOW E&M-EST. PATIENT-LVL IV: ICD-10-PCS | Mod: PBBFAC,HCNC,, | Performed by: PHYSICIAN ASSISTANT

## 2020-02-17 PROCEDURE — 1159F PR MEDICATION LIST DOCUMENTED IN MEDICAL RECORD: ICD-10-PCS | Mod: HCNC,S$GLB,, | Performed by: PHYSICIAN ASSISTANT

## 2020-02-17 PROCEDURE — 99499 RISK ADDL DX/OHS AUDIT: ICD-10-PCS | Mod: HCNC,S$GLB,, | Performed by: PHYSICIAN ASSISTANT

## 2020-02-17 PROCEDURE — 3077F SYST BP >= 140 MM HG: CPT | Mod: HCNC,CPTII,S$GLB, | Performed by: PHYSICIAN ASSISTANT

## 2020-02-17 PROCEDURE — 1126F AMNT PAIN NOTED NONE PRSNT: CPT | Mod: HCNC,S$GLB,, | Performed by: PHYSICIAN ASSISTANT

## 2020-02-17 PROCEDURE — 99999 PR PBB SHADOW E&M-EST. PATIENT-LVL IV: CPT | Mod: PBBFAC,HCNC,, | Performed by: PHYSICIAN ASSISTANT

## 2020-02-17 PROCEDURE — 1126F PR PAIN SEVERITY QUANTIFIED, NO PAIN PRESENT: ICD-10-PCS | Mod: HCNC,S$GLB,, | Performed by: PHYSICIAN ASSISTANT

## 2020-02-17 PROCEDURE — 99213 OFFICE O/P EST LOW 20 MIN: CPT | Mod: HCNC,S$GLB,, | Performed by: PHYSICIAN ASSISTANT

## 2020-02-17 PROCEDURE — 3078F DIAST BP <80 MM HG: CPT | Mod: HCNC,CPTII,S$GLB, | Performed by: PHYSICIAN ASSISTANT

## 2020-02-17 PROCEDURE — 3077F PR MOST RECENT SYSTOLIC BLOOD PRESSURE >= 140 MM HG: ICD-10-PCS | Mod: HCNC,CPTII,S$GLB, | Performed by: PHYSICIAN ASSISTANT

## 2020-02-17 PROCEDURE — 1101F PR PT FALLS ASSESS DOC 0-1 FALLS W/OUT INJ PAST YR: ICD-10-PCS | Mod: HCNC,CPTII,S$GLB, | Performed by: PHYSICIAN ASSISTANT

## 2020-02-17 PROCEDURE — 3078F PR MOST RECENT DIASTOLIC BLOOD PRESSURE < 80 MM HG: ICD-10-PCS | Mod: HCNC,CPTII,S$GLB, | Performed by: PHYSICIAN ASSISTANT

## 2020-02-17 PROCEDURE — 99213 PR OFFICE/OUTPT VISIT, EST, LEVL III, 20-29 MIN: ICD-10-PCS | Mod: HCNC,S$GLB,, | Performed by: PHYSICIAN ASSISTANT

## 2020-02-17 PROCEDURE — 99499 UNLISTED E&M SERVICE: CPT | Mod: HCNC,S$GLB,, | Performed by: PHYSICIAN ASSISTANT

## 2020-02-17 PROCEDURE — 1159F MED LIST DOCD IN RCRD: CPT | Mod: HCNC,S$GLB,, | Performed by: PHYSICIAN ASSISTANT

## 2020-02-17 NOTE — PROGRESS NOTES
Subjective:       Patient ID: Lima Maldonado is a 69 y.o. female.    Chief Complaint: Follow-up    Ms Maldonado return to clinic for follow-up of  diagnosis of left breast cancer. She had stage I ER positive disease with an intermediate risk Oncotype score.   She completed 4 cycles of TC on 3/31/17 and is currently on Femara.        She is feeling great.   No fever, chills, nausea, vomiting, shortness of breath.   Had weight loss (intentional) last year which for the most part she has kept off.     She continues on Femara.   Tolerating Femara well other than some mild generalized arthralgias- also improved with weight loss.                  Breast history: mammography on September 20, 2016 demonstrated a new irregular mass with spiculated margins seen in the left breast at  5 o'clock along the surgical scar site.ultrasound DEMONSTRATED A SOLID 8 X 8 X 7 MM MASS.    A needle biopsy in September 27 showed infiltrating carcinoma, histologic grade 3, nuclear grade 2, mitotic index 1. Tumor was 90% ER positive, 70% DC positive, and 1+ HER-2.     MRI of the breast showed a 1.7 x 1.3 cm lesion in the lower outer quadrant of the left breast.  PET/CT on October 7 was negative for any evidence of distant metastasis.    On November 16 bilateral mastectomies were performed. Left pressure 1.5 cm intermediate grade carcinoma with ductal and lobular features. There was focal dermal invasion. Margins were negative. The right breast was without abnormality.  Oncotype score returned 25 - intermediate risk.  Adjuvant TC X 4 completed 3/31/17.    Patient with history of left breast cancer in 1997 when she underwent left lumpectomy with complete axillary dissection at age 46 for a pT1cN1 breast cancer (Stage IIA).  47 nodes were removed, 1 of which was positive.  She received adjuvant chemotherapy, radiation and tamoxifen.     Review of Systems   Constitutional: Negative for activity change, appetite change, fatigue and unexpected  weight change (intentinal weight loss).   HENT: Negative for congestion and postnasal drip.    Eyes: Negative for visual disturbance.   Respiratory: Negative for cough and shortness of breath.    Cardiovascular: Negative for chest pain.   Gastrointestinal: Negative for abdominal pain and diarrhea.   Genitourinary: Negative for frequency.   Musculoskeletal: Negative for back pain.   Skin: Negative for rash.   Neurological: Negative for headaches.   Hematological: Negative for adenopathy.   Psychiatric/Behavioral: The patient is not nervous/anxious.        Objective:      Physical Exam   Constitutional: She is oriented to person, place, and time. She appears well-developed and well-nourished. No distress.   ECOG 0  Presents alone   HENT:   Head: Normocephalic.   Mouth/Throat: Oropharynx is clear and moist. No oropharyngeal exudate.   Eyes: Pupils are equal, round, and reactive to light. Conjunctivae and EOM are normal. No scleral icterus.   Neck: Normal range of motion. Neck supple. No thyromegaly present.   Cardiovascular: Normal rate, regular rhythm, normal heart sounds and intact distal pulses.   Pulmonary/Chest: Effort normal and breath sounds normal. No respiratory distress. She has no wheezes. She has no rales. She exhibits no tenderness.   Right breast reconstruction without mass, nodule or skin changes. Left breast reconstruction with firmness at medial aspect- consistent with scar tissue. No  No axillary or supraclavicular adenopathy.   4 mm erythematous area at upper inner quadrant of left breast, no scale.   Lungs clear to auscultation bilaterally   Abdominal: Soft. Bowel sounds are normal. She exhibits no distension and no mass. There is no tenderness.   No hepatosplenomegaly     Musculoskeletal: Normal range of motion. She exhibits no edema or tenderness.   No spinal or paraspinal tenderness to palpation     Lymphadenopathy:     She has no cervical adenopathy.   Neurological: She is alert and oriented to  person, place, and time. No cranial nerve deficit.   No spinal or paraspinal tenderness to palpation     Skin: Skin is warm and dry. No rash noted.   Psychiatric: She has a normal mood and affect. Her behavior is normal. Judgment and thought content normal.   Vitals reviewed.      Assessment:       1. Malignant neoplasm of lower-outer quadrant of left breast of female, estrogen receptor positive    2. Osteoporosis, unspecified osteoporosis type, unspecified pathological fracture presence        Plan:       1)Continue Femara and return to clinic in 6 months.  2)Patient received Prolia in 6/2019 after bone density showed osteoporosis. She has not received treatment since, will set her up to receive Prolia q 6 months.

## 2020-02-26 ENCOUNTER — PATIENT MESSAGE (OUTPATIENT)
Dept: HEMATOLOGY/ONCOLOGY | Facility: CLINIC | Age: 70
End: 2020-02-26

## 2020-03-05 ENCOUNTER — TELEPHONE (OUTPATIENT)
Dept: HEMATOLOGY/ONCOLOGY | Facility: CLINIC | Age: 70
End: 2020-03-05

## 2020-03-05 NOTE — TELEPHONE ENCOUNTER
tried reaching out to pt to discuss Prolia injection being approved, but no answer,a detail message left on left v/m to contact office to confirm.

## 2020-03-06 ENCOUNTER — INFUSION (OUTPATIENT)
Dept: INFUSION THERAPY | Facility: HOSPITAL | Age: 70
End: 2020-03-06
Attending: PODIATRIST
Payer: MEDICARE

## 2020-03-06 DIAGNOSIS — M81.0 SENILE OSTEOPOROSIS: Primary | ICD-10-CM

## 2020-03-06 PROCEDURE — 96372 THER/PROPH/DIAG INJ SC/IM: CPT | Mod: HCNC

## 2020-03-06 PROCEDURE — 63600175 PHARM REV CODE 636 W HCPCS: Mod: JG,HCNC | Performed by: PHYSICIAN ASSISTANT

## 2020-03-06 RX ADMIN — DENOSUMAB 60 MG: 60 INJECTION SUBCUTANEOUS at 02:03

## 2020-03-19 RX ORDER — LOSARTAN POTASSIUM 50 MG/1
TABLET ORAL
Qty: 90 TABLET | Refills: 3 | Status: SHIPPED | OUTPATIENT
Start: 2020-03-19 | End: 2021-03-03

## 2020-03-26 ENCOUNTER — PATIENT OUTREACH (OUTPATIENT)
Dept: DIABETES | Facility: CLINIC | Age: 70
End: 2020-03-26

## 2020-03-30 DIAGNOSIS — Z79.811 USE OF AROMATASE INHIBITORS: ICD-10-CM

## 2020-03-30 RX ORDER — LETROZOLE 2.5 MG/1
TABLET, FILM COATED ORAL
Qty: 90 TABLET | Refills: 3 | Status: SHIPPED | OUTPATIENT
Start: 2020-03-30 | End: 2021-03-24

## 2020-03-31 ENCOUNTER — TELEPHONE (OUTPATIENT)
Dept: GYNECOLOGIC ONCOLOGY | Facility: CLINIC | Age: 70
End: 2020-03-31

## 2020-03-31 DIAGNOSIS — M19.90 ARTHRITIS: ICD-10-CM

## 2020-03-31 RX ORDER — CELECOXIB 100 MG/1
100 CAPSULE ORAL DAILY PRN
Qty: 90 CAPSULE | Refills: 0 | Status: SHIPPED | OUTPATIENT
Start: 2020-03-31 | End: 2020-06-23

## 2020-04-06 DIAGNOSIS — Z01.419 WELL WOMAN EXAM WITH ROUTINE GYNECOLOGICAL EXAM: Primary | ICD-10-CM

## 2020-04-15 DIAGNOSIS — E11.8 TYPE 2 DIABETES MELLITUS WITH COMPLICATION, WITHOUT LONG-TERM CURRENT USE OF INSULIN: ICD-10-CM

## 2020-04-15 RX ORDER — METFORMIN HYDROCHLORIDE 500 MG/1
TABLET ORAL
Qty: 180 TABLET | Refills: 0 | Status: SHIPPED | OUTPATIENT
Start: 2020-04-15 | End: 2020-05-22

## 2020-04-30 ENCOUNTER — TELEPHONE (OUTPATIENT)
Dept: PODIATRY | Facility: CLINIC | Age: 70
End: 2020-04-30

## 2020-05-07 DIAGNOSIS — G62.9 NEUROPATHY: ICD-10-CM

## 2020-05-07 RX ORDER — GABAPENTIN 300 MG/1
600 CAPSULE ORAL NIGHTLY
Qty: 180 CAPSULE | Refills: 3 | Status: SHIPPED | OUTPATIENT
Start: 2020-05-07 | End: 2021-04-26

## 2020-05-12 ENCOUNTER — RESEARCH ENCOUNTER (OUTPATIENT)
Dept: RESEARCH | Facility: HOSPITAL | Age: 70
End: 2020-05-12

## 2020-05-12 ENCOUNTER — LAB VISIT (OUTPATIENT)
Dept: PRIMARY CARE CLINIC | Facility: CLINIC | Age: 70
End: 2020-05-12
Payer: MEDICARE

## 2020-05-12 DIAGNOSIS — Z00.6 RESEARCH STUDY PATIENT: Primary | ICD-10-CM

## 2020-05-12 PROCEDURE — 86769 SARS-COV-2 COVID-19 ANTIBODY: CPT | Mod: HCNC

## 2020-05-12 PROCEDURE — U0002 COVID-19 LAB TEST NON-CDC: HCPCS | Mod: HCNC

## 2020-05-12 NOTE — PROGRESS NOTES
Date of Consent: may 12 2020    Sponsor: [facility]    Study Title/IRB Number: Observational study of Sars-CoV2 Immunoglobulin G (IgG) seroprevalence among the [facility] population over time 2020.163  Principle Investigator: Luisa Duval, PhD    Did the patient need translation services? No   name: N/A    Prior to the Informed Consent (IC) being signed, or any study protocol required data collection, testing, procedure, or intervention being performed, the following was done and/or discussed:   Patient was given a paper copy of the IC for review    Patient was given study FAQ   Purpose of the study and qualifications to participate    Study design and tests or procedures done at this visit   Confidentiality and HIPAA Authorization for Release of Medical Records for the research trial/ subject's rights/research related injury   Risk, Benefits, Alternative Treatments, Compensation and Costs   Participation in the research trial is voluntary and patient may withdraw at anytime   Contact information for study related questions    Patient verbalizes understanding of the above: Yes  Contact information for PI and IRB given to patient: Yes  Patient able to adequately summarize: the purpose of the study, the risks associated with the study, and all procedures, testing, and follow-ups associated with the study: Yes    The consent was discussed verbally with the patient and all questions were answered satisfactorily. Patient gave verbal consent for the Seroprevalence research study with an IRB approval date of 05/08/2020.      The Consent, Consent Witness and name of Clinical Research Coordinator consenting was captured and documented in REDCap.    All Inclusion and Exclusion Criteria reviewed, subject meets all Inclusion criteria and does not meet any Exclusion Criteria at this time.     Patient Eligibility was confirmed.    Patient responded to survey questions.    The following biospecimen  collection procedures were collected:    -Nasopharyngeal Swab Collection  -Blood collection

## 2020-05-13 LAB — SARS-COV-2 IGG SERPLBLD QL IA.RAPID: NEGATIVE

## 2020-05-15 LAB — SARS-COV-2 RNA RESP QL NAA+PROBE: NOT DETECTED

## 2020-05-22 DIAGNOSIS — E11.8 TYPE 2 DIABETES MELLITUS WITH COMPLICATION, WITHOUT LONG-TERM CURRENT USE OF INSULIN: ICD-10-CM

## 2020-05-22 RX ORDER — METFORMIN HYDROCHLORIDE 500 MG/1
TABLET ORAL
Qty: 180 TABLET | Refills: 0 | Status: SHIPPED | OUTPATIENT
Start: 2020-05-22 | End: 2020-06-29

## 2020-05-29 ENCOUNTER — LAB VISIT (OUTPATIENT)
Dept: LAB | Facility: HOSPITAL | Age: 70
End: 2020-05-29
Attending: INTERNAL MEDICINE
Payer: MEDICARE

## 2020-05-29 DIAGNOSIS — E11.8 TYPE 2 DIABETES MELLITUS WITH COMPLICATION, WITHOUT LONG-TERM CURRENT USE OF INSULIN: ICD-10-CM

## 2020-05-29 DIAGNOSIS — Z01.419 WELL WOMAN EXAM WITH ROUTINE GYNECOLOGICAL EXAM: ICD-10-CM

## 2020-05-29 DIAGNOSIS — E03.9 HYPOTHYROIDISM, UNSPECIFIED TYPE: ICD-10-CM

## 2020-05-29 DIAGNOSIS — E78.2 MIXED HYPERLIPIDEMIA: ICD-10-CM

## 2020-05-29 LAB
ALBUMIN SERPL BCP-MCNC: 4.2 G/DL (ref 3.5–5.2)
ALP SERPL-CCNC: 52 U/L (ref 55–135)
ALT SERPL W/O P-5'-P-CCNC: 29 U/L (ref 10–44)
ANION GAP SERPL CALC-SCNC: 6 MMOL/L (ref 8–16)
AST SERPL-CCNC: 24 U/L (ref 10–40)
BILIRUB SERPL-MCNC: 0.9 MG/DL (ref 0.1–1)
BUN SERPL-MCNC: 15 MG/DL (ref 8–23)
CALCIUM SERPL-MCNC: 9.5 MG/DL (ref 8.7–10.5)
CHLORIDE SERPL-SCNC: 109 MMOL/L (ref 95–110)
CHOLEST SERPL-MCNC: 159 MG/DL (ref 120–199)
CHOLEST/HDLC SERPL: 4.2 {RATIO} (ref 2–5)
CO2 SERPL-SCNC: 29 MMOL/L (ref 23–29)
CREAT SERPL-MCNC: 0.7 MG/DL (ref 0.5–1.4)
ERYTHROCYTE [DISTWIDTH] IN BLOOD BY AUTOMATED COUNT: 12.5 % (ref 11.5–14.5)
EST. GFR  (AFRICAN AMERICAN): >60 ML/MIN/1.73 M^2
EST. GFR  (NON AFRICAN AMERICAN): >60 ML/MIN/1.73 M^2
ESTIMATED AVG GLUCOSE: 105 MG/DL (ref 68–131)
GLUCOSE SERPL-MCNC: 110 MG/DL (ref 70–110)
HBA1C MFR BLD HPLC: 5.3 % (ref 4–5.6)
HCT VFR BLD AUTO: 43.1 % (ref 37–48.5)
HDLC SERPL-MCNC: 38 MG/DL (ref 40–75)
HDLC SERPL: 23.9 % (ref 20–50)
HGB BLD-MCNC: 13.8 G/DL (ref 12–16)
IMM GRANULOCYTES # BLD AUTO: 0.03 K/UL (ref 0–0.04)
LDLC SERPL CALC-MCNC: 88.8 MG/DL (ref 63–159)
MCH RBC QN AUTO: 29.1 PG (ref 27–31)
MCHC RBC AUTO-ENTMCNC: 32 G/DL (ref 32–36)
MCV RBC AUTO: 91 FL (ref 82–98)
NEUTROPHILS # BLD AUTO: 2.2 K/UL (ref 1.8–7.7)
NONHDLC SERPL-MCNC: 121 MG/DL
PLATELET # BLD AUTO: 180 K/UL (ref 150–350)
PMV BLD AUTO: 9.8 FL (ref 9.2–12.9)
POTASSIUM SERPL-SCNC: 4.7 MMOL/L (ref 3.5–5.1)
PROT SERPL-MCNC: 6.9 G/DL (ref 6–8.4)
RBC # BLD AUTO: 4.74 M/UL (ref 4–5.4)
SODIUM SERPL-SCNC: 144 MMOL/L (ref 136–145)
T4 FREE SERPL-MCNC: 0.93 NG/DL (ref 0.71–1.51)
TRIGL SERPL-MCNC: 161 MG/DL (ref 30–150)
TSH SERPL DL<=0.005 MIU/L-ACNC: 1.66 UIU/ML (ref 0.4–4)
WBC # BLD AUTO: 4.15 K/UL (ref 3.9–12.7)

## 2020-05-29 PROCEDURE — 84443 ASSAY THYROID STIM HORMONE: CPT | Mod: HCNC

## 2020-05-29 PROCEDURE — 83036 HEMOGLOBIN GLYCOSYLATED A1C: CPT | Mod: HCNC

## 2020-05-29 PROCEDURE — 80061 LIPID PANEL: CPT | Mod: HCNC

## 2020-05-29 PROCEDURE — 84439 ASSAY OF FREE THYROXINE: CPT | Mod: HCNC

## 2020-05-29 PROCEDURE — 85027 COMPLETE CBC AUTOMATED: CPT | Mod: HCNC

## 2020-05-29 PROCEDURE — 36415 COLL VENOUS BLD VENIPUNCTURE: CPT | Mod: HCNC

## 2020-05-29 PROCEDURE — 80053 COMPREHEN METABOLIC PANEL: CPT | Mod: HCNC

## 2020-05-31 DIAGNOSIS — M85.80 LOW BONE MASS: ICD-10-CM

## 2020-05-31 DIAGNOSIS — Z79.899 USE OF PROTON PUMP INHIBITOR THERAPY: ICD-10-CM

## 2020-05-31 DIAGNOSIS — K21.9 GASTROESOPHAGEAL REFLUX DISEASE WITHOUT ESOPHAGITIS: ICD-10-CM

## 2020-05-31 RX ORDER — PANTOPRAZOLE SODIUM 40 MG/1
40 TABLET, DELAYED RELEASE ORAL
Qty: 90 TABLET | Refills: 3 | Status: SHIPPED | OUTPATIENT
Start: 2020-05-31 | End: 2020-09-10 | Stop reason: DRUGHIGH

## 2020-06-01 ENCOUNTER — PATIENT OUTREACH (OUTPATIENT)
Dept: ADMINISTRATIVE | Facility: OTHER | Age: 70
End: 2020-06-01

## 2020-06-01 DIAGNOSIS — E11.9 TYPE 2 DIABETES MELLITUS WITHOUT COMPLICATION, WITHOUT LONG-TERM CURRENT USE OF INSULIN: Primary | ICD-10-CM

## 2020-06-01 DIAGNOSIS — E11.42 TYPE 2 DIABETES MELLITUS WITH DIABETIC POLYNEUROPATHY, WITHOUT LONG-TERM CURRENT USE OF INSULIN: Primary | ICD-10-CM

## 2020-06-01 RX ORDER — PANTOPRAZOLE SODIUM 40 MG/1
40 TABLET, DELAYED RELEASE ORAL
Qty: 90 TABLET | Refills: 3 | Status: SHIPPED | OUTPATIENT
Start: 2020-06-01 | End: 2020-09-10 | Stop reason: DRUGHIGH

## 2020-06-01 NOTE — PROGRESS NOTES
Chart reviewed.   Immunizations: Triggered Imm Registry     Orders placed: eye exam   Upcoming appts to satisfy RAQEUL topics: n/a

## 2020-06-02 ENCOUNTER — CLINICAL SUPPORT (OUTPATIENT)
Dept: DIABETES | Facility: CLINIC | Age: 70
End: 2020-06-02
Payer: MEDICARE

## 2020-06-02 VITALS — HEIGHT: 66 IN | WEIGHT: 167.56 LBS | BODY MASS INDEX: 26.93 KG/M2

## 2020-06-02 DIAGNOSIS — E11.9 TYPE 2 DIABETES MELLITUS WITHOUT COMPLICATION, WITHOUT LONG-TERM CURRENT USE OF INSULIN: ICD-10-CM

## 2020-06-02 PROCEDURE — G0108 DIAB MANAGE TRN  PER INDIV: HCPCS | Mod: HCNC,,, | Performed by: FAMILY MEDICINE

## 2020-06-02 PROCEDURE — G0108 PR DIAB MANAGE TRN  PER INDIV: ICD-10-PCS | Mod: HCNC,,, | Performed by: FAMILY MEDICINE

## 2020-06-02 PROCEDURE — 99213 OFFICE O/P EST LOW 20 MIN: CPT | Mod: HCNC

## 2020-06-03 NOTE — PROGRESS NOTES
Diabetes Education  Author: Berenice Agustin RN  Date: 6/3/2020    Diabetes Care Management Summary  Diabetes Education Record Assessment/Progress: Initial  Current Diabetes Risk Level: Low         Diabetes Type  Diabetes Type : Type II    Diabetes History  Diabetes Diagnosis: 1-3 years  Current Treatment: Oral Medication, Diet, Exercise  Reviewed Problem List with Patient: Yes    Health Maintenance was reviewed today with patient. Discussed with patient importance of routine eye exams, foot exams/foot care, blood work (i.e.: A1c, microalbumin, and lipid), dental visits, yearly flu vaccine, and pneumonia vaccine as indicated by PCP. Patient verbalized understanding.     Health Maintenance Topics with due status: Not Due       Topic Last Completion Date    TETANUS VACCINE 2013    Colonoscopy 05/15/2018    DEXA SCAN 2019    Foot Exam 2019    High Dose Statin 2020    Lipid Panel 2020    Hemoglobin A1c 2020     Health Maintenance Due   Topic Date Due    Eye Exam  2019       Nutrition  Meal Plan 24 Hour Recall - Breakfast: from diet plan: cereal package (11gm cho, 11gm protein) w/ almond milk + blueberries - coffee (w/ splenda and cream) and water   Meal Plan 24 Hour Recall - Lunch: soup - water   Meal Plan 24 Hour Recall - Dinner: wedge salad w/ onions, tomato, luevano, blue cheese dressing + grilled shrimp - diet Dr. Pepper    Meal Plan 24 Hour Recall - Snack: diet cake bar, peanuts, protein bar     Monitoring   Self Monitoring : AM Fastin, 172, 119, 104, 104, 111, 119, 114, 155, 100 Bedtime: 112, 151, 113, 165, 115, 140, 114, 120, 96, 147, 126  Blood Glucose Logs: Yes  Do you use a personal continuous glucose monitor?: No  In the last month, how often have you had a low blood sugar reaction?: never    Exercise   Exercise Type: walking(not able to walk consistently )    Current Diabetes Treatment   Current Treatment: Oral Medication, Diet, Exercise    Social History  Preferred  Learning Method: Face to Face, Reading Materials  Primary Support: Spouse  Occupation: retired  Smoking Status: Never a Smoker  Alcohol Use: Never                                Barriers to Change  Barriers to Change: None  Learning Challenges : None    Readiness to Learn   Readiness to Learn : Eager    Cultural Influences  Cultural Influences: No    Diabetes Education Assessment/Progress  Diabetes Disease Process (diabetes disease process and treatment options): Not Covered/Deferred  Nutrition (Incorporating nutritional management into one's lifestyle): Discussion, Individual Session, Requires Assistance Family/SO, Comprehends Key Points(pt had trouble transitioning from strict diet plan to regular food - discussed strategy to transition to more veggies, healthier homemade snacks and continuing to follow smaller more frequent meals )  Physical Activity (incorporating physical activity into one's lifestyle): Discussion, Individual Session, Requires Assistance Family/SO(pt still inconsistent with exercise but is working on moving more )  Medications (states correct name, dose, onset, peak, duration, side effects & timing of meds): Discussion, Individual Session, Comprehends Key Points, Demonstrates Understanding/Competency(verbalizes/demonstrates)(pt has been taking 500mg metformin BID w/o low BG issues and tolerating well)  Monitoring (monitoring blood glucose/other parameters & using results): Discussion, Individual Session, Comprehends Key Points, Demonstrates Understanding/Competency (verbalizes/demonstrates)(pt continues to SMBG, uses values to determine changes that need to be made)  Acute Complications (preventing, detecting, and treating acute complications): Discussion, Individual Session, Requires Assistance Family/SO, Comprehends Key Points(understands if issues arise w/ low BG, medications will need to be weaned further)  Chronic Complications (preventing, detecting, and treating chronic complications):  Not Covered/Deferred  Clinical (diabetes, other pertinent medical history, and relevant comorbidities reviewed during visit): Discussion, Individual Session, Requires Assistance Family/SO, Comprehends Key Points(pt working to lose weight she regained over the holidays, is focused on weight management w/o strict diet plan)  Cognitive (knowledge of self-management skills, functional health literacy): Not Covered/Deferred  Psychosocial (emotional response to diabetes): Not Covered/Deferred  Diabetes Distress and Support Systems: Not Covered/Deferred  Behavioral (readiness for change, lifestyle practices, self-care behaviors): Discussion, Individual Session, Comprehends Key Points(pt continues to make progress with ability to self-manage diabetes, is motivated to continue w/ weight loss strategies )    Goals  Patient has selected/evaluated goals during today's session: Yes, evaluated  Healthy Eating: Set(pt will trade diet plan snacks for homemade, high protein alternatives )  Start Date: 06/02/20         Diabetes Care Plan/Intervention  Education Plan/Intervention: Individual Follow-Up DSMT    Diabetes Meal Plan  Restrictions: Restricted Carbohydrate  Carbohydrate Per Meal: 30-45g    Today's Self-Management Care Plan was developed with the patient's input and is based on barriers identified during today's assessment.    The long and short-term goals in the care plan were written with the patient/caregiver's input. The patient has agreed to work toward these goals to improve her overall diabetes control.      The patient received a copy of today's self-management plan and verbalized understanding of the care plan, goals, and all of today's instructions.      The patient was encouraged to communicate with her physician and care team regarding her condition(s) and treatment.  I provided the patient with my contact information today and encouraged her to contact me via phone or patient portal as needed.     Education Units  of Time   Time Spent: 30 min

## 2020-06-15 ENCOUNTER — PATIENT OUTREACH (OUTPATIENT)
Dept: ADMINISTRATIVE | Facility: OTHER | Age: 70
End: 2020-06-15

## 2020-06-16 ENCOUNTER — OFFICE VISIT (OUTPATIENT)
Dept: OPTOMETRY | Facility: CLINIC | Age: 70
End: 2020-06-16
Payer: MEDICARE

## 2020-06-16 ENCOUNTER — LAB VISIT (OUTPATIENT)
Dept: LAB | Facility: HOSPITAL | Age: 70
End: 2020-06-16
Attending: INTERNAL MEDICINE
Payer: MEDICARE

## 2020-06-16 DIAGNOSIS — H52.03 HYPEROPIA WITH PRESBYOPIA OF BOTH EYES: ICD-10-CM

## 2020-06-16 DIAGNOSIS — E11.9 TYPE 2 DIABETES MELLITUS WITHOUT OPHTHALMIC MANIFESTATIONS: Primary | ICD-10-CM

## 2020-06-16 DIAGNOSIS — H25.13 NUCLEAR SCLEROSIS OF BOTH EYES: ICD-10-CM

## 2020-06-16 DIAGNOSIS — H52.4 HYPEROPIA WITH PRESBYOPIA OF BOTH EYES: ICD-10-CM

## 2020-06-16 DIAGNOSIS — Z79.899 USE OF PROTON PUMP INHIBITOR THERAPY: ICD-10-CM

## 2020-06-16 DIAGNOSIS — K21.9 GASTROESOPHAGEAL REFLUX DISEASE WITHOUT ESOPHAGITIS: ICD-10-CM

## 2020-06-16 LAB
25(OH)D3+25(OH)D2 SERPL-MCNC: 69 NG/ML (ref 30–96)
MAGNESIUM SERPL-MCNC: 1.8 MG/DL (ref 1.6–2.6)
VIT B12 SERPL-MCNC: 1388 PG/ML (ref 210–950)

## 2020-06-16 PROCEDURE — 99499 RISK ADDL DX/OHS AUDIT: ICD-10-PCS | Mod: HCNC,S$GLB,, | Performed by: OPTOMETRIST

## 2020-06-16 PROCEDURE — 92004 PR EYE EXAM, NEW PATIENT,COMPREHESV: ICD-10-PCS | Mod: HCNC,S$GLB,, | Performed by: OPTOMETRIST

## 2020-06-16 PROCEDURE — 83735 ASSAY OF MAGNESIUM: CPT | Mod: HCNC

## 2020-06-16 PROCEDURE — 99999 PR PBB SHADOW E&M-EST. PATIENT-LVL IV: CPT | Mod: PBBFAC,HCNC,, | Performed by: OPTOMETRIST

## 2020-06-16 PROCEDURE — 36415 COLL VENOUS BLD VENIPUNCTURE: CPT | Mod: HCNC

## 2020-06-16 PROCEDURE — 82306 VITAMIN D 25 HYDROXY: CPT | Mod: HCNC

## 2020-06-16 PROCEDURE — 99499 UNLISTED E&M SERVICE: CPT | Mod: HCNC,S$GLB,, | Performed by: OPTOMETRIST

## 2020-06-16 PROCEDURE — 99999 PR PBB SHADOW E&M-EST. PATIENT-LVL IV: ICD-10-PCS | Mod: PBBFAC,HCNC,, | Performed by: OPTOMETRIST

## 2020-06-16 PROCEDURE — 92004 COMPRE OPH EXAM NEW PT 1/>: CPT | Mod: HCNC,S$GLB,, | Performed by: OPTOMETRIST

## 2020-06-16 PROCEDURE — 82607 VITAMIN B-12: CPT | Mod: HCNC

## 2020-06-16 NOTE — PROGRESS NOTES
HPI     Last eye exam was with  in 2017 for shingles   Pt here for routine eye exam.    Pt states that her VA is not what it used to be.  Pt states she may see floaters.  Patient denies diplopia, headaches, flashes, and pain.  No eye drops, and no eye sx.      Last edited by Cici Chisholm on 6/16/2020  9:21 AM. (History)            Assessment /Plan     For exam results, see Encounter Report.    Type 2 diabetes mellitus without ophthalmic manifestations  -     Ambulatory referral/consult to Optometry    Nuclear sclerosis of both eyes    Hyperopia with presbyopia of both eyes            1.  No retinopathy--monitor yearly.  BS control.  Eye health normal OU.  2.  Educated on cataracts and affects on vision.  Patient unhappy with vision.  Consult Dr. Coleman for cataract surgery  3.   Continue w/ current rx

## 2020-06-16 NOTE — PROGRESS NOTES
Chart reviewed.   Immunizations: updated  Orders placed: n/a  Upcoming appts to satisfy RAQUEL topics: Optometry 6/16

## 2020-07-07 RX ORDER — MONTELUKAST SODIUM 10 MG/1
TABLET ORAL
Qty: 30 TABLET | Refills: 0 | Status: SHIPPED | OUTPATIENT
Start: 2020-07-07 | End: 2021-06-17

## 2020-07-15 ENCOUNTER — OFFICE VISIT (OUTPATIENT)
Dept: OPHTHALMOLOGY | Facility: CLINIC | Age: 70
End: 2020-07-15
Payer: MEDICARE

## 2020-07-15 DIAGNOSIS — H25.13 NUCLEAR SCLEROSIS, BILATERAL: Primary | ICD-10-CM

## 2020-07-15 DIAGNOSIS — Z13.9 SCREENING FOR UNSPECIFIED CONDITION: ICD-10-CM

## 2020-07-15 PROCEDURE — 92014 PR EYE EXAM, EST PATIENT,COMPREHESV: ICD-10-PCS | Mod: HCNC,S$GLB,, | Performed by: OPHTHALMOLOGY

## 2020-07-15 PROCEDURE — 92014 COMPRE OPH EXAM EST PT 1/>: CPT | Mod: HCNC,S$GLB,, | Performed by: OPHTHALMOLOGY

## 2020-07-15 PROCEDURE — 99999 PR PBB SHADOW E&M-EST. PATIENT-LVL IV: CPT | Mod: PBBFAC,HCNC,, | Performed by: OPHTHALMOLOGY

## 2020-07-15 PROCEDURE — 99999 PR PBB SHADOW E&M-EST. PATIENT-LVL IV: ICD-10-PCS | Mod: PBBFAC,HCNC,, | Performed by: OPHTHALMOLOGY

## 2020-07-15 RX ORDER — PREDNISOLONE ACETATE-GATIFLOXACIN-BROMFENAC .75; 5; 1 MG/ML; MG/ML; MG/ML
1 SUSPENSION/ DROPS OPHTHALMIC 3 TIMES DAILY
Qty: 5 ML | Refills: 3 | Status: SHIPPED | OUTPATIENT
Start: 2020-07-15 | End: 2021-02-25

## 2020-07-15 RX ORDER — MOXIFLOXACIN 5 MG/ML
1 SOLUTION/ DROPS OPHTHALMIC
Status: CANCELLED | OUTPATIENT
Start: 2020-07-15

## 2020-07-15 RX ORDER — SODIUM CHLORIDE 0.9 % (FLUSH) 0.9 %
10 SYRINGE (ML) INJECTION
Status: DISCONTINUED | OUTPATIENT
Start: 2020-07-15 | End: 2022-06-23

## 2020-07-15 RX ORDER — TROPICAMIDE 10 MG/ML
1 SOLUTION/ DROPS OPHTHALMIC
Status: CANCELLED | OUTPATIENT
Start: 2020-07-15

## 2020-07-15 RX ORDER — TETRACAINE HYDROCHLORIDE 5 MG/ML
1 SOLUTION OPHTHALMIC
Status: CANCELLED | OUTPATIENT
Start: 2020-07-15

## 2020-07-15 RX ORDER — PHENYLEPHRINE HYDROCHLORIDE 25 MG/ML
1 SOLUTION/ DROPS OPHTHALMIC
Status: CANCELLED | OUTPATIENT
Start: 2020-07-15

## 2020-07-15 NOTE — PROGRESS NOTES
HPI     DEMARCO: 06/16/2020  Dr. RINCON     69 year old female here today for a cataract evaluation in both eyes due   to a gradual decrease in vision over the past year which is the time frame   she noticed her vision getting worse even with her glasses on, she reports   tht she has not had a new Glasses RX in over 1 year. Also glare is   bothersome at night, patient has to limit her night driving due to it   being difficult to see.     Drops: None at this time     Hemoglobin A1C       Date                     Value               Ref Range             Status                05/29/2020               5.3                 4.0 - 5.6 %           Final              Comment:    ADA Screening Guidelines:  5.7-6.4%  Consistent with   prediabetes  >or=6.5%  Consistent with diabetes  High levels of fetal   hemoglobin interfere with the HbA1C  assay. Heterozygous hemoglobin   variants (HbS, HgC, etc)do  not significantly interfere with this assay.     However, presence of multiple variants may affect accuracy.         08/20/2019               5.2                 4.0 - 5.6 %           Final              Comment:    ADA Screening Guidelines:  5.7-6.4%  Consistent with   prediabetes  >or=6.5%  Consistent with diabetes  High levels of fetal   hemoglobin interfere with the HbA1C  assay. Heterozygous hemoglobin   variants (HbS, HgC, etc)do  not significantly interfere with this assay.     However, presence of multiple variants may affect accuracy.         02/12/2019               6.8 (H)             4.0 - 5.6 %           Final              Comment:    ADA Screening Guidelines:  5.7-6.4%  Consistent with   prediabetes  >or=6.5%  Consistent with diabetes  High levels of fetal   hemoglobin interfere with the HbA1C  assay. Heterozygous hemoglobin   variants (HbS, HgC, etc)do  not significantly interfere with this assay.     However, presence of multiple variants may affect accuracy.    ----------          Last edited by Turner Boykin on  7/15/2020  9:14 AM. (History)            Assessment /Plan     For exam results, see Encounter Report.    Nuclear sclerosis, bilateral      Visually Significant Cataract: Patient reports decreased vision consistent with the clinical amount of lenticular opacity, which reaches the level of visual significance and affects activities of daily living. Risks, benefits, and alternatives to cataract surgery were discussed and the consent reviewed. IOL options were discussed, including ATIOLs and the associated side effects and additional patient cost associated with them.   IOL Selections:   Right eye  IOL: TFNT00 21.5     Left eye  IOL: TFNT00 21.0    Pt wishes to have left eye done first.  The patient expresses a desire to reduce spectacle dependence. I reviewed various IOL and LASER refractive surgical options and we will attempt to minimize spectacle dependence by managing astigmatism and optimizing IOL selection. Femtosecond LASER assisted cataract surgery (FLACS) technology was explained to the patient with educational videos and discussion.  The patient voices understanding and wishes to implement this technology during the cataract procedure.  I explained the increased precision of the LASER versus manual techniques, especially as it relates to astigmatism reduction with arcuate incisions.  I emphasized that although our goal is to reduce the need for refractive correction after surgery, there may still be a need for spectacle correction to achieve optimal visual acuity, and that a reasonable range of functional vision should be the expectation.  No guarantees are made about post operative refraction or visual acuity, as the eye may heal in unpredictable ways, and the standard risks, benefits, and alternatives to cataract surgery were explained.  The patient understands that the refractive portions of this cataract procedure are not covered by insurance, and that there is an out of pocket expense of $2250 per eye. I  also explained that even though our pre-operative plan is to utilize advanced refractive technologies during surgery, that I may decide to eliminate part or all of this plan if surgical challenges or complications arise, or I feel that it is not in the patient's best interest. Consent forms and an ABN form were given to the patient to review.      Catalys Parameters:  Right Eye:   DEWAYNE:  12mm   ?Need to geovanna patient sitting up?: n  Capsulotomy: Scanned Capsule   stGstrstastdstest:st st1st Arcuate: OFF  Incisions: OFF  Left Eye:   DEWAYNE:  12mm   ?Need to geovanna patient sitting up?: n  Capsulotomy: Scanned Capsule  stGstrstastdstest:st st1st Arcuate:  OFF  Incisions:  OFF

## 2020-07-15 NOTE — H&P (VIEW-ONLY)
HPI     DEMARCO: 06/16/2020  Dr. RINCON     69 year old female here today for a cataract evaluation in both eyes due   to a gradual decrease in vision over the past year which is the time frame   she noticed her vision getting worse even with her glasses on, she reports   tht she has not had a new Glasses RX in over 1 year. Also glare is   bothersome at night, patient has to limit her night driving due to it   being difficult to see.     Drops: None at this time     Hemoglobin A1C       Date                     Value               Ref Range             Status                05/29/2020               5.3                 4.0 - 5.6 %           Final              Comment:    ADA Screening Guidelines:  5.7-6.4%  Consistent with   prediabetes  >or=6.5%  Consistent with diabetes  High levels of fetal   hemoglobin interfere with the HbA1C  assay. Heterozygous hemoglobin   variants (HbS, HgC, etc)do  not significantly interfere with this assay.     However, presence of multiple variants may affect accuracy.         08/20/2019               5.2                 4.0 - 5.6 %           Final              Comment:    ADA Screening Guidelines:  5.7-6.4%  Consistent with   prediabetes  >or=6.5%  Consistent with diabetes  High levels of fetal   hemoglobin interfere with the HbA1C  assay. Heterozygous hemoglobin   variants (HbS, HgC, etc)do  not significantly interfere with this assay.     However, presence of multiple variants may affect accuracy.         02/12/2019               6.8 (H)             4.0 - 5.6 %           Final              Comment:    ADA Screening Guidelines:  5.7-6.4%  Consistent with   prediabetes  >or=6.5%  Consistent with diabetes  High levels of fetal   hemoglobin interfere with the HbA1C  assay. Heterozygous hemoglobin   variants (HbS, HgC, etc)do  not significantly interfere with this assay.     However, presence of multiple variants may affect accuracy.    ----------          Last edited by Turner Boykin on  7/15/2020  9:14 AM. (History)            Assessment /Plan     For exam results, see Encounter Report.    Nuclear sclerosis, bilateral      Visually Significant Cataract: Patient reports decreased vision consistent with the clinical amount of lenticular opacity, which reaches the level of visual significance and affects activities of daily living. Risks, benefits, and alternatives to cataract surgery were discussed and the consent reviewed. IOL options were discussed, including ATIOLs and the associated side effects and additional patient cost associated with them.   IOL Selections:   Right eye  IOL: TFNT00 21.5     Left eye  IOL: TFNT00 21.0    Pt wishes to have left eye done first.  The patient expresses a desire to reduce spectacle dependence. I reviewed various IOL and LASER refractive surgical options and we will attempt to minimize spectacle dependence by managing astigmatism and optimizing IOL selection. Femtosecond LASER assisted cataract surgery (FLACS) technology was explained to the patient with educational videos and discussion.  The patient voices understanding and wishes to implement this technology during the cataract procedure.  I explained the increased precision of the LASER versus manual techniques, especially as it relates to astigmatism reduction with arcuate incisions.  I emphasized that although our goal is to reduce the need for refractive correction after surgery, there may still be a need for spectacle correction to achieve optimal visual acuity, and that a reasonable range of functional vision should be the expectation.  No guarantees are made about post operative refraction or visual acuity, as the eye may heal in unpredictable ways, and the standard risks, benefits, and alternatives to cataract surgery were explained.  The patient understands that the refractive portions of this cataract procedure are not covered by insurance, and that there is an out of pocket expense of $2250 per eye. I  also explained that even though our pre-operative plan is to utilize advanced refractive technologies during surgery, that I may decide to eliminate part or all of this plan if surgical challenges or complications arise, or I feel that it is not in the patient's best interest. Consent forms and an ABN form were given to the patient to review.      Catalys Parameters:  Right Eye:   DEWAYNE:  12mm   ?Need to geovanna patient sitting up?: n  Capsulotomy: Scanned Capsule   rdGrdrrdarddrderd:rd rd3rd Arcuate: OFF  Incisions: OFF  Left Eye:   DEWAYNE:  12mm   ?Need to geovanna patient sitting up?: n  Capsulotomy: Scanned Capsule  rdGrdrrdarddrderd:rd rd3rd Arcuate:  OFF  Incisions:  OFF

## 2020-07-22 DIAGNOSIS — H25.11 NUCLEAR SCLEROTIC CATARACT OF RIGHT EYE: Primary | ICD-10-CM

## 2020-07-22 DIAGNOSIS — H25.12 NUCLEAR SCLEROTIC CATARACT OF LEFT EYE: Primary | ICD-10-CM

## 2020-08-04 RX ORDER — MONTELUKAST SODIUM 10 MG/1
TABLET ORAL
Qty: 30 TABLET | Refills: 0 | OUTPATIENT
Start: 2020-08-04

## 2020-08-10 ENCOUNTER — PATIENT OUTREACH (OUTPATIENT)
Dept: ADMINISTRATIVE | Facility: HOSPITAL | Age: 70
End: 2020-08-10

## 2020-08-10 ENCOUNTER — LAB VISIT (OUTPATIENT)
Dept: SURGERY | Facility: CLINIC | Age: 70
End: 2020-08-10
Payer: MEDICARE

## 2020-08-10 DIAGNOSIS — Z13.9 SCREENING FOR UNSPECIFIED CONDITION: ICD-10-CM

## 2020-08-10 PROCEDURE — U0003 INFECTIOUS AGENT DETECTION BY NUCLEIC ACID (DNA OR RNA); SEVERE ACUTE RESPIRATORY SYNDROME CORONAVIRUS 2 (SARS-COV-2) (CORONAVIRUS DISEASE [COVID-19]), AMPLIFIED PROBE TECHNIQUE, MAKING USE OF HIGH THROUGHPUT TECHNOLOGIES AS DESCRIBED BY CMS-2020-01-R: HCPCS | Mod: HCNC

## 2020-08-11 ENCOUNTER — TELEPHONE (OUTPATIENT)
Dept: OPHTHALMOLOGY | Facility: CLINIC | Age: 70
End: 2020-08-11

## 2020-08-11 LAB — SARS-COV-2 RNA RESP QL NAA+PROBE: NOT DETECTED

## 2020-08-11 NOTE — TELEPHONE ENCOUNTER
Level a  for patient. Told them their surgery arrival time, which is 930am, on 8/13/2020. Nothing to eat or drink after 9pm, the night before surgery. May have water, gatorade, or powerade after 9pm, until leaving home the morning of surgery. Start drops on Tuesday, one drop TID into operative eye. 8/11/20 TR

## 2020-08-13 ENCOUNTER — ANESTHESIA (OUTPATIENT)
Dept: SURGERY | Facility: OTHER | Age: 70
End: 2020-08-13
Payer: MEDICARE

## 2020-08-13 ENCOUNTER — HOSPITAL ENCOUNTER (OUTPATIENT)
Facility: OTHER | Age: 70
Discharge: HOME | End: 2020-08-13
Attending: OPHTHALMOLOGY | Admitting: OPHTHALMOLOGY
Payer: MEDICARE

## 2020-08-13 ENCOUNTER — ANESTHESIA EVENT (OUTPATIENT)
Dept: SURGERY | Facility: OTHER | Age: 70
End: 2020-08-13
Payer: MEDICARE

## 2020-08-13 VITALS
DIASTOLIC BLOOD PRESSURE: 68 MMHG | HEART RATE: 80 BPM | HEIGHT: 66 IN | SYSTOLIC BLOOD PRESSURE: 120 MMHG | OXYGEN SATURATION: 96 % | RESPIRATION RATE: 16 BRPM | TEMPERATURE: 98 F | BODY MASS INDEX: 26.52 KG/M2 | WEIGHT: 165 LBS

## 2020-08-13 DIAGNOSIS — H25.12 NUCLEAR SCLEROTIC CATARACT OF LEFT EYE: Primary | ICD-10-CM

## 2020-08-13 DIAGNOSIS — H25.13 NUCLEAR SCLEROSIS, BILATERAL: ICD-10-CM

## 2020-08-13 LAB — POCT GLUCOSE: 111 MG/DL (ref 70–110)

## 2020-08-13 PROCEDURE — 36000707: Mod: HCNC | Performed by: OPHTHALMOLOGY

## 2020-08-13 PROCEDURE — 66999 UNLISTED PX ANT SEGMENT EYE: CPT | Mod: CSM,HCNC,LT, | Performed by: OPHTHALMOLOGY

## 2020-08-13 PROCEDURE — 36000706: Mod: HCNC | Performed by: OPHTHALMOLOGY

## 2020-08-13 PROCEDURE — 37000008 HC ANESTHESIA 1ST 15 MINUTES: Mod: HCNC | Performed by: OPHTHALMOLOGY

## 2020-08-13 PROCEDURE — 25000003 PHARM REV CODE 250: Mod: HCNC | Performed by: OPHTHALMOLOGY

## 2020-08-13 PROCEDURE — 66984 PR REMOVAL, CATARACT, W/INSRT INTRAOC LENS, W/O ENDO CYCLO: ICD-10-PCS | Mod: HCNC,LT,, | Performed by: OPHTHALMOLOGY

## 2020-08-13 PROCEDURE — 63600175 PHARM REV CODE 636 W HCPCS: Mod: HCNC | Performed by: NURSE ANESTHETIST, CERTIFIED REGISTERED

## 2020-08-13 PROCEDURE — 66984 XCAPSL CTRC RMVL W/O ECP: CPT | Mod: HCNC,LT,, | Performed by: OPHTHALMOLOGY

## 2020-08-13 PROCEDURE — 66999 PR FEMTO MFIOL: ICD-10-PCS | Mod: CSM,HCNC,LT, | Performed by: OPHTHALMOLOGY

## 2020-08-13 PROCEDURE — 71000015 HC POSTOP RECOV 1ST HR: Mod: HCNC | Performed by: OPHTHALMOLOGY

## 2020-08-13 PROCEDURE — 37000009 HC ANESTHESIA EA ADD 15 MINS: Mod: HCNC | Performed by: OPHTHALMOLOGY

## 2020-08-13 PROCEDURE — V2788 PRESBYOPIA-CORRECT FUNCTION: HCPCS | Mod: HCNC | Performed by: OPHTHALMOLOGY

## 2020-08-13 RX ORDER — TETRACAINE HYDROCHLORIDE 5 MG/ML
SOLUTION OPHTHALMIC
Status: DISCONTINUED | OUTPATIENT
Start: 2020-08-13 | End: 2020-08-13 | Stop reason: HOSPADM

## 2020-08-13 RX ORDER — PHENYLEPHRINE HYDROCHLORIDE 100 MG/ML
1 SOLUTION/ DROPS OPHTHALMIC
Status: COMPLETED | OUTPATIENT
Start: 2020-08-13 | End: 2020-08-13

## 2020-08-13 RX ORDER — TETRACAINE HYDROCHLORIDE 5 MG/ML
1 SOLUTION OPHTHALMIC
Status: COMPLETED | OUTPATIENT
Start: 2020-08-13 | End: 2020-08-13

## 2020-08-13 RX ORDER — PHENYLEPHRINE HYDROCHLORIDE 25 MG/ML
1 SOLUTION/ DROPS OPHTHALMIC
Status: COMPLETED | OUTPATIENT
Start: 2020-08-13 | End: 2020-08-13

## 2020-08-13 RX ORDER — MOXIFLOXACIN 5 MG/ML
1 SOLUTION/ DROPS OPHTHALMIC
Status: COMPLETED | OUTPATIENT
Start: 2020-08-13 | End: 2020-08-13

## 2020-08-13 RX ORDER — TROPICAMIDE 10 MG/ML
1 SOLUTION/ DROPS OPHTHALMIC
Status: COMPLETED | OUTPATIENT
Start: 2020-08-13 | End: 2020-08-13

## 2020-08-13 RX ORDER — MIDAZOLAM HYDROCHLORIDE 1 MG/ML
INJECTION INTRAMUSCULAR; INTRAVENOUS
Status: DISCONTINUED | OUTPATIENT
Start: 2020-08-13 | End: 2020-08-13

## 2020-08-13 RX ORDER — LIDOCAINE HYDROCHLORIDE 40 MG/ML
INJECTION, SOLUTION RETROBULBAR
Status: DISCONTINUED | OUTPATIENT
Start: 2020-08-13 | End: 2020-08-13 | Stop reason: HOSPADM

## 2020-08-13 RX ORDER — MOXIFLOXACIN 5 MG/ML
SOLUTION/ DROPS OPHTHALMIC
Status: DISCONTINUED | OUTPATIENT
Start: 2020-08-13 | End: 2020-08-13 | Stop reason: HOSPADM

## 2020-08-13 RX ORDER — PROPARACAINE HYDROCHLORIDE 5 MG/ML
1 SOLUTION/ DROPS OPHTHALMIC
Status: DISCONTINUED | OUTPATIENT
Start: 2020-08-13 | End: 2020-08-13 | Stop reason: HOSPADM

## 2020-08-13 RX ADMIN — TETRACAINE HYDROCHLORIDE 1 DROP: 5 SOLUTION OPHTHALMIC at 10:08

## 2020-08-13 RX ADMIN — TROPICAMIDE 1 DROP: 10 SOLUTION/ DROPS OPHTHALMIC at 10:08

## 2020-08-13 RX ADMIN — MOXIFLOXACIN 1 DROP: 5 SOLUTION/ DROPS OPHTHALMIC at 12:08

## 2020-08-13 RX ADMIN — MOXIFLOXACIN 1 DROP: 5 SOLUTION/ DROPS OPHTHALMIC at 10:08

## 2020-08-13 RX ADMIN — PHENYLEPHRINE HYDROCHLORIDE 1 DROP: 25 SOLUTION/ DROPS OPHTHALMIC at 10:08

## 2020-08-13 RX ADMIN — MIDAZOLAM HYDROCHLORIDE 2 MG: 1 INJECTION, SOLUTION INTRAMUSCULAR; INTRAVENOUS at 11:08

## 2020-08-13 NOTE — ANESTHESIA POSTPROCEDURE EVALUATION
Anesthesia Post Evaluation    Patient: Lima Maldonado    Procedure(s) Performed: Procedure(s) (LRB):  EXTRACTION, CATARACT, WITH IOL INSERTION (Left)    Final Anesthesia Type: MAC    Patient location during evaluation: [facility]  Patient participation: Yes- Able to Participate  Level of consciousness: awake and alert  Post-procedure vital signs: reviewed and stable  Pain management: adequate  Airway patency: patent    PONV status at discharge: No PONV      Cardiovascular status: blood pressure returned to baseline and hemodynamically stable  Respiratory status: unassisted, spontaneous ventilation and room air  Hydration status: euvolemic  Follow-up not needed.          Vitals Value Taken Time   /66 08/13/20 1158   Temp 36 08/13/20 1158   Pulse 70 08/13/20 1158   Resp 12 08/13/20 1158   SpO2 97 08/13/20 1158         No case tracking events are documented in the log.      Pain/Nolvia Score: No data recorded

## 2020-08-13 NOTE — DISCHARGE INSTRUCTIONS
Anesthesia: Monitored Anesthsia Care (MAC)   due to have surgery. During surgery, youll be given medicine called anesthesia. This will keep you comfortable and pain-free. Your surgeon will use monitored anesthesia care (MAC). This sheet tells you more about this type of anesthesia.  What is monitored anesthesia care?  MAC keeps you very drowsy during surgery. You may be awake, but you will likely not remember much. And you wont feel pain. With MAC, medicines are given through an IV line into a vein in your arm or hand. A local anesthetic will usually be injected into the skin and muscle around the surgical site to numb it. The anesthesia provider monitors you during the procedure. He or she checks your heart rate and rhythm, blood pressure, and blood oxygen level.  Anesthesia tools and medicines that may be near you during your procedure  You will likely have:  · A pulse oximeter on the end of your finger. This measures your blood oxygen level.  · Electrocardiography leads (electrodes) on your chest. These record your heart rate and rhythm.  · Medicines given through an IV. These relax you and prevent pain. You may be awake or sleep lightly. If you have local anesthetic, it is injected directly into your skin.  · A facemask to give you oxygen, if needed.  Risks and possible complications  MAC has some risks. These include:  · Breathing problems  · Nausea and vomiting  · Allergic reaction to the anesthetic    Anesthesia safety  Tips for anesthesia safety include the following:   · Follow all instructions you are given for how long not to eat or drink before your procedure.  · Be sure your healthcare provider knows what medicines you take, especially any anti-inflammatory medicine or blood thinners. This includes aspirin and any other over-the-counter medicines, herbs, and supplements.  · Have an adult family member or friend drive you home after the procedure.  · For the first 24 hours after your surgery:  ¨ Do  not drive or use heavy equipment.  ¨ Do not make important decisions or sign documents.  ¨ Avoid alcohol.  ¨ Have someone stay with you, if possible. They can watch for problems and help keep you safe.  Date Last Reviewed: 12/1/2016  © 8398-4864 [employer]. [address] [place] [address], [place] [address]. All rights reserved. This information is not intended as a substitute for professional medical care. Always follow your healthcare professional's instructions.

## 2020-08-13 NOTE — ANESTHESIA POSTPROCEDURE EVALUATION
Anesthesia Post Evaluation    Patient: Lima Maldonado    Procedure(s) Performed: Procedure(s) (LRB):  EXTRACTION, CATARACT, WITH IOL INSERTION (Left)    Final Anesthesia Type: MAC    Patient location during evaluation: PACU  Patient participation: Yes- Able to Participate  Level of consciousness: awake and alert  Post-procedure vital signs: reviewed and stable  Pain management: adequate  Airway patency: patent    PONV status at discharge: No PONV  Anesthetic complications: no      Cardiovascular status: blood pressure returned to baseline  Respiratory status: unassisted  Hydration status: euvolemic  Follow-up not needed.          Vitals Value Taken Time   /65 08/13/20 1206   Temp 36.7   08/13/20 1228   Pulse 72 08/13/20 1206   Resp 16 08/13/20 1206   SpO2 94 % 08/13/20 1206         No case tracking events are documented in the log.      Pain/Nolvia Score: No data recorded

## 2020-08-13 NOTE — DISCHARGE SUMMARY
Outcome: Successful outpatient ophthalmic surgical procedure  Preprinted Instructions given to patient.  Regular diet.  Activity: No restrictions  Meds: see Med Rec  Condition: stable  Follow up: 1 day with Dr Coleman  Disposition: Home  Diagnosis: s/p eye surgery

## 2020-08-13 NOTE — ANESTHESIA PREPROCEDURE EVALUATION
08/13/2020  Lima Maldonado is a 70 y.o., female.    Anesthesia Evaluation    I have reviewed the Patient Summary Reports.    I have reviewed the Nursing Notes. I have reviewed the NPO Status.   I have reviewed the Medications.     Review of Systems  Anesthesia Hx:  No problems with previous Anesthesia    Social:  Former Smoker, Social Alcohol Use    Hematology/Oncology:  Hematology Normal   Oncology Normal     EENT/Dental:EENT/Dental Normal   Cardiovascular:   Exercise tolerance: good Hypertension hyperlipidemia    Pulmonary:   Sleep Apnea, CPAP    Renal/:   JOSE   Hepatic/GI:   GERD, well controlled Liver Disease, Fatty liver.   Musculoskeletal:   Arthritis   Spine Disorders: lumbar Chronic Pain    Neurological:   Neuromuscular Disease,    Endocrine:   Diabetes, well controlled Hypothyroidism Hyperthyroidism    Dermatological:  Skin Normal        Physical Exam  General:  Well nourished    Airway/Jaw/Neck:  Airway Findings: Mouth Opening: Normal Tongue: Normal  General Airway Assessment: Adult, Good  Mallampati: I  TM Distance: Normal, at least 6 cm  Jaw/Neck Findings:  Neck ROM: Normal ROM      Dental:  Dental Findings: In tact, Molar caps        Mental Status:  Mental Status Findings:  Cooperative, Alert and Oriented         Anesthesia Plan  Type of Anesthesia, risks & benefits discussed:  Anesthesia Type:  MAC  Patient's Preference:   Intra-op Monitoring Plan: standard ASA monitors  Intra-op Monitoring Plan Comments:   Post Op Pain Control Plan: per primary service following discharge from PACU  Post Op Pain Control Plan Comments:   Induction:    Beta Blocker:         Informed Consent: Patient understands risks and agrees with Anesthesia plan.  Questions answered. Anesthesia consent signed with patient.  ASA Score: 2     Day of Surgery Review of History & Physical:    H&P update referred to the  surgeon.         Ready For Surgery From Anesthesia Perspective.

## 2020-08-13 NOTE — OP NOTE
SURGEON:  Ivanna Coleman M.D.    PREOPERATIVE DIAGNOSIS:    Nuclear Sclerotic Cataract Left Eye    POSTOPERATIVE DIAGNOSIS:    Nuclear Sclerotic Cataract Left Eye    PROCEDURES:    Phacoemulsification with  intraocular lens, Left eye (43392)  With Femtosecond LASER assist    DATE OF SURGERY: 08/13/2020    IMPLANT: TFNT00 21.5    ANESTHESIA:  MAC with topical Lidocaine    COMPLICATIONS:  None    ESTIMATED BLOOD LOSS: None    SPECIMENS: None    INDICATIONS:    The patient has a history of painless progressive visual loss and  difficulty with activities of daily living secondary to cataract formation.  After a thorough discussion of the risks, benefits, and alternatives to cataract surgery, including, but not limited to, the rare risks of infection, retinal detachment, hemorrhage, need for additional surgery, loss of vision, and even loss of the eye, the patient voices understanding and desires to proceed.    DESCRIPTION OF PROCEDURE:    After verification of consent and marking of the operative eye, the patient was positioned under the femtosecond LASER. Topical anesthetic drops were administered. A surgical timeout was initiated with verification of patient identifiers and the laser surgical plan. The eye was docked securely and the laser portion of the cataract procedure was carried out without complication.  The patient was returned to the pre-operative area to await the intraocular surgical portion of the cataract procedure.  The patients IOL calculations were reviewed, and the lens selection confirmed.   After verification and marking of the proper eye in the preop holding area, the patient was brought to the operating room in supine position where the eye was prepped and draped in standard sterile fashion with 5% Betadine and a lid speculum placed in the eye.   Topical 4% Lidocaine was used in addition to the preoperative anesthesia and the procedure was begun by the creation of a paracentesis incision through  which viscoelastic was used to fill the anterior chamber.  Next, a keratome blade was used to create a triplanar temporal clear corneal incision and a cystotome and Utrata forceps used to fashion a continuous curvilinear capsulorrhexis.  Hydrodissection was carried out using the Mckee hydrodissection cannula and the nucleus was found to be mobile.  Phacoemulsification of the nucleus was carried out using a quick chop technique, and all remaining epinuclear and cortical material was removed.  The eye was then reformed with Viscoelastic and the  intraocular lens was implanted into the capsular bag.  All remaining viscoelastics were removed from the eye and at the end of the case the pupil was round, the lens was well-centered within the capsular bag and all wounds were found to be water tight.  Drops of Vigamox and Pred Forte were instilled and a shield was placed over the eye. The patient will follow up with Dr. Coleman in the morning.

## 2020-08-13 NOTE — PLAN OF CARE
Lima Maldonado has met all discharge criteria from Phase II. Vital Signs are stable, ambulating  without difficulty. Discharge instructions given, patient verbalized understanding. Discharged from facility via wheelchair in stable condition.

## 2020-08-14 ENCOUNTER — OFFICE VISIT (OUTPATIENT)
Dept: OPHTHALMOLOGY | Facility: CLINIC | Age: 70
End: 2020-08-14
Attending: OPHTHALMOLOGY
Payer: MEDICARE

## 2020-08-14 DIAGNOSIS — Z98.890 POST-OPERATIVE STATE: Primary | ICD-10-CM

## 2020-08-14 DIAGNOSIS — H25.13 NUCLEAR SCLEROSIS, BILATERAL: ICD-10-CM

## 2020-08-14 PROCEDURE — 99024 PR POST-OP FOLLOW-UP VISIT: ICD-10-PCS | Mod: HCNC,S$GLB,, | Performed by: OPHTHALMOLOGY

## 2020-08-14 PROCEDURE — 99999 PR PBB SHADOW E&M-EST. PATIENT-LVL IV: ICD-10-PCS | Mod: PBBFAC,HCNC,, | Performed by: OPHTHALMOLOGY

## 2020-08-14 PROCEDURE — 99999 PR PBB SHADOW E&M-EST. PATIENT-LVL IV: CPT | Mod: PBBFAC,HCNC,, | Performed by: OPHTHALMOLOGY

## 2020-08-14 PROCEDURE — 99024 POSTOP FOLLOW-UP VISIT: CPT | Mod: HCNC,S$GLB,, | Performed by: OPHTHALMOLOGY

## 2020-08-14 NOTE — PROGRESS NOTES
HPI     ONE DAY POST-OP    Phacoemulsification with  intraocular lens, Left eye   With Femtosecond LASER assist  DATE OF SURGERY: 08/13/2020  IMPLANT: TFNT00 21.5    Drops: PGB TID OS     Patient reports OS doing fine this morning other than FB sensation and   glare bothersome this morning. Using drop as directed     Last edited by Turner Boykin on 8/14/2020  8:32 AM. (History)            Assessment /Plan     For exam results, see Encounter Report.    Post-operative state    Nuclear sclerosis, bilateral      Slit lamp exam:  L/L: nl  K: clear, wound sealed  AC: 1+ cell  Lens: IOL centered and stable    POD1 s/p Phaco/IOL  Appropriate precautions and post op medications reviewed.  Patient instructed to call or come in if symptoms of redness, decreased vision, or pain are experienced.

## 2020-08-18 ENCOUNTER — TELEPHONE (OUTPATIENT)
Dept: GASTROENTEROLOGY | Facility: CLINIC | Age: 70
End: 2020-08-18

## 2020-08-18 ENCOUNTER — OFFICE VISIT (OUTPATIENT)
Dept: HEMATOLOGY/ONCOLOGY | Facility: CLINIC | Age: 70
End: 2020-08-18
Payer: MEDICARE

## 2020-08-18 VITALS
SYSTOLIC BLOOD PRESSURE: 130 MMHG | RESPIRATION RATE: 16 BRPM | WEIGHT: 172.19 LBS | HEIGHT: 66 IN | BODY MASS INDEX: 27.67 KG/M2 | OXYGEN SATURATION: 97 % | TEMPERATURE: 99 F | DIASTOLIC BLOOD PRESSURE: 73 MMHG | HEART RATE: 93 BPM

## 2020-08-18 DIAGNOSIS — C50.512 MALIGNANT NEOPLASM OF LOWER-OUTER QUADRANT OF LEFT BREAST OF FEMALE, ESTROGEN RECEPTOR POSITIVE: Primary | ICD-10-CM

## 2020-08-18 DIAGNOSIS — Z17.0 MALIGNANT NEOPLASM OF LOWER-OUTER QUADRANT OF LEFT BREAST OF FEMALE, ESTROGEN RECEPTOR POSITIVE: Primary | ICD-10-CM

## 2020-08-18 PROCEDURE — 3078F DIAST BP <80 MM HG: CPT | Mod: HCNC,CPTII,S$GLB, | Performed by: PHYSICIAN ASSISTANT

## 2020-08-18 PROCEDURE — 99999 PR PBB SHADOW E&M-EST. PATIENT-LVL V: CPT | Mod: PBBFAC,HCNC,, | Performed by: PHYSICIAN ASSISTANT

## 2020-08-18 PROCEDURE — 3008F BODY MASS INDEX DOCD: CPT | Mod: HCNC,CPTII,S$GLB, | Performed by: PHYSICIAN ASSISTANT

## 2020-08-18 PROCEDURE — 99213 OFFICE O/P EST LOW 20 MIN: CPT | Mod: HCNC,S$GLB,, | Performed by: PHYSICIAN ASSISTANT

## 2020-08-18 PROCEDURE — 3008F PR BODY MASS INDEX (BMI) DOCUMENTED: ICD-10-PCS | Mod: HCNC,CPTII,S$GLB, | Performed by: PHYSICIAN ASSISTANT

## 2020-08-18 PROCEDURE — 1159F MED LIST DOCD IN RCRD: CPT | Mod: HCNC,S$GLB,, | Performed by: PHYSICIAN ASSISTANT

## 2020-08-18 PROCEDURE — 1126F AMNT PAIN NOTED NONE PRSNT: CPT | Mod: HCNC,S$GLB,, | Performed by: PHYSICIAN ASSISTANT

## 2020-08-18 PROCEDURE — 1101F PT FALLS ASSESS-DOCD LE1/YR: CPT | Mod: HCNC,CPTII,S$GLB, | Performed by: PHYSICIAN ASSISTANT

## 2020-08-18 PROCEDURE — 99999 PR PBB SHADOW E&M-EST. PATIENT-LVL V: ICD-10-PCS | Mod: PBBFAC,HCNC,, | Performed by: PHYSICIAN ASSISTANT

## 2020-08-18 PROCEDURE — 1126F PR PAIN SEVERITY QUANTIFIED, NO PAIN PRESENT: ICD-10-PCS | Mod: HCNC,S$GLB,, | Performed by: PHYSICIAN ASSISTANT

## 2020-08-18 PROCEDURE — 3075F PR MOST RECENT SYSTOLIC BLOOD PRESS GE 130-139MM HG: ICD-10-PCS | Mod: HCNC,CPTII,S$GLB, | Performed by: PHYSICIAN ASSISTANT

## 2020-08-18 PROCEDURE — 3075F SYST BP GE 130 - 139MM HG: CPT | Mod: HCNC,CPTII,S$GLB, | Performed by: PHYSICIAN ASSISTANT

## 2020-08-18 PROCEDURE — 1101F PR PT FALLS ASSESS DOC 0-1 FALLS W/OUT INJ PAST YR: ICD-10-PCS | Mod: HCNC,CPTII,S$GLB, | Performed by: PHYSICIAN ASSISTANT

## 2020-08-18 PROCEDURE — 99213 PR OFFICE/OUTPT VISIT, EST, LEVL III, 20-29 MIN: ICD-10-PCS | Mod: HCNC,S$GLB,, | Performed by: PHYSICIAN ASSISTANT

## 2020-08-18 PROCEDURE — 3078F PR MOST RECENT DIASTOLIC BLOOD PRESSURE < 80 MM HG: ICD-10-PCS | Mod: HCNC,CPTII,S$GLB, | Performed by: PHYSICIAN ASSISTANT

## 2020-08-18 PROCEDURE — 1159F PR MEDICATION LIST DOCUMENTED IN MEDICAL RECORD: ICD-10-PCS | Mod: HCNC,S$GLB,, | Performed by: PHYSICIAN ASSISTANT

## 2020-08-18 NOTE — PROGRESS NOTES
Subjective:       Patient ID: Lima Maldonado is a 70 y.o. female.    Chief Complaint: Follow-up    Ms Maldonado return to clinic for follow-up of  diagnosis of left breast cancer. She had stage I ER positive disease with an intermediate risk Oncotype score.   She completed 4 cycles of TC on 3/31/17 and is currently on Femara.        Patient is feeing fair, notes some intermittent right sided sciatica. Additionally she has an intermittent pain that radiates from her mid neck to the right arm, around chest wall and into right breast. She states she has had this pain off and on for 15 years and sometimes it is more prevalent than others.   No fever, chills, nausea, vomiting, shortness of breath.        She continues on Femara.   Tolerating Femara well other than some mild generalized arthralgias.                 Breast history: mammography on September 20, 2016 demonstrated a new irregular mass with spiculated margins seen in the left breast at  5 o'clock along the surgical scar site.ultrasound DEMONSTRATED A SOLID 8 X 8 X 7 MM MASS.    A needle biopsy in September 27 showed infiltrating carcinoma, histologic grade 3, nuclear grade 2, mitotic index 1. Tumor was 90% ER positive, 70% ME positive, and 1+ HER-2.     MRI of the breast showed a 1.7 x 1.3 cm lesion in the lower outer quadrant of the left breast.  PET/CT on October 7 was negative for any evidence of distant metastasis.    On November 16 bilateral mastectomies were performed. Left pressure 1.5 cm intermediate grade carcinoma with ductal and lobular features. There was focal dermal invasion. Margins were negative. The right breast was without abnormality.  Oncotype score returned 25 - intermediate risk.  Adjuvant TC X 4 completed 3/31/17.    Patient with history of left breast cancer in 1997 when she underwent left lumpectomy with complete axillary dissection at age 46 for a pT1cN1 breast cancer (Stage IIA).  47 nodes were removed, 1 of which was  positive.  She received adjuvant chemotherapy, radiation and tamoxifen.     Review of Systems   Constitutional: Negative.    HENT: Negative for nasal congestion, rhinorrhea, sore throat and trouble swallowing.    Eyes: Negative for visual disturbance.   Respiratory: Negative for cough, chest tightness and shortness of breath.    Cardiovascular: Negative for chest pain, palpitations and leg swelling.   Gastrointestinal: Negative for abdominal pain, blood in stool, constipation, diarrhea, nausea and vomiting.   Genitourinary: Negative for dysuria, hematuria and vaginal bleeding.   Musculoskeletal: Positive for arthralgias. Negative for back pain and myalgias.   Integumentary:  Negative for pallor and rash.   Neurological: Negative for dizziness, weakness and headaches.   Hematological: Negative for adenopathy. Does not bruise/bleed easily.   Psychiatric/Behavioral: Negative for dysphoric mood and suicidal ideas. The patient is not nervous/anxious.          Objective:      Physical Exam  Vitals signs reviewed.   Constitutional:       General: She is not in acute distress.     Appearance: She is well-developed.      Comments: ECOG 0  Presents alone   HENT:      Head: Normocephalic.      Mouth/Throat:      Pharynx: No oropharyngeal exudate.   Eyes:      General: No scleral icterus.     Conjunctiva/sclera: Conjunctivae normal.      Pupils: Pupils are equal, round, and reactive to light.   Neck:      Musculoskeletal: Normal range of motion and neck supple.      Thyroid: No thyromegaly.   Cardiovascular:      Rate and Rhythm: Normal rate and regular rhythm.      Heart sounds: Normal heart sounds.   Pulmonary:      Effort: Pulmonary effort is normal. No respiratory distress.      Breath sounds: Normal breath sounds. No wheezing or rales.      Comments: Right breast reconstruction without mass, nodule or skin changes. Left breast reconstruction with firmness at medial aspect- consistent with scar tissue. No  No axillary or  supraclavicular adenopathy.     Lungs clear to auscultation bilaterally     Chest:      Chest wall: No tenderness.   Abdominal:      General: Bowel sounds are normal. There is no distension.      Palpations: Abdomen is soft. There is no mass.      Tenderness: There is no abdominal tenderness.      Comments: No hepatosplenomegaly     Musculoskeletal: Normal range of motion.         General: No tenderness.      Comments: No spinal or paraspinal tenderness to palpation     Lymphadenopathy:      Cervical: No cervical adenopathy.   Skin:     General: Skin is warm and dry.      Findings: No rash.   Neurological:      Mental Status: She is alert and oriented to person, place, and time.      Cranial Nerves: No cranial nerve deficit.      Comments: No spinal or paraspinal tenderness to palpation     Psychiatric:         Behavior: Behavior normal.         Thought Content: Thought content normal.         Judgment: Judgment normal.         Assessment:       1. Malignant neoplasm of lower-outer quadrant of left breast of female, estrogen receptor positive        Plan:       Clinically without evidence of disease. Continue Letrozole and return to clinic in 6 months.  Patient knows to call clinic or PCP if her right shoulder pain worsens or becomes more frequent. Patient amenable to plan.

## 2020-08-24 ENCOUNTER — LAB VISIT (OUTPATIENT)
Dept: SURGERY | Facility: CLINIC | Age: 70
End: 2020-08-24
Payer: MEDICARE

## 2020-08-24 ENCOUNTER — TELEPHONE (OUTPATIENT)
Dept: OPHTHALMOLOGY | Facility: CLINIC | Age: 70
End: 2020-08-24

## 2020-08-24 DIAGNOSIS — Z13.9 SCREENING FOR UNSPECIFIED CONDITION: ICD-10-CM

## 2020-08-24 PROCEDURE — U0003 INFECTIOUS AGENT DETECTION BY NUCLEIC ACID (DNA OR RNA); SEVERE ACUTE RESPIRATORY SYNDROME CORONAVIRUS 2 (SARS-COV-2) (CORONAVIRUS DISEASE [COVID-19]), AMPLIFIED PROBE TECHNIQUE, MAKING USE OF HIGH THROUGHPUT TECHNOLOGIES AS DESCRIBED BY CMS-2020-01-R: HCPCS | Mod: HCNC

## 2020-08-24 NOTE — TELEPHONE ENCOUNTER
Left a  for patient.Told them their surgery arrival time, which is7am, on 8/27/2020. Nothing to eat or drink after 9pm, the night before surgery. May have water, gatorade, or powerade after 9pm, until leaving home the morning of surgery. Start drops on Tuesday, one drop TID into operative eye. 8/24/20 TR

## 2020-08-25 LAB — SARS-COV-2 RNA RESP QL NAA+PROBE: NOT DETECTED

## 2020-08-26 ENCOUNTER — OFFICE VISIT (OUTPATIENT)
Dept: OPHTHALMOLOGY | Facility: CLINIC | Age: 70
End: 2020-08-26
Payer: MEDICARE

## 2020-08-26 DIAGNOSIS — H25.11 NUCLEAR SCLEROSIS OF RIGHT EYE: ICD-10-CM

## 2020-08-26 DIAGNOSIS — H25.12 NUCLEAR SCLEROTIC CATARACT OF LEFT EYE: ICD-10-CM

## 2020-08-26 DIAGNOSIS — Z98.890 POST-OPERATIVE STATE: Primary | ICD-10-CM

## 2020-08-26 PROCEDURE — 99999 PR PBB SHADOW E&M-EST. PATIENT-LVL IV: ICD-10-PCS | Mod: PBBFAC,HCNC,, | Performed by: OPHTHALMOLOGY

## 2020-08-26 PROCEDURE — 92136 IOL MASTER - OU - BOTH EYES: ICD-10-PCS | Mod: 26,HCNC,RT,S$GLB | Performed by: OPHTHALMOLOGY

## 2020-08-26 PROCEDURE — 99024 POSTOP FOLLOW-UP VISIT: CPT | Mod: HCNC,S$GLB,, | Performed by: OPHTHALMOLOGY

## 2020-08-26 PROCEDURE — 92136 OPHTHALMIC BIOMETRY: CPT | Mod: 26,HCNC,RT,S$GLB | Performed by: OPHTHALMOLOGY

## 2020-08-26 PROCEDURE — 99024 PR POST-OP FOLLOW-UP VISIT: ICD-10-PCS | Mod: HCNC,S$GLB,, | Performed by: OPHTHALMOLOGY

## 2020-08-26 PROCEDURE — 99999 PR PBB SHADOW E&M-EST. PATIENT-LVL IV: CPT | Mod: PBBFAC,HCNC,, | Performed by: OPHTHALMOLOGY

## 2020-08-26 RX ORDER — PHENYLEPHRINE HYDROCHLORIDE 25 MG/ML
1 SOLUTION/ DROPS OPHTHALMIC
Status: CANCELLED | OUTPATIENT
Start: 2020-08-26

## 2020-08-26 RX ORDER — TETRACAINE HYDROCHLORIDE 5 MG/ML
1 SOLUTION OPHTHALMIC
Status: CANCELLED | OUTPATIENT
Start: 2020-08-26

## 2020-08-26 RX ORDER — TROPICAMIDE 10 MG/ML
1 SOLUTION/ DROPS OPHTHALMIC
Status: CANCELLED | OUTPATIENT
Start: 2020-08-26

## 2020-08-26 RX ORDER — MOXIFLOXACIN 5 MG/ML
1 SOLUTION/ DROPS OPHTHALMIC
Status: CANCELLED | OUTPATIENT
Start: 2020-08-26

## 2020-08-26 RX ORDER — SODIUM CHLORIDE 0.9 % (FLUSH) 0.9 %
10 SYRINGE (ML) INJECTION
Status: DISCONTINUED | OUTPATIENT
Start: 2020-08-26 | End: 2022-06-23

## 2020-08-26 NOTE — PROGRESS NOTES
HPI     2 week PCIOL OS Post-op     Patient reports that OS is doing great other than occasional blurriness at   times.  Using drops as directed.     Drops:  PGB TID OS      Last edited by Turner Boykin on 8/26/2020  9:28 AM. (History)            Assessment /Plan     For exam results, see Encounter Report.    Post-operative state    Nuclear sclerotic cataract of left eye      Slit lamp exam:  L/L: nl  K: clear, wound sealed  AC: trace cell  Iris/Lens: IOL centered and stable    POW1 s/p phaco: Surgery healing well with no signs of infection or abnormal inflammation.    Patient wishes to proceed with surgery in the second eye. Risks, benefits, alternatives reviewed. IOL selection reviewed.     Right eye  IOL: TFNT00 21.5     Left eye  IOL: TFNT00 21.0 (21.5 used with -0.50 result, so aim plano OD)    The patient expresses a desire to reduce spectacle dependence. I reviewed various IOL and LASER refractive surgical options and we will attempt to minimize spectacle dependence by managing astigmatism and optimizing IOL selection. Femtosecond LASER assisted cataract surgery (FLACS) technology was explained to the patient with educational videos and discussion.  The patient voices understanding and wishes to implement this technology during the cataract procedure.  I explained the increased precision of the LASER versus manual techniques, especially as it relates to astigmatism reduction with arcuate incisions.  I emphasized that although our goal is to reduce the need for refractive correction after surgery, there may still be a need for spectacle correction to achieve optimal visual acuity, and that a reasonable range of functional vision should be the expectation.  No guarantees are made about post operative refraction or visual acuity, as the eye may heal in unpredictable ways, and the standard risks, benefits, and alternatives to cataract surgery were explained.  The patient understands that the refractive portions  of this cataract procedure are not covered by insurance, and that there is an out of pocket expense of $2250 per eye. I also explained that even though our pre-operative plan is to utilize advanced refractive technologies during surgery, that I may decide to eliminate part or all of this plan if surgical challenges or complications arise, or I feel that it is not in the patient's best interest. Consent forms and an ABN form were given to the patient to review.      Catalys Parameters:  Right Eye:   DEWAYNE:  12mm   ?Need to geovanna patient sitting up?: n  Capsulotomy: Scanned Capsule   rdGrdrrdarddrderd:rd rd3rd Arcuate: OFF  Incisions: OFF

## 2020-08-26 NOTE — H&P (VIEW-ONLY)
HPI     2 week PCIOL OS Post-op     Patient reports that OS is doing great other than occasional blurriness at   times.  Using drops as directed.     Drops:  PGB TID OS      Last edited by Turner Boykin on 8/26/2020  9:28 AM. (History)            Assessment /Plan     For exam results, see Encounter Report.    Post-operative state    Nuclear sclerotic cataract of left eye      Slit lamp exam:  L/L: nl  K: clear, wound sealed  AC: trace cell  Iris/Lens: IOL centered and stable    POW1 s/p phaco: Surgery healing well with no signs of infection or abnormal inflammation.    Patient wishes to proceed with surgery in the second eye. Risks, benefits, alternatives reviewed. IOL selection reviewed.     Right eye  IOL: TFNT00 21.5     Left eye  IOL: TFNT00 21.0 (21.5 used with -0.50 result, so aim plano OD)    The patient expresses a desire to reduce spectacle dependence. I reviewed various IOL and LASER refractive surgical options and we will attempt to minimize spectacle dependence by managing astigmatism and optimizing IOL selection. Femtosecond LASER assisted cataract surgery (FLACS) technology was explained to the patient with educational videos and discussion.  The patient voices understanding and wishes to implement this technology during the cataract procedure.  I explained the increased precision of the LASER versus manual techniques, especially as it relates to astigmatism reduction with arcuate incisions.  I emphasized that although our goal is to reduce the need for refractive correction after surgery, there may still be a need for spectacle correction to achieve optimal visual acuity, and that a reasonable range of functional vision should be the expectation.  No guarantees are made about post operative refraction or visual acuity, as the eye may heal in unpredictable ways, and the standard risks, benefits, and alternatives to cataract surgery were explained.  The patient understands that the refractive portions  of this cataract procedure are not covered by insurance, and that there is an out of pocket expense of $2250 per eye. I also explained that even though our pre-operative plan is to utilize advanced refractive technologies during surgery, that I may decide to eliminate part or all of this plan if surgical challenges or complications arise, or I feel that it is not in the patient's best interest. Consent forms and an ABN form were given to the patient to review.      Catalys Parameters:  Right Eye:   DEWAYNE:  12mm   ?Need to geovanna patient sitting up?: n  Capsulotomy: Scanned Capsule   stGstrstastdstest:st st1st Arcuate: OFF  Incisions: OFF

## 2020-08-27 ENCOUNTER — ANESTHESIA EVENT (OUTPATIENT)
Dept: SURGERY | Facility: OTHER | Age: 70
End: 2020-08-27
Payer: MEDICARE

## 2020-08-27 ENCOUNTER — ANESTHESIA (OUTPATIENT)
Dept: SURGERY | Facility: OTHER | Age: 70
End: 2020-08-27
Payer: MEDICARE

## 2020-08-27 ENCOUNTER — HOSPITAL ENCOUNTER (OUTPATIENT)
Facility: OTHER | Age: 70
Discharge: HOME | End: 2020-08-27
Attending: OPHTHALMOLOGY | Admitting: OPHTHALMOLOGY
Payer: MEDICARE

## 2020-08-27 VITALS
TEMPERATURE: 98 F | HEIGHT: 66 IN | WEIGHT: 172 LBS | HEART RATE: 76 BPM | OXYGEN SATURATION: 97 % | SYSTOLIC BLOOD PRESSURE: 114 MMHG | RESPIRATION RATE: 16 BRPM | DIASTOLIC BLOOD PRESSURE: 62 MMHG | BODY MASS INDEX: 27.64 KG/M2

## 2020-08-27 DIAGNOSIS — H25.11 NUCLEAR SCLEROTIC CATARACT OF RIGHT EYE: Primary | ICD-10-CM

## 2020-08-27 DIAGNOSIS — H25.11 NUCLEAR SCLEROSIS OF RIGHT EYE: ICD-10-CM

## 2020-08-27 DIAGNOSIS — Z98.890 POST-OPERATIVE STATE: ICD-10-CM

## 2020-08-27 LAB — POCT GLUCOSE: 112 MG/DL (ref 70–110)

## 2020-08-27 PROCEDURE — 36000707: Mod: HCNC | Performed by: OPHTHALMOLOGY

## 2020-08-27 PROCEDURE — 66999 UNLISTED PX ANT SEGMENT EYE: CPT | Mod: CSM,HCNC,RT, | Performed by: OPHTHALMOLOGY

## 2020-08-27 PROCEDURE — 66984 PR REMOVAL, CATARACT, W/INSRT INTRAOC LENS, W/O ENDO CYCLO: ICD-10-PCS | Mod: 79,HCNC,RT, | Performed by: OPHTHALMOLOGY

## 2020-08-27 PROCEDURE — 66999 PR FEMTO MFIOL: ICD-10-PCS | Mod: CSM,HCNC,RT, | Performed by: OPHTHALMOLOGY

## 2020-08-27 PROCEDURE — 82962 GLUCOSE BLOOD TEST: CPT | Mod: HCNC | Performed by: OPHTHALMOLOGY

## 2020-08-27 PROCEDURE — 66984 XCAPSL CTRC RMVL W/O ECP: CPT | Mod: 79,HCNC,RT, | Performed by: OPHTHALMOLOGY

## 2020-08-27 PROCEDURE — 25000003 PHARM REV CODE 250: Mod: HCNC | Performed by: OPHTHALMOLOGY

## 2020-08-27 PROCEDURE — 71000015 HC POSTOP RECOV 1ST HR: Mod: HCNC | Performed by: OPHTHALMOLOGY

## 2020-08-27 PROCEDURE — 37000009 HC ANESTHESIA EA ADD 15 MINS: Mod: HCNC | Performed by: OPHTHALMOLOGY

## 2020-08-27 PROCEDURE — 36000706: Mod: HCNC | Performed by: OPHTHALMOLOGY

## 2020-08-27 PROCEDURE — V2788 PRESBYOPIA-CORRECT FUNCTION: HCPCS | Mod: HCNC,WS | Performed by: OPHTHALMOLOGY

## 2020-08-27 PROCEDURE — 37000008 HC ANESTHESIA 1ST 15 MINUTES: Mod: HCNC | Performed by: OPHTHALMOLOGY

## 2020-08-27 PROCEDURE — 63600175 PHARM REV CODE 636 W HCPCS: Mod: HCNC | Performed by: NURSE ANESTHETIST, CERTIFIED REGISTERED

## 2020-08-27 RX ORDER — TROPICAMIDE 10 MG/ML
1 SOLUTION/ DROPS OPHTHALMIC
Status: COMPLETED | OUTPATIENT
Start: 2020-08-27 | End: 2020-08-27

## 2020-08-27 RX ORDER — TETRACAINE HYDROCHLORIDE 5 MG/ML
SOLUTION OPHTHALMIC
Status: DISCONTINUED | OUTPATIENT
Start: 2020-08-27 | End: 2020-08-27 | Stop reason: HOSPADM

## 2020-08-27 RX ORDER — MOXIFLOXACIN 5 MG/ML
1 SOLUTION/ DROPS OPHTHALMIC
Status: COMPLETED | OUTPATIENT
Start: 2020-08-27 | End: 2020-08-27

## 2020-08-27 RX ORDER — PHENYLEPHRINE HYDROCHLORIDE 100 MG/ML
1 SOLUTION/ DROPS OPHTHALMIC
Status: COMPLETED | OUTPATIENT
Start: 2020-08-27 | End: 2020-08-27

## 2020-08-27 RX ORDER — PHENYLEPHRINE HYDROCHLORIDE 25 MG/ML
1 SOLUTION/ DROPS OPHTHALMIC
Status: COMPLETED | OUTPATIENT
Start: 2020-08-27 | End: 2020-08-27

## 2020-08-27 RX ORDER — MIDAZOLAM HYDROCHLORIDE 1 MG/ML
INJECTION INTRAMUSCULAR; INTRAVENOUS
Status: DISCONTINUED | OUTPATIENT
Start: 2020-08-27 | End: 2020-08-27

## 2020-08-27 RX ORDER — TETRACAINE HYDROCHLORIDE 5 MG/ML
1 SOLUTION OPHTHALMIC
Status: COMPLETED | OUTPATIENT
Start: 2020-08-27 | End: 2020-08-27

## 2020-08-27 RX ORDER — PROPARACAINE HYDROCHLORIDE 5 MG/ML
1 SOLUTION/ DROPS OPHTHALMIC
Status: DISCONTINUED | OUTPATIENT
Start: 2020-08-27 | End: 2020-08-27 | Stop reason: HOSPADM

## 2020-08-27 RX ORDER — LIDOCAINE HYDROCHLORIDE 40 MG/ML
INJECTION, SOLUTION RETROBULBAR
Status: DISCONTINUED | OUTPATIENT
Start: 2020-08-27 | End: 2020-08-27 | Stop reason: HOSPADM

## 2020-08-27 RX ADMIN — MIDAZOLAM HYDROCHLORIDE 2 MG: 1 INJECTION, SOLUTION INTRAMUSCULAR; INTRAVENOUS at 09:08

## 2020-08-27 RX ADMIN — MOXIFLOXACIN 1 DROP: 5 SOLUTION/ DROPS OPHTHALMIC at 09:08

## 2020-08-27 RX ADMIN — TROPICAMIDE 1 DROP: 10 SOLUTION/ DROPS OPHTHALMIC at 07:08

## 2020-08-27 RX ADMIN — TETRACAINE HYDROCHLORIDE 1 DROP: 5 SOLUTION OPHTHALMIC at 07:08

## 2020-08-27 RX ADMIN — PHENYLEPHRINE HYDROCHLORIDE 1 DROP: 25 SOLUTION/ DROPS OPHTHALMIC at 07:08

## 2020-08-27 RX ADMIN — MOXIFLOXACIN 1 DROP: 5 SOLUTION/ DROPS OPHTHALMIC at 07:08

## 2020-08-27 NOTE — ANESTHESIA PREPROCEDURE EVALUATION
08/27/2020  Lima Maldonado is a 70 y.o., female.    Pre-op Assessment    I have reviewed the Patient Summary Reports.     I have reviewed the Nursing Notes. I have reviewed the NPO Status.   I have reviewed the Medications.     Review of Systems  Anesthesia Hx:  No problems with previous Anesthesia    Social:  Former Smoker, Social Alcohol Use    Hematology/Oncology:  Hematology Normal   Oncology Normal     EENT/Dental:EENT/Dental Normal   Cardiovascular:   Exercise tolerance: good Hypertension hyperlipidemia    Pulmonary:   Sleep Apnea, CPAP    Renal/:   JOSE   Hepatic/GI:   GERD, well controlled Liver Disease, Fatty liver.   Musculoskeletal:   Arthritis   Spine Disorders: lumbar Chronic Pain    Neurological:   Neuromuscular Disease,    Endocrine:   Diabetes, well controlled Hypothyroidism Hyperthyroidism    Dermatological:  Skin Normal        Physical Exam  General:  Well nourished    Airway/Jaw/Neck:  Airway Findings: Mouth Opening: Normal Tongue: Normal  General Airway Assessment: Adult, Good  Mallampati: I  TM Distance: Normal, at least 6 cm  Jaw/Neck Findings:  Neck ROM: Normal ROM      Dental:  Dental Findings: In tact, Molar caps        Mental Status:  Mental Status Findings:  Cooperative, Alert and Oriented         Anesthesia Plan  Type of Anesthesia, risks & benefits discussed:  Anesthesia Type:  MAC  Patient's Preference:   Intra-op Monitoring Plan: standard ASA monitors  Intra-op Monitoring Plan Comments:   Post Op Pain Control Plan: per primary service following discharge from PACU  Post Op Pain Control Plan Comments:   Induction:    Beta Blocker:         Informed Consent: Patient understands risks and agrees with Anesthesia plan.  Questions answered. Anesthesia consent signed with patient.  ASA Score: 2     Day of Surgery Review of History & Physical:    H&P update referred to the surgeon.          Ready For Surgery From Anesthesia Perspective.

## 2020-08-27 NOTE — DISCHARGE INSTRUCTIONS
Anesthesia: Monitored Anesthesia Care (MAC)  Anesthesia safety  Tips for anesthesia safety include the following:   · Follow all instructions you are given for how long not to eat or drink before your procedure.  · Be sure your healthcare provider knows what medicines you take, especially any anti-inflammatory medicine or blood thinners. This includes aspirin and any other over-the-counter medicines, herbs, and supplements.  · Have an adult family member or friend drive you home after the procedure.  · For the first 24 hours after your surgery:  ¨ Do not drive or use heavy equipment.  ¨ Do not make important decisions or sign documents.  ¨ Avoid alcohol.  ¨ Have someone stay with you, if possible. They can watch for problems and help keep you safe.  Date Last Reviewed: 12/1/2016 © 2000-2017 [employer]. [address] [place] [address], [place] [address]. All rights reserved. This information is not intended as a substitute for professional medical care. Always follow your healthcare professional's instructions.

## 2020-08-27 NOTE — ANESTHESIA POSTPROCEDURE EVALUATION
Anesthesia Post Evaluation    Patient: Lima Maldonado    Procedure(s) Performed: Procedure(s) (LRB):  EXTRACTION, CATARACT, WITH IOL INSERTION LASER (Right)    Final Anesthesia Type: MAC    Patient location during evaluation: [facility]  Patient participation: Yes- Able to Participate  Level of consciousness: awake and alert and oriented  Post-procedure vital signs: reviewed and stable  Pain management: adequate  Airway patency: patent      Anesthetic complications: no      Cardiovascular status: hemodynamically stable  Respiratory status: unassisted, spontaneous ventilation and room air  Hydration status: euvolemic  Follow-up not needed.          Vitals Value Taken Time   /73 08/27/20 0744   Temp 36.3 °C (97.3 °F) 08/27/20 0743   Pulse 78 08/27/20 0743   Resp 18 08/27/20 0743   SpO2 96 % 08/27/20 0743         No case tracking events are documented in the log.      Pain/Nolvia Score: No data recorded

## 2020-08-27 NOTE — OP NOTE
SURGEON:  Ivanna Coleman M.D.    PREOPERATIVE DIAGNOSIS:    Nuclear Sclerotic Cataract Right Eye    POSTOPERATIVE DIAGNOSIS:    Nuclear Sclerotic Cataract Right Eye    PROCEDURES:    Phacoemulsification with  intraocular lens, Right eye (37610)  With Femtosecond LASER assist    DATE OF SURGERY: 08/27/2020    IMPLANT: TFNT00 21.5    ANESTHESIA:  MAC with topical Lidocaine    COMPLICATIONS:  None    ESTIMATED BLOOD LOSS: None    SPECIMENS: None    INDICATIONS:    The patient has a history of painless progressive visual loss and  difficulty with activities of daily living secondary to cataract formation.  After a thorough discussion of the risks, benefits, and alternatives to cataract surgery, including, but not limited to, the rare risks of infection, retinal detachment, hemorrhage, need for additional surgery, loss of vision, and even loss of the eye, the patient voices understanding and desires to proceed.    DESCRIPTION OF PROCEDURE:    After verification of consent and marking of the operative eye, the patient was positioned under the femtosecond LASER. Topical anesthetic drops were administered. A surgical timeout was initiated with verification of patient identifiers and the laser surgical plan. The eye was docked securely and the laser portion of the cataract procedure was carried out without complication.  The patient was returned to the pre-operative area to await the intraocular surgical portion of the cataract procedure.  The patients IOL calculations were reviewed, and the lens selection confirmed.   After verification and marking of the proper eye in the preop holding area, the patient was brought to the operating room in supine position where the eye was prepped and draped in standard sterile fashion with 5% Betadine and a lid speculum placed in the eye.   Topical 4% Lidocaine was used in addition to the preoperative anesthesia and the procedure was begun by the creation of a paracentesis incision through  which viscoelastic was used to fill the anterior chamber.  Next, a keratome blade was used to create a triplanar temporal clear corneal incision and a cystotome and Utrata forceps used to fashion a continuous curvilinear capsulorrhexis.  Hydrodissection was carried out using the Mckee hydrodissection cannula and the nucleus was found to be mobile.  Phacoemulsification of the nucleus was carried out using a quick chop technique, and all remaining epinuclear and cortical material was removed.  The eye was then reformed with Viscoelastic and the  intraocular lens was implanted into the capsular bag.  All remaining viscoelastics were removed from the eye and at the end of the case the pupil was round, the lens was well-centered within the capsular bag and all wounds were found to be water tight.  Drops of Vigamox and Pred Forte were instilled and a shield was placed over the eye. The patient will follow up with Dr. Coleman in the morning.

## 2020-08-28 ENCOUNTER — OFFICE VISIT (OUTPATIENT)
Dept: OPHTHALMOLOGY | Facility: CLINIC | Age: 70
End: 2020-08-28
Attending: OPHTHALMOLOGY
Payer: MEDICARE

## 2020-08-28 DIAGNOSIS — Z98.890 POST-OPERATIVE STATE: Primary | ICD-10-CM

## 2020-08-28 DIAGNOSIS — H25.13 NUCLEAR SCLEROSIS, BILATERAL: ICD-10-CM

## 2020-08-28 PROCEDURE — 99999 PR PBB SHADOW E&M-EST. PATIENT-LVL IV: CPT | Mod: PBBFAC,HCNC,, | Performed by: OPHTHALMOLOGY

## 2020-08-28 PROCEDURE — 99024 POSTOP FOLLOW-UP VISIT: CPT | Mod: HCNC,S$GLB,, | Performed by: OPHTHALMOLOGY

## 2020-08-28 PROCEDURE — 99024 PR POST-OP FOLLOW-UP VISIT: ICD-10-PCS | Mod: HCNC,S$GLB,, | Performed by: OPHTHALMOLOGY

## 2020-08-28 PROCEDURE — 99999 PR PBB SHADOW E&M-EST. PATIENT-LVL IV: ICD-10-PCS | Mod: PBBFAC,HCNC,, | Performed by: OPHTHALMOLOGY

## 2020-08-28 NOTE — PROGRESS NOTES
HPI     Post-op Evaluation      Additional comments: 1 day check.               Comments     POD 1 CE with PANOPTIX IOL OS  POW 1 CE with PANOPTIX IOL OD          Last edited by Ame Baumann on 8/28/2020  9:08 AM. (History)            Assessment /Plan     For exam results, see Encounter Report.    Post-operative state    Nuclear sclerosis, bilateral      Slit lamp exam:  L/L: nl  K: clear, wound sealed  AC: 1+ cell  Lens: IOL centered and stable    POD1 s/p Phaco/IOL  Appropriate precautions and post op medications reviewed.  Patient instructed to call or come in if symptoms of redness, decreased vision, or pain are experienced.

## 2020-09-03 ENCOUNTER — PES CALL (OUTPATIENT)
Dept: ADMINISTRATIVE | Facility: CLINIC | Age: 70
End: 2020-09-03

## 2020-09-09 ENCOUNTER — PATIENT OUTREACH (OUTPATIENT)
Dept: ADMINISTRATIVE | Facility: OTHER | Age: 70
End: 2020-09-09

## 2020-09-09 NOTE — PROGRESS NOTES
LINKS immunization registry updated  Care Everywhere updated  Health Maintenance updated  Chart reviewed for overdue Proactive [facility] Encounters ([facility]QU[facility]) health maintenance testing (CRS, Breast Ca, Diabetic Eye Exam)   Orders entered:N/A

## 2020-09-10 ENCOUNTER — OFFICE VISIT (OUTPATIENT)
Dept: GASTROENTEROLOGY | Facility: CLINIC | Age: 70
End: 2020-09-10
Payer: MEDICARE

## 2020-09-10 VITALS — HEIGHT: 66 IN | WEIGHT: 168 LBS | BODY MASS INDEX: 27 KG/M2

## 2020-09-10 DIAGNOSIS — Z51.81 ENCOUNTER FOR MONITORING LONG-TERM PROTON PUMP INHIBITOR THERAPY: ICD-10-CM

## 2020-09-10 DIAGNOSIS — K44.9 HIATAL HERNIA: ICD-10-CM

## 2020-09-10 DIAGNOSIS — K76.0 FATTY LIVER: Primary | ICD-10-CM

## 2020-09-10 DIAGNOSIS — Z79.899 ENCOUNTER FOR MONITORING LONG-TERM PROTON PUMP INHIBITOR THERAPY: ICD-10-CM

## 2020-09-10 DIAGNOSIS — K22.10 ESOPHAGITIS, EROSIVE: Primary | ICD-10-CM

## 2020-09-10 DIAGNOSIS — K21.9 GASTROESOPHAGEAL REFLUX DISEASE, ESOPHAGITIS PRESENCE NOT SPECIFIED: ICD-10-CM

## 2020-09-10 PROCEDURE — 3008F PR BODY MASS INDEX (BMI) DOCUMENTED: ICD-10-PCS | Mod: HCNC,CPTII,95, | Performed by: INTERNAL MEDICINE

## 2020-09-10 PROCEDURE — 99204 OFFICE O/P NEW MOD 45 MIN: CPT | Mod: HCNC,95,, | Performed by: INTERNAL MEDICINE

## 2020-09-10 PROCEDURE — 1101F PR PT FALLS ASSESS DOC 0-1 FALLS W/OUT INJ PAST YR: ICD-10-PCS | Mod: HCNC,CPTII,95, | Performed by: INTERNAL MEDICINE

## 2020-09-10 PROCEDURE — 99204 PR OFFICE/OUTPT VISIT, NEW, LEVL IV, 45-59 MIN: ICD-10-PCS | Mod: HCNC,95,, | Performed by: INTERNAL MEDICINE

## 2020-09-10 PROCEDURE — 1159F PR MEDICATION LIST DOCUMENTED IN MEDICAL RECORD: ICD-10-PCS | Mod: HCNC,95,, | Performed by: INTERNAL MEDICINE

## 2020-09-10 PROCEDURE — 1159F MED LIST DOCD IN RCRD: CPT | Mod: HCNC,95,, | Performed by: INTERNAL MEDICINE

## 2020-09-10 PROCEDURE — 1101F PT FALLS ASSESS-DOCD LE1/YR: CPT | Mod: HCNC,CPTII,95, | Performed by: INTERNAL MEDICINE

## 2020-09-10 PROCEDURE — 3008F BODY MASS INDEX DOCD: CPT | Mod: HCNC,CPTII,95, | Performed by: INTERNAL MEDICINE

## 2020-09-10 RX ORDER — PANTOPRAZOLE SODIUM 20 MG/1
20 TABLET, DELAYED RELEASE ORAL
Qty: 90 TABLET | Refills: 3 | Status: SHIPPED | OUTPATIENT
Start: 2020-09-10 | End: 2021-09-08

## 2020-09-10 NOTE — PROGRESS NOTES
The patient location is: At Home  The chief complaint leading to consultation is: GERD    Visit type: Telemedicine Video Visit    Face to Face time with patient: 31minutes of total time spent on the encounter, which includes face to face time and non-face to face time preparing to see the patient (eg, review of tests), Obtaining and/or reviewing separately obtained history, Documenting clinical information in the electronic or other health record, Independently interpreting results (not separately reported) and communicating results to the patient/family/caregiver, or Care coordination (not separately reported).       Each patient to whom he or she provides medical services by telemedicine is:  (1) informed of the relationship between the physician and patient and the respective role of any other health care provider with respect to management of the patient; and (2) notified that he or she may decline to receive medical services by telemedicine and may withdraw from such care at any time.    Notes:     CHIEF COMPLAINT:  Followup of esophagitis.     HISTORY OF PRESENT ILLNESS:  This is a 70-year-old white female with past   medical history of left breast cancer diagnosed 2/1997, treated with   surgery, radiation and chemotherapy.  Then in 10/2016 she had a recurrent and was   treated with bilateral mastectomy and chemotherapy  No radiation and patient has been in remission since then.  The patient has a history of esophagitis.    She underwent EGD direct access by me on 06/04/2013 for heartburn by her   primary care doctor, Dr. Mccall.  She was found to have LA grade C esophagitis   and a 1-cm sliding hiatal hernia.  The patient was placed on the PPI and   pantoprazole 40 mg once daily.  Repeat EGD on 08/05/2013, showed no evidence of   any esophagitis, it was completely healed, one small benign gastric polyp.      On reviewing the prior CT   scan that she had back in 2006, it looks like she has diffuse fatty liver  and   hepatosplenomegaly. The patient knows that being on   long-term PPI use may increase the risk of pneumonias acutely, C. difficile   infections long term, low vitamin B12, low vitamin D, low magnesium,   osteoporosis and fractures.  She ought to have vitamin B12, vitamin D and   magnesium once yearly.  The patient's colonoscopy   is up-to-date.  Her last colonoscopy was done by Dr. Mark Montenegro on 10/02/2006,   it was normal, complete to the cecum, good bowel prep.  He has recommended   repeat in 10 years for screening, which should be in October 2016.  Patient had repeat colonoscopy by Dr. Gonzales  May 15, 2018 and It was complete to the cecum good bowel prep no polyps he   recommend repeat colonoscopy in 10 years  The patient   is at average risk by history today for colorectal cancer.   Patient is feeling well well controlled on her pantoprazole 40 mg once daily wants to try to go down to 20 mg once daily  Her PPI labs have been normal she feels good no dysphagia no odynophagia no hoarseness no change in bowel habits no diarrhea no constipation no blood  In her stool.      REVIEW OF SYSTEMS:  CONSTITUTIONAL:  No fever, fatigue or weight loss.  ENT:  No difficulty swallowing or sore throat.  CARDIOVASCULAR:  No chest pain or palpitations.  RESPIRATORY:  No shortness of breath or dyspnea on exertion.  GENITOURINARY:  No dysuria, urgency or frequency.  SKIN:  No itching or rash.  NEUROLOGIC:  No headache, syncope or stroke.  PSYCHIATRIC:  No uncontrolled depression or anxiety.  ENDOCRINE:  No cold or heat intolerance.  No flushing.  LYMPHATICS:  No lymphadenopathy.  ALLERGIES:  She is allergic to erythromycin and oxycodone.  GASTROINTESTINAL:  No nausea or vomiting.  Heartburn well controlled on her PPI.    No abdominal pain.  No change in appetite, no early satiety, no diarrhea or   constipation.  She averages one solid bowel movement a day without blood.     RISK FACTORS FOR LIVER DISEASE:  No blood transfusion,  no IV drugs, no tattoos   and no nasal drug use.  She rarely drinks alcohol.     PAST MEDICAL HISTORY:  As above.     PAST SURGICAL HISTORY:  She had a left modified radical mastectomy,  Than a bilateral mastectomy, breast   implant, breast reconstruction, pelvic laparotomy with robotic laparoscopic   hysterectomy, lipoma resection and reduction of the mons pubis.     PHYSICAL EXAMINATION:  Telemedicine video visit  She is 5 ft 6 in tall 168 lb  GENERAL APPEARANCE:  She is well nourished, well developed, not in any acute   distress.  Nontoxic appearing.  NEUROLOGIC:  Alert and oriented x4.  PSYCHIATRIC:  Normal speech, mentation and affect.       MEDICAL DECISION MAKING:  As above.   Prior CT scan was reviewed.  The fatty liver talk has been discussed with   the patient.  She knows that fatty liver in some patients can progress to   cirrhosis of the liver and liver cancer and need for liver transplant.  Colorectal cancer screening talk given.  Esophagitis and long-term PPI talk given.  She knows that PPIs can increase   the risk of osteoporosis, fractures, C. difficile infections, pneumonia, low   vitamin B12, low vitamin D and low magnesium possibly; and followup has been   Discussed.  Labs reviewed  Prior EGD and colonoscopy images reviewed myself   Prior hepatology notes reviewed  F 2 fibrosis in 2015 needs hepatology follow-up  IMPRESSION AND PLAN:  1.  History of esophagitis, well healed and controlled heartburn on PPI.    Recommend GERD precautions from UpToDate; with good control  Of symptoms on pantoprazole 40 mg once daily patient would like to dose reduce to 20 mg once daily  She knows best taken 45 min before 1st protein meal of the day breakfast she will message me in a month to let me know  If she feels like her symptoms well controlled if they are not will go back up to 40 mg once daily.  2.  Long-term PPI use she knows once yearly with her primary care doctor to have the following lab  checked,  Vitamin B12, vitamin D, and magnesium; creatinine come periodic bone densities.  3.   History of hepatosplenomegaly and fatty liver.  Recommended referral back to   Hepatology.  We will check for immunity to hepatitis A and B.  She has no   evidence of hepatitis C.  Recommended gradual weight loss.  4.  Colon rectal cancer screening up-to-date  5.  We will recommend the patient return to GI Clinic in once yearly for   followup.

## 2020-09-24 ENCOUNTER — PES CALL (OUTPATIENT)
Dept: ADMINISTRATIVE | Facility: CLINIC | Age: 70
End: 2020-09-24

## 2020-09-25 ENCOUNTER — OFFICE VISIT (OUTPATIENT)
Dept: OPTOMETRY | Facility: CLINIC | Age: 70
End: 2020-09-25
Payer: MEDICARE

## 2020-09-25 DIAGNOSIS — Z98.41 S/P BILATERAL CATARACT EXTRACTION: Primary | ICD-10-CM

## 2020-09-25 DIAGNOSIS — Z98.42 S/P BILATERAL CATARACT EXTRACTION: Primary | ICD-10-CM

## 2020-09-25 PROCEDURE — 99024 POSTOP FOLLOW-UP VISIT: CPT | Mod: HCNC,S$GLB,, | Performed by: OPTOMETRIST

## 2020-09-25 PROCEDURE — 99999 PR PBB SHADOW E&M-EST. PATIENT-LVL II: CPT | Mod: PBBFAC,HCNC,, | Performed by: OPTOMETRIST

## 2020-09-25 PROCEDURE — 99024 PR POST-OP FOLLOW-UP VISIT: ICD-10-PCS | Mod: HCNC,S$GLB,, | Performed by: OPTOMETRIST

## 2020-09-25 PROCEDURE — 99999 PR PBB SHADOW E&M-EST. PATIENT-LVL II: ICD-10-PCS | Mod: PBBFAC,HCNC,, | Performed by: OPTOMETRIST

## 2020-09-25 NOTE — PROGRESS NOTES
HPI     Last eye exam was 6/16/20 with   Pt here for post op eval.  08/27/2020 IMPLANT: TFNT00 21.5 OD  08/13/2020 IMPLANT: TFNT00 21.5 OS        Last edited by Cici Chisholm on 9/25/2020  9:03 AM. (History)            Assessment /Plan     For exam results, see Encounter Report.    S/P bilateral cataract extraction  -     OCT- Retina            1.  Pt doing well with MFIOLs.  Artificial tears as needed.  No CME OU.   Retina flat and intact OU--no holes, tears, breaks, or RDs.    RTC 1 year for routine exam.

## 2020-09-27 ENCOUNTER — DOCUMENTATION ONLY (OUTPATIENT)
Dept: TRANSPLANT | Facility: CLINIC | Age: 70
End: 2020-09-27

## 2020-09-27 NOTE — LETTER
September 27, 2020    Katerin Maldonado  [address] [facility] [address]      Dear Katerin Maldonado:    Your doctor has referred you to the [facility]. We are sending this letter to remind you to make an appointment with us to complete the referral process.     Please call us at [phone] to schedule an appointment. We look forward to seeing you soon.     If you received a call and have been scheduled, please disregard this letter.       Sincerely,        [facility]   [address] [place] [address] [phone]

## 2020-09-28 ENCOUNTER — IMMUNIZATION (OUTPATIENT)
Dept: PHARMACY | Facility: CLINIC | Age: 70
End: 2020-09-28
Payer: MEDICARE

## 2020-09-28 NOTE — NURSING
Pt records reviewed.   Pt will be referred to Hepatology.    Initial referral received  from the [facility].   Referring Provider/diagnosis  Omar Ambriz MD       Fatty liver  2015 pt of Dr Nguyen.     Referral letter sent to patient.

## 2020-09-29 ENCOUNTER — PATIENT MESSAGE (OUTPATIENT)
Dept: OTHER | Facility: OTHER | Age: 70
End: 2020-09-29

## 2020-10-01 ENCOUNTER — TELEPHONE (OUTPATIENT)
Dept: HEPATOLOGY | Facility: CLINIC | Age: 70
End: 2020-10-01

## 2020-10-01 NOTE — TELEPHONE ENCOUNTER
----- Message from Steffany Mendez sent at 9/27/2020  7:37 PM CDT -----  Pt records reviewed.   Pt will be referred to Hepatology.    Initial referral received  from the [facility].   Referring Provider/diagnosis  Omar Ambriz MD       Fatty liver  2015 pt of Dr Nguyen.     Referral letter sent to patient.

## 2020-11-03 ENCOUNTER — OFFICE VISIT (OUTPATIENT)
Dept: URGENT CARE | Facility: CLINIC | Age: 70
End: 2020-11-03
Payer: MEDICARE

## 2020-11-03 VITALS
RESPIRATION RATE: 17 BRPM | OXYGEN SATURATION: 95 % | HEART RATE: 98 BPM | DIASTOLIC BLOOD PRESSURE: 72 MMHG | BODY MASS INDEX: 27.64 KG/M2 | TEMPERATURE: 98 F | HEIGHT: 66 IN | WEIGHT: 172 LBS | SYSTOLIC BLOOD PRESSURE: 124 MMHG

## 2020-11-03 DIAGNOSIS — L23.7 POISON IVY DERMATITIS: Primary | ICD-10-CM

## 2020-11-03 PROCEDURE — 99214 OFFICE O/P EST MOD 30 MIN: CPT | Mod: 25,S$GLB,, | Performed by: FAMILY MEDICINE

## 2020-11-03 PROCEDURE — 96372 THER/PROPH/DIAG INJ SC/IM: CPT | Mod: S$GLB,,, | Performed by: FAMILY MEDICINE

## 2020-11-03 PROCEDURE — 99214 PR OFFICE/OUTPT VISIT, EST, LEVL IV, 30-39 MIN: ICD-10-PCS | Mod: 25,S$GLB,, | Performed by: FAMILY MEDICINE

## 2020-11-03 PROCEDURE — 96372 PR INJECTION,THERAP/PROPH/DIAG2ST, IM OR SUBCUT: ICD-10-PCS | Mod: S$GLB,,, | Performed by: FAMILY MEDICINE

## 2020-11-03 RX ORDER — BETAMETHASONE SODIUM PHOSPHATE AND BETAMETHASONE ACETATE 3; 3 MG/ML; MG/ML
6 INJECTION, SUSPENSION INTRA-ARTICULAR; INTRALESIONAL; INTRAMUSCULAR; SOFT TISSUE
Status: COMPLETED | OUTPATIENT
Start: 2020-11-03 | End: 2020-11-03

## 2020-11-03 RX ORDER — MOMETASONE FUROATE 1 MG/G
CREAM TOPICAL
Qty: 45 G | Refills: 1 | Status: SHIPPED | OUTPATIENT
Start: 2020-11-03 | End: 2021-02-25

## 2020-11-03 RX ADMIN — BETAMETHASONE SODIUM PHOSPHATE AND BETAMETHASONE ACETATE 6 MG: 3; 3 INJECTION, SUSPENSION INTRA-ARTICULAR; INTRALESIONAL; INTRAMUSCULAR; SOFT TISSUE at 11:11

## 2020-11-03 NOTE — PATIENT INSTRUCTIONS
Managing a Poison Ivy, Poison Oak, or Poison Sumac Reaction  If you come in contact with urushiol    If you think you may have come in contact with the sap oil (called urushiol) contained in poison ivy, poison oak, or poison sumac plants, wash the affected part of your skin. Do this within 15 minutes after contact. Use water or preferably, soap and water.  Undress, and wash your clothes and gear as soon as you can. Be sure to wash any pet that was with you. Taking these steps can help prevent spreading sap oil to someone else. If you have a rash, but are not sure if it is from one of these plants, see your healthcare provider.  To soothe the itching  Your skin may react to poison oak, poison ivy, and poison sumac within hours to a few days after contact. Once you have come into contact with these plants, you cant stop the reaction. But you can take these steps to soothe the itching:  · Dont scratch or scrub your rash. Not even if the itching is severe. Scratching can lead to infection.  · Bathe in lukewarm (not hot) water. Or take short cool showers to relieve the itching. For a more soothing bath, add oatmeal to the water.  · Use antihistamines that are taken by mouth. These include diphenhydramine. You can buy these at the pharmacy. Talk to your healthcare provider or pharmacist for more information on oral antihistamines.  · Use over-the-counter treatments on your skin. These include cortisone and calamine lotion.  How your skin may react  A mild rash may become red, swollen, and itchy. The rash may form a line on your skin where you brushed against the plant. If you have a severe rash, your itching may worsen. And your rash may blister and ooze. If this happens, seek medical care. The fluid from your blisters will not make your rash spread. With or without medical care, your rash may last up to 3 weeks. In the future, try to avoid coming in contact with these plants.  When to call your healthcare  provider  Call your healthcare provider if:  · Your rash is severe  · The rash spreads beyond the exposed part of your body or affects your face.  · The rash does not clear up within a few weeks  You may be given medicine to take by mouth or apply directly on the skin.  Call 911  Call 911 if you have any of the following:  · Trouble breathing or swallowing  · Any significant swelling  Date Last Reviewed: 3/1/2017  © 2419-1584 [employer]. [address] [place] [address], [place] [address]. All rights reserved. This information is not intended as a substitute for professional medical care. Always follow your healthcare professional's instructions.

## 2020-11-03 NOTE — PROGRESS NOTES
"Subjective:       Patient ID: Lima Maldonado is a 70 y.o. female.    Vitals:  height is 5' 6" (1.676 m) and weight is 78 kg (172 lb). Her temperature is 98.3 °F (36.8 °C). Her blood pressure is 124/72 and her pulse is 98. Her respiration is 17 and oxygen saturation is 95%.     Chief Complaint: Rash    This is a 70 y.o. female who presents today with a chief complaint of   Poison Ivy rash on both arms, sx started on Friday. Pt taking Benadryl and using topical ointments as well. Very little relief    Rash  This is a new problem. The current episode started in the past 7 days. The problem has been gradually worsening since onset. The affected locations include the right arm and left arm. The rash is characterized by itchiness and redness. She was exposed to plant contact. Pertinent negatives include no cough, fever or sore throat. Past treatments include antihistamine, antibiotic cream and anti-itch cream. The treatment provided mild relief.       Constitution: Negative for chills and fever.   HENT: Negative for facial swelling and sore throat.    Neck: Negative for painful lymph nodes.   Eyes: Negative for eye itching and eyelid swelling.   Respiratory: Negative for cough.    Musculoskeletal: Negative for joint pain and joint swelling.   Skin: Positive for rash and erythema. Negative for color change, pale, wound, abrasion, laceration, lesion, skin thickening/induration, puncture wound, bruising, abscess, avulsion and hives.   Allergic/Immunologic: Positive for itching. Negative for environmental allergies, immunocompromised state and hives.   Hematologic/Lymphatic: Negative for swollen lymph nodes.       Objective:      Physical Exam   HENT:   Head: Normocephalic and atraumatic.   Nose: Nose normal.   Cardiovascular: Normal rate, regular rhythm, normal heart sounds and normal pulses.   Pulmonary/Chest: Effort normal and breath sounds normal.   Abdominal: Normal appearance.   Neurological: She is alert.   Skin: " Skin is rash (hives on forearms bilaterally, minimal oozing noted). Lesions:  erythema  Nursing note and vitals reviewed.        Assessment:       1. Poison ivy dermatitis        Plan:         Poison ivy dermatitis  -     betamethasone acetate-betamethasone sodium phosphate injection 6 mg  -     mometasone 0.1% (ELOCON) 0.1 % cream; Apply to affected area daily  Dispense: 45 g; Refill: 1    discussed symptom management.

## 2020-11-06 ENCOUNTER — TELEPHONE (OUTPATIENT)
Dept: HEPATOLOGY | Facility: CLINIC | Age: 70
End: 2020-11-06

## 2020-11-06 ENCOUNTER — OFFICE VISIT (OUTPATIENT)
Dept: INTERNAL MEDICINE | Facility: CLINIC | Age: 70
End: 2020-11-06
Payer: MEDICARE

## 2020-11-06 ENCOUNTER — PROCEDURE VISIT (OUTPATIENT)
Dept: HEPATOLOGY | Facility: CLINIC | Age: 70
End: 2020-11-06
Payer: MEDICARE

## 2020-11-06 ENCOUNTER — OFFICE VISIT (OUTPATIENT)
Dept: HEPATOLOGY | Facility: CLINIC | Age: 70
End: 2020-11-06
Payer: MEDICARE

## 2020-11-06 VITALS
WEIGHT: 175.5 LBS | DIASTOLIC BLOOD PRESSURE: 62 MMHG | TEMPERATURE: 97 F | HEIGHT: 66 IN | SYSTOLIC BLOOD PRESSURE: 131 MMHG | BODY MASS INDEX: 28.21 KG/M2 | HEART RATE: 94 BPM

## 2020-11-06 VITALS
HEIGHT: 66 IN | HEART RATE: 94 BPM | OXYGEN SATURATION: 94 % | SYSTOLIC BLOOD PRESSURE: 131 MMHG | WEIGHT: 174.19 LBS | RESPIRATION RATE: 18 BRPM | TEMPERATURE: 97 F | BODY MASS INDEX: 27.99 KG/M2 | DIASTOLIC BLOOD PRESSURE: 62 MMHG

## 2020-11-06 DIAGNOSIS — K76.0 FATTY LIVER: Primary | ICD-10-CM

## 2020-11-06 DIAGNOSIS — L23.7 POISON IVY DERMATITIS: Primary | ICD-10-CM

## 2020-11-06 PROCEDURE — 1126F AMNT PAIN NOTED NONE PRSNT: CPT | Mod: HCNC,S$GLB,, | Performed by: NURSE PRACTITIONER

## 2020-11-06 PROCEDURE — 3078F DIAST BP <80 MM HG: CPT | Mod: HCNC,CPTII,S$GLB, | Performed by: NURSE PRACTITIONER

## 2020-11-06 PROCEDURE — 1159F PR MEDICATION LIST DOCUMENTED IN MEDICAL RECORD: ICD-10-PCS | Mod: HCNC,GC,S$GLB, | Performed by: STUDENT IN AN ORGANIZED HEALTH CARE EDUCATION/TRAINING PROGRAM

## 2020-11-06 PROCEDURE — 99214 PR OFFICE/OUTPT VISIT, EST, LEVL IV, 30-39 MIN: ICD-10-PCS | Mod: HCNC,GC,S$GLB, | Performed by: STUDENT IN AN ORGANIZED HEALTH CARE EDUCATION/TRAINING PROGRAM

## 2020-11-06 PROCEDURE — 1101F PR PT FALLS ASSESS DOC 0-1 FALLS W/OUT INJ PAST YR: ICD-10-PCS | Mod: HCNC,CPTII,GC,S$GLB | Performed by: STUDENT IN AN ORGANIZED HEALTH CARE EDUCATION/TRAINING PROGRAM

## 2020-11-06 PROCEDURE — 1159F MED LIST DOCD IN RCRD: CPT | Mod: HCNC,S$GLB,, | Performed by: NURSE PRACTITIONER

## 2020-11-06 PROCEDURE — 99499 UNLISTED E&M SERVICE: CPT | Mod: S$GLB,,, | Performed by: NURSE PRACTITIONER

## 2020-11-06 PROCEDURE — 99999 PR PBB SHADOW E&M-EST. PATIENT-LVL III: CPT | Mod: PBBFAC,HCNC,GC, | Performed by: STUDENT IN AN ORGANIZED HEALTH CARE EDUCATION/TRAINING PROGRAM

## 2020-11-06 PROCEDURE — 3078F DIAST BP <80 MM HG: CPT | Mod: HCNC,CPTII,GC,S$GLB | Performed by: STUDENT IN AN ORGANIZED HEALTH CARE EDUCATION/TRAINING PROGRAM

## 2020-11-06 PROCEDURE — 1159F MED LIST DOCD IN RCRD: CPT | Mod: HCNC,GC,S$GLB, | Performed by: STUDENT IN AN ORGANIZED HEALTH CARE EDUCATION/TRAINING PROGRAM

## 2020-11-06 PROCEDURE — 3075F SYST BP GE 130 - 139MM HG: CPT | Mod: HCNC,CPTII,GC,S$GLB | Performed by: STUDENT IN AN ORGANIZED HEALTH CARE EDUCATION/TRAINING PROGRAM

## 2020-11-06 PROCEDURE — 3078F PR MOST RECENT DIASTOLIC BLOOD PRESSURE < 80 MM HG: ICD-10-PCS | Mod: HCNC,CPTII,GC,S$GLB | Performed by: STUDENT IN AN ORGANIZED HEALTH CARE EDUCATION/TRAINING PROGRAM

## 2020-11-06 PROCEDURE — 3008F BODY MASS INDEX DOCD: CPT | Mod: HCNC,CPTII,S$GLB, | Performed by: NURSE PRACTITIONER

## 2020-11-06 PROCEDURE — 1126F PR PAIN SEVERITY QUANTIFIED, NO PAIN PRESENT: ICD-10-PCS | Mod: HCNC,S$GLB,, | Performed by: NURSE PRACTITIONER

## 2020-11-06 PROCEDURE — 1101F PT FALLS ASSESS-DOCD LE1/YR: CPT | Mod: HCNC,CPTII,GC,S$GLB | Performed by: STUDENT IN AN ORGANIZED HEALTH CARE EDUCATION/TRAINING PROGRAM

## 2020-11-06 PROCEDURE — 3075F PR MOST RECENT SYSTOLIC BLOOD PRESS GE 130-139MM HG: ICD-10-PCS | Mod: HCNC,CPTII,GC,S$GLB | Performed by: STUDENT IN AN ORGANIZED HEALTH CARE EDUCATION/TRAINING PROGRAM

## 2020-11-06 PROCEDURE — 1101F PR PT FALLS ASSESS DOC 0-1 FALLS W/OUT INJ PAST YR: ICD-10-PCS | Mod: HCNC,CPTII,S$GLB, | Performed by: NURSE PRACTITIONER

## 2020-11-06 PROCEDURE — 3075F SYST BP GE 130 - 139MM HG: CPT | Mod: HCNC,CPTII,S$GLB, | Performed by: NURSE PRACTITIONER

## 2020-11-06 PROCEDURE — 3078F PR MOST RECENT DIASTOLIC BLOOD PRESSURE < 80 MM HG: ICD-10-PCS | Mod: HCNC,CPTII,S$GLB, | Performed by: NURSE PRACTITIONER

## 2020-11-06 PROCEDURE — 1126F PR PAIN SEVERITY QUANTIFIED, NO PAIN PRESENT: ICD-10-PCS | Mod: HCNC,GC,S$GLB, | Performed by: STUDENT IN AN ORGANIZED HEALTH CARE EDUCATION/TRAINING PROGRAM

## 2020-11-06 PROCEDURE — 99999 PR PBB SHADOW E&M-EST. PATIENT-LVL III: ICD-10-PCS | Mod: PBBFAC,HCNC,GC, | Performed by: STUDENT IN AN ORGANIZED HEALTH CARE EDUCATION/TRAINING PROGRAM

## 2020-11-06 PROCEDURE — 91200 LIVER ELASTOGRAPHY: CPT | Mod: HCNC,S$GLB,, | Performed by: NURSE PRACTITIONER

## 2020-11-06 PROCEDURE — 99499 RISK ADDL DX/OHS AUDIT: ICD-10-PCS | Mod: S$GLB,,, | Performed by: NURSE PRACTITIONER

## 2020-11-06 PROCEDURE — 3008F BODY MASS INDEX DOCD: CPT | Mod: HCNC,CPTII,GC,S$GLB | Performed by: STUDENT IN AN ORGANIZED HEALTH CARE EDUCATION/TRAINING PROGRAM

## 2020-11-06 PROCEDURE — 99999 PR PBB SHADOW E&M-EST. PATIENT-LVL V: ICD-10-PCS | Mod: PBBFAC,HCNC,, | Performed by: NURSE PRACTITIONER

## 2020-11-06 PROCEDURE — 1101F PT FALLS ASSESS-DOCD LE1/YR: CPT | Mod: HCNC,CPTII,S$GLB, | Performed by: NURSE PRACTITIONER

## 2020-11-06 PROCEDURE — 1159F PR MEDICATION LIST DOCUMENTED IN MEDICAL RECORD: ICD-10-PCS | Mod: HCNC,S$GLB,, | Performed by: NURSE PRACTITIONER

## 2020-11-06 PROCEDURE — 3008F PR BODY MASS INDEX (BMI) DOCUMENTED: ICD-10-PCS | Mod: HCNC,CPTII,GC,S$GLB | Performed by: STUDENT IN AN ORGANIZED HEALTH CARE EDUCATION/TRAINING PROGRAM

## 2020-11-06 PROCEDURE — 99999 PR PBB SHADOW E&M-EST. PATIENT-LVL V: CPT | Mod: PBBFAC,HCNC,, | Performed by: NURSE PRACTITIONER

## 2020-11-06 PROCEDURE — 99214 PR OFFICE/OUTPT VISIT, EST, LEVL IV, 30-39 MIN: ICD-10-PCS | Mod: HCNC,S$GLB,, | Performed by: NURSE PRACTITIONER

## 2020-11-06 PROCEDURE — 3008F PR BODY MASS INDEX (BMI) DOCUMENTED: ICD-10-PCS | Mod: HCNC,CPTII,S$GLB, | Performed by: NURSE PRACTITIONER

## 2020-11-06 PROCEDURE — 3075F PR MOST RECENT SYSTOLIC BLOOD PRESS GE 130-139MM HG: ICD-10-PCS | Mod: HCNC,CPTII,S$GLB, | Performed by: NURSE PRACTITIONER

## 2020-11-06 PROCEDURE — 99214 OFFICE O/P EST MOD 30 MIN: CPT | Mod: HCNC,GC,S$GLB, | Performed by: STUDENT IN AN ORGANIZED HEALTH CARE EDUCATION/TRAINING PROGRAM

## 2020-11-06 PROCEDURE — 91200 FIBROSCAN (VIBRATION CONTROLLED TRANSIENT ELASTOGRAPHY): ICD-10-PCS | Mod: HCNC,S$GLB,, | Performed by: NURSE PRACTITIONER

## 2020-11-06 PROCEDURE — 99214 OFFICE O/P EST MOD 30 MIN: CPT | Mod: HCNC,S$GLB,, | Performed by: NURSE PRACTITIONER

## 2020-11-06 PROCEDURE — 1126F AMNT PAIN NOTED NONE PRSNT: CPT | Mod: HCNC,GC,S$GLB, | Performed by: STUDENT IN AN ORGANIZED HEALTH CARE EDUCATION/TRAINING PROGRAM

## 2020-11-06 RX ORDER — TRIAMCINOLONE ACETONIDE 1 MG/G
OINTMENT TOPICAL 2 TIMES DAILY
Qty: 1 TUBE | Refills: 0 | Status: SHIPPED | OUTPATIENT
Start: 2020-11-06 | End: 2021-02-25

## 2020-11-06 RX ORDER — METFORMIN HYDROCHLORIDE 500 MG/1
500 TABLET ORAL 2 TIMES DAILY
COMMUNITY
End: 2020-12-21 | Stop reason: DRUGHIGH

## 2020-11-06 RX ORDER — HYDROXYZINE HYDROCHLORIDE 25 MG/1
25 TABLET, FILM COATED ORAL 3 TIMES DAILY PRN
Qty: 21 TABLET | Refills: 0 | Status: SHIPPED | OUTPATIENT
Start: 2020-11-06 | End: 2021-02-25

## 2020-11-06 RX ORDER — CEPHALEXIN 500 MG/1
500 CAPSULE ORAL EVERY 12 HOURS
Qty: 14 CAPSULE | Refills: 0 | Status: SHIPPED | OUTPATIENT
Start: 2020-11-06 | End: 2020-11-13

## 2020-11-06 RX ORDER — METHYLPREDNISOLONE 4 MG/1
TABLET ORAL
Qty: 1 PACKAGE | Refills: 0 | Status: SHIPPED | OUTPATIENT
Start: 2020-11-06 | End: 2020-11-12

## 2020-11-06 NOTE — PATIENT INSTRUCTIONS
1. Fibroscan today to look for fat or scar tissue in the liver. Reassuringly, your Fibroscan shows no progression in fibrosis. You do have a significant amount of fat in the liver, so please work on dietary changes and weight loss, as below.   2. Schedule Ultrasound of the liver.  3. Will check immunity markers for Hepatitis B and arrange for re-vaccination if needed.  4. Follow up in 1 year with repeat Fibroscan at visit. Please fast 3-4 hours prior to appointment.     There is no FDA approved therapy for non-alcoholic fatty liver disease. Therefore, these things are important:  1. Weight loss goal of 15-20 pounds.  2. Low carb/sugar, high fiber and protein diet.  3. Exercise, 5 days per week, 30 minutes per day, as tolerated.  4. Recommend good cholesterol, blood pressure, blood sugar levels.    In some people, fatty liver can progress to steatohepatitis (inflamatory fatty liver) and possibly to cirrhosis, putting one at increased risk for liver cancer, liver failure, and death. Therefore, the lifestyle changes are very important to decrease this risk.     Website with information about fatty liver and inflammation related to fatty liver (CARTER) = www.nashtruth.com  AND www.NASHactually.com

## 2020-11-06 NOTE — Clinical Note
Augusto Herzog,    Please contact patient to schedule labs and ultrasound in December, and place recall for RTC in 1 year with repeat Fibroscan at visit.     Thanks!    Jessie

## 2020-11-06 NOTE — PROGRESS NOTES
[facility] VISIT NEW PT NOTE    REFERRING PROVIDER:  No ref. provider found    CHIEF COMPLAINT: Fatty Liver    HPI: Ms. Maldonado is a 70 y.o. White female with PMH noted below, presenting for evaluation of fatty liver. She was previously followed by Dr. Nguyen; last seen in clinic in 2015. She has a history of mildly elevated liver enzymes and mildly elevated total bilirubin, consistent with Gilbert syndrome. Most recent LFT's in May show normal liver enzymes and intact synthetic function. Risk factors for the development of fatty liver disease include HTN, HLD, DMII and being overweight (BMI 28). She is on Metformin 500 mg BID; last HgbA1c was 5.3% in May. She also has a history of NELDA and hypothyroidism. She lost 35-40 pounds over the past couple of years, but regained 25 pounds during COVID-19 quarantine.  Fibroscan in 2015 was suggestive of F2 fibrosis. Repeat Fibroscan today is suggestive of significant hepatic steatosis with F1 fibrosis and a low likelihood of cirrhosis. She was vaccinated against HAV and HBV in 2014, but labs in 2015 showed a positive HAAB but negative Hep B surface antibody. She denies any signs or symptoms of advanced liver disease including jaundice, dark urine, abdominal distention, hematemesis, melena, slowed mentation.     Review of patient's allergies indicates:   Allergen Reactions    Erythromycin      Other reaction(s): Nausea    Erythromycin (bulk)      Other reaction(s): Nausea    Oxycodone Nausea And Vomiting    Oxycodone-acetaminophen Nausea And Vomiting     Other reaction(s): Nausea     Current Outpatient Medications on File Prior to Visit   Medication Sig Dispense Refill    ascorbic acid (VITAMIN C) 1000 MG tablet Take 1 tablet by mouth once daily.      azelastine (ASTELIN) 137 mcg (0.1 %) nasal spray Two sprays in each nostril, sniff gently until absorbed and then follow with 1 spray of fluticasone, use both sprays twice daily as needed to control allergy  symptoms (Patient taking differently: 2 sprays Daily. Two sprays in each nostril, sniff gently until absorbed and then follow with 1 spray of fluticasone, use both sprays twice daily as needed to control allergy symptoms) 30 mL 11    CALCIUM CARBONATE/VITAMIN D3 (CALTRATE 600 + D ORAL) Take 1 tablet by mouth 2 (two) times daily.       CANNABIDIOL, CBD, EXTRACT ORAL Take 1 drop by mouth Daily.      celecoxib (CELEBREX) 100 MG capsule TAKE 1 CAPSULE (100 MG TOTAL) BY MOUTH DAILY AS NEEDED. 90 capsule 0    coenzyme Q10-vitamin E (COQ10 ) 100-100 mg-unit Cap Take 1 capsule by mouth once daily.       estradiol (YUVAFEM) 10 mcg Tab PLACE 1 TABLET (10 MCG TOTAL) VAGINALLY TWICE A WEEK. 24 tablet 3    fluticasone (FLONASE) 50 mcg/actuation nasal spray 1 spray by Each Nare route 2 (two) times daily.       gabapentin (NEURONTIN) 300 MG capsule TAKE 2 CAPSULES (600 MG TOTAL) BY MOUTH EVERY EVENING. 180 capsule 3    letrozole (FEMARA) 2.5 mg Tab TAKE 1 TABLET BY MOUTH EVERY DAY 90 tablet 3    levothyroxine (SYNTHROID) 50 MCG tablet TAKE 1 TABLET BY MOUTH EVERY DAY 90 tablet 3    losartan (COZAAR) 50 MG tablet TAKE 1 TABLET BY MOUTH EVERY DAY 90 tablet 3    metFORMIN (GLUCOPHAGE) 500 MG tablet Take 500 mg by mouth 2 (two) times a day.      mometasone 0.1% (ELOCON) 0.1 % cream Apply to affected area daily 45 g 1    montelukast (SINGULAIR) 10 mg tablet TAKE 1 TABLET BY MOUTH EVERY DAY IN THE EVENING 30 tablet 0    MULTIVITAMIN ORAL Take 1 tablet by mouth once daily.       omega-3 fatty acids-vitamin E (FISH OIL) 1,000 mg Cap Take 1 capsule by mouth once daily.       pantoprazole (PROTONIX) 20 MG tablet Take 1 tablet (20 mg total) by mouth before breakfast. 90 tablet 3    prednisolon/gatiflox/bromfenac (PREDNISOL ACE-GATIFLOX-BROMFEN) 1-0.5-0.075 % DrpS Apply 1 drop to eye 3 (three) times daily. 5 mL 3    simvastatin (ZOCOR) 20 MG tablet TAKE 1 TABLET BY MOUTH EVERY DAY IN THE EVENING 90 tablet 3    vitamin  D 1000 units Tab Take 1,000 Units by mouth once daily.       [DISCONTINUED] metFORMIN (GLUCOPHAGE) 500 MG tablet TAKE 2 TABLETS BY MOUTH TWICE A DAY WITH MEALS (Patient taking differently: Take 500 mg by mouth 2 (two) times daily with meals. ) 180 tablet 3     Current Facility-Administered Medications on File Prior to Visit   Medication Dose Route Frequency Provider Last Rate Last Dose    sodium chloride 0.9% flush 10 mL  10 mL Intravenous PRN Ivanna Coleman MD        sodium chloride 0.9% flush 10 mL  10 mL Intravenous PRN Ivanna Coleman MD         PMHX:  has a past medical history of Acute cystitis without hematuria (7/17/2017), Arthritis, Back pain, Breast cancer, Class 1 obesity due to excess calories with serious comorbidity and body mass index (BMI) of 30.0 to 30.9 in adult (11/29/2018), Elevated bilirubin (11/19/2013), Fatty liver, GERD (gastroesophageal reflux disease), Glucose intolerance (impaired glucose tolerance), Hyperlipidemia, Hypertension, Hypothyroid, Hypothyroidism, Malignant neoplasm of lower-outer quadrant of left female breast (11/15/2016), Obesity, Obesity, diabetes, and hypertension syndrome (12/12/2018), Osteopenia, Osteoporosis, Primary insomnia (11/2/2016), Shingles, Sleep apnea, Squamous cell carcinoma (2011), Stress incontinence of urine (1/11/2018), Trouble in sleeping, Type 2 diabetes mellitus with diabetic polyneuropathy, without long-term current use of insulin (12/26/2017), Urinary incontinence, and Well woman exam with routine gynecological exam (11/2/2016).    PSHX:  has a past surgical history that includes left modified radical mastectomy (Feb 1997); Breast implant (Bilateral, 1998); left breast reconstruction (2005); Pelvic laparoscopy (1978); robotic lap  hysterectomy with bso (2008); Lipoma resection (2010); reduction of mons pubis (2011); Hysterectomy (2008); pr removal of ovary/tube(s) (2008 ); Modified radical mastectomy w/ axillary lymph node dissection (02/1997);  "Appendectomy (); Breast surgery; Upper gastrointestinal endoscopy (2013); Colonoscopy (N/A, 5/15/2018); Cataract extraction w/  intraocular lens implant (Left, 2020); Cataract extraction w/  intraocular lens implant (Right, 2020); and Eye surgery.    FAMILY HISTORY: Negative for liver disease, reviewed in [facility]    SOCIAL HISTORY:   Social History     Tobacco Use   Smoking Status Former Smoker    Packs/day: 0.50    Years: 3.00    Pack years: 1.50    Start date: 4/15/1969    Quit date: 4/15/1972    Years since quittin.5   Smokeless Tobacco Never Used   Tobacco Comment    started at age 18 during college      Social History     Substance and Sexual Activity   Alcohol Use Yes    Alcohol/week: 0.0 standard drinks    Frequency: Monthly or less    Drinks per session: 1 or 2    Binge frequency: Never    Comment: occasional, 1 cocktail once monthly     Social History     Substance and Sexual Activity   Drug Use No     ROS:   GENERAL: Denies fever, chills, weight loss/gain, fatigue  HEENT: Denies headaches, dizziness, vision/hearing changes  CARDIOVASCULAR: Denies chest pain, palpitations, or edema  RESPIRATORY: Denies dyspnea, cough  GI: Denies abdominal pain, rectal bleeding, nausea, vomiting. No change in bowel pattern or color  : Denies dysuria, hematuria   SKIN: Denies rash, itching   NEURO: Denies confusion, memory loss, or mood changes  PSYCH: Denies depression or anxiety  HEME/LYMPH: Denies easy bruising or bleeding    PHYSICAL EXAM:   Friendly White female, in no acute distress; alert and oriented to person, place and time  VITALS: /62 (BP Location: Right arm, Patient Position: Sitting, BP Method: Medium (Automatic))   Pulse 94   Temp 97 °F (36.1 °C) (Oral)   Resp 18   Ht 5' 6" (1.676 m)   Wt 79 kg (174 lb 2.6 oz)   SpO2 (!) 94%   BMI 28.11 kg/m²   HENT: Normocephalic, without obvious abnormality. Oral mucosa pink and moist. Dentition good.  EYES: Sclerae anicteric. No " conjunctival pallor.   NECK: Supple. No masses or cervical adenopathy.  CARDIOVASCULAR: Regular rate and rhythm. No murmurs.  RESPIRATORY: Normal respiratory effort. BBS CTA. No wheezes or crackles.  GI: Soft, non-tender, non-distended. No hepatosplenomegaly. No masses palpable. No ascites.  EXTREMITIES:  No clubbing, cyanosis or edema.  SKIN: Warm and dry. No jaundice. No telangectasias noted. No palmar erythema. Rash to BUE - seen on 11/3 at [facility] Po[facility] Derm[facility].   NEURO:  Normal gait. No asterixis.  PSYCH:  Memory intact. Thought and speech pattern appropriate. Behavior normal. No depression or anxiety noted.    DIAGNOSTIC STUDIES:    EGD 8/5/2013:    Impression:   - Normal examined duodenum.                         - One gastric polyp. Resected and retrieved.                         - Hiatus hernia.   Recommendation:                              - Follow an antireflux regimen.                         - Repeat the upper endoscopy in 5 years for                         surveillance based on pathology results.     COLONOSCOPY 5/15/2018:    Findings:        The perianal and digital rectal examinations were normal.        The entire examined colon appeared normal.   Impression:           - The entire examined colon is normal.                         - No specimens collected.   Recommendation:                              - Repeat colonoscopy in 10 years for screening                         purposes.     US ABDOMEN COMPLETE 11/12/2013:    Findings: The liver is enlarged extending below the costal margin and measures 17.4 cm.  There is diffuse attenuation of sound by the liver consistent with a diffuse parenchymal process such as fatty infiltration.  No focal hepatic lesions are   identified.  No intra- or extrahepatic biliary ductal dilatation. The common bile duct measures 0.2 cm.  The gallbladder appears normal. No evidence for cholelithiasis.  Sonographic Dillon's sign is negative. The visualized  portions of the pancreas,   aorta and IVC appear normal. The kidneys are normal in size and measure 11.3 cm on the right and 12.1 cm on the left.  There is an incidentally noted 1.3-cm right peripelvic renal cyst. The spleen is normal in size and measures 9.7 cm. No ascites.      Impression         1. Hepatomegaly with hepatic steatosis.      FIBROSCAN 2015:    Fibroscan readin.4 KPa     IQR/med:18%     Fibrosis:F2      FIBROSCAN 2020:    Findings  Median liver stiffness score:  6.5  CAP Reading: dB/m:  323     IQR/med %:  14  Interpretation  Fibrosis interpretation is based on medial liver stiffness - Kilopascal (kPa).     Fibrosis Stage:  F1  Steatosis interpretation is based on controlled attenuation parameter - (dB/m).     Steatosis Grade:  S3      ASSESSMENT & PLAN:  70 y.o. White female with fatty liver disease.      - Fibroscan to restage liver disease.  - Schedule Ultrasound of the liver.  - Repeat LFT's in 1 year.   - Will check immunity marker for Hepatitis B, and arrange for re-vaccination if needed.  - Recommend weight loss of 15-20 pounds.  - Recommend low carb/sugar, high fiber and protein diet.  - Recommend aerobic exercise, 5 days per week, 30 minutes per day, as tolerated.  - Recommend good cholesterol, blood pressure, and blood sugar levels.  - Follow up with Internal Medicine provider for further treatment of poison ivy dermatitis.    Follow up in about 1 year (around 2021). with repeat Fibroscan before visit.    Thank you for allowing me to participate in the care of Lima Maldonado       Hepatology Nurse Practitioner  [facility]  2020 @ [id number]    CC'ed note to:   No ref. provider found  Joie Mccall MD

## 2020-11-06 NOTE — PATIENT INSTRUCTIONS
-- Please complete medrol dose pack and Keflex as prescribed  -- Cool compresses three times daily  -- Oatmeal baths may help  -- STOP mometasone cream  -- START triamcinolone ointment directly to rash only on upper and lower extremities. Avoid face  -- Hydroxyzine as needed for itching. It may make you drowsy  -- Acetaminophen and ibuprofen over the counter as needed for pain  -- Follow up if continues to worsen

## 2020-11-06 NOTE — TELEPHONE ENCOUNTER
Attempted to contact patient and schedule appointments as instructed but patient didn't answer. Left patient a voicemail stating the purpose for the call. Awaiting a call back. Scheduled appointment. Sent reminders in the mail.     Instructions:    VAISHALI Pastor, VIANEY Herzog,     Please contact patient to schedule labs and ultrasound in December, and place recall for RTC in 1 year with repeat Fibroscan at visit.     Thanks!     Jessie

## 2020-11-06 NOTE — PROCEDURES
FibroScan (Vibration Controlled Transient Elastography)    Date/Time: 11/6/2020 2:15 PM  Performed by: Jessie Palumbo NP  Authorized by: Jessie Palumbo NP     Diagnosis:  NAFLD    Probe:     Universal Protocol: Patient's identity, procedure and site were verified, confirmatory pause was performed. Discussed procedure including risks and potential complications.  Questions answered.  Patient verbalizes understanding and wishes to proceed with VCTE.     Procedure: After providing explanations of the procedure, patient was placed in the supine position with right arm in maximum abduction to allow optimal exposure of right lateral abdomen.  Patient was briefly assessed, Testing was performed in the mid-axillary location, 50Hz Shear Wave pulses were applied and the resulting Shear Wave and Propagation Speed detected with a 3.5 MHz ultrasonic signal, using the FibroScan probe, Skin to liver capsule distance and liver parenchyma were accessed during the entire examination with the FibroScan probe, Patient was instructed to breathe normally and to abstain from sudden movements during the procedure, allowing for random measurements of liver stiffness. At least 10 Shear Waves were produced, Individual measurements of each Shear Wave were calculated.  Patient tolerated the procedure well with no complications.  Meets discharge criteria as was dismissed.  Rates pain 0 out of 10.  Patient will follow up with ordering provider to review results.      Findings  Median liver stiffness score:  6.5  CAP Reading: dB/m:  323    IQR/med %:  14  Interpretation  Fibrosis interpretation is based on medial liver stiffness - Kilopascal (kPa).    Fibrosis Stage:  F 0-1  Steatosis interpretation is based on controlled attenuation parameter - (dB/m).    Steatosis Grade:  S3

## 2020-11-06 NOTE — PROGRESS NOTES
Internal Medicine Clinic Note  2020    Subjective   Patient Name: Lima Maldonado  : 1950    Chief Complaint: No chief complaint on file.      History of Present Illness:  Ms. Lima Maldonado is a 70 y.o. female who presents for rash. She was in contact with poison ivy and developed small red rash on BUE 7 days ago. It has been red and itching. She was using Benadryl PRN itching and topical anti-itching cream. She did not have relief and went to urgent care 3 days ago. She was diagnosed with poison ivy dermatitis, administered betamethasone injection, and prescribed mometasone cream daily. She has also been applying ice. She describes the pain as burning and associated with constant itching. It has continued to worsen, becoming larger on BUE, and now LUE is edematous and warm. Several blisters have opened and oozed and left behind honey colored crust. She applied Neosporin. She now has new small areas on BLE and two small red areas on left eyelid. She also feels like her face is puffy. Denies fever, chills, dizziness, syncope, CP, SOB, N/V, diarrhea. Reports  also with poison ivy dermatitis, which he has had in past, although his is resolving at this time.    Review of Systems   Constitutional: Negative for chills and fever.   HENT: Negative for sore throat.    Respiratory: Negative for cough and shortness of breath.    Cardiovascular: Negative for chest pain, palpitations and leg swelling.   Gastrointestinal: Negative for abdominal pain, constipation, diarrhea, nausea and vomiting.   Genitourinary: Negative for dysuria.   Musculoskeletal: Negative for myalgias.   Skin: Positive for itching and rash.   Neurological: Negative for dizziness and headaches.   Psychiatric/Behavioral: The patient is not nervous/anxious.        PAST HISTORY:     Past Medical History:   Diagnosis Date    Acute cystitis without hematuria 2017    Arthritis     Back pain     Breast cancer     originally dx in  02/1997- treated with surgery, chemo and radiation     Class 1 obesity due to excess calories with serious comorbidity and body mass index (BMI) of 30.0 to 30.9 in adult 11/29/2018    Elevated bilirubin 11/19/2013    Fatty liver     GERD (gastroesophageal reflux disease)     Glucose intolerance (impaired glucose tolerance)     Hyperlipidemia     Hypertension     Hypothyroid     Hypothyroidism     hypothyroidism    Malignant neoplasm of lower-outer quadrant of left female breast 11/15/2016    Obesity     Obesity, diabetes, and hypertension syndrome 12/12/2018    Osteopenia     Osteoporosis     Primary insomnia 11/2/2016    Shingles     Sleep apnea     wears CPAP nightly     Squamous cell carcinoma 2011    right forearm, sx by Dr. Corinne Ray    Stress incontinence of urine 1/11/2018    Trouble in sleeping     Type 2 diabetes mellitus with diabetic polyneuropathy, without long-term current use of insulin 12/26/2017    Urinary incontinence     Well woman exam with routine gynecological exam 11/2/2016       Past Surgical History:   Procedure Laterality Date    APPENDECTOMY  2008    removed during hysterectomy surgery     Breast implant Bilateral 1998    implants removed in 2003    BREAST SURGERY      see details below     CATARACT EXTRACTION W/  INTRAOCULAR LENS IMPLANT Left 8/13/2020    Procedure: EXTRACTION, CATARACT, WITH IOL INSERTION;  Surgeon: Ivanna Coleman MD;  Location: [facility];  Service: Ophthalmology;  Laterality: Left;    CATARACT EXTRACTION W/  INTRAOCULAR LENS IMPLANT Right 8/27/2020    Procedure: EXTRACTION, CATARACT, WITH IOL INSERTION LASER;  Surgeon: Ivanna Coleman MD;  Location: [facility] OR;  Service: Ophthalmology;  Laterality: Right;    COLONOSCOPY N/A 5/15/2018    Procedure: COLONOSCOPY;  Surgeon: Roldan Gonzales MD;  Location: [address]);  Service: Endoscopy;  Laterality: N/A;    EYE SURGERY      CATERACTS    HYSTERECTOMY  2008    benign    left breast  reconstruction  2005    left modified radical mastectomy  1997    for treatment of breast cancer     LIPOMA RESECTION  2010    removed from upper back area     MODIFIED RADICAL MASTECTOMY W/ AXILLARY LYMPH NODE DISSECTION  1997    PELVIC LAPAROSCOPY      pt had endometriosis     PA REMOVAL OF OVARY/TUBE(S)       benign    reduction of mons pubis      robotic lap  hysterectomy with bso  2008    UPPER GASTROINTESTINAL ENDOSCOPY  2013       Family History   Problem Relation Age of Onset    Heart attack Father 51    Heart disease Father     Breast cancer Sister 51        BRCA 1/2 negative    Cancer Sister         breast    Cancer Mother 65        malignant melanoma     Melanoma Mother     No Known Problems Brother     No Known Problems Daughter     No Known Problems Son     No Known Problems Daughter     No Known Problems Son     Uterine cancer Neg Hx     Colon cancer Neg Hx     Celiac disease Neg Hx     Esophageal cancer Neg Hx     Inflammatory bowel disease Neg Hx     Liver cancer Neg Hx     Liver disease Neg Hx     Stomach cancer Neg Hx     Ulcerative colitis Neg Hx     Hypertension Neg Hx     Cirrhosis Neg Hx     Crohn's disease Neg Hx     Rectal cancer Neg Hx     Ovarian cancer Neg Hx        Social History     Socioeconomic History    Marital status:      Spouse name: Not on file    Number of children: Not on file    Years of education: Not on file    Highest education level: Not on file   Occupational History    Not on file   Social Needs    Financial resource strain: Not hard at all    Food insecurity     Worry: Never true     Inability: Never true    Transportation needs     Medical: No     Non-medical: No   Tobacco Use    Smoking status: Former Smoker     Packs/day: 0.50     Years: 3.00     Pack years: 1.50     Start date: 4/15/1969     Quit date: 4/15/1972     Years since quittin.5    Smokeless tobacco: Never Used    Tobacco  comment: started at age 18 during college    Substance and Sexual Activity    Alcohol use: Yes     Alcohol/week: 0.0 standard drinks     Frequency: Monthly or less     Drinks per session: 1 or 2     Binge frequency: Never     Comment: occasional, 1 cocktail once monthly    Drug use: No    Sexual activity: Not Currently     Partners: Male   Lifestyle    Physical activity     Days per week: 0 days     Minutes per session: 0 min    Stress: Only a little   Relationships    Social connections     Talks on phone: More than three times a week     Gets together: Once a week     Attends Tenri[employer] service: Not on file     Active member of club or organization: Yes     Attends meetings of clubs or organizations: More than 4 times per year     Relationship status:    Other Topics Concern    Are you pregnant or think you may be? Not Asked    Breast-feeding Not Asked   Social History Narrative    Not on file       MEDICATIONS & ALLERGIES:       Current Outpatient Medications:     ascorbic acid (VITAMIN C) 1000 MG tablet, Take 1 tablet by mouth once daily., Disp: , Rfl:     azelastine (ASTELIN) 137 mcg (0.1 %) nasal spray, Two sprays in each nostril, sniff gently until absorbed and then follow with 1 spray of fluticasone, use both sprays twice daily as needed to control allergy symptoms (Patient taking differently: 2 sprays Daily. Two sprays in each nostril, sniff gently until absorbed and then follow with 1 spray of fluticasone, use both sprays twice daily as needed to control allergy symptoms), Disp: 30 mL, Rfl: 11    CALCIUM CARBONATE/VITAMIN D3 (CALTRATE 600 + D ORAL), Take 1 tablet by mouth 2 (two) times daily. , Disp: , Rfl:     CANNABIDIOL, CBD, EXTRACT ORAL, Take 1 drop by mouth Daily., Disp: , Rfl:     celecoxib (CELEBREX) 100 MG capsule, TAKE 1 CAPSULE (100 MG TOTAL) BY MOUTH DAILY AS NEEDED., Disp: 90 capsule, Rfl: 0    cephALEXin (KEFLEX) 500 MG capsule, Take 1 capsule (500 mg total) by mouth  every 12 (twelve) hours. for 7 days, Disp: 14 capsule, Rfl: 0    coenzyme Q10-vitamin E (COQ10 ) 100-100 mg-unit Cap, Take 1 capsule by mouth once daily. , Disp: , Rfl:     estradiol (YUVAFEM) 10 mcg Tab, PLACE 1 TABLET (10 MCG TOTAL) VAGINALLY TWICE A WEEK., Disp: 24 tablet, Rfl: 3    fluticasone (FLONASE) 50 mcg/actuation nasal spray, 1 spray by Each Nare route 2 (two) times daily. , Disp: , Rfl:     gabapentin (NEURONTIN) 300 MG capsule, TAKE 2 CAPSULES (600 MG TOTAL) BY MOUTH EVERY EVENING., Disp: 180 capsule, Rfl: 3    hydrOXYzine HCL (ATARAX) 25 MG tablet, Take 1 tablet (25 mg total) by mouth 3 (three) times daily as needed for Itching., Disp: 21 tablet, Rfl: 0    letrozole (FEMARA) 2.5 mg Tab, TAKE 1 TABLET BY MOUTH EVERY DAY, Disp: 90 tablet, Rfl: 3    levothyroxine (SYNTHROID) 50 MCG tablet, TAKE 1 TABLET BY MOUTH EVERY DAY, Disp: 90 tablet, Rfl: 3    losartan (COZAAR) 50 MG tablet, TAKE 1 TABLET BY MOUTH EVERY DAY, Disp: 90 tablet, Rfl: 3    metFORMIN (GLUCOPHAGE) 500 MG tablet, Take 500 mg by mouth 2 (two) times a day., Disp: , Rfl:     methylPREDNISolone (MEDROL DOSEPACK) 4 mg tablet, use as directed, Disp: 1 Package, Rfl: 0    mometasone 0.1% (ELOCON) 0.1 % cream, Apply to affected area daily, Disp: 45 g, Rfl: 1    montelukast (SINGULAIR) 10 mg tablet, TAKE 1 TABLET BY MOUTH EVERY DAY IN THE EVENING, Disp: 30 tablet, Rfl: 0    MULTIVITAMIN ORAL, Take 1 tablet by mouth once daily. , Disp: , Rfl:     omega-3 fatty acids-vitamin E (FISH OIL) 1,000 mg Cap, Take 1 capsule by mouth once daily. , Disp: , Rfl:     pantoprazole (PROTONIX) 20 MG tablet, Take 1 tablet (20 mg total) by mouth before breakfast., Disp: 90 tablet, Rfl: 3    prednisolon/gatiflox/bromfenac (PREDNISOL ACE-GATIFLOX-BROMFEN) 1-0.5-0.075 % DrpS, Apply 1 drop to eye 3 (three) times daily., Disp: 5 mL, Rfl: 3    simvastatin (ZOCOR) 20 MG tablet, TAKE 1 TABLET BY MOUTH EVERY DAY IN THE EVENING, Disp: 90 tablet, Rfl: 3     "triamcinolone acetonide 0.1% (KENALOG) 0.1 % ointment, Apply topically 2 (two) times a day. Apply directly to rash only on upper and lower extremities. Avoid face, Disp: 1 Tube, Rfl: 0    vitamin D 1000 units Tab, Take 1,000 Units by mouth once daily. , Disp: , Rfl:     Current Facility-Administered Medications:     sodium chloride 0.9% flush 10 mL, 10 mL, Intravenous, PRN, Ivanna Coleman MD    sodium chloride 0.9% flush 10 mL, 10 mL, Intravenous, PRN, Ivanna Coleman MD    Review of patient's allergies indicates:   Allergen Reactions    Erythromycin      Other reaction(s): Nausea    Erythromycin (bulk)      Other reaction(s): Nausea    Oxycodone Nausea And Vomiting    Oxycodone-acetaminophen Nausea And Vomiting     Other reaction(s): Nausea       OBJECTIVE:     Vital Signs:  Vitals:    11/06/20 1314   BP: 131/62   Pulse: 94   Temp: 97.1 °F (36.2 °C)   Weight: 79.6 kg (175 lb 7.8 oz)   Height: 5' 6" (1.676 m)       Body mass index is 28.32 kg/m².     Physical Exam  Constitutional:       Appearance: Normal appearance.   HENT:      Head: Normocephalic and atraumatic.      Mouth/Throat:      Mouth: Mucous membranes are moist.      Pharynx: Oropharynx is clear.   Eyes:      Extraocular Movements: Extraocular movements intact.      Pupils: Pupils are equal, round, and reactive to light.   Neck:      Musculoskeletal: Neck supple.   Cardiovascular:      Rate and Rhythm: Normal rate and regular rhythm.      Pulses: Normal pulses.      Heart sounds: Normal heart sounds.   Pulmonary:      Effort: Pulmonary effort is normal. No respiratory distress.      Breath sounds: Normal breath sounds.   Abdominal:      General: Abdomen is flat. Bowel sounds are normal.      Palpations: Abdomen is soft.   Musculoskeletal:         General: No swelling.   Skin:     General: Skin is warm and dry.      Capillary Refill: Capillary refill takes less than 2 seconds.      Comments: BUE erythematous macular papular lesions located densely on " forearms with extension to hands and upper arms, with several areas honey crusting, no drainage at this time. LUE with edema, warm to touch. Small erythematous macular lesions just distal to knees anteriorly b/l. Two small erythematous macular lesions on left eyelid. (see media tab for photos)   Neurological:      General: No focal deficit present.      Mental Status: She is alert and oriented to person, place, and time. Mental status is at baseline.   Psychiatric:         Mood and Affect: Mood normal.         Behavior: Behavior normal.         Thought Content: Thought content normal.         Judgment: Judgment normal.         Laboratory  Lab Results   Component Value Date    WBC 4.15 05/29/2020    HGB 13.8 05/29/2020    HCT 43.1 05/29/2020    MCV 91 05/29/2020     05/29/2020     BMP  Lab Results   Component Value Date     05/29/2020    K 4.7 05/29/2020     05/29/2020    CO2 29 05/29/2020    BUN 15 05/29/2020    CREATININE 0.7 05/29/2020    CALCIUM 9.5 05/29/2020    ANIONGAP 6 (L) 05/29/2020    ESTGFRAFRICA >60.0 05/29/2020    EGFRNONAA >60.0 05/29/2020     Lab Results   Component Value Date    INR 1.0 02/02/2017    INR 1.0 02/03/2014     Lab Results   Component Value Date    HGBA1C 5.3 05/29/2020     No results for input(s): POCTGLUCOSE in the last 72 hours.    Health Maintenance Due   Topic Date Due    Shingles Vaccine (3 of 3) 12/18/2019    Foot Exam  09/24/2020       ASSESSMENT & PLAN:   Katerin was seen today in clinic for rash    Poison ivy dermatitis  -- Onset 7 days ago, worsening, despite betamethasone injection 3 days ago and mometasone cream   -- C/o itching and pain  -- Concern for possible cellulitis on LUE, as it is very erythematous, edematous, warm, and honey crusted areas left over from where she reports blisters opened and she has been scratching  -- ?Reaction to preservatives in mometasone cream. Instructed to d/c and replacing with triamcinolone ointment, as ointment also can  stay on skin more effectively than cream. Avoid face  -- Cool compresses three times daily  -- Oatmeal baths may be beneficial  -- Acetaminophen and ibuprofen OTC PRN pain  -- Reassured that resolution can take 2-3 weeks and to f/u 1 week if still continues to worsen    -- cephALEXin (KEFLEX) 500 MG capsule; Take 1 capsule (500 mg total) by mouth every 12 (twelve) hours. for 7 days  Dispense: 14 capsule; Refill: 0  -- triamcinolone acetonide 0.1% (KENALOG) 0.1 % ointment; Apply topically 2 (two) times a day. Apply directly to rash only on upper and lower extremities. Avoid face  Dispense: 1 Tube; Refill: 0  -- methylPREDNISolone (MEDROL DOSEPACK) 4 mg tablet; use as directed  Dispense: 1 Package; Refill: 0  -- hydrOXYzine HCL (ATARAX) 25 MG tablet; Take 1 tablet (25 mg total) by mouth 3 (three) times daily as needed for Itching.  Dispense: 21 tablet; Refill: 0      RTC as needed.    Discussed with Dr. Lizama  - staff attestation to follow    Katherine Cunningham DO  Internal Medicine, PGY-1  [facility]  [address] [place] [address] [phone]

## 2020-12-07 ENCOUNTER — PATIENT OUTREACH (OUTPATIENT)
Dept: ADMINISTRATIVE | Facility: HOSPITAL | Age: 70
End: 2020-12-07

## 2020-12-09 ENCOUNTER — OFFICE VISIT (OUTPATIENT)
Dept: URGENT CARE | Facility: CLINIC | Age: 70
End: 2020-12-09
Payer: MEDICARE

## 2020-12-09 VITALS
HEART RATE: 103 BPM | OXYGEN SATURATION: 95 % | SYSTOLIC BLOOD PRESSURE: 134 MMHG | TEMPERATURE: 98 F | WEIGHT: 175 LBS | HEIGHT: 66 IN | DIASTOLIC BLOOD PRESSURE: 81 MMHG | BODY MASS INDEX: 28.12 KG/M2 | RESPIRATION RATE: 19 BRPM

## 2020-12-09 DIAGNOSIS — Z20.822 EXPOSURE TO COVID-19 VIRUS: ICD-10-CM

## 2020-12-09 DIAGNOSIS — J01.40 ACUTE NON-RECURRENT PANSINUSITIS: Primary | ICD-10-CM

## 2020-12-09 LAB
CTP QC/QA: YES
SARS-COV-2 RDRP RESP QL NAA+PROBE: NEGATIVE

## 2020-12-09 PROCEDURE — 3008F PR BODY MASS INDEX (BMI) DOCUMENTED: ICD-10-PCS | Mod: CPTII,S$GLB,, | Performed by: STUDENT IN AN ORGANIZED HEALTH CARE EDUCATION/TRAINING PROGRAM

## 2020-12-09 PROCEDURE — U0002: ICD-10-PCS | Mod: QW,S$GLB,, | Performed by: STUDENT IN AN ORGANIZED HEALTH CARE EDUCATION/TRAINING PROGRAM

## 2020-12-09 PROCEDURE — 99213 PR OFFICE/OUTPT VISIT, EST, LEVL III, 20-29 MIN: ICD-10-PCS | Mod: S$GLB,,, | Performed by: STUDENT IN AN ORGANIZED HEALTH CARE EDUCATION/TRAINING PROGRAM

## 2020-12-09 PROCEDURE — U0002 COVID-19 LAB TEST NON-CDC: HCPCS | Mod: QW,S$GLB,, | Performed by: STUDENT IN AN ORGANIZED HEALTH CARE EDUCATION/TRAINING PROGRAM

## 2020-12-09 PROCEDURE — 99213 OFFICE O/P EST LOW 20 MIN: CPT | Mod: S$GLB,,, | Performed by: STUDENT IN AN ORGANIZED HEALTH CARE EDUCATION/TRAINING PROGRAM

## 2020-12-09 PROCEDURE — 3008F BODY MASS INDEX DOCD: CPT | Mod: CPTII,S$GLB,, | Performed by: STUDENT IN AN ORGANIZED HEALTH CARE EDUCATION/TRAINING PROGRAM

## 2020-12-09 NOTE — PATIENT INSTRUCTIONS
CDC Testing and Quarantine Guidelines for patients with exposure to a known-positive COVID-19 person:  A close exposure is defined as anyone who has had an exposure (masked or unmasked) to a known COVID -19 positive person within 6 ft for longer than 15 minutes. If your exposure meets this definition you are required by CDC guidelines to quarantine for at least 7-10 days from time of exposure. The CDC states that a test can be performed for an asymptomatic patient (someone who does not have any symptoms) after a close exposure, and that a test should be done if you develop symptoms after a close exposure as described above.  Specifically, you can test at day 5 or later if asymptomatic, in order to get released from quarantine on day 7 or later.  If you develop symptoms sooner, you should test when your symptoms start.  If you meet the definition of a close exposure, it will not matter whether you are experiencing symptoms- a quarantine for at least 7-10 days after a close exposure is required by CDC guidelines.  Please note, if you decide to test as an asymptomatic during your quarantine and you are positive, you will be restarting your quarantine and moving from a possible 10 day quarantine (if you do not test), to a 11 day or greater quarantine.  The CDC also suggests people still monitor for symptoms for a full 14 days and remember that the shorter quarantine options do not replace initial CDC guidance.  The CDC continues to recommend quarantining for 14 days as the best way to reduce risk for spreading COVID-19 - however, this is only a recommendation.  If your exposure does not meet the above definition, you can return to your normal daily activities to include social distancing, wearing a mask and frequent handwashing.       NEGATIVE COVID TEST  You have tested negative for COVID-19 today.  If you did not have a close exposure (as defined below) you can return to your normal daily activities to include  "social distancing, wearing a mask and frequent handwashing.  A "close exposure" is defined as anyone who has had an exposure (masked or unmasked) to a known COVID -19 positive person within 6 ft for longer than 15 minutes. If your exposure meets this definition, you are required by CDC guidelines to quarantine for at least 7-10 days from time of exposure.  The CDC states that a test can be performed for an asymptomatic patient (someone who does not have any symptoms) after a close exposure, and that a test should be done if you develop symptoms after a close exposure as described above.  Specifically, you can test at day 5 or later if asymptomatic in order to get released from quarantine on day 7 or later.  If you develop symptoms sooner, you should test when your symptoms start.  If you developed symptoms since the exposure, and your test was negative today and less than 5 days from your exposure, you still have to quarantine for 7-10 days from the date of the exposure.  The 7-10 day quarantine begins from the day you were exposed, not the day of your test.  For example, if your exposure was on a Monday, and you waited until Friday of the same week to get tested and it was negative, your 7-10 day quarantine begins from that Monday, not the Friday you tested negative.  Please note, if you decide to test as an asymptomatic during your quarantine and you are positive, you will be restarting your quarantine and moving from a possible 10 day quarantine (if you do not test), to a 11 day or greater quarantine.  "

## 2020-12-09 NOTE — PROGRESS NOTES
"Subjective:       Patient ID: Lima Maldonado is a 70 y.o. female.    Vitals:  height is 5' 6" (1.676 m) and weight is 79.4 kg (175 lb). Her temperature is 98.4 °F (36.9 °C). Her blood pressure is 134/81 and her pulse is 103. Her respiration is 19 and oxygen saturation is 95%.     Chief Complaint: COVID-19 Concerns    Pt is a 70 y.o. female presenting with COVID concerns. Pt was exposed on Friday and Saturday. Pt states her only symptoms are nasal congestion and sinus pressure typical of seasonal sinusitis, has improved over last 24h. Denied f/c or lower respiratory sx's.    Sinus Problem  This is a new problem. The current episode started yesterday. The problem has been gradually improving since onset. There has been no fever. She is experiencing no pain. Associated symptoms include congestion, headaches and sinus pressure. Pertinent negatives include no chills, coughing, ear pain, hoarse voice, neck pain, shortness of breath or sore throat. Past treatments include nothing. The treatment provided mild relief.       Constitution: Negative for chills, fatigue and fever.   HENT: Positive for congestion, sinus pain and sinus pressure. Negative for ear pain, sore throat, trouble swallowing and voice change.    Neck: Negative for neck pain and painful lymph nodes.   Cardiovascular: Negative for chest pain, palpitations and sob on exertion.   Eyes: Negative for eye discharge, eye itching and eye redness.   Respiratory: Negative for chest tightness, cough, sputum production, bloody sputum, COPD, shortness of breath, stridor, wheezing and asthma.    Gastrointestinal: Negative for abdominal pain, nausea, vomiting and diarrhea.   Musculoskeletal: Negative for joint pain, joint swelling and muscle ache.   Skin: Negative for color change, rash and lesion.   Allergic/Immunologic: Negative for seasonal allergies and asthma.   Neurological: Positive for headaches. Negative for dizziness and light-headedness. "   Hematologic/Lymphatic: Negative for swollen lymph nodes.   Psychiatric/Behavioral: Negative for confusion, agitation and sleep disturbance.       Objective:      Physical Exam   Constitutional: She appears well-developed. She does not appear ill. No distress.   HENT:   Head: Normocephalic and atraumatic.   Ears:   Right Ear: External ear normal.   Left Ear: External ear normal.   Eyes: Conjunctivae and EOM are normal. Right eye exhibits no discharge. Left eye exhibits no discharge.   Cardiovascular: Normal rate, regular rhythm and normal heart sounds.   Pulmonary/Chest: Effort normal and breath sounds normal. No respiratory distress.   Neurological: She is alert.   Vitals reviewed.    Recent Lab Results       12/09/20  1648         Acceptable Yes     SARS-CoV-2 RNA, Amplification, Qual Negative             Assessment:       1. Acute non-recurrent pansinusitis    2. Exposure to COVID-19 virus        Plan:         Acute non-recurrent pansinusitis  -     POCT COVID-19 Rapid Screening  - counseled on home care and OTC medications    Exposure to COVID-19 virus  - discussed negative result with patient. Counseled Symptomatic patient with known exposure to continue home quarantine/isolation per CDC guidelines in event of false negative rapid COVID test    Follow up if symptoms worsen or fail to improve.    Brent Guy MD/MPH  [facility] Medicine  [facility]

## 2020-12-11 ENCOUNTER — PATIENT MESSAGE (OUTPATIENT)
Dept: OTHER | Facility: OTHER | Age: 70
End: 2020-12-11

## 2021-01-04 ENCOUNTER — IMMUNIZATION (OUTPATIENT)
Dept: INTERNAL MEDICINE | Facility: CLINIC | Age: 71
End: 2021-01-04
Payer: MEDICARE

## 2021-01-04 DIAGNOSIS — Z23 NEED FOR VACCINATION: ICD-10-CM

## 2021-01-04 PROCEDURE — 91300 COVID-19, MRNA, LNP-S, PF, 30 MCG/0.3 ML DOSE VACCINE: CPT | Mod: PBBFAC | Performed by: INTERNAL MEDICINE

## 2021-01-14 ENCOUNTER — OFFICE VISIT (OUTPATIENT)
Dept: GYNECOLOGIC ONCOLOGY | Facility: CLINIC | Age: 71
End: 2021-01-14
Payer: MEDICARE

## 2021-01-14 VITALS
HEART RATE: 95 BPM | SYSTOLIC BLOOD PRESSURE: 132 MMHG | WEIGHT: 179.88 LBS | BODY MASS INDEX: 29.04 KG/M2 | DIASTOLIC BLOOD PRESSURE: 74 MMHG

## 2021-01-14 DIAGNOSIS — C50.512 MALIGNANT NEOPLASM OF LOWER-OUTER QUADRANT OF LEFT BREAST OF FEMALE, ESTROGEN RECEPTOR POSITIVE: ICD-10-CM

## 2021-01-14 DIAGNOSIS — Z17.0 MALIGNANT NEOPLASM OF LOWER-OUTER QUADRANT OF LEFT BREAST OF FEMALE, ESTROGEN RECEPTOR POSITIVE: ICD-10-CM

## 2021-01-14 DIAGNOSIS — N95.2 VAGINAL ATROPHY: ICD-10-CM

## 2021-01-14 DIAGNOSIS — Z01.419 WELL WOMAN EXAM WITH ROUTINE GYNECOLOGICAL EXAM: Primary | ICD-10-CM

## 2021-01-14 PROCEDURE — 3075F SYST BP GE 130 - 139MM HG: CPT | Mod: HCNC,CPTII,S$GLB, | Performed by: OBSTETRICS & GYNECOLOGY

## 2021-01-14 PROCEDURE — 99999 PR PBB SHADOW E&M-EST. PATIENT-LVL IV: ICD-10-PCS | Mod: PBBFAC,HCNC,, | Performed by: OBSTETRICS & GYNECOLOGY

## 2021-01-14 PROCEDURE — 3078F DIAST BP <80 MM HG: CPT | Mod: HCNC,CPTII,S$GLB, | Performed by: OBSTETRICS & GYNECOLOGY

## 2021-01-14 PROCEDURE — 3075F PR MOST RECENT SYSTOLIC BLOOD PRESS GE 130-139MM HG: ICD-10-PCS | Mod: HCNC,CPTII,S$GLB, | Performed by: OBSTETRICS & GYNECOLOGY

## 2021-01-14 PROCEDURE — 3008F PR BODY MASS INDEX (BMI) DOCUMENTED: ICD-10-PCS | Mod: HCNC,CPTII,S$GLB, | Performed by: OBSTETRICS & GYNECOLOGY

## 2021-01-14 PROCEDURE — 3288F FALL RISK ASSESSMENT DOCD: CPT | Mod: HCNC,CPTII,S$GLB, | Performed by: OBSTETRICS & GYNECOLOGY

## 2021-01-14 PROCEDURE — 1126F PR PAIN SEVERITY QUANTIFIED, NO PAIN PRESENT: ICD-10-PCS | Mod: HCNC,S$GLB,, | Performed by: OBSTETRICS & GYNECOLOGY

## 2021-01-14 PROCEDURE — 3072F PR LOW RISK FOR RETINOPATHY: ICD-10-PCS | Mod: HCNC,S$GLB,, | Performed by: OBSTETRICS & GYNECOLOGY

## 2021-01-14 PROCEDURE — G0101 PR CA SCREEN;PELVIC/BREAST EXAM: ICD-10-PCS | Mod: HCNC,S$GLB,, | Performed by: OBSTETRICS & GYNECOLOGY

## 2021-01-14 PROCEDURE — 3288F PR FALLS RISK ASSESSMENT DOCUMENTED: ICD-10-PCS | Mod: HCNC,CPTII,S$GLB, | Performed by: OBSTETRICS & GYNECOLOGY

## 2021-01-14 PROCEDURE — 99999 PR PBB SHADOW E&M-EST. PATIENT-LVL IV: CPT | Mod: PBBFAC,HCNC,, | Performed by: OBSTETRICS & GYNECOLOGY

## 2021-01-14 PROCEDURE — 3078F PR MOST RECENT DIASTOLIC BLOOD PRESSURE < 80 MM HG: ICD-10-PCS | Mod: HCNC,CPTII,S$GLB, | Performed by: OBSTETRICS & GYNECOLOGY

## 2021-01-14 PROCEDURE — 1101F PT FALLS ASSESS-DOCD LE1/YR: CPT | Mod: HCNC,CPTII,S$GLB, | Performed by: OBSTETRICS & GYNECOLOGY

## 2021-01-14 PROCEDURE — 1126F AMNT PAIN NOTED NONE PRSNT: CPT | Mod: HCNC,S$GLB,, | Performed by: OBSTETRICS & GYNECOLOGY

## 2021-01-14 PROCEDURE — 3008F BODY MASS INDEX DOCD: CPT | Mod: HCNC,CPTII,S$GLB, | Performed by: OBSTETRICS & GYNECOLOGY

## 2021-01-14 PROCEDURE — G0101 CA SCREEN;PELVIC/BREAST EXAM: HCPCS | Mod: HCNC,S$GLB,, | Performed by: OBSTETRICS & GYNECOLOGY

## 2021-01-14 PROCEDURE — 1101F PR PT FALLS ASSESS DOC 0-1 FALLS W/OUT INJ PAST YR: ICD-10-PCS | Mod: HCNC,CPTII,S$GLB, | Performed by: OBSTETRICS & GYNECOLOGY

## 2021-01-14 PROCEDURE — 3072F LOW RISK FOR RETINOPATHY: CPT | Mod: HCNC,S$GLB,, | Performed by: OBSTETRICS & GYNECOLOGY

## 2021-01-14 RX ORDER — ESTRADIOL 10 UG/1
INSERT VAGINAL
Qty: 24 TABLET | Refills: 3 | Status: SHIPPED | OUTPATIENT
Start: 2021-01-14 | End: 2021-12-29

## 2021-01-26 ENCOUNTER — IMMUNIZATION (OUTPATIENT)
Dept: INTERNAL MEDICINE | Facility: CLINIC | Age: 71
End: 2021-01-26
Payer: MEDICARE

## 2021-01-26 DIAGNOSIS — Z23 NEED FOR VACCINATION: Primary | ICD-10-CM

## 2021-01-26 PROCEDURE — 0002A COVID-19, MRNA, LNP-S, PF, 30 MCG/0.3 ML DOSE VACCINE: CPT | Mod: PBBFAC | Performed by: INTERNAL MEDICINE

## 2021-01-26 PROCEDURE — 91300 COVID-19, MRNA, LNP-S, PF, 30 MCG/0.3 ML DOSE VACCINE: CPT | Mod: PBBFAC | Performed by: INTERNAL MEDICINE

## 2021-02-21 ENCOUNTER — PATIENT MESSAGE (OUTPATIENT)
Dept: INTERNAL MEDICINE | Facility: CLINIC | Age: 71
End: 2021-02-21

## 2021-02-21 DIAGNOSIS — E03.9 HYPOTHYROIDISM, UNSPECIFIED TYPE: ICD-10-CM

## 2021-02-21 DIAGNOSIS — Z00.00 ANNUAL PHYSICAL EXAM: Primary | ICD-10-CM

## 2021-02-21 DIAGNOSIS — I10 ESSENTIAL HYPERTENSION: ICD-10-CM

## 2021-02-21 DIAGNOSIS — I70.0 AORTIC ATHEROSCLEROSIS: ICD-10-CM

## 2021-02-23 ENCOUNTER — LAB VISIT (OUTPATIENT)
Dept: LAB | Facility: HOSPITAL | Age: 71
End: 2021-02-23
Attending: PHYSICIAN ASSISTANT
Payer: MEDICARE

## 2021-02-23 DIAGNOSIS — E03.9 HYPOTHYROIDISM, UNSPECIFIED TYPE: ICD-10-CM

## 2021-02-23 DIAGNOSIS — Z00.00 ANNUAL PHYSICAL EXAM: ICD-10-CM

## 2021-02-23 DIAGNOSIS — I10 ESSENTIAL HYPERTENSION: ICD-10-CM

## 2021-02-23 DIAGNOSIS — I70.0 AORTIC ATHEROSCLEROSIS: ICD-10-CM

## 2021-02-23 PROCEDURE — 36415 COLL VENOUS BLD VENIPUNCTURE: CPT

## 2021-02-23 PROCEDURE — 84443 ASSAY THYROID STIM HORMONE: CPT

## 2021-02-23 PROCEDURE — 80053 COMPREHEN METABOLIC PANEL: CPT

## 2021-02-23 PROCEDURE — 80061 LIPID PANEL: CPT

## 2021-02-24 LAB
ALBUMIN SERPL BCP-MCNC: 4.4 G/DL (ref 3.5–5.2)
ALP SERPL-CCNC: 77 U/L (ref 55–135)
ALT SERPL W/O P-5'-P-CCNC: 25 U/L (ref 10–44)
ANION GAP SERPL CALC-SCNC: 9 MMOL/L (ref 8–16)
AST SERPL-CCNC: 19 U/L (ref 10–40)
BILIRUB SERPL-MCNC: 1.3 MG/DL (ref 0.1–1)
BUN SERPL-MCNC: 18 MG/DL (ref 8–23)
CALCIUM SERPL-MCNC: 9.2 MG/DL (ref 8.7–10.5)
CHLORIDE SERPL-SCNC: 106 MMOL/L (ref 95–110)
CHOLEST SERPL-MCNC: 153 MG/DL (ref 120–199)
CHOLEST/HDLC SERPL: 4.4 {RATIO} (ref 2–5)
CO2 SERPL-SCNC: 28 MMOL/L (ref 23–29)
CREAT SERPL-MCNC: 0.8 MG/DL (ref 0.5–1.4)
EST. GFR  (AFRICAN AMERICAN): >60 ML/MIN/1.73 M^2
EST. GFR  (NON AFRICAN AMERICAN): >60 ML/MIN/1.73 M^2
GLUCOSE SERPL-MCNC: 125 MG/DL (ref 70–110)
HDLC SERPL-MCNC: 35 MG/DL (ref 40–75)
HDLC SERPL: 22.9 % (ref 20–50)
LDLC SERPL CALC-MCNC: 61 MG/DL (ref 63–159)
NONHDLC SERPL-MCNC: 118 MG/DL
POTASSIUM SERPL-SCNC: 4.7 MMOL/L (ref 3.5–5.1)
PROT SERPL-MCNC: 6.7 G/DL (ref 6–8.4)
SODIUM SERPL-SCNC: 143 MMOL/L (ref 136–145)
TRIGL SERPL-MCNC: 285 MG/DL (ref 30–150)
TSH SERPL DL<=0.005 MIU/L-ACNC: 1.99 UIU/ML (ref 0.4–4)

## 2021-02-25 ENCOUNTER — OFFICE VISIT (OUTPATIENT)
Dept: INTERNAL MEDICINE | Facility: CLINIC | Age: 71
End: 2021-02-25
Attending: INTERNAL MEDICINE
Payer: MEDICARE

## 2021-02-25 VITALS
HEIGHT: 66 IN | WEIGHT: 179.88 LBS | HEART RATE: 91 BPM | OXYGEN SATURATION: 97 % | BODY MASS INDEX: 28.91 KG/M2 | SYSTOLIC BLOOD PRESSURE: 118 MMHG | DIASTOLIC BLOOD PRESSURE: 72 MMHG

## 2021-02-25 DIAGNOSIS — K76.0 FATTY LIVER: ICD-10-CM

## 2021-02-25 DIAGNOSIS — E11.42 TYPE 2 DIABETES MELLITUS WITH DIABETIC POLYNEUROPATHY, WITHOUT LONG-TERM CURRENT USE OF INSULIN: Primary | ICD-10-CM

## 2021-02-25 DIAGNOSIS — I10 ESSENTIAL HYPERTENSION: ICD-10-CM

## 2021-02-25 DIAGNOSIS — E03.9 HYPOTHYROIDISM, UNSPECIFIED TYPE: ICD-10-CM

## 2021-02-25 DIAGNOSIS — E78.2 MIXED HYPERLIPIDEMIA: ICD-10-CM

## 2021-02-25 PROCEDURE — 3074F SYST BP LT 130 MM HG: CPT | Mod: CPTII,S$GLB,, | Performed by: INTERNAL MEDICINE

## 2021-02-25 PROCEDURE — 3072F PR LOW RISK FOR RETINOPATHY: ICD-10-PCS | Mod: S$GLB,,, | Performed by: INTERNAL MEDICINE

## 2021-02-25 PROCEDURE — 3044F PR MOST RECENT HEMOGLOBIN A1C LEVEL <7.0%: ICD-10-PCS | Mod: CPTII,S$GLB,, | Performed by: INTERNAL MEDICINE

## 2021-02-25 PROCEDURE — 1159F PR MEDICATION LIST DOCUMENTED IN MEDICAL RECORD: ICD-10-PCS | Mod: S$GLB,,, | Performed by: INTERNAL MEDICINE

## 2021-02-25 PROCEDURE — 3008F BODY MASS INDEX DOCD: CPT | Mod: CPTII,S$GLB,, | Performed by: INTERNAL MEDICINE

## 2021-02-25 PROCEDURE — 99214 PR OFFICE/OUTPT VISIT, EST, LEVL IV, 30-39 MIN: ICD-10-PCS | Mod: S$GLB,,, | Performed by: INTERNAL MEDICINE

## 2021-02-25 PROCEDURE — 1125F AMNT PAIN NOTED PAIN PRSNT: CPT | Mod: S$GLB,,, | Performed by: INTERNAL MEDICINE

## 2021-02-25 PROCEDURE — 3078F PR MOST RECENT DIASTOLIC BLOOD PRESSURE < 80 MM HG: ICD-10-PCS | Mod: CPTII,S$GLB,, | Performed by: INTERNAL MEDICINE

## 2021-02-25 PROCEDURE — 3072F LOW RISK FOR RETINOPATHY: CPT | Mod: S$GLB,,, | Performed by: INTERNAL MEDICINE

## 2021-02-25 PROCEDURE — 99214 OFFICE O/P EST MOD 30 MIN: CPT | Mod: S$GLB,,, | Performed by: INTERNAL MEDICINE

## 2021-02-25 PROCEDURE — 3074F PR MOST RECENT SYSTOLIC BLOOD PRESSURE < 130 MM HG: ICD-10-PCS | Mod: CPTII,S$GLB,, | Performed by: INTERNAL MEDICINE

## 2021-02-25 PROCEDURE — 1159F MED LIST DOCD IN RCRD: CPT | Mod: S$GLB,,, | Performed by: INTERNAL MEDICINE

## 2021-02-25 PROCEDURE — 3078F DIAST BP <80 MM HG: CPT | Mod: CPTII,S$GLB,, | Performed by: INTERNAL MEDICINE

## 2021-02-25 PROCEDURE — 99999 PR PBB SHADOW E&M-EST. PATIENT-LVL IV: ICD-10-PCS | Mod: PBBFAC,,, | Performed by: INTERNAL MEDICINE

## 2021-02-25 PROCEDURE — 3044F HG A1C LEVEL LT 7.0%: CPT | Mod: CPTII,S$GLB,, | Performed by: INTERNAL MEDICINE

## 2021-02-25 PROCEDURE — 3008F PR BODY MASS INDEX (BMI) DOCUMENTED: ICD-10-PCS | Mod: CPTII,S$GLB,, | Performed by: INTERNAL MEDICINE

## 2021-02-25 PROCEDURE — 99999 PR PBB SHADOW E&M-EST. PATIENT-LVL IV: CPT | Mod: PBBFAC,,, | Performed by: INTERNAL MEDICINE

## 2021-02-25 PROCEDURE — 1125F PR PAIN SEVERITY QUANTIFIED, PAIN PRESENT: ICD-10-PCS | Mod: S$GLB,,, | Performed by: INTERNAL MEDICINE

## 2021-02-25 RX ORDER — DULAGLUTIDE 0.75 MG/.5ML
0.75 INJECTION, SOLUTION SUBCUTANEOUS WEEKLY
Qty: 2 ML | Refills: 3 | Status: SHIPPED | OUTPATIENT
Start: 2021-02-25 | End: 2021-06-22

## 2021-02-26 ENCOUNTER — IMMUNIZATION (OUTPATIENT)
Dept: PHARMACY | Facility: CLINIC | Age: 71
End: 2021-02-26
Payer: MEDICARE

## 2021-02-26 ENCOUNTER — LAB VISIT (OUTPATIENT)
Dept: LAB | Facility: HOSPITAL | Age: 71
End: 2021-02-26
Attending: INTERNAL MEDICINE
Payer: MEDICARE

## 2021-02-26 DIAGNOSIS — E11.42 TYPE 2 DIABETES MELLITUS WITH DIABETIC POLYNEUROPATHY, WITHOUT LONG-TERM CURRENT USE OF INSULIN: ICD-10-CM

## 2021-02-26 LAB
ESTIMATED AVG GLUCOSE: 120 MG/DL (ref 68–131)
HBA1C MFR BLD: 5.8 % (ref 4–5.6)

## 2021-02-26 PROCEDURE — 83036 HEMOGLOBIN GLYCOSYLATED A1C: CPT

## 2021-02-26 PROCEDURE — 36415 COLL VENOUS BLD VENIPUNCTURE: CPT

## 2021-03-17 ENCOUNTER — PES CALL (OUTPATIENT)
Dept: ADMINISTRATIVE | Facility: CLINIC | Age: 71
End: 2021-03-17

## 2021-03-18 ENCOUNTER — PATIENT MESSAGE (OUTPATIENT)
Dept: RESEARCH | Facility: HOSPITAL | Age: 71
End: 2021-03-18

## 2021-03-23 ENCOUNTER — OFFICE VISIT (OUTPATIENT)
Dept: INTERNAL MEDICINE | Facility: CLINIC | Age: 71
End: 2021-03-23
Payer: MEDICARE

## 2021-03-23 VITALS
SYSTOLIC BLOOD PRESSURE: 118 MMHG | BODY MASS INDEX: 28.23 KG/M2 | HEART RATE: 101 BPM | WEIGHT: 175.69 LBS | OXYGEN SATURATION: 96 % | DIASTOLIC BLOOD PRESSURE: 60 MMHG | HEIGHT: 66 IN

## 2021-03-23 DIAGNOSIS — T45.1X5A CHEMOTHERAPY-INDUCED NEUROPATHY: ICD-10-CM

## 2021-03-23 DIAGNOSIS — E11.42 DIABETIC POLYNEUROPATHY ASSOCIATED WITH TYPE 2 DIABETES MELLITUS: Primary | ICD-10-CM

## 2021-03-23 DIAGNOSIS — I70.0 AORTIC ATHEROSCLEROSIS: ICD-10-CM

## 2021-03-23 DIAGNOSIS — M54.9 RIGHT-SIDED BACK PAIN, UNSPECIFIED BACK LOCATION, UNSPECIFIED CHRONICITY: ICD-10-CM

## 2021-03-23 DIAGNOSIS — I10 ESSENTIAL HYPERTENSION: ICD-10-CM

## 2021-03-23 DIAGNOSIS — G62.0 CHEMOTHERAPY-INDUCED NEUROPATHY: ICD-10-CM

## 2021-03-23 PROCEDURE — 1101F PR PT FALLS ASSESS DOC 0-1 FALLS W/OUT INJ PAST YR: ICD-10-PCS | Mod: CPTII,S$GLB,, | Performed by: PHYSICIAN ASSISTANT

## 2021-03-23 PROCEDURE — 3288F PR FALLS RISK ASSESSMENT DOCUMENTED: ICD-10-PCS | Mod: CPTII,S$GLB,, | Performed by: PHYSICIAN ASSISTANT

## 2021-03-23 PROCEDURE — 3008F BODY MASS INDEX DOCD: CPT | Mod: CPTII,S$GLB,, | Performed by: PHYSICIAN ASSISTANT

## 2021-03-23 PROCEDURE — 1126F PR PAIN SEVERITY QUANTIFIED, NO PAIN PRESENT: ICD-10-PCS | Mod: S$GLB,,, | Performed by: PHYSICIAN ASSISTANT

## 2021-03-23 PROCEDURE — 99999 PR PBB SHADOW E&M-EST. PATIENT-LVL V: ICD-10-PCS | Mod: PBBFAC,,, | Performed by: PHYSICIAN ASSISTANT

## 2021-03-23 PROCEDURE — 1126F AMNT PAIN NOTED NONE PRSNT: CPT | Mod: S$GLB,,, | Performed by: PHYSICIAN ASSISTANT

## 2021-03-23 PROCEDURE — 99999 PR PBB SHADOW E&M-EST. PATIENT-LVL V: CPT | Mod: PBBFAC,,, | Performed by: PHYSICIAN ASSISTANT

## 2021-03-23 PROCEDURE — 3008F PR BODY MASS INDEX (BMI) DOCUMENTED: ICD-10-PCS | Mod: CPTII,S$GLB,, | Performed by: PHYSICIAN ASSISTANT

## 2021-03-23 PROCEDURE — 3044F PR MOST RECENT HEMOGLOBIN A1C LEVEL <7.0%: ICD-10-PCS | Mod: CPTII,S$GLB,, | Performed by: PHYSICIAN ASSISTANT

## 2021-03-23 PROCEDURE — 1101F PT FALLS ASSESS-DOCD LE1/YR: CPT | Mod: CPTII,S$GLB,, | Performed by: PHYSICIAN ASSISTANT

## 2021-03-23 PROCEDURE — 3288F FALL RISK ASSESSMENT DOCD: CPT | Mod: CPTII,S$GLB,, | Performed by: PHYSICIAN ASSISTANT

## 2021-03-23 PROCEDURE — 3074F PR MOST RECENT SYSTOLIC BLOOD PRESSURE < 130 MM HG: ICD-10-PCS | Mod: CPTII,S$GLB,, | Performed by: PHYSICIAN ASSISTANT

## 2021-03-23 PROCEDURE — 99499 RISK ADDL DX/OHS AUDIT: ICD-10-PCS | Mod: S$GLB,,, | Performed by: PHYSICIAN ASSISTANT

## 2021-03-23 PROCEDURE — 3072F PR LOW RISK FOR RETINOPATHY: ICD-10-PCS | Mod: S$GLB,,, | Performed by: PHYSICIAN ASSISTANT

## 2021-03-23 PROCEDURE — 99214 OFFICE O/P EST MOD 30 MIN: CPT | Mod: S$GLB,,, | Performed by: PHYSICIAN ASSISTANT

## 2021-03-23 PROCEDURE — 3078F PR MOST RECENT DIASTOLIC BLOOD PRESSURE < 80 MM HG: ICD-10-PCS | Mod: CPTII,S$GLB,, | Performed by: PHYSICIAN ASSISTANT

## 2021-03-23 PROCEDURE — 3044F HG A1C LEVEL LT 7.0%: CPT | Mod: CPTII,S$GLB,, | Performed by: PHYSICIAN ASSISTANT

## 2021-03-23 PROCEDURE — 99499 UNLISTED E&M SERVICE: CPT | Mod: S$GLB,,, | Performed by: PHYSICIAN ASSISTANT

## 2021-03-23 PROCEDURE — 3074F SYST BP LT 130 MM HG: CPT | Mod: CPTII,S$GLB,, | Performed by: PHYSICIAN ASSISTANT

## 2021-03-23 PROCEDURE — 1159F PR MEDICATION LIST DOCUMENTED IN MEDICAL RECORD: ICD-10-PCS | Mod: S$GLB,,, | Performed by: PHYSICIAN ASSISTANT

## 2021-03-23 PROCEDURE — 99214 PR OFFICE/OUTPT VISIT, EST, LEVL IV, 30-39 MIN: ICD-10-PCS | Mod: S$GLB,,, | Performed by: PHYSICIAN ASSISTANT

## 2021-03-23 PROCEDURE — 1159F MED LIST DOCD IN RCRD: CPT | Mod: S$GLB,,, | Performed by: PHYSICIAN ASSISTANT

## 2021-03-23 PROCEDURE — 3078F DIAST BP <80 MM HG: CPT | Mod: CPTII,S$GLB,, | Performed by: PHYSICIAN ASSISTANT

## 2021-03-23 PROCEDURE — 3072F LOW RISK FOR RETINOPATHY: CPT | Mod: S$GLB,,, | Performed by: PHYSICIAN ASSISTANT

## 2021-03-23 RX ORDER — CYCLOBENZAPRINE HCL 10 MG
10 TABLET ORAL 3 TIMES DAILY PRN
Qty: 30 TABLET | Refills: 0 | Status: SHIPPED | OUTPATIENT
Start: 2021-03-23 | End: 2021-04-02

## 2021-03-26 ENCOUNTER — PATIENT MESSAGE (OUTPATIENT)
Dept: RESEARCH | Facility: HOSPITAL | Age: 71
End: 2021-03-26

## 2021-04-06 ENCOUNTER — OFFICE VISIT (OUTPATIENT)
Dept: PODIATRY | Facility: CLINIC | Age: 71
End: 2021-04-06
Payer: MEDICARE

## 2021-04-06 VITALS
DIASTOLIC BLOOD PRESSURE: 73 MMHG | SYSTOLIC BLOOD PRESSURE: 127 MMHG | HEIGHT: 66 IN | HEART RATE: 101 BPM | BODY MASS INDEX: 28.45 KG/M2 | WEIGHT: 177 LBS

## 2021-04-06 DIAGNOSIS — B35.1 ONYCHOMYCOSIS DUE TO DERMATOPHYTE: Primary | ICD-10-CM

## 2021-04-06 DIAGNOSIS — E11.42 DIABETIC POLYNEUROPATHY ASSOCIATED WITH TYPE 2 DIABETES MELLITUS: ICD-10-CM

## 2021-04-06 DIAGNOSIS — E11.42 TYPE 2 DIABETES MELLITUS WITH DIABETIC POLYNEUROPATHY, WITHOUT LONG-TERM CURRENT USE OF INSULIN: ICD-10-CM

## 2021-04-06 PROCEDURE — 3072F PR LOW RISK FOR RETINOPATHY: ICD-10-PCS | Mod: S$GLB,,, | Performed by: PODIATRIST

## 2021-04-06 PROCEDURE — 1125F AMNT PAIN NOTED PAIN PRSNT: CPT | Mod: S$GLB,,, | Performed by: PODIATRIST

## 2021-04-06 PROCEDURE — 3008F PR BODY MASS INDEX (BMI) DOCUMENTED: ICD-10-PCS | Mod: CPTII,S$GLB,, | Performed by: PODIATRIST

## 2021-04-06 PROCEDURE — 1125F PR PAIN SEVERITY QUANTIFIED, PAIN PRESENT: ICD-10-PCS | Mod: S$GLB,,, | Performed by: PODIATRIST

## 2021-04-06 PROCEDURE — 11721 DEBRIDE NAIL 6 OR MORE: CPT | Mod: Q9,S$GLB,, | Performed by: PODIATRIST

## 2021-04-06 PROCEDURE — 99499 NO LOS: ICD-10-PCS | Mod: S$GLB,,, | Performed by: PODIATRIST

## 2021-04-06 PROCEDURE — 3044F PR MOST RECENT HEMOGLOBIN A1C LEVEL <7.0%: ICD-10-PCS | Mod: CPTII,S$GLB,, | Performed by: PODIATRIST

## 2021-04-06 PROCEDURE — 3008F BODY MASS INDEX DOCD: CPT | Mod: CPTII,S$GLB,, | Performed by: PODIATRIST

## 2021-04-06 PROCEDURE — 3072F LOW RISK FOR RETINOPATHY: CPT | Mod: S$GLB,,, | Performed by: PODIATRIST

## 2021-04-06 PROCEDURE — 99999 PR PBB SHADOW E&M-EST. PATIENT-LVL IV: ICD-10-PCS | Mod: PBBFAC,,, | Performed by: PODIATRIST

## 2021-04-06 PROCEDURE — 99499 UNLISTED E&M SERVICE: CPT | Mod: S$GLB,,, | Performed by: PODIATRIST

## 2021-04-06 PROCEDURE — 99999 PR PBB SHADOW E&M-EST. PATIENT-LVL IV: CPT | Mod: PBBFAC,,, | Performed by: PODIATRIST

## 2021-04-06 PROCEDURE — 11721 PR DEBRIDEMENT OF NAILS, 6 OR MORE: ICD-10-PCS | Mod: Q9,S$GLB,, | Performed by: PODIATRIST

## 2021-04-06 PROCEDURE — 3044F HG A1C LEVEL LT 7.0%: CPT | Mod: CPTII,S$GLB,, | Performed by: PODIATRIST

## 2021-04-29 ENCOUNTER — TELEPHONE (OUTPATIENT)
Dept: ADMINISTRATIVE | Facility: CLINIC | Age: 71
End: 2021-04-29

## 2021-05-03 ENCOUNTER — PATIENT OUTREACH (OUTPATIENT)
Dept: ADMINISTRATIVE | Facility: HOSPITAL | Age: 71
End: 2021-05-03

## 2021-05-03 ENCOUNTER — PATIENT MESSAGE (OUTPATIENT)
Dept: ADMINISTRATIVE | Facility: HOSPITAL | Age: 71
End: 2021-05-03

## 2021-05-03 DIAGNOSIS — E11.9 DIABETES MELLITUS WITHOUT COMPLICATION: Primary | ICD-10-CM

## 2021-05-06 ENCOUNTER — HOSPITAL ENCOUNTER (OUTPATIENT)
Dept: RADIOLOGY | Facility: HOSPITAL | Age: 71
Discharge: HOME | End: 2021-05-06
Attending: NURSE PRACTITIONER
Payer: MEDICARE

## 2021-05-06 ENCOUNTER — TELEPHONE (OUTPATIENT)
Dept: ADMINISTRATIVE | Facility: CLINIC | Age: 71
End: 2021-05-06

## 2021-05-06 ENCOUNTER — TELEPHONE (OUTPATIENT)
Dept: HEPATOLOGY | Facility: CLINIC | Age: 71
End: 2021-05-06

## 2021-05-06 DIAGNOSIS — K76.0 FATTY LIVER: Primary | ICD-10-CM

## 2021-05-06 DIAGNOSIS — K76.0 FATTY LIVER: ICD-10-CM

## 2021-05-06 PROCEDURE — 76700 US ABDOMEN COMPLETE: ICD-10-PCS | Mod: 26,,, | Performed by: RADIOLOGY

## 2021-05-06 PROCEDURE — 76700 US EXAM ABDOM COMPLETE: CPT | Mod: TC

## 2021-05-06 PROCEDURE — 76700 US EXAM ABDOM COMPLETE: CPT | Mod: 26,,, | Performed by: RADIOLOGY

## 2021-05-18 ENCOUNTER — OFFICE VISIT (OUTPATIENT)
Dept: INTERNAL MEDICINE | Facility: CLINIC | Age: 71
End: 2021-05-18
Payer: MEDICARE

## 2021-05-18 VITALS
OXYGEN SATURATION: 97 % | WEIGHT: 174.06 LBS | SYSTOLIC BLOOD PRESSURE: 120 MMHG | BODY MASS INDEX: 28.09 KG/M2 | HEART RATE: 97 BPM | DIASTOLIC BLOOD PRESSURE: 68 MMHG

## 2021-05-18 DIAGNOSIS — H90.A22 SENSORINEURAL HEARING LOSS (SNHL) OF LEFT EAR WITH RESTRICTED HEARING OF RIGHT EAR: ICD-10-CM

## 2021-05-18 DIAGNOSIS — G62.0 CHEMOTHERAPY-INDUCED NEUROPATHY: ICD-10-CM

## 2021-05-18 DIAGNOSIS — N39.3 STRESS INCONTINENCE OF URINE: ICD-10-CM

## 2021-05-18 DIAGNOSIS — E78.2 MIXED HYPERLIPIDEMIA: ICD-10-CM

## 2021-05-18 DIAGNOSIS — I10 ESSENTIAL HYPERTENSION: ICD-10-CM

## 2021-05-18 DIAGNOSIS — K21.9 GASTROESOPHAGEAL REFLUX DISEASE, UNSPECIFIED WHETHER ESOPHAGITIS PRESENT: ICD-10-CM

## 2021-05-18 DIAGNOSIS — E11.42 DIABETIC POLYNEUROPATHY ASSOCIATED WITH TYPE 2 DIABETES MELLITUS: ICD-10-CM

## 2021-05-18 DIAGNOSIS — I70.0 AORTIC ATHEROSCLEROSIS: ICD-10-CM

## 2021-05-18 DIAGNOSIS — M81.0 SENILE OSTEOPOROSIS: ICD-10-CM

## 2021-05-18 DIAGNOSIS — T45.1X5A CHEMOTHERAPY-INDUCED NEUROPATHY: ICD-10-CM

## 2021-05-18 DIAGNOSIS — Z00.00 ENCOUNTER FOR PREVENTATIVE ADULT HEALTH CARE EXAMINATION: Primary | ICD-10-CM

## 2021-05-18 DIAGNOSIS — E03.9 HYPOTHYROIDISM, UNSPECIFIED TYPE: ICD-10-CM

## 2021-05-18 DIAGNOSIS — C50.512 MALIGNANT NEOPLASM OF LOWER-OUTER QUADRANT OF LEFT FEMALE BREAST, UNSPECIFIED ESTROGEN RECEPTOR STATUS: ICD-10-CM

## 2021-05-18 DIAGNOSIS — H93.13 TINNITUS OF BOTH EARS: ICD-10-CM

## 2021-05-18 PROCEDURE — 3288F FALL RISK ASSESSMENT DOCD: CPT | Mod: CPTII,S$GLB,, | Performed by: NURSE PRACTITIONER

## 2021-05-18 PROCEDURE — 99499 RISK ADDL DX/OHS AUDIT: ICD-10-PCS | Mod: HCNC,S$GLB,, | Performed by: NURSE PRACTITIONER

## 2021-05-18 PROCEDURE — 3288F PR FALLS RISK ASSESSMENT DOCUMENTED: ICD-10-PCS | Mod: CPTII,S$GLB,, | Performed by: NURSE PRACTITIONER

## 2021-05-18 PROCEDURE — G0439 PR MEDICARE ANNUAL WELLNESS SUBSEQUENT VISIT: ICD-10-PCS | Mod: S$GLB,,, | Performed by: NURSE PRACTITIONER

## 2021-05-18 PROCEDURE — 1158F ADVNC CARE PLAN TLK DOCD: CPT | Mod: S$GLB,,, | Performed by: NURSE PRACTITIONER

## 2021-05-18 PROCEDURE — 3072F LOW RISK FOR RETINOPATHY: CPT | Mod: S$GLB,,, | Performed by: NURSE PRACTITIONER

## 2021-05-18 PROCEDURE — 1101F PT FALLS ASSESS-DOCD LE1/YR: CPT | Mod: CPTII,S$GLB,, | Performed by: NURSE PRACTITIONER

## 2021-05-18 PROCEDURE — G0439 PPPS, SUBSEQ VISIT: HCPCS | Mod: S$GLB,,, | Performed by: NURSE PRACTITIONER

## 2021-05-18 PROCEDURE — 3072F PR LOW RISK FOR RETINOPATHY: ICD-10-PCS | Mod: S$GLB,,, | Performed by: NURSE PRACTITIONER

## 2021-05-18 PROCEDURE — 1101F PR PT FALLS ASSESS DOC 0-1 FALLS W/OUT INJ PAST YR: ICD-10-PCS | Mod: CPTII,S$GLB,, | Performed by: NURSE PRACTITIONER

## 2021-05-18 PROCEDURE — 1158F PR ADVANCE CARE PLANNING DISCUSS DOCUMENTED IN MEDICAL RECORD: ICD-10-PCS | Mod: S$GLB,,, | Performed by: NURSE PRACTITIONER

## 2021-05-18 PROCEDURE — 3008F BODY MASS INDEX DOCD: CPT | Mod: CPTII,S$GLB,, | Performed by: NURSE PRACTITIONER

## 2021-05-18 PROCEDURE — 99499 UNLISTED E&M SERVICE: CPT | Mod: HCNC,S$GLB,, | Performed by: NURSE PRACTITIONER

## 2021-05-18 PROCEDURE — 3008F PR BODY MASS INDEX (BMI) DOCUMENTED: ICD-10-PCS | Mod: CPTII,S$GLB,, | Performed by: NURSE PRACTITIONER

## 2021-05-18 PROCEDURE — 99999 PR PBB SHADOW E&M-EST. PATIENT-LVL III: ICD-10-PCS | Mod: PBBFAC,,, | Performed by: NURSE PRACTITIONER

## 2021-05-18 PROCEDURE — 99999 PR PBB SHADOW E&M-EST. PATIENT-LVL III: CPT | Mod: PBBFAC,,, | Performed by: NURSE PRACTITIONER

## 2021-05-24 ENCOUNTER — PES CALL (OUTPATIENT)
Dept: ADMINISTRATIVE | Facility: CLINIC | Age: 71
End: 2021-05-24

## 2021-06-17 ENCOUNTER — LAB VISIT (OUTPATIENT)
Dept: LAB | Facility: OTHER | Age: 71
End: 2021-06-17
Attending: INTERNAL MEDICINE
Payer: MEDICARE

## 2021-06-17 ENCOUNTER — OFFICE VISIT (OUTPATIENT)
Dept: INTERNAL MEDICINE | Facility: CLINIC | Age: 71
End: 2021-06-17
Attending: INTERNAL MEDICINE
Payer: MEDICARE

## 2021-06-17 VITALS
HEART RATE: 93 BPM | SYSTOLIC BLOOD PRESSURE: 128 MMHG | OXYGEN SATURATION: 98 % | DIASTOLIC BLOOD PRESSURE: 76 MMHG | HEIGHT: 66 IN | WEIGHT: 176.38 LBS | BODY MASS INDEX: 28.34 KG/M2

## 2021-06-17 DIAGNOSIS — C50.512 MALIGNANT NEOPLASM OF LOWER-OUTER QUADRANT OF LEFT BREAST OF FEMALE, ESTROGEN RECEPTOR POSITIVE: ICD-10-CM

## 2021-06-17 DIAGNOSIS — E78.2 MIXED HYPERLIPIDEMIA: ICD-10-CM

## 2021-06-17 DIAGNOSIS — I70.0 AORTIC ATHEROSCLEROSIS: ICD-10-CM

## 2021-06-17 DIAGNOSIS — Z79.899 LONG-TERM USE OF HIGH-RISK MEDICATION: ICD-10-CM

## 2021-06-17 DIAGNOSIS — E11.8 TYPE 2 DIABETES MELLITUS WITH COMPLICATION, WITHOUT LONG-TERM CURRENT USE OF INSULIN: Primary | ICD-10-CM

## 2021-06-17 DIAGNOSIS — K76.0 FATTY LIVER: ICD-10-CM

## 2021-06-17 DIAGNOSIS — E03.9 HYPOTHYROIDISM, UNSPECIFIED TYPE: ICD-10-CM

## 2021-06-17 DIAGNOSIS — K21.9 GASTROESOPHAGEAL REFLUX DISEASE, UNSPECIFIED WHETHER ESOPHAGITIS PRESENT: ICD-10-CM

## 2021-06-17 DIAGNOSIS — G62.0 CHEMOTHERAPY-INDUCED NEUROPATHY: ICD-10-CM

## 2021-06-17 DIAGNOSIS — M81.0 SENILE OSTEOPOROSIS: ICD-10-CM

## 2021-06-17 DIAGNOSIS — T45.1X5A CHEMOTHERAPY-INDUCED NEUROPATHY: ICD-10-CM

## 2021-06-17 DIAGNOSIS — G47.33 OBSTRUCTIVE SLEEP APNEA ON CPAP: ICD-10-CM

## 2021-06-17 DIAGNOSIS — I10 ESSENTIAL HYPERTENSION: ICD-10-CM

## 2021-06-17 DIAGNOSIS — Z17.0 MALIGNANT NEOPLASM OF LOWER-OUTER QUADRANT OF LEFT BREAST OF FEMALE, ESTROGEN RECEPTOR POSITIVE: ICD-10-CM

## 2021-06-17 DIAGNOSIS — Z92.21 HISTORY OF ANTINEOPLASTIC CHEMOTHERAPY: ICD-10-CM

## 2021-06-17 LAB
25(OH)D3+25(OH)D2 SERPL-MCNC: 91 NG/ML (ref 30–96)
MAGNESIUM SERPL-MCNC: 2 MG/DL (ref 1.6–2.6)
VIT B12 SERPL-MCNC: 914 PG/ML (ref 210–950)

## 2021-06-17 PROCEDURE — 3008F PR BODY MASS INDEX (BMI) DOCUMENTED: ICD-10-PCS | Mod: CPTII,S$GLB,, | Performed by: INTERNAL MEDICINE

## 2021-06-17 PROCEDURE — 1159F MED LIST DOCD IN RCRD: CPT | Mod: S$GLB,,, | Performed by: INTERNAL MEDICINE

## 2021-06-17 PROCEDURE — 3288F PR FALLS RISK ASSESSMENT DOCUMENTED: ICD-10-PCS | Mod: CPTII,S$GLB,, | Performed by: INTERNAL MEDICINE

## 2021-06-17 PROCEDURE — 36415 COLL VENOUS BLD VENIPUNCTURE: CPT | Performed by: INTERNAL MEDICINE

## 2021-06-17 PROCEDURE — 3072F LOW RISK FOR RETINOPATHY: CPT | Mod: S$GLB,,, | Performed by: INTERNAL MEDICINE

## 2021-06-17 PROCEDURE — 83735 ASSAY OF MAGNESIUM: CPT | Performed by: INTERNAL MEDICINE

## 2021-06-17 PROCEDURE — 3288F FALL RISK ASSESSMENT DOCD: CPT | Mod: CPTII,S$GLB,, | Performed by: INTERNAL MEDICINE

## 2021-06-17 PROCEDURE — 99215 PR OFFICE/OUTPT VISIT, EST, LEVL V, 40-54 MIN: ICD-10-PCS | Mod: S$GLB,,, | Performed by: INTERNAL MEDICINE

## 2021-06-17 PROCEDURE — 99999 PR PBB SHADOW E&M-EST. PATIENT-LVL IV: CPT | Mod: PBBFAC,,, | Performed by: INTERNAL MEDICINE

## 2021-06-17 PROCEDURE — 82607 VITAMIN B-12: CPT | Performed by: INTERNAL MEDICINE

## 2021-06-17 PROCEDURE — 1125F AMNT PAIN NOTED PAIN PRSNT: CPT | Mod: S$GLB,,, | Performed by: INTERNAL MEDICINE

## 2021-06-17 PROCEDURE — 3072F PR LOW RISK FOR RETINOPATHY: ICD-10-PCS | Mod: S$GLB,,, | Performed by: INTERNAL MEDICINE

## 2021-06-17 PROCEDURE — 82306 VITAMIN D 25 HYDROXY: CPT | Performed by: INTERNAL MEDICINE

## 2021-06-17 PROCEDURE — 1101F PR PT FALLS ASSESS DOC 0-1 FALLS W/OUT INJ PAST YR: ICD-10-PCS | Mod: CPTII,S$GLB,, | Performed by: INTERNAL MEDICINE

## 2021-06-17 PROCEDURE — 99999 PR PBB SHADOW E&M-EST. PATIENT-LVL IV: ICD-10-PCS | Mod: PBBFAC,,, | Performed by: INTERNAL MEDICINE

## 2021-06-17 PROCEDURE — 3008F BODY MASS INDEX DOCD: CPT | Mod: CPTII,S$GLB,, | Performed by: INTERNAL MEDICINE

## 2021-06-17 PROCEDURE — 3044F HG A1C LEVEL LT 7.0%: CPT | Mod: CPTII,S$GLB,, | Performed by: INTERNAL MEDICINE

## 2021-06-17 PROCEDURE — 99215 OFFICE O/P EST HI 40 MIN: CPT | Mod: S$GLB,,, | Performed by: INTERNAL MEDICINE

## 2021-06-17 PROCEDURE — 3044F PR MOST RECENT HEMOGLOBIN A1C LEVEL <7.0%: ICD-10-PCS | Mod: CPTII,S$GLB,, | Performed by: INTERNAL MEDICINE

## 2021-06-17 PROCEDURE — 99499 RISK ADDL DX/OHS AUDIT: ICD-10-PCS | Mod: S$GLB,,, | Performed by: INTERNAL MEDICINE

## 2021-06-17 PROCEDURE — 1101F PT FALLS ASSESS-DOCD LE1/YR: CPT | Mod: CPTII,S$GLB,, | Performed by: INTERNAL MEDICINE

## 2021-06-17 PROCEDURE — 99499 UNLISTED E&M SERVICE: CPT | Mod: S$GLB,,, | Performed by: INTERNAL MEDICINE

## 2021-06-17 PROCEDURE — 1159F PR MEDICATION LIST DOCUMENTED IN MEDICAL RECORD: ICD-10-PCS | Mod: S$GLB,,, | Performed by: INTERNAL MEDICINE

## 2021-06-17 PROCEDURE — 1125F PR PAIN SEVERITY QUANTIFIED, PAIN PRESENT: ICD-10-PCS | Mod: S$GLB,,, | Performed by: INTERNAL MEDICINE

## 2021-06-17 PROCEDURE — 86790 VIRUS ANTIBODY NOS: CPT | Performed by: INTERNAL MEDICINE

## 2021-06-17 RX ORDER — BETAMETHASONE VALERATE 1.2 MG/G
OINTMENT TOPICAL 2 TIMES DAILY
Qty: 15 G | Refills: 0 | Status: SHIPPED | OUTPATIENT
Start: 2021-06-17 | End: 2022-07-27

## 2021-06-18 LAB — HEPATITIS A ANTIBODY, IGG: POSITIVE

## 2021-07-16 ENCOUNTER — HOSPITAL ENCOUNTER (OUTPATIENT)
Dept: RADIOLOGY | Facility: CLINIC | Age: 71
Discharge: HOME | End: 2021-07-16
Attending: INTERNAL MEDICINE
Payer: MEDICARE

## 2021-07-16 DIAGNOSIS — M81.0 SENILE OSTEOPOROSIS: ICD-10-CM

## 2021-07-16 PROCEDURE — 77080 DEXA BONE DENSITY SPINE HIP: ICD-10-PCS | Mod: 26,,, | Performed by: INTERNAL MEDICINE

## 2021-07-16 PROCEDURE — 77080 DXA BONE DENSITY AXIAL: CPT | Mod: 26,,, | Performed by: INTERNAL MEDICINE

## 2021-07-16 PROCEDURE — 77080 DXA BONE DENSITY AXIAL: CPT | Mod: TC

## 2021-08-03 DIAGNOSIS — M81.0 SENILE OSTEOPOROSIS: Primary | ICD-10-CM

## 2021-08-11 ENCOUNTER — SPECIALTY PHARMACY (OUTPATIENT)
Dept: PHARMACY | Facility: CLINIC | Age: 71
End: 2021-08-11

## 2021-08-23 ENCOUNTER — TELEPHONE (OUTPATIENT)
Dept: INTERNAL MEDICINE | Facility: CLINIC | Age: 71
End: 2021-08-23

## 2021-08-23 ENCOUNTER — SPECIALTY PHARMACY (OUTPATIENT)
Dept: PHARMACY | Facility: CLINIC | Age: 71
End: 2021-08-23

## 2021-08-23 DIAGNOSIS — M81.0 SENILE OSTEOPOROSIS: Primary | ICD-10-CM

## 2021-08-26 ENCOUNTER — CLINICAL SUPPORT (OUTPATIENT)
Dept: INTERNAL MEDICINE | Facility: CLINIC | Age: 71
End: 2021-08-26
Payer: MEDICARE

## 2021-09-28 ENCOUNTER — PATIENT MESSAGE (OUTPATIENT)
Dept: PHARMACY | Facility: CLINIC | Age: 71
End: 2021-09-28

## 2021-09-28 ENCOUNTER — LAB VISIT (OUTPATIENT)
Dept: LAB | Facility: HOSPITAL | Age: 71
End: 2021-09-28
Attending: INTERNAL MEDICINE
Payer: MEDICARE

## 2021-09-28 DIAGNOSIS — E11.8 TYPE 2 DIABETES MELLITUS WITH COMPLICATION, WITHOUT LONG-TERM CURRENT USE OF INSULIN: ICD-10-CM

## 2021-09-28 LAB
ESTIMATED AVG GLUCOSE: 146 MG/DL (ref 68–131)
HBA1C MFR BLD: 6.7 % (ref 4–5.6)

## 2021-09-28 PROCEDURE — 36415 COLL VENOUS BLD VENIPUNCTURE: CPT | Mod: HCNC | Performed by: INTERNAL MEDICINE

## 2021-09-28 PROCEDURE — 83036 HEMOGLOBIN GLYCOSYLATED A1C: CPT | Mod: HCNC | Performed by: INTERNAL MEDICINE

## 2021-10-04 ENCOUNTER — PATIENT MESSAGE (OUTPATIENT)
Dept: ADMINISTRATIVE | Facility: HOSPITAL | Age: 71
End: 2021-10-04

## 2021-10-04 ENCOUNTER — OFFICE VISIT (OUTPATIENT)
Dept: INTERNAL MEDICINE | Facility: CLINIC | Age: 71
End: 2021-10-04
Attending: INTERNAL MEDICINE
Payer: MEDICARE

## 2021-10-04 VITALS
HEIGHT: 66 IN | HEART RATE: 103 BPM | DIASTOLIC BLOOD PRESSURE: 74 MMHG | SYSTOLIC BLOOD PRESSURE: 123 MMHG | OXYGEN SATURATION: 97 % | BODY MASS INDEX: 29.48 KG/M2 | WEIGHT: 183.44 LBS

## 2021-10-04 DIAGNOSIS — E03.9 HYPOTHYROIDISM, UNSPECIFIED TYPE: ICD-10-CM

## 2021-10-04 DIAGNOSIS — E11.8 TYPE 2 DIABETES MELLITUS WITH COMPLICATION, WITHOUT LONG-TERM CURRENT USE OF INSULIN: Primary | ICD-10-CM

## 2021-10-04 DIAGNOSIS — I10 ESSENTIAL HYPERTENSION: ICD-10-CM

## 2021-10-04 DIAGNOSIS — E11.9 ENCOUNTER FOR DIABETIC FOOT EXAM: ICD-10-CM

## 2021-10-04 PROCEDURE — 1160F PR REVIEW ALL MEDS BY PRESCRIBER/CLIN PHARMACIST DOCUMENTED: ICD-10-PCS | Mod: HCNC,CPTII,S$GLB, | Performed by: INTERNAL MEDICINE

## 2021-10-04 PROCEDURE — 4010F PR ACE/ARB THEARPY RXD/TAKEN: ICD-10-PCS | Mod: HCNC,CPTII,S$GLB, | Performed by: INTERNAL MEDICINE

## 2021-10-04 PROCEDURE — 3288F FALL RISK ASSESSMENT DOCD: CPT | Mod: HCNC,CPTII,S$GLB, | Performed by: INTERNAL MEDICINE

## 2021-10-04 PROCEDURE — 1101F PT FALLS ASSESS-DOCD LE1/YR: CPT | Mod: HCNC,CPTII,S$GLB, | Performed by: INTERNAL MEDICINE

## 2021-10-04 PROCEDURE — 1160F RVW MEDS BY RX/DR IN RCRD: CPT | Mod: HCNC,CPTII,S$GLB, | Performed by: INTERNAL MEDICINE

## 2021-10-04 PROCEDURE — 1159F MED LIST DOCD IN RCRD: CPT | Mod: HCNC,CPTII,S$GLB, | Performed by: INTERNAL MEDICINE

## 2021-10-04 PROCEDURE — 99999 PR PBB SHADOW E&M-EST. PATIENT-LVL IV: CPT | Mod: PBBFAC,HCNC,, | Performed by: INTERNAL MEDICINE

## 2021-10-04 PROCEDURE — 99214 PR OFFICE/OUTPT VISIT, EST, LEVL IV, 30-39 MIN: ICD-10-PCS | Mod: HCNC,S$GLB,, | Performed by: INTERNAL MEDICINE

## 2021-10-04 PROCEDURE — 99999 PR PBB SHADOW E&M-EST. PATIENT-LVL IV: ICD-10-PCS | Mod: PBBFAC,HCNC,, | Performed by: INTERNAL MEDICINE

## 2021-10-04 PROCEDURE — 1101F PR PT FALLS ASSESS DOC 0-1 FALLS W/OUT INJ PAST YR: ICD-10-PCS | Mod: HCNC,CPTII,S$GLB, | Performed by: INTERNAL MEDICINE

## 2021-10-04 PROCEDURE — 3044F HG A1C LEVEL LT 7.0%: CPT | Mod: HCNC,CPTII,S$GLB, | Performed by: INTERNAL MEDICINE

## 2021-10-04 PROCEDURE — 3074F PR MOST RECENT SYSTOLIC BLOOD PRESSURE < 130 MM HG: ICD-10-PCS | Mod: HCNC,CPTII,S$GLB, | Performed by: INTERNAL MEDICINE

## 2021-10-04 PROCEDURE — 3288F PR FALLS RISK ASSESSMENT DOCUMENTED: ICD-10-PCS | Mod: HCNC,CPTII,S$GLB, | Performed by: INTERNAL MEDICINE

## 2021-10-04 PROCEDURE — 3072F PR LOW RISK FOR RETINOPATHY: ICD-10-PCS | Mod: HCNC,CPTII,S$GLB, | Performed by: INTERNAL MEDICINE

## 2021-10-04 PROCEDURE — 1125F PR PAIN SEVERITY QUANTIFIED, PAIN PRESENT: ICD-10-PCS | Mod: HCNC,CPTII,S$GLB, | Performed by: INTERNAL MEDICINE

## 2021-10-04 PROCEDURE — 1125F AMNT PAIN NOTED PAIN PRSNT: CPT | Mod: HCNC,CPTII,S$GLB, | Performed by: INTERNAL MEDICINE

## 2021-10-04 PROCEDURE — 1159F PR MEDICATION LIST DOCUMENTED IN MEDICAL RECORD: ICD-10-PCS | Mod: HCNC,CPTII,S$GLB, | Performed by: INTERNAL MEDICINE

## 2021-10-04 PROCEDURE — 3074F SYST BP LT 130 MM HG: CPT | Mod: HCNC,CPTII,S$GLB, | Performed by: INTERNAL MEDICINE

## 2021-10-04 PROCEDURE — 3008F BODY MASS INDEX DOCD: CPT | Mod: HCNC,CPTII,S$GLB, | Performed by: INTERNAL MEDICINE

## 2021-10-04 PROCEDURE — 3078F DIAST BP <80 MM HG: CPT | Mod: HCNC,CPTII,S$GLB, | Performed by: INTERNAL MEDICINE

## 2021-10-04 PROCEDURE — 4010F ACE/ARB THERAPY RXD/TAKEN: CPT | Mod: HCNC,CPTII,S$GLB, | Performed by: INTERNAL MEDICINE

## 2021-10-04 PROCEDURE — 3078F PR MOST RECENT DIASTOLIC BLOOD PRESSURE < 80 MM HG: ICD-10-PCS | Mod: HCNC,CPTII,S$GLB, | Performed by: INTERNAL MEDICINE

## 2021-10-04 PROCEDURE — 3072F LOW RISK FOR RETINOPATHY: CPT | Mod: HCNC,CPTII,S$GLB, | Performed by: INTERNAL MEDICINE

## 2021-10-04 PROCEDURE — 3044F PR MOST RECENT HEMOGLOBIN A1C LEVEL <7.0%: ICD-10-PCS | Mod: HCNC,CPTII,S$GLB, | Performed by: INTERNAL MEDICINE

## 2021-10-04 PROCEDURE — 99214 OFFICE O/P EST MOD 30 MIN: CPT | Mod: HCNC,S$GLB,, | Performed by: INTERNAL MEDICINE

## 2021-10-04 PROCEDURE — 3008F PR BODY MASS INDEX (BMI) DOCUMENTED: ICD-10-PCS | Mod: HCNC,CPTII,S$GLB, | Performed by: INTERNAL MEDICINE

## 2021-10-04 RX ORDER — METFORMIN HYDROCHLORIDE 500 MG/1
500 TABLET ORAL
Qty: 90 TABLET | Refills: 1 | Status: SHIPPED | OUTPATIENT
Start: 2021-10-04 | End: 2022-04-01

## 2021-10-04 RX ORDER — DULAGLUTIDE 0.75 MG/.5ML
0.75 INJECTION, SOLUTION SUBCUTANEOUS WEEKLY
Qty: 4 PEN | Refills: 3 | Status: SHIPPED | OUTPATIENT
Start: 2021-10-04 | End: 2022-05-01

## 2021-10-07 ENCOUNTER — LAB VISIT (OUTPATIENT)
Dept: LAB | Facility: HOSPITAL | Age: 71
End: 2021-10-07
Payer: MEDICARE

## 2021-10-07 DIAGNOSIS — E11.8 TYPE 2 DIABETES MELLITUS WITH COMPLICATION, WITHOUT LONG-TERM CURRENT USE OF INSULIN: ICD-10-CM

## 2021-10-07 LAB
ALBUMIN/CREAT UR: NORMAL UG/MG (ref 0–30)
CREAT UR-MCNC: 26 MG/DL (ref 15–325)
MICROALBUMIN UR DL<=1MG/L-MCNC: <5 UG/ML

## 2021-10-07 PROCEDURE — 82570 ASSAY OF URINE CREATININE: CPT | Mod: HCNC | Performed by: INTERNAL MEDICINE

## 2021-10-13 ENCOUNTER — IMMUNIZATION (OUTPATIENT)
Dept: INTERNAL MEDICINE | Facility: CLINIC | Age: 71
End: 2021-10-13
Payer: MEDICARE

## 2021-10-13 DIAGNOSIS — Z23 NEED FOR VACCINATION: Primary | ICD-10-CM

## 2021-10-13 PROCEDURE — 91300 COVID-19, MRNA, LNP-S, PF, 30 MCG/0.3 ML DOSE VACCINE: CPT | Mod: HCNC,PBBFAC | Performed by: INTERNAL MEDICINE

## 2021-10-13 PROCEDURE — 0003A COVID-19, MRNA, LNP-S, PF, 30 MCG/0.3 ML DOSE VACCINE: CPT | Mod: HCNC,CV19,PBBFAC | Performed by: INTERNAL MEDICINE

## 2021-11-08 RX ORDER — LEVOTHYROXINE SODIUM 50 UG/1
TABLET ORAL
Qty: 90 TABLET | Refills: 3 | Status: SHIPPED | OUTPATIENT
Start: 2021-11-08 | End: 2022-11-10

## 2021-11-16 ENCOUNTER — TELEPHONE (OUTPATIENT)
Dept: HEMATOLOGY/ONCOLOGY | Facility: CLINIC | Age: 71
End: 2021-11-16
Payer: MEDICARE

## 2021-11-26 ENCOUNTER — TELEPHONE (OUTPATIENT)
Dept: HEPATOLOGY | Facility: CLINIC | Age: 71
End: 2021-11-26
Payer: MEDICARE

## 2021-11-26 ENCOUNTER — PATIENT MESSAGE (OUTPATIENT)
Dept: HEPATOLOGY | Facility: CLINIC | Age: 71
End: 2021-11-26
Payer: MEDICARE

## 2021-11-26 DIAGNOSIS — K76.0 FATTY LIVER: Primary | ICD-10-CM

## 2021-11-30 ENCOUNTER — OFFICE VISIT (OUTPATIENT)
Dept: HEMATOLOGY/ONCOLOGY | Facility: CLINIC | Age: 71
End: 2021-11-30
Payer: MEDICARE

## 2021-11-30 VITALS
SYSTOLIC BLOOD PRESSURE: 136 MMHG | WEIGHT: 184.94 LBS | BODY MASS INDEX: 29.72 KG/M2 | DIASTOLIC BLOOD PRESSURE: 70 MMHG | TEMPERATURE: 98 F | OXYGEN SATURATION: 93 % | RESPIRATION RATE: 16 BRPM | HEIGHT: 66 IN | HEART RATE: 88 BPM

## 2021-11-30 DIAGNOSIS — B37.9 CANDIDIASIS: ICD-10-CM

## 2021-11-30 DIAGNOSIS — C50.512 MALIGNANT NEOPLASM OF LOWER-OUTER QUADRANT OF LEFT FEMALE BREAST, UNSPECIFIED ESTROGEN RECEPTOR STATUS: ICD-10-CM

## 2021-11-30 DIAGNOSIS — N63.20 BREAST MASS, LEFT: ICD-10-CM

## 2021-11-30 PROCEDURE — 99999 PR PBB SHADOW E&M-EST. PATIENT-LVL V: CPT | Mod: PBBFAC,HCNC,, | Performed by: PHYSICIAN ASSISTANT

## 2021-11-30 PROCEDURE — 99214 PR OFFICE/OUTPT VISIT, EST, LEVL IV, 30-39 MIN: ICD-10-PCS | Mod: HCNC,S$GLB,, | Performed by: PHYSICIAN ASSISTANT

## 2021-11-30 PROCEDURE — 99999 PR PBB SHADOW E&M-EST. PATIENT-LVL V: ICD-10-PCS | Mod: PBBFAC,HCNC,, | Performed by: PHYSICIAN ASSISTANT

## 2021-11-30 PROCEDURE — 99214 OFFICE O/P EST MOD 30 MIN: CPT | Mod: HCNC,S$GLB,, | Performed by: PHYSICIAN ASSISTANT

## 2021-11-30 RX ORDER — ONDANSETRON 4 MG/1
TABLET, ORALLY DISINTEGRATING ORAL
COMMUNITY
Start: 2021-10-26 | End: 2022-04-26

## 2021-11-30 RX ORDER — NYSTATIN 100000 [USP'U]/G
POWDER TOPICAL
Qty: 1 G | Refills: 1 | Status: SHIPPED | OUTPATIENT
Start: 2021-11-30 | End: 2022-06-23

## 2021-11-30 RX ORDER — CHLORHEXIDINE GLUCONATE ORAL RINSE 1.2 MG/ML
SOLUTION DENTAL
COMMUNITY
Start: 2021-10-26 | End: 2022-04-08

## 2021-11-30 RX ORDER — IBUPROFEN 600 MG/1
TABLET ORAL
COMMUNITY
Start: 2021-10-26 | End: 2022-06-23

## 2021-11-30 RX ORDER — HYDROCODONE BITARTRATE AND ACETAMINOPHEN 7.5; 325 MG/1; MG/1
TABLET ORAL
COMMUNITY
Start: 2021-10-26 | End: 2022-04-26

## 2021-12-03 ENCOUNTER — PATIENT MESSAGE (OUTPATIENT)
Dept: HEPATOLOGY | Facility: CLINIC | Age: 71
End: 2021-12-03

## 2021-12-03 ENCOUNTER — PROCEDURE VISIT (OUTPATIENT)
Dept: HEPATOLOGY | Facility: CLINIC | Age: 71
End: 2021-12-03
Payer: MEDICARE

## 2021-12-03 ENCOUNTER — OFFICE VISIT (OUTPATIENT)
Dept: HEPATOLOGY | Facility: CLINIC | Age: 71
End: 2021-12-03
Payer: MEDICARE

## 2021-12-03 VITALS
HEART RATE: 89 BPM | OXYGEN SATURATION: 93 % | HEIGHT: 66 IN | RESPIRATION RATE: 18 BRPM | SYSTOLIC BLOOD PRESSURE: 136 MMHG | BODY MASS INDEX: 29.55 KG/M2 | DIASTOLIC BLOOD PRESSURE: 82 MMHG | TEMPERATURE: 98 F | WEIGHT: 183.88 LBS

## 2021-12-03 DIAGNOSIS — K76.0 FATTY LIVER: Primary | ICD-10-CM

## 2021-12-03 DIAGNOSIS — K74.01 EARLY HEPATIC FIBROSIS: ICD-10-CM

## 2021-12-03 PROCEDURE — 99499 RISK ADDL DX/OHS AUDIT: ICD-10-PCS | Mod: HCNC,S$GLB,, | Performed by: NURSE PRACTITIONER

## 2021-12-03 PROCEDURE — 3072F LOW RISK FOR RETINOPATHY: CPT | Mod: HCNC,CPTII,S$GLB, | Performed by: NURSE PRACTITIONER

## 2021-12-03 PROCEDURE — 3066F NEPHROPATHY DOC TX: CPT | Mod: HCNC,CPTII,S$GLB, | Performed by: NURSE PRACTITIONER

## 2021-12-03 PROCEDURE — 3066F PR DOCUMENTATION OF TREATMENT FOR NEPHROPATHY: ICD-10-PCS | Mod: HCNC,CPTII,S$GLB, | Performed by: NURSE PRACTITIONER

## 2021-12-03 PROCEDURE — 3061F NEG MICROALBUMINURIA REV: CPT | Mod: HCNC,CPTII,S$GLB, | Performed by: NURSE PRACTITIONER

## 2021-12-03 PROCEDURE — 91200 FIBROSCAN (VIBRATION CONTROLLED TRANSIENT ELASTOGRAPHY): ICD-10-PCS | Mod: HCNC,S$GLB,, | Performed by: NURSE PRACTITIONER

## 2021-12-03 PROCEDURE — 99214 OFFICE O/P EST MOD 30 MIN: CPT | Mod: HCNC,S$GLB,, | Performed by: NURSE PRACTITIONER

## 2021-12-03 PROCEDURE — 99499 UNLISTED E&M SERVICE: CPT | Mod: HCNC,S$GLB,, | Performed by: NURSE PRACTITIONER

## 2021-12-03 PROCEDURE — 3061F PR NEG MICROALBUMINURIA RESULT DOCUMENTED/REVIEW: ICD-10-PCS | Mod: HCNC,CPTII,S$GLB, | Performed by: NURSE PRACTITIONER

## 2021-12-03 PROCEDURE — 4010F PR ACE/ARB THEARPY RXD/TAKEN: ICD-10-PCS | Mod: HCNC,CPTII,S$GLB, | Performed by: NURSE PRACTITIONER

## 2021-12-03 PROCEDURE — 99999 PR PBB SHADOW E&M-EST. PATIENT-LVL V: ICD-10-PCS | Mod: PBBFAC,HCNC,, | Performed by: NURSE PRACTITIONER

## 2021-12-03 PROCEDURE — 4010F ACE/ARB THERAPY RXD/TAKEN: CPT | Mod: HCNC,CPTII,S$GLB, | Performed by: NURSE PRACTITIONER

## 2021-12-03 PROCEDURE — 3072F PR LOW RISK FOR RETINOPATHY: ICD-10-PCS | Mod: HCNC,CPTII,S$GLB, | Performed by: NURSE PRACTITIONER

## 2021-12-03 PROCEDURE — 99214 PR OFFICE/OUTPT VISIT, EST, LEVL IV, 30-39 MIN: ICD-10-PCS | Mod: HCNC,S$GLB,, | Performed by: NURSE PRACTITIONER

## 2021-12-03 PROCEDURE — 99999 PR PBB SHADOW E&M-EST. PATIENT-LVL V: CPT | Mod: PBBFAC,HCNC,, | Performed by: NURSE PRACTITIONER

## 2021-12-03 PROCEDURE — 91200 LIVER ELASTOGRAPHY: CPT | Mod: HCNC,S$GLB,, | Performed by: NURSE PRACTITIONER

## 2021-12-13 ENCOUNTER — TELEPHONE (OUTPATIENT)
Dept: INTERNAL MEDICINE | Facility: CLINIC | Age: 71
End: 2021-12-13
Payer: MEDICARE

## 2021-12-20 ENCOUNTER — TELEPHONE (OUTPATIENT)
Dept: INTERNAL MEDICINE | Facility: CLINIC | Age: 71
End: 2021-12-20
Payer: MEDICARE

## 2021-12-22 ENCOUNTER — PATIENT MESSAGE (OUTPATIENT)
Dept: INTERNAL MEDICINE | Facility: CLINIC | Age: 71
End: 2021-12-22
Payer: MEDICARE

## 2021-12-22 ENCOUNTER — PATIENT MESSAGE (OUTPATIENT)
Dept: HEMATOLOGY/ONCOLOGY | Facility: CLINIC | Age: 71
End: 2021-12-22
Payer: MEDICARE

## 2021-12-28 ENCOUNTER — OFFICE VISIT (OUTPATIENT)
Dept: INTERNAL MEDICINE | Facility: CLINIC | Age: 71
End: 2021-12-28
Payer: MEDICARE

## 2021-12-28 ENCOUNTER — HOSPITAL ENCOUNTER (OUTPATIENT)
Dept: CARDIOLOGY | Facility: OTHER | Age: 71
Discharge: HOME | End: 2021-12-28
Attending: INTERNAL MEDICINE
Payer: MEDICARE

## 2021-12-28 VITALS
HEART RATE: 82 BPM | BODY MASS INDEX: 29.21 KG/M2 | SYSTOLIC BLOOD PRESSURE: 118 MMHG | OXYGEN SATURATION: 95 % | DIASTOLIC BLOOD PRESSURE: 62 MMHG | WEIGHT: 181 LBS

## 2021-12-28 DIAGNOSIS — Z17.0 MALIGNANT NEOPLASM OF LOWER-OUTER QUADRANT OF LEFT BREAST OF FEMALE, ESTROGEN RECEPTOR POSITIVE: ICD-10-CM

## 2021-12-28 DIAGNOSIS — E11.8 TYPE 2 DIABETES MELLITUS WITH COMPLICATION, WITHOUT LONG-TERM CURRENT USE OF INSULIN: ICD-10-CM

## 2021-12-28 DIAGNOSIS — E03.9 HYPOTHYROIDISM, UNSPECIFIED TYPE: ICD-10-CM

## 2021-12-28 DIAGNOSIS — I10 ESSENTIAL HYPERTENSION: ICD-10-CM

## 2021-12-28 DIAGNOSIS — L98.9 SKIN LESION: ICD-10-CM

## 2021-12-28 DIAGNOSIS — Z01.818 PRE-OP EVALUATION: Primary | ICD-10-CM

## 2021-12-28 DIAGNOSIS — Z01.818 PRE-OP EVALUATION: ICD-10-CM

## 2021-12-28 DIAGNOSIS — G47.33 OBSTRUCTIVE SLEEP APNEA ON CPAP: ICD-10-CM

## 2021-12-28 DIAGNOSIS — C50.512 MALIGNANT NEOPLASM OF LOWER-OUTER QUADRANT OF LEFT BREAST OF FEMALE, ESTROGEN RECEPTOR POSITIVE: ICD-10-CM

## 2021-12-28 PROCEDURE — 3288F FALL RISK ASSESSMENT DOCD: CPT | Mod: HCNC,CPTII,S$GLB, | Performed by: PHYSICIAN ASSISTANT

## 2021-12-28 PROCEDURE — 93010 EKG 12-LEAD: ICD-10-PCS | Mod: HCNC,,, | Performed by: INTERNAL MEDICINE

## 2021-12-28 PROCEDURE — 93005 ELECTROCARDIOGRAM TRACING: CPT | Mod: HCNC

## 2021-12-28 PROCEDURE — 99214 OFFICE O/P EST MOD 30 MIN: CPT | Mod: HCNC,S$GLB,, | Performed by: PHYSICIAN ASSISTANT

## 2021-12-28 PROCEDURE — 3008F PR BODY MASS INDEX (BMI) DOCUMENTED: ICD-10-PCS | Mod: HCNC,CPTII,S$GLB, | Performed by: PHYSICIAN ASSISTANT

## 2021-12-28 PROCEDURE — 1101F PR PT FALLS ASSESS DOC 0-1 FALLS W/OUT INJ PAST YR: ICD-10-PCS | Mod: HCNC,CPTII,S$GLB, | Performed by: PHYSICIAN ASSISTANT

## 2021-12-28 PROCEDURE — 1126F AMNT PAIN NOTED NONE PRSNT: CPT | Mod: HCNC,CPTII,S$GLB, | Performed by: PHYSICIAN ASSISTANT

## 2021-12-28 PROCEDURE — 93010 ELECTROCARDIOGRAM REPORT: CPT | Mod: HCNC,,, | Performed by: INTERNAL MEDICINE

## 2021-12-28 PROCEDURE — 3008F BODY MASS INDEX DOCD: CPT | Mod: HCNC,CPTII,S$GLB, | Performed by: PHYSICIAN ASSISTANT

## 2021-12-28 PROCEDURE — 3074F SYST BP LT 130 MM HG: CPT | Mod: HCNC,CPTII,S$GLB, | Performed by: PHYSICIAN ASSISTANT

## 2021-12-28 PROCEDURE — 3078F PR MOST RECENT DIASTOLIC BLOOD PRESSURE < 80 MM HG: ICD-10-PCS | Mod: HCNC,CPTII,S$GLB, | Performed by: PHYSICIAN ASSISTANT

## 2021-12-28 PROCEDURE — 1159F MED LIST DOCD IN RCRD: CPT | Mod: HCNC,CPTII,S$GLB, | Performed by: PHYSICIAN ASSISTANT

## 2021-12-28 PROCEDURE — 1126F PR PAIN SEVERITY QUANTIFIED, NO PAIN PRESENT: ICD-10-PCS | Mod: HCNC,CPTII,S$GLB, | Performed by: PHYSICIAN ASSISTANT

## 2021-12-28 PROCEDURE — 99999 PR PBB SHADOW E&M-EST. PATIENT-LVL V: CPT | Mod: PBBFAC,HCNC,, | Performed by: PHYSICIAN ASSISTANT

## 2021-12-28 PROCEDURE — 1160F RVW MEDS BY RX/DR IN RCRD: CPT | Mod: HCNC,CPTII,S$GLB, | Performed by: PHYSICIAN ASSISTANT

## 2021-12-28 PROCEDURE — 99214 PR OFFICE/OUTPT VISIT, EST, LEVL IV, 30-39 MIN: ICD-10-PCS | Mod: HCNC,S$GLB,, | Performed by: PHYSICIAN ASSISTANT

## 2021-12-28 PROCEDURE — 3074F PR MOST RECENT SYSTOLIC BLOOD PRESSURE < 130 MM HG: ICD-10-PCS | Mod: HCNC,CPTII,S$GLB, | Performed by: PHYSICIAN ASSISTANT

## 2021-12-28 PROCEDURE — 1159F PR MEDICATION LIST DOCUMENTED IN MEDICAL RECORD: ICD-10-PCS | Mod: HCNC,CPTII,S$GLB, | Performed by: PHYSICIAN ASSISTANT

## 2021-12-28 PROCEDURE — 1101F PT FALLS ASSESS-DOCD LE1/YR: CPT | Mod: HCNC,CPTII,S$GLB, | Performed by: PHYSICIAN ASSISTANT

## 2021-12-28 PROCEDURE — 3078F DIAST BP <80 MM HG: CPT | Mod: HCNC,CPTII,S$GLB, | Performed by: PHYSICIAN ASSISTANT

## 2021-12-28 PROCEDURE — 3288F PR FALLS RISK ASSESSMENT DOCUMENTED: ICD-10-PCS | Mod: HCNC,CPTII,S$GLB, | Performed by: PHYSICIAN ASSISTANT

## 2021-12-28 PROCEDURE — 1160F PR REVIEW ALL MEDS BY PRESCRIBER/CLIN PHARMACIST DOCUMENTED: ICD-10-PCS | Mod: HCNC,CPTII,S$GLB, | Performed by: PHYSICIAN ASSISTANT

## 2021-12-28 PROCEDURE — 99999 PR PBB SHADOW E&M-EST. PATIENT-LVL V: ICD-10-PCS | Mod: PBBFAC,HCNC,, | Performed by: PHYSICIAN ASSISTANT

## 2021-12-28 NOTE — PROGRESS NOTES
INTERNAL MEDICINE PROGRESS NOTE  SURGICAL CLEARANCE    CHIEF COMPLAINT     Chief Complaint   Patient presents with    Pre-op Exam       HPI     Lima Maldonado is a 71 y.o. female who presents for a pre-op evaluation today.    PCP is Dr. Mccall, patient is known to me.     Patient presents for pre-op evalaution. She will be having Blepharoplasty, lateral browlift, and fat transfer on 1/19/2022 by Dr. Arboleda. Pt reports that clearance needs to be obtained by 1/6/2022.    She has been feeling well.     Cr: has been normal in the past  DM: Last A1C was 6.7 on 9/28/2021; never used insulin  MI and CHF: No cardiac history  TIA CVA: no history  Fam hx of sudden cardiac death: no   Difficulty with anesthesia in the past: no trouble, does report post op nausea and vomiting in the past. Last surgery was breast reconstruction after BRCA 2018 - no complications.   ADLs: Could go about 5 flights of stairs with ease  Dental implants or hardware: None        Past Medical History:  Past Medical History:   Diagnosis Date    Acute cystitis without hematuria 7/17/2017    Arthritis     Back pain     Breast cancer     originally dx in 02/1997- treated with surgery, chemo and radiation     Class 1 obesity due to excess calories with serious comorbidity and body mass index (BMI) of 30.0 to 30.9 in adult 11/29/2018    Elevated bilirubin 11/19/2013    Fatty liver     GERD (gastroesophageal reflux disease)     Glucose intolerance (impaired glucose tolerance)     Hyperlipidemia     Hypertension     Hypothyroid     Hypothyroidism     hypothyroidism    Malignant neoplasm of lower-outer quadrant of left female breast 11/15/2016    Obesity     Obesity, diabetes, and hypertension syndrome 12/12/2018    Osteopenia     Osteoporosis     Primary insomnia 11/2/2016    Shingles     Sleep apnea     wears CPAP nightly     Squamous cell carcinoma 2011    right forearm, sx by Dr. Corinne Ray    Stress incontinence of urine  1/11/2018    Trouble in sleeping     Type 2 diabetes mellitus with diabetic polyneuropathy, without long-term current use of insulin 12/26/2017    Urinary incontinence     Well woman exam with routine gynecological exam 11/2/2016       Home Medications:  Prior to Admission medications    Medication Sig Start Date End Date Taking? Authorizing Provider   ascorbic acid, vitamin C, (VITAMIN C) 1000 MG tablet Take 1 tablet by mouth once daily.   Yes Historical Provider   betamethasone valerate 0.1% (VALISONE) 0.1 % Oint Apply topically 2 (two) times daily. 6/17/21  Yes Joie Mccall MD   CALCIUM CARBONATE/VITAMIN D3 (CALTRATE 600 + D ORAL) Take 1 tablet by mouth 2 (two) times daily.    Yes Historical Provider   CANNABIDIOL, CBD, EXTRACT ORAL Take 1 drop by mouth Daily.   Yes Historical Provider   celecoxib (CELEBREX) 100 MG capsule TAKE 1 CAPSULE (100 MG TOTAL) BY MOUTH DAILY AS NEEDED. 6/1/21  Yes Sapna Small NP   chlorhexidine (PERIDEX) 0.12 % solution  10/26/21  Yes Historical Provider   coenzyme Q10-vitamin E 100-100 mg-unit Cap Take 1 capsule by mouth once daily.    Yes Historical Provider   denosumab (PROLIA) 60 mg/mL Syrg Inject 1 mL (60 mg total) into the skin every 6 (six) months. 8/5/21 8/5/22 Yes Joie Mccall MD   dulaglutide (TRULICITY) 0.75 mg/0.5 mL pen injector Inject 0.75 mg into the skin once a week. 10/4/21  Yes Joie Mccall MD   estradioL (YUVAFEM) 10 mcg Tab PLACE 1 TABLET (10 MCG TOTAL) VAGINALLY TWICE A WEEK. 1/14/21  Yes Truman Rascon MD   fluticasone (FLONASE) 50 mcg/actuation nasal spray 1 spray by Each Nare route 2 (two) times daily.  8/29/18  Yes Historical Provider   gabapentin (NEURONTIN) 300 MG capsule TAKE 2 CAPSULES (600 MG TOTAL) BY MOUTH EVERY EVENING. 4/26/21 4/26/22 Yes Francisco Baker MD   HYDROcodone-acetaminophen (NORCO) 7.5-325 mg per tablet  10/26/21  Yes Historical Provider   ibuprofen (ADVIL,MOTRIN) 600 MG tablet  10/26/21  Yes Historical  Provider   letrozole (FEMARA) 2.5 mg Tab TAKE 1 TABLET BY MOUTH EVERY DAY 3/24/21  Yes Francisco Baker MD   levothyroxine (SYNTHROID) 50 MCG tablet TAKE 1 TABLET BY MOUTH EVERY DAY 11/8/21  Yes Joie Mccall MD   losartan (COZAAR) 50 MG tablet TAKE 1 TABLET BY MOUTH EVERY DAY 3/3/21  Yes Naila Ledesma MD   metFORMIN (GLUCOPHAGE) 500 MG tablet Take 1 tablet (500 mg total) by mouth daily with breakfast. 10/4/21 10/4/22 Yes Joie Mccall MD   MULTIVITAMIN ORAL Take 1 tablet by mouth once daily.    Yes Historical Provider   nystatin (MYCOSTATIN) powder Apply to affected area 3 times daily 11/30/21 11/30/22 Yes Melissa Lagunas PA-C   omega-3 fatty acids-vitamin E 1,000 mg Cap Take 1 capsule by mouth once daily.    Yes Historical Provider   ondansetron (ZOFRAN-ODT) 4 MG TbDL  10/26/21  Yes Historical Provider   pantoprazole (PROTONIX) 20 MG tablet TAKE 1 TABLET (20 MG TOTAL) BY MOUTH BEFORE BREAKFAST. 9/8/21 9/8/22 Yes Omar Ambriz MD   simvastatin (ZOCOR) 20 MG tablet TAKE 1 TABLET BY MOUTH EVERY DAY IN THE EVENING 12/7/21  Yes Bridgett Gabriel PA-C   vitamin D 1000 units Tab Take 1,000 Units by mouth once daily.    Yes Historical Provider       Review of Systems:  Review of Systems   Constitutional: Negative for chills and fever.   HENT: Negative for facial swelling, sinus pressure, sore throat and trouble swallowing.    Eyes: Negative for visual disturbance.   Respiratory: Negative for cough and shortness of breath.    Cardiovascular: Negative for chest pain.   Gastrointestinal: Negative for abdominal pain, constipation, diarrhea, nausea and vomiting.   Genitourinary: Negative for dysuria, flank pain, menstrual problem, pelvic pain, vaginal bleeding and vaginal discharge.   Musculoskeletal: Negative for arthralgias, back pain, neck pain and neck stiffness.   Skin: Negative for rash.   Neurological: Negative for dizziness, syncope, weakness and headaches.   Psychiatric/Behavioral: Negative  for confusion.       Health Maintainence:   Immunizations:  Health Maintenance       Date Due Completion Date    Foot Exam 09/24/2020 9/24/2019 (Done)    Override on 9/24/2019: Done    Override on 12/11/2018: Done    Override on 8/9/2017: Done    Eye Exam 07/15/2021 7/15/2020    Hemoglobin A1c 03/28/2022 9/28/2021    COVID-19 Vaccine (4 - Booster for Pfizer series) 04/13/2022 10/13/2021    Lipid Panel 05/06/2022 5/6/2021    Diabetes Urine Screening 10/07/2022 10/7/2021    High Dose Statin 12/07/2022 12/7/2021    DEXA SCAN 07/16/2023 7/16/2021    TETANUS VACCINE 12/03/2023 12/3/2013    Colorectal Cancer Screening 05/15/2028 5/15/2018    Override on 10/6/2006: Done           PHYSICAL EXAM     /62 (BP Location: Left arm, Patient Position: Sitting)   Pulse 82   Wt 82.1 kg (181 lb)   SpO2 95%   BMI 29.21 kg/m²     Physical Exam    LABS     Lab Results   Component Value Date    HGBA1C 6.7 (H) 09/28/2021     CMP  Sodium   Date Value Ref Range Status   05/06/2021 143 136 - 145 mmol/L Final     Potassium   Date Value Ref Range Status   05/06/2021 4.7 3.5 - 5.1 mmol/L Final     Chloride   Date Value Ref Range Status   05/06/2021 105 95 - 110 mmol/L Final     CO2   Date Value Ref Range Status   05/06/2021 29 23 - 29 mmol/L Final     Glucose   Date Value Ref Range Status   05/06/2021 113 (H) 70 - 110 mg/dL Final     BUN   Date Value Ref Range Status   05/06/2021 15 8 - 23 mg/dL Final     Creatinine   Date Value Ref Range Status   05/06/2021 0.8 0.5 - 1.4 mg/dL Final     Calcium   Date Value Ref Range Status   05/06/2021 10.0 8.7 - 10.5 mg/dL Final     Total Protein   Date Value Ref Range Status   05/06/2021 7.0 6.0 - 8.4 g/dL Final   05/06/2021 7.0 6.0 - 8.4 g/dL Final     Albumin   Date Value Ref Range Status   05/06/2021 4.3 3.5 - 5.2 g/dL Final   05/06/2021 4.3 3.5 - 5.2 g/dL Final     Total Bilirubin   Date Value Ref Range Status   05/06/2021 1.3 (H) 0.1 - 1.0 mg/dL Final     Comment:     For infants and newborns,  interpretation of results should be based  on gestational age, weight and in agreement with clinical  observations.    Premature Infant recommended reference ranges:  Up to 24 hours.............<8.0 mg/dL  Up to 48 hours............<12.0 mg/dL  3-5 days..................<15.0 mg/dL  6-29 days.................<15.0 mg/dL     05/06/2021 1.3 (H) 0.1 - 1.0 mg/dL Final     Comment:     For infants and newborns, interpretation of results should be based  on gestational age, weight and in agreement with clinical  observations.    Premature Infant recommended reference ranges:  Up to 24 hours.............<8.0 mg/dL  Up to 48 hours............<12.0 mg/dL  3-5 days..................<15.0 mg/dL  6-29 days.................<15.0 mg/dL       Alkaline Phosphatase   Date Value Ref Range Status   05/06/2021 83 55 - 135 U/L Final   05/06/2021 83 55 - 135 U/L Final     AST   Date Value Ref Range Status   05/06/2021 17 10 - 40 U/L Final   05/06/2021 17 10 - 40 U/L Final     ALT   Date Value Ref Range Status   05/06/2021 18 10 - 44 U/L Final   05/06/2021 18 10 - 44 U/L Final     Anion Gap   Date Value Ref Range Status   05/06/2021 9 8 - 16 mmol/L Final     eGFR if    Date Value Ref Range Status   05/06/2021 >60.0 >60 mL/min/1.73 m^2 Final     eGFR if non    Date Value Ref Range Status   05/06/2021 >60.0 >60 mL/min/1.73 m^2 Final     Comment:     Calculation used to obtain the estimated glomerular filtration  rate (eGFR) is the CKD-EPI equation.        Lab Results   Component Value Date    WBC 4.15 05/29/2020    HGB 13.8 05/29/2020    HCT 43.1 05/29/2020    MCV 91 05/29/2020     05/29/2020     Lab Results   Component Value Date    CHOL 115 (L) 05/06/2021    CHOL 153 02/23/2021    CHOL 159 05/29/2020     Lab Results   Component Value Date    HDL 34 (L) 05/06/2021    HDL 35 (L) 02/23/2021    HDL 38 (L) 05/29/2020     Lab Results   Component Value Date    LDLCALC 51.4 (L) 05/06/2021    LDLCALC 61.0 (L)  02/23/2021    LDLCALC 88.8 05/29/2020     Lab Results   Component Value Date    TRIG 148 05/06/2021    TRIG 285 (H) 02/23/2021    TRIG 161 (H) 05/29/2020     Lab Results   Component Value Date    CHOLHDL 29.6 05/06/2021    CHOLHDL 22.9 02/23/2021    CHOLHDL 23.9 05/29/2020     Lab Results   Component Value Date    TSH 1.989 02/23/2021       ASSESSMENT/PLAN     Lima Maldonado is a 71 y.o. female       Lima was seen today for pre-op exam. Expected to have RCRI of 3.9%. Expect pt will be medically optimized after labs and EKG have resulted. Will refer to derm for eval of reproted skin lesions to right breast.     Diagnoses and all orders for this visit:    Pre-op evaluation  -     CBC Auto Differential; Future  -     Comprehensive Metabolic Panel; Future  -     Hemoglobin A1C; Future  -     EKG 12-lead; Future    Skin lesion  -     Ambulatory referral/consult to Dermatology; Future    Type 2 diabetes mellitus with complication, without long-term current use of insulin   -A1C 6.6    Malignant neoplasm of lower-outer quadrant of left breast of female, estrogen receptor positive    Obstructive sleep apnea on CPAP   -uses CPAP regularly     Hypothyroidism, unspecified type   -stable    -cont synthroid 50 mcg    Essential hypertension   -well controlled, continue current regimen Cozar 50      RTC as needed    Bridgett Gabriel PA-C     Update:   Labs, EKG reviewed patient is medically optimized for this procedure. NNAMDI ABDULLAHI

## 2021-12-29 ENCOUNTER — PATIENT MESSAGE (OUTPATIENT)
Dept: GYNECOLOGIC ONCOLOGY | Facility: CLINIC | Age: 71
End: 2021-12-29
Payer: MEDICARE

## 2021-12-29 ENCOUNTER — OFFICE VISIT (OUTPATIENT)
Dept: DERMATOLOGY | Facility: CLINIC | Age: 71
End: 2021-12-29
Payer: MEDICARE

## 2021-12-29 DIAGNOSIS — D48.5 NEOPLASM OF UNCERTAIN BEHAVIOR OF SKIN: ICD-10-CM

## 2021-12-29 DIAGNOSIS — D18.01 CHERRY ANGIOMA: Primary | ICD-10-CM

## 2021-12-29 DIAGNOSIS — L98.9 SKIN LESION: ICD-10-CM

## 2021-12-29 PROCEDURE — 99213 PR OFFICE/OUTPT VISIT, EST, LEVL III, 20-29 MIN: ICD-10-PCS | Mod: 25,HCNC,S$GLB, | Performed by: DERMATOLOGY

## 2021-12-29 PROCEDURE — 99999 PR PBB SHADOW E&M-EST. PATIENT-LVL III: ICD-10-PCS | Mod: PBBFAC,HCNC,, | Performed by: DERMATOLOGY

## 2021-12-29 PROCEDURE — 1126F AMNT PAIN NOTED NONE PRSNT: CPT | Mod: HCNC,CPTII,S$GLB, | Performed by: DERMATOLOGY

## 2021-12-29 PROCEDURE — 99213 OFFICE O/P EST LOW 20 MIN: CPT | Mod: 25,HCNC,S$GLB, | Performed by: DERMATOLOGY

## 2021-12-29 PROCEDURE — 88305 TISSUE EXAM BY PATHOLOGIST: CPT | Mod: HCNC | Performed by: PATHOLOGY

## 2021-12-29 PROCEDURE — 88305 TISSUE EXAM BY PATHOLOGIST: CPT | Mod: 26,HCNC,, | Performed by: PATHOLOGY

## 2021-12-29 PROCEDURE — 11103 PR TANGENTIAL BIOPSY, SKIN, EA ADDTL LESION: ICD-10-PCS | Mod: 59,HCNC,S$GLB, | Performed by: DERMATOLOGY

## 2021-12-29 PROCEDURE — 99999 PR PBB SHADOW E&M-EST. PATIENT-LVL III: CPT | Mod: PBBFAC,HCNC,, | Performed by: DERMATOLOGY

## 2021-12-29 PROCEDURE — 88305 TISSUE EXAM BY PATHOLOGIST: ICD-10-PCS | Mod: 26,HCNC,, | Performed by: PATHOLOGY

## 2021-12-29 PROCEDURE — 11103 TANGNTL BX SKIN EA SEP/ADDL: CPT | Mod: 59,HCNC,S$GLB, | Performed by: DERMATOLOGY

## 2021-12-29 PROCEDURE — 11104 PR PUNCH BIOPSY, SKIN, SINGLE LESION: ICD-10-PCS | Mod: HCNC,S$GLB,, | Performed by: DERMATOLOGY

## 2021-12-29 PROCEDURE — 11104 PUNCH BX SKIN SINGLE LESION: CPT | Mod: HCNC,S$GLB,, | Performed by: DERMATOLOGY

## 2021-12-29 PROCEDURE — 1126F PR PAIN SEVERITY QUANTIFIED, NO PAIN PRESENT: ICD-10-PCS | Mod: HCNC,CPTII,S$GLB, | Performed by: DERMATOLOGY

## 2021-12-29 NOTE — PROGRESS NOTES
Subjective:       Patient ID:  Lima Maldonado is a 71 y.o. female who presents for   Chief Complaint   Patient presents with    Growth     Patient is a 72 yo female with hx of breast cancer x 2, and mother who passed from melanoma, presents for changed growth on chest . Growth is red and getting darker.  Gets caught on bra. No pain, no bleeding. She would like it removed due to the darker color change.  Also asks about new rash on right breast only, comes and goes, red and flaky. Was given nystatin topical for this which helped temporarily but rash came back. Not itchy.      Review of Systems   Skin: Positive for activity-related sunscreen use. Negative for daily sunscreen use, recent sunburn and wears hat.   Hematologic/Lymphatic: Does not bruise/bleed easily.        Objective:    Physical Exam   Constitutional: She appears well-developed and well-nourished.   Neurological: She is alert and oriented to person, place, and time.   Psychiatric: She has a normal mood and affect.   Skin:   Areas Examined (abnormalities noted in diagram):   Head / Face Inspection Performed  Neck Inspection Performed  Chest / Axilla Inspection Performed  Back Inspection Performed  RUE Inspected  LUE Inspection Performed              Diagram Legend     Erythematous scaling macule/papule c/w actinic keratosis       Vascular papule c/w angioma      Pigmented verrucoid papule/plaque c/w seborrheic keratosis      Yellow umbilicated papule c/w sebaceous hyperplasia      Irregularly shaped tan macule c/w lentigo     1-2 mm smooth white papules consistent with Milia      Movable subcutaneous cyst with punctum c/w epidermal inclusion cyst      Subcutaneous movable cyst c/w pilar cyst      Firm pink to brown papule c/w dermatofibroma      Pedunculated fleshy papule(s) c/w skin tag(s)      Evenly pigmented macule c/w junctional nevus     Mildly variegated pigmented, slightly irregular-bordered macule c/w mildly atypical nevus      Flesh  colored to evenly pigmented papule c/w intradermal nevus       Pink pearly papule/plaque c/w basal cell carcinoma      Erythematous hyperkeratotic cursted plaque c/w SCC      Surgical scar with no sign of skin cancer recurrence      Open and closed comedones      Inflammatory papules and pustules      Verrucoid papule consistent consistent with wart     Erythematous eczematous patches and plaques     Dystrophic onycholytic nail with subungual debris c/w onychomycosis     Umbilicated papule    Erythematous-base heme-crusted tan verrucoid plaque consistent with inflamed seborrheic keratosis     Erythematous Silvery Scaling Plaque c/w Psoriasis     See annotation      Central chest          Right breast      Assessment / Plan:      Pathology Orders:     Normal Orders This Visit    Specimen to Pathology, Dermatology     Comments:    Number of Specimens:->2  ------------------------->-------------------------  Spec 1 Procedure:->Biopsy  Spec 1 Clinical Impression:->pale erythematous scaly plaque  on breast site of breast ca - r/o inflammatory breast ca vs  eczema  Spec 1 Source:->right breast  ------------------------->-------------------------  Spec 2 Procedure:->Biopsy  Spec 2 Clinical Impression:->erythematous papule 2 mm r/o  cherry angioma  Spec 2 Source:->central chest  Release to patient->Immediate    Questions:    Procedure Type: Dermatology and skin neoplasms    Number of Specimens: 2    ------------------------: -------------------------    Spec 1 Procedure: Biopsy    Spec 1 Clinical Impression: pale erythematous scaly plaque on breast site of breast ca - r/o inflammatory breast ca vs eczema    Spec 1 Source: right breast    ------------------------: -------------------------    Spec 2 Procedure: Biopsy    Spec 2 Clinical Impression: erythematous papule 2 mm r/o cherry angioma    Spec 2 Source: central chest    Clinical Information: hx of breast ca    Release to patient: Immediate        Cherry angioma  Appears  to be causing irritation and anxiety to pt   Pt requests removal; see procedure note below      Neoplasm of uncertain behavior of skin  -     Specimen to Pathology, Dermatology  Central chest - favor cherry angioma - shave removal today:  Shave biopsy procedure note:    Shave biopsy performed after verbal consent including risk of infection, scar, recurrence, need for additional treatment of site. Area prepped with alcohol, anesthetized with approximately 1.0cc of 1% lidocaine with epinephrine. Lesional tissue shaved with razor blade. Hemostasis achieved with application of aluminum chloride followed by hyfrecation. No complications. Dressing applied. Wound care explained.      Right breast - pt with faint erythematous scaly plaque today - on cell phone pt shows photograph of a much more red, inflamed plaque -   Advise biopsy today to r/o inflammatory breast ca  Favor eczema clinically -     Punch biopsy procedure note:  Punch biopsy performed after verbal consent obtained. Area marked and prepped with alcohol. Approximately 1cc of 1% lidocaine with epinephrine injected. 4 mm disposable punch used to remove lesion. Hemostasis obtained and biopsy site closed with 1 - 2 Prolene sutures. Wound care instructions reviewed with patient and handout given.             Follow up for for TBSE.

## 2021-12-30 ENCOUNTER — HOSPITAL ENCOUNTER (OUTPATIENT)
Dept: RADIOLOGY | Facility: HOSPITAL | Age: 71
Discharge: HOME | End: 2021-12-30
Attending: PHYSICIAN ASSISTANT
Payer: MEDICARE

## 2021-12-30 DIAGNOSIS — N63.20 BREAST MASS, LEFT: ICD-10-CM

## 2021-12-30 DIAGNOSIS — N64.59 ABNORMAL BREAST TISSUE: ICD-10-CM

## 2021-12-30 PROCEDURE — 77061 MAMMO DIGITAL DIAGNOSTIC LEFT WITH TOMO: ICD-10-PCS | Mod: 26,HCNC,LT, | Performed by: RADIOLOGY

## 2021-12-30 PROCEDURE — 76642 ULTRASOUND BREAST LIMITED: CPT | Mod: TC,HCNC,LT

## 2021-12-30 PROCEDURE — 77065 DX MAMMO INCL CAD UNI: CPT | Mod: 26,HCNC,LT, | Performed by: RADIOLOGY

## 2021-12-30 PROCEDURE — 77061 BREAST TOMOSYNTHESIS UNI: CPT | Mod: TC,HCNC,LT

## 2021-12-30 PROCEDURE — 77065 MAMMO DIGITAL DIAGNOSTIC LEFT WITH TOMO: ICD-10-PCS | Mod: 26,HCNC,LT, | Performed by: RADIOLOGY

## 2021-12-30 PROCEDURE — 77061 BREAST TOMOSYNTHESIS UNI: CPT | Mod: 26,HCNC,LT, | Performed by: RADIOLOGY

## 2021-12-30 PROCEDURE — 76642 US BREAST LEFT LIMITED: ICD-10-PCS | Mod: 26,HCNC,LT, | Performed by: RADIOLOGY

## 2021-12-30 PROCEDURE — 76642 ULTRASOUND BREAST LIMITED: CPT | Mod: 26,HCNC,LT, | Performed by: RADIOLOGY

## 2022-01-05 ENCOUNTER — PATIENT MESSAGE (OUTPATIENT)
Dept: INTERNAL MEDICINE | Facility: CLINIC | Age: 72
End: 2022-01-05
Payer: MEDICARE

## 2022-01-05 LAB
FINAL PATHOLOGIC DIAGNOSIS: NORMAL
GROSS: NORMAL
Lab: NORMAL
MICROSCOPIC EXAM: NORMAL

## 2022-01-07 NOTE — PROGRESS NOTES
1. Skin, right breast, punch biopsy:   - Subacute spongiotic dermatitis (see comment).   COMMENT: Differential diagnosis includes atopic dermatitis, allergic contact   dermatitis, nummular dermatitis or id reaction.   2.  Skin, central chest, shave biopsy:   - Hemangioma.

## 2022-01-09 NOTE — PROGRESS NOTES
Subjective:       Patient ID:  Lima Maldonado is a 71 y.o. female who presents for   Chief Complaint   Patient presents with    Growth     Patient is a 70 yo female with hx of breast cancer x 2, and mother who passed from melanoma, presents for changed growth on chest . Growth is red and getting darker.  Gets caught on bra. No pain, no bleeding. She would like it removed due to the darker color change.  Also asks about new rash on right breast only, comes and goes, red and flaky. Was given nystatin topical for this which helped temporarily but rash came back. Not itchy.      Growth      Review of Systems   Skin: Positive for activity-related sunscreen use. Negative for daily sunscreen use, recent sunburn and wears hat.   Hematologic/Lymphatic: Does not bruise/bleed easily.        Objective:    Physical Exam   Constitutional: She appears well-developed and well-nourished.   Neurological: She is alert and oriented to person, place, and time.   Psychiatric: She has a normal mood and affect.   Skin:   Areas Examined (abnormalities noted in diagram):   Head / Face Inspection Performed  Neck Inspection Performed  Chest / Axilla Inspection Performed  Back Inspection Performed  RUE Inspected  LUE Inspection Performed              Diagram Legend     Erythematous scaling macule/papule c/w actinic keratosis       Vascular papule c/w angioma      Pigmented verrucoid papule/plaque c/w seborrheic keratosis      Yellow umbilicated papule c/w sebaceous hyperplasia      Irregularly shaped tan macule c/w lentigo     1-2 mm smooth white papules consistent with Milia      Movable subcutaneous cyst with punctum c/w epidermal inclusion cyst      Subcutaneous movable cyst c/w pilar cyst      Firm pink to brown papule c/w dermatofibroma      Pedunculated fleshy papule(s) c/w skin tag(s)      Evenly pigmented macule c/w junctional nevus     Mildly variegated pigmented, slightly irregular-bordered macule c/w mildly atypical nevus       Flesh colored to evenly pigmented papule c/w intradermal nevus       Pink pearly papule/plaque c/w basal cell carcinoma      Erythematous hyperkeratotic cursted plaque c/w SCC      Surgical scar with no sign of skin cancer recurrence      Open and closed comedones      Inflammatory papules and pustules      Verrucoid papule consistent consistent with wart     Erythematous eczematous patches and plaques     Dystrophic onycholytic nail with subungual debris c/w onychomycosis     Umbilicated papule    Erythematous-base heme-crusted tan verrucoid plaque consistent with inflamed seborrheic keratosis     Erythematous Silvery Scaling Plaque c/w Psoriasis     See annotation      Central chest          Right breast      Assessment / Plan:      Pathology Orders:     Normal Orders This Visit    Specimen to Pathology, Dermatology     Comments:    Number of Specimens:->2  ------------------------->-------------------------  Spec 1 Procedure:->Biopsy  Spec 1 Clinical Impression:->pale erythematous scaly plaque  on breast site of breast ca - r/o inflammatory breast ca vs  eczema  Spec 1 Source:->right breast  ------------------------->-------------------------  Spec 2 Procedure:->Biopsy  Spec 2 Clinical Impression:->erythematous papule 2 mm r/o  cherry angioma  Spec 2 Source:->central chest  Release to patient->Immediate    Questions:    Procedure Type: Dermatology and skin neoplasms    Number of Specimens: 2    ------------------------: -------------------------    Spec 1 Procedure: Biopsy    Spec 1 Clinical Impression: pale erythematous scaly plaque on breast site of breast ca - r/o inflammatory breast ca vs eczema    Spec 1 Source: right breast    ------------------------: -------------------------    Spec 2 Procedure: Biopsy    Spec 2 Clinical Impression: erythematous papule 2 mm r/o cherry angioma    Spec 2 Source: central chest    Clinical Information: hx of breast ca    Release to patient: Immediate        Cherry  angioma  Appears to be causing irritation and anxiety to pt   Pt requests removal; see procedure note below      Neoplasm of uncertain behavior of skin  -     Specimen to Pathology, Dermatology  Central chest - favor cherry angioma - shave removal today:  Shave biopsy procedure note:    Shave biopsy performed after verbal consent including risk of infection, scar, recurrence, need for additional treatment of site. Area prepped with alcohol, anesthetized with approximately 1.0cc of 1% lidocaine with epinephrine. Lesional tissue shaved with razor blade. Hemostasis achieved with application of aluminum chloride followed by hyfrecation. No complications. Dressing applied. Wound care explained.      Right breast - pt with faint erythematous scaly plaque today - on cell phone pt shows photograph of a much more red, inflamed plaque -   Advise biopsy today to r/o inflammatory breast ca  Favor eczema clinically -     Punch biopsy procedure note:  Punch biopsy performed after verbal consent obtained. Area marked and prepped with alcohol. Approximately 1cc of 1% lidocaine with epinephrine injected. 4 mm disposable punch used to remove lesion. Hemostasis obtained and biopsy site closed with 1 - 2 Prolene sutures. Wound care instructions reviewed with patient and handout given.             Follow up for for TBSE.

## 2022-01-12 ENCOUNTER — OFFICE VISIT (OUTPATIENT)
Dept: DERMATOLOGY | Facility: CLINIC | Age: 72
End: 2022-01-12
Payer: MEDICARE

## 2022-01-12 DIAGNOSIS — L57.0 ACTINIC KERATOSIS: ICD-10-CM

## 2022-01-12 DIAGNOSIS — D23.9 DERMATOFIBROMA: ICD-10-CM

## 2022-01-12 DIAGNOSIS — L85.3 DRY SKIN: ICD-10-CM

## 2022-01-12 DIAGNOSIS — L82.1 SEBORRHEIC KERATOSES: ICD-10-CM

## 2022-01-12 DIAGNOSIS — L30.9 ECZEMA, UNSPECIFIED TYPE: ICD-10-CM

## 2022-01-12 DIAGNOSIS — Z12.83 SCREENING EXAM FOR SKIN CANCER: Primary | ICD-10-CM

## 2022-01-12 DIAGNOSIS — D18.01 CHERRY ANGIOMA: ICD-10-CM

## 2022-01-12 PROCEDURE — 99999 PR PBB SHADOW E&M-EST. PATIENT-LVL I: CPT | Mod: PBBFAC,HCNC,, | Performed by: DERMATOLOGY

## 2022-01-12 PROCEDURE — 17003 DESTRUCT PREMALG LES 2-14: CPT | Mod: 59,HCNC,S$GLB, | Performed by: DERMATOLOGY

## 2022-01-12 PROCEDURE — 99999 PR PBB SHADOW E&M-EST. PATIENT-LVL I: ICD-10-PCS | Mod: PBBFAC,HCNC,, | Performed by: DERMATOLOGY

## 2022-01-12 PROCEDURE — 1101F PR PT FALLS ASSESS DOC 0-1 FALLS W/OUT INJ PAST YR: ICD-10-PCS | Mod: HCNC,CPTII,S$GLB, | Performed by: DERMATOLOGY

## 2022-01-12 PROCEDURE — 3288F PR FALLS RISK ASSESSMENT DOCUMENTED: ICD-10-PCS | Mod: HCNC,CPTII,S$GLB, | Performed by: DERMATOLOGY

## 2022-01-12 PROCEDURE — 1126F PR PAIN SEVERITY QUANTIFIED, NO PAIN PRESENT: ICD-10-PCS | Mod: HCNC,CPTII,S$GLB, | Performed by: DERMATOLOGY

## 2022-01-12 PROCEDURE — 3288F FALL RISK ASSESSMENT DOCD: CPT | Mod: HCNC,CPTII,S$GLB, | Performed by: DERMATOLOGY

## 2022-01-12 PROCEDURE — 17000 PR DESTRUCTION(LASER SURGERY,CRYOSURGERY,CHEMOSURGERY),PREMALIGNANT LESIONS,FIRST LESION: ICD-10-PCS | Mod: HCNC,S$GLB,, | Performed by: DERMATOLOGY

## 2022-01-12 PROCEDURE — 17000 DESTRUCT PREMALG LESION: CPT | Mod: HCNC,S$GLB,, | Performed by: DERMATOLOGY

## 2022-01-12 PROCEDURE — 1126F AMNT PAIN NOTED NONE PRSNT: CPT | Mod: HCNC,CPTII,S$GLB, | Performed by: DERMATOLOGY

## 2022-01-12 PROCEDURE — 17003 DESTRUCTION, PREMALIGNANT LESIONS; SECOND THROUGH 14 LESIONS: ICD-10-PCS | Mod: 59,HCNC,S$GLB, | Performed by: DERMATOLOGY

## 2022-01-12 PROCEDURE — 99214 OFFICE O/P EST MOD 30 MIN: CPT | Mod: 25,HCNC,S$GLB, | Performed by: DERMATOLOGY

## 2022-01-12 PROCEDURE — 1101F PT FALLS ASSESS-DOCD LE1/YR: CPT | Mod: HCNC,CPTII,S$GLB, | Performed by: DERMATOLOGY

## 2022-01-12 PROCEDURE — 99214 PR OFFICE/OUTPT VISIT, EST, LEVL IV, 30-39 MIN: ICD-10-PCS | Mod: 25,HCNC,S$GLB, | Performed by: DERMATOLOGY

## 2022-01-12 RX ORDER — HYDROCORTISONE 25 MG/G
OINTMENT TOPICAL 2 TIMES DAILY
Qty: 28 G | Refills: 2 | Status: SHIPPED | OUTPATIENT
Start: 2022-01-12 | End: 2023-02-01

## 2022-01-12 NOTE — PROGRESS NOTES
Subjective:       Patient ID:  Lima Maldonado is a 71 y.o. female who presents for   Chief Complaint   Patient presents with    Suture / Staple Removal    Skin Check     Patient is a 72 yo female present for TBSE.  Last seen 2 weeks ago for rash on right breast, biopsy showed eczema, and biopsy of angioma. Both have healed.    hx of breast ca x 2 and mother  from melanoma.  Pt unsure when last skin cancer check.  The patient denies any moles or growths of the skin that are rapidly growing, hurting, itching, bleeding, or changing colors.  Has dry skin on ears and around nose.    Review of Systems   Skin: Positive for activity-related sunscreen use and wears hat. Negative for daily sunscreen use and recent sunburn.   Hematologic/Lymphatic: Bruises/bleeds easily.        Objective:    Physical Exam   Constitutional: She appears well-developed and well-nourished.   Neurological: She is alert and oriented to person, place, and time.   Psychiatric: She has a normal mood and affect.   Skin:   Areas Examined (abnormalities noted in diagram):   Scalp / Hair Palpated and Inspected  Head / Face Inspection Performed  Neck Inspection Performed  Chest / Axilla Inspection Performed  Abdomen Inspection Performed  Genitals / Buttocks / Groin Inspection Performed  Back Inspection Performed  RUE Inspected  LUE Inspection Performed  RLE Inspected  LLE Inspection Performed  Nails and Digits Inspection Performed                   Diagram Legend     Erythematous scaling macule/papule c/w actinic keratosis       Vascular papule c/w angioma      Pigmented verrucoid papule/plaque c/w seborrheic keratosis      Yellow umbilicated papule c/w sebaceous hyperplasia      Irregularly shaped tan macule c/w lentigo     1-2 mm smooth white papules consistent with Milia      Movable subcutaneous cyst with punctum c/w epidermal inclusion cyst      Subcutaneous movable cyst c/w pilar cyst      Firm pink to brown papule c/w dermatofibroma       Pedunculated fleshy papule(s) c/w skin tag(s)      Evenly pigmented macule c/w junctional nevus     Mildly variegated pigmented, slightly irregular-bordered macule c/w mildly atypical nevus      Flesh colored to evenly pigmented papule c/w intradermal nevus       Pink pearly papule/plaque c/w basal cell carcinoma      Erythematous hyperkeratotic cursted plaque c/w SCC      Surgical scar with no sign of skin cancer recurrence      Open and closed comedones      Inflammatory papules and pustules      Verrucoid papule consistent consistent with wart     Erythematous eczematous patches and plaques     Dystrophic onycholytic nail with subungual debris c/w onychomycosis     Umbilicated papule    Erythematous-base heme-crusted tan verrucoid plaque consistent with inflamed seborrheic keratosis     Erythematous Silvery Scaling Plaque c/w Psoriasis     See annotation      Assessment / Plan:        Screening exam for skin cancer  Total body skin examination performed today including at least 12 points as noted in physical examination. No lesions suspicious for malignancy noted.  Recommend daily sun protection/avoidance, use of at least SPF 30, broad spectrum sunscreen (OTC drug), skin self examinations, and routine physician surveillance to optimize early detection    Eczema, unspecified type  -     hydrocortisone 2.5 % ointment; Apply topically 2 (two) times daily. To up to 2 weeks for eczema flares  Dispense: 28 g; Refill: 2  Good skin care regimen discussed including limiting to one bath or shower/day, using lukewarm water with mild soap and moisturizing cream to skin 1 - 2x/day. Brochure was provided and reviewed with patient.    Dry skin  Emollients    Cherry angioma  This is a benign vascular lesion. Reassurance given. No treatment required.     Referred to our [facility], located at our [facility] location, [address] [place] [address], [phone].  Laser treatments are typically not covered by  insurance.    Seborrheic keratoses  These are benign inherited growths without a malignant potential. Reassurance given to patient. No treatment is necessary.     Dermatofibroma  This is a benign scar-like lesion secondary to minor trauma. No treatment required.     Actinic keratosis  Cryosurgery Procedure Note    Verbal consent from the patient is obtained including, but not limited to, risk of hypopigmentation/hyperpigmentation, scar, recurrence of lesion. The patient is aware of the precancerous quality and need for treatment of these lesions. Liquid nitrogen cryosurgery is applied to the 2 actinic keratoses, as detailed in the physical exam, to produce a freeze injury. The patient is aware that blisters may form and is instructed on wound care with gentle cleansing and use of vaseline ointment to keep moist until healed. The patient is supplied a handout on cryosurgery and is instructed to call if lesions do not completely resolve.         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    Follow up in about 1 year (around 1/12/2023) for for TBSE.

## 2022-02-11 ENCOUNTER — SPECIALTY PHARMACY (OUTPATIENT)
Dept: PHARMACY | Facility: CLINIC | Age: 72
End: 2022-02-11
Payer: MEDICARE

## 2022-02-11 NOTE — TELEPHONE ENCOUNTER
2/11 WWB  Refill attempt to schedule  to [facility]. Pt will call back when appt. Has been scheduled. [facility] will follow up next week.

## 2022-02-13 ENCOUNTER — PATIENT MESSAGE (OUTPATIENT)
Dept: INTERNAL MEDICINE | Facility: CLINIC | Age: 72
End: 2022-02-13
Payer: MEDICARE

## 2022-02-16 NOTE — TELEPHONE ENCOUNTER
Called patient to see if she has scheduled her Prolia appt yet. Patient stated she sent a [employer] message to her doctor to set up the appt but did not hear back yet. Informed patient they did respond and asked when she wanted to come in for the injection. Patient stated she would call the office to set it up and will follow up with [facility].

## 2022-02-18 ENCOUNTER — SPECIALTY PHARMACY (OUTPATIENT)
Dept: PHARMACY | Facility: CLINIC | Age: 72
End: 2022-02-18
Payer: MEDICARE

## 2022-02-18 NOTE — TELEPHONE ENCOUNTER
Specialty Pharmacy - Refill Coordination    Specialty Medication Orders Linked to Encounter    Flowsheet Row Most Recent Value   Medication #1 denosumab (PROLIA) 60 mg/mL Syrg (Order#[id number], Rx#[id number])        2/23  confirmed by Nelda Vitale on MS Teams    Refill Questions - Documented Responses    Flowsheet Row Most Recent Value   Patient Availability and HIPAA Verification    Does patient want to proceed with activity? Yes   HIPAA/medical authority confirmed? Yes   Relationship to patient of person spoken to? Self   Refill Screening Questions    Changes to allergies? No   Changes to medications? No   New conditions since last clinic visit? No   Unplanned office visit, urgent care, ED, or hospital admission in the last 4 weeks? No   Financial problems or insurance changes? No   How many doses of your specialty medications were missed in the last 4 weeks? 0   Would patient like to speak to a pharmacist? No   When does the patient need to receive the medication? 02/25/22   Refill Delivery Questions    How will the patient receive the medication?    When does the patient need to receive the medication? 02/25/22   Shipping Address Prescription   Address in [facility] confirmed and updated if neccessary? Yes   Expected Copay ($) 99   Is the patient able to afford the medication copay? Yes   Payment Method CC on file   Days supply of Refill 180   Supplies needed? No supplies needed   Refill activity completed? Yes   Refill activity plan Refill scheduled   Shipment/Pickup Date: 02/23/22          Current Outpatient Medications   Medication Sig    ascorbic acid, vitamin C, (VITAMIN C) 1000 MG tablet Take 1 tablet by mouth once daily.    betamethasone valerate 0.1% (VALISONE) 0.1 % Oint Apply topically 2 (two) times daily.    CALCIUM CARBONATE/VITAMIN D3 (CALTRATE 600 + D ORAL) Take 1 tablet by mouth 2 (two) times daily.     CANNABIDIOL, CBD, EXTRACT ORAL Take 1 drop by mouth Daily.     celecoxib (CELEBREX) 100 MG capsule TAKE 1 CAPSULE (100 MG TOTAL) BY MOUTH DAILY AS NEEDED.    chlorhexidine (PERIDEX) 0.12 % solution     coenzyme Q10-vitamin E 100-100 mg-unit Cap Take 1 capsule by mouth once daily.     denosumab (PROLIA) 60 mg/mL Syrg Inject 1 mL (60 mg total) into the skin every 6 (six) months.    dulaglutide (TRULICITY) 0.75 mg/0.5 mL pen injector Inject 0.75 mg into the skin once a week.    fluticasone (FLONASE) 50 mcg/actuation nasal spray 1 spray by Each Nare route 2 (two) times daily.     gabapentin (NEURONTIN) 300 MG capsule TAKE 2 CAPSULES (600 MG TOTAL) BY MOUTH EVERY EVENING.    HYDROcodone-acetaminophen (NORCO) 7.5-325 mg per tablet     hydrocortisone 2.5 % ointment Apply topically 2 (two) times daily. To up to 2 weeks for eczema flares    ibuprofen (ADVIL,MOTRIN) 600 MG tablet     letrozole (FEMARA) 2.5 mg Tab TAKE 1 TABLET BY MOUTH EVERY DAY    levothyroxine (SYNTHROID) 50 MCG tablet TAKE 1 TABLET BY MOUTH EVERY DAY    losartan (COZAAR) 50 MG tablet TAKE 1 TABLET BY MOUTH EVERY DAY    metFORMIN (GLUCOPHAGE) 500 MG tablet Take 1 tablet (500 mg total) by mouth daily with breakfast.    MULTIVITAMIN ORAL Take 1 tablet by mouth once daily.     nystatin (MYCOSTATIN) powder Apply to affected area 3 times daily    omega-3 fatty acids-vitamin E 1,000 mg Cap Take 1 capsule by mouth once daily.     ondansetron (ZOFRAN-ODT) 4 MG TbDL     pantoprazole (PROTONIX) 20 MG tablet TAKE 1 TABLET (20 MG TOTAL) BY MOUTH BEFORE BREAKFAST.    simvastatin (ZOCOR) 20 MG tablet TAKE 1 TABLET BY MOUTH EVERY DAY IN THE EVENING    vitamin D 1000 units Tab Take 1,000 Units by mouth once daily.     YUVAFEM 10 mcg Tab PLACE 1 TABLET VAGINALLY TWICE A WEEK   Last reviewed on 1/5/2022 12:59 PM by Bridgett Gabriel PA-C    Review of patient's allergies indicates:   Allergen Reactions    Erythromycin      Other reaction(s): Nausea    Erythromycin (bulk)      Other reaction(s): Nausea    Oxycodone  Nausea And Vomiting    Oxycodone-acetaminophen Nausea And Vomiting     Other reaction(s): Nausea    Last reviewed on  1/12/2022 9:51 AM by Maggie Cox      Tasks added this encounter   8/3/2022 - Refill Call (Auto Added)  2/18/2022 -  Setup Confirmation   Tasks due within next 3 months   No tasks due.     [facility]  [address] [place] [id number]  Phone: [phone]  Fax: [phone]

## 2022-02-25 ENCOUNTER — CLINICAL SUPPORT (OUTPATIENT)
Dept: INTERNAL MEDICINE | Facility: CLINIC | Age: 72
End: 2022-02-25
Payer: MEDICARE

## 2022-02-25 NOTE — PROGRESS NOTES
After obtaining consent, and per orders of Dr. Mccall, injection of Prolia 60 mg Subqu ( Lot Number [id number] Expiration May 2024 ) to left abdomen given by Carmen Stout. Patient instructed to remain in clinic for 20 minutes afterwards, and to report any adverse reaction to me immediately.

## 2022-03-13 NOTE — TELEPHONE ENCOUNTER
No new care gaps identified.  Powered by [employer]. Reference number: [id number].   3/13/2022 12:14:50 AM CST

## 2022-03-17 RX ORDER — LOSARTAN POTASSIUM 50 MG/1
TABLET ORAL
Qty: 90 TABLET | Refills: 1 | Status: SHIPPED | OUTPATIENT
Start: 2022-03-17 | End: 2022-06-16

## 2022-03-17 NOTE — TELEPHONE ENCOUNTER
Refill Authorization Note   Lima Maldonado  is requesting a refill authorization.  Brief Assessment and Rationale for Refill:  Approve     Medication Therapy Plan:       Medication Reconciliation Completed: No   Comments:   --->Care Gap information included below if applicable.   Orders Placed This Encounter    losartan (COZAAR) 50 MG tablet      Requested Prescriptions   Signed Prescriptions Disp Refills    losartan (COZAAR) 50 MG tablet 90 tablet 1     Sig: TAKE 1 TABLET BY MOUTH EVERY DAY       Cardiovascular:  Angiotensin Receptor Blockers Passed - 3/17/2022 11:54 AM        Passed - Patient is at least 18 years old        Passed - Last BP in normal range within 360 days     BP Readings from Last 3 Encounters:   12/28/21 118/62   12/03/21 136/82   11/30/21 136/70               Passed - Valid encounter within last 15 months     Recent Visits  Date Type Provider Dept   10/04/21 Office Visit Joie Mccall MD [facility] Internal Medicine   06/17/21 Office Visit Joie Mccall MD [facility]   02/25/21 Office Visit Joie Mccall MD [facility] Internal [facility]   Showing recent visits within past 720 days and meeting all other requirements  Future Appointments  No visits were found meeting these conditions.  Showing future appointments within next 150 days and meeting all other requirements      Future Appointments              In 1 week LAB, APPOINTMENT [facility] - Primary [facility] Bldg, [facility]    In 1 month Joie Mccall MD [facility] - Internal Medicine, [facility]    In 9 months [facility] HEPATOLOGY, [facility] - [facility], [facility]    In 9 months Jessie Palumbo NP [facility] - [facility], [facility]                Passed - Cr is 1.39 or below and within 360 days     Lab Results   Component Value Date    CREATININE 0.8 12/28/2021    CREATININE 0.8 05/06/2021    CREATININE 0.8 02/23/2021    POCCRE 0.9 10/11/2016              Passed - K is 5.2 or below and within  360 days     Potassium   Date Value Ref Range Status   12/28/2021 5.1 3.5 - 5.1 mmol/L Final   05/06/2021 4.7 3.5 - 5.1 mmol/L Final   02/23/2021 4.7 3.5 - 5.1 mmol/L Final              Passed - eGFR within 360 days     Lab Results   Component Value Date    EGFRNONAA >60 12/28/2021    EGFRNONAA >60.0 05/06/2021    EGFRNONAA >60.0 02/23/2021                    Appointments  past 12m or future 3m with PCP    Date Provider   Last Visit   10/4/2021 Joie Mccall MD   Next Visit   4/21/2022 Joie Mccall MD   ED visits in past 90 days: 0     Note composed:11:56 AM 03/17/2022

## 2022-03-29 ENCOUNTER — LAB VISIT (OUTPATIENT)
Dept: LAB | Facility: HOSPITAL | Age: 72
End: 2022-03-29
Attending: INTERNAL MEDICINE
Payer: MEDICARE

## 2022-03-29 DIAGNOSIS — E11.8 TYPE 2 DIABETES MELLITUS WITH COMPLICATION, WITHOUT LONG-TERM CURRENT USE OF INSULIN: ICD-10-CM

## 2022-03-29 DIAGNOSIS — K76.0 FATTY LIVER: ICD-10-CM

## 2022-03-29 DIAGNOSIS — K74.01 EARLY HEPATIC FIBROSIS: ICD-10-CM

## 2022-03-29 DIAGNOSIS — E03.9 HYPOTHYROIDISM, UNSPECIFIED TYPE: ICD-10-CM

## 2022-03-29 LAB
ALBUMIN SERPL BCP-MCNC: 4.2 G/DL (ref 3.5–5.2)
ALP SERPL-CCNC: 67 U/L (ref 55–135)
ALT SERPL W/O P-5'-P-CCNC: 34 U/L (ref 10–44)
ANION GAP SERPL CALC-SCNC: 12 MMOL/L (ref 8–16)
AST SERPL-CCNC: 34 U/L (ref 10–40)
BASOPHILS # BLD AUTO: 0.04 K/UL (ref 0–0.2)
BASOPHILS NFR BLD: 0.9 % (ref 0–1.9)
BILIRUB SERPL-MCNC: 1.3 MG/DL (ref 0.1–1)
BUN SERPL-MCNC: 18 MG/DL (ref 8–23)
CALCIUM SERPL-MCNC: 11 MG/DL (ref 8.7–10.5)
CHLORIDE SERPL-SCNC: 104 MMOL/L (ref 95–110)
CHOLEST SERPL-MCNC: 148 MG/DL (ref 120–199)
CHOLEST/HDLC SERPL: 4.6 {RATIO} (ref 2–5)
CO2 SERPL-SCNC: 27 MMOL/L (ref 23–29)
CREAT SERPL-MCNC: 0.8 MG/DL (ref 0.5–1.4)
DIFFERENTIAL METHOD: ABNORMAL
EOSINOPHIL # BLD AUTO: 0.1 K/UL (ref 0–0.5)
EOSINOPHIL NFR BLD: 3.1 % (ref 0–8)
ERYTHROCYTE [DISTWIDTH] IN BLOOD BY AUTOMATED COUNT: 12.4 % (ref 11.5–14.5)
EST. GFR  (AFRICAN AMERICAN): >60 ML/MIN/1.73 M^2
EST. GFR  (NON AFRICAN AMERICAN): >60 ML/MIN/1.73 M^2
ESTIMATED AVG GLUCOSE: 143 MG/DL (ref 68–131)
GLUCOSE SERPL-MCNC: 144 MG/DL (ref 70–110)
HBA1C MFR BLD: 6.6 % (ref 4–5.6)
HCT VFR BLD AUTO: 41.1 % (ref 37–48.5)
HDLC SERPL-MCNC: 32 MG/DL (ref 40–75)
HDLC SERPL: 21.6 % (ref 20–50)
HGB BLD-MCNC: 13.8 G/DL (ref 12–16)
IMM GRANULOCYTES # BLD AUTO: 0.03 K/UL (ref 0–0.04)
IMM GRANULOCYTES NFR BLD AUTO: 0.7 % (ref 0–0.5)
LDLC SERPL CALC-MCNC: 64.8 MG/DL (ref 63–159)
LYMPHOCYTES # BLD AUTO: 1.4 K/UL (ref 1–4.8)
LYMPHOCYTES NFR BLD: 33.6 % (ref 18–48)
MCH RBC QN AUTO: 28.9 PG (ref 27–31)
MCHC RBC AUTO-ENTMCNC: 33.6 G/DL (ref 32–36)
MCV RBC AUTO: 86 FL (ref 82–98)
MONOCYTES # BLD AUTO: 0.4 K/UL (ref 0.3–1)
MONOCYTES NFR BLD: 9.6 % (ref 4–15)
NEUTROPHILS # BLD AUTO: 2.2 K/UL (ref 1.8–7.7)
NEUTROPHILS NFR BLD: 52.1 % (ref 38–73)
NONHDLC SERPL-MCNC: 116 MG/DL
NRBC BLD-RTO: 0 /100 WBC
PLATELET # BLD AUTO: 197 K/UL (ref 150–450)
PMV BLD AUTO: 10.2 FL (ref 9.2–12.9)
POTASSIUM SERPL-SCNC: 4.5 MMOL/L (ref 3.5–5.1)
PROT SERPL-MCNC: 6.8 G/DL (ref 6–8.4)
RBC # BLD AUTO: 4.78 M/UL (ref 4–5.4)
SODIUM SERPL-SCNC: 143 MMOL/L (ref 136–145)
TRIGL SERPL-MCNC: 256 MG/DL (ref 30–150)
TSH SERPL DL<=0.005 MIU/L-ACNC: 2.3 UIU/ML (ref 0.4–4)
WBC # BLD AUTO: 4.26 K/UL (ref 3.9–12.7)

## 2022-03-29 PROCEDURE — 80061 LIPID PANEL: CPT | Performed by: INTERNAL MEDICINE

## 2022-03-29 PROCEDURE — 80053 COMPREHEN METABOLIC PANEL: CPT | Performed by: INTERNAL MEDICINE

## 2022-03-29 PROCEDURE — 86706 HEP B SURFACE ANTIBODY: CPT | Performed by: NURSE PRACTITIONER

## 2022-03-29 PROCEDURE — 36415 COLL VENOUS BLD VENIPUNCTURE: CPT | Performed by: NURSE PRACTITIONER

## 2022-03-29 PROCEDURE — 83036 HEMOGLOBIN GLYCOSYLATED A1C: CPT | Performed by: INTERNAL MEDICINE

## 2022-03-29 PROCEDURE — 84443 ASSAY THYROID STIM HORMONE: CPT | Performed by: INTERNAL MEDICINE

## 2022-03-29 PROCEDURE — 85025 COMPLETE CBC W/AUTO DIFF WBC: CPT | Performed by: INTERNAL MEDICINE

## 2022-04-01 LAB
HBV SURFACE AB SER QL IA: POSITIVE
HBV SURFACE AB SERPL IA-ACNC: 286 MIU/ML

## 2022-04-01 RX ORDER — METFORMIN HYDROCHLORIDE 500 MG/1
TABLET ORAL
Qty: 90 TABLET | Refills: 0 | Status: SHIPPED | OUTPATIENT
Start: 2022-04-01 | End: 2022-06-30

## 2022-04-01 NOTE — TELEPHONE ENCOUNTER
Refill Routing Note   Medication(s) are not appropriate for processing by [facility] for the following reason(s):      - Drug-Disease Interaction (metFORMIN and Fatty liver; Early hepatic fibrosis)    [facility] action(s):  Defer Medication-related problems identified: Drug-disease interaction        --->Care Gap information included in message below if applicable.   Medication reconciliation completed: No   Automatic Epic Generated Protocol Data:        Requested Prescriptions   Pending Prescriptions Disp Refills    metFORMIN (GLUCOPHAGE) 500 MG tablet [Pharmacy Med Name: METFORMIN  MG TABLET] 90 tablet 0     Sig: TAKE 1 TABLET BY MOUTH EVERY DAY WITH BREAKFAST       Endocrinology:  Diabetes - Biguanides Failed - 4/1/2022  1:10 AM        Failed - Matches previous order       Previous Authorizing Provider: Joie Mccall MD (metFORMIN (GLUCOPHAGE) 500 MG tablet)  Previous Pharmacy: [facility]/pharmacy #[id number] - [id number] [facility]            Passed - Patient is at least 18 years old        Passed - Valid encounter within last 15 months     Recent Visits  Date Type Provider Dept   10/04/21 Office Visit Joie Mccall MD [facility] Internal Medicine   06/17/21 Office Visit Joie Mccall MD [facility] Internal Medicine   02/25/21 Office Visit Joie Mccall MD [facility] Internal Medicine   Showing recent visits within past 720 days and meeting all other requirements  Future Appointments  No visits were found meeting these conditions.  Showing future appointments within next 150 days and meeting all other requirements      Future Appointments              In 2 weeks Joie Mccall MD [facility] Internal Medicine, [facility]    In 8 months [facility] HEPATOLOGY, [facility]ology Clinic - 1st Fl, [facility]    In 8 months Jessie Palumbo NP [facility] - 1st Fl, [facility]                Passed - No ED/Hospital visits since last PCP visit     Last PCP Visit: 10/4/2021 Last  Admission: 8/27/2020 Last ED Visit: 2/2/2017          Passed - Cr is 1.39 or below and within 360 days     Lab Results   Component Value Date    CREATININE 0.8 03/29/2022    CREATININE 0.8 12/28/2021    CREATININE 0.8 05/06/2021    POCCRE 0.9 10/11/2016              Passed - HBA1C within 180 days     Lab Results   Component Value Date    HGBA1C 6.6 (H) 03/29/2022    HGBA1C 6.6 (H) 12/28/2021    HGBA1C 6.7 (H) 09/28/2021              Passed - eGFR is 45 or above and within 360 days     Lab Results   Component Value Date    EGFRNONAA >60.0 03/29/2022    EGFRNONAA >60 12/28/2021    EGFRNONAA >60.0 05/06/2021                      Appointments  past 12m or future 3m with PCP    Date Provider   Last Visit   10/4/2021 Joie Mccall MD   Next Visit   4/21/2022 Joie Mccall MD   ED visits in past 90 days: 0        Note composed:5:11 PM 04/01/2022

## 2022-04-01 NOTE — TELEPHONE ENCOUNTER
No new care gaps identified.  Powered by [employer]. Reference number: [id number].   4/01/2022 1:11:19 AM CDT

## 2022-04-08 ENCOUNTER — PATIENT MESSAGE (OUTPATIENT)
Dept: INTERNAL MEDICINE | Facility: CLINIC | Age: 72
End: 2022-04-08
Payer: MEDICARE

## 2022-04-08 DIAGNOSIS — E83.52 HYPERCALCEMIA: Primary | ICD-10-CM

## 2022-04-08 NOTE — TELEPHONE ENCOUNTER
Increased calcium on recent labs, mildly pre-renal as well, alk phos wnl  Pt taking 600mg calcium bid  Does report some new superior lateral hip pain, shoulder pain and back pain which she attributes to lifting grandbabies recently    Stop calcium, increase water intake  Repeat labs in one week  If Calcium stable or increases will check bone scan in this 72 yo with hx of breast cancer

## 2022-04-10 ENCOUNTER — PATIENT MESSAGE (OUTPATIENT)
Dept: INTERNAL MEDICINE | Facility: CLINIC | Age: 72
End: 2022-04-10
Payer: MEDICARE

## 2022-04-10 NOTE — TELEPHONE ENCOUNTER
"Sent pt portal message with "if not read by" notification to schedule labs as requested by provider in routing comments.  "

## 2022-04-18 ENCOUNTER — LAB VISIT (OUTPATIENT)
Dept: LAB | Facility: HOSPITAL | Age: 72
End: 2022-04-18
Attending: INTERNAL MEDICINE
Payer: MEDICARE

## 2022-04-18 ENCOUNTER — PATIENT MESSAGE (OUTPATIENT)
Dept: ADMINISTRATIVE | Facility: OTHER | Age: 72
End: 2022-04-18
Payer: MEDICARE

## 2022-04-18 DIAGNOSIS — E83.52 HYPERCALCEMIA: ICD-10-CM

## 2022-04-18 LAB
ALBUMIN SERPL BCP-MCNC: 4.2 G/DL (ref 3.5–5.2)
ALP SERPL-CCNC: 71 U/L (ref 55–135)
ALT SERPL W/O P-5'-P-CCNC: 32 U/L (ref 10–44)
ANION GAP SERPL CALC-SCNC: 9 MMOL/L (ref 8–16)
AST SERPL-CCNC: 25 U/L (ref 10–40)
BILIRUB SERPL-MCNC: 0.8 MG/DL (ref 0.1–1)
BUN SERPL-MCNC: 14 MG/DL (ref 8–23)
CALCIUM SERPL-MCNC: 9.8 MG/DL (ref 8.7–10.5)
CHLORIDE SERPL-SCNC: 103 MMOL/L (ref 95–110)
CO2 SERPL-SCNC: 29 MMOL/L (ref 23–29)
CREAT SERPL-MCNC: 0.8 MG/DL (ref 0.5–1.4)
EST. GFR  (AFRICAN AMERICAN): >60 ML/MIN/1.73 M^2
EST. GFR  (NON AFRICAN AMERICAN): >60 ML/MIN/1.73 M^2
GLUCOSE SERPL-MCNC: 203 MG/DL (ref 70–110)
POTASSIUM SERPL-SCNC: 4.8 MMOL/L (ref 3.5–5.1)
PROT SERPL-MCNC: 7.1 G/DL (ref 6–8.4)
SODIUM SERPL-SCNC: 141 MMOL/L (ref 136–145)

## 2022-04-18 PROCEDURE — 80053 COMPREHEN METABOLIC PANEL: CPT | Performed by: INTERNAL MEDICINE

## 2022-04-18 PROCEDURE — 36415 COLL VENOUS BLD VENIPUNCTURE: CPT | Performed by: INTERNAL MEDICINE

## 2022-04-21 ENCOUNTER — OFFICE VISIT (OUTPATIENT)
Dept: INTERNAL MEDICINE | Facility: CLINIC | Age: 72
End: 2022-04-21
Attending: INTERNAL MEDICINE
Payer: MEDICARE

## 2022-04-21 VITALS
OXYGEN SATURATION: 97 % | DIASTOLIC BLOOD PRESSURE: 78 MMHG | BODY MASS INDEX: 29.04 KG/M2 | WEIGHT: 180.69 LBS | SYSTOLIC BLOOD PRESSURE: 110 MMHG | HEIGHT: 66 IN | HEART RATE: 99 BPM

## 2022-04-21 DIAGNOSIS — Z92.21 HISTORY OF ANTINEOPLASTIC CHEMOTHERAPY: ICD-10-CM

## 2022-04-21 DIAGNOSIS — E78.2 MIXED HYPERLIPIDEMIA: Primary | ICD-10-CM

## 2022-04-21 DIAGNOSIS — E03.9 HYPOTHYROIDISM, UNSPECIFIED TYPE: ICD-10-CM

## 2022-04-21 DIAGNOSIS — C50.512 MALIGNANT NEOPLASM OF LOWER-OUTER QUADRANT OF LEFT BREAST OF FEMALE, ESTROGEN RECEPTOR POSITIVE: ICD-10-CM

## 2022-04-21 DIAGNOSIS — G47.33 OBSTRUCTIVE SLEEP APNEA ON CPAP: ICD-10-CM

## 2022-04-21 DIAGNOSIS — M81.0 SENILE OSTEOPOROSIS: ICD-10-CM

## 2022-04-21 DIAGNOSIS — T45.1X5A CHEMOTHERAPY-INDUCED NEUROPATHY: ICD-10-CM

## 2022-04-21 DIAGNOSIS — E11.8 TYPE 2 DIABETES MELLITUS WITH COMPLICATION, WITHOUT LONG-TERM CURRENT USE OF INSULIN: ICD-10-CM

## 2022-04-21 DIAGNOSIS — I10 ESSENTIAL HYPERTENSION: ICD-10-CM

## 2022-04-21 DIAGNOSIS — K76.0 FATTY LIVER: ICD-10-CM

## 2022-04-21 DIAGNOSIS — E83.52 HYPERCALCEMIA: ICD-10-CM

## 2022-04-21 DIAGNOSIS — I70.0 AORTIC ATHEROSCLEROSIS: ICD-10-CM

## 2022-04-21 DIAGNOSIS — Z17.0 MALIGNANT NEOPLASM OF LOWER-OUTER QUADRANT OF LEFT BREAST OF FEMALE, ESTROGEN RECEPTOR POSITIVE: ICD-10-CM

## 2022-04-21 DIAGNOSIS — G62.0 CHEMOTHERAPY-INDUCED NEUROPATHY: ICD-10-CM

## 2022-04-21 PROCEDURE — 3044F HG A1C LEVEL LT 7.0%: CPT | Mod: CPTII,S$GLB,, | Performed by: INTERNAL MEDICINE

## 2022-04-21 PROCEDURE — 3044F PR MOST RECENT HEMOGLOBIN A1C LEVEL <7.0%: ICD-10-PCS | Mod: CPTII,S$GLB,, | Performed by: INTERNAL MEDICINE

## 2022-04-21 PROCEDURE — 99999 PR PBB SHADOW E&M-EST. PATIENT-LVL V: CPT | Mod: PBBFAC,,, | Performed by: INTERNAL MEDICINE

## 2022-04-21 PROCEDURE — 1101F PR PT FALLS ASSESS DOC 0-1 FALLS W/OUT INJ PAST YR: ICD-10-PCS | Mod: CPTII,S$GLB,, | Performed by: INTERNAL MEDICINE

## 2022-04-21 PROCEDURE — 3074F SYST BP LT 130 MM HG: CPT | Mod: CPTII,S$GLB,, | Performed by: INTERNAL MEDICINE

## 2022-04-21 PROCEDURE — 4010F ACE/ARB THERAPY RXD/TAKEN: CPT | Mod: CPTII,S$GLB,, | Performed by: INTERNAL MEDICINE

## 2022-04-21 PROCEDURE — 3288F PR FALLS RISK ASSESSMENT DOCUMENTED: ICD-10-PCS | Mod: CPTII,S$GLB,, | Performed by: INTERNAL MEDICINE

## 2022-04-21 PROCEDURE — 1101F PT FALLS ASSESS-DOCD LE1/YR: CPT | Mod: CPTII,S$GLB,, | Performed by: INTERNAL MEDICINE

## 2022-04-21 PROCEDURE — 3288F FALL RISK ASSESSMENT DOCD: CPT | Mod: CPTII,S$GLB,, | Performed by: INTERNAL MEDICINE

## 2022-04-21 PROCEDURE — 3008F PR BODY MASS INDEX (BMI) DOCUMENTED: ICD-10-PCS | Mod: CPTII,S$GLB,, | Performed by: INTERNAL MEDICINE

## 2022-04-21 PROCEDURE — 3078F PR MOST RECENT DIASTOLIC BLOOD PRESSURE < 80 MM HG: ICD-10-PCS | Mod: CPTII,S$GLB,, | Performed by: INTERNAL MEDICINE

## 2022-04-21 PROCEDURE — 99215 PR OFFICE/OUTPT VISIT, EST, LEVL V, 40-54 MIN: ICD-10-PCS | Mod: S$GLB,,, | Performed by: INTERNAL MEDICINE

## 2022-04-21 PROCEDURE — 3078F DIAST BP <80 MM HG: CPT | Mod: CPTII,S$GLB,, | Performed by: INTERNAL MEDICINE

## 2022-04-21 PROCEDURE — 3008F BODY MASS INDEX DOCD: CPT | Mod: CPTII,S$GLB,, | Performed by: INTERNAL MEDICINE

## 2022-04-21 PROCEDURE — 99215 OFFICE O/P EST HI 40 MIN: CPT | Mod: S$GLB,,, | Performed by: INTERNAL MEDICINE

## 2022-04-21 PROCEDURE — 3074F PR MOST RECENT SYSTOLIC BLOOD PRESSURE < 130 MM HG: ICD-10-PCS | Mod: CPTII,S$GLB,, | Performed by: INTERNAL MEDICINE

## 2022-04-21 PROCEDURE — 99999 PR PBB SHADOW E&M-EST. PATIENT-LVL V: ICD-10-PCS | Mod: PBBFAC,,, | Performed by: INTERNAL MEDICINE

## 2022-04-21 PROCEDURE — 1125F AMNT PAIN NOTED PAIN PRSNT: CPT | Mod: CPTII,S$GLB,, | Performed by: INTERNAL MEDICINE

## 2022-04-21 PROCEDURE — 1125F PR PAIN SEVERITY QUANTIFIED, PAIN PRESENT: ICD-10-PCS | Mod: CPTII,S$GLB,, | Performed by: INTERNAL MEDICINE

## 2022-04-21 PROCEDURE — 99499 UNLISTED E&M SERVICE: CPT | Mod: S$GLB,,, | Performed by: INTERNAL MEDICINE

## 2022-04-21 PROCEDURE — 99499 RISK ADDL DX/OHS AUDIT: ICD-10-PCS | Mod: S$GLB,,, | Performed by: INTERNAL MEDICINE

## 2022-04-21 PROCEDURE — 4010F PR ACE/ARB THEARPY RXD/TAKEN: ICD-10-PCS | Mod: CPTII,S$GLB,, | Performed by: INTERNAL MEDICINE

## 2022-04-21 RX ORDER — TOBRAMYCIN AND DEXAMETHASONE 3; 1 MG/ML; MG/ML
SUSPENSION/ DROPS OPHTHALMIC
COMMUNITY
Start: 2022-01-23 | End: 2022-06-23

## 2022-04-21 RX ORDER — DOCUSATE SODIUM 100 MG/1
100 CAPSULE ORAL DAILY
COMMUNITY
Start: 2022-01-06 | End: 2022-06-23

## 2022-04-21 RX ORDER — AMOXICILLIN 500 MG/1
CAPSULE ORAL
COMMUNITY
Start: 2022-03-15 | End: 2022-06-23

## 2022-04-21 RX ORDER — CEPHALEXIN 500 MG/1
500 CAPSULE ORAL 3 TIMES DAILY
COMMUNITY
Start: 2022-01-06 | End: 2022-06-23

## 2022-04-21 RX ORDER — FENOFIBRATE 160 MG/1
160 TABLET ORAL DAILY
Qty: 90 TABLET | Refills: 3 | Status: SHIPPED | OUTPATIENT
Start: 2022-04-21 | End: 2023-04-09

## 2022-04-21 RX ORDER — PSEUDOEPHEDRINE HCL 120 MG
TABLET, EXTENDED RELEASE ORAL
COMMUNITY
Start: 2022-03-15 | End: 2022-06-23

## 2022-04-21 NOTE — PROGRESS NOTES
Subjective:       Patient ID: Lima Maldonado is a 71 y.o. female.    Chief Complaint: Follow-up     Lima Maldonado is a 71 y.o.  female who presents for Follow-up  .  Difficulty sleeping well on some nights, using cpap,     Hx of two episodes of Breast Cancer- initially 20+ years ago, treated with adriamycin at that time, second primary 2016 treated completed 4 cycles of TC on 3/31/17. Has not seen cards since 2018.  laslt echo 2017 with concentric remodeling but otherwise wnl     Has been overwhelmed, busy, not doing her best at self care, refuses referral to counseling or medication. + interrupted sleep.   Thyroid Problem  Presents for follow-up visit. Patient reports no cold intolerance, dry skin, heat intolerance, leg swelling, menstrual problem or palpitations.     Review of Systems   Constitutional: Negative for chills and fever.   Respiratory: Negative for cough and shortness of breath.    Cardiovascular: Negative for chest pain and palpitations.   Gastrointestinal: Negative for nausea and vomiting.   Endocrine: Negative for cold intolerance and heat intolerance.   Genitourinary: Negative for dysuria, hematuria and menstrual problem.   Skin: Negative for rash and wound.   Psychiatric/Behavioral: Positive for sleep disturbance.       Problem Noted   Senile Osteoporosis 5/17/2016    On prolia     Obesity, Diabetes, and Hypertension Syndrome (Resolved) 12/12/2018   Class 1 Obesity Due to Excess Calories With Serious Comorbidity and Body Mass Index (Bmi) of 30.0 to 30.9 in Adult (Resolved) 11/29/2018   Pre-Operative Clearance (Resolved)        Past Medical History:   Diagnosis Date    Acute cystitis without hematuria 7/17/2017    Arthritis     Back pain     Breast cancer     originally dx in 02/1997- treated with surgery, chemo and radiation     Class 1 obesity due to excess calories with serious comorbidity and body mass index (BMI) of 30.0 to 30.9 in adult 11/29/2018    Elevated bilirubin 11/19/2013     Fatty liver     GERD (gastroesophageal reflux disease)     Glucose intolerance (impaired glucose tolerance)     Hyperlipidemia     Hypertension     Hypothyroid     Hypothyroidism     hypothyroidism    Malignant neoplasm of lower-outer quadrant of left female breast 11/15/2016    Obesity     Obesity, diabetes, and hypertension syndrome 12/12/2018    Osteopenia     Osteoporosis     Primary insomnia 11/2/2016    Shingles     Sleep apnea     wears CPAP nightly     Squamous cell carcinoma 2011    right forearm, sx by Dr. Corinne Ray    Stress incontinence of urine 1/11/2018    Trouble in sleeping     Type 2 diabetes mellitus with diabetic polyneuropathy, without long-term current use of insulin 12/26/2017    Urinary incontinence     Well woman exam with routine gynecological exam 11/2/2016       Past Surgical History:   Procedure Laterality Date    APPENDECTOMY  2008    removed during hysterectomy surgery     BREAST BIOPSY      2010    Breast implant Bilateral 1998    implants removed in 2003    BREAST LUMPECTOMY      02/1997    BREAST RECONSTRUCTION Bilateral     02/2017    BREAST SURGERY      see details below     CATARACT EXTRACTION W/  INTRAOCULAR LENS IMPLANT Left 8/13/2020    Procedure: EXTRACTION, CATARACT, WITH IOL INSERTION;  Surgeon: Ivanna Coleman MD;  Location: [facility];  Service: Ophthalmology;  Laterality: Left;    CATARACT EXTRACTION W/  INTRAOCULAR LENS IMPLANT Right 8/27/2020    Procedure: EXTRACTION, CATARACT, WITH IOL INSERTION LASER;  Surgeon: Ivanna Coleman MD;  Location: [facility];  Service: Ophthalmology;  Laterality: Right;    COLONOSCOPY N/A 5/15/2018    Procedure: COLONOSCOPY;  Surgeon: Roldan Gonzales MD;  Location: [address]);  Service: Endoscopy;  Laterality: N/A;    EYE SURGERY      CATERACTS    HYSTERECTOMY  2008    benign    left breast reconstruction  2005    left modified radical mastectomy  Feb 1997    for treatment of breast cancer      "LIPOMA RESECTION  2010    removed from upper back area     MODIFIED RADICAL MASTECTOMY W/ AXILLARY LYMPH NODE DISSECTION  1997    OOPHORECTOMY      2008    PELVIC LAPAROSCOPY      pt had endometriosis     AK REMOVAL OF OVARY/TUBE(S)       benign    reduction of mons pubis      robotic lap  hysterectomy with bso  2008    UPPER GASTROINTESTINAL ENDOSCOPY  2013       Family History   Problem Relation Age of Onset    Heart attack Father 51    Heart disease Father         Heart Attac, age 51    Alcohol abuse Father     Breast cancer Sister 51        BRCA 1/2 negative    Cancer Sister         breast    Cancer Mother 65        Malignant melanoma    Melanoma Mother     No Known Problems Brother     No Known Problems Daughter     No Known Problems Son     No Known Problems Daughter     No Known Problems Son     Diabetes Maternal Uncle             Diabetes Maternal Uncle             Uterine cancer Neg Hx     Colon cancer Neg Hx     Celiac disease Neg Hx     Esophageal cancer Neg Hx     Inflammatory bowel disease Neg Hx     Liver cancer Neg Hx     Liver disease Neg Hx     Stomach cancer Neg Hx     Ulcerative colitis Neg Hx     Hypertension Neg Hx     Cirrhosis Neg Hx     Crohn's disease Neg Hx     Rectal cancer Neg Hx     Ovarian cancer Neg Hx        Social History     Tobacco Use    Smoking status: Former Smoker     Packs/day: 0.50     Years: 3.00     Pack years: 1.50     Start date: 4/15/1969     Quit date: 4/15/1972     Years since quittin.1    Smokeless tobacco: Never Used    Tobacco comment: started at age 18 during college    Substance Use Topics    Alcohol use: Yes     Alcohol/week: 1.0 standard drink     Types: 1 Standard drinks or equivalent per week     Comment: Rarely, not even 1X per week    Drug use: No       Objective:   Blood pressure 110/78, pulse 99, height 5' 6" (1.676 m), weight 81.9 kg (180 lb 10.7 oz), SpO2 97 %.     Physical " Exam  Constitutional:       Appearance: Normal appearance. She is well-developed. She is not ill-appearing.   HENT:      Head: Normocephalic and atraumatic.   Eyes:      General: No scleral icterus.     Conjunctiva/sclera: Conjunctivae normal.   Cardiovascular:      Rate and Rhythm: Normal rate.      Heart sounds: Normal heart sounds. No murmur heard.    No friction rub. No gallop.   Pulmonary:      Effort: Pulmonary effort is normal.      Breath sounds: Normal breath sounds. No wheezing or rales.   Chest:      Chest wall: No tenderness.   Abdominal:      General: Bowel sounds are normal.      Palpations: Abdomen is soft.      Tenderness: There is no guarding.   Musculoskeletal:         General: No tenderness or signs of injury.   Skin:     General: Skin is warm and dry.   Neurological:      Mental Status: She is alert and oriented to person, place, and time. Mental status is at baseline.   Psychiatric:         Behavior: Behavior normal.         Thought Content: Thought content normal.         Prior labs reviewed  Assessment/Plan:        Lima was seen today for follow-up.    Diagnoses and all orders for this visit:    Mixed hyperlipidemia  -  Uncontrolled  add   fenofibrate 160 MG Tab; Take 1 tablet (160 mg total) by mouth once daily.  Call if any issues with cost or side effects  Repeat labs on rtc      Type 2 diabetes mellitus with complication, without long-term current use of insulin  -     Ambulatory referral/consult to Podiatry; Future  -     Ambulatory referral/consult to Optometry; Future  -     Hemoglobin A1C; Future  Return to diabetic diet and regular exercise  Recommend Annual eye exam and foot exams  Daily 81 mg enteric coated ASA, ACE/ARB     Fatty liver  There is no FDA approved therapy for fatty liver disease. Therefore, these things are important:  1. Limit alcohol consumption  2 continue weight loss,  consider [facility] if interested. Also, if interested in a dietician visit to  create a weight loss plan, contact the dietician team at [facility] at [email or web address] to schedule a visit to you can call [facility] in [place]: [phone] and the  will transfer the call to one of the dieticians to schedule an appointment. Or you can also call [phone] to schedule. They do offer video visits. This is not covered by insurance unless you request a referral AND your insurance plan covers nutrition visits for weight loss and/or fatty liver.   3. Low carb/sugar, high fiber and protein diet.Try to limit your carb intake to LESS than 30-45 grams of carbs with a meal or LESS than 5-10 grams with any snack (total of any snack foods eaten during that time). Use My[employer]itness Pal joselito to add up your carbs through the day. Do NOT drink any beverages with calories or carbs (this can lead to high blood sugar and weight gain). Also, some of our patients have been very successful with weight loss using the pre made/planned meal planning services that limit calories and portion size (one example is Sensible Meals but there are many out there)  4. Exercise, 5 days per week, 30 minutes per day, as tolerated  5. Recommend good cholesterol, blood pressure, blood sugar levels      In some people, fatty liver can progress to steatohepatitis (inflamatory fatty liver) and possibly to cirrhosis, putting one at increased risk for liver cancer, liver failure, and death. Therefore, the lifestyle changes are very important to decrease this risk.      Website with information about fatty liver and inflammation related to fatty liver (CARTER) = [email or web address]  AND [email or web address]     Chemotherapy-induced neuropathy  Stable, Followed by heme/onc  Keep follow up as scheduled    History of antineoplastic chemotherapy  -     Ambulatory referral/consult to Cardiology; Future  Recommend cardiac evaluation   Recommend regular exercise, healthy lifestyle including treatment of kelley,  mangement of DM and BP to decrease risk of further CV damage    Hypercalcemia  Stop calcium supplements  Repeat labs    Essential hypertension  -     Comprehensive Metabolic Panel; Future  Well controlled  Cont current BP medication(s) and low salt diet    Aortic atherosclerosis  On statin  Cont statin, lifestyle changes as above    Obstructive sleep apnea on CPAP  Stressed importance of compliance  Use cpap daily  Follow up with sleep medicine    Senile osteoporosis  Cont vit d, routine bmd  On prolia q 6 mo  Important not to miss dosing    Malignant neoplasm of lower-outer quadrant of left breast of female, estrogen receptor positive  -     Ambulatory referral/consult to Cardiology; Future  JACKI, followed by heme/onc  Stable    Hypothyroidism  - Clinically euthyroid/stable  - cont current dose of levothyroxine  - annual TSH      45  min spent in care of patient including history, physical, chart review, orders and coordination of care.     Medication List with Changes/Refills   New Medications    FENOFIBRATE 160 MG TAB    Take 1 tablet (160 mg total) by mouth once daily.   Current Medications    12 HOUR DECONGESTANT 120 MG 12 HR TABLET    TAKE 1 TABLET BY MOUTH EVERY 12 HOURS AS NEEDED FOR CONGESTION    AMOXICILLIN (AMOXIL) 500 MG CAPSULE    TAKE 1 CAPSULE BY MOUTH 3 TIMES A DAY UNTIL GONE    ASCORBIC ACID, VITAMIN C, (VITAMIN C) 1000 MG TABLET    Take 1 tablet by mouth once daily.    BETAMETHASONE VALERATE 0.1% (VALISONE) 0.1 % OINT    Apply topically 2 (two) times daily.    CANNABIDIOL, CBD, EXTRACT ORAL    Take 1 drop by mouth Daily.    CELECOXIB (CELEBREX) 100 MG CAPSULE    TAKE 1 CAPSULE (100 MG TOTAL) BY MOUTH DAILY AS NEEDED.    CEPHALEXIN (KEFLEX) 500 MG CAPSULE    Take 500 mg by mouth 3 (three) times daily.    COENZYME Q10-VITAMIN E 100-100 MG-UNIT CAP    Take 1 capsule by mouth once daily.     COLACE 100 MG CAPSULE    Take 100 mg by mouth once daily.    DENOSUMAB (PROLIA) 60 MG/ML SYRG    Inject 1 mL (60  mg total) into the skin every 6 (six) months.    FLUTICASONE (FLONASE) 50 MCG/ACTUATION NASAL SPRAY    1 spray by Each Nare route 2 (two) times daily.     HYDROCORTISONE 2.5 % OINTMENT    Apply topically 2 (two) times daily. To up to 2 weeks for eczema flares    IBUPROFEN (ADVIL,MOTRIN) 600 MG TABLET        LETROZOLE (FEMARA) 2.5 MG TAB    TAKE 1 TABLET BY MOUTH EVERY DAY    LEVOTHYROXINE (SYNTHROID) 50 MCG TABLET    TAKE 1 TABLET BY MOUTH EVERY DAY    LOSARTAN (COZAAR) 50 MG TABLET    TAKE 1 TABLET BY MOUTH EVERY DAY    METFORMIN (GLUCOPHAGE) 500 MG TABLET    TAKE 1 TABLET BY MOUTH EVERY DAY WITH BREAKFAST    MULTIVITAMIN ORAL    Take 1 tablet by mouth once daily.     NYSTATIN (MYCOSTATIN) POWDER    Apply to affected area 3 times daily    OMEGA-3 FATTY ACIDS-VITAMIN E 1,000 MG CAP    Take 1 capsule by mouth once daily.     PANTOPRAZOLE (PROTONIX) 20 MG TABLET    TAKE 1 TABLET (20 MG TOTAL) BY MOUTH BEFORE BREAKFAST.    SIMVASTATIN (ZOCOR) 20 MG TABLET    TAKE 1 TABLET BY MOUTH EVERY DAY IN THE EVENING    TOBRAMYCIN-DEXAMETHASONE 0.3-0.1% (TOBRADEX) 0.3-0.1 % DRPS    APPLY 2 DROP INTO BOTH EYES EVERY FOUR HOURS WHILE AWAKE    VITAMIN D 1000 UNITS TAB    Take 1,000 Units by mouth once daily.     YUVAFEM 10 MCG TAB    PLACE 1 TABLET VAGINALLY TWICE A WEEK   Changed and/or Refilled Medications    Modified Medication Previous Medication    GABAPENTIN (NEURONTIN) 300 MG CAPSULE gabapentin (NEURONTIN) 300 MG capsule       Take 2 capsules (600 mg total) by mouth every evening.    TAKE 2 CAPSULES (600 MG TOTAL) BY MOUTH EVERY EVENING.    TRULICITY 0.75 MG/0.5 ML PEN INJECTOR dulaglutide (TRULICITY) 0.75 mg/0.5 mL pen injector       INJECT 0.75 MG INTO THE SKIN ONCE A WEEK.    Inject 0.75 mg into the skin once a week.   Discontinued Medications    HYDROCODONE-ACETAMINOPHEN (NORCO) 7.5-325 MG PER TABLET        ONDANSETRON (ZOFRAN-ODT) 4 MG TBDL

## 2022-04-25 ENCOUNTER — PATIENT MESSAGE (OUTPATIENT)
Dept: ORTHOPEDICS | Facility: CLINIC | Age: 72
End: 2022-04-25
Payer: MEDICARE

## 2022-04-25 ENCOUNTER — TELEPHONE (OUTPATIENT)
Dept: ORTHOPEDICS | Facility: CLINIC | Age: 72
End: 2022-04-25
Payer: MEDICARE

## 2022-04-25 NOTE — TELEPHONE ENCOUNTER
4/25/22  [facility] for pt to call the referrals department to schedule appt with Podiatry-sm

## 2022-04-26 ENCOUNTER — OFFICE VISIT (OUTPATIENT)
Dept: HEMATOLOGY/ONCOLOGY | Facility: CLINIC | Age: 72
End: 2022-04-26
Payer: MEDICARE

## 2022-04-26 VITALS
WEIGHT: 182.13 LBS | RESPIRATION RATE: 17 BRPM | TEMPERATURE: 98 F | DIASTOLIC BLOOD PRESSURE: 65 MMHG | BODY MASS INDEX: 29.27 KG/M2 | HEIGHT: 66 IN | HEART RATE: 101 BPM | SYSTOLIC BLOOD PRESSURE: 135 MMHG | OXYGEN SATURATION: 95 %

## 2022-04-26 DIAGNOSIS — G62.9 NEUROPATHY: ICD-10-CM

## 2022-04-26 DIAGNOSIS — C50.512 MALIGNANT NEOPLASM OF LOWER-OUTER QUADRANT OF LEFT BREAST OF FEMALE, ESTROGEN RECEPTOR POSITIVE: Primary | ICD-10-CM

## 2022-04-26 DIAGNOSIS — Z17.0 MALIGNANT NEOPLASM OF LOWER-OUTER QUADRANT OF LEFT BREAST OF FEMALE, ESTROGEN RECEPTOR POSITIVE: Primary | ICD-10-CM

## 2022-04-26 PROCEDURE — 3288F FALL RISK ASSESSMENT DOCD: CPT | Mod: CPTII,S$GLB,, | Performed by: INTERNAL MEDICINE

## 2022-04-26 PROCEDURE — 3008F BODY MASS INDEX DOCD: CPT | Mod: CPTII,S$GLB,, | Performed by: INTERNAL MEDICINE

## 2022-04-26 PROCEDURE — 1101F PR PT FALLS ASSESS DOC 0-1 FALLS W/OUT INJ PAST YR: ICD-10-PCS | Mod: CPTII,S$GLB,, | Performed by: INTERNAL MEDICINE

## 2022-04-26 PROCEDURE — 3075F PR MOST RECENT SYSTOLIC BLOOD PRESS GE 130-139MM HG: ICD-10-PCS | Mod: CPTII,S$GLB,, | Performed by: INTERNAL MEDICINE

## 2022-04-26 PROCEDURE — 99213 OFFICE O/P EST LOW 20 MIN: CPT | Mod: S$GLB,,, | Performed by: INTERNAL MEDICINE

## 2022-04-26 PROCEDURE — 3288F PR FALLS RISK ASSESSMENT DOCUMENTED: ICD-10-PCS | Mod: CPTII,S$GLB,, | Performed by: INTERNAL MEDICINE

## 2022-04-26 PROCEDURE — 99999 PR PBB SHADOW E&M-EST. PATIENT-LVL III: CPT | Mod: PBBFAC,,, | Performed by: INTERNAL MEDICINE

## 2022-04-26 PROCEDURE — 3078F PR MOST RECENT DIASTOLIC BLOOD PRESSURE < 80 MM HG: ICD-10-PCS | Mod: CPTII,S$GLB,, | Performed by: INTERNAL MEDICINE

## 2022-04-26 PROCEDURE — 1159F PR MEDICATION LIST DOCUMENTED IN MEDICAL RECORD: ICD-10-PCS | Mod: CPTII,S$GLB,, | Performed by: INTERNAL MEDICINE

## 2022-04-26 PROCEDURE — 1101F PT FALLS ASSESS-DOCD LE1/YR: CPT | Mod: CPTII,S$GLB,, | Performed by: INTERNAL MEDICINE

## 2022-04-26 PROCEDURE — 3044F PR MOST RECENT HEMOGLOBIN A1C LEVEL <7.0%: ICD-10-PCS | Mod: CPTII,S$GLB,, | Performed by: INTERNAL MEDICINE

## 2022-04-26 PROCEDURE — 99213 PR OFFICE/OUTPT VISIT, EST, LEVL III, 20-29 MIN: ICD-10-PCS | Mod: S$GLB,,, | Performed by: INTERNAL MEDICINE

## 2022-04-26 PROCEDURE — 1125F AMNT PAIN NOTED PAIN PRSNT: CPT | Mod: CPTII,S$GLB,, | Performed by: INTERNAL MEDICINE

## 2022-04-26 PROCEDURE — 4010F PR ACE/ARB THEARPY RXD/TAKEN: ICD-10-PCS | Mod: CPTII,S$GLB,, | Performed by: INTERNAL MEDICINE

## 2022-04-26 PROCEDURE — 4010F ACE/ARB THERAPY RXD/TAKEN: CPT | Mod: CPTII,S$GLB,, | Performed by: INTERNAL MEDICINE

## 2022-04-26 PROCEDURE — 3078F DIAST BP <80 MM HG: CPT | Mod: CPTII,S$GLB,, | Performed by: INTERNAL MEDICINE

## 2022-04-26 PROCEDURE — 1159F MED LIST DOCD IN RCRD: CPT | Mod: CPTII,S$GLB,, | Performed by: INTERNAL MEDICINE

## 2022-04-26 PROCEDURE — 3075F SYST BP GE 130 - 139MM HG: CPT | Mod: CPTII,S$GLB,, | Performed by: INTERNAL MEDICINE

## 2022-04-26 PROCEDURE — 99999 PR PBB SHADOW E&M-EST. PATIENT-LVL III: ICD-10-PCS | Mod: PBBFAC,,, | Performed by: INTERNAL MEDICINE

## 2022-04-26 PROCEDURE — 3044F HG A1C LEVEL LT 7.0%: CPT | Mod: CPTII,S$GLB,, | Performed by: INTERNAL MEDICINE

## 2022-04-26 PROCEDURE — 1125F PR PAIN SEVERITY QUANTIFIED, PAIN PRESENT: ICD-10-PCS | Mod: CPTII,S$GLB,, | Performed by: INTERNAL MEDICINE

## 2022-04-26 PROCEDURE — 3008F PR BODY MASS INDEX (BMI) DOCUMENTED: ICD-10-PCS | Mod: CPTII,S$GLB,, | Performed by: INTERNAL MEDICINE

## 2022-04-26 RX ORDER — GABAPENTIN 300 MG/1
600 CAPSULE ORAL NIGHTLY
Qty: 180 CAPSULE | Refills: 3 | Status: SHIPPED | OUTPATIENT
Start: 2022-04-26 | End: 2023-04-24

## 2022-04-26 NOTE — PROGRESS NOTES
Subjective:       Patient ID: Lima Maldonado is a 71 y.o. female.    Chief Complaint: No chief complaint on file.    HPI  Ms Maldonado return to clinic for follow-up of  diagnosis of left breast cancer. She had stage I ER positive disease with an intermediate risk Oncotype score.   She completed 4 cycles of TC on 3/31/17 and is currently on letrozole.      She has some ongoing arthralgias especially in her feet and ankles.  Recently she has had bilateral shoulder pain and some left hip-those symptoms have improved.  She is not exercising.  She has no shortness of breath.    Breast history: mammography on September 20, 2016 demonstrated a new irregular mass with spiculated margins seen in the left breast at  5 o'clock along the surgical scar site.ultrasound showed a  8 X 8 X 7 MM MASS.    A needle biopsy in September 27 showed infiltrating carcinoma, histologic grade 3, nuclear grade 2, mitotic index 1. Tumor was 90% ER positive, 70% MN positive, and 1+ HER-2.     MRI of the breast showed a 1.7 x 1.3 cm lesion in the lower outer quadrant of the left breast.  PET/CT on October 7 was negative for any evidence of distant metastasis.    On November 16 bilateral mastectomies were performed. Left breast showed a  1.5 cm intermediate grade carcinoma with ductal and lobular features. There was focal dermal invasion. Margins were negative. The right breast was without abnormality.  Oncotype score returned 25 - intermediate risk.  Adjuvant TC X 4 completed 3/31/17.    She began letrozole in April 2017.    Patient with history of left breast cancer in 1997 when she underwent left lumpectomy with complete axillary dissection at age 46 for a pT1cN1 breast cancer (Stage IIA).  47 nodes were removed, 1 of which was positive.  She received adjuvant chemotherapy, radiation and tamoxifen.   Review of Systems   Constitutional: Negative.    Respiratory: Negative.    Gastrointestinal: Positive for constipation (at times).    Musculoskeletal: Positive for arthralgias.   Integumentary:  Positive for rash (saw derm - now resolved).   Neurological: Negative.    Psychiatric/Behavioral: Negative.          Objective:      Physical Exam  Vitals reviewed.   Constitutional:       General: She is not in acute distress.  Cardiovascular:      Rate and Rhythm: Normal rate and regular rhythm.   Pulmonary:      Effort: Pulmonary effort is normal. No respiratory distress.      Breath sounds: Normal breath sounds. No wheezing or rales.   Chest:   Breasts:      Right: No axillary adenopathy or supraclavicular adenopathy.      Left: No axillary adenopathy or supraclavicular adenopathy.         Abdominal:      Palpations: Abdomen is soft. There is no mass.      Tenderness: There is no abdominal tenderness.   Lymphadenopathy:      Cervical: No cervical adenopathy.      Upper Body:      Right upper body: No supraclavicular or axillary adenopathy.      Left upper body: No supraclavicular or axillary adenopathy.   Neurological:      Mental Status: She is alert and oriented to person, place, and time.   Psychiatric:         Mood and Affect: Mood normal.         Thought Content: Thought content normal.         Judgment: Judgment normal.         Assessment:       Problem List Items Addressed This Visit     Malignant neoplasm of lower-outer quadrant of left female breast - Primary          Plan:       I discussed the use of Breast Cancer Index to see if there was benefit to > 5 years of endocrine therapy.           Route Chart for Scheduling    Med Onc Chart Routing      Follow up with physician 6 months.   Follow up with [facility]    Labs    Imaging    Pharmacy appointment    Other referrals

## 2022-05-26 ENCOUNTER — OFFICE VISIT (OUTPATIENT)
Dept: URGENT CARE | Facility: CLINIC | Age: 72
End: 2022-05-26
Payer: MEDICARE

## 2022-05-26 VITALS
OXYGEN SATURATION: 97 % | HEART RATE: 98 BPM | HEIGHT: 66 IN | RESPIRATION RATE: 18 BRPM | DIASTOLIC BLOOD PRESSURE: 76 MMHG | WEIGHT: 182 LBS | BODY MASS INDEX: 29.25 KG/M2 | SYSTOLIC BLOOD PRESSURE: 111 MMHG | TEMPERATURE: 98 F

## 2022-05-26 DIAGNOSIS — J06.9 BACTERIAL URI: ICD-10-CM

## 2022-05-26 DIAGNOSIS — B96.89 BACTERIAL URI: ICD-10-CM

## 2022-05-26 DIAGNOSIS — R05.9 COUGH: ICD-10-CM

## 2022-05-26 DIAGNOSIS — J01.90 ACUTE BACTERIAL SINUSITIS: Primary | ICD-10-CM

## 2022-05-26 DIAGNOSIS — B96.89 ACUTE BACTERIAL SINUSITIS: Primary | ICD-10-CM

## 2022-05-26 LAB
CTP QC/QA: YES
SARS-COV-2 RDRP RESP QL NAA+PROBE: NEGATIVE

## 2022-05-26 PROCEDURE — 3078F DIAST BP <80 MM HG: CPT | Mod: CPTII,S$GLB,, | Performed by: PHYSICIAN ASSISTANT

## 2022-05-26 PROCEDURE — 1159F PR MEDICATION LIST DOCUMENTED IN MEDICAL RECORD: ICD-10-PCS | Mod: CPTII,S$GLB,, | Performed by: PHYSICIAN ASSISTANT

## 2022-05-26 PROCEDURE — U0002: ICD-10-PCS | Mod: QW,S$GLB,, | Performed by: PHYSICIAN ASSISTANT

## 2022-05-26 PROCEDURE — 1125F PR PAIN SEVERITY QUANTIFIED, PAIN PRESENT: ICD-10-PCS | Mod: CPTII,S$GLB,, | Performed by: PHYSICIAN ASSISTANT

## 2022-05-26 PROCEDURE — 4010F PR ACE/ARB THEARPY RXD/TAKEN: ICD-10-PCS | Mod: CPTII,S$GLB,, | Performed by: PHYSICIAN ASSISTANT

## 2022-05-26 PROCEDURE — 3078F PR MOST RECENT DIASTOLIC BLOOD PRESSURE < 80 MM HG: ICD-10-PCS | Mod: CPTII,S$GLB,, | Performed by: PHYSICIAN ASSISTANT

## 2022-05-26 PROCEDURE — 3074F SYST BP LT 130 MM HG: CPT | Mod: CPTII,S$GLB,, | Performed by: PHYSICIAN ASSISTANT

## 2022-05-26 PROCEDURE — 4010F ACE/ARB THERAPY RXD/TAKEN: CPT | Mod: CPTII,S$GLB,, | Performed by: PHYSICIAN ASSISTANT

## 2022-05-26 PROCEDURE — 99213 PR OFFICE/OUTPT VISIT, EST, LEVL III, 20-29 MIN: ICD-10-PCS | Mod: S$GLB,,, | Performed by: PHYSICIAN ASSISTANT

## 2022-05-26 PROCEDURE — 3074F PR MOST RECENT SYSTOLIC BLOOD PRESSURE < 130 MM HG: ICD-10-PCS | Mod: CPTII,S$GLB,, | Performed by: PHYSICIAN ASSISTANT

## 2022-05-26 PROCEDURE — 71046 X-RAY EXAM CHEST 2 VIEWS: CPT | Mod: FY,S$GLB,, | Performed by: RADIOLOGY

## 2022-05-26 PROCEDURE — 3044F HG A1C LEVEL LT 7.0%: CPT | Mod: CPTII,S$GLB,, | Performed by: PHYSICIAN ASSISTANT

## 2022-05-26 PROCEDURE — 1125F AMNT PAIN NOTED PAIN PRSNT: CPT | Mod: CPTII,S$GLB,, | Performed by: PHYSICIAN ASSISTANT

## 2022-05-26 PROCEDURE — 99213 OFFICE O/P EST LOW 20 MIN: CPT | Mod: S$GLB,,, | Performed by: PHYSICIAN ASSISTANT

## 2022-05-26 PROCEDURE — 3008F BODY MASS INDEX DOCD: CPT | Mod: CPTII,S$GLB,, | Performed by: PHYSICIAN ASSISTANT

## 2022-05-26 PROCEDURE — 1160F PR REVIEW ALL MEDS BY PRESCRIBER/CLIN PHARMACIST DOCUMENTED: ICD-10-PCS | Mod: CPTII,S$GLB,, | Performed by: PHYSICIAN ASSISTANT

## 2022-05-26 PROCEDURE — 1159F MED LIST DOCD IN RCRD: CPT | Mod: CPTII,S$GLB,, | Performed by: PHYSICIAN ASSISTANT

## 2022-05-26 PROCEDURE — 1160F RVW MEDS BY RX/DR IN RCRD: CPT | Mod: CPTII,S$GLB,, | Performed by: PHYSICIAN ASSISTANT

## 2022-05-26 PROCEDURE — 3008F PR BODY MASS INDEX (BMI) DOCUMENTED: ICD-10-PCS | Mod: CPTII,S$GLB,, | Performed by: PHYSICIAN ASSISTANT

## 2022-05-26 PROCEDURE — 3044F PR MOST RECENT HEMOGLOBIN A1C LEVEL <7.0%: ICD-10-PCS | Mod: CPTII,S$GLB,, | Performed by: PHYSICIAN ASSISTANT

## 2022-05-26 PROCEDURE — U0002 COVID-19 LAB TEST NON-CDC: HCPCS | Mod: QW,S$GLB,, | Performed by: PHYSICIAN ASSISTANT

## 2022-05-26 PROCEDURE — 71046 XR CHEST PA AND LATERAL: ICD-10-PCS | Mod: FY,S$GLB,, | Performed by: RADIOLOGY

## 2022-05-26 RX ORDER — PREDNISONE 20 MG/1
20 TABLET ORAL DAILY
Qty: 5 TABLET | Refills: 0 | Status: SHIPPED | OUTPATIENT
Start: 2022-05-26 | End: 2022-05-31

## 2022-05-26 RX ORDER — CODEINE PHOSPHATE AND GUAIFENESIN 10; 100 MG/5ML; MG/5ML
5 SOLUTION ORAL 3 TIMES DAILY PRN
Qty: 118 ML | Refills: 0 | Status: SHIPPED | OUTPATIENT
Start: 2022-05-26 | End: 2022-06-05

## 2022-05-26 RX ORDER — DOXYCYCLINE 100 MG/1
100 CAPSULE ORAL EVERY 12 HOURS
Qty: 14 CAPSULE | Refills: 0 | Status: SHIPPED | OUTPATIENT
Start: 2022-05-26 | End: 2022-06-23

## 2022-05-26 NOTE — PROGRESS NOTES
"Subjective:       Patient ID: Lima Maldonado is a 71 y.o. female.    Vitals:  height is 5' 6" (1.676 m) and weight is 82.6 kg (182 lb). Her temperature is 98.4 °F (36.9 °C). Her blood pressure is 111/76 and her pulse is 98. Her respiration is 18 and oxygen saturation is 97%.     Chief Complaint: Cough    Pt stated that she has been coughing constantly x2 weeks . Pt has taken otc meds. Pts pharmacy has been updated in her chart .     Patient provider note starts here:  Patient presents with complaints of URI like symptoms for the past 2 weeks. Reports nasal congestion and sinus pressure and pain, cough productive of green sputum (worsened at nighttime) and right ear pain. Denies recent known ill contacts. Reports fevers a few days ago which has since resolved. Taking different over the counter medications in addition to left over Promethazine DM and guaifenesin with codeine cough syrups with only little relief. Requesting steroids for relief today. Blood sugars have been around 100-120s.    Cough  This is a new problem. The current episode started 1 to 4 weeks ago. The problem has been unchanged. The problem occurs constantly. The cough is productive of sputum. Associated symptoms include ear pain, a fever, headaches, nasal congestion, postnasal drip, rhinorrhea and a sore throat. Pertinent negatives include no chest pain, chills, ear congestion, heartburn, hemoptysis, myalgias, rash, shortness of breath, sweats, weight loss or wheezing. Nothing aggravates the symptoms. She has tried OTC cough suppressant for the symptoms. The treatment provided no relief.       Constitution: Positive for fever. Negative for chills.   HENT: Positive for ear pain, congestion, postnasal drip, sinus pain, sinus pressure and sore throat. Negative for ear discharge.    Neck: Negative for neck pain.   Cardiovascular: Negative for chest pain, palpitations and sob on exertion.   Respiratory: Positive for cough and sputum production. " Negative for chest tightness, bloody sputum, shortness of breath and wheezing.    Gastrointestinal: Negative for abdominal pain, vomiting, diarrhea and heartburn.   Musculoskeletal: Negative for muscle ache.   Skin: Negative for color change, rash and wound.   Neurological: Positive for headaches. Negative for numbness and tingling.       Objective:      Physical Exam   Constitutional: She is oriented to person, place, and time. She appears well-developed. She is cooperative.  Non-toxic appearance. She appears ill (Appears to not feel well. Nontoxic in appearance). No distress.   HENT:   Head: Normocephalic and atraumatic.   Ears:   Right Ear: Hearing, tympanic membrane, external ear and ear canal normal.   Left Ear: Hearing, tympanic membrane, external ear and ear canal normal.   Nose: Congestion present. No mucosal edema, rhinorrhea or nasal deformity. No epistaxis. Right sinus exhibits maxillary sinus tenderness. Right sinus exhibits no frontal sinus tenderness. Left sinus exhibits maxillary sinus tenderness. Left sinus exhibits no frontal sinus tenderness.   Mouth/Throat: Uvula is midline, oropharynx is clear and moist and mucous membranes are normal. No trismus in the jaw. Normal dentition. No uvula swelling. No oropharyngeal exudate, posterior oropharyngeal edema or posterior oropharyngeal erythema.   Eyes: Conjunctivae and lids are normal. No scleral icterus.   Neck: Trachea normal and phonation normal. Neck supple. No edema present. No erythema present. No neck rigidity present.   Cardiovascular: Normal rate, regular rhythm, normal heart sounds and normal pulses.   Pulmonary/Chest: Effort normal and breath sounds normal. No respiratory distress. She has no decreased breath sounds. She has no wheezes. She has no rhonchi.   Abdominal: Normal appearance.   Musculoskeletal: Normal range of motion.         General: No deformity. Normal range of motion.   Neurological: She is alert and oriented to person, place,  and time. She exhibits normal muscle tone. Coordination normal.   Skin: Skin is warm, dry, intact, not diaphoretic and not pale.   Psychiatric: Her speech is normal and behavior is normal. Judgment and thought content normal.   Nursing note and vitals reviewed.        Assessment:       1. Acute bacterial sinusitis    2. Cough    3. Bacterial URI        Results for orders placed or performed in visit on 05/26/22   POCT COVID-19 Rapid Screening   Result Value Ref Range    POC Rapid COVID Negative Negative     Acceptable Yes      XR CHEST PA AND LATERAL  Result Date: 5/26/2022  EXAMINATION: XR CHEST PA AND LATERAL CLINICAL HISTORY: Cough, unspecified FINDINGS: Chest two views: Heart size is normal there is postoperative change with clips there is aortic plaque and DJD.  Lungs are clear.   No acute process seen. Electronically signed by: Jayson Brice MD Date: 05/26/2022 Time: 13:27    Plan:         Acute bacterial sinusitis    Cough  -     POCT COVID-19 Rapid Screening  -     XR CHEST PA AND LATERAL; Future; Expected date: 05/26/2022  -     guaiFENesin-codeine 100-10 mg/5 ml (TUSSI-ORGANIDIN NR)  mg/5 mL syrup; Take 5 mLs by mouth 3 (three) times daily as needed for Cough.  Dispense: 118 mL; Refill: 0    Bacterial URI  -     predniSONE (DELTASONE) 20 MG tablet; Take 1 tablet (20 mg total) by mouth once daily. for 5 days  Dispense: 5 tablet; Refill: 0  -     doxycycline (VIBRAMYCIN) 100 MG Cap; Take 1 capsule (100 mg total) by mouth every 12 (twelve) hours.  Dispense: 14 capsule; Refill: 0           Medical Decision Making:   History:   Old Medical Records: I decided to obtain old medical records.  Differential Diagnosis:   Differential diagnosis includes but is not limited to: viral vs bacterial URI, pharyngitis, otitis, COVID 19, influenza, pneumonia.    Independently Interpreted Test(s):   I have ordered and independently interpreted X-rays - see summary below.       <> Summary of X-Ray  Reading(s): Plain films reviewed and interpreted by me- no acute findings  Clinical Tests:   Lab Tests: Ordered and Reviewed       <> Summary of Lab: COVID negative  Radiological Study: Ordered and Reviewed  Urgent Care Management:  Patient presents with complaints of URI like symptoms and cough which has lingered for the past 2 weeks. On exam, she is afebrile and nontoxic appearing. Lungs CTAB. Appears to not feel well. COVID negative, CXR normal. Due to the length of persistent symptoms and tenderness to the maxillary sinuses, will cover with a course of antibiotics. She is also requesting a burst of steroids for symptomatic relief. I have strongly advised her to monitor her glucose and stop taking the steroids if her glucose becomes too high. She understands the importance of this and the need to monitor her glucose. She also reports that she did not get relief with the Promethazine but the codeine cough syrup did help the cough. I have written this for her as well and she was educated on the sedative effects of the codeine. Advised close follow-up with PCP. She verbalized understanding and agreed with plan.        Patient Instructions   You must understand that you've received an Urgent Care treatment only and that you may be released before all your medical problems are known or treated. You, the patient, will arrange for follow up care as instructed.      Follow up with your PCP or specialty clinic as instructed in the next 2-3 days if not improved or as needed. You can call [phone] to schedule an appointment with appropriate provider.      If you condition worsens, we recommend that you receive another evaluation at the emergency room immediately or contact your primary medical clinic's after hours call service to discuss your concerns.      Please return here or go to the Emergency Department for any concerns or worsening condition.

## 2022-05-26 NOTE — PATIENT INSTRUCTIONS
You must understand that you've received an Urgent Care treatment only and that you may be released before all your medical problems are known or treated. You, the patient, will arrange for follow up care as instructed.      Follow up with your PCP or specialty clinic as instructed in the next 2-3 days if not improved or as needed. You can call [phone] to schedule an appointment with appropriate provider.      If you condition worsens, we recommend that you receive another evaluation at the emergency room immediately or contact your primary medical clinic's after hours call service to discuss your concerns.      Please return here or go to the Emergency Department for any concerns or worsening condition.

## 2022-05-30 ENCOUNTER — PATIENT MESSAGE (OUTPATIENT)
Dept: ADMINISTRATIVE | Facility: HOSPITAL | Age: 72
End: 2022-05-30
Payer: MEDICARE

## 2022-06-03 ENCOUNTER — TELEPHONE (OUTPATIENT)
Dept: HEMATOLOGY/ONCOLOGY | Facility: CLINIC | Age: 72
End: 2022-06-03
Payer: MEDICARE

## 2022-06-03 NOTE — TELEPHONE ENCOUNTER
"Returned call, spoke to Pavel. Pavel will refax the derrell discrepancy form for completion and return.               ----- Message from Melquiades Espinoza sent at 6/2/2022 11:29 AM CDT -----  Consult/Advisory:          Name Of Caller: Pavel ([employer] [employer])      Contact Preference?: [phone]     Fax  [phone]      Provider Name: Samuel      Does patient feel the need to be seen today? No      What is the nature of the call?: Requesting return fax for notice of discrepancy (breast cancer index). Looking to clarify the derrell status.         Additional Notes:  "Thank you for all that you do for our patients"        "

## 2022-06-16 RX ORDER — LOSARTAN POTASSIUM 50 MG/1
50 TABLET ORAL DAILY
Qty: 90 TABLET | Refills: 3 | Status: SHIPPED | OUTPATIENT
Start: 2022-06-16 | End: 2023-08-22

## 2022-06-16 NOTE — TELEPHONE ENCOUNTER
No new care gaps identified.  [facility] Embedded Care Gaps. Reference number: [id number]. 6/16/2022   12:11:49 AM GIACOMOT

## 2022-06-16 NOTE — TELEPHONE ENCOUNTER
Refill Decision Note   Lima Maldonado  is requesting a refill authorization.  Brief Assessment and Rationale for Refill:  Approve     Medication Therapy Plan:       Medication Reconciliation Completed: No   Comments:     No Care Gaps recommended.     Note composed:10:39 AM 06/16/2022

## 2022-06-23 ENCOUNTER — TELEPHONE (OUTPATIENT)
Dept: INTERNAL MEDICINE | Facility: CLINIC | Age: 72
End: 2022-06-23
Payer: MEDICARE

## 2022-06-23 DIAGNOSIS — U07.1 COVID-19: Primary | ICD-10-CM

## 2022-06-23 NOTE — TELEPHONE ENCOUNTER
Called and spoke directly to pt  Reviewed med list and reviewed med interactions with paxlovid     Not taking celebrex or top steroid consistently   Taking gpn   Last dose of trulicity 0.75 yesterday - sugars have been 160s or lower   Not taking yuvafem in over 1 week     Will give paxlovid and closely monitor bg   Will Hold yuvafem and simvastatin for 1 week   All questions were answered and pt verbalized understanding of plan.     Er and rtc prompts given   Informed pt of [facility] care if she feels like she needs to be assessed for her symptoms   Cont otc meds to address symptoms

## 2022-06-23 NOTE — TELEPHONE ENCOUNTER
----- Message from Ligia Morel sent at 6/23/2022  4:26 PM CDT -----  Regarding: Advice about Covid  Contact: LORNE ALBA [[id number]]  Name of Who is Calling:  LORNE ALBA [[id number]]        What is the request in detail: Pt stated she is requesting a call back in regards to her testing positive for COVID  . She states she doesn't know what to do. Please advise           Can the clinic reply by [facility]: No           What Number to Call Back [facility][phone]

## 2022-06-23 NOTE — TELEPHONE ENCOUNTER
Patient states that she tested positive for Covid-19 via home test today 6/23/22. Symptoms present are congestion, cough, and body aches. She would like to know if she qualifies for medication Paxlovid. Meanwhile, she awaits patient was sent via my chart OTC symptomatic relief for treatment.     Pharmacy of choice   [facility] [address] at [facility]

## 2022-06-30 RX ORDER — METFORMIN HYDROCHLORIDE 500 MG/1
TABLET ORAL
Qty: 90 TABLET | Refills: 1 | Status: SHIPPED | OUTPATIENT
Start: 2022-06-30 | End: 2022-10-25

## 2022-06-30 NOTE — TELEPHONE ENCOUNTER
Care Due:                  Date            Visit Type   Department     Provider  --------------------------------------------------------------------------------                                EP -                              [facility] INTERNAL  Joie Macario  Last Visit: 04-      CARE ([facility])   MEDICINE       Blessey                              [facility] Macario  Next Visit: 07-      [facility]                                                            Last  Test          Frequency    Reason                     Performed    Due Date  --------------------------------------------------------------------------------    HBA1C.......  6 months...  TRULICITY, metFORMIN.....  03- 09-    [facility] Embedded Care Gaps. Reference number: [id number]. 6/30/2022   1:51:09 AM CDT

## 2022-06-30 NOTE — TELEPHONE ENCOUNTER
Refill Authorization Note     Refill Decision Note   Lima Maldonado  is requesting a refill authorization.  Brief Assessment and Rationale for Refill:  Approve     Medication Therapy Plan:       Medication Reconciliation Completed: No   Comments:     No Care Gaps recommended.     Note composed:11:18 AM 06/30/2022

## 2022-07-22 ENCOUNTER — LAB VISIT (OUTPATIENT)
Dept: LAB | Facility: HOSPITAL | Age: 72
End: 2022-07-22
Attending: INTERNAL MEDICINE
Payer: MEDICARE

## 2022-07-22 DIAGNOSIS — I10 ESSENTIAL HYPERTENSION: ICD-10-CM

## 2022-07-22 DIAGNOSIS — E11.8 TYPE 2 DIABETES MELLITUS WITH COMPLICATION, WITHOUT LONG-TERM CURRENT USE OF INSULIN: ICD-10-CM

## 2022-07-22 LAB
ALBUMIN SERPL BCP-MCNC: 4.2 G/DL (ref 3.5–5.2)
ALP SERPL-CCNC: 51 U/L (ref 55–135)
ALT SERPL W/O P-5'-P-CCNC: 31 U/L (ref 10–44)
ANION GAP SERPL CALC-SCNC: 7 MMOL/L (ref 8–16)
AST SERPL-CCNC: 28 U/L (ref 10–40)
BILIRUB SERPL-MCNC: 1.3 MG/DL (ref 0.1–1)
BUN SERPL-MCNC: 17 MG/DL (ref 8–23)
CALCIUM SERPL-MCNC: 9.9 MG/DL (ref 8.7–10.5)
CHLORIDE SERPL-SCNC: 105 MMOL/L (ref 95–110)
CO2 SERPL-SCNC: 29 MMOL/L (ref 23–29)
CREAT SERPL-MCNC: 0.9 MG/DL (ref 0.5–1.4)
EST. GFR  (AFRICAN AMERICAN): >60 ML/MIN/1.73 M^2
EST. GFR  (NON AFRICAN AMERICAN): >60 ML/MIN/1.73 M^2
ESTIMATED AVG GLUCOSE: 146 MG/DL (ref 68–131)
GLUCOSE SERPL-MCNC: 184 MG/DL (ref 70–110)
HBA1C MFR BLD: 6.7 % (ref 4–5.6)
POTASSIUM SERPL-SCNC: 4.6 MMOL/L (ref 3.5–5.1)
PROT SERPL-MCNC: 6.7 G/DL (ref 6–8.4)
SODIUM SERPL-SCNC: 141 MMOL/L (ref 136–145)

## 2022-07-22 PROCEDURE — 36415 COLL VENOUS BLD VENIPUNCTURE: CPT | Performed by: INTERNAL MEDICINE

## 2022-07-22 PROCEDURE — 83036 HEMOGLOBIN GLYCOSYLATED A1C: CPT | Performed by: INTERNAL MEDICINE

## 2022-07-22 PROCEDURE — 80053 COMPREHEN METABOLIC PANEL: CPT | Performed by: INTERNAL MEDICINE

## 2022-07-27 ENCOUNTER — OFFICE VISIT (OUTPATIENT)
Dept: INTERNAL MEDICINE | Facility: CLINIC | Age: 72
End: 2022-07-27
Attending: INTERNAL MEDICINE
Payer: MEDICARE

## 2022-07-27 VITALS
OXYGEN SATURATION: 98 % | SYSTOLIC BLOOD PRESSURE: 132 MMHG | HEART RATE: 87 BPM | HEIGHT: 66 IN | DIASTOLIC BLOOD PRESSURE: 58 MMHG | WEIGHT: 175.25 LBS | BODY MASS INDEX: 28.16 KG/M2

## 2022-07-27 DIAGNOSIS — I10 ESSENTIAL HYPERTENSION: ICD-10-CM

## 2022-07-27 DIAGNOSIS — E11.65 TYPE 2 DIABETES MELLITUS WITH HYPERGLYCEMIA, WITHOUT LONG-TERM CURRENT USE OF INSULIN: Primary | ICD-10-CM

## 2022-07-27 DIAGNOSIS — T45.1X5A CHEMOTHERAPY-INDUCED NEUROPATHY: ICD-10-CM

## 2022-07-27 DIAGNOSIS — K76.0 FATTY LIVER: ICD-10-CM

## 2022-07-27 DIAGNOSIS — E78.2 MIXED HYPERLIPIDEMIA: ICD-10-CM

## 2022-07-27 DIAGNOSIS — M81.0 SENILE OSTEOPOROSIS: ICD-10-CM

## 2022-07-27 DIAGNOSIS — G62.0 CHEMOTHERAPY-INDUCED NEUROPATHY: ICD-10-CM

## 2022-07-27 DIAGNOSIS — I70.0 AORTIC ATHEROSCLEROSIS: ICD-10-CM

## 2022-07-27 PROCEDURE — 1101F PT FALLS ASSESS-DOCD LE1/YR: CPT | Mod: CPTII,S$GLB,, | Performed by: INTERNAL MEDICINE

## 2022-07-27 PROCEDURE — 1159F MED LIST DOCD IN RCRD: CPT | Mod: CPTII,S$GLB,, | Performed by: INTERNAL MEDICINE

## 2022-07-27 PROCEDURE — 1101F PR PT FALLS ASSESS DOC 0-1 FALLS W/OUT INJ PAST YR: ICD-10-PCS | Mod: CPTII,S$GLB,, | Performed by: INTERNAL MEDICINE

## 2022-07-27 PROCEDURE — 99999 PR PBB SHADOW E&M-EST. PATIENT-LVL IV: ICD-10-PCS | Mod: PBBFAC,,, | Performed by: INTERNAL MEDICINE

## 2022-07-27 PROCEDURE — 3044F PR MOST RECENT HEMOGLOBIN A1C LEVEL <7.0%: ICD-10-PCS | Mod: CPTII,S$GLB,, | Performed by: INTERNAL MEDICINE

## 2022-07-27 PROCEDURE — 3078F DIAST BP <80 MM HG: CPT | Mod: CPTII,S$GLB,, | Performed by: INTERNAL MEDICINE

## 2022-07-27 PROCEDURE — 99214 OFFICE O/P EST MOD 30 MIN: CPT | Mod: S$GLB,,, | Performed by: INTERNAL MEDICINE

## 2022-07-27 PROCEDURE — 3288F PR FALLS RISK ASSESSMENT DOCUMENTED: ICD-10-PCS | Mod: CPTII,S$GLB,, | Performed by: INTERNAL MEDICINE

## 2022-07-27 PROCEDURE — 3008F BODY MASS INDEX DOCD: CPT | Mod: CPTII,S$GLB,, | Performed by: INTERNAL MEDICINE

## 2022-07-27 PROCEDURE — 3078F PR MOST RECENT DIASTOLIC BLOOD PRESSURE < 80 MM HG: ICD-10-PCS | Mod: CPTII,S$GLB,, | Performed by: INTERNAL MEDICINE

## 2022-07-27 PROCEDURE — 3075F SYST BP GE 130 - 139MM HG: CPT | Mod: CPTII,S$GLB,, | Performed by: INTERNAL MEDICINE

## 2022-07-27 PROCEDURE — 1126F AMNT PAIN NOTED NONE PRSNT: CPT | Mod: CPTII,S$GLB,, | Performed by: INTERNAL MEDICINE

## 2022-07-27 PROCEDURE — 3044F HG A1C LEVEL LT 7.0%: CPT | Mod: CPTII,S$GLB,, | Performed by: INTERNAL MEDICINE

## 2022-07-27 PROCEDURE — 1126F PR PAIN SEVERITY QUANTIFIED, NO PAIN PRESENT: ICD-10-PCS | Mod: CPTII,S$GLB,, | Performed by: INTERNAL MEDICINE

## 2022-07-27 PROCEDURE — 4010F ACE/ARB THERAPY RXD/TAKEN: CPT | Mod: CPTII,S$GLB,, | Performed by: INTERNAL MEDICINE

## 2022-07-27 PROCEDURE — 99214 PR OFFICE/OUTPT VISIT, EST, LEVL IV, 30-39 MIN: ICD-10-PCS | Mod: S$GLB,,, | Performed by: INTERNAL MEDICINE

## 2022-07-27 PROCEDURE — 3008F PR BODY MASS INDEX (BMI) DOCUMENTED: ICD-10-PCS | Mod: CPTII,S$GLB,, | Performed by: INTERNAL MEDICINE

## 2022-07-27 PROCEDURE — 4010F PR ACE/ARB THEARPY RXD/TAKEN: ICD-10-PCS | Mod: CPTII,S$GLB,, | Performed by: INTERNAL MEDICINE

## 2022-07-27 PROCEDURE — 99999 PR PBB SHADOW E&M-EST. PATIENT-LVL IV: CPT | Mod: PBBFAC,,, | Performed by: INTERNAL MEDICINE

## 2022-07-27 PROCEDURE — 1159F PR MEDICATION LIST DOCUMENTED IN MEDICAL RECORD: ICD-10-PCS | Mod: CPTII,S$GLB,, | Performed by: INTERNAL MEDICINE

## 2022-07-27 PROCEDURE — 3075F PR MOST RECENT SYSTOLIC BLOOD PRESS GE 130-139MM HG: ICD-10-PCS | Mod: CPTII,S$GLB,, | Performed by: INTERNAL MEDICINE

## 2022-07-27 PROCEDURE — 3288F FALL RISK ASSESSMENT DOCD: CPT | Mod: CPTII,S$GLB,, | Performed by: INTERNAL MEDICINE

## 2022-07-27 RX ORDER — DULAGLUTIDE 1.5 MG/.5ML
1.5 INJECTION, SOLUTION SUBCUTANEOUS
Qty: 4 PEN | Refills: 11 | Status: SHIPPED | OUTPATIENT
Start: 2022-07-27 | End: 2022-10-25 | Stop reason: DRUGHIGH

## 2022-07-27 NOTE — PROGRESS NOTES
Subjective:       Patient ID: Lima Maldonado is a 71 y.o. female.    Chief Complaint: Diabetes (F/u)     Lima Maldonado is a 71 y.o.  female who presents for Diabetes (F/u)  .  Problem Noted   Hypothyroidism    Hyperlipidemia     On simvastatin, added fenofibrate 4/22      Type 2 Diabetes Mellitus With Hyperglycemia, Without Long-Term Current Use of Insulin 7/27/2022    metfromin 500mg daily, increasing trulicity from 0.75mg to 1.5 mg 7/22 hba1c 7.6     Senile Osteoporosis 5/17/2016    On prolia, last inj 2/25/22        Obesity, Diabetes, and Hypertension Syndrome (Resolved) 12/12/2018   Class 1 Obesity Due to Excess Calories With Serious Comorbidity and Body Mass Index (Bmi) of 30.0 to 30.9 in Adult (Resolved) 11/29/2018   Pre-Operative Clearance (Resolved)      Chemo induced neuropathy controlled with gabapentin  Seeing hepatology for fatty liver, has lost some wt.      Taking prolia for her osteoporsis, due 8/25/22. rx sent with prior auth    Pt has a history of HTN.  Counseled on low salt diet and graded exercise program. Denies CP, SOB, orthopnea or PND.  Currently treated with antihypertensives listed in med card and compliant most of the time. No side effects from medication noted by patient.     Patient has a history of hyperlipidemia. Pt is complaint with her medication. Denies muscle cramping and weakness. Pt attempts to follow a low cholesterol diet and exercise. No CP, SOB, orthopnea or PND.         Diabetes  She presents for her follow-up diabetic visit. She has type 2 diabetes mellitus. Her disease course has been stable. Associated symptoms include foot paresthesias. There are no hypoglycemic complications. Symptoms are stable. Risk factors for coronary artery disease include diabetes mellitus, dyslipidemia, hypertension, obesity, post-menopausal and sedentary lifestyle. Current diabetic treatment includes oral agent (monotherapy) (and trulicity). Her weight is decreasing steadily. She is  following a generally healthy diet. She never participates in exercise. An ACE inhibitor/angiotensin II receptor blocker is being taken. Sees podiatrist: missed recent apt.Eye exam current: schecduled.     Health Maintenance       Date Due Completion Date    Foot Exam 09/24/2020 9/24/2019 (Done)    Override on 9/24/2019: Done    Override on 12/11/2018: Done    Override on 8/9/2017: Done    Eye Exam 07/15/2021 7/15/2020    COVID-19 Vaccine (4 - Booster for Pfizer series) 01/13/2022 10/13/2021    Influenza Vaccine (1) 09/01/2022 11/5/2021    Override on 11/7/2017: Not Clinically Appropriate    Override on 9/23/2015: Done    Diabetes Urine Screening 10/07/2022 10/7/2021    Hemoglobin A1c 01/22/2023 7/22/2022    Lipid Panel 03/29/2023 3/29/2022    High Dose Statin 04/26/2023 4/26/2022    DEXA Scan 07/16/2023 7/16/2021    TETANUS VACCINE 12/03/2023 12/3/2013    Colorectal Cancer Screening 05/15/2028 5/15/2018    Override on 10/6/2006: Done          Review of Systems      Past Medical History:   Diagnosis Date    Acute cystitis without hematuria 7/17/2017    Arthritis     Back pain     Breast cancer     originally dx in 02/1997- treated with surgery, chemo and radiation     Class 1 obesity due to excess calories with serious comorbidity and body mass index (BMI) of 30.0 to 30.9 in adult 11/29/2018    Elevated bilirubin 11/19/2013    Fatty liver     GERD (gastroesophageal reflux disease)     Glucose intolerance (impaired glucose tolerance)     Hyperlipidemia     Hypertension     Hypothyroid     Hypothyroidism     hypothyroidism    Malignant neoplasm of lower-outer quadrant of left female breast 11/15/2016    Obesity     Obesity, diabetes, and hypertension syndrome 12/12/2018    Osteopenia     Osteoporosis     Primary insomnia 11/2/2016    Shingles     Sleep apnea     wears CPAP nightly     Squamous cell carcinoma 2011    right forearm, sx by Dr. Corinne Ray    Stress incontinence of urine 1/11/2018     Trouble in sleeping     Type 2 diabetes mellitus with diabetic polyneuropathy, without long-term current use of insulin 12/26/2017    Urinary incontinence     Well woman exam with routine gynecological exam 11/2/2016       Past Surgical History:   Procedure Laterality Date    APPENDECTOMY  2008    removed during hysterectomy surgery     BLEPHAROPLASTY Bilateral     BREAST BIOPSY      2010    Breast implant Bilateral 1998    implants removed in 2003    BREAST LUMPECTOMY      02/1997    BREAST RECONSTRUCTION Bilateral     02/2017    BREAST SURGERY      see details below     CATARACT EXTRACTION W/  INTRAOCULAR LENS IMPLANT Left 08/13/2020    Procedure: EXTRACTION, CATARACT, WITH IOL INSERTION;  Surgeon: Ivanna Coleman MD;  Location: [facility];  Service: Ophthalmology;  Laterality: Left;    CATARACT EXTRACTION W/  INTRAOCULAR LENS IMPLANT Right 08/27/2020    Procedure: EXTRACTION, CATARACT, WITH IOL INSERTION LASER;  Surgeon: Ivanna Coleman MD;  Location: [facility];  Service: Ophthalmology;  Laterality: Right;    COLONOSCOPY N/A 05/15/2018    Procedure: COLONOSCOPY;  Surgeon: Roldan Gonzales MD;  Location: [facility] ([address] [place] [address]);  Service: Endoscopy;  Laterality: N/A;    COSMETIC SURGERY  1/2022    Eyelift    EYE SURGERY      CATERACTS    HYSTERECTOMY  2008    benign    left breast reconstruction  2005    left modified radical mastectomy  02/1997    for treatment of breast cancer     LIPOMA RESECTION  2010    removed from upper back area     MODIFIED RADICAL MASTECTOMY W/ AXILLARY LYMPH NODE DISSECTION  02/1997    OOPHORECTOMY      2008    PELVIC LAPAROSCOPY  1978    pt had endometriosis     [place] REMOVAL OF OVARY/TUBE(S)  2008    benign    reduction of mons pubis  2011    robotic lap  hysterectomy with bso  2008    UPPER GASTROINTESTINAL ENDOSCOPY  08/05/2013       Family History   Problem Relation Age of Onset    Heart attack Father 51    Heart disease Father         Heart [place], age 51     "Alcohol abuse Father     Breast cancer Sister 51        BRCA 1/2 negative    Cancer Sister         Breast Cancer    Cancer Mother 65        Malignant melanoma    Melanoma Mother     No Known Problems Brother     No Known Problems Daughter     No Known Problems Son     No Known Problems Daughter     No Known Problems Son     Diabetes Maternal Uncle             Diabetes Maternal Uncle             Arthritis Maternal Grandmother         Severe arthitis in Knees    Uterine cancer Neg Hx     Colon cancer Neg Hx     Celiac disease Neg Hx     Esophageal cancer Neg Hx     Inflammatory bowel disease Neg Hx     Liver cancer Neg Hx     Liver disease Neg Hx     Stomach cancer Neg Hx     Ulcerative colitis Neg Hx     Hypertension Neg Hx     Cirrhosis Neg Hx     Crohn's disease Neg Hx     Rectal cancer Neg Hx     Ovarian cancer Neg Hx        Social History     Tobacco Use    Smoking status: Former Smoker     Packs/day: 0.50     Years: 3.00     Pack years: 1.50     Start date: 4/15/1969     Quit date: 4/15/1972     Years since quittin.3    Smokeless tobacco: Never Used    Tobacco comment: started at age 18 during college    Substance Use Topics    Alcohol use: Yes     Comment: Rarely, not even 1X per week    Drug use: No             Objective:   Blood pressure (!) 132/58, pulse 87, height 5' 6" (1.676 m), weight 79.5 kg (175 lb 4.3 oz), SpO2 98 %.     Physical Exam  Constitutional:       Appearance: Normal appearance. She is well-developed. She is not ill-appearing.   HENT:      Head: Normocephalic and atraumatic.   Eyes:      General: No scleral icterus.     Conjunctiva/sclera: Conjunctivae normal.   Cardiovascular:      Rate and Rhythm: Normal rate.      Heart sounds: Normal heart sounds. No murmur heard.    No friction rub. No gallop.   Pulmonary:      Effort: Pulmonary effort is normal.      Breath sounds: Normal breath sounds. No wheezing or rales.   Chest:      Chest wall: " No tenderness.   Abdominal:      Palpations: Abdomen is soft.   Musculoskeletal:         General: No tenderness or signs of injury.   Skin:     General: Skin is warm and dry.   Neurological:      Mental Status: She is alert and oriented to person, place, and time. Mental status is at baseline.   Psychiatric:         Behavior: Behavior normal.         Thought Content: Thought content normal.         Prior labs reviewed  Assessment/Plan:        Lima was seen today for diabetes.    Diagnoses and all orders for this visit:    Type 2 diabetes mellitus with hyperglycemia, without long-term current use of insulin  -     Diabetes Digital Medicine (DDMP) Enrollment Order  -     Diabetes Digital Medicine (DDMP): Assign Onboarding Questionnaires  -     Lipids Digital Medicine (LDMP) Enrollment Order  -     Lipids Digital Medicine (LDMP): Assign Onboarding Questionnaires  -     Ambulatory referral/consult to Podiatry; Future  Increase trulicity to 1.5 mg weekly  rtc in 3 mo     Essential hypertension  -     Hypertension Digital Medicine (HDMP) Enrollment Order  -     Hypertension Digital Medicine (HDMP): Assign Onboarding Questionnaires  Well controlled  Cont current BP medication(s) and low salt diet  Cont wt loss, add exercise    Senile osteoporosis  -     denosumab (PROLIA) 60 mg/mL Syrg; Inject 1 mL (60 mg total) into the skin every 6 (six) months.  -     Prior authorization Order  Repeat BMD in 2023    Aortic atherosclerosis  On statin, fenofibrate  81 mg asa'  Stable, cont    Fatty liver  Has follow up with hepatology  Cont wt loss    Chemotherapy-induced neuropathy  On gabapentin  stalble  Followed by onc    Mixed hyperlipidemia  Started on fenofibrate  Repeat labs prior to rtc    Other orders  -     dulaglutide (TRULICITY) 1.5 mg/0.5 mL pen injector; Inject 1.5 mg into the skin every 7 days.   RTC in 3 mo with labs      Medication List with Changes/Refills   New Medications    DULAGLUTIDE (TRULICITY) 1.5 MG/0.5 ML PEN  INJECTOR    Inject 1.5 mg into the skin every 7 days.   Current Medications    ASCORBIC ACID, VITAMIN C, (VITAMIN C) 1000 MG TABLET    Take 1 tablet by mouth once daily.    CANNABIDIOL, CBD, EXTRACT ORAL    Take 1 drop by mouth Daily.    CELECOXIB (CELEBREX) 100 MG CAPSULE    TAKE 1 CAPSULE (100 MG TOTAL) BY MOUTH DAILY AS NEEDED.    COENZYME Q10-VITAMIN E 100-100 MG-UNIT CAP    Take 1 capsule by mouth once daily.     FENOFIBRATE 160 MG TAB    Take 1 tablet (160 mg total) by mouth once daily.    FLUTICASONE (FLONASE) 50 MCG/ACTUATION NASAL SPRAY    1 spray by Each Nare route 2 (two) times daily.     GABAPENTIN (NEURONTIN) 300 MG CAPSULE    Take 2 capsules (600 mg total) by mouth every evening.    HYDROCORTISONE 2.5 % OINTMENT    Apply topically 2 (two) times daily. To up to 2 weeks for eczema flares    LETROZOLE (FEMARA) 2.5 MG TAB    TAKE 1 TABLET BY MOUTH EVERY DAY    LEVOTHYROXINE (SYNTHROID) 50 MCG TABLET    TAKE 1 TABLET BY MOUTH EVERY DAY    LOSARTAN (COZAAR) 50 MG TABLET    Take 1 tablet (50 mg total) by mouth once daily.    METFORMIN (GLUCOPHAGE) 500 MG TABLET    TAKE 1 TABLET BY MOUTH EVERY DAY WITH BREAKFAST    MULTIVITAMIN ORAL    Take 1 tablet by mouth once daily.     OMEGA-3 FATTY ACIDS-VITAMIN E 1,000 MG CAP    Take 1 capsule by mouth once daily.     PANTOPRAZOLE (PROTONIX) 20 MG TABLET    TAKE 1 TABLET (20 MG TOTAL) BY MOUTH BEFORE BREAKFAST.    SIMVASTATIN (ZOCOR) 20 MG TABLET    TAKE 1 TABLET BY MOUTH EVERY DAY IN THE EVENING    VITAMIN D 1000 UNITS TAB    Take 1,000 Units by mouth once daily.     YUVAFEM 10 MCG TAB    PLACE 1 TABLET VAGINALLY TWICE A WEEK   Changed and/or Refilled Medications    Modified Medication Previous Medication    DENOSUMAB (PROLIA) 60 MG/ML SYRG denosumab (PROLIA) 60 mg/mL Syrg       Inject 1 mL (60 mg total) into the skin every 6 (six) months.    Inject 1 mL (60 mg total) into the skin every 6 (six) months.   Discontinued Medications    BETAMETHASONE VALERATE 0.1%  (VALISONE) 0.1 % OINT    Apply topically 2 (two) times daily.    TRULICITY 0.75 MG/0.5 ML PEN INJECTOR    INJECT 0.75 MG INTO THE SKIN ONCE A WEEK.

## 2022-08-09 ENCOUNTER — PES CALL (OUTPATIENT)
Dept: ADMINISTRATIVE | Facility: CLINIC | Age: 72
End: 2022-08-09
Payer: MEDICARE

## 2022-08-15 DIAGNOSIS — M81.0 SENILE OSTEOPOROSIS: ICD-10-CM

## 2022-08-15 RX ORDER — DENOSUMAB 60 MG/ML
60 INJECTION SUBCUTANEOUS
Qty: 2 ML | Refills: 0 | Status: SHIPPED | OUTPATIENT
Start: 2022-08-15 | End: 2022-10-25 | Stop reason: SDUPTHER

## 2022-08-17 ENCOUNTER — OFFICE VISIT (OUTPATIENT)
Dept: OPTOMETRY | Facility: CLINIC | Age: 72
End: 2022-08-17
Attending: INTERNAL MEDICINE
Payer: MEDICARE

## 2022-08-17 DIAGNOSIS — Z96.1 PSEUDOPHAKIA OF BOTH EYES: ICD-10-CM

## 2022-08-17 DIAGNOSIS — E11.9 TYPE 2 DIABETES MELLITUS WITHOUT OPHTHALMIC MANIFESTATIONS: Primary | ICD-10-CM

## 2022-08-17 PROCEDURE — 3044F PR MOST RECENT HEMOGLOBIN A1C LEVEL <7.0%: ICD-10-PCS | Mod: CPTII,S$GLB,, | Performed by: OPTOMETRIST

## 2022-08-17 PROCEDURE — 92014 PR EYE EXAM, EST PATIENT,COMPREHESV: ICD-10-PCS | Mod: S$GLB,,, | Performed by: OPTOMETRIST

## 2022-08-17 PROCEDURE — 99999 PR PBB SHADOW E&M-EST. PATIENT-LVL III: CPT | Mod: PBBFAC,,, | Performed by: OPTOMETRIST

## 2022-08-17 PROCEDURE — 1126F AMNT PAIN NOTED NONE PRSNT: CPT | Mod: CPTII,S$GLB,, | Performed by: OPTOMETRIST

## 2022-08-17 PROCEDURE — 2023F DILAT RTA XM W/O RTNOPTHY: CPT | Mod: CPTII,S$GLB,, | Performed by: OPTOMETRIST

## 2022-08-17 PROCEDURE — 2023F PR DILATED RETINAL EXAM W/O EVID OF RETINOPATHY: ICD-10-PCS | Mod: CPTII,S$GLB,, | Performed by: OPTOMETRIST

## 2022-08-17 PROCEDURE — 4010F PR ACE/ARB THEARPY RXD/TAKEN: ICD-10-PCS | Mod: CPTII,S$GLB,, | Performed by: OPTOMETRIST

## 2022-08-17 PROCEDURE — 1101F PT FALLS ASSESS-DOCD LE1/YR: CPT | Mod: CPTII,S$GLB,, | Performed by: OPTOMETRIST

## 2022-08-17 PROCEDURE — 4010F ACE/ARB THERAPY RXD/TAKEN: CPT | Mod: CPTII,S$GLB,, | Performed by: OPTOMETRIST

## 2022-08-17 PROCEDURE — 3288F FALL RISK ASSESSMENT DOCD: CPT | Mod: CPTII,S$GLB,, | Performed by: OPTOMETRIST

## 2022-08-17 PROCEDURE — 3044F HG A1C LEVEL LT 7.0%: CPT | Mod: CPTII,S$GLB,, | Performed by: OPTOMETRIST

## 2022-08-17 PROCEDURE — 1159F PR MEDICATION LIST DOCUMENTED IN MEDICAL RECORD: ICD-10-PCS | Mod: CPTII,S$GLB,, | Performed by: OPTOMETRIST

## 2022-08-17 PROCEDURE — 1159F MED LIST DOCD IN RCRD: CPT | Mod: CPTII,S$GLB,, | Performed by: OPTOMETRIST

## 2022-08-17 PROCEDURE — 1126F PR PAIN SEVERITY QUANTIFIED, NO PAIN PRESENT: ICD-10-PCS | Mod: CPTII,S$GLB,, | Performed by: OPTOMETRIST

## 2022-08-17 PROCEDURE — 3288F PR FALLS RISK ASSESSMENT DOCUMENTED: ICD-10-PCS | Mod: CPTII,S$GLB,, | Performed by: OPTOMETRIST

## 2022-08-17 PROCEDURE — 1101F PR PT FALLS ASSESS DOC 0-1 FALLS W/OUT INJ PAST YR: ICD-10-PCS | Mod: CPTII,S$GLB,, | Performed by: OPTOMETRIST

## 2022-08-17 PROCEDURE — 92014 COMPRE OPH EXAM EST PT 1/>: CPT | Mod: S$GLB,,, | Performed by: OPTOMETRIST

## 2022-08-17 PROCEDURE — 99999 PR PBB SHADOW E&M-EST. PATIENT-LVL III: ICD-10-PCS | Mod: PBBFAC,,, | Performed by: OPTOMETRIST

## 2022-08-17 NOTE — PROGRESS NOTES
HPI     Last eye exam was 9/25/20 with Dr. Williamson.  Patient states sometimes feels like she gets a film over OU and vision   gets blurry. Overall vision seems ok.  Patient denies diplopia, headaches, flashes/floaters, and pain.    Hemoglobin A1C       Date                     Value               Ref Range             Status                07/22/2022               6.7 (H)             4.0 - 5.6 %           Final                  Last edited by Julienne Grijalva MA on 8/17/2022 10:14 AM. (History)            Assessment /Plan     For exam results, see Encounter Report.    Type 2 diabetes mellitus without ophthalmic manifestations  -     Ambulatory referral/consult to Optometry    Pseudophakia of both eyes            1.  No retinopathy--monitor yearly.  BS control.  Eye health normal OU.  2.  Patient doing well with MFIOLs.   Retina flat and intact OU--no holes, tears, breaks, or RDs.                  RTC 1 year for routine exam.

## 2022-08-18 ENCOUNTER — SPECIALTY PHARMACY (OUTPATIENT)
Dept: PHARMACY | Facility: CLINIC | Age: 72
End: 2022-08-18
Payer: MEDICARE

## 2022-08-18 NOTE — TELEPHONE ENCOUNTER
Outgoing call to patient for Prolia refill. Patient is due for next dose on 8/25. Patient copay $395.14 and unable to afford. Routing to [facility] to see if buy and bill is an option.

## 2022-08-18 NOTE — TELEPHONE ENCOUNTER
Prolia copay through medical will likely be the same as pharmacy copay. InBasket sent to have provider reach out to the patient for alternate treatment options. Closing enrollment at [facility].

## 2022-08-22 ENCOUNTER — OFFICE VISIT (OUTPATIENT)
Dept: PODIATRY | Facility: CLINIC | Age: 72
End: 2022-08-22
Payer: MEDICARE

## 2022-08-22 VITALS
HEART RATE: 68 BPM | DIASTOLIC BLOOD PRESSURE: 77 MMHG | HEIGHT: 66 IN | SYSTOLIC BLOOD PRESSURE: 124 MMHG | BODY MASS INDEX: 28.12 KG/M2 | WEIGHT: 175 LBS

## 2022-08-22 DIAGNOSIS — G62.0 PERIPHERAL NEUROPATHY DUE TO CHEMOTHERAPY: ICD-10-CM

## 2022-08-22 DIAGNOSIS — L60.0 INGROWN NAIL: ICD-10-CM

## 2022-08-22 DIAGNOSIS — M20.41 HAMMER TOES OF BOTH FEET: ICD-10-CM

## 2022-08-22 DIAGNOSIS — M79.675 GREAT TOE PAIN, LEFT: ICD-10-CM

## 2022-08-22 DIAGNOSIS — M20.5X1 HALLUX LIMITUS, ACQUIRED, RIGHT: ICD-10-CM

## 2022-08-22 DIAGNOSIS — E11.49 TYPE II DIABETES MELLITUS WITH NEUROLOGICAL MANIFESTATIONS: Primary | ICD-10-CM

## 2022-08-22 DIAGNOSIS — M20.42 HAMMER TOES OF BOTH FEET: ICD-10-CM

## 2022-08-22 DIAGNOSIS — E11.8 TYPE 2 DIABETES MELLITUS WITH COMPLICATION, WITHOUT LONG-TERM CURRENT USE OF INSULIN: ICD-10-CM

## 2022-08-22 DIAGNOSIS — T45.1X5A PERIPHERAL NEUROPATHY DUE TO CHEMOTHERAPY: ICD-10-CM

## 2022-08-22 DIAGNOSIS — M20.5X2 HALLUX LIMITUS, ACQUIRED, LEFT: ICD-10-CM

## 2022-08-22 PROCEDURE — 3078F PR MOST RECENT DIASTOLIC BLOOD PRESSURE < 80 MM HG: ICD-10-PCS | Mod: CPTII,S$GLB,, | Performed by: PODIATRIST

## 2022-08-22 PROCEDURE — 4010F PR ACE/ARB THEARPY RXD/TAKEN: ICD-10-PCS | Mod: CPTII,S$GLB,, | Performed by: PODIATRIST

## 2022-08-22 PROCEDURE — 3044F PR MOST RECENT HEMOGLOBIN A1C LEVEL <7.0%: ICD-10-PCS | Mod: CPTII,S$GLB,, | Performed by: PODIATRIST

## 2022-08-22 PROCEDURE — 3078F DIAST BP <80 MM HG: CPT | Mod: CPTII,S$GLB,, | Performed by: PODIATRIST

## 2022-08-22 PROCEDURE — 1125F AMNT PAIN NOTED PAIN PRSNT: CPT | Mod: CPTII,S$GLB,, | Performed by: PODIATRIST

## 2022-08-22 PROCEDURE — 99999 PR PBB SHADOW E&M-EST. PATIENT-LVL V: CPT | Mod: PBBFAC,,, | Performed by: PODIATRIST

## 2022-08-22 PROCEDURE — 3044F HG A1C LEVEL LT 7.0%: CPT | Mod: CPTII,S$GLB,, | Performed by: PODIATRIST

## 2022-08-22 PROCEDURE — 1159F PR MEDICATION LIST DOCUMENTED IN MEDICAL RECORD: ICD-10-PCS | Mod: CPTII,S$GLB,, | Performed by: PODIATRIST

## 2022-08-22 PROCEDURE — 3074F PR MOST RECENT SYSTOLIC BLOOD PRESSURE < 130 MM HG: ICD-10-PCS | Mod: CPTII,S$GLB,, | Performed by: PODIATRIST

## 2022-08-22 PROCEDURE — 3008F PR BODY MASS INDEX (BMI) DOCUMENTED: ICD-10-PCS | Mod: CPTII,S$GLB,, | Performed by: PODIATRIST

## 2022-08-22 PROCEDURE — 3008F BODY MASS INDEX DOCD: CPT | Mod: CPTII,S$GLB,, | Performed by: PODIATRIST

## 2022-08-22 PROCEDURE — 4010F ACE/ARB THERAPY RXD/TAKEN: CPT | Mod: CPTII,S$GLB,, | Performed by: PODIATRIST

## 2022-08-22 PROCEDURE — 1160F PR REVIEW ALL MEDS BY PRESCRIBER/CLIN PHARMACIST DOCUMENTED: ICD-10-PCS | Mod: CPTII,S$GLB,, | Performed by: PODIATRIST

## 2022-08-22 PROCEDURE — 1160F RVW MEDS BY RX/DR IN RCRD: CPT | Mod: CPTII,S$GLB,, | Performed by: PODIATRIST

## 2022-08-22 PROCEDURE — 1125F PR PAIN SEVERITY QUANTIFIED, PAIN PRESENT: ICD-10-PCS | Mod: CPTII,S$GLB,, | Performed by: PODIATRIST

## 2022-08-22 PROCEDURE — 3074F SYST BP LT 130 MM HG: CPT | Mod: CPTII,S$GLB,, | Performed by: PODIATRIST

## 2022-08-22 PROCEDURE — 1159F MED LIST DOCD IN RCRD: CPT | Mod: CPTII,S$GLB,, | Performed by: PODIATRIST

## 2022-08-22 PROCEDURE — 99999 PR PBB SHADOW E&M-EST. PATIENT-LVL V: ICD-10-PCS | Mod: PBBFAC,,, | Performed by: PODIATRIST

## 2022-08-22 PROCEDURE — 99214 PR OFFICE/OUTPT VISIT, EST, LEVL IV, 30-39 MIN: ICD-10-PCS | Mod: S$GLB,,, | Performed by: PODIATRIST

## 2022-08-22 PROCEDURE — 99214 OFFICE O/P EST MOD 30 MIN: CPT | Mod: S$GLB,,, | Performed by: PODIATRIST

## 2022-08-22 NOTE — PROGRESS NOTES
Subjective:      Patient ID: Lima Maldonado is a 72 y.o. female.    Chief Complaint: PCP (Joie Mccall MD  7/27/22/), Diabetic Foot Exam, Nail Problem (Nails feel ingrown ), and Toe Pain    Lima is a 72 y.o. female who presents to the clinic for evaluation and treatment of high risk feet. Lima has a past medical history of Acute cystitis without hematuria (7/17/2017), Arthritis, Back pain, Breast cancer, Class 1 obesity due to excess calories with serious comorbidity and body mass index (BMI) of 30.0 to 30.9 in adult (11/29/2018), Elevated bilirubin (11/19/2013), Fatty liver, GERD (gastroesophageal reflux disease), Glucose intolerance (impaired glucose tolerance), Hyperlipidemia, Hypertension, Hypothyroid, Hypothyroidism, Malignant neoplasm of lower-outer quadrant of left female breast (11/15/2016), Obesity, Obesity, diabetes, and hypertension syndrome (12/12/2018), Osteopenia, Osteoporosis, Primary insomnia (11/2/2016), Shingles, Sleep apnea, Squamous cell carcinoma (2011), Stress incontinence of urine (1/11/2018), Trouble in sleeping, Type 2 diabetes mellitus with diabetic polyneuropathy, without long-term current use of insulin (12/26/2017), Urinary incontinence, and Well woman exam with routine gynecological exam (11/2/2016). The patient's chief complaint is a painful ingrown toenail on lateral left hallux. Patient has attempted self treatment with nail nipper.  This is a persistent problem.  Patient  denies purulent drainage.     This patient has documented high risk feet requiring routine maintenance secondary to peripheral neuropathy.    PCP: Joie Mccall MD    Date Last Seen by PCP:     Chief Complaint   Patient presents with    PCP     Joie Mccall MD  7/27/22      Diabetic Foot Exam    Nail Problem     Nails feel ingrown     Toe Pain         Current shoe gear:  Affected Foot: Tennis shoes     Unaffected Foot: Tennis shoes    Hemoglobin A1C   Date Value Ref Range Status   07/22/2022  6.7 (H) 4.0 - 5.6 % Final     Comment:     ADA Screening Guidelines:  5.7-6.4%  Consistent with prediabetes  >or=6.5%  Consistent with diabetes    High levels of fetal hemoglobin interfere with the HbA1C  assay. Heterozygous hemoglobin variants (HbS, HgC, etc)do  not significantly interfere with this assay.   However, presence of multiple variants may affect accuracy.     03/29/2022 6.6 (H) 4.0 - 5.6 % Final     Comment:     ADA Screening Guidelines:  5.7-6.4%  Consistent with prediabetes  >or=6.5%  Consistent with diabetes    High levels of fetal hemoglobin interfere with the HbA1C  assay. Heterozygous hemoglobin variants (HbS, HgC, etc)do  not significantly interfere with this assay.   However, presence of multiple variants may affect accuracy.     12/28/2021 6.6 (H) 4.0 - 5.6 % Final     Comment:     ADA Screening Guidelines:  5.7-6.4%  Consistent with prediabetes  >or=6.5%  Consistent with diabetes    High levels of fetal hemoglobin interfere with the HbA1C  assay. Heterozygous hemoglobin variants (HbS, HgC, etc)do  not significantly interfere with this assay.   However, presence of multiple variants may affect accuracy.             Patient Active Problem List   Diagnosis    Hyperlipidemia    Hypothyroidism    Obstructive sleep apnea on CPAP    GERD (gastroesophageal reflux disease)    Fatty liver    HTN (hypertension)    Vaginal atrophy    Senile osteoporosis    Primary insomnia    Malignant neoplasm of lower-outer quadrant of left female breast    Dermatitis of eyelid of left eye due to herpes zoster    Aortic atherosclerosis    Type 2 diabetes mellitus with diabetic polyneuropathy, without long-term current use of insulin    Chemotherapy-induced neuropathy    Stress incontinence of urine    Sensorineural hearing loss (SNHL) of left ear with restricted hearing of right ear    Perforation of left tympanic membrane    Allergic rhinitis    Nasal septal deviation    Tinnitus of both ears     Diabetic polyneuropathy associated with type 2 diabetes mellitus    Nuclear sclerosis, bilateral    Post-operative state    Nuclear sclerotic cataract of right eye    Early hepatic fibrosis    Type 2 diabetes mellitus with hyperglycemia, without long-term current use of insulin       Current Outpatient Medications on File Prior to Visit   Medication Sig Dispense Refill    ascorbic acid, vitamin C, (VITAMIN C) 1000 MG tablet Take 1 tablet by mouth once daily.      CANNABIDIOL, CBD, EXTRACT ORAL Take 1 drop by mouth Daily.      celecoxib (CELEBREX) 100 MG capsule TAKE 1 CAPSULE (100 MG TOTAL) BY MOUTH DAILY AS NEEDED. 90 capsule 0    coenzyme Q10-vitamin E 100-100 mg-unit Cap Take 1 capsule by mouth once daily.       denosumab (PROLIA) 60 mg/mL Syrg Inject 1 mL (60 mg total) into the skin every 6 (six) months. 2 mL 0    denosumab (PROLIA) 60 mg/mL Syrg Inject 1 mL (60 mg total) into the skin every 6 (six) months. 2 mL 0    dulaglutide (TRULICITY) 1.5 mg/0.5 mL pen injector Inject 1.5 mg into the skin every 7 days. 4 pen 11    fenofibrate 160 MG Tab Take 1 tablet (160 mg total) by mouth once daily. 90 tablet 3    fluticasone (FLONASE) 50 mcg/actuation nasal spray 1 spray by Each Nare route 2 (two) times daily.       gabapentin (NEURONTIN) 300 MG capsule Take 2 capsules (600 mg total) by mouth every evening. 180 capsule 3    hydrocortisone 2.5 % ointment Apply topically 2 (two) times daily. To up to 2 weeks for eczema flares 28 g 2    letrozole (FEMARA) 2.5 mg Tab TAKE 1 TABLET BY MOUTH EVERY DAY 90 tablet 3    levothyroxine (SYNTHROID) 50 MCG tablet TAKE 1 TABLET BY MOUTH EVERY DAY 90 tablet 3    losartan (COZAAR) 50 MG tablet Take 1 tablet (50 mg total) by mouth once daily. 90 tablet 3    metFORMIN (GLUCOPHAGE) 500 MG tablet TAKE 1 TABLET BY MOUTH EVERY DAY WITH BREAKFAST 90 tablet 1    MULTIVITAMIN ORAL Take 1 tablet by mouth once daily.       omega-3 fatty acids-vitamin E 1,000 mg Cap Take 1  capsule by mouth once daily.       pantoprazole (PROTONIX) 20 MG tablet TAKE 1 TABLET (20 MG TOTAL) BY MOUTH BEFORE BREAKFAST. 90 tablet 3    simvastatin (ZOCOR) 20 MG tablet TAKE 1 TABLET BY MOUTH EVERY DAY IN THE EVENING 90 tablet 3    vitamin D 1000 units Tab Take 1,000 Units by mouth once daily.       YUVAFEM 10 mcg Tab PLACE 1 TABLET VAGINALLY TWICE A WEEK 24 tablet 0     No current facility-administered medications on file prior to visit.       Review of patient's allergies indicates:   Allergen Reactions    Erythromycin      Other reaction(s): Nausea    Erythromycin (bulk)      Other reaction(s): Nausea    Oxycodone Nausea And Vomiting    Oxycodone-acetaminophen Nausea And Vomiting     Other reaction(s): Nausea       Past Surgical History:   Procedure Laterality Date    APPENDECTOMY  2008    removed during hysterectomy surgery     BLEPHAROPLASTY Bilateral     BREAST BIOPSY      2010    Breast implant Bilateral 1998    implants removed in 2003    BREAST LUMPECTOMY      02/1997    BREAST RECONSTRUCTION Bilateral     02/2017    BREAST SURGERY      see details below     CATARACT EXTRACTION W/  INTRAOCULAR LENS IMPLANT Left 08/13/2020    Procedure: EXTRACTION, CATARACT, WITH IOL INSERTION;  Surgeon: Ivanna Coleman MD;  Location: [facility];  Service: Ophthalmology;  Laterality: Left;    CATARACT EXTRACTION W/  INTRAOCULAR LENS IMPLANT Right 08/27/2020    Procedure: EXTRACTION, CATARACT, WITH IOL INSERTION LASER;  Surgeon: Ivanna Coleman MD;  Location: [facility];  Service: Ophthalmology;  Laterality: Right;    COLONOSCOPY N/A 05/15/2018    Procedure: COLONOSCOPY;  Surgeon: Roldan Gonzales MD;  [facility]  Service: Endoscopy;  Laterality: N/A;    COSMETIC SURGERY  1/2022    Eyelift    EYE SURGERY      CATERACTS    HYSTERECTOMY  2008    benign    left breast reconstruction  2005    left modified radical mastectomy  02/1997    for treatment of breast cancer     LIPOMA RESECTION   2010    removed from upper back area     MODIFIED RADICAL MASTECTOMY W/ AXILLARY LYMPH NODE DISSECTION  1997    OOPHORECTOMY      2008    PELVIC LAPAROSCOPY      pt had endometriosis     DE REMOVAL OF OVARY/TUBE(S)      benign    reduction of mons pubis      robotic lap  hysterectomy with bso  2008    UPPER GASTROINTESTINAL ENDOSCOPY  2013       Family History   Problem Relation Age of Onset    Heart attack Father 51    Heart disease Father         Heart Attac, age 51    Alcohol abuse Father     Breast cancer Sister 51        BRCA 1/2 negative    Cancer Sister         Breast Cancer    Cancer Mother 65        Malignant melanoma    Melanoma Mother     No Known Problems Brother     No Known Problems Daughter     No Known Problems Son     No Known Problems Daughter     No Known Problems Son     Diabetes Maternal Uncle             Diabetes Maternal Uncle             Arthritis Maternal Grandmother         Severe arthitis in Knees    Uterine cancer Neg Hx     Colon cancer Neg Hx     Celiac disease Neg Hx     Esophageal cancer Neg Hx     Inflammatory bowel disease Neg Hx     Liver cancer Neg Hx     Liver disease Neg Hx     Stomach cancer Neg Hx     Ulcerative colitis Neg Hx     Hypertension Neg Hx     Cirrhosis Neg Hx     Crohn's disease Neg Hx     Rectal cancer Neg Hx     Ovarian cancer Neg Hx        Social History     Socioeconomic History    Marital status:    Tobacco Use    Smoking status: Former Smoker     Packs/day: 0.50     Years: 3.00     Pack years: 1.50     Start date: 4/15/1969     Quit date: 4/15/1972     Years since quittin.3    Smokeless tobacco: Never Used    Tobacco comment: started at age 18 during college    Substance and Sexual Activity    Alcohol use: Yes     Comment: Rarely, not even 1X per week    Drug use: No    Sexual activity: Yes     Partners: Male     Birth control/protection: Post-menopausal, See Surgical  "Hx     Social Determinants of Health     Financial Resource Strain: Low Risk     Difficulty of Paying Living Expenses: Not hard at all   Food Insecurity: No Food Insecurity    Worried About Running Out of Food in the Last Year: Never true    Ran Out of Food in the Last Year: Never true   Transportation Needs: No Transportation Needs    Lack of Transportation (Medical): No    Lack of Transportation (Non-Medical): No   Physical Activity: Inactive    Days of Exercise per Week: 0 days    Minutes of Exercise per Session: 0 min   Stress: No Stress Concern Present    Feeling of Stress : Only a little   Social Connections: Unknown    Frequency of Communication with Friends and Family: More than three times a week    Frequency of Social Gatherings with Friends and Family: Three times a week    Active Member of Clubs or Organizations: Yes    Attends Club or Organization Meetings: More than 4 times per year    Marital Status:    Housing Stability: Low Risk     Unable to Pay for Housing in the Last Year: No    Number of Places Lived in the Last Year: 1    Unstable Housing in the Last Year: No           Review of Systems   Constitutional: Negative for chills, fever and malaise/fatigue.   HENT: Negative for hearing loss.    Cardiovascular: Negative for claudication.   Respiratory: Negative for shortness of breath.    Skin: Positive for color change, dry skin and nail changes. Negative for flushing and rash.   Musculoskeletal: Positive for joint pain and myalgias.   Neurological: Positive for paresthesias and sensory change. Negative for loss of balance and numbness.   Psychiatric/Behavioral: Negative for altered mental status.           Objective:       Vitals:    08/22/22 1151   BP: 124/77   Pulse: 68   Weight: 79.4 kg (175 lb)   Height: 5' 6" (1.676 m)   PainSc:   2   PainLoc: Toe       Physical Exam  Vitals reviewed.   Constitutional:       Appearance: She is well-developed.   Cardiovascular:      " Pulses:           Dorsalis pedis pulses are 2+ on the right side and 2+ on the left side.        Posterior tibial pulses are 2+ on the right side and 2+ on the left side.   Musculoskeletal:      Right ankle: Decreased range of motion. Abnormal pulse.      Right Achilles Tendon: Ceja's test negative.      Left ankle: Decreased range of motion. Abnormal pulse.      Left Achilles Tendon: Ceja's test negative.      Right foot: Decreased range of motion.      Left foot: Decreased range of motion.   Feet:      Right foot:      Protective Sensation: 5 sites tested. 2 sites sensed.      Left foot:      Protective Sensation: 5 sites tested. 2 sites sensed.   Skin:     General: Skin is dry.      Capillary Refill: Capillary refill takes more than 3 seconds.      Comments: Tenderness to lateral hallux nail margin of lefr foot with ingrown nail plate. Surrounding erythema and minimal edema is noted there is no granuloma formation noted. no malodor      Neurological:      Mental Status: She is alert.      Comments: diminished sensation noted to b/L lower extremities   Psychiatric:         Behavior: Behavior normal. Behavior is cooperative.                 Assessment:       Encounter Diagnoses   Name Primary?    Type 2 diabetes mellitus with complication, without long-term current use of insulin     Type II diabetes mellitus with neurological manifestations Yes    Peripheral neuropathy due to chemotherapy     Great toe pain, left     Ingrown nail     Hammer toes of both feet     Hallux limitus, acquired, right     Hallux limitus, acquired, left          Plan:       Lima was seen today for pcp, diabetic foot exam, nail problem and toe pain.    Diagnoses and all orders for this visit:    Type II diabetes mellitus with neurological manifestations    Type 2 diabetes mellitus with complication, without long-term current use of insulin  -     Ambulatory referral/consult to Podiatry    Peripheral neuropathy due to  chemotherapy    Great toe pain, left    Ingrown nail    Hammer toes of both feet    Hallux limitus, acquired, right    Hallux limitus, acquired, left      I counseled the patient on her conditions, their implications and medical management.      Greater than 50% of this 41 minute visit spent on counseling and coordination of care.    Education about the diabetic foot, neuropathy, and prevention of limb loss.    Shoe inspection. Diabetic Foot Education. Patient reminded of the importance of good nutrition/healthy diet/weight management and blood sugar control to help prevent podiatric complications of diabetes. Patient instructed on proper foot hygeine. Wear comfortable, proper fitting shoes. Wash feet daily. Dry well. After drying, apply moisturizer to feet (no lotion to webspaces). Inspect feet daily for skin breaks, blisters, swelling, or redness. Wear cotton socks (preferably white)  Change socks every day. Do NOT walk barefoot. Do NOT use heating pads or hot water soaks. We discussed wearing proper shoe gear, daily foot inspections, never walking without protective shoe gear.     Discussed edema control and the importance of daily moisturizer to the feet such as Gold bonds diabetic foot cream    In depth conversation on the treatment of ingrown nail; partial nail avulsion vs chemical matrixectomy vs conservative treatment of soaking and nail trimming    Informed patient that many nail problems can be prevented by wearing the right shoes and trimming your nails properly.   The right shoes:  Patient is to wear shoes that are supportive and roomy enough for toes to wiggle. Look for shoes made of natural materials such as leather, which allow  feet to breathe.   Proper trimming: To avoid problems, she was instructed to trim toenails straight across without cutting down into the corners.     She will continue to monitor the areas daily, inspect her feet, wear protective shoe gear when ambulatory, moisturizer to  maintain skin integrity and follow in this office in approximately 4-7 months, sooner p.r.n.

## 2022-08-22 NOTE — PATIENT INSTRUCTIONS
"  Ingrown Toenail, Excised  An ingrown toenail occurs when the nail grows sideways into the skin alongside the nail. This can cause pain, especially when wearing tight shoes. It can also lead to an infection with redness and swelling.  The side of the nail will need to be removed in order to stop the pain and release any infection present. If there is a lot of redness and swelling, then an antibiotic may also be used. The redness and pain should begin to go away within 48 hours. It will take about two weeks for the exposed nail bed to become dry and all of the swelling to go down.  If only the side of the nail was removed it will begin to grow back in a few months. To prevent recurrence, that side of nail bed may be treated with a strong chemical to prevent the nail from regrowing ("ablation").  Home care  The following guidelines will help you care for your toe at home:  Once a day beginning in 24 hours:  Clean the toe separate from your body with warm running water and antibacterial soap such as dial soap or saline wound spray  Clean any remaining crust away with soap and water using a cotton-tipped applicator.  Apply betadine to the toe.  Cover with a breathable bandage or until the exposed nail bed is dry and there is no more drainage.  Change the dressing daily, or whenever it becomes wet or dirty.  If you were prescribed antibiotics, take them as directed until they are all gone.  Wear comfortable shoes with a lot of toe room, or open-toe sandals, while your toe is healing.  You may use acetaminophen or ibuprofen to control pain, unless another medicine was prescribed. If you have chronic liver or kidney disease or ever had a stomach ulcer or GI bleeding, talk with your doctor before using these medicines.  Prevention  To prevent ingrown toenails:  Wear shoes that fit well. Avoid shoes that pinch the toes together.  When you trim your toenails, do not cut them too short. Cut straight across at the top and do " not round the edges.  Do not use a sharp object to clean under your nail since this might cause an infection.  At first signs of a recurrence, insert a small piece of cotton under that side of the nail to help it grow out straight.  Follow-up care  Follow up with your doctor or this facility as advised by our staff. If the ingrown toenail recurs, follow up with a podiatrist for nail bed ablation.  When to seek medical advice  Call your health care provider right away if any of the following occur:  Increasing redness, pain or swelling of the toe  Red streaks in the skin leading away from the wound  Continued pus or fluid drainage for more than 24 hours  Fever of 100.4º F (38º C) or higher, or as directed by your health care provider  © 3060-7830 [employer]. [address] [place] [address]. All rights reserved. This information is not intended as a substitute for professional medical care. Always follow your healthcare professional's instructions.      Recommend lotions: eucerin, eucerin for diabetics, aquaphor, A&D ointment, gold bond for diabetics, sween, Chelan's Bees all purpose baby ointment,  urea 40 with aloe (found on amazon.com)    Shoe recommendations: (try 6pm.[employer] zappos.[employer]Style [employer] , nordstromraYabbedoo.iStyle Inc., or shoes.[employer]. for discounted prices) you can visit [employer]SW shoes in [place]  or [facility] in the [facility] (there are also several shoe brand outlets in the [facility])    Asics (GT 2000 or gel foundations), new balance stability type shoes (such as the 940 series), shelley (stabil c3),  Curtis (GTS or Beast or transcend), propet, HOKAone, Jose  (tennis shoe)    Sofft Brand (women) John&Santana (men), clarks, croalee, aerosoles, naturalizers, SAS, ecco, leif gipson rockports (dress shoes)    Vionic, burkenstocks, fitflops, propet (sandals)  Nike comfort thong sandals, crocs, propet (house shoes)        Diabetes: Inspecting Your Feet  Diabetes increases your chances of developing foot  problems. So inspect your feet every day. This helps you find small skin irritations before they become serious infections. If you have trouble seeing the bottoms of your feet, use a mirror or ask a family member or friend to help.     Pressure spots on the bottom of the foot are common areas where problems develop.   How to check your feet  Below are tips to help you look for foot problems. Try to check your feet at the same time each day, such as when you get out of bed in the morning:  Check the top of each foot. The tops of toes, back of the heel, and outer edge of the foot can get a lot of rubbing from poor-fitting shoes.  Check the bottom of each foot. Daily wear and tear often leads to problems at pressure spots.  Check the toes and nails. Fungal infections often occur between toes. Toenail problems can also be a sign of fungal infections or lead to breaks in the skin.  Check your shoes, too. Loose objects inside a shoe can injure the foot. Use your hand to feel inside your shoes for things like deep, loose stitching, or rough areas that could irritate your skin.  Warning signs  Look for any color changes in the foot. Redness with streaks can signal a severe infection, which needs immediate medical attention. Tell your doctor right away if you have any of these problems:  Swelling, sometimes with color changes, may be a sign of poor blood flow or infection. Symptoms include tenderness and an increase in the size of your foot.  Warm or hot areas on your feet may be signs of infection. A foot that is cold may not be getting enough blood.  Sensations such as burning, tingling, or pins and needles can be signs of a problem. Also check for areas that may be numb.  Hot spots are caused by friction or pressure. Look for hot spots in areas that get a lot of rubbing. Hot spots can turn into blisters, calluses, or sores.  Cracks and sores are caused by dry or irritated skin. They are a sign that the skin is  breaking down, which can lead to infection.  Toenail problems to watch for include nails growing into the skin (ingrown toenail) and causing redness or pain. Thick, yellow, or discolored nails can signal a fungal infection.  Drainage and odor can develop from untreated sores and ulcers. Call your doctor right away if you notice white or yellow drainage, bleeding, or unpleasant odor.   © 6684-6083 [employer]. [address] [place] [address]. All rights reserved. This information is not intended as a substitute for professional medical care. Always follow your healthcare professional's instructions.        Step-by-Step:  Inspecting Your Feet (Diabetes)    Date Last Reviewed: 10/1/2016  © 3319-6613 [employer]. [address] [place] [address]. All rights reserved. This information is not intended as a substitute for professional medical care. Always follow your healthcare professional's instructions.

## 2022-08-29 NOTE — PROGRESS NOTES
Subjective:   Patient ID:  Lima Maldonado is a 72 y.o. female who presents for evaluation of No chief complaint on file.    PROBLEM LIST:  Breast cancer 1997 (XRT, AC+T, lumpectomy), 2016  (left total mastectomy,completed taxotere,cyclophosphamide. Letrozole)  HTN  HLD  DM, not on insulin  NELDA    HPI: She presents today to [facility] care.  She has a history of left-sided breast cancer diagnosed in 1997 which was treated with a lumpectomy, radiation therapy, and an anthracycline containing chemotherapy regimen.  She subsequently had a recurrence in 2016 and underwent a total left mastectomy and adjuvant chemotherapy with Taxotere and cyclophosphamide.    She is currently on letrozole.She also has treated hypertension, hyperlipidemia, and diabetes.  She denies any chest pain, shortness of breath, PND, orthopnea, or  lower extremity edema.  She reports an occasional brief flutter in her chest without any sustained symptoms.    She does not currently exercise on a regular basis.Her father had a myocardial infarction at the age of 51.    ECG 28-DEC-2021 10:16:39: Personally reviewed by me shows NSR 74 bpm with RBBB    Echo 3/17:  CONCLUSIONS     1 - Concentric remodeling.     2 - Normal left ventricular systolic function (EF 60-65%).     3 - Normal left ventricular diastolic function.     4 - Right ventricular enlargement with normal systolic function.     Past Medical History:   Diagnosis Date    Acute cystitis without hematuria 7/17/2017    Arthritis     Back pain     Breast cancer     originally dx in 02/1997- treated with surgery, chemo and radiation     Class 1 obesity due to excess calories with serious comorbidity and body mass index (BMI) of 30.0 to 30.9 in adult 11/29/2018    Elevated bilirubin 11/19/2013    Fatty liver     GERD (gastroesophageal reflux disease)     Glucose intolerance (impaired glucose tolerance)     Hyperlipidemia     Hypertension     Hypothyroid     Hypothyroidism     hypothyroidism     Malignant neoplasm of lower-outer quadrant of left female breast 11/15/2016    Obesity     Obesity, diabetes, and hypertension syndrome 12/12/2018    Osteopenia     Osteoporosis     Primary insomnia 11/2/2016    Shingles     Sleep apnea     wears CPAP nightly     Squamous cell carcinoma 2011    right forearm, sx by Dr. Corinne Ray    Stress incontinence of urine 1/11/2018    Trouble in sleeping     Type 2 diabetes mellitus with diabetic polyneuropathy, without long-term current use of insulin 12/26/2017    Urinary incontinence     Well woman exam with routine gynecological exam 11/2/2016       Past Surgical History:   Procedure Laterality Date    APPENDECTOMY  2008    removed during hysterectomy surgery     BLEPHAROPLASTY Bilateral     BREAST BIOPSY      2010    Breast implant Bilateral 1998    implants removed in 2003    BREAST LUMPECTOMY      02/1997    BREAST RECONSTRUCTION Bilateral     02/2017    BREAST SURGERY      see details below     CATARACT EXTRACTION W/  INTRAOCULAR LENS IMPLANT Left 08/13/2020    Procedure: EXTRACTION, CATARACT, WITH IOL INSERTION;  Surgeon: Ivanna Coleman MD;  Location: [facility];  Service: Ophthalmology;  Laterality: Left;    CATARACT EXTRACTION W/  INTRAOCULAR LENS IMPLANT Right 08/27/2020    Procedure: EXTRACTION, CATARACT, WITH IOL INSERTION LASER;  Surgeon: Ivanna Coleman MD;  Location: [facility];  Service: Ophthalmology;  Laterality: Right;    COLONOSCOPY N/A 05/15/2018    Procedure: COLONOSCOPY;  Surgeon: Roldan Gonzales MD;  Location: [facility] ([address] [place] [address]);  Service: Endoscopy;  Laterality: N/A;    COSMETIC SURGERY  1/2022    Eyelift    EYE SURGERY      CATERACTS    HYSTERECTOMY  2008    benign    left breast reconstruction  2005    left modified radical mastectomy  02/1997    for treatment of breast cancer     LIPOMA RESECTION  2010    removed from upper back area     MODIFIED RADICAL MASTECTOMY W/ AXILLARY LYMPH NODE DISSECTION  02/1997    OOPHORECTOMY      2008    PELVIC  LAPAROSCOPY      pt had endometriosis     NC REMOVAL OF OVARY/TUBE(S)      benign    reduction of mons pubis  2011    robotic lap  hysterectomy with bso  2008    UPPER GASTROINTESTINAL ENDOSCOPY  2013       Social History     Socioeconomic History    Marital status:    Tobacco Use    Smoking status: Former     Packs/day: 0.50     Years: 3.00     Pack years: 1.50     Types: Cigarettes     Start date: 4/15/1969     Quit date: 4/15/1972     Years since quittin.4    Smokeless tobacco: Never    Tobacco comments:     started at age 18 during college    Substance and Sexual Activity    Alcohol use: Yes     Comment: Rarely, not even 1X per week    Drug use: No    Sexual activity: Yes     Partners: Male     Birth control/protection: Post-menopausal, See Surgical Hx     Social Determinants of Health     Financial Resource Strain: Low Risk     Difficulty of Paying Living Expenses: Not hard at all   Food Insecurity: No Food Insecurity    Worried About Running Out of Food in the Last Year: Never true    Ran Out of Food in the Last Year: Never true   Transportation Needs: No Transportation Needs    Lack of Transportation (Medical): No    Lack of Transportation (Non-Medical): No   Physical Activity: Inactive    Days of Exercise per Week: 0 days    Minutes of Exercise per Session: 0 min   Stress: No Stress Concern Present    Feeling of Stress : Only a little   Social Connections: Unknown    Frequency of Communication with Friends and Family: More than three times a week    Frequency of Social Gatherings with Friends and Family: Three times a week    Active Member of Clubs or Organizations: Yes    Attends Club or Organization Meetings: More than 4 times per year    Marital Status:    Housing Stability: Low Risk     Unable to Pay for Housing in the Last Year: No    Number of Places Lived in the Last Year: 1    Unstable Housing in the Last Year: No       Family History   Problem Relation Age of Onset     Heart attack Father 51    Heart disease Father         Heart [place], age 51    Alcohol abuse Father     Breast cancer Sister 51        BRCA 1/2 negative    Cancer Sister         Breast Cancer    Cancer Mother 65        Malignant melanoma    Melanoma Mother     No Known Problems Brother     No Known Problems Daughter     No Known Problems Son     No Known Problems Daughter     No Known Problems Son     Diabetes Maternal Uncle             Diabetes Maternal Uncle             Arthritis Maternal Grandmother         Severe arthitis in Knees    Uterine cancer Neg Hx     Colon cancer Neg Hx     Celiac disease Neg Hx     Esophageal cancer Neg Hx     Inflammatory bowel disease Neg Hx     Liver cancer Neg Hx     Liver disease Neg Hx     Stomach cancer Neg Hx     Ulcerative colitis Neg Hx     Hypertension Neg Hx     Cirrhosis Neg Hx     Crohn's disease Neg Hx     Rectal cancer Neg Hx     Ovarian cancer Neg Hx        Patient's Medications   New Prescriptions    No medications on file   Previous Medications    ASCORBIC ACID, VITAMIN C, (VITAMIN C) 1000 MG TABLET    Take 1 tablet by mouth once daily.    CANNABIDIOL, CBD, EXTRACT ORAL    Take 1 drop by mouth Daily.    CELECOXIB (CELEBREX) 100 MG CAPSULE    TAKE 1 CAPSULE (100 MG TOTAL) BY MOUTH DAILY AS NEEDED.    COENZYME Q10-VITAMIN E 100-100 MG-UNIT CAP    Take 1 capsule by mouth once daily.     DENOSUMAB (PROLIA) 60 MG/ML SYRG    Inject 1 mL (60 mg total) into the skin every 6 (six) months.    DENOSUMAB (PROLIA) 60 MG/ML SYRG    Inject 1 mL (60 mg total) into the skin every 6 (six) months.    DULAGLUTIDE (TRULICITY) 1.5 MG/0.5 ML PEN INJECTOR    Inject 1.5 mg into the skin every 7 days.    FENOFIBRATE 160 MG TAB    Take 1 tablet (160 mg total) by mouth once daily.    FLUTICASONE (FLONASE) 50 MCG/ACTUATION NASAL SPRAY    1 spray by Each Nare route 2 (two) times daily.     GABAPENTIN (NEURONTIN) 300 MG CAPSULE    Take 2 capsules (600 mg total) by mouth every  evening.    HYDROCORTISONE 2.5 % OINTMENT    Apply topically 2 (two) times daily. To up to 2 weeks for eczema flares    LETROZOLE (FEMARA) 2.5 MG TAB    TAKE 1 TABLET BY MOUTH EVERY DAY    LEVOTHYROXINE (SYNTHROID) 50 MCG TABLET    TAKE 1 TABLET BY MOUTH EVERY DAY    LOSARTAN (COZAAR) 50 MG TABLET    Take 1 tablet (50 mg total) by mouth once daily.    METFORMIN (GLUCOPHAGE) 500 MG TABLET    TAKE 1 TABLET BY MOUTH EVERY DAY WITH BREAKFAST    MULTIVITAMIN ORAL    Take 1 tablet by mouth once daily.     OMEGA-3 FATTY ACIDS-VITAMIN E 1,000 MG CAP    Take 1 capsule by mouth once daily.     PANTOPRAZOLE (PROTONIX) 20 MG TABLET    TAKE 1 TABLET (20 MG TOTAL) BY MOUTH BEFORE BREAKFAST.    SIMVASTATIN (ZOCOR) 20 MG TABLET    TAKE 1 TABLET BY MOUTH EVERY DAY IN THE EVENING    VITAMIN D 1000 UNITS TAB    Take 1,000 Units by mouth once daily.     YUVAFEM 10 MCG TAB    PLACE 1 TABLET VAGINALLY TWICE A WEEK   Modified Medications    No medications on file   Discontinued Medications    No medications on file       Review of Systems   Constitutional: Negative for malaise/fatigue and weight gain.   HENT:  Negative for hearing loss.    Eyes:  Negative for visual disturbance.   Cardiovascular:  Negative for chest pain, claudication, dyspnea on exertion, leg swelling, near-syncope, orthopnea, palpitations, paroxysmal nocturnal dyspnea and syncope.   Respiratory:  Negative for cough, shortness of breath, sleep disturbances due to breathing, snoring and wheezing.    Endocrine: Negative for cold intolerance, heat intolerance, polydipsia, polyphagia and polyuria.   Hematologic/Lymphatic: Negative for bleeding problem. Does not bruise/bleed easily.   Skin:  Negative for rash and suspicious lesions.   Musculoskeletal:  Positive for joint pain. Negative for arthritis, falls, muscle weakness and myalgias.   Gastrointestinal:  Negative for abdominal pain, change in bowel habit, constipation, diarrhea, heartburn, hematochezia, melena and nausea.    Genitourinary:  Negative for hematuria and nocturia.   Neurological:  Positive for paresthesias. Negative for excessive daytime sleepiness, dizziness, headaches, light-headedness, loss of balance and weakness.   Psychiatric/Behavioral:  Negative for depression. The patient is not nervous/anxious.    Allergic/Immunologic: Negative for environmental allergies.     There were no vitals taken for this visit.    Objective:   Physical Exam  Constitutional:       Appearance: She is well-developed.      Comments:      HENT:      Head: Normocephalic and atraumatic.   Eyes:      General: No scleral icterus.     Conjunctiva/sclera: Conjunctivae normal.      Pupils: Pupils are equal, round, and reactive to light.   Neck:      Thyroid: No thyromegaly.      Vascular: No hepatojugular reflux or JVD.      Trachea: No tracheal deviation.   Cardiovascular:      Rate and Rhythm: Normal rate and regular rhythm.      Chest Wall: PMI is not displaced.      Pulses: Intact distal pulses.           Carotid pulses are 2+ on the right side and 2+ on the left side.       Radial pulses are 2+ on the right side and 2+ on the left side.        Dorsalis pedis pulses are 2+ on the right side and 2+ on the left side.        Posterior tibial pulses are 2+ on the right side and 2+ on the left side.      Heart sounds: Normal heart sounds.   Pulmonary:      Effort: Pulmonary effort is normal.      Breath sounds: Normal breath sounds.   Abdominal:      General: Bowel sounds are normal. There is no distension.      Palpations: Abdomen is soft. There is no mass.      Tenderness: There is no abdominal tenderness.   Musculoskeletal:         General: No tenderness.      Cervical back: Normal range of motion and neck supple.   Lymphadenopathy:      Cervical: No cervical adenopathy.   Skin:     General: Skin is warm and dry.      Findings: No erythema or rash.      Nails: There is no clubbing.   Neurological:      Mental Status: She is alert and oriented to  person, place, and time.   Psychiatric:         Speech: Speech normal.         Behavior: Behavior normal.       Lab Results   Component Value Date     07/22/2022    K 4.6 07/22/2022     07/22/2022    CO2 29 07/22/2022    BUN 17 07/22/2022    CREATININE 0.9 07/22/2022     (H) 07/22/2022    HGBA1C 6.7 (H) 07/22/2022    MG 2.0 06/17/2021    AST 28 07/22/2022    ALT 31 07/22/2022    ALBUMIN 4.2 07/22/2022    PROT 6.7 07/22/2022    BILITOT 1.3 (H) 07/22/2022    WBC 4.26 03/29/2022    HGB 13.8 03/29/2022    HCT 41.1 03/29/2022    MCV 86 03/29/2022     03/29/2022    INR 1.0 02/02/2017    TSH 2.303 03/29/2022    CHOL 148 03/29/2022    HDL 32 (L) 03/29/2022    LDLCALC 64.8 03/29/2022    TRIG 256 (H) 03/29/2022       Assessment:     1. Aortic atherosclerosis    2. Primary hypertension : Blood pressure is at goal. I have made no changes. Continue current regimen.   3. Mixed hyperlipidemia :  She recently started fenofibrate for elevated triglycerides.  Her LDL is at goal on simvastatin. Following a heart health diet such as the Mediterranean Diet is recommended in addition to 150 minutes a week of moderate intensity exercise to lower cholesterol, maintain a healthy body weight, and improve overall cardiovascular health.   4. Malignant neoplasm of lower-outer quadrant of left breast of female, estrogen receptor positive :  We discussed that given her history of anthracycline exposure as well as radiation to her left chest, a screening stress echocardiogram is recommended now and then every 5 years going forward.   5. Type 2 diabetes mellitus with diabetic polyneuropathy, without long-term current use of insulin :  She is on metformin and Trulicity.  Her diabetes is managed by Dr. Mccall.   6. Obstructive sleep apnea on CPAP        Plan:     Diagnoses and all orders for this visit:    Aortic atherosclerosis    Primary hypertension    Mixed hyperlipidemia    Malignant neoplasm of lower-outer quadrant of  left breast of female, estrogen receptor positive    Type 2 diabetes mellitus with diabetic polyneuropathy, without long-term current use of insulin    Obstructive sleep apnea on CPAP        Thank you for allowing me to participate in this patient's care. Please do not hesitate to contact me with any questions or concerns.

## 2022-08-30 ENCOUNTER — PATIENT OUTREACH (OUTPATIENT)
Dept: ADMINISTRATIVE | Facility: HOSPITAL | Age: 72
End: 2022-08-30
Payer: MEDICARE

## 2022-08-31 ENCOUNTER — OFFICE VISIT (OUTPATIENT)
Dept: CARDIOLOGY | Facility: CLINIC | Age: 72
End: 2022-08-31
Payer: MEDICARE

## 2022-08-31 VITALS
OXYGEN SATURATION: 96 % | BODY MASS INDEX: 28.63 KG/M2 | DIASTOLIC BLOOD PRESSURE: 60 MMHG | HEIGHT: 66 IN | SYSTOLIC BLOOD PRESSURE: 130 MMHG | HEART RATE: 100 BPM | WEIGHT: 178.13 LBS

## 2022-08-31 DIAGNOSIS — I70.0 AORTIC ATHEROSCLEROSIS: Primary | ICD-10-CM

## 2022-08-31 DIAGNOSIS — E11.42 TYPE 2 DIABETES MELLITUS WITH DIABETIC POLYNEUROPATHY, WITHOUT LONG-TERM CURRENT USE OF INSULIN: ICD-10-CM

## 2022-08-31 DIAGNOSIS — C50.512 MALIGNANT NEOPLASM OF LOWER-OUTER QUADRANT OF LEFT BREAST OF FEMALE, ESTROGEN RECEPTOR POSITIVE: ICD-10-CM

## 2022-08-31 DIAGNOSIS — Z17.0 MALIGNANT NEOPLASM OF LOWER-OUTER QUADRANT OF LEFT BREAST OF FEMALE, ESTROGEN RECEPTOR POSITIVE: ICD-10-CM

## 2022-08-31 DIAGNOSIS — Z92.21 HISTORY OF ANTINEOPLASTIC CHEMOTHERAPY: ICD-10-CM

## 2022-08-31 DIAGNOSIS — E78.2 MIXED HYPERLIPIDEMIA: ICD-10-CM

## 2022-08-31 DIAGNOSIS — I10 PRIMARY HYPERTENSION: ICD-10-CM

## 2022-08-31 DIAGNOSIS — G47.33 OBSTRUCTIVE SLEEP APNEA ON CPAP: ICD-10-CM

## 2022-08-31 PROCEDURE — 3044F HG A1C LEVEL LT 7.0%: CPT | Mod: CPTII,S$GLB,, | Performed by: INTERNAL MEDICINE

## 2022-08-31 PROCEDURE — 3288F PR FALLS RISK ASSESSMENT DOCUMENTED: ICD-10-PCS | Mod: CPTII,S$GLB,, | Performed by: INTERNAL MEDICINE

## 2022-08-31 PROCEDURE — 3078F PR MOST RECENT DIASTOLIC BLOOD PRESSURE < 80 MM HG: ICD-10-PCS | Mod: CPTII,S$GLB,, | Performed by: INTERNAL MEDICINE

## 2022-08-31 PROCEDURE — 3078F DIAST BP <80 MM HG: CPT | Mod: CPTII,S$GLB,, | Performed by: INTERNAL MEDICINE

## 2022-08-31 PROCEDURE — 1126F AMNT PAIN NOTED NONE PRSNT: CPT | Mod: CPTII,S$GLB,, | Performed by: INTERNAL MEDICINE

## 2022-08-31 PROCEDURE — 3288F FALL RISK ASSESSMENT DOCD: CPT | Mod: CPTII,S$GLB,, | Performed by: INTERNAL MEDICINE

## 2022-08-31 PROCEDURE — 99999 PR PBB SHADOW E&M-EST. PATIENT-LVL V: ICD-10-PCS | Mod: PBBFAC,,, | Performed by: INTERNAL MEDICINE

## 2022-08-31 PROCEDURE — 1159F PR MEDICATION LIST DOCUMENTED IN MEDICAL RECORD: ICD-10-PCS | Mod: CPTII,S$GLB,, | Performed by: INTERNAL MEDICINE

## 2022-08-31 PROCEDURE — 4010F ACE/ARB THERAPY RXD/TAKEN: CPT | Mod: CPTII,S$GLB,, | Performed by: INTERNAL MEDICINE

## 2022-08-31 PROCEDURE — 3075F SYST BP GE 130 - 139MM HG: CPT | Mod: CPTII,S$GLB,, | Performed by: INTERNAL MEDICINE

## 2022-08-31 PROCEDURE — 1101F PR PT FALLS ASSESS DOC 0-1 FALLS W/OUT INJ PAST YR: ICD-10-PCS | Mod: CPTII,S$GLB,, | Performed by: INTERNAL MEDICINE

## 2022-08-31 PROCEDURE — 99204 OFFICE O/P NEW MOD 45 MIN: CPT | Mod: S$GLB,,, | Performed by: INTERNAL MEDICINE

## 2022-08-31 PROCEDURE — 3044F PR MOST RECENT HEMOGLOBIN A1C LEVEL <7.0%: ICD-10-PCS | Mod: CPTII,S$GLB,, | Performed by: INTERNAL MEDICINE

## 2022-08-31 PROCEDURE — 1101F PT FALLS ASSESS-DOCD LE1/YR: CPT | Mod: CPTII,S$GLB,, | Performed by: INTERNAL MEDICINE

## 2022-08-31 PROCEDURE — 1126F PR PAIN SEVERITY QUANTIFIED, NO PAIN PRESENT: ICD-10-PCS | Mod: CPTII,S$GLB,, | Performed by: INTERNAL MEDICINE

## 2022-08-31 PROCEDURE — 99999 PR PBB SHADOW E&M-EST. PATIENT-LVL V: CPT | Mod: PBBFAC,,, | Performed by: INTERNAL MEDICINE

## 2022-08-31 PROCEDURE — 3008F BODY MASS INDEX DOCD: CPT | Mod: CPTII,S$GLB,, | Performed by: INTERNAL MEDICINE

## 2022-08-31 PROCEDURE — 3075F PR MOST RECENT SYSTOLIC BLOOD PRESS GE 130-139MM HG: ICD-10-PCS | Mod: CPTII,S$GLB,, | Performed by: INTERNAL MEDICINE

## 2022-08-31 PROCEDURE — 3008F PR BODY MASS INDEX (BMI) DOCUMENTED: ICD-10-PCS | Mod: CPTII,S$GLB,, | Performed by: INTERNAL MEDICINE

## 2022-08-31 PROCEDURE — 1159F MED LIST DOCD IN RCRD: CPT | Mod: CPTII,S$GLB,, | Performed by: INTERNAL MEDICINE

## 2022-08-31 PROCEDURE — 99204 PR OFFICE/OUTPT VISIT, NEW, LEVL IV, 45-59 MIN: ICD-10-PCS | Mod: S$GLB,,, | Performed by: INTERNAL MEDICINE

## 2022-08-31 PROCEDURE — 4010F PR ACE/ARB THEARPY RXD/TAKEN: ICD-10-PCS | Mod: CPTII,S$GLB,, | Performed by: INTERNAL MEDICINE

## 2022-09-06 ENCOUNTER — PATIENT MESSAGE (OUTPATIENT)
Dept: GASTROENTEROLOGY | Facility: CLINIC | Age: 72
End: 2022-09-06
Payer: MEDICARE

## 2022-09-07 ENCOUNTER — HOSPITAL ENCOUNTER (OUTPATIENT)
Dept: CARDIOLOGY | Facility: HOSPITAL | Age: 72
Discharge: HOME | End: 2022-09-07
Attending: INTERNAL MEDICINE
Payer: MEDICARE

## 2022-09-07 VITALS — WEIGHT: 178 LBS | HEIGHT: 66 IN | BODY MASS INDEX: 28.61 KG/M2

## 2022-09-07 DIAGNOSIS — Z17.0 MALIGNANT NEOPLASM OF LOWER-OUTER QUADRANT OF LEFT BREAST OF FEMALE, ESTROGEN RECEPTOR POSITIVE: ICD-10-CM

## 2022-09-07 DIAGNOSIS — E11.42 TYPE 2 DIABETES MELLITUS WITH DIABETIC POLYNEUROPATHY, WITHOUT LONG-TERM CURRENT USE OF INSULIN: ICD-10-CM

## 2022-09-07 DIAGNOSIS — C50.512 MALIGNANT NEOPLASM OF LOWER-OUTER QUADRANT OF LEFT BREAST OF FEMALE, ESTROGEN RECEPTOR POSITIVE: ICD-10-CM

## 2022-09-07 DIAGNOSIS — Z92.21 HISTORY OF ANTINEOPLASTIC CHEMOTHERAPY: ICD-10-CM

## 2022-09-07 DIAGNOSIS — I70.0 AORTIC ATHEROSCLEROSIS: ICD-10-CM

## 2022-09-07 LAB
ASCENDING AORTA: 3.36 CM
AV INDEX (PROSTH): 0.83
AV MEAN GRADIENT: 4 MMHG
AV PEAK GRADIENT: 9 MMHG
AV VALVE AREA: 3.14 CM2
AV VELOCITY RATIO: 0.69
BSA FOR ECHO PROCEDURE: 1.94 M2
CV ECHO LV RWT: 0.27 CM
CV STRESS BASE HR: 88 BPM
DIASTOLIC BLOOD PRESSURE: 59 MMHG
DOP CALC AO PEAK VEL: 1.47 M/S
DOP CALC AO VTI: 22.93 CM
DOP CALC LVOT AREA: 3.8 CM2
DOP CALC LVOT DIAMETER: 2.2 CM
DOP CALC LVOT PEAK VEL: 1.01 M/S
DOP CALC LVOT STROKE VOLUME: 72.04 CM3
DOP CALCLVOT PEAK VEL VTI: 18.96 CM
E WAVE DECELERATION TIME: 173.54 MSEC
E/A RATIO: 0.62
E/E' RATIO: 9 M/S
ECHO LV POSTERIOR WALL: 0.65 CM (ref 0.6–1.1)
EJECTION FRACTION: 60 %
FRACTIONAL SHORTENING: 42 % (ref 28–44)
INTERVENTRICULAR SEPTUM: 0.9 CM (ref 0.6–1.1)
LA MAJOR: 3.6 CM
LA MINOR: 3.85 CM
LA WIDTH: 3.43 CM
LEFT ATRIUM SIZE: 3.38 CM
LEFT ATRIUM VOLUME INDEX MOD: 17.1 ML/M2
LEFT ATRIUM VOLUME INDEX: 19.3 ML/M2
LEFT ATRIUM VOLUME MOD: 32.43 CM3
LEFT ATRIUM VOLUME: 36.67 CM3
LEFT INTERNAL DIMENSION IN SYSTOLE: 2.75 CM (ref 2.1–4)
LEFT VENTRICLE DIASTOLIC VOLUME INDEX: 54.63 ML/M2
LEFT VENTRICLE DIASTOLIC VOLUME: 103.8 ML
LEFT VENTRICLE MASS INDEX: 62 G/M2
LEFT VENTRICLE SYSTOLIC VOLUME INDEX: 14.9 ML/M2
LEFT VENTRICLE SYSTOLIC VOLUME: 28.24 ML
LEFT VENTRICULAR INTERNAL DIMENSION IN DIASTOLE: 4.73 CM (ref 3.5–6)
LEFT VENTRICULAR MASS: 118.62 G
LV LATERAL E/E' RATIO: 7.88 M/S
LV SEPTAL E/E' RATIO: 10.5 M/S
MV A" WAVE DURATION": 12.27 MSEC
MV PEAK A VEL: 1.02 M/S
MV PEAK E VEL: 0.63 M/S
MV STENOSIS PRESSURE HALF TIME: 50.33 MS
MV VALVE AREA P 1/2 METHOD: 4.37 CM2
OHS CV CPX 1 MINUTE RECOVERY HEART RATE: 134 BPM
OHS CV CPX 85 PERCENT MAX PREDICTED HEART RATE MALE: 120
OHS CV CPX ESTIMATED METS: 9
OHS CV CPX MAX PREDICTED HEART RATE: 142
OHS CV CPX PATIENT IS FEMALE: 1
OHS CV CPX PATIENT IS MALE: 0
OHS CV CPX PEAK DIASTOLIC BLOOD PRESSURE: 73 MMHG
OHS CV CPX PEAK HEAR RATE: 160 BPM
OHS CV CPX PEAK RATE PRESSURE PRODUCT: NORMAL
OHS CV CPX PEAK SYSTOLIC BLOOD PRESSURE: 185 MMHG
OHS CV CPX PERCENT MAX PREDICTED HEART RATE ACHIEVED: 113
OHS CV CPX RATE PRESSURE PRODUCT PRESENTING: NORMAL
PISA TR MAX VEL: 2.37 M/S
PULM VEIN S/D RATIO: 2
PV PEAK D VEL: 0.37 M/S
PV PEAK S VEL: 0.74 M/S
RA MAJOR: 3.68 CM
RA PRESSURE: 3 MMHG
RA WIDTH: 3.19 CM
RIGHT VENTRICULAR END-DIASTOLIC DIMENSION: 2.71 CM
RV TISSUE DOPPLER FREE WALL SYSTOLIC VELOCITY 1 (APICAL 4 CHAMBER VIEW): 14.21 CM/S
SINUS: 2.63 CM
STJ: 2.62 CM
STRESS ECHO POST EXERCISE DUR MIN: 6 MINUTES
STRESS ECHO POST EXERCISE DUR SEC: 8 SECONDS
STRESS ST DEPRESSION: 1 MM
SYSTOLIC BLOOD PRESSURE: 125 MMHG
TDI LATERAL: 0.08 M/S
TDI SEPTAL: 0.06 M/S
TDI: 0.07 M/S
TR MAX PG: 22 MMHG
TRICUSPID ANNULAR PLANE SYSTOLIC EXCURSION: 2.21 CM
TV REST PULMONARY ARTERY PRESSURE: 25 MMHG

## 2022-09-07 PROCEDURE — 93325 STRESS ECHO (CUPID ONLY): ICD-10-PCS | Mod: 26,,, | Performed by: INTERNAL MEDICINE

## 2022-09-07 PROCEDURE — 93351 STRESS TTE COMPLETE: CPT

## 2022-09-07 PROCEDURE — 93325 DOPPLER ECHO COLOR FLOW MAPG: CPT | Mod: 26,,, | Performed by: INTERNAL MEDICINE

## 2022-09-07 PROCEDURE — 93320 DOPPLER ECHO COMPLETE: CPT | Mod: 26,,, | Performed by: INTERNAL MEDICINE

## 2022-09-07 PROCEDURE — 93351 STRESS TTE COMPLETE: CPT | Mod: 26,,, | Performed by: INTERNAL MEDICINE

## 2022-09-07 PROCEDURE — 93351 STRESS ECHO (CUPID ONLY): ICD-10-PCS | Mod: 26,,, | Performed by: INTERNAL MEDICINE

## 2022-09-07 PROCEDURE — 93320 STRESS ECHO (CUPID ONLY): ICD-10-PCS | Mod: 26,,, | Performed by: INTERNAL MEDICINE

## 2022-10-13 ENCOUNTER — OFFICE VISIT (OUTPATIENT)
Dept: GASTROENTEROLOGY | Facility: CLINIC | Age: 72
End: 2022-10-13
Payer: MEDICARE

## 2022-10-13 DIAGNOSIS — Z51.81 ENCOUNTER FOR MONITORING LONG-TERM PROTON PUMP INHIBITOR THERAPY: ICD-10-CM

## 2022-10-13 DIAGNOSIS — Z87.19 HISTORY OF HIATAL HERNIA: ICD-10-CM

## 2022-10-13 DIAGNOSIS — K21.9 GASTROESOPHAGEAL REFLUX DISEASE, UNSPECIFIED WHETHER ESOPHAGITIS PRESENT: ICD-10-CM

## 2022-10-13 DIAGNOSIS — Z79.899 ENCOUNTER FOR MONITORING LONG-TERM PROTON PUMP INHIBITOR THERAPY: ICD-10-CM

## 2022-10-13 DIAGNOSIS — R19.7 DIARRHEA IN ADULT PATIENT: ICD-10-CM

## 2022-10-13 DIAGNOSIS — R11.2 NAUSEA AND VOMITING, UNSPECIFIED VOMITING TYPE: Primary | ICD-10-CM

## 2022-10-13 PROCEDURE — 4010F PR ACE/ARB THEARPY RXD/TAKEN: ICD-10-PCS | Mod: CPTII,95,, | Performed by: INTERNAL MEDICINE

## 2022-10-13 PROCEDURE — 3044F PR MOST RECENT HEMOGLOBIN A1C LEVEL <7.0%: ICD-10-PCS | Mod: CPTII,95,, | Performed by: INTERNAL MEDICINE

## 2022-10-13 PROCEDURE — 99215 PR OFFICE/OUTPT VISIT, EST, LEVL V, 40-54 MIN: ICD-10-PCS | Mod: 95,,, | Performed by: INTERNAL MEDICINE

## 2022-10-13 PROCEDURE — 3044F HG A1C LEVEL LT 7.0%: CPT | Mod: CPTII,95,, | Performed by: INTERNAL MEDICINE

## 2022-10-13 PROCEDURE — 4010F ACE/ARB THERAPY RXD/TAKEN: CPT | Mod: CPTII,95,, | Performed by: INTERNAL MEDICINE

## 2022-10-13 PROCEDURE — 99215 OFFICE O/P EST HI 40 MIN: CPT | Mod: 95,,, | Performed by: INTERNAL MEDICINE

## 2022-10-13 NOTE — PATIENT INSTRUCTIONS
Dion please schedule patient for 12 hours fasting blood work  Please order stool studies to be turn in 2nd floor [facility] [place] Monday through Friday between 7:00 a.m. and 4:00 p.m. as a fresh sample collected within 3 hours  Referral placed for EGD  Return GI clinic 8 weeks for follow-up

## 2022-10-13 NOTE — Clinical Note
Dion please schedule patient for 12 hours fasting blood work Please order stool studies to be turn in 2nd floor [facility] [place] Monday through Friday between 7:00 a.m. and 4:00 p.m. as a fresh sample collected within 3 hours Referral placed for EGD Return GI clinic 8 weeks for follow-up

## 2022-10-13 NOTE — PROGRESS NOTES
The patient location is:  At home  The chief complaint leading to consultation is:  Episodic nausea vomiting diarrhea    Visit type: audiovisual    Face to Face time with patient: 30 minutes of total time spent on the encounter, which includes face to face time and non-face to face time preparing to see the patient (eg, review of tests), Obtaining and/or reviewing separately obtained history, Documenting clinical information in the electronic or other health record, Independently interpreting results (not separately reported) and communicating results to the patient/family/caregiver, or Care coordination (not separately reported).         Each patient to whom he or she provides medical services by telemedicine is:  (1) informed of the relationship between the physician and patient and the respective role of any other health care provider with respect to management of the patient; and (2) notified that he or she may decline to receive medical services by telemedicine and may withdraw from such care at any time.    Notes:           [facility] Consultation Note    Reason for Consult:  The primary encounter diagnosis was Nausea and vomiting, unspecified vomiting type. Diagnoses of Diarrhea in adult patient, Gastroesophageal reflux disease, unspecified whether esophagitis present, History of hiatal hernia, and Encounter for monitoring long-term proton pump inhibitor therapy were also pertinent to this visit.    PCP:   Joie Mccall       Referring MD:  No referring provider defined for this encounter.    Initial History of Present Illness (HPI):  This is a 72 y.o. female here for evaluation of symptoms of periodic episodic nausea vomiting and diarrhea.  Patient says no new medications no recent antibiotics note blood in her stool no abdominal pain but is scan to the point where it is unpredictable and it is affecting the quality of her life she did go down several years ago on her PPI down from 40  mg a day down to 20 mg a day she does have a 1 cm hiatal hernia off PPI she did have erosive esophagitis she recently started doing 40 mg 1 day 20 mg the other day alternating back and forth in her heartburn symptoms seem improved no dysphagia no odynophagia no blood in her stool she did have a colonoscopy in 2018 it was normal she was told not to have another 1 for 10 years there is no bili pain whatsoever she is followed closely by the hepatology Clinic for fatty liver she would like to have further evaluation of her GI symptoms    Abdominal pain - no  Reflux - as above  Dysphagia - no   Bowel habits - as above  GI bleeding - none  NSAID usage - none    Interval HPI 10/13/2022:  The patient's last visit with me was on 9/10/2020.      ROS:  Constitutional: No fevers, chills, No weight loss  ENT:  As above heartburn no dysphagia no odynophagia no hoarseness  CV: No chest pain, no palpitation  Pulm: No cough, No shortness of breath, no wheezing  Ophtho: No vision changes  GI: see HPI  Derm: No rash, no itching  Heme: No lymphadenopathy, No easy bruising  MSK: Some arthritis  : No dysuria, No hematuria  Endo: No hot or cold intolerance  Neuro: No syncope, No seizure, no strokes  Psych: No uncontrolled anxiety, No uncontrolled depression    Medical History:  has a past medical history of Acute cystitis without hematuria (7/17/2017), Arthritis, Back pain, Breast cancer, Class 1 obesity due to excess calories with serious comorbidity and body mass index (BMI) of 30.0 to 30.9 in adult (11/29/2018), Elevated bilirubin (11/19/2013), Fatty liver, GERD (gastroesophageal reflux disease), Glucose intolerance (impaired glucose tolerance), Hyperlipidemia, Hypertension, Hypothyroid, Hypothyroidism, Malignant neoplasm of lower-outer quadrant of left female breast (11/15/2016), Obesity, Obesity, diabetes, and hypertension syndrome (12/12/2018), Osteopenia, Osteoporosis, Primary insomnia (11/2/2016), Shingles, Sleep apnea, Squamous  cell carcinoma (2011), Stress incontinence of urine (1/11/2018), Trouble in sleeping, Type 2 diabetes mellitus with diabetic polyneuropathy, without long-term current use of insulin (12/26/2017), Urinary incontinence, and Well woman exam with routine gynecological exam (11/2/2016).    Surgical History:  has a past surgical history that includes left modified radical mastectomy (02/1997); Breast implant (Bilateral, 1998); left breast reconstruction (2005); Pelvic laparoscopy (1978); robotic lap  hysterectomy with bso (2008); Lipoma resection (2010); reduction of mons pubis (2011); Hysterectomy (2008); pr removal of ovary/tube(s) (2008); Modified radical mastectomy w/ axillary lymph node dissection (02/1997); Appendectomy (2008); Breast surgery; Upper gastrointestinal endoscopy (08/05/2013); Colonoscopy (N/A, 05/15/2018); Cataract extraction w/  intraocular lens implant (Left, 08/13/2020); Cataract extraction w/  intraocular lens implant (Right, 08/27/2020); Eye surgery; Breast biopsy; Breast lumpectomy; Breast reconstruction (Bilateral); Oophorectomy; Cosmetic surgery (1/2022); and Blepharoplasty (Bilateral).    Family History: family history includes Alcohol abuse in her father; Arthritis in her maternal grandmother; Breast cancer (age of onset: 51) in her sister; Cancer in her sister; Cancer (age of onset: 65) in her mother; Diabetes in her maternal uncle and maternal uncle; Heart attack (age of onset: 51) in her father; Heart disease in her father; Melanoma in her mother; No Known Problems in her brother, daughter, daughter, son, and son..     Social History:  reports that she quit smoking about 50 years ago. She started smoking about 53 years ago. She has a 1.50 pack-year smoking history. She has never used smokeless tobacco. She reports current alcohol use. She reports that she does not use drugs.    Review of patient's allergies indicates:   Allergen Reactions    Erythromycin      Other reaction(s): Nausea     Erythromycin (bulk)      Other reaction(s): Nausea    Oxycodone Nausea And Vomiting    Oxycodone-acetaminophen Nausea And Vomiting     Other reaction(s): Nausea       Medication List with Changes/Refills   Current Medications    ASCORBIC ACID, VITAMIN C, (VITAMIN C) 1000 MG TABLET    Take 1 tablet by mouth once daily.    CANNABIDIOL, CBD, EXTRACT ORAL    Take 1 drop by mouth Daily.    CELECOXIB (CELEBREX) 100 MG CAPSULE    TAKE 1 CAPSULE (100 MG TOTAL) BY MOUTH DAILY AS NEEDED.    COENZYME Q10-VITAMIN E 100-100 MG-UNIT CAP    Take 1 capsule by mouth once daily.     DENOSUMAB (PROLIA) 60 MG/ML SYRG    Inject 1 mL (60 mg total) into the skin every 6 (six) months.    DENOSUMAB (PROLIA) 60 MG/ML SYRG    Inject 1 mL (60 mg total) into the skin every 6 (six) months.    DULAGLUTIDE (TRULICITY) 1.5 MG/0.5 ML PEN INJECTOR    Inject 1.5 mg into the skin every 7 days.    FENOFIBRATE 160 MG TAB    Take 1 tablet (160 mg total) by mouth once daily.    FLUTICASONE (FLONASE) 50 MCG/ACTUATION NASAL SPRAY    1 spray by Each Nare route 2 (two) times daily.     GABAPENTIN (NEURONTIN) 300 MG CAPSULE    Take 2 capsules (600 mg total) by mouth every evening.    HYDROCORTISONE 2.5 % OINTMENT    Apply topically 2 (two) times daily. To up to 2 weeks for eczema flares    LETROZOLE (FEMARA) 2.5 MG TAB    TAKE 1 TABLET BY MOUTH EVERY DAY    LEVOTHYROXINE (SYNTHROID) 50 MCG TABLET    TAKE 1 TABLET BY MOUTH EVERY DAY    LOSARTAN (COZAAR) 50 MG TABLET    Take 1 tablet (50 mg total) by mouth once daily.    METFORMIN (GLUCOPHAGE) 500 MG TABLET    TAKE 1 TABLET BY MOUTH EVERY DAY WITH BREAKFAST    MULTIVITAMIN ORAL    Take 1 tablet by mouth once daily.     OMEGA-3 FATTY ACIDS-VITAMIN E 1,000 MG CAP    Take 1 capsule by mouth once daily.     PANTOPRAZOLE (PROTONIX) 20 MG TABLET    Take 1 tablet (20 mg total) by mouth before breakfast.    SIMVASTATIN (ZOCOR) 20 MG TABLET    TAKE 1 TABLET BY MOUTH EVERY DAY IN THE EVENING    VITAMIN D 1000 UNITS TAB     Take 1,000 Units by mouth once daily.     YUVAFEM 10 MCG TAB    PLACE 1 TABLET VAGINALLY TWICE A WEEK         Objective Findings:    Vital Signs:  There were no vitals taken for this visit.  There is no height or weight on file to calculate BMI.    Physical Exam:  Telemedicine video visit  General Appearance: Well appearing in no acute distress  Neurologic:  Alert and oriented x4  Psychiatric:  Normal speech mentation and affect    Labs:  Lab Results   Component Value Date    WBC 4.26 03/29/2022    HGB 13.8 03/29/2022    HCT 41.1 03/29/2022     03/29/2022    CHOL 148 03/29/2022    TRIG 256 (H) 03/29/2022    HDL 32 (L) 03/29/2022    ALT 31 07/22/2022    AST 28 07/22/2022     07/22/2022    K 4.6 07/22/2022     07/22/2022    CREATININE 0.9 07/22/2022    BUN 17 07/22/2022    CO2 29 07/22/2022    TSH 2.303 03/29/2022    INR 1.0 02/02/2017    GLUF 120 (H) 02/08/2011    HGBA1C 6.7 (H) 07/22/2022               Medical Decision Making:  Lab work reviewed  Prior EGD colonoscopy images and path reviewed  Lab work talk given  Stool studies talk given EGD talk given  The Olympus scope [id number] ([id number]) was                         introduced through the anus and advanced to the                         cecum, identified by appendiceal orifice and                         ileocecal valve. The colonoscopy was performed with                         moderate difficulty due to restricted mobility of                         the colon. The patient tolerated the procedure                         well. The quality of the bowel preparation was                         good. The ileocecal valve and the appendiceal                         orifice were photographed.   Findings:        The perianal and digital rectal examinations were normal.        The entire examined colon appeared normal.   Impression:           - The entire examined colon is normal.                         - No specimens collected.   Recommendation:        - Discharge patient to home (ambulatory).                         - Repeat colonoscopy in 10 years for screening                         purposes.   Attending Participation:        I was present from insertion to withdraw and performed procedure        with the fellow.   Roldan Gonzales MD   5/15/2018     The                         Olympus scope GIF- ([id number]) was introduced                         through the mouth, and advanced to the third part                         of duodenum. The upper GI endoscopy was                         accomplished without difficulty. The patient                         tolerated the procedure well.   Findings:        The examined duodenum was normal.        One 1 mm sessile polyp with no bleeding and no stigmata of recent        bleeding was found in the gastric body. The polyp was removed with a        cold biopsy forceps. Resection and retrieval were complete.        Estimated blood loss was minimal.        The cardia and gastric fundus were normal on retroflexion.        A small hiatus hernia was found. The proximal extent of the gastric        folds (end of tubular esophagus) was 37 cm from the incisors. The        hiatal narrowing was 38 cm from the incisors. The Z-line was 37 cm        from the incisors.   Impression:           - Normal examined duodenum.                         - One gastric polyp. Resected and retrieved.                         - Hiatus hernia.   Recommendation:       - Discharge patient to home.                         - Follow an antireflux regimen.                         - Repeat the upper endoscopy in 5 years for                         surveillance based on pathology results.                         - The findings and recommendations were discussed                         with the patient.                         - The findings and recommendations were discussed                         with the designated responsible adult.                         -  Await pathology results.                         - Telephone endoscopist for pathology results in 2                         weeks.                         - The findings and recommendations were discussed                         with the patient.                         - The findings and recommendations were discussed                         with the designated responsible adult.                         - Return to referring physician.                         - Return to GI clinic at the next available                         appointment.   Attending Participation:        I personally performed the entire procedure.   Omar Ambriz MD   8/5/2013   Assessment:  1. Nausea and vomiting, unspecified vomiting type    2. Diarrhea in adult patient    3. Gastroesophageal reflux disease, unspecified whether esophagitis present    4. History of hiatal hernia    5. Encounter for monitoring long-term proton pump inhibitor therapy         Recommendations:  1. 12 hours fasting blood work  2. Stool studies  3. EGD  4. Return GI clinic 8 weeks for follow-up     No follow-ups on file.      Order summary:  Orders Placed This Encounter    Clostridium difficile EIA    Stool culture    Giardia / Cryptosporidum, EIA    Stool Exam-Ova,Cysts,Parasites    Micropsoridia species, PCR    Cyclospora    TRYPTASE    Strongyloides IgG Antibodies    Anti-Ent. Histolytica Ab    Hepatic Function Panel    Basic Metabolic Panel    CBC Auto Differential    Fecal fat, qualitative    Pancreatic elastase, fecal    Lipase    TSH    Ambulatory referral/consult to Endo Procedure          Thank you so much for allowing me to participate in the care of Lima MONROY Griseldajosemanuel    Omar Ambriz MD

## 2022-10-18 ENCOUNTER — IMMUNIZATION (OUTPATIENT)
Dept: INTERNAL MEDICINE | Facility: CLINIC | Age: 72
End: 2022-10-18
Payer: MEDICARE

## 2022-10-18 ENCOUNTER — LAB VISIT (OUTPATIENT)
Dept: LAB | Facility: HOSPITAL | Age: 72
End: 2022-10-18
Attending: INTERNAL MEDICINE
Payer: MEDICARE

## 2022-10-18 DIAGNOSIS — R11.2 NAUSEA AND VOMITING, UNSPECIFIED VOMITING TYPE: ICD-10-CM

## 2022-10-18 DIAGNOSIS — R19.7 DIARRHEA IN ADULT PATIENT: ICD-10-CM

## 2022-10-18 DIAGNOSIS — K21.9 GASTROESOPHAGEAL REFLUX DISEASE, UNSPECIFIED WHETHER ESOPHAGITIS PRESENT: ICD-10-CM

## 2022-10-18 DIAGNOSIS — Z51.81 ENCOUNTER FOR MONITORING LONG-TERM PROTON PUMP INHIBITOR THERAPY: ICD-10-CM

## 2022-10-18 DIAGNOSIS — E11.65 TYPE 2 DIABETES MELLITUS WITH HYPERGLYCEMIA, WITHOUT LONG-TERM CURRENT USE OF INSULIN: ICD-10-CM

## 2022-10-18 DIAGNOSIS — Z87.19 HISTORY OF HIATAL HERNIA: ICD-10-CM

## 2022-10-18 DIAGNOSIS — Z79.899 ENCOUNTER FOR MONITORING LONG-TERM PROTON PUMP INHIBITOR THERAPY: ICD-10-CM

## 2022-10-18 DIAGNOSIS — E78.2 MIXED HYPERLIPIDEMIA: ICD-10-CM

## 2022-10-18 LAB
ALBUMIN SERPL BCP-MCNC: 4.3 G/DL (ref 3.5–5.2)
ALBUMIN SERPL BCP-MCNC: 4.3 G/DL (ref 3.5–5.2)
ALP SERPL-CCNC: 56 U/L (ref 55–135)
ALP SERPL-CCNC: 56 U/L (ref 55–135)
ALT SERPL W/O P-5'-P-CCNC: 33 U/L (ref 10–44)
ALT SERPL W/O P-5'-P-CCNC: 33 U/L (ref 10–44)
ANION GAP SERPL CALC-SCNC: 10 MMOL/L (ref 8–16)
ANION GAP SERPL CALC-SCNC: 10 MMOL/L (ref 8–16)
AST SERPL-CCNC: 33 U/L (ref 10–40)
AST SERPL-CCNC: 33 U/L (ref 10–40)
BASOPHILS # BLD AUTO: 0.03 K/UL (ref 0–0.2)
BASOPHILS NFR BLD: 0.7 % (ref 0–1.9)
BILIRUB DIRECT SERPL-MCNC: 0.4 MG/DL (ref 0.1–0.3)
BILIRUB SERPL-MCNC: 0.9 MG/DL (ref 0.1–1)
BILIRUB SERPL-MCNC: 0.9 MG/DL (ref 0.1–1)
BUN SERPL-MCNC: 18 MG/DL (ref 8–23)
BUN SERPL-MCNC: 18 MG/DL (ref 8–23)
CALCIUM SERPL-MCNC: 9.8 MG/DL (ref 8.7–10.5)
CALCIUM SERPL-MCNC: 9.8 MG/DL (ref 8.7–10.5)
CHLORIDE SERPL-SCNC: 107 MMOL/L (ref 95–110)
CHLORIDE SERPL-SCNC: 107 MMOL/L (ref 95–110)
CHOLEST SERPL-MCNC: 140 MG/DL (ref 120–199)
CHOLEST/HDLC SERPL: 4 {RATIO} (ref 2–5)
CO2 SERPL-SCNC: 24 MMOL/L (ref 23–29)
CO2 SERPL-SCNC: 24 MMOL/L (ref 23–29)
CREAT SERPL-MCNC: 0.9 MG/DL (ref 0.5–1.4)
CREAT SERPL-MCNC: 0.9 MG/DL (ref 0.5–1.4)
DIFFERENTIAL METHOD: ABNORMAL
EOSINOPHIL # BLD AUTO: 0.1 K/UL (ref 0–0.5)
EOSINOPHIL NFR BLD: 2 % (ref 0–8)
ERYTHROCYTE [DISTWIDTH] IN BLOOD BY AUTOMATED COUNT: 12.4 % (ref 11.5–14.5)
EST. GFR  (NO RACE VARIABLE): >60 ML/MIN/1.73 M^2
EST. GFR  (NO RACE VARIABLE): >60 ML/MIN/1.73 M^2
ESTIMATED AVG GLUCOSE: 160 MG/DL (ref 68–131)
GLUCOSE SERPL-MCNC: 148 MG/DL (ref 70–110)
GLUCOSE SERPL-MCNC: 148 MG/DL (ref 70–110)
HBA1C MFR BLD: 7.2 % (ref 4–5.6)
HCT VFR BLD AUTO: 39.9 % (ref 37–48.5)
HDLC SERPL-MCNC: 35 MG/DL (ref 40–75)
HDLC SERPL: 25 % (ref 20–50)
HGB BLD-MCNC: 13.2 G/DL (ref 12–16)
IMM GRANULOCYTES # BLD AUTO: 0.05 K/UL (ref 0–0.04)
IMM GRANULOCYTES NFR BLD AUTO: 1.1 % (ref 0–0.5)
LDLC SERPL CALC-MCNC: 66.6 MG/DL (ref 63–159)
LIPASE SERPL-CCNC: 69 U/L (ref 4–60)
LYMPHOCYTES # BLD AUTO: 1.5 K/UL (ref 1–4.8)
LYMPHOCYTES NFR BLD: 33 % (ref 18–48)
MCH RBC QN AUTO: 29.1 PG (ref 27–31)
MCHC RBC AUTO-ENTMCNC: 33.1 G/DL (ref 32–36)
MCV RBC AUTO: 88 FL (ref 82–98)
MONOCYTES # BLD AUTO: 0.4 K/UL (ref 0.3–1)
MONOCYTES NFR BLD: 8.4 % (ref 4–15)
NEUTROPHILS # BLD AUTO: 2.4 K/UL (ref 1.8–7.7)
NEUTROPHILS NFR BLD: 54.8 % (ref 38–73)
NONHDLC SERPL-MCNC: 105 MG/DL
NRBC BLD-RTO: 0 /100 WBC
PLATELET # BLD AUTO: 217 K/UL (ref 150–450)
PMV BLD AUTO: 10.6 FL (ref 9.2–12.9)
POTASSIUM SERPL-SCNC: 3.9 MMOL/L (ref 3.5–5.1)
POTASSIUM SERPL-SCNC: 3.9 MMOL/L (ref 3.5–5.1)
PROT SERPL-MCNC: 6.9 G/DL (ref 6–8.4)
PROT SERPL-MCNC: 6.9 G/DL (ref 6–8.4)
RBC # BLD AUTO: 4.53 M/UL (ref 4–5.4)
SODIUM SERPL-SCNC: 141 MMOL/L (ref 136–145)
SODIUM SERPL-SCNC: 141 MMOL/L (ref 136–145)
TRIGL SERPL-MCNC: 192 MG/DL (ref 30–150)
TSH SERPL DL<=0.005 MIU/L-ACNC: 1.69 UIU/ML (ref 0.4–4)
WBC # BLD AUTO: 4.4 K/UL (ref 3.9–12.7)

## 2022-10-18 PROCEDURE — 86753 PROTOZOA ANTIBODY NOS: CPT | Performed by: INTERNAL MEDICINE

## 2022-10-18 PROCEDURE — 90694 VACC AIIV4 NO PRSRV 0.5ML IM: CPT | Mod: S$GLB,,, | Performed by: INTERNAL MEDICINE

## 2022-10-18 PROCEDURE — 36415 COLL VENOUS BLD VENIPUNCTURE: CPT | Performed by: INTERNAL MEDICINE

## 2022-10-18 PROCEDURE — 86682 HELMINTH ANTIBODY: CPT | Performed by: INTERNAL MEDICINE

## 2022-10-18 PROCEDURE — G0008 ADMIN INFLUENZA VIRUS VAC: HCPCS | Mod: S$GLB,,, | Performed by: INTERNAL MEDICINE

## 2022-10-18 PROCEDURE — G0008 FLU VACCINE - QUADRIVALENT - ADJUVANTED: ICD-10-PCS | Mod: S$GLB,,, | Performed by: INTERNAL MEDICINE

## 2022-10-18 PROCEDURE — 83036 HEMOGLOBIN GLYCOSYLATED A1C: CPT | Performed by: INTERNAL MEDICINE

## 2022-10-18 PROCEDURE — 87207 SMEAR SPECIAL STAIN: CPT | Performed by: INTERNAL MEDICINE

## 2022-10-18 PROCEDURE — 84443 ASSAY THYROID STIM HORMONE: CPT | Performed by: INTERNAL MEDICINE

## 2022-10-18 PROCEDURE — 80061 LIPID PANEL: CPT | Performed by: INTERNAL MEDICINE

## 2022-10-18 PROCEDURE — 83690 ASSAY OF LIPASE: CPT | Performed by: INTERNAL MEDICINE

## 2022-10-18 PROCEDURE — 83520 IMMUNOASSAY QUANT NOS NONAB: CPT | Performed by: INTERNAL MEDICINE

## 2022-10-18 PROCEDURE — 90694 FLU VACCINE - QUADRIVALENT - ADJUVANTED: ICD-10-PCS | Mod: S$GLB,,, | Performed by: INTERNAL MEDICINE

## 2022-10-18 PROCEDURE — 80076 HEPATIC FUNCTION PANEL: CPT | Mod: XB | Performed by: INTERNAL MEDICINE

## 2022-10-18 PROCEDURE — 80053 COMPREHEN METABOLIC PANEL: CPT | Performed by: INTERNAL MEDICINE

## 2022-10-18 PROCEDURE — 85025 COMPLETE CBC W/AUTO DIFF WBC: CPT | Performed by: INTERNAL MEDICINE

## 2022-10-19 LAB — STRONGYLOIDES ANTIBODY IGG: NEGATIVE

## 2022-10-20 LAB
E HISTOLYT AB SER IA-ACNC: NEGATIVE
TRYPTASE LEVEL: 1.8 NG/ML

## 2022-10-25 ENCOUNTER — LAB VISIT (OUTPATIENT)
Dept: LAB | Facility: HOSPITAL | Age: 72
End: 2022-10-25
Attending: INTERNAL MEDICINE
Payer: MEDICARE

## 2022-10-25 ENCOUNTER — OFFICE VISIT (OUTPATIENT)
Dept: INTERNAL MEDICINE | Facility: CLINIC | Age: 72
End: 2022-10-25
Attending: INTERNAL MEDICINE
Payer: MEDICARE

## 2022-10-25 VITALS
HEIGHT: 66 IN | OXYGEN SATURATION: 96 % | DIASTOLIC BLOOD PRESSURE: 64 MMHG | BODY MASS INDEX: 28.34 KG/M2 | SYSTOLIC BLOOD PRESSURE: 110 MMHG | HEART RATE: 96 BPM | WEIGHT: 176.38 LBS

## 2022-10-25 DIAGNOSIS — I70.0 AORTIC ATHEROSCLEROSIS: ICD-10-CM

## 2022-10-25 DIAGNOSIS — E11.65 TYPE 2 DIABETES MELLITUS WITH HYPERGLYCEMIA, WITHOUT LONG-TERM CURRENT USE OF INSULIN: ICD-10-CM

## 2022-10-25 DIAGNOSIS — I10 PRIMARY HYPERTENSION: ICD-10-CM

## 2022-10-25 DIAGNOSIS — G47.33 OBSTRUCTIVE SLEEP APNEA ON CPAP: ICD-10-CM

## 2022-10-25 DIAGNOSIS — E11.42 DIABETIC POLYNEUROPATHY ASSOCIATED WITH TYPE 2 DIABETES MELLITUS: ICD-10-CM

## 2022-10-25 DIAGNOSIS — E03.9 HYPOTHYROIDISM, UNSPECIFIED TYPE: ICD-10-CM

## 2022-10-25 DIAGNOSIS — E11.65 TYPE 2 DIABETES MELLITUS WITH HYPERGLYCEMIA, WITHOUT LONG-TERM CURRENT USE OF INSULIN: Primary | ICD-10-CM

## 2022-10-25 DIAGNOSIS — K76.0 FATTY LIVER: ICD-10-CM

## 2022-10-25 LAB
ALBUMIN/CREAT UR: 15.7 UG/MG (ref 0–30)
CREAT UR-MCNC: 108 MG/DL (ref 15–325)
MICROALBUMIN UR DL<=1MG/L-MCNC: 17 UG/ML

## 2022-10-25 PROCEDURE — 99999 PR PBB SHADOW E&M-EST. PATIENT-LVL IV: CPT | Mod: PBBFAC,,, | Performed by: INTERNAL MEDICINE

## 2022-10-25 PROCEDURE — 4010F ACE/ARB THERAPY RXD/TAKEN: CPT | Mod: CPTII,S$GLB,, | Performed by: INTERNAL MEDICINE

## 2022-10-25 PROCEDURE — 3074F SYST BP LT 130 MM HG: CPT | Mod: CPTII,S$GLB,, | Performed by: INTERNAL MEDICINE

## 2022-10-25 PROCEDURE — 3051F HG A1C>EQUAL 7.0%<8.0%: CPT | Mod: CPTII,S$GLB,, | Performed by: INTERNAL MEDICINE

## 2022-10-25 PROCEDURE — 3074F PR MOST RECENT SYSTOLIC BLOOD PRESSURE < 130 MM HG: ICD-10-PCS | Mod: CPTII,S$GLB,, | Performed by: INTERNAL MEDICINE

## 2022-10-25 PROCEDURE — 1126F AMNT PAIN NOTED NONE PRSNT: CPT | Mod: CPTII,S$GLB,, | Performed by: INTERNAL MEDICINE

## 2022-10-25 PROCEDURE — 99999 PR PBB SHADOW E&M-EST. PATIENT-LVL IV: ICD-10-PCS | Mod: PBBFAC,,, | Performed by: INTERNAL MEDICINE

## 2022-10-25 PROCEDURE — 3288F PR FALLS RISK ASSESSMENT DOCUMENTED: ICD-10-PCS | Mod: CPTII,S$GLB,, | Performed by: INTERNAL MEDICINE

## 2022-10-25 PROCEDURE — 3078F PR MOST RECENT DIASTOLIC BLOOD PRESSURE < 80 MM HG: ICD-10-PCS | Mod: CPTII,S$GLB,, | Performed by: INTERNAL MEDICINE

## 2022-10-25 PROCEDURE — 1159F PR MEDICATION LIST DOCUMENTED IN MEDICAL RECORD: ICD-10-PCS | Mod: CPTII,S$GLB,, | Performed by: INTERNAL MEDICINE

## 2022-10-25 PROCEDURE — 1101F PR PT FALLS ASSESS DOC 0-1 FALLS W/OUT INJ PAST YR: ICD-10-PCS | Mod: CPTII,S$GLB,, | Performed by: INTERNAL MEDICINE

## 2022-10-25 PROCEDURE — 3288F FALL RISK ASSESSMENT DOCD: CPT | Mod: CPTII,S$GLB,, | Performed by: INTERNAL MEDICINE

## 2022-10-25 PROCEDURE — 1126F PR PAIN SEVERITY QUANTIFIED, NO PAIN PRESENT: ICD-10-PCS | Mod: CPTII,S$GLB,, | Performed by: INTERNAL MEDICINE

## 2022-10-25 PROCEDURE — 99499 UNLISTED E&M SERVICE: CPT | Mod: HCNC,S$GLB,, | Performed by: INTERNAL MEDICINE

## 2022-10-25 PROCEDURE — 1101F PT FALLS ASSESS-DOCD LE1/YR: CPT | Mod: CPTII,S$GLB,, | Performed by: INTERNAL MEDICINE

## 2022-10-25 PROCEDURE — 1159F MED LIST DOCD IN RCRD: CPT | Mod: CPTII,S$GLB,, | Performed by: INTERNAL MEDICINE

## 2022-10-25 PROCEDURE — 99214 OFFICE O/P EST MOD 30 MIN: CPT | Mod: S$GLB,,, | Performed by: INTERNAL MEDICINE

## 2022-10-25 PROCEDURE — 3078F DIAST BP <80 MM HG: CPT | Mod: CPTII,S$GLB,, | Performed by: INTERNAL MEDICINE

## 2022-10-25 PROCEDURE — 3051F PR MOST RECENT HEMOGLOBIN A1C LEVEL 7.0 - < 8.0%: ICD-10-PCS | Mod: CPTII,S$GLB,, | Performed by: INTERNAL MEDICINE

## 2022-10-25 PROCEDURE — 99214 PR OFFICE/OUTPT VISIT, EST, LEVL IV, 30-39 MIN: ICD-10-PCS | Mod: S$GLB,,, | Performed by: INTERNAL MEDICINE

## 2022-10-25 PROCEDURE — 82043 UR ALBUMIN QUANTITATIVE: CPT | Performed by: INTERNAL MEDICINE

## 2022-10-25 PROCEDURE — 4010F PR ACE/ARB THEARPY RXD/TAKEN: ICD-10-PCS | Mod: CPTII,S$GLB,, | Performed by: INTERNAL MEDICINE

## 2022-10-25 RX ORDER — SUCRALFATE 1 G/10ML
1 SUSPENSION ORAL 4 TIMES DAILY
OUTPATIENT
Start: 2022-10-25

## 2022-10-25 RX ORDER — SUCRALFATE 1 G/10ML
1 SUSPENSION ORAL 4 TIMES DAILY
Qty: 828 ML | Refills: 1 | Status: SHIPPED | OUTPATIENT
Start: 2022-10-25

## 2022-10-25 RX ORDER — METFORMIN HYDROCHLORIDE 500 MG/1
500 TABLET ORAL 2 TIMES DAILY WITH MEALS
Qty: 180 TABLET | Refills: 0
Start: 2022-10-25 | End: 2022-12-27

## 2022-10-25 RX ORDER — SUCRALFATE 1 G/10ML
1 SUSPENSION ORAL 4 TIMES DAILY
Qty: 2 EACH | Refills: 2 | Status: SHIPPED | OUTPATIENT
Start: 2022-10-25 | End: 2022-10-25 | Stop reason: DRUGHIGH

## 2022-10-25 NOTE — TELEPHONE ENCOUNTER
[facility] states that the quantity for medication Carafate is not clear please see message below for further details. Routed to Dr. Mccall for clarification. Thanks.     Medication pended was already previously pended. Thanks.

## 2022-10-25 NOTE — PROGRESS NOTES
Subjective:       Patient ID: Lima Maldonado is a 72 y.o. female.    Chief Complaint: Hypertension     Lima Maldonado is a 72 y.o.  female who presents for Hypertension  .  Having intermittent n/v, saw dr brito in gi, has egd planned, labs unrevealing. Started about 10 months ago.  Did start on trulicity 6 mo prior but she is unable to note any temporal relationship to dosing and her symptoms.  Cont with pantoprazole, improves with ice cream.    Hba1c 7.2       Hypertension  This is a chronic problem. The problem is controlled. Pertinent negatives include no palpitations, peripheral edema or shortness of breath. Identifiable causes of hypertension include a thyroid problem.   Thyroid Problem  Presents for follow-up visit. Patient reports no anxiety, constipation, fatigue or palpitations. The symptoms have been stable.     Review of Systems   Constitutional:  Negative for fatigue.   Respiratory:  Negative for shortness of breath.    Cardiovascular:  Negative for palpitations.   Gastrointestinal:  Negative for constipation.   Psychiatric/Behavioral:  The patient is not nervous/anxious.      Patient Active Problem List   Diagnosis    Hyperlipidemia    Hypothyroidism    Obstructive sleep apnea on CPAP    GERD (gastroesophageal reflux disease)    Fatty liver    Primary hypertension    Vaginal atrophy    Senile osteoporosis    Primary insomnia    Malignant neoplasm of lower-outer quadrant of left female breast    Dermatitis of eyelid of left eye due to herpes zoster    Aortic atherosclerosis    Type 2 diabetes mellitus with diabetic polyneuropathy, without long-term current use of insulin    Chemotherapy-induced neuropathy    Stress incontinence of urine    Sensorineural hearing loss (SNHL) of left ear with restricted hearing of right ear    Perforation of left tympanic membrane    Allergic rhinitis    Nasal septal deviation    Tinnitus of both ears    Diabetic polyneuropathy associated with  type 2 diabetes mellitus    Nuclear sclerosis, bilateral    Post-operative state    Nuclear sclerotic cataract of right eye    Early hepatic fibrosis    Type 2 diabetes mellitus with hyperglycemia, without long-term current use of insulin         Past Medical History:   Diagnosis Date    Acute cystitis without hematuria 7/17/2017    Arthritis     Back pain     Breast cancer     originally dx in 02/1997- treated with surgery, chemo and radiation     Class 1 obesity due to excess calories with serious comorbidity and body mass index (BMI) of 30.0 to 30.9 in adult 11/29/2018    Elevated bilirubin 11/19/2013    Fatty liver     GERD (gastroesophageal reflux disease)     Glucose intolerance (impaired glucose tolerance)     Hyperlipidemia     Hypertension     Hypothyroid     Hypothyroidism     hypothyroidism    Malignant neoplasm of lower-outer quadrant of left female breast 11/15/2016    Obesity     Obesity, diabetes, and hypertension syndrome 12/12/2018    Osteopenia     Osteoporosis     Primary insomnia 11/2/2016    Shingles     Sleep apnea     wears CPAP nightly     Squamous cell carcinoma 2011    right forearm, sx by Dr. Corinne Ray    Stress incontinence of urine 1/11/2018    Trouble in sleeping     Type 2 diabetes mellitus with diabetic polyneuropathy, without long-term current use of insulin 12/26/2017    Urinary incontinence     Well woman exam with routine gynecological exam 11/2/2016       Past Surgical History:   Procedure Laterality Date    APPENDECTOMY  2008    removed during hysterectomy surgery     BLEPHAROPLASTY Bilateral     BREAST BIOPSY      2010    Breast implant Bilateral 1998    implants removed in 2003    BREAST LUMPECTOMY      02/1997    BREAST RECONSTRUCTION Bilateral     02/2017    BREAST SURGERY      see details below     CATARACT EXTRACTION W/  INTRAOCULAR LENS IMPLANT Left 08/13/2020    Procedure: EXTRACTION, CATARACT, WITH IOL INSERTION;  Surgeon:  Ivanna Coleman MD;  Location: [facility];  Service: Ophthalmology;  Laterality: Left;    CATARACT EXTRACTION W/  INTRAOCULAR LENS IMPLANT Right 2020    Procedure: EXTRACTION, CATARACT, WITH IOL INSERTION LASER;  Surgeon: Ivanna Coleman MD;  Location: [facility] [place] [facility];  Service: Ophthalmology;  Laterality: Right;    COLONOSCOPY N/A 05/15/2018    Procedure: COLONOSCOPY;  Surgeon: Roldan Gonzales MD;  [facility]  Service: Endoscopy;  Laterality: N/A;    COSMETIC SURGERY  2022    Eyelift    EYE SURGERY      CATERACTS    HYSTERECTOMY      benign    left breast reconstruction      left modified radical mastectomy  1997    for treatment of breast cancer     LIPOMA RESECTION      removed from upper back area     MODIFIED RADICAL MASTECTOMY W/ AXILLARY LYMPH NODE DISSECTION  1997    OOPHORECTOMY          PELVIC LAPAROSCOPY      pt had endometriosis     W[place] REMOVAL OF OVARY/TUBE(S)      benign    reduction of mons pubis      robotic lap  hysterectomy with bso      UPPER GASTROINTESTINAL ENDOSCOPY  2013       Family History   Problem Relation Age of Onset    Heart attack Father 51    Heart disease Father         Heart Attac, age 51    Alcohol abuse Father     Breast cancer Sister 51        BRCA 1/2 negative    Cancer Sister         Breast Cancer    Cancer Mother 65        Malignant melanoma    Melanoma Mother     No Known Problems Brother     No Known Problems Daughter     No Known Problems Son     No Known Problems Daughter     No Known Problems Son     Diabetes Maternal Uncle             Diabetes Maternal Uncle             Arthritis Maternal Grandmother         Severe arthitis in Knees    Uterine cancer Neg Hx     Colon cancer Neg Hx     Celiac disease Neg Hx     Esophageal cancer Neg Hx     Inflammatory bowel disease Neg Hx     Liver cancer Neg Hx     Liver disease Neg Hx     Stomach cancer Neg Hx     Ulcerative  "colitis Neg Hx     Hypertension Neg Hx     Cirrhosis Neg Hx     Crohn's disease Neg Hx     Rectal cancer Neg Hx     Ovarian cancer Neg Hx        Social History     Tobacco Use    Smoking status: Former     Packs/day: 0.50     Years: 3.00     Pack years: 1.50     Types: Cigarettes     Start date: 4/15/1969     Quit date: 4/15/1972     Years since quittin.5    Smokeless tobacco: Never    Tobacco comments:     started at age 18 during college    Substance Use Topics    Alcohol use: Yes     Comment: Rarely, not even 1X per week    Drug use: No       Objective:   Blood pressure 110/64, pulse 96, height 5' 6" (1.676 m), weight 80 kg (176 lb 5.9 oz), SpO2 96 %.     Physical Exam  Constitutional:       General: She is not in acute distress.     Appearance: She is well-developed.   HENT:      Head: Normocephalic and atraumatic.      Right Ear: External ear normal.      Left Ear: External ear normal.   Eyes:      General:         Right eye: No discharge.         Left eye: No discharge.      Conjunctiva/sclera: Conjunctivae normal.   Neck:      Thyroid: No thyromegaly.      Vascular: No carotid bruit.   Cardiovascular:      Heart sounds: Normal heart sounds. No murmur heard.    No friction rub. No gallop.   Pulmonary:      Effort: Pulmonary effort is normal.      Breath sounds: Normal breath sounds. No wheezing or rales.   Abdominal:      General: Bowel sounds are normal. There is no distension.      Palpations: Abdomen is soft.      Tenderness: There is no abdominal tenderness.   Musculoskeletal:         General: No tenderness.      Right lower leg: No edema.      Left lower leg: No edema.   Lymphadenopathy:      Cervical: No cervical adenopathy.   Skin:     General: Skin is warm and dry.   Neurological:      Mental Status: She is alert and oriented to person, place, and time. Mental status is at baseline.   Psychiatric:         Thought Content: Thought content normal.         Judgment: Judgment normal.     "   Prior labs reviewed  Assessment/Plan:       1. Type 2 diabetes mellitus with hyperglycemia, without long-term current use of insulin  Overview:  metfromin 500mg daily, increasing trulicity from 0.75mg to 1.5 mg 7/22 hba1c 6.6   10/22 nonadherent to diet, hba1c 7.2   Having GI issues, unclear eitology  No significant improvement in wt or dm on higher dose of trulicity  Decrease back to 0.75, increase metformin to 500 mg bid  Follow up in 3 mo with hba1c  Cont with dr alarcon for gi eval      2. Aortic atherosclerosis  Overview:  Seen on CXR 9/21/17 and lumbar spine xray 5/9/16    Assessment & Plan:  Continue simvastatin and omega threes  Low cholestrol diet      3. Primary hypertension  Overview:  Controlled on losartan 50 mg daily    Assessment & Plan:  Cont current meds and low salt diet      4. Hypothyroidism, unspecified type  Overview:  Controlled on levothyroxine 50 mg daily  Cont current dose      5. Diabetic polyneuropathy associated with type 2 diabetes mellitus    6. Fatty liver    7. Obstructive sleep apnea on CPAP    Other orders  -     sucralfate (CARAFATE) 100 mg/mL suspension; Take 10 mLs (1 g total) by mouth 4 (four) times daily.  Dispense: 2 each; Refill: 2  -     dulaglutide (TRULICITY) 0.75 mg/0.5 mL pen injector; Inject 0.75 mg into the skin every 7 days.  Dispense: 4 pen; Refill: 11  -     metFORMIN (GLUCOPHAGE) 500 MG tablet; Take 1 tablet (500 mg total) by mouth 2 (two) times daily with meals.  Dispense: 180 tablet; Refill: 0       Medication List with Changes/Refills   New Medications    DULAGLUTIDE (TRULICITY) 0.75 MG/0.5 ML PEN INJECTOR    Inject 0.75 mg into the skin every 7 days.    SUCRALFATE (CARAFATE) 100 MG/ML SUSPENSION    Take 10 mLs (1 g total) by mouth 4 (four) times daily.   Current Medications    ASCORBIC ACID, VITAMIN C, (VITAMIN C) 1000 MG TABLET    Take 1 tablet by mouth once daily.    CANNABIDIOL, CBD, EXTRACT ORAL    Take 1 drop by mouth Daily.    CELECOXIB (CELEBREX) 100  MG CAPSULE    TAKE 1 CAPSULE (100 MG TOTAL) BY MOUTH DAILY AS NEEDED.    COENZYME Q10-VITAMIN E 100-100 MG-UNIT CAP    Take 1 capsule by mouth once daily.     DENOSUMAB (PROLIA) 60 MG/ML SYRG    Inject 1 mL (60 mg total) into the skin every 6 (six) months.    FENOFIBRATE 160 MG TAB    Take 1 tablet (160 mg total) by mouth once daily.    FLUTICASONE (FLONASE) 50 MCG/ACTUATION NASAL SPRAY    1 spray by Each Nare route 2 (two) times daily.     GABAPENTIN (NEURONTIN) 300 MG CAPSULE    Take 2 capsules (600 mg total) by mouth every evening.    HYDROCORTISONE 2.5 % OINTMENT    Apply topically 2 (two) times daily. To up to 2 weeks for eczema flares    LETROZOLE (FEMARA) 2.5 MG TAB    TAKE 1 TABLET BY MOUTH EVERY DAY    LEVOTHYROXINE (SYNTHROID) 50 MCG TABLET    TAKE 1 TABLET BY MOUTH EVERY DAY    LOSARTAN (COZAAR) 50 MG TABLET    Take 1 tablet (50 mg total) by mouth once daily.    MULTIVITAMIN ORAL    Take 1 tablet by mouth once daily.     OMEGA-3 FATTY ACIDS-VITAMIN E 1,000 MG CAP    Take 1 capsule by mouth once daily.     PANTOPRAZOLE (PROTONIX) 20 MG TABLET    Take 1 tablet (20 mg total) by mouth before breakfast.    SIMVASTATIN (ZOCOR) 20 MG TABLET    TAKE 1 TABLET BY MOUTH EVERY DAY IN THE EVENING    VITAMIN D 1000 UNITS TAB    Take 1,000 Units by mouth once daily.     YUVAFEM 10 MCG TAB    PLACE 1 TABLET VAGINALLY TWICE A WEEK   Changed and/or Refilled Medications    Modified Medication Previous Medication    METFORMIN (GLUCOPHAGE) 500 MG TABLET metFORMIN (GLUCOPHAGE) 500 MG tablet       Take 1 tablet (500 mg total) by mouth 2 (two) times daily with meals.    TAKE 1 TABLET BY MOUTH EVERY DAY WITH BREAKFAST   Discontinued Medications    DENOSUMAB (PROLIA) 60 MG/ML SYRG    Inject 1 mL (60 mg total) into the skin every 6 (six) months.    DULAGLUTIDE (TRULICITY) 1.5 MG/0.5 ML PEN INJECTOR    Inject 1.5 mg into the skin every 7 days.

## 2022-10-25 NOTE — TELEPHONE ENCOUNTER
----- Message from Deanna Neal sent at 10/25/2022 11:54 AM CDT -----  Name of Who is Calling: Guerline/ BETINA          What is the request in detail: The quantity written on the pt's rx of Sucralfate is not clear.Please call back to clarify          Can the clinic reply by [facility]:          [facility] [phone]

## 2022-10-27 LAB — CYCLOSPORA STL SAFRANIN STN: NORMAL

## 2022-10-29 DIAGNOSIS — R19.5 LOW FECAL ELASTASE LEVEL: Primary | ICD-10-CM

## 2022-11-05 DIAGNOSIS — R74.8 ELEVATED LIPASE: ICD-10-CM

## 2022-11-05 DIAGNOSIS — R11.2 NAUSEA AND VOMITING, UNSPECIFIED VOMITING TYPE: Primary | ICD-10-CM

## 2022-11-05 DIAGNOSIS — R19.7 DIARRHEA IN ADULT PATIENT: ICD-10-CM

## 2022-11-05 NOTE — PROGRESS NOTES
Dion please schedule Katerin for CT scan abdomen pancreas protocol for further evaluation of her GI symptoms orders placed.     please schedule patient for repeat lipase orders placed    Katerin nonspecific mild elevation of your lipase will recommend further imaging and lab work above.

## 2022-11-07 ENCOUNTER — OFFICE VISIT (OUTPATIENT)
Dept: HEMATOLOGY/ONCOLOGY | Facility: CLINIC | Age: 72
End: 2022-11-07
Payer: MEDICARE

## 2022-11-07 VITALS
OXYGEN SATURATION: 96 % | BODY MASS INDEX: 28.49 KG/M2 | RESPIRATION RATE: 18 BRPM | HEIGHT: 66 IN | HEART RATE: 94 BPM | TEMPERATURE: 98 F | SYSTOLIC BLOOD PRESSURE: 127 MMHG | WEIGHT: 177.25 LBS | DIASTOLIC BLOOD PRESSURE: 68 MMHG

## 2022-11-07 DIAGNOSIS — C50.512 MALIGNANT NEOPLASM OF LOWER-OUTER QUADRANT OF LEFT BREAST OF FEMALE, ESTROGEN RECEPTOR POSITIVE: Primary | ICD-10-CM

## 2022-11-07 DIAGNOSIS — Z17.0 MALIGNANT NEOPLASM OF LOWER-OUTER QUADRANT OF LEFT BREAST OF FEMALE, ESTROGEN RECEPTOR POSITIVE: Primary | ICD-10-CM

## 2022-11-07 PROCEDURE — 3078F PR MOST RECENT DIASTOLIC BLOOD PRESSURE < 80 MM HG: ICD-10-PCS | Mod: CPTII,S$GLB,, | Performed by: INTERNAL MEDICINE

## 2022-11-07 PROCEDURE — 3008F BODY MASS INDEX DOCD: CPT | Mod: CPTII,S$GLB,, | Performed by: INTERNAL MEDICINE

## 2022-11-07 PROCEDURE — 3008F PR BODY MASS INDEX (BMI) DOCUMENTED: ICD-10-PCS | Mod: CPTII,S$GLB,, | Performed by: INTERNAL MEDICINE

## 2022-11-07 PROCEDURE — 3288F PR FALLS RISK ASSESSMENT DOCUMENTED: ICD-10-PCS | Mod: CPTII,S$GLB,, | Performed by: INTERNAL MEDICINE

## 2022-11-07 PROCEDURE — 1159F PR MEDICATION LIST DOCUMENTED IN MEDICAL RECORD: ICD-10-PCS | Mod: CPTII,S$GLB,, | Performed by: INTERNAL MEDICINE

## 2022-11-07 PROCEDURE — 3051F HG A1C>EQUAL 7.0%<8.0%: CPT | Mod: CPTII,S$GLB,, | Performed by: INTERNAL MEDICINE

## 2022-11-07 PROCEDURE — 3078F DIAST BP <80 MM HG: CPT | Mod: CPTII,S$GLB,, | Performed by: INTERNAL MEDICINE

## 2022-11-07 PROCEDURE — 4010F PR ACE/ARB THEARPY RXD/TAKEN: ICD-10-PCS | Mod: CPTII,S$GLB,, | Performed by: INTERNAL MEDICINE

## 2022-11-07 PROCEDURE — 99213 PR OFFICE/OUTPT VISIT, EST, LEVL III, 20-29 MIN: ICD-10-PCS | Mod: S$GLB,,, | Performed by: INTERNAL MEDICINE

## 2022-11-07 PROCEDURE — 3288F FALL RISK ASSESSMENT DOCD: CPT | Mod: CPTII,S$GLB,, | Performed by: INTERNAL MEDICINE

## 2022-11-07 PROCEDURE — 3061F NEG MICROALBUMINURIA REV: CPT | Mod: CPTII,S$GLB,, | Performed by: INTERNAL MEDICINE

## 2022-11-07 PROCEDURE — 1159F MED LIST DOCD IN RCRD: CPT | Mod: CPTII,S$GLB,, | Performed by: INTERNAL MEDICINE

## 2022-11-07 PROCEDURE — 99999 PR PBB SHADOW E&M-EST. PATIENT-LVL IV: ICD-10-PCS | Mod: PBBFAC,,, | Performed by: INTERNAL MEDICINE

## 2022-11-07 PROCEDURE — 3066F PR DOCUMENTATION OF TREATMENT FOR NEPHROPATHY: ICD-10-PCS | Mod: CPTII,S$GLB,, | Performed by: INTERNAL MEDICINE

## 2022-11-07 PROCEDURE — 3074F PR MOST RECENT SYSTOLIC BLOOD PRESSURE < 130 MM HG: ICD-10-PCS | Mod: CPTII,S$GLB,, | Performed by: INTERNAL MEDICINE

## 2022-11-07 PROCEDURE — 99213 OFFICE O/P EST LOW 20 MIN: CPT | Mod: S$GLB,,, | Performed by: INTERNAL MEDICINE

## 2022-11-07 PROCEDURE — 1101F PT FALLS ASSESS-DOCD LE1/YR: CPT | Mod: CPTII,S$GLB,, | Performed by: INTERNAL MEDICINE

## 2022-11-07 PROCEDURE — 3061F PR NEG MICROALBUMINURIA RESULT DOCUMENTED/REVIEW: ICD-10-PCS | Mod: CPTII,S$GLB,, | Performed by: INTERNAL MEDICINE

## 2022-11-07 PROCEDURE — 3051F PR MOST RECENT HEMOGLOBIN A1C LEVEL 7.0 - < 8.0%: ICD-10-PCS | Mod: CPTII,S$GLB,, | Performed by: INTERNAL MEDICINE

## 2022-11-07 PROCEDURE — 99999 PR PBB SHADOW E&M-EST. PATIENT-LVL IV: CPT | Mod: PBBFAC,,, | Performed by: INTERNAL MEDICINE

## 2022-11-07 PROCEDURE — 3066F NEPHROPATHY DOC TX: CPT | Mod: CPTII,S$GLB,, | Performed by: INTERNAL MEDICINE

## 2022-11-07 PROCEDURE — 4010F ACE/ARB THERAPY RXD/TAKEN: CPT | Mod: CPTII,S$GLB,, | Performed by: INTERNAL MEDICINE

## 2022-11-07 PROCEDURE — 3074F SYST BP LT 130 MM HG: CPT | Mod: CPTII,S$GLB,, | Performed by: INTERNAL MEDICINE

## 2022-11-07 PROCEDURE — 1101F PR PT FALLS ASSESS DOC 0-1 FALLS W/OUT INJ PAST YR: ICD-10-PCS | Mod: CPTII,S$GLB,, | Performed by: INTERNAL MEDICINE

## 2022-11-07 NOTE — PROGRESS NOTES
Subjective:       Patient ID: Lima Maldonado is a 72 y.o. female.    Chief Complaint: No chief complaint on file.    HPI  Ms Maldonado return to clinic for follow-up of  diagnosis of left breast cancer. She had stage I ER positive disease with an intermediate risk Oncotype score.   She is currently on letrozole.   Some achyness in feet and ankles, still has neuropathy.          Breast history:   mammography on September 20, 2016 demonstrated a new irregular mass with spiculated margins seen in the left breast at  5 o'clock along the surgical scar site.ultrasound showed a  8 X 8 X 7 MM MASS.    A needle biopsy in September 27 showed infiltrating carcinoma, histologic grade 3, nuclear grade 2, mitotic index 1. Tumor was 90% ER positive, 70% ND positive, and 1+ HER-2.     MRI of the breast showed a 1.7 x 1.3 cm lesion in the lower outer quadrant of the left breast.  PET/CT on October 7 was negative for any evidence of distant metastasis.    On November 16, 2016 bilateral mastectomies were performed. Left breast showed a  1.5 cm intermediate grade carcinoma with ductal and lobular features. There was focal dermal invasion. Margins were negative. The right breast was without abnormality.  Oncotype score returned 25 - intermediate risk.  Adjuvant TC X 4 completed 3/31/17.    She began letrozole in April 2017.    Patient with history of left breast cancer in 1997 when she underwent left lumpectomy with complete axillary dissection at age 46 for a pT1cN1 breast cancer (Stage IIA).  47 nodes were removed, 1 of which was positive.  She received adjuvant chemotherapy, radiation and tamoxifen.   Review of Systems   Constitutional: Negative.    Respiratory: Negative.     Neurological: Negative.    Psychiatric/Behavioral: Negative.         Objective:      Physical Exam  Vitals reviewed.   Constitutional:       General: She is not in acute distress.  Cardiovascular:      Rate and Rhythm: Normal rate and regular rhythm.    Pulmonary:      Effort: Pulmonary effort is normal. No respiratory distress.      Breath sounds: Normal breath sounds. No wheezing or rales.   Chest:       Abdominal:      Palpations: Abdomen is soft. There is no mass.      Tenderness: There is no abdominal tenderness.   Lymphadenopathy:      Cervical: No cervical adenopathy.      Upper Body:      Right upper body: No supraclavicular or axillary adenopathy.      Left upper body: No supraclavicular or axillary adenopathy.   Neurological:      Mental Status: She is alert and oriented to person, place, and time.   Psychiatric:         Mood and Affect: Mood normal.         Thought Content: Thought content normal.         Judgment: Judgment normal.       Assessment:     BCI 5.1 % risk of delayed recurrence with + benefit to extended endocrine therapy  Problem List Items Addressed This Visit       Malignant neoplasm of lower-outer quadrant of left female breast - Primary       Plan:     I reviewed the BCI results.  She will take a 2 week break from letrozole and report back.       Route Chart for Scheduling    Med Onc Chart Routing      Follow up with physician 6 months.   Follow up with [facility]    Infusion scheduling note    Injection scheduling note    Labs CMP   Lab interval:     Imaging    Pharmacy appointment    Other referrals          Supportive Plan Information  OP DENOSUMAB   Francisco Baker MD   Upcoming Treatment Dates - OP DENOSUMAB    11/21/2022       Medications       denosumab (PROLIA) injection 60 mg  5/22/2023       Medications       denosumab (PROLIA) injection 60 mg

## 2022-11-10 RX ORDER — LEVOTHYROXINE SODIUM 50 UG/1
TABLET ORAL
Qty: 90 TABLET | Refills: 3 | Status: SHIPPED | OUTPATIENT
Start: 2022-11-10 | End: 2023-11-12

## 2022-11-10 NOTE — TELEPHONE ENCOUNTER
Refill Decision Note   Lima Maldonado  is requesting a refill authorization.  Brief Assessment and Rationale for Refill:  Approve     Medication Therapy Plan:    ACCEPTABLE USE PER ORC PROTOCOL ; RISK MINIMAL WHEN THYROID LABS NORMAL , TSH WNL    Medication Reconciliation Completed: No   Comments:     No Care Gaps recommended.     Note composed:10:20 AM 11/10/2022

## 2022-11-10 NOTE — TELEPHONE ENCOUNTER
No new care gaps identified.  [facility] Embedded Care Gaps. Reference number: [id number]. 11/10/2022   3:40:29 AM CST

## 2022-11-17 ENCOUNTER — DOCUMENTATION ONLY (OUTPATIENT)
Dept: HEMATOLOGY/ONCOLOGY | Facility: CLINIC | Age: 72
End: 2022-11-17
Payer: MEDICARE

## 2022-11-18 ENCOUNTER — CLINICAL SUPPORT (OUTPATIENT)
Dept: ENDOSCOPY | Facility: HOSPITAL | Age: 72
End: 2022-11-18
Attending: INTERNAL MEDICINE
Payer: MEDICARE

## 2022-11-18 VITALS — HEIGHT: 66 IN | BODY MASS INDEX: 28.28 KG/M2 | WEIGHT: 176 LBS

## 2022-11-18 DIAGNOSIS — R19.7 DIARRHEA IN ADULT PATIENT: ICD-10-CM

## 2022-11-18 DIAGNOSIS — R11.2 NAUSEA AND VOMITING, UNSPECIFIED VOMITING TYPE: ICD-10-CM

## 2022-11-18 DIAGNOSIS — K21.9 GASTROESOPHAGEAL REFLUX DISEASE, UNSPECIFIED WHETHER ESOPHAGITIS PRESENT: ICD-10-CM

## 2022-11-29 ENCOUNTER — PES CALL (OUTPATIENT)
Dept: ADMINISTRATIVE | Facility: CLINIC | Age: 72
End: 2022-11-29
Payer: MEDICARE

## 2022-12-05 ENCOUNTER — PATIENT MESSAGE (OUTPATIENT)
Dept: HEMATOLOGY/ONCOLOGY | Facility: CLINIC | Age: 72
End: 2022-12-05
Payer: MEDICARE

## 2022-12-13 ENCOUNTER — IMMUNIZATION (OUTPATIENT)
Dept: INTERNAL MEDICINE | Facility: CLINIC | Age: 72
End: 2022-12-13
Payer: MEDICARE

## 2022-12-13 ENCOUNTER — PROCEDURE VISIT (OUTPATIENT)
Dept: HEPATOLOGY | Facility: CLINIC | Age: 72
End: 2022-12-13
Payer: MEDICARE

## 2022-12-13 ENCOUNTER — OFFICE VISIT (OUTPATIENT)
Dept: HEPATOLOGY | Facility: CLINIC | Age: 72
End: 2022-12-13
Payer: MEDICARE

## 2022-12-13 VITALS
SYSTOLIC BLOOD PRESSURE: 144 MMHG | HEART RATE: 86 BPM | DIASTOLIC BLOOD PRESSURE: 66 MMHG | TEMPERATURE: 99 F | WEIGHT: 178.81 LBS | BODY MASS INDEX: 28.86 KG/M2 | OXYGEN SATURATION: 95 %

## 2022-12-13 DIAGNOSIS — E11.42 TYPE 2 DIABETES MELLITUS WITH DIABETIC POLYNEUROPATHY, WITHOUT LONG-TERM CURRENT USE OF INSULIN: Primary | ICD-10-CM

## 2022-12-13 DIAGNOSIS — E66.3 OVERWEIGHT (BMI 25.0-29.9): ICD-10-CM

## 2022-12-13 DIAGNOSIS — Z23 NEED FOR VACCINATION: Primary | ICD-10-CM

## 2022-12-13 DIAGNOSIS — I10 PRIMARY HYPERTENSION: ICD-10-CM

## 2022-12-13 DIAGNOSIS — K74.01 EARLY HEPATIC FIBROSIS: ICD-10-CM

## 2022-12-13 DIAGNOSIS — E03.9 HYPOTHYROIDISM, UNSPECIFIED TYPE: ICD-10-CM

## 2022-12-13 DIAGNOSIS — E78.2 MIXED HYPERLIPIDEMIA: ICD-10-CM

## 2022-12-13 DIAGNOSIS — K76.0 FATTY LIVER: Primary | ICD-10-CM

## 2022-12-13 DIAGNOSIS — E11.42 TYPE 2 DIABETES MELLITUS WITH DIABETIC POLYNEUROPATHY, WITHOUT LONG-TERM CURRENT USE OF INSULIN: ICD-10-CM

## 2022-12-13 PROCEDURE — 1159F MED LIST DOCD IN RCRD: CPT | Mod: CPTII,S$GLB,, | Performed by: NURSE PRACTITIONER

## 2022-12-13 PROCEDURE — 1101F PT FALLS ASSESS-DOCD LE1/YR: CPT | Mod: CPTII,S$GLB,, | Performed by: NURSE PRACTITIONER

## 2022-12-13 PROCEDURE — 99499 RISK ADDL DX/OHS AUDIT: ICD-10-PCS | Mod: HCNC,S$GLB,, | Performed by: NURSE PRACTITIONER

## 2022-12-13 PROCEDURE — 1160F PR REVIEW ALL MEDS BY PRESCRIBER/CLIN PHARMACIST DOCUMENTED: ICD-10-PCS | Mod: CPTII,S$GLB,, | Performed by: NURSE PRACTITIONER

## 2022-12-13 PROCEDURE — 3051F PR MOST RECENT HEMOGLOBIN A1C LEVEL 7.0 - < 8.0%: ICD-10-PCS | Mod: CPTII,S$GLB,, | Performed by: NURSE PRACTITIONER

## 2022-12-13 PROCEDURE — 4010F PR ACE/ARB THEARPY RXD/TAKEN: ICD-10-PCS | Mod: CPTII,S$GLB,, | Performed by: NURSE PRACTITIONER

## 2022-12-13 PROCEDURE — 4010F ACE/ARB THERAPY RXD/TAKEN: CPT | Mod: CPTII,S$GLB,, | Performed by: NURSE PRACTITIONER

## 2022-12-13 PROCEDURE — 3061F NEG MICROALBUMINURIA REV: CPT | Mod: CPTII,S$GLB,, | Performed by: NURSE PRACTITIONER

## 2022-12-13 PROCEDURE — 1160F RVW MEDS BY RX/DR IN RCRD: CPT | Mod: CPTII,S$GLB,, | Performed by: NURSE PRACTITIONER

## 2022-12-13 PROCEDURE — 3078F PR MOST RECENT DIASTOLIC BLOOD PRESSURE < 80 MM HG: ICD-10-PCS | Mod: CPTII,S$GLB,, | Performed by: NURSE PRACTITIONER

## 2022-12-13 PROCEDURE — 1101F PR PT FALLS ASSESS DOC 0-1 FALLS W/OUT INJ PAST YR: ICD-10-PCS | Mod: CPTII,S$GLB,, | Performed by: NURSE PRACTITIONER

## 2022-12-13 PROCEDURE — 3288F FALL RISK ASSESSMENT DOCD: CPT | Mod: CPTII,S$GLB,, | Performed by: NURSE PRACTITIONER

## 2022-12-13 PROCEDURE — 0124A COVID-19, MRNA, LNP-S, BIVALENT BOOSTER, PF, 30 MCG/0.3 ML DOSE: CPT | Mod: CV19,PBBFAC | Performed by: INTERNAL MEDICINE

## 2022-12-13 PROCEDURE — 76981 USE PARENCHYMA: CPT | Mod: S$GLB,,, | Performed by: NURSE PRACTITIONER

## 2022-12-13 PROCEDURE — 3061F PR NEG MICROALBUMINURIA RESULT DOCUMENTED/REVIEW: ICD-10-PCS | Mod: CPTII,S$GLB,, | Performed by: NURSE PRACTITIONER

## 2022-12-13 PROCEDURE — 99214 PR OFFICE/OUTPT VISIT, EST, LEVL IV, 30-39 MIN: ICD-10-PCS | Mod: S$GLB,,, | Performed by: NURSE PRACTITIONER

## 2022-12-13 PROCEDURE — 91312 COVID-19, MRNA, LNP-S, BIVALENT BOOSTER, PF, 30 MCG/0.3 ML DOSE: CPT | Mod: S$GLB,,, | Performed by: INTERNAL MEDICINE

## 2022-12-13 PROCEDURE — 3077F SYST BP >= 140 MM HG: CPT | Mod: CPTII,S$GLB,, | Performed by: NURSE PRACTITIONER

## 2022-12-13 PROCEDURE — 99999 PR PBB SHADOW E&M-EST. PATIENT-LVL V: CPT | Mod: PBBFAC,,, | Performed by: NURSE PRACTITIONER

## 2022-12-13 PROCEDURE — 3078F DIAST BP <80 MM HG: CPT | Mod: CPTII,S$GLB,, | Performed by: NURSE PRACTITIONER

## 2022-12-13 PROCEDURE — 99999 PR PBB SHADOW E&M-EST. PATIENT-LVL V: ICD-10-PCS | Mod: PBBFAC,,, | Performed by: NURSE PRACTITIONER

## 2022-12-13 PROCEDURE — 99214 OFFICE O/P EST MOD 30 MIN: CPT | Mod: S$GLB,,, | Performed by: NURSE PRACTITIONER

## 2022-12-13 PROCEDURE — 1159F PR MEDICATION LIST DOCUMENTED IN MEDICAL RECORD: ICD-10-PCS | Mod: CPTII,S$GLB,, | Performed by: NURSE PRACTITIONER

## 2022-12-13 PROCEDURE — 91312 COVID-19, MRNA, LNP-S, BIVALENT BOOSTER, PF, 30 MCG/0.3 ML DOSE: ICD-10-PCS | Mod: S$GLB,,, | Performed by: INTERNAL MEDICINE

## 2022-12-13 PROCEDURE — 3051F HG A1C>EQUAL 7.0%<8.0%: CPT | Mod: CPTII,S$GLB,, | Performed by: NURSE PRACTITIONER

## 2022-12-13 PROCEDURE — 1126F AMNT PAIN NOTED NONE PRSNT: CPT | Mod: CPTII,S$GLB,, | Performed by: NURSE PRACTITIONER

## 2022-12-13 PROCEDURE — 3077F PR MOST RECENT SYSTOLIC BLOOD PRESSURE >= 140 MM HG: ICD-10-PCS | Mod: CPTII,S$GLB,, | Performed by: NURSE PRACTITIONER

## 2022-12-13 PROCEDURE — 99499 UNLISTED E&M SERVICE: CPT | Mod: HCNC,S$GLB,, | Performed by: NURSE PRACTITIONER

## 2022-12-13 PROCEDURE — 3288F PR FALLS RISK ASSESSMENT DOCUMENTED: ICD-10-PCS | Mod: CPTII,S$GLB,, | Performed by: NURSE PRACTITIONER

## 2022-12-13 PROCEDURE — 1126F PR PAIN SEVERITY QUANTIFIED, NO PAIN PRESENT: ICD-10-PCS | Mod: CPTII,S$GLB,, | Performed by: NURSE PRACTITIONER

## 2022-12-13 PROCEDURE — 3008F PR BODY MASS INDEX (BMI) DOCUMENTED: ICD-10-PCS | Mod: CPTII,S$GLB,, | Performed by: NURSE PRACTITIONER

## 2022-12-13 PROCEDURE — 76981 FIBROSCAN NEW ORLEANS: ICD-10-PCS | Mod: S$GLB,,, | Performed by: NURSE PRACTITIONER

## 2022-12-13 PROCEDURE — 3066F NEPHROPATHY DOC TX: CPT | Mod: CPTII,S$GLB,, | Performed by: NURSE PRACTITIONER

## 2022-12-13 PROCEDURE — 3008F BODY MASS INDEX DOCD: CPT | Mod: CPTII,S$GLB,, | Performed by: NURSE PRACTITIONER

## 2022-12-13 PROCEDURE — 3066F PR DOCUMENTATION OF TREATMENT FOR NEPHROPATHY: ICD-10-PCS | Mod: CPTII,S$GLB,, | Performed by: NURSE PRACTITIONER

## 2022-12-13 NOTE — PROCEDURES
FibroScan [place]    Date/Time: 12/13/2022 8:45 AM  Performed by: Jessie Palumbo NP  Authorized by: Jessie Palumbo NP     Diagnosis:  NAFLD    Probe:  M    Universal Protocol: Patient's identity, procedure and site were verified, confirmatory pause was performed.  Discussed procedure including risks and potential complications.  Questions answered.  Patient verbalizes understanding and wishes to proceed with VCTE.     Procedure: After providing explanations of the procedure, patient was placed in the supine position with right arm in maximum abduction to allow optimal exposure of right lateral abdomen.  Patient was briefly assessed, Testing was performed in the mid-axillary location, 50Hz Shear Wave pulses were applied and the resulting Shear Wave and Propagation Speed detected with a 3.5 MHz ultrasonic signal, using the FibroScan probe, Skin to liver capsule distance and liver parenchyma were accessed during the entire examination with the FibroScan probe, Patient was instructed to breathe normally and to abstain from sudden movements during the procedure, allowing for random measurements of liver stiffness. At least 10 Shear Waves were produced, Individual measurements of each Shear Wave were calculated.  Patient tolerated the procedure well with no complications.  Meets discharge criteria as was dismissed.  Rates pain 0 out of 10.  Patient will follow up with ordering provider to review results.    Findings  Median liver stiffness score:  7.8  CAP Reading: dB/m:  400    IQR/med %:  18  Interpretation  Fibrosis interpretation is based on medial liver stiffness - Kilopascal (kPa).    Fibrosis Stage:  F2  Steatosis interpretation is based on controlled attenuation parameter - (dB/m).    Steatosis Grade:  S3

## 2022-12-13 NOTE — PROGRESS NOTES
[facility] VISIT ESTABLISHED PT NOTE    REFERRING PROVIDER:  No ref. provider found    CHIEF COMPLAINT: Fatty Liver    HPI: Ms. Maldonado is a 72 y.o. White female with PMH noted below, presenting for follow up for fatty liver. She was last seen in clinic in 12/2021. She has a history of mildly elevated liver enzymes and mildly elevated total bilirubin, consistent with Gilbert syndrome. Most recent LFT's in 10/2022 show normal liver enzymes and intact synthetic function. Risk factors for the development of fatty liver disease include HTN, HLD, DMII and being overweight (BMI 28). She is on Metformin 500 mg daily and Trulicity; last HgbA1c was worsened at 7.2% in 10/2022. She also has a history of NELDA and hypothyroidism. She lost 5 pounds since last visit 1 year ago.  Fibroscan in 2015 was suggestive of F2 fibrosis. Repeat Fibroscan in 2020 was suggestive of significant hepatic steatosis with F1 fibrosis and a low likelihood of cirrhosis. Fibroscan in 12/2021 was suggestive of significant hepatic steatosis with F2 (moderate) fibrosis and a low likelihood of cirrhosis. Repeat Fiborscan today is again suggestive of F2 fibrosis, however CAP score has worsened (400 dB/m), likely secondary to elevated BS. Most recent Ultrasound in 5/2021 showed fatty liver, with no focal lesions. She has been vaccinated against Hepatitis A and B, with positive titers noted. She is well appearing, and has no signs or symptoms of decompensated liver disease including jaundice, dark urine, pruritus, abdominal distention, hematemesis, melena, or periods of confusion suggestive of hepatic encephalopathy.     Review of patient's allergies indicates:   Allergen Reactions    Erythromycin      Other reaction(s): Nausea    Erythromycin (bulk)      Other reaction(s): Nausea    Oxycodone Nausea And Vomiting    Oxycodone-acetaminophen Nausea And Vomiting     Other reaction(s): Nausea     Current Outpatient Medications on File Prior to  Visit   Medication Sig Dispense Refill    ascorbic acid, vitamin C, (VITAMIN C) 1000 MG tablet Take 1 tablet by mouth once daily.      CANNABIDIOL, CBD, EXTRACT ORAL Take 1 drop by mouth Daily.      celecoxib (CELEBREX) 100 MG capsule TAKE 1 CAPSULE (100 MG TOTAL) BY MOUTH DAILY AS NEEDED. 90 capsule 0    coenzyme Q10-vitamin E 100-100 mg-unit Cap Take 1 capsule by mouth once daily.       denosumab (PROLIA) 60 mg/mL Syrg Inject 1 mL (60 mg total) into the skin every 6 (six) months. 2 mL 0    dulaglutide (TRULICITY) 0.75 mg/0.5 mL pen injector Inject 0.75 mg into the skin every 7 days. 4 pen 11    fenofibrate 160 MG Tab Take 1 tablet (160 mg total) by mouth once daily. 90 tablet 3    fluticasone (FLONASE) 50 mcg/actuation nasal spray 1 spray by Each Nare route 2 (two) times daily.       gabapentin (NEURONTIN) 300 MG capsule Take 2 capsules (600 mg total) by mouth every evening. 180 capsule 3    letrozole (FEMARA) 2.5 mg Tab TAKE 1 TABLET BY MOUTH EVERY DAY 90 tablet 3    levothyroxine (SYNTHROID) 50 MCG tablet TAKE 1 TABLET BY MOUTH EVERY DAY 90 tablet 3    losartan (COZAAR) 50 MG tablet Take 1 tablet (50 mg total) by mouth once daily. 90 tablet 3    metFORMIN (GLUCOPHAGE) 500 MG tablet Take 1 tablet (500 mg total) by mouth 2 (two) times daily with meals. 180 tablet 0    MULTIVITAMIN ORAL Take 1 tablet by mouth once daily.       omega-3 fatty acids-vitamin E 1,000 mg Cap Take 1 capsule by mouth once daily.       pantoprazole (PROTONIX) 20 MG tablet TAKE 1 TABLET (20 MG TOTAL) BY MOUTH BEFORE BREAKFAST. 90 tablet 0    simvastatin (ZOCOR) 20 MG tablet TAKE 1 TABLET BY MOUTH EVERY DAY IN THE EVENING 90 tablet 3    sucralfate (CARAFATE) 100 mg/mL suspension Take 10 mLs (1 g total) by mouth 4 (four) times daily. 828 mL 1    vitamin D 1000 units Tab Take 1,000 Units by mouth once daily.       YUVAFEM 10 mcg Tab PLACE 1 TABLET VAGINALLY TWICE A WEEK 24 tablet 0    hydrocortisone 2.5 % ointment Apply topically 2 (two) times  daily. To up to 2 weeks for eczema flares (Patient not taking: Reported on 12/13/2022) 28 g 2     No current facility-administered medications on file prior to visit.     PMHX:  has a past medical history of Acute cystitis without hematuria (7/17/2017), Arthritis, Back pain, Breast cancer, Class 1 obesity due to excess calories with serious comorbidity and body mass index (BMI) of 30.0 to 30.9 in adult (11/29/2018), Elevated bilirubin (11/19/2013), Fatty liver, GERD (gastroesophageal reflux disease), Glucose intolerance (impaired glucose tolerance), Hyperlipidemia, Hypertension, Hypothyroid, Hypothyroidism, Malignant neoplasm of lower-outer quadrant of left female breast (11/15/2016), Obesity, Obesity, diabetes, and hypertension syndrome (12/12/2018), Osteopenia, Osteoporosis, Primary insomnia (11/2/2016), Shingles, Sleep apnea, Squamous cell carcinoma (2011), Stress incontinence of urine (1/11/2018), Trouble in sleeping, Type 2 diabetes mellitus with diabetic polyneuropathy, without long-term current use of insulin (12/26/2017), Urinary incontinence, and Well woman exam with routine gynecological exam (11/2/2016).    PSHX:  has a past surgical history that includes left modified radical mastectomy (02/1997); Breast implant (Bilateral, 1998); left breast reconstruction (2005); Pelvic laparoscopy (1978); robotic lap  hysterectomy with bso (2008); Lipoma resection (2010); reduction of mons pubis (2011); Hysterectomy (2008); pr removal of ovary/tube(s) (2008); Modified radical mastectomy w/ axillary lymph node dissection (02/1997); Appendectomy (2008); Breast surgery; Upper gastrointestinal endoscopy (08/05/2013); Colonoscopy (N/A, 05/15/2018); Cataract extraction w/  intraocular lens implant (Left, 08/13/2020); Cataract extraction w/  intraocular lens implant (Right, 08/27/2020); Eye surgery; Breast biopsy; Breast lumpectomy; Breast reconstruction (Bilateral); Oophorectomy; Cosmetic surgery (1/2022); and  Blepharoplasty (Bilateral).    FAMILY HISTORY: Negative for liver disease, reviewed in EPIC    SOCIAL HISTORY:   Social History     Tobacco Use   Smoking Status Former    Packs/day: 0.50    Years: 3.00    Pack years: 1.50    Types: Cigarettes    Start date: 4/15/1969    Quit date: 4/15/1972    Years since quittin.6   Smokeless Tobacco Never   Tobacco Comments    started at age 18 during college      Social History     Substance and Sexual Activity   Alcohol Use Yes    Comment: Rarely, not even 1X per week     Social History     Substance and Sexual Activity   Drug Use No     ROS:   GENERAL: Denies fever, chills, weight loss/gain, fatigue  HEENT: Denies headaches, dizziness, vision/hearing changes  CARDIOVASCULAR: Denies chest pain, palpitations, or edema  RESPIRATORY: Denies dyspnea, cough  GI: Denies abdominal pain, rectal bleeding, nausea, vomiting. No change in bowel pattern or color  : Denies dysuria, hematuria   SKIN: Denies rash, itching   NEURO: Denies confusion, memory loss, or mood changes  PSYCH: Denies depression or anxiety  HEME/LYMPH: Denies easy bruising or bleeding    PHYSICAL EXAM:   Friendly White female, in no acute distress; alert and oriented to person, place and time  VITALS: BP (!) 144/66 (BP Location: Right arm, Patient Position: Sitting, BP Method: Medium (Automatic))   Pulse 86   Temp 98.5 °F (36.9 °C) (Oral)   Wt 81.1 kg (178 lb 12.7 oz)   SpO2 95%   BMI 28.86 kg/m²   HENT: Normocephalic, without obvious abnormality.   EYES: Sclerae anicteric.   NECK: No obvious masses.  CARDIOVASCULAR: No peripheral edema.  RESPIRATORY: Normal respiratory effort.   GI: Soft, obese abdomen.  EXTREMITIES:  No clubbing, cyanosis or edema.  SKIN: Warm and dry. No jaundice.   NEURO:  Normal gait. No asterixis.  PSYCH:  Memory intact. Thought and speech pattern appropriate. Behavior normal. No depression or anxiety noted.    DIAGNOSTIC STUDIES:    EGD 2013:    Impression:   - Normal examined  duodenum.                         - One gastric polyp. Resected and retrieved.                         - Hiatus hernia.   Recommendation:                              - Follow an antireflux regimen.                         - Repeat the upper endoscopy in 5 years for                         surveillance based on pathology results.     COLONOSCOPY 5/15/2018:    Findings:        The perianal and digital rectal examinations were normal.        The entire examined colon appeared normal.   Impression:           - The entire examined colon is normal.                         - No specimens collected.   Recommendation:                              - Repeat colonoscopy in 10 years for screening                         purposes.     US ABDOMEN COMPLETE 2013:    Findings: The liver is enlarged extending below the costal margin and measures 17.4 cm.  There is diffuse attenuation of sound by the liver consistent with a diffuse parenchymal process such as fatty infiltration.  No focal hepatic lesions are   identified.  No intra- or extrahepatic biliary ductal dilatation. The common bile duct measures 0.2 cm.  The gallbladder appears normal. No evidence for cholelithiasis.  Sonographic Dillon's sign is negative. The visualized portions of the pancreas,   aorta and IVC appear normal. The kidneys are normal in size and measure 11.3 cm on the right and 12.1 cm on the left.  There is an incidentally noted 1.3-cm right peripelvic renal cyst. The spleen is normal in size and measures 9.7 cm. No ascites.      Impression         1. Hepatomegaly with hepatic steatosis.      FIBROSCAN 2015:    Fibroscan readin.4 KPa     IQR/med:18%     Fibrosis:F2      FIBROSCAN 2020:    Findings  Median liver stiffness score:  6.5  CAP Reading: dB/m:  323     IQR/med %:  14  Interpretation  Fibrosis interpretation is based on medial liver stiffness - Kilopascal (kPa).     Fibrosis Stage:  F1  Steatosis interpretation is based on  controlled attenuation parameter - (dB/m).     Steatosis Grade:  S3    US ABDOMEN COMPLETE 5/6/2021:    FINDINGS:    Liver: Normal in size, measuring 17.0 cm. Diffuse increased hepatic echogenicity.  No focal hepatic lesions.  Hypoechoic adjacent to the gallbladder measuring 3.1 x 1.5 x 1 4 cm likely represent fatty sparing.     Gallbladder: No calculi, wall thickening, or pericholecystic fluid.  No sonographic Dillon's sign.     Biliary system: The common duct is not dilated, measuring 3 mm.  No intrahepatic ductal dilatation.     Spleen: Normal in size with a homogeneous echotexture, measuring 10.4 x 3.9 cm.     Pancreas: The visualized portions of pancreas appear normal.     Right kidney: Normal in size with no hydronephrosis, measuring 11.6 cm.  [place] minimally complex renal cyst measuring 1.1 x 1.1 x 1.5 cm with 0.1 cm septum.     Left kidney:  Normal in size with no hydronephrosis, measuring 11.5 cm.     Aorta: No aneurysm.     Inferior vena cava: Normal in appearance.     Miscellaneous: No ascites.     Impression:     Hepatic steatosis.  No masses identified.     Right renal cyst.    FIBROSCAN 12/3/2021:    Findings  Median liver stiffness score:  9  CAP Reading: dB/m:  374     IQR/med %:  9  Interpretation  Fibrosis interpretation is based on medial liver stiffness - Kilopascal (kPa).     Fibrosis Stage:  F2  Steatosis interpretation is based on controlled attenuation parameter - (dB/m).     Steatosis Grade:  S3    FIBROSCAN 12/13/2022:    Findings  Median liver stiffness score:  7.8  CAP Reading: dB/m:  400     IQR/med %:  18  Interpretation  Fibrosis interpretation is based on medial liver stiffness - Kilopascal (kPa).     Fibrosis Stage:  F2  Steatosis interpretation is based on controlled attenuation parameter - (dB/m).     Steatosis Grade:  S3    ASSESSMENT & PLAN:  72 y.o. White female with:    1. Fatty liver  Hepatic Function Panel    FibroScan [place] (Vibration Controlled Transient  Elastography)    US Abdomen Complete      2. Early hepatic fibrosis  Hepatic Function Panel    FibroScan [place] (Vibration Controlled Transient Elastography)    US Abdomen Complete      3. Type 2 diabetes mellitus with diabetic polyneuropathy, without long-term current use of insulin        4. Mixed hyperlipidemia        5. Hypothyroidism, unspecified type        6. Primary hypertension        7. Overweight (BMI 25.0-29.9)          - Fibroscan today to re-stage liver disease.  - Recommend and Ultrasound of the liver every 1-2 years.  - Repeat liver function tests annually.  - Recommend weight loss of 15 lbs, through diet and exercise.  - Recommend a referral to a nutritionist to discuss dietary changes. (patient declined).   - Recommend good control of cholesterol, blood pressure, & blood sugar levels.    Follow up in about 1 year (around 12/13/2023). with labs, US and Fibroscan before visit.    Thank you for allowing me to participate in the care of [place] ELIANA Maldonado       Hepatology Nurse Practitioner  [facility]  12/13/2022 @ 0950    CC'ed note to:   No ref. provider found  Joie Mccall MD

## 2022-12-13 NOTE — PATIENT INSTRUCTIONS
1. Fibroscan today to assess for fat and scar tissue in the liver.  2. Recommend an Ultrasound of the liver every 2 years.  3. Repeat liver function tests in 1 year.   4. Avoid alcohol and herbal supplements/alternative remedies.  6. Return to clinic in 1 year.    There is no FDA approved therapy for non-alcoholic fatty liver disease. Therefore, these things are important:  1. Weight loss goal of 15 lbs.  2. Low carb/sugar, high fiber and protein diet.Try to limit your carb intake to LESS than 30-45 grams of carbs with a meal or LESS than 5-10 grams with any snack (total of any snack foods eaten during that time). Use MyFitness Pal joselito to add up your carbs through the day. Do NOT drink any beverages with calories or carbs (this can lead to high blood sugar and weight gain). Also, some of our patients have been very successful with weight loss using the pre made/planned meal planning services that limit calories and portion size (one example is Sensible Meals but there are many out there).  3. Exercise, 5 days per week, 30 minutes per day, as tolerated.  4. Recommend good control of cholesterol, blood pressure, & blood sugar levels.    In some people, fatty liver can progress to steatohepatitis (inflamatory fatty liver) and possibly to cirrhosis, putting one at increased risk for liver cancer, liver failure, and death. Therefore, the lifestyle changes are very important to decrease this risk.     Website with information about fatty liver and inflammation related to fatty liver (CARTER) = www.nashtruth.com  AND [email or web address]

## 2022-12-20 ENCOUNTER — HOSPITAL ENCOUNTER (OUTPATIENT)
Facility: HOSPITAL | Age: 72
Discharge: HOME | End: 2022-12-20
Attending: INTERNAL MEDICINE | Admitting: INTERNAL MEDICINE
Payer: MEDICARE

## 2022-12-20 ENCOUNTER — ANESTHESIA EVENT (OUTPATIENT)
Dept: ENDOSCOPY | Facility: HOSPITAL | Age: 72
End: 2022-12-20
Payer: MEDICARE

## 2022-12-20 ENCOUNTER — TELEPHONE (OUTPATIENT)
Dept: ENDOSCOPY | Facility: HOSPITAL | Age: 72
End: 2022-12-20
Payer: MEDICARE

## 2022-12-20 ENCOUNTER — ANESTHESIA (OUTPATIENT)
Dept: ENDOSCOPY | Facility: HOSPITAL | Age: 72
End: 2022-12-20
Payer: MEDICARE

## 2022-12-20 VITALS
WEIGHT: 174 LBS | HEART RATE: 79 BPM | HEIGHT: 66 IN | TEMPERATURE: 98 F | SYSTOLIC BLOOD PRESSURE: 102 MMHG | RESPIRATION RATE: 16 BRPM | OXYGEN SATURATION: 98 % | BODY MASS INDEX: 27.97 KG/M2 | DIASTOLIC BLOOD PRESSURE: 65 MMHG

## 2022-12-20 DIAGNOSIS — R11.2 NAUSEA & VOMITING: ICD-10-CM

## 2022-12-20 DIAGNOSIS — R11.2 NAUSEA AND VOMITING, UNSPECIFIED VOMITING TYPE: Primary | ICD-10-CM

## 2022-12-20 LAB — POCT GLUCOSE: 146 MG/DL (ref 70–110)

## 2022-12-20 PROCEDURE — 37000009 HC ANESTHESIA EA ADD 15 MINS: Performed by: INTERNAL MEDICINE

## 2022-12-20 PROCEDURE — E9220 PRA ENDO ANESTHESIA: HCPCS | Mod: ,,, | Performed by: NURSE ANESTHETIST, CERTIFIED REGISTERED

## 2022-12-20 PROCEDURE — 43239 EGD BIOPSY SINGLE/MULTIPLE: CPT | Mod: 59,,, | Performed by: INTERNAL MEDICINE

## 2022-12-20 PROCEDURE — 43239 EGD BIOPSY SINGLE/MULTIPLE: CPT | Mod: 59 | Performed by: INTERNAL MEDICINE

## 2022-12-20 PROCEDURE — 43251 EGD REMOVE LESION SNARE: CPT | Mod: GC,,, | Performed by: INTERNAL MEDICINE

## 2022-12-20 PROCEDURE — 88305 TISSUE EXAM BY PATHOLOGIST: CPT | Performed by: STUDENT IN AN ORGANIZED HEALTH CARE EDUCATION/TRAINING PROGRAM

## 2022-12-20 PROCEDURE — 25000003 PHARM REV CODE 250: Performed by: NURSE ANESTHETIST, CERTIFIED REGISTERED

## 2022-12-20 PROCEDURE — E9220 PRA ENDO ANESTHESIA: ICD-10-PCS | Mod: ,,, | Performed by: NURSE ANESTHETIST, CERTIFIED REGISTERED

## 2022-12-20 PROCEDURE — 27201089 HC SNARE, DISP (ANY): Performed by: INTERNAL MEDICINE

## 2022-12-20 PROCEDURE — 88305 TISSUE EXAM BY PATHOLOGIST: ICD-10-PCS | Mod: 26,,, | Performed by: STUDENT IN AN ORGANIZED HEALTH CARE EDUCATION/TRAINING PROGRAM

## 2022-12-20 PROCEDURE — 43251 EGD REMOVE LESION SNARE: CPT | Performed by: INTERNAL MEDICINE

## 2022-12-20 PROCEDURE — 37000008 HC ANESTHESIA 1ST 15 MINUTES: Performed by: INTERNAL MEDICINE

## 2022-12-20 PROCEDURE — 43251 PR EGD, FLEX, W/REMOVAL, TUMOR/POLYP/LESION(S), SNARE: ICD-10-PCS | Mod: GC,,, | Performed by: INTERNAL MEDICINE

## 2022-12-20 PROCEDURE — 88342 CHG IMMUNOCYTOCHEMISTRY: ICD-10-PCS | Mod: 26,,, | Performed by: STUDENT IN AN ORGANIZED HEALTH CARE EDUCATION/TRAINING PROGRAM

## 2022-12-20 PROCEDURE — 88342 IMHCHEM/IMCYTCHM 1ST ANTB: CPT | Performed by: STUDENT IN AN ORGANIZED HEALTH CARE EDUCATION/TRAINING PROGRAM

## 2022-12-20 PROCEDURE — 82657 ENZYME CELL ACTIVITY: CPT | Performed by: PATHOLOGY

## 2022-12-20 PROCEDURE — 43239 PR EGD, FLEX, W/BIOPSY, SGL/MULTI: ICD-10-PCS | Mod: 59,,, | Performed by: INTERNAL MEDICINE

## 2022-12-20 PROCEDURE — 88305 TISSUE EXAM BY PATHOLOGIST: CPT | Mod: 26,,, | Performed by: STUDENT IN AN ORGANIZED HEALTH CARE EDUCATION/TRAINING PROGRAM

## 2022-12-20 PROCEDURE — 27201042 HC RETRIEVAL NET: Performed by: INTERNAL MEDICINE

## 2022-12-20 PROCEDURE — 25000003 PHARM REV CODE 250: Performed by: INTERNAL MEDICINE

## 2022-12-20 PROCEDURE — 27201012 HC FORCEPS, HOT/COLD, DISP: Performed by: INTERNAL MEDICINE

## 2022-12-20 PROCEDURE — 88342 IMHCHEM/IMCYTCHM 1ST ANTB: CPT | Mod: 26,,, | Performed by: STUDENT IN AN ORGANIZED HEALTH CARE EDUCATION/TRAINING PROGRAM

## 2022-12-20 RX ORDER — SODIUM CHLORIDE 9 MG/ML
INJECTION, SOLUTION INTRAVENOUS CONTINUOUS
Status: DISCONTINUED | OUTPATIENT
Start: 2022-12-20 | End: 2022-12-20 | Stop reason: HOSPADM

## 2022-12-20 RX ORDER — PROPOFOL 10 MG/ML
INJECTION, EMULSION INTRAVENOUS CONTINUOUS PRN
Status: DISCONTINUED | OUTPATIENT
Start: 2022-12-20 | End: 2022-12-20

## 2022-12-20 RX ORDER — LIDOCAINE HCL/PF 100 MG/5ML
SYRINGE (ML) INTRAVENOUS
Status: DISCONTINUED | OUTPATIENT
Start: 2022-12-20 | End: 2022-12-20

## 2022-12-20 RX ORDER — PROPOFOL 10 MG/ML
INJECTION, EMULSION INTRAVENOUS
Status: DISCONTINUED | OUTPATIENT
Start: 2022-12-20 | End: 2022-12-20

## 2022-12-20 RX ADMIN — PROPOFOL 80 MG: 10 INJECTION, EMULSION INTRAVENOUS at 09:12

## 2022-12-20 RX ADMIN — GLYCOPYRROLATE 0.2 MG: 0.2 INJECTION, SOLUTION INTRAMUSCULAR; INTRAVENOUS at 09:12

## 2022-12-20 RX ADMIN — PROPOFOL 30 MG: 10 INJECTION, EMULSION INTRAVENOUS at 09:12

## 2022-12-20 RX ADMIN — PROPOFOL 200 MCG/KG/MIN: 10 INJECTION, EMULSION INTRAVENOUS at 09:12

## 2022-12-20 RX ADMIN — SODIUM CHLORIDE: 0.9 INJECTION, SOLUTION INTRAVENOUS at 10:12

## 2022-12-20 RX ADMIN — SODIUM CHLORIDE: 0.9 INJECTION, SOLUTION INTRAVENOUS at 09:12

## 2022-12-20 RX ADMIN — Medication 100 MG: at 09:12

## 2022-12-20 NOTE — H&P
Short Stay Endoscopy History and Physical    PCP - Joie Mccall MD    Procedure - EGD  ASA - per anesthesia  Mallampati - per anesthesia  History of Anesthesia problems - no  Family history Anesthesia problems -  no   Plan of anesthesia - General    HPI:  This is a 72 y.o. female here for evaluation of :  nausea, vomiting  Was having episodic n/v  Improved over past month    ROS:  Constitutional: No fevers, chills  CV: No chest pain  Pulm: No shortness of breath  GI: see HPI  Derm: No rash    Medical History:  has a past medical history of Acute cystitis without hematuria (7/17/2017), Arthritis, Back pain, Breast cancer, Class 1 obesity due to excess calories with serious comorbidity and body mass index (BMI) of 30.0 to 30.9 in adult (11/29/2018), Elevated bilirubin (11/19/2013), Fatty liver, GERD (gastroesophageal reflux disease), Glucose intolerance (impaired glucose tolerance), Hyperlipidemia, Hypertension, Hypothyroid, Hypothyroidism, Malignant neoplasm of lower-outer quadrant of left female breast (11/15/2016), Obesity, Obesity, diabetes, and hypertension syndrome (12/12/2018), Osteopenia, Osteoporosis, Primary insomnia (11/2/2016), Shingles, Sleep apnea, Squamous cell carcinoma (2011), Stress incontinence of urine (1/11/2018), Trouble in sleeping, Type 2 diabetes mellitus with diabetic polyneuropathy, without long-term current use of insulin (12/26/2017), Urinary incontinence, and Well woman exam with routine gynecological exam (11/2/2016).    Surgical History:  has a past surgical history that includes left modified radical mastectomy (02/1997); Breast implant (Bilateral, 1998); left breast reconstruction (2005); Pelvic laparoscopy (1978); robotic lap  hysterectomy with bso (2008); Lipoma resection (2010); reduction of mons pubis (2011); Hysterectomy (2008); pr removal of ovary/tube(s) (2008); Modified radical mastectomy w/ axillary lymph node dissection (02/1997); Appendectomy (2008); Breast surgery;  Upper gastrointestinal endoscopy (08/05/2013); Colonoscopy (N/A, 05/15/2018); Cataract extraction w/  intraocular lens implant (Left, 08/13/2020); Cataract extraction w/  intraocular lens implant (Right, 08/27/2020); Eye surgery; Breast biopsy; Breast lumpectomy; Breast reconstruction (Bilateral); Oophorectomy; Cosmetic surgery (1/2022); and Blepharoplasty (Bilateral).    Family History: family history includes Alcohol abuse in her father; Arthritis in her maternal grandmother; Breast cancer (age of onset: 51) in her sister; Cancer in her sister; Cancer (age of onset: 65) in her mother; Diabetes in her maternal uncle and maternal uncle; Heart attack (age of onset: 51) in her father; Heart disease in her father; Melanoma in her mother; No Known Problems in her brother, daughter, daughter, son, and son.. Otherwise no colon cancer, inflammatory bowel disease, or GI malignancies.    Social History:  reports that she quit smoking about 50 years ago. She started smoking about 53 years ago. She has a 1.50 pack-year smoking history. She has never used smokeless tobacco. She reports current alcohol use. She reports that she does not use drugs.    Review of patient's allergies indicates:   Allergen Reactions    Erythromycin      Other reaction(s): Nausea    Erythromycin (bulk)      Other reaction(s): Nausea    Oxycodone Nausea And Vomiting    Oxycodone-acetaminophen Nausea And Vomiting     Other reaction(s): Nausea       Medications:   Medications Prior to Admission   Medication Sig Dispense Refill Last Dose    ascorbic acid, vitamin C, (VITAMIN C) 1000 MG tablet Take 1 tablet by mouth once daily.   Past Week    celecoxib (CELEBREX) 100 MG capsule TAKE 1 CAPSULE (100 MG TOTAL) BY MOUTH DAILY AS NEEDED. 90 capsule 0 Past Week    coenzyme Q10-vitamin E 100-100 mg-unit Cap Take 1 capsule by mouth once daily.    Past Week    dulaglutide (TRULICITY) 0.75 mg/0.5 mL pen injector Inject 0.75 mg into the skin every 7 days. 4 pen 11  Past Week    fenofibrate 160 MG Tab Take 1 tablet (160 mg total) by mouth once daily. 90 tablet 3 Past Week    gabapentin (NEURONTIN) 300 MG capsule Take 2 capsules (600 mg total) by mouth every evening. 180 capsule 3 Past Week    letrozole (FEMARA) 2.5 mg Tab TAKE 1 TABLET BY MOUTH EVERY DAY 90 tablet 3 Past Week    levothyroxine (SYNTHROID) 50 MCG tablet TAKE 1 TABLET BY MOUTH EVERY DAY 90 tablet 3 12/19/2022    losartan (COZAAR) 50 MG tablet Take 1 tablet (50 mg total) by mouth once daily. 90 tablet 3 12/19/2022    metFORMIN (GLUCOPHAGE) 500 MG tablet Take 1 tablet (500 mg total) by mouth 2 (two) times daily with meals. 180 tablet 0 Past Week    pantoprazole (PROTONIX) 20 MG tablet TAKE 1 TABLET (20 MG TOTAL) BY MOUTH BEFORE BREAKFAST. 90 tablet 0 Past Week    simvastatin (ZOCOR) 20 MG tablet TAKE 1 TABLET BY MOUTH EVERY DAY IN THE EVENING 90 tablet 3 Past Week    sucralfate (CARAFATE) 100 mg/mL suspension Take 10 mLs (1 g total) by mouth 4 (four) times daily. 828 mL 1 Past Week    vitamin D 1000 units Tab Take 1,000 Units by mouth once daily.    Past Week    CANNABIDIOL, CBD, EXTRACT ORAL Take 1 drop by mouth Daily.       denosumab (PROLIA) 60 mg/mL Syrg Inject 1 mL (60 mg total) into the skin every 6 (six) months. 2 mL 0 More than a month    fluticasone (FLONASE) 50 mcg/actuation nasal spray 1 spray by Each Nare route 2 (two) times daily.        hydrocortisone 2.5 % ointment Apply topically 2 (two) times daily. To up to 2 weeks for eczema flares (Patient not taking: Reported on 12/13/2022) 28 g 2     MULTIVITAMIN ORAL Take 1 tablet by mouth once daily.        omega-3 fatty acids-vitamin E 1,000 mg Cap Take 1 capsule by mouth once daily.        YUVAFEM 10 mcg Tab PLACE 1 TABLET VAGINALLY TWICE A WEEK 24 tablet 0          Physical Exam:    Vital Signs:   Vitals:    12/20/22 0856   BP: 134/72   Pulse: 88   Resp: 18   Temp: 98.1 °F (36.7 °C)       General Appearance: Well appearing in no acute distress  Eyes:    No  scleral icterus  ENT: lips and tongue normal  Lungs: no use of accessory muscles  Heart:  normal rate, regular rhythm  Abdomen: Soft, non tender, non distended   Extremities: no edema  Skin: No rash      Labs:  Lab Results   Component Value Date    WBC 4.40 10/18/2022    HGB 13.2 10/18/2022    HCT 39.9 10/18/2022     10/18/2022    CHOL 140 10/18/2022    TRIG 192 (H) 10/18/2022    HDL 35 (L) 10/18/2022    ALT 33 10/18/2022    ALT 33 10/18/2022    AST 33 10/18/2022    AST 33 10/18/2022     10/18/2022     10/18/2022    K 3.9 10/18/2022    K 3.9 10/18/2022     10/18/2022     10/18/2022    CREATININE 0.9 10/18/2022    CREATININE 0.9 10/18/2022    BUN 18 10/18/2022    BUN 18 10/18/2022    CO2 24 10/18/2022    CO2 24 10/18/2022    TSH 1.689 10/18/2022    INR 1.0 02/02/2017    GLUF 120 (H) 02/08/2011    HGBA1C 7.2 (H) 10/18/2022       I have explained the risks and benefits of endoscopy procedures to the patient including but not limited to bleeding, perforation, infection, and death.  The patient was asked if they understand and allowed to ask any further questions to their satisfaction.    Donavan Reid MD

## 2022-12-20 NOTE — ANESTHESIA PREPROCEDURE EVALUATION
12/20/2022  Pre-operative evaluation for Procedure(s) (LRB):  EGD (ESOPHAGOGASTRODUODENOSCOPY) (N/A)    Lima Maldonado is a 72 y.o. female     Patient Active Problem List   Diagnosis    Hyperlipidemia    Hypothyroidism    Obstructive sleep apnea on CPAP    GERD (gastroesophageal reflux disease)    Fatty liver    Primary hypertension    Vaginal atrophy    Senile osteoporosis    Primary insomnia    Malignant neoplasm of lower-outer quadrant of left female breast    Dermatitis of eyelid of left eye due to herpes zoster    Aortic atherosclerosis    Type 2 diabetes mellitus with diabetic polyneuropathy, without long-term current use of insulin    Chemotherapy-induced neuropathy    Stress incontinence of urine    Sensorineural hearing loss (SNHL) of left ear with restricted hearing of right ear    Perforation of left tympanic membrane    Allergic rhinitis    Nasal septal deviation    Tinnitus of both ears    Diabetic polyneuropathy associated with type 2 diabetes mellitus    Nuclear sclerosis, bilateral    Post-operative state    Nuclear sclerotic cataract of right eye    Early hepatic fibrosis    Type 2 diabetes mellitus with hyperglycemia, without long-term current use of insulin    Overweight (BMI 25.0-29.9)       Review of patient's allergies indicates:   Allergen Reactions    Erythromycin      Other reaction(s): Nausea    Erythromycin (bulk)      Other reaction(s): Nausea    Oxycodone Nausea And Vomiting    Oxycodone-acetaminophen Nausea And Vomiting     Other reaction(s): Nausea       No current facility-administered medications on file prior to encounter.     Current Outpatient Medications on File Prior to Encounter   Medication Sig Dispense Refill    ascorbic acid, vitamin C, (VITAMIN C) 1000 MG tablet Take 1 tablet by mouth once daily.      CANNABIDIOL, CBD, EXTRACT ORAL  Take 1 drop by mouth Daily.      celecoxib (CELEBREX) 100 MG capsule TAKE 1 CAPSULE (100 MG TOTAL) BY MOUTH DAILY AS NEEDED. 90 capsule 0    coenzyme Q10-vitamin E 100-100 mg-unit Cap Take 1 capsule by mouth once daily.       denosumab (PROLIA) 60 mg/mL Syrg Inject 1 mL (60 mg total) into the skin every 6 (six) months. 2 mL 0    dulaglutide (TRULICITY) 0.75 mg/0.5 mL pen injector Inject 0.75 mg into the skin every 7 days. 4 pen 11    fenofibrate 160 MG Tab Take 1 tablet (160 mg total) by mouth once daily. 90 tablet 3    fluticasone (FLONASE) 50 mcg/actuation nasal spray 1 spray by Each Nare route 2 (two) times daily.       gabapentin (NEURONTIN) 300 MG capsule Take 2 capsules (600 mg total) by mouth every evening. 180 capsule 3    hydrocortisone 2.5 % ointment Apply topically 2 (two) times daily. To up to 2 weeks for eczema flares (Patient not taking: Reported on 12/13/2022) 28 g 2    letrozole (FEMARA) 2.5 mg Tab TAKE 1 TABLET BY MOUTH EVERY DAY 90 tablet 3    levothyroxine (SYNTHROID) 50 MCG tablet TAKE 1 TABLET BY MOUTH EVERY DAY 90 tablet 3    losartan (COZAAR) 50 MG tablet Take 1 tablet (50 mg total) by mouth once daily. 90 tablet 3    metFORMIN (GLUCOPHAGE) 500 MG tablet Take 1 tablet (500 mg total) by mouth 2 (two) times daily with meals. 180 tablet 0    MULTIVITAMIN ORAL Take 1 tablet by mouth once daily.       omega-3 fatty acids-vitamin E 1,000 mg Cap Take 1 capsule by mouth once daily.       sucralfate (CARAFATE) 100 mg/mL suspension Take 10 mLs (1 g total) by mouth 4 (four) times daily. 828 mL 1    vitamin D 1000 units Tab Take 1,000 Units by mouth once daily.       YUVAFEM 10 mcg Tab PLACE 1 TABLET VAGINALLY TWICE A WEEK 24 tablet 0       Past Surgical History:   Procedure Laterality Date    APPENDECTOMY  2008    removed during hysterectomy surgery     BLEPHAROPLASTY Bilateral     BREAST BIOPSY      2010    Breast implant Bilateral 1998    implants removed in 2003    BREAST  LUMPECTOMY      1997    BREAST RECONSTRUCTION Bilateral     2017    BREAST SURGERY      see details below     CATARACT EXTRACTION W/  INTRAOCULAR LENS IMPLANT Left 2020    Procedure: EXTRACTION, CATARACT, WITH IOL INSERTION;  Surgeon: Ivanna Coleman MD;  Location: [facility];  Service: Ophthalmology;  Laterality: Left;    CATARACT EXTRACTION W/  INTRAOCULAR LENS IMPLANT Right 2020    Procedure: EXTRACTION, CATARACT, WITH IOL INSERTION LASER;  Surgeon: Ivanna Coleman MD;  Location: [facility] OR;  Service: Ophthalmology;  Laterality: Right;    COLONOSCOPY N/A 05/15/2018    Procedure: COLONOSCOPY;  Surgeon: Roldan Gonzales MD;  Location: [facility] ENDO (4TH FLR);  Service: Endoscopy;  Laterality: N/A;    COSMETIC SURGERY  2022    Eyelift    EYE SURGERY      CATERACTS    HYSTERECTOMY      benign    left breast reconstruction      left modified radical mastectomy  1997    for treatment of breast cancer     LIPOMA RESECTION  2010    removed from upper back area     MODIFIED RADICAL MASTECTOMY W/ AXILLARY LYMPH NODE DISSECTION  1997    OOPHORECTOMY          PELVIC LAPAROSCOPY      pt had endometriosis     SD REMOVAL OF OVARY/TUBE(S)      benign    reduction of mons pubis      robotic lap  hysterectomy with bso  2008    UPPER GASTROINTESTINAL ENDOSCOPY  2013       Social History     Socioeconomic History    Marital status:    Tobacco Use    Smoking status: Former     Packs/day: 0.50     Years: 3.00     Pack years: 1.50     Types: Cigarettes     Start date: 4/15/1969     Quit date: 4/15/1972     Years since quittin.7    Smokeless tobacco: Never    Tobacco comments:     started at age 18 during college    Substance and Sexual Activity    Alcohol use: Yes     Comment: Rarely, not even 1X per week    Drug use: No    Sexual activity: Yes     Partners: Male     Birth control/protection: Post-menopausal, See Surgical Hx     Social Determinants of Health      Financial Resource Strain: Low Risk     Difficulty of Paying Living Expenses: Not hard at all   Food Insecurity: No Food Insecurity    Worried About Running Out of Food in the Last Year: Never true    Ran Out of Food in the Last Year: Never true   Transportation Needs: No Transportation Needs    Lack of Transportation (Medical): No    Lack of Transportation (Non-Medical): No   Physical Activity: Insufficiently Active    Days of Exercise per Week: 1 day    Minutes of Exercise per Session: 40 min   Stress: Stress Concern Present    Feeling of Stress : To some extent   Social Connections: Unknown    Frequency of Communication with Friends and Family: More than three times a week    Frequency of Social Gatherings with Friends and Family: Three times a week    Active Member of Clubs or Organizations: Yes    Attends Club or Organization Meetings: More than 4 times per year    Marital Status:    Housing Stability: Low Risk     Unable to Pay for Housing in the Last Year: No    Number of Places Lived in the Last Year: 1    Unstable Housing in the Last Year: No         CBC: No results for input(s): WBC, RBC, HGB, HCT, PLT, MCV, MCH, MCHC in the last 72 hours.    CMP: No results for input(s): NA, K, CL, CO2, BUN, CREATININE, GLU, MG, PHOS, CALCIUM, ALBUMIN, PROT, ALKPHOS, ALT, AST, BILITOT in the last 72 hours.    INR  No results for input(s): PT, INR, PROTIME, APTT in the last 72 hours.        Diagnostic Studies:      EKD Echo:  Results for orders placed or performed during the hospital encounter of 17   2D Echo w/ Color Flow Doppler   Result Value Ref Range    EF + QEF 60 55 - 65    Diastolic Dysfunction No     Mitral Valve Mobility NORMAL       TTE 2022   The patient exercised for 6 minutes and 8 seconds on a high ramp protocol, corresponding to a functional capacity of 9 METS, achieving a peak heart rate of 160 bpm, which is 113% of the age predicted maximum heart rate.   The  test was stopped because the patient experienced shortness of breath.   During stress, the following significant arrhythmias were observed: occasional PACs.   The patient's exercise capacity was normal.   The ECG portion of this study is positive for myocardial ischemia.   The left ventricle is normal in size with normal systolic function.   The estimated ejection fraction is 60%.   Normal left ventricular diastolic function.   Normal right ventricular size with normal right ventricular systolic function.   The left ventricular global longitudinal strain is -16%.   The estimated PA systolic pressure is 25 mmHg.   Normal central venous pressure (3 mmHg).   Trivial posterolateral pericardial effusion.   The stress echo portion of this study is negative for myocardial ischemia.      Pre-op Assessment    I have reviewed the Patient Summary Reports.     I have reviewed the Nursing Notes. I have reviewed the NPO Status.   I have reviewed the Medications.     Review of Systems  Cardiovascular:   Hypertension    Pulmonary:   Sleep Apnea    Hepatic/GI:   GERD Liver Disease,    Endocrine:   Diabetes Hypothyroidism        Physical Exam  General: Well nourished and Cooperative    Airway:  Mallampati: II   Mouth Opening: Normal  TM Distance: Normal  Tongue: Normal  Neck ROM: Normal ROM    Chest/Lungs:  Clear to auscultation, Normal Respiratory Rate    Heart:  Rate: Normal  Rhythm: Regular Rhythm  Sounds: Normal        Anesthesia Plan  Type of Anesthesia, risks & benefits discussed:    Anesthesia Type: Gen Natural Airway  Intra-op Monitoring Plan: Standard ASA Monitors  Post Op Pain Control Plan: multimodal analgesia and IV/PO Opioids PRN  Induction:  IV  Airway Plan: Direct and Video, Post-Induction  Informed Consent: Informed consent signed with the Patient and all parties understand the risks and agree with anesthesia plan.  All questions answered.   ASA Score: 2    Ready For Surgery From Anesthesia Perspective.      .

## 2022-12-20 NOTE — ANESTHESIA POSTPROCEDURE EVALUATION
Anesthesia Post Evaluation    Patient: Lima Maldonado    Procedure(s) Performed: Procedure(s) (LRB):  EGD (ESOPHAGOGASTRODUODENOSCOPY) (N/A)    Final Anesthesia Type: general      Patient location during evaluation: PACU  Patient participation: Yes- Able to Participate  Level of consciousness: awake and alert  Post-procedure vital signs: reviewed and stable  Pain management: adequate  Airway patency: patent  NELDA mitigation strategies: Multimodal analgesia  PONV status at discharge: No PONV  Anesthetic complications: no      Cardiovascular status: blood pressure returned to baseline and hemodynamically stable  Respiratory status: unassisted  Hydration status: euvolemic  Follow-up not needed.          Vitals Value Taken Time   /63 12/20/22 1025   Temp 36.6 °C (97.9 °F) 12/20/22 1025   Pulse 90 12/20/22 1025   Resp 16 12/20/22 1025   SpO2 97 % 12/20/22 1025         No case tracking events are documented in the log.      Pain/Nolvia Score: Nolvia Score: 9 (12/20/2022 10:26 AM)

## 2022-12-20 NOTE — TRANSFER OF CARE
"Anesthesia Transfer of Care Note    Patient: Lima Maldonado    Procedure(s) Performed: Procedure(s) (LRB):  EGD (ESOPHAGOGASTRODUODENOSCOPY) (N/A)    Patient location: GI    Anesthesia Type: general    Transport from OR: Transported from OR on 2-3 L/min O2 by NC with adequate spontaneous ventilation    Post pain: adequate analgesia    Post assessment: no apparent anesthetic complications    Post vital signs: stable    Level of consciousness: awake    Nausea/Vomiting: no nausea/vomiting    Complications: none    Transfer of care protocol was followed      Last vitals:   Visit Vitals  /63   Pulse 87   Temp 36.6 °C (97.9 °F)   Resp 16   Ht 5' 6" (1.676 m)   Wt 78.9 kg (174 lb)   SpO2 97%   Breastfeeding No   BMI 28.08 kg/m²     "

## 2022-12-23 LAB
FINAL PATHOLOGIC DIAGNOSIS: NORMAL
GROSS: 2367
Lab: NORMAL

## 2022-12-30 LAB
FINAL PATHOLOGIC DIAGNOSIS: NORMAL
GROSS: NORMAL
Lab: NORMAL
MICROSCOPIC EXAM: NORMAL

## 2023-01-11 ENCOUNTER — HOSPITAL ENCOUNTER (OUTPATIENT)
Dept: RADIOLOGY | Facility: HOSPITAL | Age: 73
Discharge: HOME | End: 2023-01-11
Attending: INTERNAL MEDICINE
Payer: MEDICARE

## 2023-01-11 DIAGNOSIS — R74.8 ELEVATED LIPASE: ICD-10-CM

## 2023-01-11 DIAGNOSIS — R19.7 DIARRHEA IN ADULT PATIENT: ICD-10-CM

## 2023-01-11 DIAGNOSIS — R11.2 NAUSEA AND VOMITING, UNSPECIFIED VOMITING TYPE: ICD-10-CM

## 2023-01-17 ENCOUNTER — HOSPITAL ENCOUNTER (OUTPATIENT)
Dept: RADIOLOGY | Facility: HOSPITAL | Age: 73
Discharge: HOME | End: 2023-01-17
Attending: INTERNAL MEDICINE
Payer: MEDICARE

## 2023-01-17 PROCEDURE — 74160 CT ABDOMEN W/CONTRAST: CPT | Mod: 26,HCNC,, | Performed by: RADIOLOGY

## 2023-01-17 PROCEDURE — 74160 CT ABDOMEN W/CONTRAST: CPT | Mod: TC,HCNC

## 2023-01-17 PROCEDURE — 74160 CT ABDOMEN WITH CONTRAST: ICD-10-PCS | Mod: 26,HCNC,, | Performed by: RADIOLOGY

## 2023-01-17 PROCEDURE — 25500020 PHARM REV CODE 255: Mod: HCNC | Performed by: INTERNAL MEDICINE

## 2023-01-17 RX ADMIN — IOHEXOL 100 ML: 350 INJECTION, SOLUTION INTRAVENOUS at 09:01

## 2023-01-17 NOTE — PROGRESS NOTES
Katerin  your EGD pathology was benign     1. Small bowel, biopsy:   - Small bowel mucosa without significant histopathologic alteration   - Negative for active inflammation or celiac-like injury   - Negative for dysplasia or malignancy   2. Stomach, biopsy:   - Mild chronic inactive gastritis   - Negative for Helicobacter pylori on H&E stain and immunostain   - Negative for intestinal metaplasia, dysplasia or malignancy   NOTE: Positive control and internal negative control for Helicobacter pylori   immunostain were examined and were appropriate.   3. Stomach, polypectomies:   - Gastric hyperplastic polyp, multiple fragments   - Negative for Helicobacter pylori on H&E stain   - Negative for intestinal metaplasia, dysplasia or malignancy   Comment: Interp By Tyrese Sequeira MD, PhD, Signed on 12/30/2022

## 2023-01-19 NOTE — PROGRESS NOTES
"Subjective:       Patient ID: Lima Maldonado is a 67 y.o. female.    Vitals:  height is 5' 6" (1.676 m) and weight is 83.9 kg (185 lb). Her other (see comments) temperature is 99.8 °F (37.7 °C). Her blood pressure is 150/87 (abnormal) and her pulse is 103. Her respiration is 18 and oxygen saturation is 98%.     Chief Complaint: Cough and Sinus Problem    This is a 67 y.o. female with Past Medical History:  7/17/2017: Acute cystitis without hematuria  No date: Arthritis  No date: Back pain  No date: Breast cancer      Comment: originally dx in 02/1997- treated with                surgery, chemo and radiation   11/19/2013: Elevated bilirubin  No date: Fatty liver  No date: GERD (gastroesophageal reflux disease)  No date: Glucose intolerance (impaired glucose toleranc*  No date: Hyperlipidemia  No date: Hypertension  No date: Hypothyroid  No date: Obesity  No date: Osteopenia  No date: Osteoporosis  11/2/2016: Primary insomnia  No date: Shingles  No date: Sleep apnea      Comment: wears CPAP nightly   2011: Squamous cell carcinoma      Comment: right forearm, sx by Dr. Corinne Ray  No date: Thyrotoxicosis without mention of goiter or ot*      Comment: hypothyroidism  No date: Trouble in sleeping  No date: Urinary incontinence  11/2/2016: Well woman exam with routine gynecological exam   who presents today with a chief complaint of sinus problem.      Sinus Problem   This is a new problem. The current episode started in the past 7 days. The problem has been gradually worsening since onset. The maximum temperature recorded prior to her arrival was 100.4 - 100.9 F. Her pain is at a severity of 8/10. Associated symptoms include chills, congestion, coughing, ear pain and a sore throat. Pertinent negatives include no headaches, hoarse voice or shortness of breath. Past treatments include acetaminophen, lying down, sitting up and spray decongestants. The treatment provided no relief.     Review of Systems   Constitution: " Positive for chills and malaise/fatigue. Negative for fever.   HENT: Positive for congestion, ear pain and sore throat. Negative for hoarse voice.    Eyes: Positive for discharge and redness. Negative for blurred vision and pain.   Cardiovascular: Negative for chest pain, dyspnea on exertion and leg swelling.   Respiratory: Positive for cough and sputum production. Negative for shortness of breath and wheezing.    Skin: Negative for rash.   Musculoskeletal: Negative for joint pain and myalgias.   Gastrointestinal: Negative for abdominal pain, diarrhea, nausea and vomiting.   Genitourinary: Negative for dysuria.   Neurological: Negative for dizziness, focal weakness and headaches.   Psychiatric/Behavioral: Negative for depression.       Objective:      Physical Exam   Constitutional: She is oriented to person, place, and time. She appears well-developed and well-nourished.   HENT:   Head: Normocephalic and atraumatic.   Right Ear: Hearing, tympanic membrane, external ear and ear canal normal.   Left Ear: Hearing, tympanic membrane, external ear and ear canal normal.   Nose: Nose normal.   Mouth/Throat: Uvula is midline and oropharynx is clear and moist.   Eyes: Conjunctivae are normal.   Neck: Normal range of motion. Neck supple. No thyromegaly present.   Cardiovascular: Normal rate and regular rhythm.  Exam reveals no gallop and no friction rub.    No murmur heard.  Pulmonary/Chest: Effort normal and breath sounds normal. She has no wheezes. She has no rales.   Musculoskeletal: Normal range of motion.   Lymphadenopathy:     She has no cervical adenopathy.   Neurological: She is alert and oriented to person, place, and time.   Skin: Skin is warm and dry. No rash noted. No erythema.   Psychiatric: She has a normal mood and affect. Her behavior is normal. Judgment and thought content normal.   Nursing note and vitals reviewed.      Assessment:       1. Upper respiratory tract infection, unspecified type    2. Viral  syndrome        Plan:         Upper respiratory tract infection, unspecified type  -     methylPREDNISolone (MEDROL DOSEPACK) 4 mg tablet; use as directed  Dispense: 1 Package; Refill: 0  -     guaifenesin-codeine 100-10 mg/5 ml (TUSSI-ORGANIDIN NR)  mg/5 mL syrup; Take 5 mLs by mouth every 6 (six) hours as needed for Cough.  Dispense: 240 mL; Refill: 0  -     albuterol 90 mcg/actuation inhaler; Inhale 1-2 puffs into the lungs every 4 (four) hours as needed.  Dispense: 1 Inhaler; Refill: 1    Viral syndrome  -     methylPREDNISolone (MEDROL DOSEPACK) 4 mg tablet; use as directed  Dispense: 1 Package; Refill: 0  -     guaifenesin-codeine 100-10 mg/5 ml (TUSSI-ORGANIDIN NR)  mg/5 mL syrup; Take 5 mLs by mouth every 6 (six) hours as needed for Cough.  Dispense: 240 mL; Refill: 0  -     albuterol 90 mcg/actuation inhaler; Inhale 1-2 puffs into the lungs every 4 (four) hours as needed.  Dispense: 1 Inhaler; Refill: 1      Lima was seen today for cough and sinus problem.    Diagnoses and all orders for this visit:    Upper respiratory tract infection, unspecified type  -     methylPREDNISolone (MEDROL DOSEPACK) 4 mg tablet; use as directed  -     guaifenesin-codeine 100-10 mg/5 ml (TUSSI-ORGANIDIN NR)  mg/5 mL syrup; Take 5 mLs by mouth every 6 (six) hours as needed for Cough.  -     albuterol 90 mcg/actuation inhaler; Inhale 1-2 puffs into the lungs every 4 (four) hours as needed.    Viral syndrome  -     methylPREDNISolone (MEDROL DOSEPACK) 4 mg tablet; use as directed  -     guaifenesin-codeine 100-10 mg/5 ml (TUSSI-ORGANIDIN NR)  mg/5 mL syrup; Take 5 mLs by mouth every 6 (six) hours as needed for Cough.  -     albuterol 90 mcg/actuation inhaler; Inhale 1-2 puffs into the lungs every 4 (four) hours as needed.      Patient Instructions   - Rest.    - Drink plenty of fluids.    - Tylenol or Ibuprofen as directed as needed for fever/pain.    - Take over-the-counter claritin, zyrtec, allegra, or  "xyzal as directed.   - Take over the counter Coricidin HBP as directed for symptom relief.   - Follow up with your PCP or specialty clinic as directed in the next 1-2 weeks if not improved or as needed.  You can call [phone] to schedule an appointment with the appropriate provider.    - Go to the ED if your symptoms worsen.    Viral Syndrome (Adult)  A viral illness may cause a number of symptoms. The symptoms depend on the part of the body that the virus affects. If it settles in your nose, throat, and lungs, it may cause cough, sore throat, congestion, and sometimes headache. If it settles in your stomach and intestinal tract, it may cause vomiting and diarrhea. Sometimes it causes vague symptoms like "aching all over," feeling tired, loss of appetite, or fever.  A viral illness usually lasts 1 to 2 weeks, but sometimes it lasts longer. In some cases, a more serious infection can look like a viral syndrome in the first few days of the illness. You may need another exam and additional tests to know the difference. Watch for the warning signs listed below.  Home care  Follow these guidelines for taking care of yourself at home:  · If symptoms are severe, rest at home for the first 2 to 3 days.  · Stay away from cigarette smoke - both your smoke and the smoke from others.  · You may use over-the-counter acetaminophen or ibuprofen for fever, muscle aching, and headache, unless another medicine was prescribed for this. If you have chronic liver or kidney disease or ever had a stomach ulcer or GI bleeding, talk with your doctor before using these medicines. No one who is younger than 18 and ill with a fever should take aspirin. It may cause severe disease or death.  · Your appetite may be poor, so a light diet is fine. Avoid dehydration by drinking 8 to 12 8-ounce glasses of fluids each day. This may include water; orange juice; lemonade; apple, grape, and cranberry juice; clear fruit drinks; electrolyte " replacement and sports drinks; and decaffeinated teas and coffee. If you have been diagnosed with a kidney disease, ask your doctor how much and what types of fluids you should drink to prevent dehydration. If you have kidney disease, drinking too much fluid can cause it build up in the your body and be dangerous to your health.  · Over-the-counter remedies won't shorten the length of the illness but may be helpful for cough, sore throat; and nasal and sinus congestion. Don't use decongestants if you have high blood pressure.  Follow-up care  Follow up with your healthcare provider if you do not improve over the next week.  Call 911  Get emergency medical care if any of the following occur:  · Convulsion  · Feeling weak, dizzy, or like you are going to faint  · Chest pain, shortness of breath, wheezing, or difficulty breathing  When to seek medical advice  Call your healthcare provider right away if any of these occur:  · Cough with lots of colored sputum (mucus) or blood in your sputum  · Chest pain, shortness of breath, wheezing, or difficulty breathing  · Severe headache; face, neck, or ear pain  · Severe, constant pain in the lower right side of your belly (abdominal)  · Continued vomiting (cant keep liquids down)  · Frequent diarrhea (more than 5 times a day); blood (red or black color) or mucus in diarrhea  · Feeling weak, dizzy, or like you are going to faint  · Extreme thirst  · Fever of 100.4°F (38°C) or higher, or as directed by your healthcare provider  Date Last Reviewed: 9/25/2015  © 7182-7494 [employer]. [address] [place] [address], [place] [address]. All rights reserved. This information is not intended as a substitute for professional medical care. Always follow your healthcare professional's instructions.        Viral Upper Respiratory Illness (Adult)  You have a viral upper respiratory illness (URI), which is another term for the common cold. This illness is contagious during the first  few days. It is spread through the air by coughing and sneezing. It may also be spread by direct contact (touching the sick person and then touching your own eyes, nose, or mouth). Frequent handwashing will decrease risk of spread. Most viral illnesses go away within 7 to 10 days with rest and simple home remedies. Sometimes the illness may last for several weeks. Antibiotics will not kill a virus, and they are generally not prescribed for this condition.    Home care  · If symptoms are severe, rest at home for the first 2 to 3 days. When you resume activity, don't let yourself get too tired.  · Avoid being exposed to cigarette smoke (yours or others).  · You may use acetaminophen or ibuprofen to control pain and fever, unless another medicine was prescribed. (Note: If you have chronic liver or kidney disease, have ever had a stomach ulcer or gastrointestinal bleeding, or are taking blood-thinning medicines, talk with your healthcare provider before using these medicines.) Aspirin should never be given to anyone under 18 years of age who is ill with a viral infection or fever. It may cause severe liver or brain damage.  · Your appetite may be poor, so a light diet is fine. Avoid dehydration by drinking 6 to 8 glasses of fluids per day (water, soft drinks, juices, tea, or soup). Extra fluids will help loosen secretions in the nose and lungs.  · Over-the-counter cold medicines will not shorten the length of time youre sick, but they may be helpful for the following symptoms: cough, sore throat, and nasal and sinus congestion. (Note: Do not use decongestants if you have high blood pressure.)  Follow-up care  Follow up with your healthcare provider, or as advised.  When to seek medical advice  Call your healthcare provider right away if any of these occur:  · Cough with lots of colored sputum (mucus)  · Severe headache; face, neck, or ear pain  · Difficulty swallowing due to throat pain  · Fever of 100.4°F  (38°C)  Call 911, or get immediate medical care  Call emergency services right away if any of these occur:  · Chest pain, shortness of breath, wheezing, or difficulty breathing  · Coughing up blood  · Inability to swallow due to throat pain  Date Last Reviewed: 9/13/2015  © 1320-3013 [employer] [address] [place] [address], [place] [address]. All rights reserved. This information is not intended as a substitute for professional medical care. Always follow your healthcare professional's instructions.                Consent: The risks of pain and injection site reactions were reviewed with the patient prior to the injection.

## 2023-01-22 ENCOUNTER — PATIENT MESSAGE (OUTPATIENT)
Dept: GASTROENTEROLOGY | Facility: CLINIC | Age: 73
End: 2023-01-22
Payer: MEDICARE

## 2023-01-22 NOTE — PROGRESS NOTES
Valorie please refer Katerin to [facility] for further evaluation of her low fecal elastase diarrhea pancreatic lesion seen on CT scan.  For evaluation and management.  Thank you      Katerin your CT scan was read as follows I will refer you to her pancreas specialist and the [facility] for further evaluation and management of your symptoms and imaging findings.  Referral has been placed

## 2023-01-22 NOTE — PROGRESS NOTES
Katerin it looks like your followed by hepatology Clinic already findings of fatty liver and slightly enlarged liver and spleen as you know avoidance of alcohol and gradual weight loss is the current recommendations ideally 17 lb weight loss gradually over the next 12-18 months.    I will refer you to [facility] for further evaluation and management of your low fecal elastase should diarrhea and pancreas findings on CT.  That need follow-up.    Impression:     1. 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  Recommend reimaging every 2 years for 5 years to monitor for interval growth.  2. Hepatomegaly and hepatic steatosis.     Electronically signed by resident: Julienne Quiñones  Date:                                            01/17/2023  Time:                                           09:32     Electronically signed by: Yuri Severino MD  Date:                                            01/17/2023

## 2023-01-23 ENCOUNTER — TELEPHONE (OUTPATIENT)
Dept: ENDOSCOPY | Facility: HOSPITAL | Age: 73
End: 2023-01-23
Payer: MEDICARE

## 2023-01-24 ENCOUNTER — TELEPHONE (OUTPATIENT)
Dept: ENDOSCOPY | Facility: HOSPITAL | Age: 73
End: 2023-01-24
Payer: MEDICARE

## 2023-01-24 NOTE — TELEPHONE ENCOUNTER
----- Message from Niels Ching MD sent at 1/24/2023 12:50 PM CST -----  [facility] visit in 2-3 weeks; may need EUS or MRCP as next test for pancreas image findings.    ----- Message -----  From: Omar Ambriz MD  Sent: 1/22/2023   4:30 PM CST  To: Erika Manzo MA, Joie Mccall MD, #    Valorie please refer Katerin to [facility] for further evaluation of her low fecal elastase diarrhea pancreatic lesion seen on CT scan.  For evaluation and management.  Thank you      Katerin your CT scan was read as follows I will refer you to her pancreas specialist and the [facility] for further evaluation and management of your symptoms and imaging findings.  Referral has been placed

## 2023-01-25 ENCOUNTER — LAB VISIT (OUTPATIENT)
Dept: LAB | Facility: HOSPITAL | Age: 73
End: 2023-01-25
Attending: INTERNAL MEDICINE
Payer: MEDICARE

## 2023-01-25 DIAGNOSIS — E11.65 TYPE 2 DIABETES MELLITUS WITH HYPERGLYCEMIA, WITHOUT LONG-TERM CURRENT USE OF INSULIN: ICD-10-CM

## 2023-01-25 LAB
ESTIMATED AVG GLUCOSE: 148 MG/DL (ref 68–131)
HBA1C MFR BLD: 6.8 % (ref 4–5.6)

## 2023-01-25 PROCEDURE — 36415 COLL VENOUS BLD VENIPUNCTURE: CPT | Mod: HCNC | Performed by: INTERNAL MEDICINE

## 2023-01-25 PROCEDURE — 83036 HEMOGLOBIN GLYCOSYLATED A1C: CPT | Mod: HCNC | Performed by: INTERNAL MEDICINE

## 2023-02-01 ENCOUNTER — OFFICE VISIT (OUTPATIENT)
Dept: INTERNAL MEDICINE | Facility: CLINIC | Age: 73
End: 2023-02-01
Attending: INTERNAL MEDICINE
Payer: MEDICARE

## 2023-02-01 VITALS
HEIGHT: 66 IN | BODY MASS INDEX: 28.34 KG/M2 | WEIGHT: 176.38 LBS | HEART RATE: 101 BPM | DIASTOLIC BLOOD PRESSURE: 66 MMHG | OXYGEN SATURATION: 96 % | SYSTOLIC BLOOD PRESSURE: 118 MMHG

## 2023-02-01 DIAGNOSIS — M81.0 SENILE OSTEOPOROSIS: ICD-10-CM

## 2023-02-01 DIAGNOSIS — C50.512 MALIGNANT NEOPLASM OF LOWER-OUTER QUADRANT OF LEFT BREAST OF FEMALE, ESTROGEN RECEPTOR POSITIVE: ICD-10-CM

## 2023-02-01 DIAGNOSIS — E11.65 TYPE 2 DIABETES MELLITUS WITH HYPERGLYCEMIA, WITHOUT LONG-TERM CURRENT USE OF INSULIN: Primary | ICD-10-CM

## 2023-02-01 DIAGNOSIS — E78.2 MIXED HYPERLIPIDEMIA: ICD-10-CM

## 2023-02-01 DIAGNOSIS — I70.0 AORTIC ATHEROSCLEROSIS: ICD-10-CM

## 2023-02-01 DIAGNOSIS — I10 PRIMARY HYPERTENSION: ICD-10-CM

## 2023-02-01 DIAGNOSIS — K76.0 FATTY LIVER: ICD-10-CM

## 2023-02-01 DIAGNOSIS — Z17.0 MALIGNANT NEOPLASM OF LOWER-OUTER QUADRANT OF LEFT BREAST OF FEMALE, ESTROGEN RECEPTOR POSITIVE: ICD-10-CM

## 2023-02-01 DIAGNOSIS — K21.9 GASTROESOPHAGEAL REFLUX DISEASE, UNSPECIFIED WHETHER ESOPHAGITIS PRESENT: ICD-10-CM

## 2023-02-01 DIAGNOSIS — Z79.899 ENCOUNTER FOR LONG-TERM CURRENT USE OF MEDICATION: ICD-10-CM

## 2023-02-01 DIAGNOSIS — G62.0 CHEMOTHERAPY-INDUCED NEUROPATHY: ICD-10-CM

## 2023-02-01 DIAGNOSIS — R11.0 NAUSEA: ICD-10-CM

## 2023-02-01 DIAGNOSIS — G62.0 PERIPHERAL NEUROPATHY DUE TO CHEMOTHERAPY: ICD-10-CM

## 2023-02-01 DIAGNOSIS — T45.1X5A PERIPHERAL NEUROPATHY DUE TO CHEMOTHERAPY: ICD-10-CM

## 2023-02-01 DIAGNOSIS — E11.42 TYPE 2 DIABETES MELLITUS WITH DIABETIC POLYNEUROPATHY, WITHOUT LONG-TERM CURRENT USE OF INSULIN: ICD-10-CM

## 2023-02-01 DIAGNOSIS — G47.33 OBSTRUCTIVE SLEEP APNEA ON CPAP: ICD-10-CM

## 2023-02-01 DIAGNOSIS — T45.1X5A CHEMOTHERAPY-INDUCED NEUROPATHY: ICD-10-CM

## 2023-02-01 PROCEDURE — 99215 OFFICE O/P EST HI 40 MIN: CPT | Mod: HCNC,S$GLB,, | Performed by: INTERNAL MEDICINE

## 2023-02-01 PROCEDURE — 3044F HG A1C LEVEL LT 7.0%: CPT | Mod: HCNC,CPTII,S$GLB, | Performed by: INTERNAL MEDICINE

## 2023-02-01 PROCEDURE — 3072F PR LOW RISK FOR RETINOPATHY: ICD-10-PCS | Mod: HCNC,CPTII,S$GLB, | Performed by: INTERNAL MEDICINE

## 2023-02-01 PROCEDURE — 99999 PR PBB SHADOW E&M-EST. PATIENT-LVL V: CPT | Mod: PBBFAC,HCNC,, | Performed by: INTERNAL MEDICINE

## 2023-02-01 PROCEDURE — 1101F PT FALLS ASSESS-DOCD LE1/YR: CPT | Mod: HCNC,CPTII,S$GLB, | Performed by: INTERNAL MEDICINE

## 2023-02-01 PROCEDURE — 1159F MED LIST DOCD IN RCRD: CPT | Mod: HCNC,CPTII,S$GLB, | Performed by: INTERNAL MEDICINE

## 2023-02-01 PROCEDURE — 3288F FALL RISK ASSESSMENT DOCD: CPT | Mod: HCNC,CPTII,S$GLB, | Performed by: INTERNAL MEDICINE

## 2023-02-01 PROCEDURE — 1126F PR PAIN SEVERITY QUANTIFIED, NO PAIN PRESENT: ICD-10-PCS | Mod: HCNC,CPTII,S$GLB, | Performed by: INTERNAL MEDICINE

## 2023-02-01 PROCEDURE — 3074F SYST BP LT 130 MM HG: CPT | Mod: HCNC,CPTII,S$GLB, | Performed by: INTERNAL MEDICINE

## 2023-02-01 PROCEDURE — 3074F PR MOST RECENT SYSTOLIC BLOOD PRESSURE < 130 MM HG: ICD-10-PCS | Mod: HCNC,CPTII,S$GLB, | Performed by: INTERNAL MEDICINE

## 2023-02-01 PROCEDURE — 3008F BODY MASS INDEX DOCD: CPT | Mod: HCNC,CPTII,S$GLB, | Performed by: INTERNAL MEDICINE

## 2023-02-01 PROCEDURE — 1126F AMNT PAIN NOTED NONE PRSNT: CPT | Mod: HCNC,CPTII,S$GLB, | Performed by: INTERNAL MEDICINE

## 2023-02-01 PROCEDURE — 1101F PR PT FALLS ASSESS DOC 0-1 FALLS W/OUT INJ PAST YR: ICD-10-PCS | Mod: HCNC,CPTII,S$GLB, | Performed by: INTERNAL MEDICINE

## 2023-02-01 PROCEDURE — 3078F PR MOST RECENT DIASTOLIC BLOOD PRESSURE < 80 MM HG: ICD-10-PCS | Mod: HCNC,CPTII,S$GLB, | Performed by: INTERNAL MEDICINE

## 2023-02-01 PROCEDURE — 3288F PR FALLS RISK ASSESSMENT DOCUMENTED: ICD-10-PCS | Mod: HCNC,CPTII,S$GLB, | Performed by: INTERNAL MEDICINE

## 2023-02-01 PROCEDURE — 99215 PR OFFICE/OUTPT VISIT, EST, LEVL V, 40-54 MIN: ICD-10-PCS | Mod: HCNC,S$GLB,, | Performed by: INTERNAL MEDICINE

## 2023-02-01 PROCEDURE — 3078F DIAST BP <80 MM HG: CPT | Mod: HCNC,CPTII,S$GLB, | Performed by: INTERNAL MEDICINE

## 2023-02-01 PROCEDURE — 1159F PR MEDICATION LIST DOCUMENTED IN MEDICAL RECORD: ICD-10-PCS | Mod: HCNC,CPTII,S$GLB, | Performed by: INTERNAL MEDICINE

## 2023-02-01 PROCEDURE — 3072F LOW RISK FOR RETINOPATHY: CPT | Mod: HCNC,CPTII,S$GLB, | Performed by: INTERNAL MEDICINE

## 2023-02-01 PROCEDURE — 3044F PR MOST RECENT HEMOGLOBIN A1C LEVEL <7.0%: ICD-10-PCS | Mod: HCNC,CPTII,S$GLB, | Performed by: INTERNAL MEDICINE

## 2023-02-01 PROCEDURE — 3008F PR BODY MASS INDEX (BMI) DOCUMENTED: ICD-10-PCS | Mod: HCNC,CPTII,S$GLB, | Performed by: INTERNAL MEDICINE

## 2023-02-01 PROCEDURE — 99999 PR PBB SHADOW E&M-EST. PATIENT-LVL V: ICD-10-PCS | Mod: PBBFAC,HCNC,, | Performed by: INTERNAL MEDICINE

## 2023-02-01 NOTE — PROGRESS NOTES
"Subjective:       Patient ID: Lima Maldonado is a 72 y.o. female.    Chief Complaint: Diabetes     Lima Maldonado is a 72 y.o.  female who presents for Diabetes  .  Was having intermittent viral symptoms with nausea, vomiting and diarrhea and has been evaluated by dr alarcon. She is using sucralate prn, on average taking it twice a week for a "mild yucky feeling". This manages her symptoms well.  Also taking ppi daily. Recent UGI showed gastritis, h pylori negative and hyperplastic gastric polyps.     Walking with  nightly for exercise. Attempting healthy diet.         Diabetes  Pertinent negatives for diabetes include no chest pain.   Patient Active Problem List   Diagnosis    Hyperlipidemia    Hypothyroidism    Obstructive sleep apnea on CPAP    GERD (gastroesophageal reflux disease)    Fatty liver    Primary hypertension    Vaginal atrophy    Senile osteoporosis    Primary insomnia    Malignant neoplasm of lower-outer quadrant of left female breast    Dermatitis of eyelid of left eye due to herpes zoster    Aortic atherosclerosis    Chemotherapy-induced neuropathy    Stress incontinence of urine    Sensorineural hearing loss (SNHL) of left ear with restricted hearing of right ear    Perforation of left tympanic membrane    Allergic rhinitis    Nasal septal deviation    Tinnitus of both ears    Diabetic polyneuropathy associated with type 2 diabetes mellitus    Nuclear sclerosis, bilateral    Post-operative state    Nuclear sclerotic cataract of right eye    Early hepatic fibrosis    Type 2 diabetes mellitus with diabetic polyneuropathy, without long-term current use of insulin    Overweight (BMI 25.0-29.9)           Past Medical History:   Diagnosis Date    Acute cystitis without hematuria 7/17/2017    Arthritis     Back pain     Breast cancer     originally dx in 02/1997- treated with surgery, chemo and radiation     Class 1 obesity due to excess calories with serious comorbidity and body mass " index (BMI) of 30.0 to 30.9 in adult 11/29/2018    Elevated bilirubin 11/19/2013    Fatty liver     GERD (gastroesophageal reflux disease)     Glucose intolerance (impaired glucose tolerance)     Hyperlipidemia     Hypertension     Hypothyroid     Hypothyroidism     hypothyroidism    Malignant neoplasm of lower-outer quadrant of left female breast 11/15/2016    Obesity     Obesity, diabetes, and hypertension syndrome 12/12/2018    Osteopenia     Osteoporosis     Primary insomnia 11/2/2016    Shingles     Sleep apnea     wears CPAP nightly     Squamous cell carcinoma 2011    right forearm, sx by Dr. Corinne Ray    Stress incontinence of urine 1/11/2018    Trouble in sleeping     Type 2 diabetes mellitus with diabetic polyneuropathy, without long-term current use of insulin 12/26/2017    Urinary incontinence     Well woman exam with routine gynecological exam 11/2/2016       Past Surgical History:   Procedure Laterality Date    APPENDECTOMY  2008    removed during hysterectomy surgery     BLEPHAROPLASTY Bilateral     BREAST BIOPSY      2010    Breast implant Bilateral 1998    implants removed in 2003    BREAST LUMPECTOMY      02/1997    BREAST RECONSTRUCTION Bilateral     02/2017    BREAST SURGERY      see details below     CATARACT EXTRACTION W/  INTRAOCULAR LENS IMPLANT Left 08/13/2020    Procedure: EXTRACTION, CATARACT, WITH IOL INSERTION;  Surgeon: Ivanna Coleman MD;  Location: [facility];  Service: Ophthalmology;  Laterality: Left;    CATARACT EXTRACTION W/  INTRAOCULAR LENS IMPLANT Right 08/27/2020    Procedure: EXTRACTION, CATARACT, WITH IOL INSERTION LASER;  Surgeon: Ivanna Coleman MD;  Location: [facility];  Service: Ophthalmology;  Laterality: Right;    COLONOSCOPY N/A 05/15/2018    Procedure: COLONOSCOPY;  Surgeon: Roldan Gonzales MD;  Location: [address]);  Service: Endoscopy;  Laterality: N/A;    COSMETIC SURGERY  1/2022    Eyelift    ESOPHAGOGASTRODUODENOSCOPY N/A 12/20/2022    Procedure: EGD  (ESOPHAGOGASTRODUODENOSCOPY);  Surgeon: Omar Ambriz MD;  Location: [facility] ([address] [place] [address]);  Service: Endoscopy;  Laterality: N/A;  instructions via portal -     EYE SURGERY      CATERACTS    HYSTERECTOMY  2008    benign    left breast reconstruction      left modified radical mastectomy  1997    for treatment of breast cancer     LIPOMA RESECTION  2010    removed from upper back area     MODIFIED RADICAL MASTECTOMY W/ AXILLARY LYMPH NODE DISSECTION  1997    OOPHORECTOMY          PELVIC LAPAROSCOPY      pt had endometriosis     NH REMOVAL OF OVARY/TUBE(S)      benign    reduction of mons pubis      robotic lap  hysterectomy with bso      UPPER GASTROINTESTINAL ENDOSCOPY  2013       Family History   Problem Relation Age of Onset    Heart attack Father 51    Heart disease Father         Heart Att[place], age 51    Alcohol abuse Father     Breast cancer Sister 51        BRCA 1/2 negative    Cancer Sister         Breast Cancer    Cancer Mother 65        Malignant melanoma    Melanoma Mother     No Known Problems Brother     No Known Problems Daughter     No Known Problems Son     No Known Problems Daughter     No Known Problems Son     Diabetes Maternal Uncle             Diabetes Maternal Uncle             Arthritis Maternal Grandmother         Severe arthitis in Knees    Uterine cancer Neg Hx     Colon cancer Neg Hx     Celiac disease Neg Hx     Esophageal cancer Neg Hx     Inflammatory bowel disease Neg Hx     Liver cancer Neg Hx     Liver disease Neg Hx     Stomach cancer Neg Hx     Ulcerative colitis Neg Hx     Hypertension Neg Hx     Cirrhosis Neg Hx     Crohn's disease Neg Hx     Rectal cancer Neg Hx     Ovarian cancer Neg Hx        Social History     Tobacco Use    Smoking status: Former     Packs/day: 0.50     Years: 3.00     Pack years: 1.50     Types: Cigarettes     Start date: 4/15/1969     Quit date: 4/15/1972     Years since quittin.8    Smokeless  "tobacco: Never    Tobacco comments:     started at age 18 during college    Substance Use Topics    Alcohol use: Yes     Comment: Rarely, not even 1X per week    Drug use: No         Review of Systems   Constitutional:  Negative for chills and fever.   Respiratory:  Negative for cough and shortness of breath.    Cardiovascular:  Negative for chest pain and palpitations.   Gastrointestinal:  Negative for nausea and vomiting.   Genitourinary:  Negative for dysuria and hematuria.       Objective:   Blood pressure 118/66, pulse 101, height 5' 6" (1.676 m), weight 80 kg (176 lb 5.9 oz), SpO2 96 %.     Physical Exam  Constitutional:       Appearance: Normal appearance. She is well-developed. She is not ill-appearing.   HENT:      Head: Normocephalic and atraumatic.   Eyes:      General: No scleral icterus.     Conjunctiva/sclera: Conjunctivae normal.   Cardiovascular:      Rate and Rhythm: Normal rate.      Heart sounds: Normal heart sounds. No murmur heard.    No friction rub. No gallop.   Pulmonary:      Effort: Pulmonary effort is normal.      Breath sounds: Normal breath sounds. No wheezing or rales.   Chest:      Chest wall: No tenderness.   Abdominal:      General: Bowel sounds are normal. There is no distension.      Palpations: Abdomen is soft. There is no mass.   Musculoskeletal:         General: No tenderness or signs of injury.   Skin:     General: Skin is warm and dry.   Neurological:      Mental Status: She is alert and oriented to person, place, and time. Mental status is at baseline.   Psychiatric:         Behavior: Behavior normal.         Thought Content: Thought content normal.       Prior labs reviewed    Health Maintenance         Date Due Completion Date    DEXA Scan 07/16/2023 7/16/2021    Hemoglobin A1c 07/25/2023 1/25/2023    Eye Exam 08/17/2023 8/17/2022    Foot Exam 08/22/2023 8/22/2022    Override on 9/24/2019: Done    Override on 12/11/2018: Done    Override on 8/9/2017: Done    Lipid Panel " 10/18/2023 10/18/2022    Diabetes Urine Screening 10/25/2023 10/25/2022    TETANUS VACCINE 12/03/2023 12/3/2013    High Dose Statin 12/20/2023 12/20/2022    Colorectal Cancer Screening 05/15/2028 5/15/2018    Override on 10/6/2006: Done            Assessment/Plan:       1. Type 2 diabetes mellitus with hyperglycemia, without long-term current use of insulin  Overview:  metfromin 500mg daily, increasing trulicity from 0.75mg to 1.5 mg 7/22 hba1c 6.6   10/22 nonadherent to diet, hba1c 7.2   Had GI issues, unclear eitology, work up with dr alarcon  Symptoms improved with prn sulcrafate  No significant improvement in wt or dm on higher dose of trulicity  Decreased 0.75 trulicity, increased metformin to 500 mg bid with improvement in hba1c to 6.8     cont current plan, not changing to ozempic due to GI issues    Orders:  -     Hemoglobin A1C; Future; Expected date: 07/31/2023  -     Diabetes Digital Medicine (DD) Enrollment Order  -     Diabetes Digital Medicine (DD): Assign Onboarding Questionnaires    2. Nausea  Comments:  cont sulcarfate, protonix  Orders:  -     NM Gastric Emptying; Future; Expected date: 02/01/2023    3. Senile osteoporosis  Overview:  On prolia, last inj 2/25/22   Scheduled for 5/23  Recommend wt bearing exercise, vit d      4. Primary hypertension  Comments:  well controlled  Overview:  Controlled on losartan 50 mg daily    Assessment & Plan:  Cont current plan  Refer to digital medicine    Orders:  -     Hypertension Digital Medicine (HDM) Enrollment Order  -     Hypertension Digital Medicine (HDM): Assign Onboarding Questionnaires    5. Obstructive sleep apnea on CPAP  Comments:  cont cpap usage nightly  Overview:  Adherent to CPAP  Cont current plan      6. Fatty liver  Comments:  recommend wt loss  Overview:  Dx 2013  On imaging 2022  Recommend wt loss      7. Aortic atherosclerosis  Overview:  Seen on CXR 9/21/17 and lumbar spine xray 5/9/16  Managed with simvastatin 20 mg,  fenofibrate  Cont current plan, heart healthy diet    Orders:  -     Lipids Digital Medicine (LDMP) Enrollment Order  -     Lipids Digital Medicine (LDMP): Assign Onboarding Questionnaires    8. Malignant neoplasm of lower-outer quadrant of left breast of female, estrogen receptor positive  Comments:  followed by dr yadav  stable JACKI  Overview:  Followed by dr yadav  Currently treated with letrozole 2.5 mg daily      9. Peripheral neuropathy due to chemotherapy    10. Chemotherapy-induced neuropathy  Overview:  Worsened with chemo  No improvement with holding AI  Improves with gabapentin use      11. Gastroesophageal reflux disease, unspecified whether esophagitis present  Comments:  check labs on rtc  cont protonix, lifestyel modifications    12. Mixed hyperlipidemia  Overview:  On simvastatin, added fenofibrate 4/22   Continue current plan  Sees dr warner in cardiology      13. Encounter for long-term current use of medication  -     Magnesium; Future; Expected date: 08/01/2023  -     Vitamin B12; Future; Expected date: 08/01/2023  -     Vitamin D; Future; Expected date: 08/01/2023    14. Type 2 diabetes mellitus with diabetic polyneuropathy, without long-term current use of insulin  Overview:  metfromin 500mg daily, increasing trulicity from 0.75mg to 1.5 mg 7/22 hba1c 6.6   10/22 nonadherent to diet, hba1c 7.2   Had GI issues, unclear eitology, work up with dr alarcon  Symptoms improved with prn sulcrafate  No significant improvement in wt or dm on higher dose of trulicity  Decreased 0.75 trulicity, increased metformin to 500 mg bid with improvement in hba1c to 6.8     cont current plan, not changing to ozempic due to GI issues      42 min spent in care of patient including history, physical, chart review, orders and coordination of care.       Medication List with Changes/Refills   Current Medications    ASCORBIC ACID, VITAMIN C, (VITAMIN C) 1000 MG TABLET    Take 1 tablet by mouth once daily.    CANNABIDIOL,  CBD, EXTRACT ORAL    Take 1 drop by mouth Daily.    CELECOXIB (CELEBREX) 100 MG CAPSULE    TAKE 1 CAPSULE (100 MG TOTAL) BY MOUTH DAILY AS NEEDED.    COENZYME Q10-VITAMIN E 100-100 MG-UNIT CAP    Take 1 capsule by mouth once daily.     DENOSUMAB (PROLIA) 60 MG/ML SYRG    Inject 1 mL (60 mg total) into the skin every 6 (six) months.    DULAGLUTIDE (TRULICITY) 0.75 MG/0.5 ML PEN INJECTOR    Inject 0.75 mg into the skin every 7 days.    FENOFIBRATE 160 MG TAB    Take 1 tablet (160 mg total) by mouth once daily.    FLUTICASONE (FLONASE) 50 MCG/ACTUATION NASAL SPRAY    1 spray by Each Nare route 2 (two) times daily.     GABAPENTIN (NEURONTIN) 300 MG CAPSULE    Take 2 capsules (600 mg total) by mouth every evening.    LETROZOLE (FEMARA) 2.5 MG TAB    TAKE 1 TABLET BY MOUTH EVERY DAY    LEVOTHYROXINE (SYNTHROID) 50 MCG TABLET    TAKE 1 TABLET BY MOUTH EVERY DAY    LOSARTAN (COZAAR) 50 MG TABLET    Take 1 tablet (50 mg total) by mouth once daily.    METFORMIN (GLUCOPHAGE) 500 MG TABLET    TAKE 1 TABLET BY MOUTH EVERY DAY WITH BREAKFAST    MULTIVITAMIN ORAL    Take 1 tablet by mouth once daily.     OMEGA-3 FATTY ACIDS-VITAMIN E 1,000 MG CAP    Take 1 capsule by mouth once daily.     PANTOPRAZOLE (PROTONIX) 20 MG TABLET    TAKE 1 TABLET (20 MG TOTAL) BY MOUTH BEFORE BREAKFAST.    SIMVASTATIN (ZOCOR) 20 MG TABLET    TAKE 1 TABLET BY MOUTH EVERY DAY IN THE EVENING    SUCRALFATE (CARAFATE) 100 MG/ML SUSPENSION    Take 10 mLs (1 g total) by mouth 4 (four) times daily.    VITAMIN D 1000 UNITS TAB    Take 1,000 Units by mouth once daily.     YUVAFEM 10 MCG TAB    PLACE 1 TABLET VAGINALLY TWICE A WEEK   Discontinued Medications    HYDROCORTISONE 2.5 % OINTMENT    Apply topically 2 (two) times daily. To up to 2 weeks for eczema flares       Recommend patient complete vaccinations as listed in the overdue health maintenance report. Pt may obtain vaccinations at their local pharmacy, any [facility] pharmacy or request vaccination in our  clinic.  Please note that some insurances will only cover vaccination cost at specific locations.Please check with your insurance provider regarding your coverage.  Vaccines that are not covered are still recommended.

## 2023-02-09 DIAGNOSIS — Z00.00 ENCOUNTER FOR MEDICARE ANNUAL WELLNESS EXAM: ICD-10-CM

## 2023-02-14 ENCOUNTER — PATIENT MESSAGE (OUTPATIENT)
Dept: INTERNAL MEDICINE | Facility: CLINIC | Age: 73
End: 2023-02-14
Payer: MEDICARE

## 2023-02-14 ENCOUNTER — HOSPITAL ENCOUNTER (OUTPATIENT)
Dept: RADIOLOGY | Facility: HOSPITAL | Age: 73
Discharge: HOME | End: 2023-02-14
Attending: INTERNAL MEDICINE
Payer: MEDICARE

## 2023-02-14 DIAGNOSIS — R11.0 NAUSEA: ICD-10-CM

## 2023-02-14 PROCEDURE — 78264 GASTRIC EMPTYING IMG STUDY: CPT | Mod: 26,HCNC,, | Performed by: RADIOLOGY

## 2023-02-14 PROCEDURE — 78264 NM GASTRIC EMPTYING: ICD-10-PCS | Mod: 26,HCNC,, | Performed by: RADIOLOGY

## 2023-02-14 PROCEDURE — 78264 GASTRIC EMPTYING IMG STUDY: CPT | Mod: TC,HCNC

## 2023-02-15 RX ORDER — DULAGLUTIDE 1.5 MG/.5ML
1.5 INJECTION, SOLUTION SUBCUTANEOUS
Qty: 12 PEN | Refills: 3 | Status: SHIPPED | OUTPATIENT
Start: 2023-02-15 | End: 2023-08-01 | Stop reason: ALTCHOICE

## 2023-02-15 NOTE — PROGRESS NOTES
Increase trulicity to 1.5mg weekly  Please schedule video visit in one month to assess wt managemetn and diabetes managemtn on higher dose  Wit me or alan please

## 2023-02-16 ENCOUNTER — TELEPHONE (OUTPATIENT)
Dept: INTERNAL MEDICINE | Facility: CLINIC | Age: 73
End: 2023-02-16
Payer: MEDICARE

## 2023-02-16 ENCOUNTER — OFFICE VISIT (OUTPATIENT)
Dept: GASTROENTEROLOGY | Facility: CLINIC | Age: 73
End: 2023-02-16
Payer: MEDICARE

## 2023-02-16 VITALS
HEART RATE: 101 BPM | DIASTOLIC BLOOD PRESSURE: 66 MMHG | BODY MASS INDEX: 28.61 KG/M2 | SYSTOLIC BLOOD PRESSURE: 118 MMHG | WEIGHT: 178 LBS | HEIGHT: 66 IN

## 2023-02-16 DIAGNOSIS — R93.5 ABNORMAL CT OF THE ABDOMEN: Primary | ICD-10-CM

## 2023-02-16 DIAGNOSIS — K86.2 PANCREATIC CYST: ICD-10-CM

## 2023-02-16 PROCEDURE — 3078F DIAST BP <80 MM HG: CPT | Mod: HCNC,CPTII,S$GLB, | Performed by: INTERNAL MEDICINE

## 2023-02-16 PROCEDURE — 3072F PR LOW RISK FOR RETINOPATHY: ICD-10-PCS | Mod: HCNC,CPTII,S$GLB, | Performed by: INTERNAL MEDICINE

## 2023-02-16 PROCEDURE — 3072F LOW RISK FOR RETINOPATHY: CPT | Mod: HCNC,CPTII,S$GLB, | Performed by: INTERNAL MEDICINE

## 2023-02-16 PROCEDURE — 3074F SYST BP LT 130 MM HG: CPT | Mod: HCNC,CPTII,S$GLB, | Performed by: INTERNAL MEDICINE

## 2023-02-16 PROCEDURE — 3044F PR MOST RECENT HEMOGLOBIN A1C LEVEL <7.0%: ICD-10-PCS | Mod: HCNC,CPTII,S$GLB, | Performed by: INTERNAL MEDICINE

## 2023-02-16 PROCEDURE — 1159F PR MEDICATION LIST DOCUMENTED IN MEDICAL RECORD: ICD-10-PCS | Mod: HCNC,CPTII,S$GLB, | Performed by: INTERNAL MEDICINE

## 2023-02-16 PROCEDURE — 1160F RVW MEDS BY RX/DR IN RCRD: CPT | Mod: HCNC,CPTII,S$GLB, | Performed by: INTERNAL MEDICINE

## 2023-02-16 PROCEDURE — 99999 PR PBB SHADOW E&M-EST. PATIENT-LVL III: ICD-10-PCS | Mod: PBBFAC,HCNC,, | Performed by: INTERNAL MEDICINE

## 2023-02-16 PROCEDURE — 3008F BODY MASS INDEX DOCD: CPT | Mod: HCNC,CPTII,S$GLB, | Performed by: INTERNAL MEDICINE

## 2023-02-16 PROCEDURE — 1160F PR REVIEW ALL MEDS BY PRESCRIBER/CLIN PHARMACIST DOCUMENTED: ICD-10-PCS | Mod: HCNC,CPTII,S$GLB, | Performed by: INTERNAL MEDICINE

## 2023-02-16 PROCEDURE — 3078F PR MOST RECENT DIASTOLIC BLOOD PRESSURE < 80 MM HG: ICD-10-PCS | Mod: HCNC,CPTII,S$GLB, | Performed by: INTERNAL MEDICINE

## 2023-02-16 PROCEDURE — 1101F PR PT FALLS ASSESS DOC 0-1 FALLS W/OUT INJ PAST YR: ICD-10-PCS | Mod: HCNC,CPTII,S$GLB, | Performed by: INTERNAL MEDICINE

## 2023-02-16 PROCEDURE — 3044F HG A1C LEVEL LT 7.0%: CPT | Mod: HCNC,CPTII,S$GLB, | Performed by: INTERNAL MEDICINE

## 2023-02-16 PROCEDURE — 3008F PR BODY MASS INDEX (BMI) DOCUMENTED: ICD-10-PCS | Mod: HCNC,CPTII,S$GLB, | Performed by: INTERNAL MEDICINE

## 2023-02-16 PROCEDURE — 1101F PT FALLS ASSESS-DOCD LE1/YR: CPT | Mod: HCNC,CPTII,S$GLB, | Performed by: INTERNAL MEDICINE

## 2023-02-16 PROCEDURE — 99214 OFFICE O/P EST MOD 30 MIN: CPT | Mod: HCNC,S$GLB,, | Performed by: INTERNAL MEDICINE

## 2023-02-16 PROCEDURE — 1159F MED LIST DOCD IN RCRD: CPT | Mod: HCNC,CPTII,S$GLB, | Performed by: INTERNAL MEDICINE

## 2023-02-16 PROCEDURE — 3288F PR FALLS RISK ASSESSMENT DOCUMENTED: ICD-10-PCS | Mod: HCNC,CPTII,S$GLB, | Performed by: INTERNAL MEDICINE

## 2023-02-16 PROCEDURE — 99999 PR PBB SHADOW E&M-EST. PATIENT-LVL III: CPT | Mod: PBBFAC,HCNC,, | Performed by: INTERNAL MEDICINE

## 2023-02-16 PROCEDURE — 99214 PR OFFICE/OUTPT VISIT, EST, LEVL IV, 30-39 MIN: ICD-10-PCS | Mod: HCNC,S$GLB,, | Performed by: INTERNAL MEDICINE

## 2023-02-16 PROCEDURE — 3288F FALL RISK ASSESSMENT DOCD: CPT | Mod: HCNC,CPTII,S$GLB, | Performed by: INTERNAL MEDICINE

## 2023-02-16 PROCEDURE — 3074F PR MOST RECENT SYSTOLIC BLOOD PRESSURE < 130 MM HG: ICD-10-PCS | Mod: HCNC,CPTII,S$GLB, | Performed by: INTERNAL MEDICINE

## 2023-02-16 NOTE — TELEPHONE ENCOUNTER
----- Message from Kirill Gilbert sent at 2/16/2023  4:31 PM CST -----      Name of Who is Calling: LORNE ALBA [[id number]]      What is the request in detail: Pt returned call to the office.Please contact to further discuss and advise.          Can the clinic reply by [facility]: N      What [facility] Back [facility] [phone]

## 2023-02-16 NOTE — TELEPHONE ENCOUNTER
"Per Dr. Mccall, "Increase trulicity to 1.5mg weekly  Please schedule video visit in one month to assess wt managemetn and diabetes managemtn on higher dose  Wit me or alan please"    Called pt to advise about Trulicity increase, pt understood. Pt also states she has an appointment scheduled.  "

## 2023-02-16 NOTE — TELEPHONE ENCOUNTER
Ms. Conklin calls back and I schedule her one month virtual f/u appt with Ms. Gabriel as advised by Dr. Mccall.

## 2023-02-16 NOTE — PROGRESS NOTES
Advanced Endoscopy / Pancreaticobiliary Service    Reason for visit (Chief Complaint):  Abnormal CT, pancreatic cysts    Referring provider/PCP: Omar Ambriz MD  [address] [place] [address]    History of Present Illness: Patient presents for above.  She underwent CT scan in mid January, that was ordered for prior history of diarrhea and nausea.  Of note her symptoms of diarrhea and nausea have since resolved.  She now reports having 1 bowel movement a day that is not loose and that sinks in the toilet.     The CT showed 3 hypodense areas in the pancreas (2 in the head and 1 in the body).  The largest that this measured about 8 mm.  The main pancreatic duct was not dilated.    She has no history of pancreatitis.  No family history of pancreatic cancer.  She is a nonsmoker.      Physical Exam:  General: Well-developed, well-appearing, no acute distress      Laboratory:   reviewed    Imaging:  Reviewed images from CT scan.    Assessment/Plan:  Pancreatic cyst- suspect the 3 small hypodense areas seen on CT scan all represent pancreatic cysts.  We discussed the rationale for pancreatic cyst surveillance.  Per the international guidelines that our group follows, I will arrange for an interval imaging test in about 6 months.  Would recommend that the interval imaging be an endoscopic ultrasound at that time.  This will better allow us to characterize internal aspects of the cysts.  If there is no change in size in that 6 month interval (with cysts remaining <10mm), the next surveillance imaging would likely be in about 2 years.  The patient is comfortable and in agreement with this plan.        Hermelindo Coleman MD, ALFRED   - Department of Gastroenterology  [facility] [place]

## 2023-03-14 ENCOUNTER — OFFICE VISIT (OUTPATIENT)
Dept: INTERNAL MEDICINE | Facility: CLINIC | Age: 73
End: 2023-03-14
Payer: MEDICARE

## 2023-03-14 VITALS
HEART RATE: 92 BPM | DIASTOLIC BLOOD PRESSURE: 88 MMHG | WEIGHT: 178 LBS | BODY MASS INDEX: 28.61 KG/M2 | SYSTOLIC BLOOD PRESSURE: 132 MMHG | HEIGHT: 66 IN

## 2023-03-14 DIAGNOSIS — E11.65 TYPE 2 DIABETES MELLITUS WITH HYPERGLYCEMIA, WITHOUT LONG-TERM CURRENT USE OF INSULIN: Primary | ICD-10-CM

## 2023-03-14 DIAGNOSIS — I10 PRIMARY HYPERTENSION: ICD-10-CM

## 2023-03-14 DIAGNOSIS — E66.3 OVERWEIGHT (BMI 25.0-29.9): ICD-10-CM

## 2023-03-14 PROCEDURE — 1159F PR MEDICATION LIST DOCUMENTED IN MEDICAL RECORD: ICD-10-PCS | Mod: HCNC,CPTII,95, | Performed by: PHYSICIAN ASSISTANT

## 2023-03-14 PROCEDURE — 3075F PR MOST RECENT SYSTOLIC BLOOD PRESS GE 130-139MM HG: ICD-10-PCS | Mod: HCNC,CPTII,95, | Performed by: PHYSICIAN ASSISTANT

## 2023-03-14 PROCEDURE — 3008F BODY MASS INDEX DOCD: CPT | Mod: HCNC,CPTII,95, | Performed by: PHYSICIAN ASSISTANT

## 2023-03-14 PROCEDURE — 3044F PR MOST RECENT HEMOGLOBIN A1C LEVEL <7.0%: ICD-10-PCS | Mod: HCNC,CPTII,95, | Performed by: PHYSICIAN ASSISTANT

## 2023-03-14 PROCEDURE — 1101F PT FALLS ASSESS-DOCD LE1/YR: CPT | Mod: HCNC,CPTII,95, | Performed by: PHYSICIAN ASSISTANT

## 2023-03-14 PROCEDURE — 1126F AMNT PAIN NOTED NONE PRSNT: CPT | Mod: HCNC,CPTII,95, | Performed by: PHYSICIAN ASSISTANT

## 2023-03-14 PROCEDURE — 99214 PR OFFICE/OUTPT VISIT, EST, LEVL IV, 30-39 MIN: ICD-10-PCS | Mod: HCNC,95,, | Performed by: PHYSICIAN ASSISTANT

## 2023-03-14 PROCEDURE — 99214 OFFICE O/P EST MOD 30 MIN: CPT | Mod: HCNC,95,, | Performed by: PHYSICIAN ASSISTANT

## 2023-03-14 PROCEDURE — 3044F HG A1C LEVEL LT 7.0%: CPT | Mod: HCNC,CPTII,95, | Performed by: PHYSICIAN ASSISTANT

## 2023-03-14 PROCEDURE — 3075F SYST BP GE 130 - 139MM HG: CPT | Mod: HCNC,CPTII,95, | Performed by: PHYSICIAN ASSISTANT

## 2023-03-14 PROCEDURE — 1101F PR PT FALLS ASSESS DOC 0-1 FALLS W/OUT INJ PAST YR: ICD-10-PCS | Mod: HCNC,CPTII,95, | Performed by: PHYSICIAN ASSISTANT

## 2023-03-14 PROCEDURE — 3288F FALL RISK ASSESSMENT DOCD: CPT | Mod: HCNC,CPTII,95, | Performed by: PHYSICIAN ASSISTANT

## 2023-03-14 PROCEDURE — 3288F PR FALLS RISK ASSESSMENT DOCUMENTED: ICD-10-PCS | Mod: HCNC,CPTII,95, | Performed by: PHYSICIAN ASSISTANT

## 2023-03-14 PROCEDURE — 3079F PR MOST RECENT DIASTOLIC BLOOD PRESSURE 80-89 MM HG: ICD-10-PCS | Mod: HCNC,CPTII,95, | Performed by: PHYSICIAN ASSISTANT

## 2023-03-14 PROCEDURE — 3079F DIAST BP 80-89 MM HG: CPT | Mod: HCNC,CPTII,95, | Performed by: PHYSICIAN ASSISTANT

## 2023-03-14 PROCEDURE — 3072F PR LOW RISK FOR RETINOPATHY: ICD-10-PCS | Mod: HCNC,CPTII,95, | Performed by: PHYSICIAN ASSISTANT

## 2023-03-14 PROCEDURE — 1159F MED LIST DOCD IN RCRD: CPT | Mod: HCNC,CPTII,95, | Performed by: PHYSICIAN ASSISTANT

## 2023-03-14 PROCEDURE — 4010F PR ACE/ARB THEARPY RXD/TAKEN: ICD-10-PCS | Mod: HCNC,CPTII,95, | Performed by: PHYSICIAN ASSISTANT

## 2023-03-14 PROCEDURE — 3008F PR BODY MASS INDEX (BMI) DOCUMENTED: ICD-10-PCS | Mod: HCNC,CPTII,95, | Performed by: PHYSICIAN ASSISTANT

## 2023-03-14 PROCEDURE — 3072F LOW RISK FOR RETINOPATHY: CPT | Mod: HCNC,CPTII,95, | Performed by: PHYSICIAN ASSISTANT

## 2023-03-14 PROCEDURE — 1126F PR PAIN SEVERITY QUANTIFIED, NO PAIN PRESENT: ICD-10-PCS | Mod: HCNC,CPTII,95, | Performed by: PHYSICIAN ASSISTANT

## 2023-03-14 PROCEDURE — 4010F ACE/ARB THERAPY RXD/TAKEN: CPT | Mod: HCNC,CPTII,95, | Performed by: PHYSICIAN ASSISTANT

## 2023-03-14 NOTE — PROGRESS NOTES
INTERNAL MEDICINE PROGRESS NOTE    CHIEF COMPLAINT     Chief Complaint   Patient presents with    Diabetes       HPI     Lima Maldonado is a 72 y.o. female who presents for a follow-up visit today.    PCP is Joie Mccall MD, patient is kno known to me.     Patient presents today virtually for follow-up.  She was last seen by primary care provider on 02/01/2023 for diabetes check.  At that time she was having symptoms with nausea vomiting and diarrhea that were being managed by Dr. Ambriz.  She had normal gastric emptying study.  She requested to change from Trulicity to Ozempic to help not only with diabetes management but with weight loss as well.  Because of difficulty with obtaining Ozempic (supply shortage, insurance coverage difficulties) PCP decided to increase Trulicity to 1 mg weekly.  Patient has been compliant with this medication increase and reports that she is tolerating this well.  She has no side effects.  She wishes to continue this regimen.  Patient has had no symptomatic lows and has had normal blood sugar readings in the 100s but admits to sugars increasing to 200s associated with dietary indiscretion.    Blood pressure has been well controlled and when checked at home today reading was 124 over 70s.    Patient denies fever, chills, nausea, vomiting, chest pain, shortness of breath, dizziness, altered mental status, unexpected bleeding.  At home during this virtual visit.  Face time 15 minutes.        Past Medical History:  Past Medical History:   Diagnosis Date    Acute cystitis without hematuria 7/17/2017    Arthritis     Back pain     Breast cancer     originally dx in 02/1997- treated with surgery, chemo and radiation     Class 1 obesity due to excess calories with serious comorbidity and body mass index (BMI) of 30.0 to 30.9 in adult 11/29/2018    Elevated bilirubin 11/19/2013    Fatty liver     GERD (gastroesophageal reflux disease)     Glucose intolerance (impaired glucose  tolerance)     Hyperlipidemia     Hypertension     Hypothyroid     Hypothyroidism     hypothyroidism    Malignant neoplasm of lower-outer quadrant of left female breast 11/15/2016    Obesity     Obesity, diabetes, and hypertension syndrome 12/12/2018    Osteopenia     Osteoporosis     Primary insomnia 11/2/2016    Shingles     Sleep apnea     wears CPAP nightly     Squamous cell carcinoma 2011    right forearm, sx by Dr. Corinne Ray    Stress incontinence of urine 1/11/2018    Trouble in sleeping     Type 2 diabetes mellitus with diabetic polyneuropathy, without long-term current use of insulin 12/26/2017    Urinary incontinence     Well woman exam with routine gynecological exam 11/2/2016       Home Medications:  Prior to Admission medications    Medication Sig Start Date End Date Taking? Authorizing Provider   ascorbic acid, vitamin C, (VITAMIN C) 1000 MG tablet Take 1 tablet by mouth once daily.   Yes Historical Provider   CANNABIDIOL, CBD, EXTRACT ORAL Take 1 drop by mouth Daily.   Yes Historical Provider   celecoxib (CELEBREX) 100 MG capsule TAKE 1 CAPSULE (100 MG TOTAL) BY MOUTH DAILY AS NEEDED. 6/1/21  Yes Sapna Small NP   coenzyme Q10-vitamin E 100-100 mg-unit Cap Take 1 capsule by mouth once daily.    Yes Historical Provider   denosumab (PROLIA) 60 mg/mL Syrg Inject 1 mL (60 mg total) into the skin every 6 (six) months. 8/5/22 8/5/23 Yes Joie Mccall MD   dulaglutide (TRULICITY) 1.5 mg/0.5 mL pen injector Inject 1.5 mg into the skin every 7 days. 2/15/23 2/15/24 Yes Joie Mccall MD   fenofibrate 160 MG Tab Take 1 tablet (160 mg total) by mouth once daily. 4/21/22 4/21/23 Yes Joie Mccall MD   fluticasone (FLONASE) 50 mcg/actuation nasal spray 1 spray by Each Nare route 2 (two) times daily.  8/29/18  Yes Historical Provider   gabapentin (NEURONTIN) 300 MG capsule Take 2 capsules (600 mg total) by mouth every evening. 4/26/22 4/26/23 Yes Francisco Baker MD   letrozole (FEMARA) 2.5  mg Tab TAKE 1 TABLET BY MOUTH EVERY DAY 3/30/22  Yes Francisco Baker MD   levothyroxine (SYNTHROID) 50 MCG tablet TAKE 1 TABLET BY MOUTH EVERY DAY 11/10/22  Yes Joie Mccall MD   losartan (COZAAR) 50 MG tablet Take 1 tablet (50 mg total) by mouth once daily. 6/16/22  Yes Joie Mccall MD   metFORMIN (GLUCOPHAGE) 500 MG tablet TAKE 1 TABLET BY MOUTH EVERY DAY WITH BREAKFAST 12/27/22  Yes Joie Mccall MD   MULTIVITAMIN ORAL Take 1 tablet by mouth once daily.    Yes Historical Provider   omega-3 fatty acids-vitamin E 1,000 mg Cap Take 1 capsule by mouth once daily.    Yes Historical Provider   pantoprazole (PROTONIX) 20 MG tablet TAKE 1 TABLET (20 MG TOTAL) BY MOUTH BEFORE BREAKFAST. 12/8/22 3/14/23 Yes Omar Ambriz MD   simvastatin (ZOCOR) 20 MG tablet TAKE 1 TABLET BY MOUTH EVERY DAY IN THE EVENING 12/5/22  Yes Bridgett Gabriel PA-C   sucralfate (CARAFATE) 100 mg/mL suspension Take 10 mLs (1 g total) by mouth 4 (four) times daily. 10/25/22  Yes Joie Mccall MD   vitamin D 1000 units Tab Take 1,000 Units by mouth once daily.    Yes Historical Provider   YUVAFEM 10 mcg Tab PLACE 1 TABLET VAGINALLY TWICE A WEEK 12/29/21  Yes Sapna Small NP       Review of Systems:  Review of Systems   Constitutional:  Positive for fatigue.   Cardiovascular:  Negative for chest pain.   Endocrine: Negative for polydipsia, polyphagia and polyuria.   Skin:  Negative for pallor.   Neurological:  Negative for dizziness, tremors, seizures, speech difficulty, weakness and headaches.   Psychiatric/Behavioral:  Negative for confusion. The patient is not nervous/anxious.      Health Maintainence:   Immunizations:  Health Maintenance         Date Due Completion Date    DEXA Scan 07/16/2023 7/16/2021    Hemoglobin A1c 07/25/2023 1/25/2023    Eye Exam 08/17/2023 8/17/2022    Foot Exam 08/22/2023 8/22/2022    Override on 9/24/2019: Done    Override on 12/11/2018: Done    Override on 8/9/2017: Done    Lipid Panel  "10/18/2023 10/18/2022    Diabetes Urine Screening 10/25/2023 10/25/2022    TETANUS VACCINE 12/03/2023 12/3/2013    High Dose Statin 02/16/2024 2/16/2023    Colorectal Cancer Screening 05/15/2028 5/15/2018    Override on 10/6/2006: Done             PHYSICAL EXAM     /88   Pulse 92   Ht 5' 6" (1.676 m)   Wt 80.7 kg (178 lb)   BMI 28.73 kg/m²     Physical Exam  Constitutional:       Appearance: Normal appearance.      Comments: Healthy-appearing female in no acute distress or apparent pain.   Neurological:      Mental Status: She is alert.       LABS     Lab Results   Component Value Date    HGBA1C 6.8 (H) 01/25/2023     CMP  Sodium   Date Value Ref Range Status   10/18/2022 141 136 - 145 mmol/L Final   10/18/2022 141 136 - 145 mmol/L Final     Potassium   Date Value Ref Range Status   10/18/2022 3.9 3.5 - 5.1 mmol/L Final   10/18/2022 3.9 3.5 - 5.1 mmol/L Final     Chloride   Date Value Ref Range Status   10/18/2022 107 95 - 110 mmol/L Final   10/18/2022 107 95 - 110 mmol/L Final     CO2   Date Value Ref Range Status   10/18/2022 24 23 - 29 mmol/L Final   10/18/2022 24 23 - 29 mmol/L Final     Glucose   Date Value Ref Range Status   10/18/2022 148 (H) 70 - 110 mg/dL Final   10/18/2022 148 (H) 70 - 110 mg/dL Final     BUN   Date Value Ref Range Status   10/18/2022 18 8 - 23 mg/dL Final   10/18/2022 18 8 - 23 mg/dL Final     Creatinine   Date Value Ref Range Status   10/18/2022 0.9 0.5 - 1.4 mg/dL Final   10/18/2022 0.9 0.5 - 1.4 mg/dL Final     Calcium   Date Value Ref Range Status   10/18/2022 9.8 8.7 - 10.5 mg/dL Final   10/18/2022 9.8 8.7 - 10.5 mg/dL Final     Total Protein   Date Value Ref Range Status   10/18/2022 6.9 6.0 - 8.4 g/dL Final   10/18/2022 6.9 6.0 - 8.4 g/dL Final     Albumin   Date Value Ref Range Status   10/18/2022 4.3 3.5 - 5.2 g/dL Final   10/18/2022 4.3 3.5 - 5.2 g/dL Final     Total Bilirubin   Date Value Ref Range Status   10/18/2022 0.9 0.1 - 1.0 mg/dL Final     Comment:     For " infants and newborns, interpretation of results should be based  on gestational age, weight and in agreement with clinical  observations.    Premature Infant recommended reference ranges:  Up to 24 hours.............<8.0 mg/dL  Up to 48 hours............<12.0 mg/dL  3-5 days..................<15.0 mg/dL  6-29 days.................<15.0 mg/dL     10/18/2022 0.9 0.1 - 1.0 mg/dL Final     Comment:     For infants and newborns, interpretation of results should be based  on gestational age, weight and in agreement with clinical  observations.    Premature Infant recommended reference ranges:  Up to 24 hours.............<8.0 mg/dL  Up to 48 hours............<12.0 mg/dL  3-5 days..................<15.0 mg/dL  6-29 days.................<15.0 mg/dL       Alkaline Phosphatase   Date Value Ref Range Status   10/18/2022 56 55 - 135 U/L Final   10/18/2022 56 55 - 135 U/L Final     AST   Date Value Ref Range Status   10/18/2022 33 10 - 40 U/L Final   10/18/2022 33 10 - 40 U/L Final     ALT   Date Value Ref Range Status   10/18/2022 33 10 - 44 U/L Final   10/18/2022 33 10 - 44 U/L Final     Anion Gap   Date Value Ref Range Status   10/18/2022 10 8 - 16 mmol/L Final   10/18/2022 10 8 - 16 mmol/L Final     eGFR if    Date Value Ref Range Status   07/22/2022 >60.0 >60 mL/min/1.73 m^2 Final     eGFR if non    Date Value Ref Range Status   07/22/2022 >60.0 >60 mL/min/1.73 m^2 Final     Comment:     Calculation used to obtain the estimated glomerular filtration  rate (eGFR) is the CKD-EPI equation.        Lab Results   Component Value Date    WBC 4.40 10/18/2022    HGB 13.2 10/18/2022    HCT 39.9 10/18/2022    MCV 88 10/18/2022     10/18/2022     Lab Results   Component Value Date    CHOL 140 10/18/2022    CHOL 148 03/29/2022    CHOL 115 (L) 05/06/2021     Lab Results   Component Value Date    HDL 35 (L) 10/18/2022    HDL 32 (L) 03/29/2022    HDL 34 (L) 05/06/2021     Lab Results   Component Value  Date    LDLCALC 66.6 10/18/2022    LDLCALC 64.8 03/29/2022    LDLCALC 51.4 (L) 05/06/2021     Lab Results   Component Value Date    TRIG 192 (H) 10/18/2022    TRIG 256 (H) 03/29/2022    TRIG 148 05/06/2021     Lab Results   Component Value Date    CHOLHDL 25.0 10/18/2022    CHOLHDL 21.6 03/29/2022    CHOLHDL 29.6 05/06/2021     Lab Results   Component Value Date    TSH 1.689 10/18/2022       ASSESSMENT/PLAN     Lima Maldonado is a 72 y.o. female     Lima was seen today for diabetes. Tolerating med regimen well, continue current regimen. Check A1C in     Diagnoses and all orders for this visit:    Type 2 diabetes mellitus with hyperglycemia, without long-term current use of insulin  -     Hemoglobin A1C; Future  - Trulicity 1.5 weekly, metformin 500 mg daily,    Overweight (BMI 25.0-29.9)   -increased Trulicity dosage   -discussed importance of dietary compliance     Primary hypertension   -well controlled,    -continue current regimen    -losartan 50    Return to clinic in 3 months with PCP with A1c preceding.      Bridgett Gabriel PA-C   Department of Internal Medicine - [facility]   1:49 PM    Answers submitted by the patient for this visit:  Diabetes Questionnaire (Submitted on 3/11/2023)  Chief Complaint: Diabetes problem  Diabetes type: type 2  MedicAlert ID: No  Disease duration: 7 Years  blurred vision: No  foot paresthesias: Yes  foot ulcerations: No  visual change: No  weight loss: No  hunger: No  mood changes: No  sleepiness: No  sweats: No  blackouts: No  hospitalization: No  nocturnal hypoglycemia: No  required assistance: No  required glucagon: No  CVA: No  heart disease: No  nephropathy: No  peripheral neuropathy: Yes  PVD: No  autonomic neuropathy: No  CAD risks: family history, obesity, sedentary lifestyle, diabetes mellitus  Treatment compliance: some of the time  Home blood tests: 1-2 x per day  Monitoring compliance: good  Blood glucose trend: fluctuating minimally  Weight trend:  fluctuating minimally  Meal planning: avoidance of concentrated sweets, carbohydrate counting  Exercise: three times a week  Dietitian visit: No  Eye exam current: Yes  Sees podiatrist: Yes

## 2023-05-08 ENCOUNTER — INFUSION (OUTPATIENT)
Dept: INFUSION THERAPY | Facility: HOSPITAL | Age: 73
End: 2023-05-08
Payer: MEDICARE

## 2023-05-08 DIAGNOSIS — Z17.0 MALIGNANT NEOPLASM OF LOWER-OUTER QUADRANT OF LEFT BREAST OF FEMALE, ESTROGEN RECEPTOR POSITIVE: Primary | ICD-10-CM

## 2023-05-08 DIAGNOSIS — M81.0 SENILE OSTEOPOROSIS: Primary | ICD-10-CM

## 2023-05-08 DIAGNOSIS — C50.512 MALIGNANT NEOPLASM OF LOWER-OUTER QUADRANT OF LEFT BREAST OF FEMALE, ESTROGEN RECEPTOR POSITIVE: Primary | ICD-10-CM

## 2023-05-08 PROCEDURE — 63600175 PHARM REV CODE 636 W HCPCS: Mod: JZ,JG,HCNC | Performed by: INTERNAL MEDICINE

## 2023-05-08 PROCEDURE — 96372 THER/PROPH/DIAG INJ SC/IM: CPT | Mod: HCNC

## 2023-05-08 RX ADMIN — DENOSUMAB 60 MG: 60 INJECTION SUBCUTANEOUS at 12:05

## 2023-05-09 ENCOUNTER — PES CALL (OUTPATIENT)
Dept: ADMINISTRATIVE | Facility: CLINIC | Age: 73
End: 2023-05-09
Payer: MEDICARE

## 2023-05-26 DIAGNOSIS — Z79.899 ENCOUNTER FOR MONITORING LONG-TERM PROTON PUMP INHIBITOR THERAPY: ICD-10-CM

## 2023-05-26 DIAGNOSIS — K44.9 HIATAL HERNIA: ICD-10-CM

## 2023-05-26 DIAGNOSIS — Z51.81 ENCOUNTER FOR MONITORING LONG-TERM PROTON PUMP INHIBITOR THERAPY: ICD-10-CM

## 2023-05-26 DIAGNOSIS — K22.10 ESOPHAGITIS, EROSIVE: ICD-10-CM

## 2023-05-26 RX ORDER — PANTOPRAZOLE SODIUM 20 MG/1
20 TABLET, DELAYED RELEASE ORAL
Qty: 90 TABLET | Refills: 0 | Status: SHIPPED | OUTPATIENT
Start: 2023-05-26 | End: 2023-08-23

## 2023-05-26 NOTE — TELEPHONE ENCOUNTER
Refill Routing Note   Medication(s) are not appropriate for processing by [facility] for the following reason(s):      No active prescription written by PCP    [facility] action(s):  Defer None identified     Medication Therapy Plan: FLOS      Appointments  past 12m or future 3m with PCP    Date Provider   Last Visit   2/1/2023 Joie Mccall MD   Next Visit   7/11/2023 Joie Mccall MD   ED visits in past 90 days: 0        Note composed:10:32 AM 05/26/2023

## 2023-05-26 NOTE — TELEPHONE ENCOUNTER
Care Due:                  Date            Visit Type   Department     Provider  --------------------------------------------------------------------------------                                EP -                              [facility] INTERNAL  Joie Macario  Last Visit: 02-      CARE ([facility])   MEDICINE       Blessey                              [facility] Macario  Next Visit: 07-      CARE ([facility])   MEDICINE       Eusebio                                                            Last  Test          Frequency    Reason                     Performed    Due Date  --------------------------------------------------------------------------------    HBA1C.......  6 months...  dulaglutide, metFORMIN...  01- 07-    [facility] Embedded Care Due Messages. Reference number: [id number].   5/26/2023 8:25:41 AM CDT

## 2023-05-30 ENCOUNTER — LAB VISIT (OUTPATIENT)
Dept: LAB | Facility: HOSPITAL | Age: 73
End: 2023-05-30
Attending: INTERNAL MEDICINE
Payer: MEDICARE

## 2023-05-30 ENCOUNTER — OFFICE VISIT (OUTPATIENT)
Dept: HEMATOLOGY/ONCOLOGY | Facility: CLINIC | Age: 73
End: 2023-05-30
Payer: MEDICARE

## 2023-05-30 VITALS
SYSTOLIC BLOOD PRESSURE: 125 MMHG | RESPIRATION RATE: 18 BRPM | OXYGEN SATURATION: 95 % | BODY MASS INDEX: 28.77 KG/M2 | TEMPERATURE: 98 F | HEIGHT: 66 IN | DIASTOLIC BLOOD PRESSURE: 60 MMHG | HEART RATE: 84 BPM | WEIGHT: 179 LBS

## 2023-05-30 DIAGNOSIS — C50.512 MALIGNANT NEOPLASM OF LOWER-OUTER QUADRANT OF LEFT BREAST OF FEMALE, ESTROGEN RECEPTOR POSITIVE: ICD-10-CM

## 2023-05-30 DIAGNOSIS — Z17.0 MALIGNANT NEOPLASM OF LOWER-OUTER QUADRANT OF LEFT BREAST OF FEMALE, ESTROGEN RECEPTOR POSITIVE: ICD-10-CM

## 2023-05-30 DIAGNOSIS — M81.0 SENILE OSTEOPOROSIS: ICD-10-CM

## 2023-05-30 DIAGNOSIS — Z17.0 MALIGNANT NEOPLASM OF LOWER-OUTER QUADRANT OF LEFT BREAST OF FEMALE, ESTROGEN RECEPTOR POSITIVE: Primary | ICD-10-CM

## 2023-05-30 DIAGNOSIS — C50.512 MALIGNANT NEOPLASM OF LOWER-OUTER QUADRANT OF LEFT BREAST OF FEMALE, ESTROGEN RECEPTOR POSITIVE: Primary | ICD-10-CM

## 2023-05-30 LAB
ALBUMIN SERPL BCP-MCNC: 4.1 G/DL (ref 3.5–5.2)
ALP SERPL-CCNC: 109 U/L (ref 55–135)
ALT SERPL W/O P-5'-P-CCNC: 53 U/L (ref 10–44)
ANION GAP SERPL CALC-SCNC: 7 MMOL/L (ref 8–16)
AST SERPL-CCNC: 51 U/L (ref 10–40)
BILIRUB SERPL-MCNC: 0.8 MG/DL (ref 0.1–1)
BUN SERPL-MCNC: 15 MG/DL (ref 8–23)
CALCIUM SERPL-MCNC: 9.8 MG/DL (ref 8.7–10.5)
CHLORIDE SERPL-SCNC: 108 MMOL/L (ref 95–110)
CO2 SERPL-SCNC: 26 MMOL/L (ref 23–29)
CREAT SERPL-MCNC: 0.8 MG/DL (ref 0.5–1.4)
EST. GFR  (NO RACE VARIABLE): >60 ML/MIN/1.73 M^2
GLUCOSE SERPL-MCNC: 190 MG/DL (ref 70–110)
POTASSIUM SERPL-SCNC: 4.9 MMOL/L (ref 3.5–5.1)
PROT SERPL-MCNC: 6.9 G/DL (ref 6–8.4)
SODIUM SERPL-SCNC: 141 MMOL/L (ref 136–145)

## 2023-05-30 PROCEDURE — 3288F FALL RISK ASSESSMENT DOCD: CPT | Mod: CPTII,S$GLB,, | Performed by: INTERNAL MEDICINE

## 2023-05-30 PROCEDURE — 4010F PR ACE/ARB THEARPY RXD/TAKEN: ICD-10-PCS | Mod: CPTII,S$GLB,, | Performed by: INTERNAL MEDICINE

## 2023-05-30 PROCEDURE — 3072F LOW RISK FOR RETINOPATHY: CPT | Mod: CPTII,S$GLB,, | Performed by: INTERNAL MEDICINE

## 2023-05-30 PROCEDURE — 1101F PT FALLS ASSESS-DOCD LE1/YR: CPT | Mod: CPTII,S$GLB,, | Performed by: INTERNAL MEDICINE

## 2023-05-30 PROCEDURE — 99213 OFFICE O/P EST LOW 20 MIN: CPT | Mod: S$GLB,,, | Performed by: INTERNAL MEDICINE

## 2023-05-30 PROCEDURE — 3074F SYST BP LT 130 MM HG: CPT | Mod: CPTII,S$GLB,, | Performed by: INTERNAL MEDICINE

## 2023-05-30 PROCEDURE — 3008F BODY MASS INDEX DOCD: CPT | Mod: CPTII,S$GLB,, | Performed by: INTERNAL MEDICINE

## 2023-05-30 PROCEDURE — 3078F DIAST BP <80 MM HG: CPT | Mod: CPTII,S$GLB,, | Performed by: INTERNAL MEDICINE

## 2023-05-30 PROCEDURE — 1101F PR PT FALLS ASSESS DOC 0-1 FALLS W/OUT INJ PAST YR: ICD-10-PCS | Mod: CPTII,S$GLB,, | Performed by: INTERNAL MEDICINE

## 2023-05-30 PROCEDURE — 3072F PR LOW RISK FOR RETINOPATHY: ICD-10-PCS | Mod: CPTII,S$GLB,, | Performed by: INTERNAL MEDICINE

## 2023-05-30 PROCEDURE — 99213 PR OFFICE/OUTPT VISIT, EST, LEVL III, 20-29 MIN: ICD-10-PCS | Mod: S$GLB,,, | Performed by: INTERNAL MEDICINE

## 2023-05-30 PROCEDURE — 80053 COMPREHEN METABOLIC PANEL: CPT | Performed by: INTERNAL MEDICINE

## 2023-05-30 PROCEDURE — 99999 PR PBB SHADOW E&M-EST. PATIENT-LVL IV: ICD-10-PCS | Mod: PBBFAC,,, | Performed by: INTERNAL MEDICINE

## 2023-05-30 PROCEDURE — 3074F PR MOST RECENT SYSTOLIC BLOOD PRESSURE < 130 MM HG: ICD-10-PCS | Mod: CPTII,S$GLB,, | Performed by: INTERNAL MEDICINE

## 2023-05-30 PROCEDURE — 3288F PR FALLS RISK ASSESSMENT DOCUMENTED: ICD-10-PCS | Mod: CPTII,S$GLB,, | Performed by: INTERNAL MEDICINE

## 2023-05-30 PROCEDURE — 99999 PR PBB SHADOW E&M-EST. PATIENT-LVL IV: CPT | Mod: PBBFAC,,, | Performed by: INTERNAL MEDICINE

## 2023-05-30 PROCEDURE — 36415 COLL VENOUS BLD VENIPUNCTURE: CPT | Performed by: INTERNAL MEDICINE

## 2023-05-30 PROCEDURE — 3078F PR MOST RECENT DIASTOLIC BLOOD PRESSURE < 80 MM HG: ICD-10-PCS | Mod: CPTII,S$GLB,, | Performed by: INTERNAL MEDICINE

## 2023-05-30 PROCEDURE — 3008F PR BODY MASS INDEX (BMI) DOCUMENTED: ICD-10-PCS | Mod: CPTII,S$GLB,, | Performed by: INTERNAL MEDICINE

## 2023-05-30 PROCEDURE — 3044F HG A1C LEVEL LT 7.0%: CPT | Mod: CPTII,S$GLB,, | Performed by: INTERNAL MEDICINE

## 2023-05-30 PROCEDURE — 1159F PR MEDICATION LIST DOCUMENTED IN MEDICAL RECORD: ICD-10-PCS | Mod: CPTII,S$GLB,, | Performed by: INTERNAL MEDICINE

## 2023-05-30 PROCEDURE — 4010F ACE/ARB THERAPY RXD/TAKEN: CPT | Mod: CPTII,S$GLB,, | Performed by: INTERNAL MEDICINE

## 2023-05-30 PROCEDURE — 3044F PR MOST RECENT HEMOGLOBIN A1C LEVEL <7.0%: ICD-10-PCS | Mod: CPTII,S$GLB,, | Performed by: INTERNAL MEDICINE

## 2023-05-30 PROCEDURE — 1159F MED LIST DOCD IN RCRD: CPT | Mod: CPTII,S$GLB,, | Performed by: INTERNAL MEDICINE

## 2023-05-30 NOTE — PROGRESS NOTES
Subjective:       Patient ID: Lima Maldonado is a 72 y.o. female.    Chief Complaint: No chief complaint on file.      HPI  Ms Maldonado return to clinic for follow-up of  diagnosis of left breast cancer. She had stage I ER positive disease with an intermediate risk Oncotype score.   She is currently on letrozole.     She has arthralgias with her biggest areas of pain being her ankles right greater than left, hands, right hip and right shoulder and some neck pain.    She has ongoing neuropathy in her feet.      She is not been exercising.          Breast history:   mammography on September 20, 2016 demonstrated a new irregular mass with spiculated margins seen in the left breast at  5 o'clock along the surgical scar site.ultrasound showed a  8 X 8 X 7 MM MASS.    A needle biopsy in September 27 showed infiltrating carcinoma, histologic grade 3, nuclear grade 2, mitotic index 1. Tumor was 90% ER positive, 70% MN positive, and 1+ HER-2.     MRI of the breast showed a 1.7 x 1.3 cm lesion in the lower outer quadrant of the left breast.  PET/CT on October 7 was negative for any evidence of distant metastasis.    On November 16, 2016 bilateral mastectomies were performed. Left breast showed a  1.5 cm intermediate grade carcinoma with ductal and lobular features. There was focal dermal invasion. Margins were negative. The right breast was without abnormality.  Oncotype score returned 25 - intermediate risk.  Adjuvant TC X 4 completed 3/31/17.    She began letrozole in April 2017.    Breast Cancer Index:12/2022  5.1% risk or recurrence with benefit to continue endocrine therapy    Patient with history of left breast cancer in 1997 when she underwent left lumpectomy with complete axillary dissection at age 46 for a pT1cN1 breast cancer (Stage IIA).  47 nodes were removed, 1 of which was positive.  She received adjuvant chemotherapy, radiation and tamoxifen.   Review of Systems   Constitutional: Negative.    Respiratory:  Negative.     Neurological: Negative.    Psychiatric/Behavioral: Negative.         Objective:      Physical Exam  Vitals reviewed.   Constitutional:       General: She is not in acute distress.  Cardiovascular:      Rate and Rhythm: Normal rate and regular rhythm.   Pulmonary:      Effort: Pulmonary effort is normal. No respiratory distress.      Breath sounds: Normal breath sounds. No wheezing or rales.   Chest:       Abdominal:      Palpations: Abdomen is soft. There is no mass.      Tenderness: There is no abdominal tenderness.   Lymphadenopathy:      Cervical: No cervical adenopathy.      Upper Body:      Right upper body: No supraclavicular or axillary adenopathy.      Left upper body: No supraclavicular or axillary adenopathy.   Neurological:      Mental Status: She is alert and oriented to person, place, and time.   Psychiatric:         Mood and Affect: Mood normal.         Thought Content: Thought content normal.         Judgment: Judgment normal.       Assessment:       Problem List Items Addressed This Visit       Malignant neoplasm of lower-outer quadrant of left female breast - Primary       Plan:     Return to clinic 6 months.       Route Chart for Scheduling    Med Onc Chart Routing      Follow up with physician 6 months.   Follow up with [facility]    Infusion scheduling note    Injection scheduling note Prolia in 6 months   Labs CMP   Scheduling:  Preferred lab:  Lab interval:     Imaging None      Pharmacy appointment    Other referrals  No additional referrals needed             Supportive Plan Information  OP DENOSUMAB   Francisco Baker MD   Upcoming Treatment Dates - OP DENOSUMAB    5/22/2023       Medications       denosumab (PROLIA) injection 60 mg

## 2023-06-01 ENCOUNTER — TELEPHONE (OUTPATIENT)
Dept: ENDOSCOPY | Facility: HOSPITAL | Age: 73
End: 2023-06-01

## 2023-06-01 NOTE — TELEPHONE ENCOUNTER
----- Message from Erika Manzo MA sent at 2/16/2023 10:43 AM CST -----  MD Erika Buitrago MA  Caller: Unspecified (Today, 10:40 AM)  Clinic patient seen today.  She will need EUS of the pancreas to be performed in August of this year.  Procedure can be performed at [facility] or [place].  Can be a 45 minute case.

## 2023-07-13 ENCOUNTER — TELEPHONE (OUTPATIENT)
Dept: SLEEP MEDICINE | Facility: CLINIC | Age: 73
End: 2023-07-13
Payer: MEDICARE

## 2023-07-18 ENCOUNTER — TELEPHONE (OUTPATIENT)
Dept: ENDOSCOPY | Facility: HOSPITAL | Age: 73
End: 2023-07-18
Payer: MEDICARE

## 2023-07-25 ENCOUNTER — LAB VISIT (OUTPATIENT)
Dept: LAB | Facility: HOSPITAL | Age: 73
End: 2023-07-25
Attending: INTERNAL MEDICINE
Payer: MEDICARE

## 2023-07-25 ENCOUNTER — TELEPHONE (OUTPATIENT)
Dept: ENDOSCOPY | Facility: HOSPITAL | Age: 73
End: 2023-07-25
Payer: MEDICARE

## 2023-07-25 DIAGNOSIS — Z79.899 ENCOUNTER FOR LONG-TERM CURRENT USE OF MEDICATION: ICD-10-CM

## 2023-07-25 DIAGNOSIS — E11.65 TYPE 2 DIABETES MELLITUS WITH HYPERGLYCEMIA, WITHOUT LONG-TERM CURRENT USE OF INSULIN: ICD-10-CM

## 2023-07-25 LAB
25(OH)D3+25(OH)D2 SERPL-MCNC: 80 NG/ML (ref 30–96)
ESTIMATED AVG GLUCOSE: 151 MG/DL (ref 68–131)
HBA1C MFR BLD: 6.9 % (ref 4–5.6)
MAGNESIUM SERPL-MCNC: 2 MG/DL (ref 1.6–2.6)
VIT B12 SERPL-MCNC: 715 PG/ML (ref 210–950)

## 2023-07-25 PROCEDURE — 83735 ASSAY OF MAGNESIUM: CPT | Mod: HCNC | Performed by: INTERNAL MEDICINE

## 2023-07-25 PROCEDURE — 82607 VITAMIN B-12: CPT | Mod: HCNC | Performed by: INTERNAL MEDICINE

## 2023-07-25 PROCEDURE — 82306 VITAMIN D 25 HYDROXY: CPT | Mod: HCNC | Performed by: INTERNAL MEDICINE

## 2023-07-25 PROCEDURE — 83036 HEMOGLOBIN GLYCOSYLATED A1C: CPT | Mod: HCNC | Performed by: INTERNAL MEDICINE

## 2023-07-25 PROCEDURE — 36415 COLL VENOUS BLD VENIPUNCTURE: CPT | Mod: HCNC | Performed by: INTERNAL MEDICINE

## 2023-08-01 ENCOUNTER — OFFICE VISIT (OUTPATIENT)
Dept: INTERNAL MEDICINE | Facility: CLINIC | Age: 73
End: 2023-08-01
Attending: INTERNAL MEDICINE
Payer: MEDICARE

## 2023-08-01 VITALS
WEIGHT: 173.75 LBS | DIASTOLIC BLOOD PRESSURE: 80 MMHG | HEIGHT: 66 IN | OXYGEN SATURATION: 96 % | BODY MASS INDEX: 27.92 KG/M2 | HEART RATE: 90 BPM | SYSTOLIC BLOOD PRESSURE: 135 MMHG

## 2023-08-01 DIAGNOSIS — I10 PRIMARY HYPERTENSION: Primary | ICD-10-CM

## 2023-08-01 DIAGNOSIS — M81.0 AGE-RELATED OSTEOPOROSIS WITHOUT CURRENT PATHOLOGICAL FRACTURE: ICD-10-CM

## 2023-08-01 DIAGNOSIS — E78.2 MIXED HYPERLIPIDEMIA: ICD-10-CM

## 2023-08-01 DIAGNOSIS — H10.9 BACTERIAL CONJUNCTIVITIS OF LEFT EYE: ICD-10-CM

## 2023-08-01 DIAGNOSIS — N95.2 VAGINAL ATROPHY: ICD-10-CM

## 2023-08-01 DIAGNOSIS — E11.42 TYPE 2 DIABETES MELLITUS WITH DIABETIC POLYNEUROPATHY, WITHOUT LONG-TERM CURRENT USE OF INSULIN: ICD-10-CM

## 2023-08-01 PROCEDURE — 3079F PR MOST RECENT DIASTOLIC BLOOD PRESSURE 80-89 MM HG: ICD-10-PCS | Mod: HCNC,CPTII,S$GLB, | Performed by: INTERNAL MEDICINE

## 2023-08-01 PROCEDURE — 3072F PR LOW RISK FOR RETINOPATHY: ICD-10-PCS | Mod: HCNC,CPTII,S$GLB, | Performed by: INTERNAL MEDICINE

## 2023-08-01 PROCEDURE — 99999 PR PBB SHADOW E&M-EST. PATIENT-LVL IV: ICD-10-PCS | Mod: PBBFAC,HCNC,, | Performed by: INTERNAL MEDICINE

## 2023-08-01 PROCEDURE — 1126F AMNT PAIN NOTED NONE PRSNT: CPT | Mod: HCNC,CPTII,S$GLB, | Performed by: INTERNAL MEDICINE

## 2023-08-01 PROCEDURE — 3008F BODY MASS INDEX DOCD: CPT | Mod: HCNC,CPTII,S$GLB, | Performed by: INTERNAL MEDICINE

## 2023-08-01 PROCEDURE — 3066F NEPHROPATHY DOC TX: CPT | Mod: HCNC,CPTII,S$GLB, | Performed by: INTERNAL MEDICINE

## 2023-08-01 PROCEDURE — 3061F PR NEG MICROALBUMINURIA RESULT DOCUMENTED/REVIEW: ICD-10-PCS | Mod: HCNC,CPTII,S$GLB, | Performed by: INTERNAL MEDICINE

## 2023-08-01 PROCEDURE — 3288F PR FALLS RISK ASSESSMENT DOCUMENTED: ICD-10-PCS | Mod: HCNC,CPTII,S$GLB, | Performed by: INTERNAL MEDICINE

## 2023-08-01 PROCEDURE — 1126F PR PAIN SEVERITY QUANTIFIED, NO PAIN PRESENT: ICD-10-PCS | Mod: HCNC,CPTII,S$GLB, | Performed by: INTERNAL MEDICINE

## 2023-08-01 PROCEDURE — 3075F SYST BP GE 130 - 139MM HG: CPT | Mod: HCNC,CPTII,S$GLB, | Performed by: INTERNAL MEDICINE

## 2023-08-01 PROCEDURE — 3044F HG A1C LEVEL LT 7.0%: CPT | Mod: HCNC,CPTII,S$GLB, | Performed by: INTERNAL MEDICINE

## 2023-08-01 PROCEDURE — 3079F DIAST BP 80-89 MM HG: CPT | Mod: HCNC,CPTII,S$GLB, | Performed by: INTERNAL MEDICINE

## 2023-08-01 PROCEDURE — 4010F PR ACE/ARB THEARPY RXD/TAKEN: ICD-10-PCS | Mod: HCNC,CPTII,S$GLB, | Performed by: INTERNAL MEDICINE

## 2023-08-01 PROCEDURE — 1101F PR PT FALLS ASSESS DOC 0-1 FALLS W/OUT INJ PAST YR: ICD-10-PCS | Mod: HCNC,CPTII,S$GLB, | Performed by: INTERNAL MEDICINE

## 2023-08-01 PROCEDURE — 3066F PR DOCUMENTATION OF TREATMENT FOR NEPHROPATHY: ICD-10-PCS | Mod: HCNC,CPTII,S$GLB, | Performed by: INTERNAL MEDICINE

## 2023-08-01 PROCEDURE — 99214 OFFICE O/P EST MOD 30 MIN: CPT | Mod: HCNC,S$GLB,, | Performed by: INTERNAL MEDICINE

## 2023-08-01 PROCEDURE — 3288F FALL RISK ASSESSMENT DOCD: CPT | Mod: HCNC,CPTII,S$GLB, | Performed by: INTERNAL MEDICINE

## 2023-08-01 PROCEDURE — 3072F LOW RISK FOR RETINOPATHY: CPT | Mod: HCNC,CPTII,S$GLB, | Performed by: INTERNAL MEDICINE

## 2023-08-01 PROCEDURE — 3008F PR BODY MASS INDEX (BMI) DOCUMENTED: ICD-10-PCS | Mod: HCNC,CPTII,S$GLB, | Performed by: INTERNAL MEDICINE

## 2023-08-01 PROCEDURE — 99214 PR OFFICE/OUTPT VISIT, EST, LEVL IV, 30-39 MIN: ICD-10-PCS | Mod: HCNC,S$GLB,, | Performed by: INTERNAL MEDICINE

## 2023-08-01 PROCEDURE — 99999 PR PBB SHADOW E&M-EST. PATIENT-LVL IV: CPT | Mod: PBBFAC,HCNC,, | Performed by: INTERNAL MEDICINE

## 2023-08-01 PROCEDURE — 4010F ACE/ARB THERAPY RXD/TAKEN: CPT | Mod: HCNC,CPTII,S$GLB, | Performed by: INTERNAL MEDICINE

## 2023-08-01 PROCEDURE — 1101F PT FALLS ASSESS-DOCD LE1/YR: CPT | Mod: HCNC,CPTII,S$GLB, | Performed by: INTERNAL MEDICINE

## 2023-08-01 PROCEDURE — 3061F NEG MICROALBUMINURIA REV: CPT | Mod: HCNC,CPTII,S$GLB, | Performed by: INTERNAL MEDICINE

## 2023-08-01 PROCEDURE — 3044F PR MOST RECENT HEMOGLOBIN A1C LEVEL <7.0%: ICD-10-PCS | Mod: HCNC,CPTII,S$GLB, | Performed by: INTERNAL MEDICINE

## 2023-08-01 PROCEDURE — 3075F PR MOST RECENT SYSTOLIC BLOOD PRESS GE 130-139MM HG: ICD-10-PCS | Mod: HCNC,CPTII,S$GLB, | Performed by: INTERNAL MEDICINE

## 2023-08-01 RX ORDER — POLYMYXIN B SULFATE AND TRIMETHOPRIM 1; 10000 MG/ML; [USP'U]/ML
1 SOLUTION OPHTHALMIC EVERY 4 HOURS
Qty: 10 ML | Refills: 0 | Status: SHIPPED | OUTPATIENT
Start: 2023-08-01 | End: 2023-08-08

## 2023-08-01 RX ORDER — ESTRADIOL 10 UG/1
INSERT VAGINAL
Qty: 24 TABLET | Refills: 2 | Status: SHIPPED | OUTPATIENT
Start: 2023-08-01

## 2023-08-01 RX ORDER — SEMAGLUTIDE 0.68 MG/ML
0.5 INJECTION, SOLUTION SUBCUTANEOUS
Qty: 3 ML | Refills: 1 | Status: SHIPPED | OUTPATIENT
Start: 2023-08-01 | End: 2023-09-01

## 2023-08-01 NOTE — PROGRESS NOTES
Subjective:       Patient ID: Lima Maldonado is a 73 y.o. female.    Chief Complaint: Diabetes     Lima Maldonado is a 73 y.o.  female who presents for Diabetes  .  Frequent nausea has resolved, despite being on higher dose of trulicity 1.5 mg, diabetes has been stable, hba1c 6.9. weight has decreased but gradually- 5 pounds over last 6 months. Walking some for exercise.  Ready to do digital medicine     + arthralgias since starting letrozole    Red eye started yesterday.  Purlulent discharge. Reforming throughout the day.      Wants vaginal estrogen refill- occasional discomfort    Diabetes      Patient Active Problem List   Diagnosis    Hyperlipidemia    Hypothyroidism    Obstructive sleep apnea on CPAP    GERD (gastroesophageal reflux disease)    Fatty liver    Primary hypertension    Vaginal atrophy    Senile osteoporosis    Primary insomnia    Malignant neoplasm of lower-outer quadrant of left female breast    Dermatitis of eyelid of left eye due to herpes zoster    Aortic atherosclerosis    Chemotherapy-induced neuropathy    Stress incontinence of urine    Sensorineural hearing loss (SNHL) of left ear with restricted hearing of right ear    Perforation of left tympanic membrane    Allergic rhinitis    Nasal septal deviation    Tinnitus of both ears    Diabetic polyneuropathy associated with type 2 diabetes mellitus    Nuclear sclerosis, bilateral    Post-operative state    Nuclear sclerotic cataract of right eye    Early hepatic fibrosis    Type 2 diabetes mellitus with diabetic polyneuropathy, without long-term current use of insulin    Overweight (BMI 25.0-29.9)           Past Medical History:   Diagnosis Date    Acute cystitis without hematuria 7/17/2017    Arthritis     Back pain     Breast cancer     originally dx in 02/1997- treated with surgery, chemo and radiation     Class 1 obesity due to excess calories with serious comorbidity and body mass index (BMI) of 30.0 to 30.9 in adult 11/29/2018     Elevated bilirubin 11/19/2013    Fatty liver     GERD (gastroesophageal reflux disease)     Glucose intolerance (impaired glucose tolerance)     Hyperlipidemia     Hypertension     Hypothyroid     Hypothyroidism     hypothyroidism    Malignant neoplasm of lower-outer quadrant of left female breast 11/15/2016    Obesity     Obesity, diabetes, and hypertension syndrome 12/12/2018    Osteopenia     Osteoporosis     Primary insomnia 11/2/2016    Shingles     Sleep apnea     wears CPAP nightly     Squamous cell carcinoma 2011    right forearm, sx by Dr. Corinne Ray    Stress incontinence of urine 1/11/2018    Trouble in sleeping     Type 2 diabetes mellitus with diabetic polyneuropathy, without long-term current use of insulin 12/26/2017    Urinary incontinence     Well woman exam with routine gynecological exam 11/2/2016       Past Surgical History:   Procedure Laterality Date    APPENDECTOMY  2008    removed during hysterectomy surgery     BLEPHAROPLASTY Bilateral     BREAST BIOPSY      2010    Breast implant Bilateral 1998    implants removed in 2003    BREAST LUMPECTOMY      02/1997    BREAST RECONSTRUCTION Bilateral     02/2017    BREAST SURGERY      see details below     CATARACT EXTRACTION W/  INTRAOCULAR LENS IMPLANT Left 08/13/2020    Procedure: EXTRACTION, CATARACT, WITH IOL INSERTION;  Surgeon: Ivanna Coleman MD;  Location: [facility];  Service: Ophthalmology;  Laterality: Left;    CATARACT EXTRACTION W/  INTRAOCULAR LENS IMPLANT Right 08/27/2020    Procedure: EXTRACTION, CATARACT, WITH IOL INSERTION LASER;  Surgeon: Ivanna Coleman MD;  Location: [facility];  Service: Ophthalmology;  Laterality: Right;    COLONOSCOPY N/A 05/15/2018    Procedure: COLONOSCOPY;  Surgeon: Roldan Gonzales MD;  Location: [address]);  Service: Endoscopy;  Laterality: N/A;    COSMETIC SURGERY  1/2022    Eyelift    ENDOSCOPIC ULTRASOUND OF UPPER GASTROINTESTINAL TRACT N/A 10/11/2023    Procedure: ULTRASOUND, UPPER GI TRACT,  ENDOSCOPIC;  Surgeon: Hermelindo Coleman MD;  Location: [facility] ENDO (2ND FLR);  Service: Endoscopy;  Laterality: N/A;  instr portal-ozempic or trulicity-tb  10/5-precall complete/pt verbalized understanding of holding Ozempic-MS    ESOPHAGOGASTRODUODENOSCOPY N/A 2022    Procedure: EGD (ESOPHAGOGASTRODUODENOSCOPY);  Surgeon: Omar Ambriz MD;  Location: [facility] ENDO (4TH FLR);  Service: Endoscopy;  Laterality: N/A;  instructions via portal - sm    EYE SURGERY      CATERACTS    HYSTERECTOMY      benign    left breast reconstruction      left modified radical mastectomy  1997    for treatment of breast cancer     LIPOMA RESECTION      removed from upper back area     MODIFIED RADICAL MASTECTOMY W/ AXILLARY LYMPH NODE DISSECTION  1997    OOPHORECTOMY          PELVIC LAPAROSCOPY  1978    pt had endometriosis     NH REMOVAL OF OVARY/TUBE(S)      benign    reduction of mons pubis      robotic lap  hysterectomy with bso      UPPER GASTROINTESTINAL ENDOSCOPY  2013       Family History   Problem Relation Age of Onset    Heart attack Father 51    Heart disease Father         Heart Attac, age 51    Alcohol abuse Father     Breast cancer Sister 51        BRCA 1/2 negative    Cancer Sister         Breast Cancer    Cancer Mother 65        Malignant melanoma    Melanoma Mother     No Known Problems Brother     No Known Problems Daughter     No Known Problems Son     No Known Problems Daughter     No Known Problems Son     Diabetes Maternal Uncle             Diabetes Maternal Uncle             Arthritis Maternal Grandmother         Severe arthitis in Knees    Uterine cancer Neg Hx     Colon cancer Neg Hx     Celiac disease Neg Hx     Esophageal cancer Neg Hx     Inflammatory bowel disease Neg Hx     Liver cancer Neg Hx     Liver disease Neg Hx     Stomach cancer Neg Hx     Ulcerative colitis Neg Hx     Hypertension Neg Hx     Cirrhosis Neg Hx     Crohn's disease Neg Hx      "Rectal cancer Neg Hx     Ovarian cancer Neg Hx        Social History     Tobacco Use    Smoking status: Former     Current packs/day: 0.00     Average packs/day: 0.5 packs/day for 3.0 years (1.5 ttl pk-yrs)     Types: Cigarettes     Start date: 4/15/1969     Quit date: 4/15/1972     Years since quittin.6    Smokeless tobacco: Never    Tobacco comments:     started at age 18 during college    Substance Use Topics    Alcohol use: Yes     Comment: Rarely, not even 1X per week    Drug use: No         Review of Systems      Objective:   Blood pressure 135/80, pulse 90, height 5' 6" (1.676 m), weight 78.8 kg (173 lb 11.6 oz), SpO2 96 %.     Physical Exam  Constitutional:       Appearance: Normal appearance. She is well-developed. She is not ill-appearing.   HENT:      Head: Normocephalic and atraumatic.   Eyes:      General: No scleral icterus.     Conjunctiva/sclera:      Left eye: Left conjunctiva is injected. Exudate present.   Cardiovascular:      Rate and Rhythm: Normal rate.      Heart sounds: Normal heart sounds. No murmur heard.     No friction rub. No gallop.   Pulmonary:      Effort: Pulmonary effort is normal.      Breath sounds: Normal breath sounds. No wheezing or rales.   Chest:      Chest wall: No tenderness.   Musculoskeletal:         General: No tenderness or signs of injury.   Skin:     General: Skin is warm and dry.   Neurological:      Mental Status: She is alert and oriented to person, place, and time. Mental status is at baseline.   Psychiatric:         Behavior: Behavior normal.         Thought Content: Thought content normal.         Prior labs reviewed    Health Maintenance         Date Due Completion Date    Eye Exam 2023    TETANUS VACCINE 2023 12/3/2013    COVID-19 Vaccine ( season) 2024    Hemoglobin A1c 2024    Foot Exam 2024    Override on 2019: Done    Override on 2018: Done    Override on " 8/9/2017: Done    High Dose Statin 11/06/2024 11/6/2023    Diabetes Urine Screening 11/28/2024 11/28/2023    Lipid Panel 11/28/2024 11/28/2023    DEXA Scan 09/01/2025 9/1/2023    Colorectal Cancer Screening 05/15/2028 5/15/2018    Override on 10/6/2006: Done            Assessment/Plan:       1. Primary hypertension  Overview:  Controlled on losartan 50 mg daily    Orders:  -     Hypertension Digital Medicine (HDMP) Enrollment Order  -     Hypertension Digital Medicine (HDMP): Assign Onboarding Questionnaires    2. Type 2 diabetes mellitus with diabetic polyneuropathy, without long-term current use of insulin  Overview:  metfromin 500mg daily, increasing trulicity from 0.75mg to 1.5 mg 7/22 hba1c 6.6   10/22 nonadherent to diet, hba1c 7.2   Had GI issues, unclear eitology, work up with dr alarcon  Symptoms improved with prn sulcrafate  No significant improvement in wt or dm on higher dose of trulicity  Decreased 0.75 trulicity, increased metformin to 500 mg bid with improvement in hba1c to 6.8  cont current plan, not changing to ozempic due to GI issues  3/23 increased trulicity back to 1.5 mg metformin 500 mg bid  8/23 GI issues improved, stop trulicity, start ozempic 0.5 mg weekly, cont metfromin 500 mg bid, f/u in one month for review and titration    Orders:  -     Diabetes Digital Medicine (DDMP) Enrollment Order  -     Diabetes Digital Medicine (DDMP): Assign Onboarding Questionnaires    3. Mixed hyperlipidemia  Overview:  On simvastatin, added fenofibrate 4/22   Continue current plan  Sees dr warner in cardiology    Orders:  -     Lipids Digital Medicine (LDMP) Enrollment Order  -     Lipids Digital Medicine (LDMP): Assign Onboarding Questionnaires    4. Vaginal atrophy  -     estradioL (YUVAFEM) 10 mcg Tab; PLACE 1 TABLET VAGINALLY TWICE A WEEK  Dispense: 24 tablet; Refill: 2  Aware of minimimal risk of vaginal estrogen use given her history of breast cancer    5. Bacterial conjunctivitis of left eye  -      polymyxin B sulf-trimethoprim (POLYTRIM) 10,000 unit- 1 mg/mL Drop; Place 1 drop into the left eye every 4 (four) hours. for 7 days  Dispense: 10 mL; Refill: 0    6. Age-related osteoporosis without current pathological fracture  -     DXA Bone Density Axial Skeleton 1 or more sites; Future; Expected date: 08/01/2023  Cont vit d, wt bearing exercise, prolia    Other orders  -     semaglutide (OZEMPIC) 0.25 mg or 0.5 mg (2 mg/3 mL) pen injector; Inject 0.5 mg into the skin every 7 days.  Dispense: 3 mL; Refill: 1    30 min spent in care of patient including history, physical, chart review, orders and coordination of care.         Medication List with Changes/Refills   New Medications    CYCLOBENZAPRINE (FLEXERIL) 5 MG TABLET    Take 1 tablet (5 mg total) by mouth 3 (three) times daily as needed for Muscle spasms (muscular pain).    RSVPREF3 ANTIGEN-AS01E, PF, (AREXVY) 120 MCG/0.5 ML SUSR VACCINE    Inject 0.5 mLs into the muscle once. for 1 dose    SARS-COV-2, COVID-19, (SPIKEVAX, MODERNA,, 12YRS AND UP 2023,) 50 MCG/0.5 ML INJECTION    Inject 0.5 mLs into the muscle once. Inject 0.5 mL into the muscle once. for 1 dose    TOBRAMYCIN SULFATE 0.3% (TOBREX) 0.3 % OPHTHALMIC SOLUTION    Apply to wound beds twice daily   Current Medications    ASCORBIC ACID, VITAMIN C, (VITAMIN C) 1000 MG TABLET    Take 1 tablet by mouth once daily.    ASPIRIN (ECOTRIN) 81 MG EC TABLET    Take 81 mg by mouth once daily.    CALCIUM-VITAMIN D TABLET 600 MG-200 UNITS        CANNABIDIOL, CBD, EXTRACT ORAL    Take 1 drop by mouth Daily.    COENZYME Q10-VITAMIN E 100-100 MG-UNIT CAP    Take 1 capsule by mouth once daily.     DENOSUMAB (PROLIA) 60 MG/ML SYRG    Inject 1 mL (60 mg total) into the skin every 6 (six) months.    FLUTICASONE (FLONASE) 50 MCG/ACTUATION NASAL SPRAY    1 spray by Each Nare route 2 (two) times daily.     GABAPENTIN (NEURONTIN) 300 MG CAPSULE    TAKE 2 CAPSULES BY MOUTH EVERY EVENING.    LETROZOLE (FEMARA) 2.5 MG TAB     TAKE 1 TABLET BY MOUTH EVERY DAY    MULTIVITAMIN ORAL    Take 1 tablet by mouth once daily.     OMEGA-3 FATTY ACIDS-VITAMIN E 1,000 MG CAP    Take 1 capsule by mouth once daily.     SIMVASTATIN (ZOCOR) 20 MG TABLET    TAKE 1 TABLET BY MOUTH EVERY DAY IN THE EVENING    SUCRALFATE (CARAFATE) 100 MG/ML SUSPENSION    Take 10 mLs (1 g total) by mouth 4 (four) times daily.    VITAMIN D 1000 UNITS TAB    Take 1,000 Units by mouth once daily.    Changed and/or Refilled Medications    Modified Medication Previous Medication    ESTRADIOL (YUVAFEM) 10 MCG TAB YUVAFEM 10 mcg Tab       PLACE 1 TABLET VAGINALLY TWICE A WEEK    PLACE 1 TABLET VAGINALLY TWICE A WEEK    FENOFIBRATE 160 MG TAB fenofibrate 160 MG Tab       TAKE 1 TABLET BY MOUTH EVERY DAY    TAKE 1 TABLET BY MOUTH ONCE DAILY.    LEVOTHYROXINE (SYNTHROID) 50 MCG TABLET levothyroxine (SYNTHROID) 50 MCG tablet       TAKE 1 TABLET BY MOUTH EVERY DAY    TAKE 1 TABLET BY MOUTH EVERY DAY    LOSARTAN (COZAAR) 100 MG TABLET losartan (COZAAR) 50 MG tablet       Take 1 tablet (100 mg total) by mouth once daily.    TAKE 1 TABLET BY MOUTH EVERY DAY    METFORMIN (GLUCOPHAGE) 500 MG TABLET metFORMIN (GLUCOPHAGE) 500 MG tablet       Take 1 tablet (500 mg total) by mouth 2 (two) times daily with meals.    TAKE 1 TABLET BY MOUTH EVERY DAY WITH BREAKFAST    OZEMPIC 0.25 MG OR 0.5 MG (2 MG/3 ML) PEN INJECTOR OZEMPIC 0.25 mg or 0.5 mg (2 mg/3 mL) pen injector       INJECT 0.5 MG INTO THE SKIN EVERY 7 DAYS.    Inject into the skin.    PANTOPRAZOLE (PROTONIX) 20 MG TABLET pantoprazole (PROTONIX) 20 MG tablet       Take 1 tablet (20 mg total) by mouth before breakfast.    TAKE 1 TABLET (20 MG TOTAL) BY MOUTH BEFORE BREAKFAST.   Discontinued Medications    CELECOXIB (CELEBREX) 100 MG CAPSULE    TAKE 1 CAPSULE (100 MG TOTAL) BY MOUTH DAILY AS NEEDED.    DULAGLUTIDE (TRULICITY) 1.5 MG/0.5 ML PEN INJECTOR    Inject 1.5 mg into the skin every 7 days.    LOSARTAN (COZAAR) 50 MG TABLET    Take 1  tablet (50 mg total) by mouth once daily.       Recommend patient complete vaccinations as listed in the overdue health maintenance report. Pt may obtain vaccinations at their local pharmacy, any [facility] pharmacy or request vaccination in our clinic.  Please note that some insurances will only cover vaccination cost at specific locations.Please check with your insurance provider regarding your coverage.  Vaccines that are not covered are still recommended.

## 2023-08-22 ENCOUNTER — PATIENT MESSAGE (OUTPATIENT)
Dept: ENDOSCOPY | Facility: HOSPITAL | Age: 73
End: 2023-08-22
Payer: MEDICARE

## 2023-08-22 ENCOUNTER — TELEPHONE (OUTPATIENT)
Dept: ENDOSCOPY | Facility: HOSPITAL | Age: 73
End: 2023-08-22
Payer: MEDICARE

## 2023-08-22 DIAGNOSIS — R90.89 ABNORMAL IMAGING OF CENTRAL NERVOUS SYSTEM: Primary | ICD-10-CM

## 2023-08-22 NOTE — TELEPHONE ENCOUNTER
Spoke to patient to schedule procedure(s) Upper Endoscopy Ultrasound (EUS)       Physician to perform procedure(s) Dr. LARA Coleman  Date of Procedure (s) 10/11/23  Arrival Time 11:30 AM  Time of Procedure(s) 12:30 AM   Location of Procedure(s) [address]  Type of Rx Prep sent to patient: Other  Instructions provided to patient via [facility]    Patient was informed on the following information and verbalized understanding. Screening questionnaire reviewed with patient and complete. If procedure requires anesthesia, a responsible adult needs to be present to accompany the patient home, patient cannot drive after receiving anesthesia. Appointment details are tentative, especially check-in time. Patient will receive a prep-op call 4 days prior to confirm check-in time for procedure. If applicable the patient should contact their pharmacy to verify Rx for procedure prep is ready for pick-up. Patient was advised to call the scheduling department at [phone] if pharmacy states no Rx is available. Patient was advised to call the endoscopy scheduling department if any questions or concerns arise.      [facility] Scheduling Department

## 2023-08-23 DIAGNOSIS — Z51.81 ENCOUNTER FOR MONITORING LONG-TERM PROTON PUMP INHIBITOR THERAPY: ICD-10-CM

## 2023-08-23 DIAGNOSIS — Z79.899 ENCOUNTER FOR MONITORING LONG-TERM PROTON PUMP INHIBITOR THERAPY: ICD-10-CM

## 2023-08-23 DIAGNOSIS — K44.9 HIATAL HERNIA: ICD-10-CM

## 2023-08-23 DIAGNOSIS — K22.10 ESOPHAGITIS, EROSIVE: ICD-10-CM

## 2023-08-23 RX ORDER — PANTOPRAZOLE SODIUM 20 MG/1
20 TABLET, DELAYED RELEASE ORAL
Qty: 90 TABLET | Refills: 3 | Status: SHIPPED | OUTPATIENT
Start: 2023-08-23

## 2023-08-23 NOTE — TELEPHONE ENCOUNTER
Refill Routing Note   Medication(s) are not appropriate for processing by [facility] for the following reason(s):      No active prescription written by provider    [facility] action(s):  Defer Care Due:  None identified            Appointments  past 12m or future 3m with PCP    Date Provider   Last Visit   8/1/2023 Joie Mccall MD   Next Visit   12/6/2023 Joie Mccall MD   ED visits in past 90 days: 0        Note composed:10:40 AM 08/23/2023

## 2023-08-23 NOTE — TELEPHONE ENCOUNTER
No care due was identified.  [facility] Embedded Care Due Messages. Reference number: [id number].   8/23/2023 10:05:24 AM CDT

## 2023-08-28 ENCOUNTER — OFFICE VISIT (OUTPATIENT)
Dept: PODIATRY | Facility: CLINIC | Age: 73
End: 2023-08-28
Payer: MEDICARE

## 2023-08-28 VITALS
BODY MASS INDEX: 27.92 KG/M2 | SYSTOLIC BLOOD PRESSURE: 115 MMHG | DIASTOLIC BLOOD PRESSURE: 73 MMHG | HEIGHT: 66 IN | WEIGHT: 173.75 LBS | HEART RATE: 93 BPM

## 2023-08-28 DIAGNOSIS — M79.674 GREAT TOE PAIN, RIGHT: ICD-10-CM

## 2023-08-28 DIAGNOSIS — M20.41 HAMMER TOES OF BOTH FEET: ICD-10-CM

## 2023-08-28 DIAGNOSIS — E11.8 TYPE 2 DIABETES MELLITUS WITH COMPLICATION, WITHOUT LONG-TERM CURRENT USE OF INSULIN: Primary | ICD-10-CM

## 2023-08-28 DIAGNOSIS — M20.5X1 HALLUX LIMITUS, ACQUIRED, RIGHT: ICD-10-CM

## 2023-08-28 DIAGNOSIS — E11.49 TYPE II DIABETES MELLITUS WITH NEUROLOGICAL MANIFESTATIONS: ICD-10-CM

## 2023-08-28 DIAGNOSIS — L60.0 INGROWN NAIL: ICD-10-CM

## 2023-08-28 DIAGNOSIS — M20.5X2 HALLUX LIMITUS, ACQUIRED, LEFT: ICD-10-CM

## 2023-08-28 DIAGNOSIS — M20.42 HAMMER TOES OF BOTH FEET: ICD-10-CM

## 2023-08-28 PROCEDURE — 4010F PR ACE/ARB THEARPY RXD/TAKEN: ICD-10-PCS | Mod: HCNC,CPTII,S$GLB, | Performed by: PODIATRIST

## 2023-08-28 PROCEDURE — 3044F PR MOST RECENT HEMOGLOBIN A1C LEVEL <7.0%: ICD-10-PCS | Mod: HCNC,CPTII,S$GLB, | Performed by: PODIATRIST

## 2023-08-28 PROCEDURE — 99999 PR PBB SHADOW E&M-EST. PATIENT-LVL V: ICD-10-PCS | Mod: PBBFAC,HCNC,, | Performed by: PODIATRIST

## 2023-08-28 PROCEDURE — 3044F HG A1C LEVEL LT 7.0%: CPT | Mod: HCNC,CPTII,S$GLB, | Performed by: PODIATRIST

## 2023-08-28 PROCEDURE — 1159F MED LIST DOCD IN RCRD: CPT | Mod: HCNC,CPTII,S$GLB, | Performed by: PODIATRIST

## 2023-08-28 PROCEDURE — 1125F PR PAIN SEVERITY QUANTIFIED, PAIN PRESENT: ICD-10-PCS | Mod: HCNC,CPTII,S$GLB, | Performed by: PODIATRIST

## 2023-08-28 PROCEDURE — 99214 PR OFFICE/OUTPT VISIT, EST, LEVL IV, 30-39 MIN: ICD-10-PCS | Mod: HCNC,S$GLB,, | Performed by: PODIATRIST

## 2023-08-28 PROCEDURE — 3078F PR MOST RECENT DIASTOLIC BLOOD PRESSURE < 80 MM HG: ICD-10-PCS | Mod: HCNC,CPTII,S$GLB, | Performed by: PODIATRIST

## 2023-08-28 PROCEDURE — 3072F PR LOW RISK FOR RETINOPATHY: ICD-10-PCS | Mod: HCNC,CPTII,S$GLB, | Performed by: PODIATRIST

## 2023-08-28 PROCEDURE — 1160F PR REVIEW ALL MEDS BY PRESCRIBER/CLIN PHARMACIST DOCUMENTED: ICD-10-PCS | Mod: HCNC,CPTII,S$GLB, | Performed by: PODIATRIST

## 2023-08-28 PROCEDURE — 1160F RVW MEDS BY RX/DR IN RCRD: CPT | Mod: HCNC,CPTII,S$GLB, | Performed by: PODIATRIST

## 2023-08-28 PROCEDURE — 3008F PR BODY MASS INDEX (BMI) DOCUMENTED: ICD-10-PCS | Mod: HCNC,CPTII,S$GLB, | Performed by: PODIATRIST

## 2023-08-28 PROCEDURE — 99999 PR PBB SHADOW E&M-EST. PATIENT-LVL V: CPT | Mod: PBBFAC,HCNC,, | Performed by: PODIATRIST

## 2023-08-28 PROCEDURE — 3008F BODY MASS INDEX DOCD: CPT | Mod: HCNC,CPTII,S$GLB, | Performed by: PODIATRIST

## 2023-08-28 PROCEDURE — 99214 OFFICE O/P EST MOD 30 MIN: CPT | Mod: HCNC,S$GLB,, | Performed by: PODIATRIST

## 2023-08-28 PROCEDURE — 3074F SYST BP LT 130 MM HG: CPT | Mod: HCNC,CPTII,S$GLB, | Performed by: PODIATRIST

## 2023-08-28 PROCEDURE — 3074F PR MOST RECENT SYSTOLIC BLOOD PRESSURE < 130 MM HG: ICD-10-PCS | Mod: HCNC,CPTII,S$GLB, | Performed by: PODIATRIST

## 2023-08-28 PROCEDURE — 1125F AMNT PAIN NOTED PAIN PRSNT: CPT | Mod: HCNC,CPTII,S$GLB, | Performed by: PODIATRIST

## 2023-08-28 PROCEDURE — 4010F ACE/ARB THERAPY RXD/TAKEN: CPT | Mod: HCNC,CPTII,S$GLB, | Performed by: PODIATRIST

## 2023-08-28 PROCEDURE — 1159F PR MEDICATION LIST DOCUMENTED IN MEDICAL RECORD: ICD-10-PCS | Mod: HCNC,CPTII,S$GLB, | Performed by: PODIATRIST

## 2023-08-28 PROCEDURE — 3078F DIAST BP <80 MM HG: CPT | Mod: HCNC,CPTII,S$GLB, | Performed by: PODIATRIST

## 2023-08-28 PROCEDURE — 3072F LOW RISK FOR RETINOPATHY: CPT | Mod: HCNC,CPTII,S$GLB, | Performed by: PODIATRIST

## 2023-08-28 RX ORDER — TOBRAMYCIN 3 MG/ML
SOLUTION/ DROPS OPHTHALMIC
Qty: 5 ML | Refills: 2 | Status: SHIPPED | OUTPATIENT
Start: 2023-08-28

## 2023-08-28 NOTE — PROGRESS NOTES
Subjective:      Patient ID: Lima Maldonado is a 73 y.o. female.    Chief Complaint: Diabetic Foot Exam (Last DM visit on 8/01/2023)    Lima is a 73 y.o. female who presents to the clinic for evaluation and treatment of high risk feet. Lima has a past medical history of Acute cystitis without hematuria (7/17/2017), Arthritis, Back pain, Breast cancer, Class 1 obesity due to excess calories with serious comorbidity and body mass index (BMI) of 30.0 to 30.9 in adult (11/29/2018), Elevated bilirubin (11/19/2013), Fatty liver, GERD (gastroesophageal reflux disease), Glucose intolerance (impaired glucose tolerance), Hyperlipidemia, Hypertension, Hypothyroid, Hypothyroidism, Malignant neoplasm of lower-outer quadrant of left female breast (11/15/2016), Obesity, Obesity, diabetes, and hypertension syndrome (12/12/2018), Osteopenia, Osteoporosis, Primary insomnia (11/2/2016), Shingles, Sleep apnea, Squamous cell carcinoma (2011), Stress incontinence of urine (1/11/2018), Trouble in sleeping, Type 2 diabetes mellitus with diabetic polyneuropathy, without long-term current use of insulin (12/26/2017), Urinary incontinence, and Well woman exam with routine gynecological exam (11/2/2016). The patient's chief complaint is a painful ingrown toenail on lateral left hallux. Patient has attempted self treatment with nail nipper.  This is a persistent problem.  Patient  denies purulent drainage.     This patient has documented high risk feet requiring routine maintenance secondary to peripheral neuropathy.    8/28/23 Presents to the clinic complaining of painful ingrown toenail, right lateral hallux nail border.  Relates that her grandchild stepped on the toe. Patient has attempted self treatment with topical abx ointment.Patient  denies purulent drainage. Presents in Boat shoes    PCP: Joie Mccall MD    Date Last Seen by PCP:     Chief Complaint   Patient presents with    Diabetic Foot Exam     Last DM visit on  8/01/2023         Current shoe gear:  Affected Foot: Tennis shoes     Unaffected Foot: Tennis shoes    Hemoglobin A1C   Date Value Ref Range Status   07/25/2023 6.9 (H) 4.0 - 5.6 % Final     Comment:     ADA Screening Guidelines:  5.7-6.4%  Consistent with prediabetes  >or=6.5%  Consistent with diabetes    High levels of fetal hemoglobin interfere with the HbA1C  assay. Heterozygous hemoglobin variants (HbS, HgC, etc)do  not significantly interfere with this assay.   However, presence of multiple variants may affect accuracy.     01/25/2023 6.8 (H) 4.0 - 5.6 % Final     Comment:     ADA Screening Guidelines:  5.7-6.4%  Consistent with prediabetes  >or=6.5%  Consistent with diabetes    High levels of fetal hemoglobin interfere with the HbA1C  assay. Heterozygous hemoglobin variants (HbS, HgC, etc)do  not significantly interfere with this assay.   However, presence of multiple variants may affect accuracy.     10/18/2022 7.2 (H) 4.0 - 5.6 % Final     Comment:     ADA Screening Guidelines:  5.7-6.4%  Consistent with prediabetes  >or=6.5%  Consistent with diabetes    High levels of fetal hemoglobin interfere with the HbA1C  assay. Heterozygous hemoglobin variants (HbS, HgC, etc)do  not significantly interfere with this assay.   However, presence of multiple variants may affect accuracy.             Patient Active Problem List   Diagnosis    Hyperlipidemia    Hypothyroidism    Obstructive sleep apnea on CPAP    GERD (gastroesophageal reflux disease)    Fatty liver    Primary hypertension    Vaginal atrophy    Senile osteoporosis    Primary insomnia    Malignant neoplasm of lower-outer quadrant of left female breast    Dermatitis of eyelid of left eye due to herpes zoster    Aortic atherosclerosis    Chemotherapy-induced neuropathy    Stress incontinence of urine    Sensorineural hearing loss (SNHL) of left ear with restricted hearing of right ear    Perforation of left tympanic membrane    Allergic rhinitis    Nasal  septal deviation    Tinnitus of both ears    Diabetic polyneuropathy associated with type 2 diabetes mellitus    Nuclear sclerosis, bilateral    Post-operative state    Nuclear sclerotic cataract of right eye    Early hepatic fibrosis    Type 2 diabetes mellitus with diabetic polyneuropathy, without long-term current use of insulin    Overweight (BMI 25.0-29.9)       Current Outpatient Medications on File Prior to Visit   Medication Sig Dispense Refill    ascorbic acid, vitamin C, (VITAMIN C) 1000 MG tablet Take 1 tablet by mouth once daily.      CANNABIDIOL, CBD, EXTRACT ORAL Take 1 drop by mouth Daily.      celecoxib (CELEBREX) 100 MG capsule TAKE 1 CAPSULE (100 MG TOTAL) BY MOUTH DAILY AS NEEDED. 90 capsule 0    coenzyme Q10-vitamin E 100-100 mg-unit Cap Take 1 capsule by mouth once daily.       estradioL (YUVAFEM) 10 mcg Tab PLACE 1 TABLET VAGINALLY TWICE A WEEK 24 tablet 2    fenofibrate 160 MG Tab TAKE 1 TABLET BY MOUTH ONCE DAILY. 90 tablet 1    fluticasone (FLONASE) 50 mcg/actuation nasal spray 1 spray by Each Nare route 2 (two) times daily.       gabapentin (NEURONTIN) 300 MG capsule TAKE 2 CAPSULES BY MOUTH EVERY EVENING. 180 capsule 3    letrozole (FEMARA) 2.5 mg Tab TAKE 1 TABLET BY MOUTH EVERY DAY 90 tablet 3    levothyroxine (SYNTHROID) 50 MCG tablet TAKE 1 TABLET BY MOUTH EVERY DAY 90 tablet 3    losartan (COZAAR) 50 MG tablet TAKE 1 TABLET BY MOUTH EVERY DAY 90 tablet 3    metFORMIN (GLUCOPHAGE) 500 MG tablet TAKE 1 TABLET BY MOUTH EVERY DAY WITH BREAKFAST (Patient taking differently: 2 (two) times a day.) 90 tablet 1    MULTIVITAMIN ORAL Take 1 tablet by mouth once daily.       omega-3 fatty acids-vitamin E 1,000 mg Cap Take 1 capsule by mouth once daily.       pantoprazole (PROTONIX) 20 MG tablet Take 1 tablet (20 mg total) by mouth before breakfast. 90 tablet 3    simvastatin (ZOCOR) 20 MG tablet TAKE 1 TABLET BY MOUTH EVERY DAY IN THE EVENING 90 tablet 3    sucralfate (CARAFATE) 100 mg/mL  suspension Take 10 mLs (1 g total) by mouth 4 (four) times daily. 828 mL 1    vitamin D 1000 units Tab Take 1,000 Units by mouth once daily.       denosumab (PROLIA) 60 mg/mL Syrg Inject 1 mL (60 mg total) into the skin every 6 (six) months. 2 mL 0     No current facility-administered medications on file prior to visit.       Review of patient's allergies indicates:   Allergen Reactions    Erythromycin      Other reaction(s): Nausea    Erythromycin (bulk)      Other reaction(s): Nausea    Oxycodone Nausea And Vomiting    Oxycodone-acetaminophen Nausea And Vomiting     Other reaction(s): Nausea       Past Surgical History:   Procedure Laterality Date    APPENDECTOMY  2008    removed during hysterectomy surgery     BLEPHAROPLASTY Bilateral     BREAST BIOPSY      2010    Breast implant Bilateral 1998    implants removed in 2003    BREAST LUMPECTOMY      02/1997    BREAST RECONSTRUCTION Bilateral     02/2017    BREAST SURGERY      see details below     CATARACT EXTRACTION W/  INTRAOCULAR LENS IMPLANT Left 08/13/2020    Procedure: EXTRACTION, CATARACT, WITH IOL INSERTION;  Surgeon: Ivanna Coleman MD;  Location: [facility];  Service: Ophthalmology;  Laterality: Left;    CATARACT EXTRACTION W/  INTRAOCULAR LENS IMPLANT Right 08/27/2020    Procedure: EXTRACTION, CATARACT, WITH IOL INSERTION LASER;  Surgeon: Ivanna Coleman MD;  Location: [facility];  Service: Ophthalmology;  Laterality: Right;    COLONOSCOPY N/A 05/15/2018    Procedure: COLONOSCOPY;  Surgeon: Roldan Gonzales MD;  Location: [facility] ([address] [place] [address]);  Service: Endoscopy;  Laterality: N/A;    COSMETIC SURGERY  1/2022    Eyelift    ESOPHAGOGASTRODUODENOSCOPY N/A 12/20/2022    Procedure: EGD (ESOPHAGOGASTRODUODENOSCOPY);  Surgeon: Omar Ambriz MD;  Location: [facility] ([address] [place] [address]);  Service: Endoscopy;  Laterality: N/A;  instructions via portal -     EYE SURGERY      CATERACTS    HYSTERECTOMY  2008    benign    left breast reconstruction  2005    left modified radical  mastectomy  1997    for treatment of breast cancer     LIPOMA RESECTION  2010    removed from upper back area     MODIFIED RADICAL MASTECTOMY W/ AXILLARY LYMPH NODE DISSECTION  1997    OOPHORECTOMY          PELVIC LAPAROSCOPY      pt had endometriosis     AK REMOVAL OF OVARY/TUBE(S)      benign    reduction of mons pubis      robotic lap  hysterectomy with bso  2008    UPPER GASTROINTESTINAL ENDOSCOPY  2013       Family History   Problem Relation Age of Onset    Heart attack Father 51    Heart disease Father         Heart [place], age 51    Alcohol abuse Father     Breast cancer Sister 51        BRCA 1/2 negative    Cancer Sister         Breast Cancer    Cancer Mother 65        Malignant melanoma    Melanoma Mother     No Known Problems Brother     No Known Problems Daughter     No Known Problems Son     No Known Problems Daughter     No Known Problems Son     Diabetes Maternal Uncle             Diabetes Maternal Uncle             Arthritis Maternal Grandmother         Severe arthitis in Knees    Uterine cancer Neg Hx     Colon cancer Neg Hx     Celiac disease Neg Hx     Esophageal cancer Neg Hx     Inflammatory bowel disease Neg Hx     Liver cancer Neg Hx     Liver disease Neg Hx     Stomach cancer Neg Hx     Ulcerative colitis Neg Hx     Hypertension Neg Hx     Cirrhosis Neg Hx     Crohn's disease Neg Hx     Rectal cancer Neg Hx     Ovarian cancer Neg Hx        Social History     Socioeconomic History    Marital status:    Tobacco Use    Smoking status: Former     Current packs/day: 0.00     Average packs/day: 0.5 packs/day for 3.0 years (1.5 ttl pk-yrs)     Types: Cigarettes     Start date: 4/15/1969     Quit date: 4/15/1972     Years since quittin.4    Smokeless tobacco: Never    Tobacco comments:     started at age 18 during college    Substance and Sexual Activity    Alcohol use: Yes     Comment: Rarely, not even 1X per week    Drug use: No    Sexual  activity: Yes     Partners: Male     Birth control/protection: Post-menopausal, See Surgical Hx     Social Determinants of Health     Financial Resource Strain: Low Risk  (8/22/2023)    Overall Financial Resource Strain (CARDIA)     Difficulty of Paying Living Expenses: Not hard at all   Food Insecurity: No Food Insecurity (8/22/2023)    Hunger Vital Sign     Worried About Running Out of Food in the Last Year: Never true     Ran Out of Food in the Last Year: Never true   Transportation Needs: No Transportation Needs (8/22/2023)    PRAPARE - Transportation     Lack of Transportation (Medical): No     Lack of Transportation (Non-Medical): No   Physical Activity: Insufficiently Active (8/22/2023)    Exercise Vital Sign     Days of Exercise per Week: 1 day     Minutes of Exercise per Session: 40 min   Stress: Stress Concern Present (8/22/2023)    [place] [employer] - Occupational Stress Questionnaire     Feeling of Stress : To some extent   Social Connections: Unknown (8/22/2023)    Social Connection and Isolation Panel [NHANES]     Frequency of Communication with Friends and Family: More than three times a week     Frequency of Social Gatherings with Friends and Family: Twice a week     Active Member of Clubs or Organizations: Yes     Attends Club or Organization Meetings: More than 4 times per year     Marital Status:    Housing Stability: Low Risk  (8/22/2023)    Housing Stability Vital Sign     Unable to Pay for Housing in the Last Year: No     Number of Places Lived in the Last Year: 1     Unstable Housing in the Last Year: No           Review of Systems   Constitutional: Negative for chills, fever and malaise/fatigue.   HENT:  Negative for hearing loss.    Cardiovascular:  Negative for claudication.   Respiratory:  Negative for shortness of breath.    Skin:  Positive for color change, dry skin and nail changes. Negative for flushing and rash.   Musculoskeletal:  Positive for joint pain  "and myalgias.   Neurological:  Positive for paresthesias and sensory change. Negative for loss of balance and numbness.   Psychiatric/Behavioral:  Negative for altered mental status.            Objective:       Vitals:    08/28/23 1124   BP: 115/73   Pulse: 93   Weight: 78.8 kg (173 lb 11.6 oz)   Height: 5' 6" (1.676 m)   PainSc:   2   PainLoc: Toe       Physical Exam  Vitals reviewed.   Constitutional:       Appearance: She is well-developed.   Cardiovascular:      Pulses:           Dorsalis pedis pulses are 2+ on the right side and 2+ on the left side.        Posterior tibial pulses are 2+ on the right side and 2+ on the left side.   Musculoskeletal:      Right ankle: Decreased range of motion. Abnormal pulse.      Right Achilles Tendon: Ceja's test negative.      Left ankle: Decreased range of motion. Abnormal pulse.      Left Achilles Tendon: Ceja's test negative.      Right foot: Decreased range of motion.      Left foot: Decreased range of motion.   Feet:      Right foot:      Protective Sensation: 5 sites tested.  2 sites sensed.      Toenail Condition: Right toenails are ingrown.      Left foot:      Protective Sensation: 5 sites tested.  2 sites sensed.   Skin:     General: Skin is dry.      Capillary Refill: Capillary refill takes more than 3 seconds.      Comments: Tenderness to lateral hallux nail margin of right foot with ingrown nail plate. Minimal erythema and minimal edema is noted there is no granuloma formation noted. no malodor      Neurological:      Mental Status: She is alert.      Comments: diminished sensation noted to b/L lower extremities   Psychiatric:         Behavior: Behavior normal. Behavior is cooperative.                 Assessment:       Encounter Diagnoses   Name Primary?    Type 2 diabetes mellitus with complication, without long-term current use of insulin Yes    Type II diabetes mellitus with neurological manifestations     Hammer toes of both feet     Hallux limitus, " acquired, right     Hallux limitus, acquired, left     Great toe pain, right     Ingrown nail          Plan:       Lima was seen today for diabetic foot exam.    Diagnoses and all orders for this visit:    Type 2 diabetes mellitus with complication, without long-term current use of insulin    Type II diabetes mellitus with neurological manifestations  -     DIABETIC SHOES FOR HOME USE    Hammer toes of both feet  -     DIABETIC SHOES FOR HOME USE    Hallux limitus, acquired, right  -     DIABETIC SHOES FOR HOME USE    Hallux limitus, acquired, left  -     DIABETIC SHOES FOR HOME USE    Great toe pain, right    Ingrown nail    Other orders  -     tobramycin sulfate 0.3% (TOBREX) 0.3 % ophthalmic solution; Apply to wound beds twice daily      I counseled the patient on her conditions, their implications and medical management.    Discussed treatment options with patient. Options included soaking, partial avulsion and matrixectomy. Risks and benefits discussed and all questions were answered. The patient wishes to proceed with matrixectomy in the future, staff will assist with scheduling    The patient was  dispensed a surgical shoe today and given a prescription for tobramycin drops 0.3% for twice daily application to the wound.      Education about the diabetic foot, neuropathy, and prevention of limb loss.    Shoe inspection. Diabetic Foot Education. Patient reminded of the importance of good nutrition/healthy diet/weight management and blood sugar control to help prevent podiatric complications of diabetes. Patient instructed on proper foot hygeine. Wear comfortable, proper fitting shoes. Wash feet daily. Dry well. After drying, apply moisturizer to feet (no lotion to webspaces). Inspect feet daily for skin breaks, blisters, swelling, or redness. Wear cotton socks (preferably white)  Change socks every day. Do NOT walk barefoot. Do NOT use heating pads or hot water soaks. We discussed wearing proper shoe gear,  daily foot inspections, never walking without protective shoe gear.     Discussed edema control and the importance of daily moisturizer to the feet.     She will continue to monitor the areas daily, inspect her feet, wear protective shoe gear when ambulatory, moisturizer to maintain skin integrity and follow in this office in for nail procedure

## 2023-08-28 NOTE — PATIENT INSTRUCTIONS
"Ingrown Toenail, Excised  An ingrown toenail occurs when the nail grows sideways into the skin alongside the nail. This can cause pain, especially when wearing tight shoes. It can also lead to an infection with redness and swelling.  The side of the nail will need to be removed in order to stop the pain and release any infection present. If there is a lot of redness and swelling, then an antibiotic may also be used. The redness and pain should begin to go away within 48 hours. It will take about two weeks for the exposed nail bed to become dry and all of the swelling to go down.  If only the side of the nail was removed it will begin to grow back in a few months. To prevent recurrence, that side of nail bed may be treated with a strong chemical to prevent the nail from regrowing ("ablation").  Home care  The following guidelines will help you care for your toe at home:  Once a day beginning in 24 hours:  Clean the toe separate from your body with warm running water and antibacterial soap such as dial soap or saline wound spray  Clean any remaining crust away with soap and water using a cotton-tipped applicator. And DRY completely dry  Apply tobrex antibiotic drops (1-2 drops) to the toe twice daily.  Cover with a breathable bandage or until the exposed nail bed is dry and there is no more drainage.  Change the dressing daily, or whenever it becomes wet or dirty.  If you were prescribed antibiotics, take them as directed until they are all gone.  Wear comfortable shoes with a lot of toe room, or open-toe sandals, while your toe is healing.  You may use acetaminophen or ibuprofen to control pain, unless another medicine was prescribed. If you have chronic liver or kidney disease or ever had a stomach ulcer or GI bleeding, talk with your doctor before using these medicines.  Prevention  To prevent ingrown toenails:  Wear shoes that fit well. Avoid shoes that pinch the toes together.  When you trim your toenails, do not " cut them too short. Cut straight across at the top and do not round the edges.  Do not use a sharp object to clean under your nail since this might cause an infection.  At first signs of a recurrence, insert a small piece of cotton under that side of the nail to help it grow out straight.  Follow-up care  Follow up with your doctor or this facility as advised by our staff. If the ingrown toenail recurs, follow up with a podiatrist for nail bed ablation.  When to seek medical advice  Call your health care provider right away if any of the following occur:  Increasing redness, pain or swelling of the toe  Red streaks in the skin leading away from the wound  Continued pus or fluid drainage for more than 24 hours  Fever of 100.4º F (38º C) or higher, or as directed by your health care provider  © 3584-8190 [employer]. [address] [place] [address]. All rights reserved. This information is not intended as a substitute for professional medical care. Always follow your healthcare professional's instructions.    Over the counter pain creams: Voltaren Gel, Biofreeze, Bengay, tiger balm, two old goat, lidocaine gel,  Absorbine Veterinary Liniment Gel Topical Analgesic Sore Muscle and Joint Pain Relief  Recommend lotions: eucerin, eucerin for diabetics, aquaphor, A&D ointment, gold bond for diabetics, sween, Dallas's Bees all purpose baby ointment,  urea 40 with aloe or SkinIntegra rapid crack repair (found on amazon.com)  Shoe recommendations: (try 6pm.com, zappos.Taofang.com , FreeWheel.Taofang.com, or shoes.Taofang.com for discounted prices) you can visit varsity shoes in [place], DSW shoes in [place]  or [facility] in the [facility] (there are also several shoe brand outlets in the [facility])  ONLY purchase stability style tennis shoes NO flex, foam, free, yoga mat style shoes  Asics (GT 4000 or gel foundations), new balance stability type shoes (such as the 940 series), shelley (stabil c3),  Acevedo (GTS or  Beast or transcend), propet, HokaOne (tennis shoe) Denys (tennis shoes and boots)  Sofze Sapp (women) John&Santana (men), clarks, crocs, aerosoles, naturalizers, SAS, ecco, born, leif carpenter, rockports (dress shoes)  Vionic, burkenstocks, fitflops, propet, taos, baretraps (sandals)  HokaOne sandals, crocs, propet (house shoes)    Nail Home remedy:  Vicks Vapor rub or Emuaid to nails for easier manageability    Diabetes: Inspecting Your Feet  Diabetes increases your chances of developing foot problems. So inspect your feet every day. This helps you find small skin irritations before they become serious infections. If you have trouble seeing the bottoms of your feet, use a mirror or ask a family member or friend to help.     Pressure spots on the bottom of the foot are common areas where problems develop.   How to check your feet  Below are tips to help you look for foot problems. Try to check your feet at the same time each day, such as when you get out of bed in the morning:  Check the top of each foot. The tops of toes, back of the heel, and outer edge of the foot can get a lot of rubbing from poor-fitting shoes.  Check the bottom of each foot. Daily wear and tear often leads to problems at pressure spots.  Check the toes and nails. Fungal infections often occur between toes. Toenail problems can also be a sign of fungal infections or lead to breaks in the skin.  Check your shoes, too. Loose objects inside a shoe can injure the foot. Use your hand to feel inside your shoes for things like deep, loose stitching, or rough areas that could irritate your skin.  Warning signs  Look for any color changes in the foot. Redness with streaks can signal a severe infection, which needs immediate medical attention. Tell your doctor right away if you have any of these problems:  Swelling, sometimes with color changes, may be a sign of poor blood flow or infection. Symptoms include tenderness and an increase in the size of  your foot.  Warm or hot areas on your feet may be signs of infection. A foot that is cold may not be getting enough blood.  Sensations such as burning, tingling, or pins and needles can be signs of a problem. Also check for areas that may be numb.  Hot spots are caused by friction or pressure. Look for hot spots in areas that get a lot of rubbing. Hot spots can turn into blisters, calluses, or sores.  Cracks and sores are caused by dry or irritated skin. They are a sign that the skin is breaking down, which can lead to infection.  Toenail problems to watch for include nails growing into the skin (ingrown toenail) and causing redness or pain. Thick, yellow, or discolored nails can signal a fungal infection.  Drainage and odor can develop from untreated sores and ulcers. Call your doctor right away if you notice white or yellow drainage, bleeding, or unpleasant odor.   © 9355-4046 [employer]. [address] [place] [address]. All rights reserved. This information is not intended as a substitute for professional medical care. Always follow your healthcare professional's instructions.        Step-by-Step:  Inspecting Your Feet (Diabetes)    Date Last Reviewed: 10/1/2016  © 6115-3002 [employer]. [address] [place] [address]. All rights reserved. This information is not intended as a substitute for professional medical care. Always follow your healthcare professional's instructions.

## 2023-08-29 ENCOUNTER — TELEPHONE (OUTPATIENT)
Dept: PODIATRY | Facility: CLINIC | Age: 73
End: 2023-08-29
Payer: MEDICARE

## 2023-08-29 NOTE — TELEPHONE ENCOUNTER
Pt was called regarding the need to obtain more information about her scheduled procedure and if she was able to come at 10am instead

## 2023-09-01 ENCOUNTER — HOSPITAL ENCOUNTER (OUTPATIENT)
Dept: RADIOLOGY | Facility: CLINIC | Age: 73
Discharge: HOME | End: 2023-09-01
Attending: INTERNAL MEDICINE
Payer: MEDICARE

## 2023-09-01 DIAGNOSIS — M81.0 AGE-RELATED OSTEOPOROSIS WITHOUT CURRENT PATHOLOGICAL FRACTURE: ICD-10-CM

## 2023-09-01 PROCEDURE — 77080 DXA BONE DENSITY AXIAL: CPT | Mod: TC,HCNC

## 2023-09-01 PROCEDURE — 77080 DXA BONE DENSITY AXIAL: CPT | Mod: 26,HCNC,, | Performed by: INTERNAL MEDICINE

## 2023-09-01 PROCEDURE — 77080 DXA BONE DENSITY AXIAL SKELETON 1 OR MORE SITES: ICD-10-PCS | Mod: 26,HCNC,, | Performed by: INTERNAL MEDICINE

## 2023-09-06 ENCOUNTER — PROCEDURE VISIT (OUTPATIENT)
Dept: PODIATRY | Facility: CLINIC | Age: 73
End: 2023-09-06
Payer: MEDICARE

## 2023-09-06 VITALS
WEIGHT: 173.75 LBS | HEART RATE: 103 BPM | HEIGHT: 66 IN | SYSTOLIC BLOOD PRESSURE: 143 MMHG | BODY MASS INDEX: 27.92 KG/M2 | DIASTOLIC BLOOD PRESSURE: 78 MMHG

## 2023-09-06 DIAGNOSIS — L60.0 INGROWN NAIL: Primary | ICD-10-CM

## 2023-09-06 PROCEDURE — 11750 NAIL REMOVAL: ICD-10-PCS | Mod: T5,S$GLB,, | Performed by: PODIATRIST

## 2023-09-06 PROCEDURE — 11750 EXCISION NAIL&NAIL MATRIX: CPT | Mod: T5,S$GLB,, | Performed by: PODIATRIST

## 2023-09-12 ENCOUNTER — TELEPHONE (OUTPATIENT)
Dept: NEUROSURGERY | Facility: CLINIC | Age: 73
End: 2023-09-12
Payer: MEDICARE

## 2023-09-12 NOTE — TELEPHONE ENCOUNTER
Spoke to patient and confirmed time, date, and location for appt with Kerri for workup per Dr. Joshi request

## 2023-09-13 ENCOUNTER — OFFICE VISIT (OUTPATIENT)
Dept: PODIATRY | Facility: CLINIC | Age: 73
End: 2023-09-13
Payer: MEDICARE

## 2023-09-13 ENCOUNTER — OFFICE VISIT (OUTPATIENT)
Dept: NEUROSURGERY | Facility: CLINIC | Age: 73
End: 2023-09-13
Payer: MEDICARE

## 2023-09-13 VITALS
WEIGHT: 168.44 LBS | HEART RATE: 111 BPM | BODY MASS INDEX: 27.19 KG/M2 | SYSTOLIC BLOOD PRESSURE: 127 MMHG | DIASTOLIC BLOOD PRESSURE: 78 MMHG

## 2023-09-13 VITALS
WEIGHT: 173.75 LBS | BODY MASS INDEX: 27.92 KG/M2 | DIASTOLIC BLOOD PRESSURE: 76 MMHG | HEIGHT: 66 IN | SYSTOLIC BLOOD PRESSURE: 126 MMHG | HEART RATE: 94 BPM

## 2023-09-13 DIAGNOSIS — M54.2 CERVICALGIA: Primary | ICD-10-CM

## 2023-09-13 DIAGNOSIS — M48.02 SPINAL STENOSIS, CERVICAL REGION: ICD-10-CM

## 2023-09-13 DIAGNOSIS — M25.512 ACUTE PAIN OF LEFT SHOULDER: ICD-10-CM

## 2023-09-13 DIAGNOSIS — L60.0 INGROWN NAIL: Primary | ICD-10-CM

## 2023-09-13 PROCEDURE — 1101F PT FALLS ASSESS-DOCD LE1/YR: CPT | Mod: CPTII,S$GLB,, | Performed by: NURSE PRACTITIONER

## 2023-09-13 PROCEDURE — 99214 PR OFFICE/OUTPT VISIT, EST, LEVL IV, 30-39 MIN: ICD-10-PCS | Mod: S$GLB,,, | Performed by: NURSE PRACTITIONER

## 2023-09-13 PROCEDURE — 3044F PR MOST RECENT HEMOGLOBIN A1C LEVEL <7.0%: ICD-10-PCS | Mod: CPTII,S$GLB,, | Performed by: PODIATRIST

## 2023-09-13 PROCEDURE — 3044F HG A1C LEVEL LT 7.0%: CPT | Mod: CPTII,S$GLB,, | Performed by: PODIATRIST

## 2023-09-13 PROCEDURE — 3288F PR FALLS RISK ASSESSMENT DOCUMENTED: ICD-10-PCS | Mod: CPTII,S$GLB,, | Performed by: NURSE PRACTITIONER

## 2023-09-13 PROCEDURE — 1101F PR PT FALLS ASSESS DOC 0-1 FALLS W/OUT INJ PAST YR: ICD-10-PCS | Mod: CPTII,S$GLB,, | Performed by: NURSE PRACTITIONER

## 2023-09-13 PROCEDURE — 3074F PR MOST RECENT SYSTOLIC BLOOD PRESSURE < 130 MM HG: ICD-10-PCS | Mod: CPTII,S$GLB,, | Performed by: NURSE PRACTITIONER

## 2023-09-13 PROCEDURE — 3078F DIAST BP <80 MM HG: CPT | Mod: CPTII,S$GLB,, | Performed by: NURSE PRACTITIONER

## 2023-09-13 PROCEDURE — 99214 OFFICE O/P EST MOD 30 MIN: CPT | Mod: S$GLB,,, | Performed by: NURSE PRACTITIONER

## 2023-09-13 PROCEDURE — 3074F SYST BP LT 130 MM HG: CPT | Mod: CPTII,S$GLB,, | Performed by: PODIATRIST

## 2023-09-13 PROCEDURE — 3288F FALL RISK ASSESSMENT DOCD: CPT | Mod: CPTII,S$GLB,, | Performed by: NURSE PRACTITIONER

## 2023-09-13 PROCEDURE — 4010F ACE/ARB THERAPY RXD/TAKEN: CPT | Mod: CPTII,S$GLB,, | Performed by: PODIATRIST

## 2023-09-13 PROCEDURE — 3008F PR BODY MASS INDEX (BMI) DOCUMENTED: ICD-10-PCS | Mod: CPTII,S$GLB,, | Performed by: PODIATRIST

## 2023-09-13 PROCEDURE — 3008F PR BODY MASS INDEX (BMI) DOCUMENTED: ICD-10-PCS | Mod: CPTII,S$GLB,, | Performed by: NURSE PRACTITIONER

## 2023-09-13 PROCEDURE — 3008F BODY MASS INDEX DOCD: CPT | Mod: CPTII,S$GLB,, | Performed by: NURSE PRACTITIONER

## 2023-09-13 PROCEDURE — 1126F PR PAIN SEVERITY QUANTIFIED, NO PAIN PRESENT: ICD-10-PCS | Mod: CPTII,S$GLB,, | Performed by: PODIATRIST

## 2023-09-13 PROCEDURE — 4010F PR ACE/ARB THEARPY RXD/TAKEN: ICD-10-PCS | Mod: CPTII,S$GLB,, | Performed by: NURSE PRACTITIONER

## 2023-09-13 PROCEDURE — 99999 PR PBB SHADOW E&M-EST. PATIENT-LVL II: ICD-10-PCS | Mod: PBBFAC,,, | Performed by: PODIATRIST

## 2023-09-13 PROCEDURE — 99999 PR PBB SHADOW E&M-EST. PATIENT-LVL II: CPT | Mod: PBBFAC,,, | Performed by: PODIATRIST

## 2023-09-13 PROCEDURE — 99999 PR PBB SHADOW E&M-EST. PATIENT-LVL IV: ICD-10-PCS | Mod: PBBFAC,,, | Performed by: NURSE PRACTITIONER

## 2023-09-13 PROCEDURE — 3072F LOW RISK FOR RETINOPATHY: CPT | Mod: CPTII,S$GLB,, | Performed by: NURSE PRACTITIONER

## 2023-09-13 PROCEDURE — 3074F SYST BP LT 130 MM HG: CPT | Mod: CPTII,S$GLB,, | Performed by: NURSE PRACTITIONER

## 2023-09-13 PROCEDURE — 1160F RVW MEDS BY RX/DR IN RCRD: CPT | Mod: CPTII,S$GLB,, | Performed by: NURSE PRACTITIONER

## 2023-09-13 PROCEDURE — 3078F PR MOST RECENT DIASTOLIC BLOOD PRESSURE < 80 MM HG: ICD-10-PCS | Mod: CPTII,S$GLB,, | Performed by: NURSE PRACTITIONER

## 2023-09-13 PROCEDURE — 1159F MED LIST DOCD IN RCRD: CPT | Mod: CPTII,S$GLB,, | Performed by: NURSE PRACTITIONER

## 2023-09-13 PROCEDURE — 4010F ACE/ARB THERAPY RXD/TAKEN: CPT | Mod: CPTII,S$GLB,, | Performed by: NURSE PRACTITIONER

## 2023-09-13 PROCEDURE — 3078F DIAST BP <80 MM HG: CPT | Mod: CPTII,S$GLB,, | Performed by: PODIATRIST

## 2023-09-13 PROCEDURE — 99024 PR POST-OP FOLLOW-UP VISIT: ICD-10-PCS | Mod: S$GLB,,, | Performed by: PODIATRIST

## 2023-09-13 PROCEDURE — 3072F LOW RISK FOR RETINOPATHY: CPT | Mod: CPTII,S$GLB,, | Performed by: PODIATRIST

## 2023-09-13 PROCEDURE — 99999 PR PBB SHADOW E&M-EST. PATIENT-LVL IV: CPT | Mod: PBBFAC,,, | Performed by: NURSE PRACTITIONER

## 2023-09-13 PROCEDURE — 3072F PR LOW RISK FOR RETINOPATHY: ICD-10-PCS | Mod: CPTII,S$GLB,, | Performed by: NURSE PRACTITIONER

## 2023-09-13 PROCEDURE — 1125F PR PAIN SEVERITY QUANTIFIED, PAIN PRESENT: ICD-10-PCS | Mod: CPTII,S$GLB,, | Performed by: NURSE PRACTITIONER

## 2023-09-13 PROCEDURE — 1160F PR REVIEW ALL MEDS BY PRESCRIBER/CLIN PHARMACIST DOCUMENTED: ICD-10-PCS | Mod: CPTII,S$GLB,, | Performed by: NURSE PRACTITIONER

## 2023-09-13 PROCEDURE — 1159F PR MEDICATION LIST DOCUMENTED IN MEDICAL RECORD: ICD-10-PCS | Mod: CPTII,S$GLB,, | Performed by: NURSE PRACTITIONER

## 2023-09-13 PROCEDURE — 3072F PR LOW RISK FOR RETINOPATHY: ICD-10-PCS | Mod: CPTII,S$GLB,, | Performed by: PODIATRIST

## 2023-09-13 PROCEDURE — 4010F PR ACE/ARB THEARPY RXD/TAKEN: ICD-10-PCS | Mod: CPTII,S$GLB,, | Performed by: PODIATRIST

## 2023-09-13 PROCEDURE — 3074F PR MOST RECENT SYSTOLIC BLOOD PRESSURE < 130 MM HG: ICD-10-PCS | Mod: CPTII,S$GLB,, | Performed by: PODIATRIST

## 2023-09-13 PROCEDURE — 3044F HG A1C LEVEL LT 7.0%: CPT | Mod: CPTII,S$GLB,, | Performed by: NURSE PRACTITIONER

## 2023-09-13 PROCEDURE — 3008F BODY MASS INDEX DOCD: CPT | Mod: CPTII,S$GLB,, | Performed by: PODIATRIST

## 2023-09-13 PROCEDURE — 1126F AMNT PAIN NOTED NONE PRSNT: CPT | Mod: CPTII,S$GLB,, | Performed by: PODIATRIST

## 2023-09-13 PROCEDURE — 3044F PR MOST RECENT HEMOGLOBIN A1C LEVEL <7.0%: ICD-10-PCS | Mod: CPTII,S$GLB,, | Performed by: NURSE PRACTITIONER

## 2023-09-13 PROCEDURE — 1125F AMNT PAIN NOTED PAIN PRSNT: CPT | Mod: CPTII,S$GLB,, | Performed by: NURSE PRACTITIONER

## 2023-09-13 PROCEDURE — 3078F PR MOST RECENT DIASTOLIC BLOOD PRESSURE < 80 MM HG: ICD-10-PCS | Mod: CPTII,S$GLB,, | Performed by: PODIATRIST

## 2023-09-13 PROCEDURE — 99024 POSTOP FOLLOW-UP VISIT: CPT | Mod: S$GLB,,, | Performed by: PODIATRIST

## 2023-09-13 RX ORDER — CALCIUM CARBONATE/VITAMIN D3 600MG-5MCG
TABLET ORAL
COMMUNITY

## 2023-09-13 RX ORDER — NITROFURANTOIN 25; 75 MG/1; MG/1
100 CAPSULE ORAL 2 TIMES DAILY
COMMUNITY
Start: 2023-05-19 | End: 2023-09-18 | Stop reason: ALTCHOICE

## 2023-09-13 RX ORDER — DULAGLUTIDE 0.75 MG/.5ML
INJECTION, SOLUTION SUBCUTANEOUS
COMMUNITY
Start: 2023-04-10 | End: 2023-09-18 | Stop reason: ALTCHOICE

## 2023-09-13 RX ORDER — FLUCONAZOLE 150 MG/1
150 TABLET ORAL
COMMUNITY
Start: 2023-05-19 | End: 2023-09-18 | Stop reason: ALTCHOICE

## 2023-09-13 NOTE — PROGRESS NOTES
Neurosurgery  Established Patient    SUBJECTIVE:     History of Present Illness: Lima Maldonado is a 73 y.o. female who presents to clinic c/o intermittent chronic neck pain that was been gradually worsening over the past several months. Pt states that initially she began having aching pain radiating into R shoulder, but has now begun to have the same pain radiate to L shoulder in a more severe manner. Denies trauma or inciting event. Her pain worsens throughout the day, keeps her from getting to sleep at night. Pain worsens when she lifts either arm above 90 degrees. Has some aching pain periodically throughout either arm.    She endorses nearly constant severe pain in both thumbs that persistently affects her daily activities. Pt endorses Hx of neuropathy in her hands that began after breast cancer chemotherapy 6 years ago, has gradually worsened since. Endorses Hx of diabetic neuropathy she believes is contributing to this problem. Pt clarifies that the pain in her thumbs feels distinct from her chronic neuropathy. Pt endorses that she seems to be more clumsy with her hands since her neck problems began worsening.  Denies b/b dysfunction, saddle anesthesia, or gait instability.      Review of patient's allergies indicates:   Allergen Reactions    Erythromycin      Other reaction(s): Nausea    Erythromycin (bulk)      Other reaction(s): Nausea    Oxycodone Nausea And Vomiting    Oxycodone-acetaminophen Nausea And Vomiting     Other reaction(s): Nausea       Current Outpatient Medications   Medication Sig Dispense Refill    ascorbic acid, vitamin C, (VITAMIN C) 1000 MG tablet Take 1 tablet by mouth once daily.      CANNABIDIOL, CBD, EXTRACT ORAL Take 1 drop by mouth Daily.      coenzyme Q10-vitamin E 100-100 mg-unit Cap Take 1 capsule by mouth once daily.       estradioL (YUVAFEM) 10 mcg Tab PLACE 1 TABLET VAGINALLY TWICE A WEEK 24 tablet 2    fenofibrate 160 MG Tab TAKE 1 TABLET BY MOUTH ONCE DAILY. 90 tablet 1     fluticasone (FLONASE) 50 mcg/actuation nasal spray 1 spray by Each Nare route 2 (two) times daily.       gabapentin (NEURONTIN) 300 MG capsule TAKE 2 CAPSULES BY MOUTH EVERY EVENING. 180 capsule 3    letrozole (FEMARA) 2.5 mg Tab TAKE 1 TABLET BY MOUTH EVERY DAY 90 tablet 3    levothyroxine (SYNTHROID) 50 MCG tablet TAKE 1 TABLET BY MOUTH EVERY DAY 90 tablet 3    losartan (COZAAR) 50 MG tablet TAKE 1 TABLET BY MOUTH EVERY DAY 90 tablet 3    metFORMIN (GLUCOPHAGE) 500 MG tablet TAKE 1 TABLET BY MOUTH EVERY DAY WITH BREAKFAST (Patient taking differently: 2 (two) times a day.) 90 tablet 1    MULTIVITAMIN ORAL Take 1 tablet by mouth once daily.       omega-3 fatty acids-vitamin E 1,000 mg Cap Take 1 capsule by mouth once daily.       pantoprazole (PROTONIX) 20 MG tablet Take 1 tablet (20 mg total) by mouth before breakfast. 90 tablet 3    simvastatin (ZOCOR) 20 MG tablet TAKE 1 TABLET BY MOUTH EVERY DAY IN THE EVENING 90 tablet 3    sucralfate (CARAFATE) 100 mg/mL suspension Take 10 mLs (1 g total) by mouth 4 (four) times daily. 828 mL 1    TRULICITY 0.75 mg/0.5 mL pen injector Inject into the skin.      vitamin D 1000 units Tab Take 1,000 Units by mouth once daily.       calcium-vitamin D tablet 600 mg-200 units       celecoxib (CELEBREX) 100 MG capsule TAKE 1 CAPSULE (100 MG TOTAL) BY MOUTH DAILY AS NEEDED. (Patient not taking: Reported on 9/13/2023) 90 capsule 0    denosumab (PROLIA) 60 mg/mL Syrg Inject 1 mL (60 mg total) into the skin every 6 (six) months. 2 mL 0    fluconazole (DIFLUCAN) 150 MG Tab Take 150 mg by mouth.      nitrofurantoin, macrocrystal-monohydrate, (MACROBID) 100 MG capsule Take 100 mg by mouth 2 (two) times daily.      tobramycin sulfate 0.3% (TOBREX) 0.3 % ophthalmic solution Apply to wound beds twice daily (Patient not taking: Reported on 9/13/2023) 5 mL 2     No current facility-administered medications for this visit.       Past Medical History:   Diagnosis Date    Acute cystitis without  hematuria 7/17/2017    Arthritis     Back pain     Breast cancer     originally dx in 02/1997- treated with surgery, chemo and radiation     Class 1 obesity due to excess calories with serious comorbidity and body mass index (BMI) of 30.0 to 30.9 in adult 11/29/2018    Elevated bilirubin 11/19/2013    Fatty liver     GERD (gastroesophageal reflux disease)     Glucose intolerance (impaired glucose tolerance)     Hyperlipidemia     Hypertension     Hypothyroid     Hypothyroidism     hypothyroidism    Malignant neoplasm of lower-outer quadrant of left female breast 11/15/2016    Obesity     Obesity, diabetes, and hypertension syndrome 12/12/2018    Osteopenia     Osteoporosis     Primary insomnia 11/2/2016    Shingles     Sleep apnea     wears CPAP nightly     Squamous cell carcinoma 2011    right forearm, sx by Dr. Corinne Ray    Stress incontinence of urine 1/11/2018    Trouble in sleeping     Type 2 diabetes mellitus with diabetic polyneuropathy, without long-term current use of insulin 12/26/2017    Urinary incontinence     Well woman exam with routine gynecological exam 11/2/2016     Past Surgical History:   Procedure Laterality Date    APPENDECTOMY  2008    removed during hysterectomy surgery     BLEPHAROPLASTY Bilateral     BREAST BIOPSY      2010    Breast implant Bilateral 1998    implants removed in 2003    BREAST LUMPECTOMY      02/1997    BREAST RECONSTRUCTION Bilateral     02/2017    BREAST SURGERY      see details below     CATARACT EXTRACTION W/  INTRAOCULAR LENS IMPLANT Left 08/13/2020    Procedure: EXTRACTION, CATARACT, WITH IOL INSERTION;  Surgeon: Ivanna Coleman MD;  Location: [facility] OR;  Service: Ophthalmology;  Laterality: Left;    CATARACT EXTRACTION W/  INTRAOCULAR LENS IMPLANT Right 08/27/2020    Procedure: EXTRACTION, CATARACT, WITH IOL INSERTION LASER;  Surgeon: Ivanna Coleman MD;  Location: [facility] OR;  Service: Ophthalmology;  Laterality: Right;    COLONOSCOPY N/A 05/15/2018    Procedure:  COLONOSCOPY;  Surgeon: Roldan Gonzales MD;  [facility]  Service: Endoscopy;  Laterality: N/A;    COSMETIC SURGERY  2022    Eyelift    ESOPHAGOGASTRODUODENOSCOPY N/A 2022    Procedure: EGD (ESOPHAGOGASTRODUODENOSCOPY);  Surgeon: Omar Ambriz MD;  [facility]  Service: Endoscopy;  Laterality: N/A;  instructions via portal -     EYE SURGERY      CATERACTS    HYSTERECTOMY      benign    left breast reconstruction      left modified radical mastectomy  1997    for treatment of breast cancer     LIPOMA RESECTION      removed from upper back area     MODIFIED RADICAL MASTECTOMY W/ AXILLARY LYMPH NODE DISSECTION  1997    OOPHORECTOMY          PELVIC LAPAROSCOPY      pt had endometriosis     NE REMOVAL OF OVARY/TUBE(S)      benign    reduction of mons pubis      robotic lap  hysterectomy with bso  2008    UPPER GASTROINTESTINAL ENDOSCOPY  2013     Family History       Problem Relation (Age of Onset)    Alcohol abuse Father    Arthritis Maternal Grandmother    Breast cancer Sister (51)    Cancer Sister, Mother (65)    Diabetes Maternal Uncle, Maternal Uncle    Heart attack Father (51)    Heart disease Father    Melanoma Mother    No Known Problems Brother, Daughter, Son, Daughter, Son          Social History     Socioeconomic History    Marital status:    Tobacco Use    Smoking status: Former     Current packs/day: 0.00     Average packs/day: 0.5 packs/day for 3.0 years (1.5 ttl pk-yrs)     Types: Cigarettes     Start date: 4/15/1969     Quit date: 4/15/1972     Years since quittin.4    Smokeless tobacco: Never    Tobacco comments:     started at age 18 during college    Substance and Sexual Activity    Alcohol use: Yes     Comment: Rarely, not even 1X per week    Drug use: No    Sexual activity: Yes     Partners: Male     Birth control/protection: Post-menopausal, See Surgical Hx     Social Determinants of Health      Financial Resource Strain: Low Risk  (9/12/2023)    Overall Financial Resource Strain (CARDIA)     Difficulty of Paying Living Expenses: Not hard at all   Food Insecurity: No Food Insecurity (9/12/2023)    Hunger Vital Sign     Worried About Running Out of Food in the Last Year: Never true     Ran Out of Food in the Last Year: Never true   Transportation Needs: No Transportation Needs (9/12/2023)    PRAPARE - Transportation     Lack of Transportation (Medical): No     Lack of Transportation (Non-Medical): No   Physical Activity: Inactive (9/12/2023)    Exercise Vital Sign     Days of Exercise per Week: 0 days     Minutes of Exercise per Session: 0 min   Stress: Stress Concern Present (9/12/2023)    [place] [employer] - Occupational Stress Questionnaire     Feeling of Stress : To some extent   Social Connections: Unknown (9/12/2023)    Social Connection and Isolation Panel [NHANES]     Frequency of Communication with Friends and Family: More than three times a week     Frequency of Social Gatherings with Friends and Family: Twice a week     Active Member of Clubs or Organizations: Yes     Attends Club or Organization Meetings: More than 4 times per year     Marital Status:    Housing Stability: Low Risk  (9/12/2023)    Housing Stability Vital Sign     Unable to Pay for Housing in the Last Year: No     Number of Places Lived in the Last Year: 1     Unstable Housing in the Last Year: No       Review of Systems   Constitutional:  Negative for activity change, appetite change and fever.   HENT:  Negative for ear discharge.    Eyes:  Negative for pain and visual disturbance.   Respiratory:  Negative for cough, chest tightness and shortness of breath.    Cardiovascular:  Negative for chest pain and palpitations.   Gastrointestinal:  Negative for abdominal distention and abdominal pain.   Endocrine: Negative for polydipsia, polyphagia and polyuria.   Musculoskeletal:  Positive for  arthralgias, myalgias, neck pain and neck stiffness. Negative for back pain.   Skin:  Negative for color change and wound.   Neurological:  Positive for numbness. Negative for dizziness, weakness and headaches.   Psychiatric/Behavioral:  Negative for behavioral problems, confusion and dysphoric mood.        OBJECTIVE:     Vital Signs  Pulse: (!) 111  BP: 127/78  Pain Score:   7  Weight: 76.4 kg (168 lb 6.9 oz)  Body mass index is 27.19 kg/m².    Neurosurgery Physical Exam  General: well developed, well nourished, no distress.   Head: normocephalic, atraumatic  Neurologic: Alert and oriented. Thought content appropriate.  GCS: Motor: 6/Verbal: 5/Eyes: 4 GCS Total: 15  Mental Status: Awake, Alert, Oriented x 4  Language: No aphasia  Speech: No dysarthria  Cranial nerves: face symmetric, tongue midline, CN II-XII grossly intact.   Eyes: pupils equal, round, reactive to light with accomodation, EOMI.   Pulmonary: normal respirations, no signs of respiratory distress  Abdomen: soft, non-distended  Skin: Skin is warm, dry and intact.  Sensory: intact to light touch throughout  Motor Strength:Moves all extremities spontaneously with good tone.    Strength  Deltoids Triceps Biceps Wrist Extension Wrist Flexion Hand    Upper: R 5/5 5/5 5/5 5/5 5/5 5/5    L 5/5 5/5 5/5 5/5 5/5 5/5     HF KE KF DF PF EHL   Lower: R 5/5 5/5 5/5 5/5 5/5 5/5    L 5/5 5/5 5/5 5/5 5/5 5/5     Reflexes:   Mendieta's: Negative.    Cerebellar:   Gait stable, fluid.   Tandem Gait: No difficulty  Able to walk on heels & toes     Cervical:   Midline TTP: Negative.    Shoulders:  Ana Test: negative bilaterally  Lift-off: negative bilaterally  Infraspinatus: negative bilaterally      Diagnostic Results:  There is no new imaging to review for this encounter.       ASSESSMENT/PLAN:   Lima Maldonado is a 73 y.o. female presents to clinic with symptoms of cervical radiculopathy. I have ordered:     - MRI Cervical Spine WO to further investigate pt's  cervical radicular symptoms  - XR Cervical w/ Flex/Ext to evaluate for any dynamic instability  - XR Shoulder to assess for arthritic changes.      I would like the patient to follow-up in clinic in 2-4 weeks to discuss the image findings. I have encouraged her to contact the clinic with any questions, concerns, or adverse clinical changes. She verbalized understanding.        JANELLE Griffin-SAVANNAH  Neurosurgery  [facility].      Note dictated with voice recognition software, please excuse any grammatical errors.

## 2023-09-17 NOTE — PROCEDURES
Nail Removal    Date/Time: 9/6/2023 10:00 AM    Performed by: Katina Shay DPM  Authorized by: Katina Shay DPM    Consent Done?:  Yes (Written)  Location:     Location:  Right foot    Location detail:  Right big toe  Anesthesia:     Anesthesia:  Digital block    Local anesthetic:  Lidocaine 1% without epinephrine and bupivacaine 0.5% without epinephrine    Anesthetic total (ml):  5  Procedure Details:     Preparation:  Skin prepped with Betadine    Amount removed:  Partial    Side:  Lateral    Wedge excision of skin of nail fold: No      Nail bed sutured?: No      Nail matrix removed:  Partial    Removal method:  Phenol and alcohol    Dressing applied:  Antibiotic ointment and dressing applied    Patient tolerance:  Patient tolerated the procedure well with no immediate complications     Home instructions given to pt  RTC in 1 week or sooner if any new pedal problems arise, any signs of infection occur, or if condition worsens.

## 2023-09-17 NOTE — PROGRESS NOTES
Subjective:      Patient ID: Lima Maldonado is a 73 y.o. female.    Chief Complaint: Post-op Evaluation (L great toenail nail procedure 9/6/2023) and Diabetes Mellitus (Pcp - Joie Mccall MD - 8/1/2023)    Pt presents today s/p permanent right hallux lateral border nail avulsion. Pt states she has been soaking and apply abx ointment. Pt denies any new issues. Pt denies any pain.     Review of Systems   Constitutional: Negative for chills, fever and malaise/fatigue.   HENT:  Negative for hearing loss.    Cardiovascular:  Negative for claudication.   Respiratory:  Negative for shortness of breath.    Skin:  Positive for nail changes. Negative for flushing and rash.   Musculoskeletal:  Negative for joint pain and myalgias.   Neurological:  Negative for loss of balance, numbness, paresthesias and sensory change.   Psychiatric/Behavioral:  Negative for altered mental status.          Objective:      Physical Exam  Vitals reviewed.   Cardiovascular:      Pulses:           Dorsalis pedis pulses are 2+ on the right side and 2+ on the left side.        Posterior tibial pulses are 2+ on the right side and 2+ on the left side.      Comments: No edema noted b/L  Musculoskeletal:         General: Normal range of motion.      Comments:        Feet:      Right foot:      Protective Sensation: 5 sites tested.  5 sites sensed.      Left foot:      Protective Sensation: 5 sites tested.  5 sites sensed.   Skin:     General: Skin is warm.      Capillary Refill: Capillary refill takes 2 to 3 seconds.      Coloration: Skin is not cyanotic.      Findings: No abscess or erythema.      Comments: Right hallux lateral border absent, slightly open  No drainage, no SOI noted   Neurological:      Mental Status: She is alert.      Comments: Gross sensation intact b/L           Assessment:       Encounter Diagnosis   Name Primary?    Ingrown nail Yes         Plan:       Lima was seen today for post-op evaluation and diabetes  mellitus.    Diagnoses and all orders for this visit:    Ingrown nail      I counseled the patient on her conditions, their implications and medical management.    Pt advised to d/c soaking and begin to apply betadine and cover with gauze/tape until nail border is dry and healed  Call or return to clinic prn if these symptoms worsen or fail to improve as anticipated.      .

## 2023-09-18 ENCOUNTER — OFFICE VISIT (OUTPATIENT)
Dept: INTERNAL MEDICINE | Facility: CLINIC | Age: 73
End: 2023-09-18
Payer: MEDICARE

## 2023-09-18 VITALS
BODY MASS INDEX: 26.86 KG/M2 | DIASTOLIC BLOOD PRESSURE: 78 MMHG | OXYGEN SATURATION: 96 % | WEIGHT: 167.13 LBS | HEART RATE: 80 BPM | SYSTOLIC BLOOD PRESSURE: 126 MMHG | HEIGHT: 66 IN

## 2023-09-18 DIAGNOSIS — E11.42 DIABETIC POLYNEUROPATHY ASSOCIATED WITH TYPE 2 DIABETES MELLITUS: ICD-10-CM

## 2023-09-18 DIAGNOSIS — E03.9 HYPOTHYROIDISM, UNSPECIFIED TYPE: ICD-10-CM

## 2023-09-18 DIAGNOSIS — N39.3 STRESS INCONTINENCE OF URINE: ICD-10-CM

## 2023-09-18 DIAGNOSIS — Z17.0 MALIGNANT NEOPLASM OF LOWER-OUTER QUADRANT OF LEFT BREAST OF FEMALE, ESTROGEN RECEPTOR POSITIVE: ICD-10-CM

## 2023-09-18 DIAGNOSIS — G62.0 CHEMOTHERAPY-INDUCED NEUROPATHY: ICD-10-CM

## 2023-09-18 DIAGNOSIS — F51.01 PRIMARY INSOMNIA: ICD-10-CM

## 2023-09-18 DIAGNOSIS — R26.9 ABNORMALITY OF GAIT AND MOBILITY: ICD-10-CM

## 2023-09-18 DIAGNOSIS — H90.A22 SENSORINEURAL HEARING LOSS (SNHL) OF LEFT EAR WITH RESTRICTED HEARING OF RIGHT EAR: ICD-10-CM

## 2023-09-18 DIAGNOSIS — K21.9 GASTROESOPHAGEAL REFLUX DISEASE, UNSPECIFIED WHETHER ESOPHAGITIS PRESENT: ICD-10-CM

## 2023-09-18 DIAGNOSIS — Z00.00 ENCOUNTER FOR MEDICARE ANNUAL WELLNESS EXAM: ICD-10-CM

## 2023-09-18 DIAGNOSIS — E11.42 TYPE 2 DIABETES MELLITUS WITH DIABETIC POLYNEUROPATHY, WITHOUT LONG-TERM CURRENT USE OF INSULIN: ICD-10-CM

## 2023-09-18 DIAGNOSIS — I10 PRIMARY HYPERTENSION: ICD-10-CM

## 2023-09-18 DIAGNOSIS — Z79.811 AROMATASE INHIBITOR USE: ICD-10-CM

## 2023-09-18 DIAGNOSIS — C50.512 MALIGNANT NEOPLASM OF LOWER-OUTER QUADRANT OF LEFT BREAST OF FEMALE, ESTROGEN RECEPTOR POSITIVE: ICD-10-CM

## 2023-09-18 DIAGNOSIS — E78.2 MIXED HYPERLIPIDEMIA: ICD-10-CM

## 2023-09-18 DIAGNOSIS — K76.0 FATTY LIVER: ICD-10-CM

## 2023-09-18 DIAGNOSIS — M81.0 SENILE OSTEOPOROSIS: ICD-10-CM

## 2023-09-18 DIAGNOSIS — G47.33 OBSTRUCTIVE SLEEP APNEA ON CPAP: ICD-10-CM

## 2023-09-18 DIAGNOSIS — K74.01 EARLY HEPATIC FIBROSIS: ICD-10-CM

## 2023-09-18 DIAGNOSIS — I70.0 AORTIC ATHEROSCLEROSIS: ICD-10-CM

## 2023-09-18 DIAGNOSIS — T45.1X5A CHEMOTHERAPY-INDUCED NEUROPATHY: ICD-10-CM

## 2023-09-18 DIAGNOSIS — Z00.00 ENCOUNTER FOR PREVENTIVE HEALTH EXAMINATION: Primary | ICD-10-CM

## 2023-09-18 PROCEDURE — 1159F PR MEDICATION LIST DOCUMENTED IN MEDICAL RECORD: ICD-10-PCS | Mod: CPTII,S$GLB,, | Performed by: NURSE PRACTITIONER

## 2023-09-18 PROCEDURE — 1170F FXNL STATUS ASSESSED: CPT | Mod: CPTII,S$GLB,, | Performed by: NURSE PRACTITIONER

## 2023-09-18 PROCEDURE — 1160F PR REVIEW ALL MEDS BY PRESCRIBER/CLIN PHARMACIST DOCUMENTED: ICD-10-PCS | Mod: CPTII,S$GLB,, | Performed by: NURSE PRACTITIONER

## 2023-09-18 PROCEDURE — 1125F PR PAIN SEVERITY QUANTIFIED, PAIN PRESENT: ICD-10-PCS | Mod: CPTII,S$GLB,, | Performed by: NURSE PRACTITIONER

## 2023-09-18 PROCEDURE — 3288F FALL RISK ASSESSMENT DOCD: CPT | Mod: CPTII,S$GLB,, | Performed by: NURSE PRACTITIONER

## 2023-09-18 PROCEDURE — 3288F PR FALLS RISK ASSESSMENT DOCUMENTED: ICD-10-PCS | Mod: CPTII,S$GLB,, | Performed by: NURSE PRACTITIONER

## 2023-09-18 PROCEDURE — 3078F PR MOST RECENT DIASTOLIC BLOOD PRESSURE < 80 MM HG: ICD-10-PCS | Mod: CPTII,S$GLB,, | Performed by: NURSE PRACTITIONER

## 2023-09-18 PROCEDURE — 1101F PR PT FALLS ASSESS DOC 0-1 FALLS W/OUT INJ PAST YR: ICD-10-PCS | Mod: CPTII,S$GLB,, | Performed by: NURSE PRACTITIONER

## 2023-09-18 PROCEDURE — 4010F PR ACE/ARB THEARPY RXD/TAKEN: ICD-10-PCS | Mod: CPTII,S$GLB,, | Performed by: NURSE PRACTITIONER

## 2023-09-18 PROCEDURE — 1160F RVW MEDS BY RX/DR IN RCRD: CPT | Mod: CPTII,S$GLB,, | Performed by: NURSE PRACTITIONER

## 2023-09-18 PROCEDURE — 3078F DIAST BP <80 MM HG: CPT | Mod: CPTII,S$GLB,, | Performed by: NURSE PRACTITIONER

## 2023-09-18 PROCEDURE — 3008F PR BODY MASS INDEX (BMI) DOCUMENTED: ICD-10-PCS | Mod: CPTII,S$GLB,, | Performed by: NURSE PRACTITIONER

## 2023-09-18 PROCEDURE — 1101F PT FALLS ASSESS-DOCD LE1/YR: CPT | Mod: CPTII,S$GLB,, | Performed by: NURSE PRACTITIONER

## 2023-09-18 PROCEDURE — 1125F AMNT PAIN NOTED PAIN PRSNT: CPT | Mod: CPTII,S$GLB,, | Performed by: NURSE PRACTITIONER

## 2023-09-18 PROCEDURE — 3044F PR MOST RECENT HEMOGLOBIN A1C LEVEL <7.0%: ICD-10-PCS | Mod: CPTII,S$GLB,, | Performed by: NURSE PRACTITIONER

## 2023-09-18 PROCEDURE — 3072F PR LOW RISK FOR RETINOPATHY: ICD-10-PCS | Mod: CPTII,S$GLB,, | Performed by: NURSE PRACTITIONER

## 2023-09-18 PROCEDURE — 3074F PR MOST RECENT SYSTOLIC BLOOD PRESSURE < 130 MM HG: ICD-10-PCS | Mod: CPTII,S$GLB,, | Performed by: NURSE PRACTITIONER

## 2023-09-18 PROCEDURE — G0439 PR MEDICARE ANNUAL WELLNESS SUBSEQUENT VISIT: ICD-10-PCS | Mod: S$GLB,,, | Performed by: NURSE PRACTITIONER

## 2023-09-18 PROCEDURE — 1170F PR FUNCTIONAL STATUS ASSESSED: ICD-10-PCS | Mod: CPTII,S$GLB,, | Performed by: NURSE PRACTITIONER

## 2023-09-18 PROCEDURE — 3044F HG A1C LEVEL LT 7.0%: CPT | Mod: CPTII,S$GLB,, | Performed by: NURSE PRACTITIONER

## 2023-09-18 PROCEDURE — 99999 PR PBB SHADOW E&M-EST. PATIENT-LVL V: CPT | Mod: PBBFAC,,, | Performed by: NURSE PRACTITIONER

## 2023-09-18 PROCEDURE — 99999 PR PBB SHADOW E&M-EST. PATIENT-LVL V: ICD-10-PCS | Mod: PBBFAC,,, | Performed by: NURSE PRACTITIONER

## 2023-09-18 PROCEDURE — 3074F SYST BP LT 130 MM HG: CPT | Mod: CPTII,S$GLB,, | Performed by: NURSE PRACTITIONER

## 2023-09-18 PROCEDURE — 1159F MED LIST DOCD IN RCRD: CPT | Mod: CPTII,S$GLB,, | Performed by: NURSE PRACTITIONER

## 2023-09-18 PROCEDURE — 3072F LOW RISK FOR RETINOPATHY: CPT | Mod: CPTII,S$GLB,, | Performed by: NURSE PRACTITIONER

## 2023-09-18 PROCEDURE — 3008F BODY MASS INDEX DOCD: CPT | Mod: CPTII,S$GLB,, | Performed by: NURSE PRACTITIONER

## 2023-09-18 PROCEDURE — G0439 PPPS, SUBSEQ VISIT: HCPCS | Mod: S$GLB,,, | Performed by: NURSE PRACTITIONER

## 2023-09-18 PROCEDURE — 4010F ACE/ARB THERAPY RXD/TAKEN: CPT | Mod: CPTII,S$GLB,, | Performed by: NURSE PRACTITIONER

## 2023-09-18 RX ORDER — ASPIRIN 81 MG/1
81 TABLET ORAL DAILY
COMMUNITY

## 2023-09-18 RX ORDER — SEMAGLUTIDE 0.68 MG/ML
INJECTION, SOLUTION SUBCUTANEOUS
COMMUNITY
Start: 2023-09-16 | End: 2023-10-12

## 2023-09-18 NOTE — PATIENT INSTRUCTIONS
1. Follow up with Joie Bird MD as scheduled.    2. Eye exam as scheduled.    3. Flu shot today or RSV vaccine.    4. New covid boosters should be available around Oct if interested.    Counseling and Referral of Other Preventative  (Italic type indicates deductible and co-insurance are waived)    Patient Name: Lima Maldonado  Today's Date: 9/18/2023    Health Maintenance         Date Due Completion Date    COVID-19 Vaccine (5 - Pfizer risk series) 02/07/2023 12/13/2022    Eye Exam 08/17/2023 8/17/2022    Influenza Vaccine (1) 09/01/2023 10/18/2022    Override on 11/7/2017: Not Clinically Appropriate    Override on 9/23/2015: Done    Lipid Panel 10/18/2023 10/18/2022    Diabetes Urine Screening 10/25/2023 10/25/2022    TETANUS VACCINE 12/03/2023 12/3/2013    Hemoglobin A1c 01/25/2024 7/25/2023    Foot Exam 08/28/2024 8/28/2023    Override on 9/24/2019: Done    Override on 12/11/2018: Done    Override on 8/9/2017: Done    High Dose Statin 09/13/2024 9/13/2023    DEXA Scan 09/01/2025 9/1/2023    Colorectal Cancer Screening 05/15/2028 5/15/2018    Override on 10/6/2006: Done          No orders of the defined types were placed in this encounter.    The following information is provided to all patients.  This information is to help you find resources for any of the problems found today that may be affecting your health:                Living healthy guide: www.[facility].[place].gov      Understanding Diabetes: www.diabetes.org      Eating healthy: www.cdc.gov/healthyweight      CDC home safety checklist: www.cdc.gov/steadi/patient.html      Agency on Aging: www.goea.[place].gov      Alcoholics anonymous (AA): www.aa.org      Physical Activity: www.lata.nih.gov/lv3pkuk      Tobacco use: www.quitwithusla.org

## 2023-09-18 NOTE — PROGRESS NOTES
"Lima Maldonado presented for a  Medicare AWV and comprehensive Health Risk Assessment today. The following components were reviewed and updated:    Medical history  Family History  Social history  Allergies and Current Medications  Health Risk Assessment  Health Maintenance  Care Team         ** See Completed Assessments for Annual Wellness Visit within the encounter summary.**         The following assessments were completed:  Living Situation  CAGE  Depression Screening  Timed Get Up and Go  Whisper Test - N/A hearing impairment  Cognitive Function Screening    Nutrition Screening  ADL Screening  PAQ Screening  Review for Opioid Screening: Pt does not have Rx for Opioids.  Review for Substance Use Disorders: Patient does not use substances.        Vitals:    09/18/23 1000   BP: 126/78   BP Location: Right arm   Pulse: 80   SpO2: 96%   Weight: 75.8 kg (167 lb 1.7 oz)   Height: 5' 6" (1.676 m)     Body mass index is 26.97 kg/m².    Physical Exam  Vitals reviewed.   Constitutional:       Appearance: Normal appearance.   HENT:      Head: Normocephalic.   Cardiovascular:      Rate and Rhythm: Normal rate.   Pulmonary:      Effort: Pulmonary effort is normal.   Abdominal:      General: Bowel sounds are normal.   Musculoskeletal:         General: Normal range of motion.      Right lower leg: No edema.      Left lower leg: No edema.   Skin:     General: Skin is warm and dry.      Capillary Refill: Capillary refill takes less than 2 seconds.   Neurological:      Mental Status: She is alert and oriented to person, place, and time.   Psychiatric:         Behavior: Behavior normal.         Thought Content: Thought content normal.         Judgment: Judgment normal.               Diagnoses and health risks identified today and associated recommendations/orders:    1. Encounter for preventive health examination  Assessments completed.  HM recommendations reviewed. Flu shot today. Discussed rsv vaccine and new covid booster. Eye " exam as scheduled.   F/u with PCP as scheduled.    2. Aortic atherosclerosis  Chronic, stable on current regimen. Followed by PCP. On statin and fenofibrate.    3. Chemotherapy-induced neuropathy  Chronic, stable on current regimen. Followed by PCP / oncology.    4. Type 2 diabetes mellitus with diabetic polyneuropathy, without long-term current use of insulin  Chronic, stable on current regimen. Followed by PCP / oncology.    5. Diabetic polyneuropathy associated with type 2 diabetes mellitus  Chronic, stable on current regimen. Followed by PCP.    6. Malignant neoplasm of lower-outer quadrant of left breast of female, estrogen receptor positive  Chronic, stable on current regimen. Followed by oncology.    7. Aromatase inhibitor use  Chronic, stable on current regimen. Followed by oncology.    8. Primary hypertension  Chronic, stable on current regimen. Followed by cardiology.    9. Mixed hyperlipidemia  Chronic, stable on current regimen. Followed by cardiology.    10. BMI 26.0-26.9,adult  Chronic, stable on current regimen. Followed by PCP.    11. Hypothyroidism, unspecified type  Chronic, stable on current regimen. Followed by PCP.    12. Senile osteoporosis  Chronic, stable on current regimen. Followed by PCP.    13. Gastroesophageal reflux disease, unspecified whether esophagitis present  Chronic, stable on current regimen. Followed by GI.    14. Fatty liver  Chronic, stable on current regimen. Followed by hepatology.    15. Early hepatic fibrosis  Chronic, stable on current regimen. Followed by hepatology.    16. Obstructive sleep apnea on CPAP  Chronic, stable on current regimen. Followed by sleep medicine.    17. Primary insomnia  Chronic, stable on current regimen. Followed by sleep medicine.    18. Sensorineural hearing loss (SNHL) of left ear with restricted hearing of right ear  Chronic, stable on current regimen. Followed by ENT.    19. Stress incontinence of urine  Chronic, stable on current regimen.  Followed by PCP.    20. Abnormality of gait and mobility  Chronic, stable. No recent falls. Does not use assistive devices. Followed by PCP.    21. Encounter for Medicare annual wellness exam  Medicare HRA completed.  - Ambulatory Referral/Consult to Enhanced Annual Wellness Visit (eAWV)      Provided Lima with a 5-10 year written screening schedule and personal prevention plan. Recommendations were developed using the USPSTF age appropriate recommendations. Education, counseling, and referrals were provided as needed. After Visit Summary printed and given to patient which includes a list of additional screenings\tests needed.    Follow up in about 1 year (around 9/18/2024) for Medicare AWV and with PCP as scheduled.       Mikaela Saavedra NP    I offered to discuss advanced care planning, including how to pick a person who would make decisions for you if you were unable to make them for yourself, called a health care power of , and what kind of decisions you might make such as use of life sustaining treatments such as ventilators and tube feeding when faced with a life limiting illness recorded on a living will that they will need to know. (How you want to be cared for as you near the end of your natural life)     X Patient is interested in learning more about how to make advanced directives.  I provided them paperwork and offered to discuss this with them.

## 2023-10-04 ENCOUNTER — HOSPITAL ENCOUNTER (OUTPATIENT)
Dept: RADIOLOGY | Facility: HOSPITAL | Age: 73
Discharge: HOME | End: 2023-10-04
Attending: NURSE PRACTITIONER
Payer: MEDICARE

## 2023-10-04 ENCOUNTER — TELEPHONE (OUTPATIENT)
Dept: SPORTS MEDICINE | Facility: CLINIC | Age: 73
End: 2023-10-04
Payer: MEDICARE

## 2023-10-04 ENCOUNTER — OFFICE VISIT (OUTPATIENT)
Dept: NEUROSURGERY | Facility: CLINIC | Age: 73
End: 2023-10-04
Payer: MEDICARE

## 2023-10-04 ENCOUNTER — TELEPHONE (OUTPATIENT)
Dept: ENDOSCOPY | Facility: HOSPITAL | Age: 73
End: 2023-10-04
Payer: MEDICARE

## 2023-10-04 VITALS — DIASTOLIC BLOOD PRESSURE: 80 MMHG | HEART RATE: 85 BPM | SYSTOLIC BLOOD PRESSURE: 144 MMHG

## 2023-10-04 DIAGNOSIS — M48.02 SPINAL STENOSIS, CERVICAL REGION: ICD-10-CM

## 2023-10-04 DIAGNOSIS — M54.2 CERVICALGIA: ICD-10-CM

## 2023-10-04 DIAGNOSIS — M25.512 ACUTE PAIN OF LEFT SHOULDER: ICD-10-CM

## 2023-10-04 DIAGNOSIS — M25.512 ACUTE PAIN OF LEFT SHOULDER: Primary | ICD-10-CM

## 2023-10-04 PROCEDURE — 73030 XR SHOULDER COMPLETE 2 OR MORE VIEWS LEFT: ICD-10-PCS | Mod: 26,LT,, | Performed by: RADIOLOGY

## 2023-10-04 PROCEDURE — 3288F PR FALLS RISK ASSESSMENT DOCUMENTED: ICD-10-PCS | Mod: HCNC,CPTII,S$GLB, | Performed by: NURSE PRACTITIONER

## 2023-10-04 PROCEDURE — 4010F PR ACE/ARB THEARPY RXD/TAKEN: ICD-10-PCS | Mod: HCNC,CPTII,S$GLB, | Performed by: NURSE PRACTITIONER

## 2023-10-04 PROCEDURE — 1160F PR REVIEW ALL MEDS BY PRESCRIBER/CLIN PHARMACIST DOCUMENTED: ICD-10-PCS | Mod: HCNC,CPTII,S$GLB, | Performed by: NURSE PRACTITIONER

## 2023-10-04 PROCEDURE — 72141 MRI NECK SPINE W/O DYE: CPT | Mod: 26,,, | Performed by: RADIOLOGY

## 2023-10-04 PROCEDURE — 3077F PR MOST RECENT SYSTOLIC BLOOD PRESSURE >= 140 MM HG: ICD-10-PCS | Mod: HCNC,CPTII,S$GLB, | Performed by: NURSE PRACTITIONER

## 2023-10-04 PROCEDURE — 4010F ACE/ARB THERAPY RXD/TAKEN: CPT | Mod: HCNC,CPTII,S$GLB, | Performed by: NURSE PRACTITIONER

## 2023-10-04 PROCEDURE — 3079F DIAST BP 80-89 MM HG: CPT | Mod: HCNC,CPTII,S$GLB, | Performed by: NURSE PRACTITIONER

## 2023-10-04 PROCEDURE — 1159F MED LIST DOCD IN RCRD: CPT | Mod: HCNC,CPTII,S$GLB, | Performed by: NURSE PRACTITIONER

## 2023-10-04 PROCEDURE — 1125F PR PAIN SEVERITY QUANTIFIED, PAIN PRESENT: ICD-10-PCS | Mod: HCNC,CPTII,S$GLB, | Performed by: NURSE PRACTITIONER

## 2023-10-04 PROCEDURE — 73030 X-RAY EXAM OF SHOULDER: CPT | Mod: 26,LT,, | Performed by: RADIOLOGY

## 2023-10-04 PROCEDURE — 3044F HG A1C LEVEL LT 7.0%: CPT | Mod: HCNC,CPTII,S$GLB, | Performed by: NURSE PRACTITIONER

## 2023-10-04 PROCEDURE — 3072F LOW RISK FOR RETINOPATHY: CPT | Mod: HCNC,CPTII,S$GLB, | Performed by: NURSE PRACTITIONER

## 2023-10-04 PROCEDURE — 1160F RVW MEDS BY RX/DR IN RCRD: CPT | Mod: HCNC,CPTII,S$GLB, | Performed by: NURSE PRACTITIONER

## 2023-10-04 PROCEDURE — 99999 PR PBB SHADOW E&M-EST. PATIENT-LVL V: CPT | Mod: PBBFAC,HCNC,, | Performed by: NURSE PRACTITIONER

## 2023-10-04 PROCEDURE — 1101F PR PT FALLS ASSESS DOC 0-1 FALLS W/OUT INJ PAST YR: ICD-10-PCS | Mod: HCNC,CPTII,S$GLB, | Performed by: NURSE PRACTITIONER

## 2023-10-04 PROCEDURE — 3288F FALL RISK ASSESSMENT DOCD: CPT | Mod: HCNC,CPTII,S$GLB, | Performed by: NURSE PRACTITIONER

## 2023-10-04 PROCEDURE — 99213 OFFICE O/P EST LOW 20 MIN: CPT | Mod: HCNC,S$GLB,, | Performed by: NURSE PRACTITIONER

## 2023-10-04 PROCEDURE — 72141 MRI CERVICAL SPINE WITHOUT CONTRAST: ICD-10-PCS | Mod: 26,,, | Performed by: RADIOLOGY

## 2023-10-04 PROCEDURE — 3077F SYST BP >= 140 MM HG: CPT | Mod: HCNC,CPTII,S$GLB, | Performed by: NURSE PRACTITIONER

## 2023-10-04 PROCEDURE — 99999 PR PBB SHADOW E&M-EST. PATIENT-LVL V: ICD-10-PCS | Mod: PBBFAC,HCNC,, | Performed by: NURSE PRACTITIONER

## 2023-10-04 PROCEDURE — 1159F PR MEDICATION LIST DOCUMENTED IN MEDICAL RECORD: ICD-10-PCS | Mod: HCNC,CPTII,S$GLB, | Performed by: NURSE PRACTITIONER

## 2023-10-04 PROCEDURE — 3044F PR MOST RECENT HEMOGLOBIN A1C LEVEL <7.0%: ICD-10-PCS | Mod: HCNC,CPTII,S$GLB, | Performed by: NURSE PRACTITIONER

## 2023-10-04 PROCEDURE — 1101F PT FALLS ASSESS-DOCD LE1/YR: CPT | Mod: HCNC,CPTII,S$GLB, | Performed by: NURSE PRACTITIONER

## 2023-10-04 PROCEDURE — 99213 PR OFFICE/OUTPT VISIT, EST, LEVL III, 20-29 MIN: ICD-10-PCS | Mod: HCNC,S$GLB,, | Performed by: NURSE PRACTITIONER

## 2023-10-04 PROCEDURE — 72141 MRI NECK SPINE W/O DYE: CPT | Mod: TC

## 2023-10-04 PROCEDURE — 3079F PR MOST RECENT DIASTOLIC BLOOD PRESSURE 80-89 MM HG: ICD-10-PCS | Mod: HCNC,CPTII,S$GLB, | Performed by: NURSE PRACTITIONER

## 2023-10-04 PROCEDURE — 1125F AMNT PAIN NOTED PAIN PRSNT: CPT | Mod: HCNC,CPTII,S$GLB, | Performed by: NURSE PRACTITIONER

## 2023-10-04 PROCEDURE — 73030 X-RAY EXAM OF SHOULDER: CPT | Mod: TC,LT

## 2023-10-04 PROCEDURE — 3072F PR LOW RISK FOR RETINOPATHY: ICD-10-PCS | Mod: HCNC,CPTII,S$GLB, | Performed by: NURSE PRACTITIONER

## 2023-10-04 NOTE — TELEPHONE ENCOUNTER
----- Message from Alycia Narayan sent at 10/4/2023  4:28 PM CDT -----  Regarding: FW: appointment  Left shoulder pain   ----- Message -----  From: Florinda Hansen MA  Sent: 10/4/2023   3:05 PM CDT  To: [facility] Staff  Subject: appointment                                      Good afternoon,     Can you assist me with getting this patient scheduled with Dr. Smith? Referral in!     Thank you  Florinda WINTERS

## 2023-10-04 NOTE — TELEPHONE ENCOUNTER
Left message for patient to call back to schedule appointment with Dr Smith for left shoulder pain.

## 2023-10-05 ENCOUNTER — TELEPHONE (OUTPATIENT)
Dept: SPORTS MEDICINE | Facility: CLINIC | Age: 73
End: 2023-10-05
Payer: MEDICARE

## 2023-10-05 NOTE — TELEPHONE ENCOUNTER
----- Message from Miki Demarco sent at 10/5/2023 10:53 AM CDT -----  Regarding: PT'S RTURNING A CALL FROM [place]  Contact: pt  Pt's requesting a call back.

## 2023-10-09 ENCOUNTER — HOSPITAL ENCOUNTER (OUTPATIENT)
Dept: RADIOLOGY | Facility: HOSPITAL | Age: 73
Discharge: HOME | End: 2023-10-09
Attending: ORTHOPAEDIC SURGERY
Payer: MEDICARE

## 2023-10-09 ENCOUNTER — OFFICE VISIT (OUTPATIENT)
Dept: SPORTS MEDICINE | Facility: CLINIC | Age: 73
End: 2023-10-09
Payer: MEDICARE

## 2023-10-09 VITALS
HEART RATE: 87 BPM | BODY MASS INDEX: 27.93 KG/M2 | SYSTOLIC BLOOD PRESSURE: 140 MMHG | WEIGHT: 173.06 LBS | DIASTOLIC BLOOD PRESSURE: 76 MMHG

## 2023-10-09 DIAGNOSIS — G89.29 CHRONIC LEFT SHOULDER PAIN: Primary | ICD-10-CM

## 2023-10-09 DIAGNOSIS — M25.512 CHRONIC LEFT SHOULDER PAIN: Primary | ICD-10-CM

## 2023-10-09 DIAGNOSIS — M25.512 ACUTE PAIN OF LEFT SHOULDER: ICD-10-CM

## 2023-10-09 PROCEDURE — 1159F PR MEDICATION LIST DOCUMENTED IN MEDICAL RECORD: ICD-10-PCS | Mod: HCNC,CPTII,S$GLB, | Performed by: ORTHOPAEDIC SURGERY

## 2023-10-09 PROCEDURE — 99999 PR PBB SHADOW E&M-EST. PATIENT-LVL IV: ICD-10-PCS | Mod: PBBFAC,HCNC,, | Performed by: ORTHOPAEDIC SURGERY

## 2023-10-09 PROCEDURE — 3288F FALL RISK ASSESSMENT DOCD: CPT | Mod: HCNC,CPTII,S$GLB, | Performed by: ORTHOPAEDIC SURGERY

## 2023-10-09 PROCEDURE — 99999 PR PBB SHADOW E&M-EST. PATIENT-LVL IV: CPT | Mod: PBBFAC,HCNC,, | Performed by: ORTHOPAEDIC SURGERY

## 2023-10-09 PROCEDURE — 4010F PR ACE/ARB THEARPY RXD/TAKEN: ICD-10-PCS | Mod: HCNC,CPTII,S$GLB, | Performed by: ORTHOPAEDIC SURGERY

## 2023-10-09 PROCEDURE — 99204 PR OFFICE/OUTPT VISIT, NEW, LEVL IV, 45-59 MIN: ICD-10-PCS | Mod: HCNC,S$GLB,, | Performed by: ORTHOPAEDIC SURGERY

## 2023-10-09 PROCEDURE — 1101F PT FALLS ASSESS-DOCD LE1/YR: CPT | Mod: HCNC,CPTII,S$GLB, | Performed by: ORTHOPAEDIC SURGERY

## 2023-10-09 PROCEDURE — 73030 XR SHOULDER COMPLETE 2 OR MORE VIEWS LEFT: ICD-10-PCS | Mod: 26,HCNC,LT, | Performed by: RADIOLOGY

## 2023-10-09 PROCEDURE — 3078F DIAST BP <80 MM HG: CPT | Mod: HCNC,CPTII,S$GLB, | Performed by: ORTHOPAEDIC SURGERY

## 2023-10-09 PROCEDURE — 3044F PR MOST RECENT HEMOGLOBIN A1C LEVEL <7.0%: ICD-10-PCS | Mod: HCNC,CPTII,S$GLB, | Performed by: ORTHOPAEDIC SURGERY

## 2023-10-09 PROCEDURE — 73030 X-RAY EXAM OF SHOULDER: CPT | Mod: TC,HCNC,LT

## 2023-10-09 PROCEDURE — 3072F PR LOW RISK FOR RETINOPATHY: ICD-10-PCS | Mod: HCNC,CPTII,S$GLB, | Performed by: ORTHOPAEDIC SURGERY

## 2023-10-09 PROCEDURE — 1159F MED LIST DOCD IN RCRD: CPT | Mod: HCNC,CPTII,S$GLB, | Performed by: ORTHOPAEDIC SURGERY

## 2023-10-09 PROCEDURE — 3044F HG A1C LEVEL LT 7.0%: CPT | Mod: HCNC,CPTII,S$GLB, | Performed by: ORTHOPAEDIC SURGERY

## 2023-10-09 PROCEDURE — 3008F BODY MASS INDEX DOCD: CPT | Mod: HCNC,CPTII,S$GLB, | Performed by: ORTHOPAEDIC SURGERY

## 2023-10-09 PROCEDURE — 73030 X-RAY EXAM OF SHOULDER: CPT | Mod: 26,HCNC,LT, | Performed by: RADIOLOGY

## 2023-10-09 PROCEDURE — 1125F PR PAIN SEVERITY QUANTIFIED, PAIN PRESENT: ICD-10-PCS | Mod: HCNC,CPTII,S$GLB, | Performed by: ORTHOPAEDIC SURGERY

## 2023-10-09 PROCEDURE — 3077F PR MOST RECENT SYSTOLIC BLOOD PRESSURE >= 140 MM HG: ICD-10-PCS | Mod: HCNC,CPTII,S$GLB, | Performed by: ORTHOPAEDIC SURGERY

## 2023-10-09 PROCEDURE — 3288F PR FALLS RISK ASSESSMENT DOCUMENTED: ICD-10-PCS | Mod: HCNC,CPTII,S$GLB, | Performed by: ORTHOPAEDIC SURGERY

## 2023-10-09 PROCEDURE — 3078F PR MOST RECENT DIASTOLIC BLOOD PRESSURE < 80 MM HG: ICD-10-PCS | Mod: HCNC,CPTII,S$GLB, | Performed by: ORTHOPAEDIC SURGERY

## 2023-10-09 PROCEDURE — 1125F AMNT PAIN NOTED PAIN PRSNT: CPT | Mod: HCNC,CPTII,S$GLB, | Performed by: ORTHOPAEDIC SURGERY

## 2023-10-09 PROCEDURE — 3008F PR BODY MASS INDEX (BMI) DOCUMENTED: ICD-10-PCS | Mod: HCNC,CPTII,S$GLB, | Performed by: ORTHOPAEDIC SURGERY

## 2023-10-09 PROCEDURE — 4010F ACE/ARB THERAPY RXD/TAKEN: CPT | Mod: HCNC,CPTII,S$GLB, | Performed by: ORTHOPAEDIC SURGERY

## 2023-10-09 PROCEDURE — 3072F LOW RISK FOR RETINOPATHY: CPT | Mod: HCNC,CPTII,S$GLB, | Performed by: ORTHOPAEDIC SURGERY

## 2023-10-09 PROCEDURE — 99204 OFFICE O/P NEW MOD 45 MIN: CPT | Mod: HCNC,S$GLB,, | Performed by: ORTHOPAEDIC SURGERY

## 2023-10-09 PROCEDURE — 3077F SYST BP >= 140 MM HG: CPT | Mod: HCNC,CPTII,S$GLB, | Performed by: ORTHOPAEDIC SURGERY

## 2023-10-09 PROCEDURE — 1101F PR PT FALLS ASSESS DOC 0-1 FALLS W/OUT INJ PAST YR: ICD-10-PCS | Mod: HCNC,CPTII,S$GLB, | Performed by: ORTHOPAEDIC SURGERY

## 2023-10-09 NOTE — PROGRESS NOTES
CC: left shoulder pain, retired, referred by Kerri Barajas NP, in neurology      73 y.o. Female LHD reports that the pain is severe and not responding to any conservative care.    Patient reports pain in left shoulder began a year ago with progressively worse pain, of note, she states that her right shoulder feels similar   She note there is also pain in her hands on the radial side. But pain does not radiate from shoulder.   She reports that the pain is worse with overhead activity. It also bothers her at night.  She states she has tried massage therapy, tylenol, hydrocodone with minimal relief  Patient has seen an NP in neurology for possible cervical radiculopathy, and has had a cervical MRI  Is affecting ADLs.     SANE: 60    Review of Systems   Constitution: Negative. Negative for chills, fever and night sweats.   HENT: Negative for congestion and headaches.    Eyes: Negative for blurred vision, left vision loss and right vision loss.   Cardiovascular: Negative for chest pain and syncope.   Respiratory: Negative for cough and shortness of breath.    Endocrine: Negative for polydipsia, polyphagia and polyuria.   Hematologic/Lymphatic: Negative for bleeding problem. Does not bruise/bleed easily.   Skin: Negative for dry skin, itching and rash.   Musculoskeletal: Negative for falls and muscle weakness.   Gastrointestinal: Negative for abdominal pain and bowel incontinence.   Genitourinary: Negative for bladder incontinence and nocturia.   Neurological: Negative for disturbances in coordination, loss of balance and seizures.   Psychiatric/Behavioral: Negative for depression. The patient does not have insomnia.    Allergic/Immunologic: Negative for hives and persistent infections.     PAST MEDICAL HISTORY:   Past Medical History:   Diagnosis Date    Acute cystitis without hematuria 7/17/2017    Arthritis     Back pain     Breast cancer     originally dx in 02/1997- treated with surgery, chemo and radiation     Class  1 obesity due to excess calories with serious comorbidity and body mass index (BMI) of 30.0 to 30.9 in adult 11/29/2018    Elevated bilirubin 11/19/2013    Fatty liver     GERD (gastroesophageal reflux disease)     Glucose intolerance (impaired glucose tolerance)     Hyperlipidemia     Hypertension     Hypothyroid     Hypothyroidism     hypothyroidism    Malignant neoplasm of lower-outer quadrant of left female breast 11/15/2016    Obesity     Obesity, diabetes, and hypertension syndrome 12/12/2018    Osteopenia     Osteoporosis     Primary insomnia 11/2/2016    Shingles     Sleep apnea     wears CPAP nightly     Squamous cell carcinoma 2011    right forearm, sx by Dr. Corinne Ray    Stress incontinence of urine 1/11/2018    Trouble in sleeping     Type 2 diabetes mellitus with diabetic polyneuropathy, without long-term current use of insulin 12/26/2017    Urinary incontinence     Well woman exam with routine gynecological exam 11/2/2016     PAST SURGICAL HISTORY:   Past Surgical History:   Procedure Laterality Date    APPENDECTOMY  2008    removed during hysterectomy surgery     BLEPHAROPLASTY Bilateral     BREAST BIOPSY      2010    Breast implant Bilateral 1998    implants removed in 2003    BREAST LUMPECTOMY      02/1997    BREAST RECONSTRUCTION Bilateral     02/2017    BREAST SURGERY      see details below     CATARACT EXTRACTION W/  INTRAOCULAR LENS IMPLANT Left 08/13/2020    Procedure: EXTRACTION, CATARACT, WITH IOL INSERTION;  Surgeon: Ivanna Coleman MD;  Location: [facility];  Service: Ophthalmology;  Laterality: Left;    CATARACT EXTRACTION W/  INTRAOCULAR LENS IMPLANT Right 08/27/2020    Procedure: EXTRACTION, CATARACT, WITH IOL INSERTION LASER;  Surgeon: Ivanna Coleman MD;  Location: [facility] OR;  Service: Ophthalmology;  Laterality: Right;    COLONOSCOPY N/A 05/15/2018    Procedure: COLONOSCOPY;  Surgeon: Roldan Gonzales MD;  Location: [address]);  Service: Endoscopy;  Laterality: N/A;    COSMETIC  SURGERY  2022    Eyelift    ESOPHAGOGASTRODUODENOSCOPY N/A 2022    Procedure: EGD (ESOPHAGOGASTRODUODENOSCOPY);  Surgeon: Omar Ambriz MD;  Location: [facility] ([address] [place] [address]);  Service: Endoscopy;  Laterality: N/A;  instructions via portal -     EYE SURGERY      CATERACTS    HYSTERECTOMY      benign    left breast reconstruction      left modified radical mastectomy  1997    for treatment of breast cancer     LIPOMA RESECTION      removed from upper back area     MODIFIED RADICAL MASTECTOMY W/ AXILLARY LYMPH NODE DISSECTION  1997    OOPHORECTOMY          PELVIC LAPAROSCOPY      pt had endometriosis     KY REMOVAL OF OVARY/TUBE(S)      benign    reduction of mons pubis      robotic lap  hysterectomy with bso      UPPER GASTROINTESTINAL ENDOSCOPY  2013     FAMILY HISTORY:   Family History   Problem Relation Age of Onset    Heart attack Father 51    Heart disease Father         Heart [place], age 51    Alcohol abuse Father     Breast cancer Sister 51        BRCA 1/2 negative    Cancer Sister         Breast Cancer    Cancer Mother 65        Malignant melanoma    Melanoma Mother     No Known Problems Brother     No Known Problems Daughter     No Known Problems Son     No Known Problems Daughter     No Known Problems Son     Diabetes Maternal Uncle             Diabetes Maternal Uncle             Arthritis Maternal Grandmother         Severe arthitis in Knees    Uterine cancer Neg Hx     Colon cancer Neg Hx     Celiac disease Neg Hx     Esophageal cancer Neg Hx     Inflammatory bowel disease Neg Hx     Liver cancer Neg Hx     Liver disease Neg Hx     Stomach cancer Neg Hx     Ulcerative colitis Neg Hx     Hypertension Neg Hx     Cirrhosis Neg Hx     Crohn's disease Neg Hx     Rectal cancer Neg Hx     Ovarian cancer Neg Hx      SOCIAL HISTORY:   Social History     Socioeconomic History    Marital status:    Tobacco Use    Smoking status: Former      Current packs/day: 0.00     Average packs/day: 0.5 packs/day for 3.0 years (1.5 ttl pk-yrs)     Types: Cigarettes     Start date: 4/15/1969     Quit date: 4/15/1972     Years since quittin.5    Smokeless tobacco: Never    Tobacco comments:     started at age 18 during college    Substance and Sexual Activity    Alcohol use: Yes     Comment: Rarely, not even 1X per week    Drug use: No    Sexual activity: Yes     Partners: Male     Birth control/protection: Post-menopausal, See Surgical Hx     Social Determinants of Health     Financial Resource Strain: Low Risk  (10/7/2023)    Overall Financial Resource Strain (CARDIA)     Difficulty of Paying Living Expenses: Not hard at all   Food Insecurity: No Food Insecurity (10/7/2023)    Hunger Vital Sign     Worried About Running Out of Food in the Last Year: Never true     Ran Out of Food in the Last Year: Never true   Transportation Needs: No Transportation Needs (10/7/2023)    PRAPARE - Transportation     Lack of Transportation (Medical): No     Lack of Transportation (Non-Medical): No   Physical Activity: Inactive (10/7/2023)    Exercise Vital Sign     Days of Exercise per Week: 0 days     Minutes of Exercise per Session: 0 min   Stress: No Stress Concern Present (10/7/2023)    [employer] - Occupational Stress Questionnaire     Feeling of Stress : Only a little   Recent Concern: Stress - Stress Concern Present (2023)    [employer] - Occupational Stress Questionnaire     Feeling of Stress : To some extent   Social Connections: Socially Integrated (10/7/2023)    Social Connection and Isolation Panel [NHANES]     Frequency of Communication with Friends and Family: More than three times a week     Frequency of Social Gatherings with Friends and Family: Twice a week     Attends L[employer] Services: 1 to 4 times per year     Active Member of Clubs or Organizations: Yes     Attends Club or Organization  Meetings: More than 4 times per year     Marital Status:    Housing Stability: Low Risk  (10/7/2023)    Housing Stability Vital Sign     Unable to Pay for Housing in the Last Year: No     Number of Places Lived in the Last Year: 1     Unstable Housing in the Last Year: No       MEDICATIONS:   Current Outpatient Medications:     ascorbic acid, vitamin C, (VITAMIN C) 1000 MG tablet, Take 1 tablet by mouth once daily., Disp: , Rfl:     aspirin (ECOTRIN) 81 MG EC tablet, Take 81 mg by mouth once daily., Disp: , Rfl:     calcium-vitamin D tablet 600 mg-200 units, , Disp: , Rfl:     CANNABIDIOL, CBD, EXTRACT ORAL, Take 1 drop by mouth Daily., Disp: , Rfl:     coenzyme Q10-vitamin E 100-100 mg-unit Cap, Take 1 capsule by mouth once daily. , Disp: , Rfl:     denosumab (PROLIA) 60 mg/mL Syrg, Inject 1 mL (60 mg total) into the skin every 6 (six) months., Disp: 2 mL, Rfl: 0    estradioL (YUVAFEM) 10 mcg Tab, PLACE 1 TABLET VAGINALLY TWICE A WEEK, Disp: 24 tablet, Rfl: 2    fenofibrate 160 MG Tab, TAKE 1 TABLET BY MOUTH ONCE DAILY., Disp: 90 tablet, Rfl: 1    fluticasone (FLONASE) 50 mcg/actuation nasal spray, 1 spray by Each Nare route 2 (two) times daily. , Disp: , Rfl:     gabapentin (NEURONTIN) 300 MG capsule, TAKE 2 CAPSULES BY MOUTH EVERY EVENING., Disp: 180 capsule, Rfl: 3    letrozole (FEMARA) 2.5 mg Tab, TAKE 1 TABLET BY MOUTH EVERY DAY, Disp: 90 tablet, Rfl: 3    levothyroxine (SYNTHROID) 50 MCG tablet, TAKE 1 TABLET BY MOUTH EVERY DAY, Disp: 90 tablet, Rfl: 3    losartan (COZAAR) 50 MG tablet, TAKE 1 TABLET BY MOUTH EVERY DAY, Disp: 90 tablet, Rfl: 3    metFORMIN (GLUCOPHAGE) 500 MG tablet, TAKE 1 TABLET BY MOUTH EVERY DAY WITH BREAKFAST (Patient taking differently: 2 (two) times a day.), Disp: 90 tablet, Rfl: 1    MULTIVITAMIN ORAL, Take 1 tablet by mouth once daily. , Disp: , Rfl:     omega-3 fatty acids-vitamin E 1,000 mg Cap, Take 1 capsule by mouth once daily. , Disp: , Rfl:     OZEMPIC 0.25 mg or 0.5 mg  (2 mg/3 mL) pen injector, Inject into the skin., Disp: , Rfl:     pantoprazole (PROTONIX) 20 MG tablet, Take 1 tablet (20 mg total) by mouth before breakfast., Disp: 90 tablet, Rfl: 3    simvastatin (ZOCOR) 20 MG tablet, TAKE 1 TABLET BY MOUTH EVERY DAY IN THE EVENING, Disp: 90 tablet, Rfl: 3    sucralfate (CARAFATE) 100 mg/mL suspension, Take 10 mLs (1 g total) by mouth 4 (four) times daily., Disp: 828 mL, Rfl: 1    tobramycin sulfate 0.3% (TOBREX) 0.3 % ophthalmic solution, Apply to wound beds twice daily, Disp: 5 mL, Rfl: 2    vitamin D 1000 units Tab, Take 1,000 Units by mouth once daily. , Disp: , Rfl:   ALLERGIES:   Review of patient's allergies indicates:   Allergen Reactions    Erythromycin      Other reaction(s): Nausea    Erythromycin (bulk)      Other reaction(s): Nausea    Oxycodone Nausea And Vomiting    Oxycodone-acetaminophen Nausea And Vomiting     Other reaction(s): Nausea       VITAL SIGNS: BP (!) 140/76   Pulse 87   Wt 78.5 kg (173 lb 1 oz)   BMI 27.93 kg/m²      PHYSICAL EXAMINATION:  General:  The patient is alert and oriented x 3.  Mood is pleasant.  Observation of ears, eyes and nose reveal no gross abnormalities.  No labored breathing observed.  Gait is coordinated. Patient can toe walk and heel walk without difficulty.      left SHOULDER / UPPER EXTREMITY EXAM    OBSERVATION:     Swelling  none  Deformity  none   Discoloration  none   Scapular winging none   Scars   none  Atrophy  none    TENDERNESS / CREPITUS (T/C):          T/C      T/C   Clavicle   -/-  SUPRAspinatus    -/-     AC Jt.    -/-  INFRAspinatus  -/-    SC Jt.    -/-  Deltoid    -/-      G. Tuberosity  -/-  LH BICEP groove  -/-   Acromion:  -/-  Midline Neck   -/-     Scapular Spine -/-  Trapezium   -/-   SMA Scapula  -/-  GH jt. line - post  -/-     Scapulothoracic  -/-         ROM: (* = with pain)  Right shoulder   Left shoulder        AROM (PROM)   AROM (PROM)   FE    170° (175°)     165° (175°)     ER at 0°    55°   (65°)    45°  (55°)   ER at 90° ABD  90°  (90°)    90°  (90°)   IR at 90°  ABD   NA  (40°)     NA  (40°)      IR (spine level)   T10     T10    STRENGTH: (* = with pain) RIGHT SHOULDER  LEFT SHOULDER   SCAPTION at 0  5/5    5/5   SCAPTION at 30  5/5    5/5    IR    5/5    5/5   ER    5/5    5/5   BICEPS   5/5    5/5   Deltoid    5/5    5/5     SIGNS:  Painful side       NEER   -    OSILS  -    MCFARLANE   -    SPEEDS  neg     DROP ARM   neg   BELLY PRESS neg   Superior escape none    LIFT-OFF  neg   X-Body ADD    neg    MOVING VALGUS neg        STABILITY TESTING    RIGHT SHOULDER   LEFT SHOULDER     Translation     Anterior  up face     up face    Posterior  up face    up face    Sulcus   < 10mm    < 10 mm     Signs   Apprehension   neg      neg       Relocation   no change     no change      Jerk test  neg     neg    EXTREMITY NEURO-VASCULAR EXAM    Sensation grossly intact to light touch all dermatomal regions.    DTR 2+ Biceps, Triceps, BR and Negative Tates sign   Grossly intact motor function at Elbow, Wrist and Hand   Distal pulses radial and ulnar 2+, brisk cap refill, symmetric.      NECK:  Painless FROM and spinous processes non-tender. Negative Spurlings sign.      OTHER FINDINGS:  + scapular dyskinesia    XRAYS done today were reviewed and interpreted personally by me:  Shoulder trauma series left,  were obtained and reviewed  No convincing fracture or dislocation is noted. The osseous structures appear well mineralized and well aligned. Subacromial spur noted.            ASSESSMENT:     1. Probable CMC arthritis, bilateral   2. Radiculopathy, cervical    PLAN:    Referred to Ines Fajardo MD thumb CMC OA, bilat L>R  Cervical injections, f/u with neurology    All questions were answered, pt will contact us for questions or concerns in the interim.    I made the decision to obtain old records of the patient including previous notes and imaging. New imaging was ordered today of the extremity or  extremities evaluated. I independently reviewed and interpreted the radiographs and/or MRIs today as well as prior imaging.

## 2023-10-11 ENCOUNTER — ANESTHESIA (OUTPATIENT)
Dept: ENDOSCOPY | Facility: HOSPITAL | Age: 73
End: 2023-10-11
Payer: MEDICARE

## 2023-10-11 ENCOUNTER — ANESTHESIA EVENT (OUTPATIENT)
Dept: ENDOSCOPY | Facility: HOSPITAL | Age: 73
End: 2023-10-11
Payer: MEDICARE

## 2023-10-11 ENCOUNTER — HOSPITAL ENCOUNTER (OUTPATIENT)
Facility: HOSPITAL | Age: 73
Discharge: HOME | End: 2023-10-11
Attending: INTERNAL MEDICINE | Admitting: INTERNAL MEDICINE
Payer: MEDICARE

## 2023-10-11 VITALS
DIASTOLIC BLOOD PRESSURE: 65 MMHG | BODY MASS INDEX: 27.8 KG/M2 | SYSTOLIC BLOOD PRESSURE: 140 MMHG | WEIGHT: 173 LBS | HEART RATE: 83 BPM | RESPIRATION RATE: 20 BRPM | TEMPERATURE: 99 F | OXYGEN SATURATION: 99 % | HEIGHT: 66 IN

## 2023-10-11 DIAGNOSIS — E78.2 MIXED HYPERLIPIDEMIA: ICD-10-CM

## 2023-10-11 DIAGNOSIS — K86.2 PANCREATIC CYST: ICD-10-CM

## 2023-10-11 LAB — POCT GLUCOSE: 122 MG/DL (ref 70–110)

## 2023-10-11 PROCEDURE — D9220A PRA ANESTHESIA: ICD-10-PCS | Mod: HCNC,CRNA,, | Performed by: NURSE ANESTHETIST, CERTIFIED REGISTERED

## 2023-10-11 PROCEDURE — 63600175 PHARM REV CODE 636 W HCPCS: Mod: HCNC | Performed by: NURSE ANESTHETIST, CERTIFIED REGISTERED

## 2023-10-11 PROCEDURE — 37000008 HC ANESTHESIA 1ST 15 MINUTES: Mod: HCNC | Performed by: INTERNAL MEDICINE

## 2023-10-11 PROCEDURE — 37000009 HC ANESTHESIA EA ADD 15 MINS: Mod: HCNC | Performed by: INTERNAL MEDICINE

## 2023-10-11 PROCEDURE — 25000003 PHARM REV CODE 250: Mod: HCNC | Performed by: INTERNAL MEDICINE

## 2023-10-11 PROCEDURE — D9220A PRA ANESTHESIA: Mod: HCNC,CRNA,, | Performed by: NURSE ANESTHETIST, CERTIFIED REGISTERED

## 2023-10-11 PROCEDURE — 82962 GLUCOSE BLOOD TEST: CPT | Mod: HCNC | Performed by: INTERNAL MEDICINE

## 2023-10-11 PROCEDURE — 43259 EGD US EXAM DUODENUM/JEJUNUM: CPT | Mod: HCNC | Performed by: INTERNAL MEDICINE

## 2023-10-11 PROCEDURE — 25000003 PHARM REV CODE 250: Mod: HCNC | Performed by: NURSE ANESTHETIST, CERTIFIED REGISTERED

## 2023-10-11 PROCEDURE — 43259 EGD US EXAM DUODENUM/JEJUNUM: CPT | Mod: HCNC,,, | Performed by: INTERNAL MEDICINE

## 2023-10-11 PROCEDURE — D9220A PRA ANESTHESIA: Mod: HCNC,ANES,, | Performed by: STUDENT IN AN ORGANIZED HEALTH CARE EDUCATION/TRAINING PROGRAM

## 2023-10-11 PROCEDURE — 43259 PR ENDOSCOPIC ULTRASOUND EXAM: ICD-10-PCS | Mod: HCNC,,, | Performed by: INTERNAL MEDICINE

## 2023-10-11 PROCEDURE — D9220A PRA ANESTHESIA: ICD-10-PCS | Mod: HCNC,ANES,, | Performed by: STUDENT IN AN ORGANIZED HEALTH CARE EDUCATION/TRAINING PROGRAM

## 2023-10-11 RX ORDER — LIDOCAINE HYDROCHLORIDE 20 MG/ML
INJECTION INTRAVENOUS
Status: DISCONTINUED | OUTPATIENT
Start: 2023-10-11 | End: 2023-10-11

## 2023-10-11 RX ORDER — PROPOFOL 10 MG/ML
VIAL (ML) INTRAVENOUS
Status: DISCONTINUED | OUTPATIENT
Start: 2023-10-11 | End: 2023-10-11

## 2023-10-11 RX ORDER — SODIUM CHLORIDE 0.9 % (FLUSH) 0.9 %
10 SYRINGE (ML) INJECTION
Status: DISCONTINUED | OUTPATIENT
Start: 2023-10-11 | End: 2023-10-11 | Stop reason: HOSPADM

## 2023-10-11 RX ORDER — SODIUM CHLORIDE 9 MG/ML
INJECTION, SOLUTION INTRAVENOUS CONTINUOUS
Status: DISCONTINUED | OUTPATIENT
Start: 2023-10-11 | End: 2023-10-11 | Stop reason: HOSPADM

## 2023-10-11 RX ORDER — FENOFIBRATE 160 MG/1
160 TABLET ORAL
Qty: 90 TABLET | Refills: 0 | Status: SHIPPED | OUTPATIENT
Start: 2023-10-11 | End: 2024-01-04

## 2023-10-11 RX ORDER — ONDANSETRON 2 MG/ML
4 INJECTION INTRAMUSCULAR; INTRAVENOUS DAILY PRN
Status: DISCONTINUED | OUTPATIENT
Start: 2023-10-11 | End: 2023-10-11 | Stop reason: HOSPADM

## 2023-10-11 RX ORDER — FENTANYL CITRATE 50 UG/ML
25 INJECTION, SOLUTION INTRAMUSCULAR; INTRAVENOUS EVERY 5 MIN PRN
Status: DISCONTINUED | OUTPATIENT
Start: 2023-10-11 | End: 2023-10-11 | Stop reason: HOSPADM

## 2023-10-11 RX ADMIN — PROPOFOL 100 MG: 10 INJECTION, EMULSION INTRAVENOUS at 12:10

## 2023-10-11 RX ADMIN — LIDOCAINE HYDROCHLORIDE 100 MG: 20 INJECTION INTRAVENOUS at 12:10

## 2023-10-11 RX ADMIN — SODIUM CHLORIDE: 9 INJECTION, SOLUTION INTRAVENOUS at 12:10

## 2023-10-11 RX ADMIN — SODIUM CHLORIDE: 0.9 INJECTION, SOLUTION INTRAVENOUS at 12:10

## 2023-10-11 NOTE — TELEPHONE ENCOUNTER
Refill Routing Note   Medication(s) are not appropriate for processing by [facility] for the following reason(s):      Drug-disease interaction: fenofibrate and Fatty liver; Early hepatic fibrosis    [facility] action(s):  Defer Care Due:  None identified          Appointments  past 12m or future 3m with PCP    Date Provider   Last Visit   8/1/2023 Joie Mccall MD   Next Visit   12/6/2023 Joie Mccall MD   ED visits in past 90 days: 0        Note composed:4:16 AM 10/11/2023

## 2023-10-11 NOTE — PROGRESS NOTES
Neurosurgery  Established Patient    SUBJECTIVE:     History of Present Illness: Lima Maldonado is a 73 y.o. female who presents to clinic c/o intermittent chronic neck pain that was been gradually worsening over the past several months. Pt states that initially she began having aching pain radiating into R shoulder, but has now begun to have the same pain radiate to L shoulder in a more severe manner. Denies trauma or inciting event. Her pain worsens throughout the day, keeps her from getting to sleep at night. Pain worsens when she lifts either arm above 90 degrees. Has some aching pain periodically throughout either arm.     She endorses nearly constant severe pain in both thumbs that persistently affects her daily activities. Pt endorses Hx of neuropathy in her hands that began after breast cancer chemotherapy 6 years ago, has gradually worsened since. Endorses Hx of diabetic neuropathy she believes is contributing to this problem. Pt clarifies that the pain in her thumbs feels distinct from her chronic neuropathy. Pt endorses that she seems to be more clumsy with her hands since her neck problems began worsening.  Denies b/b dysfunction, saddle anesthesia, or gait instability.      Interval Hx 10/4/23: After the last appointment, it was recommended that the patient obtain additional imaging to evaluate her worsening neck pain. She is being seen in clinic today to review the image findings and discuss next steps. States that her symptoms remain unchanged since the last appointment. Rates her pain as a 5/10.       Review of patient's allergies indicates:   Allergen Reactions    Erythromycin      Other reaction(s): Nausea    Erythromycin (bulk)      Other reaction(s): Nausea    Oxycodone Nausea And Vomiting    Oxycodone-acetaminophen Nausea And Vomiting     Other reaction(s): Nausea       Current Outpatient Medications   Medication Sig Dispense Refill    ascorbic acid, vitamin C, (VITAMIN C) 1000 MG tablet Take  1 tablet by mouth once daily.      aspirin (ECOTRIN) 81 MG EC tablet Take 81 mg by mouth once daily.      calcium-vitamin D tablet 600 mg-200 units       CANNABIDIOL, CBD, EXTRACT ORAL Take 1 drop by mouth Daily.      coenzyme Q10-vitamin E 100-100 mg-unit Cap Take 1 capsule by mouth once daily.       estradioL (YUVAFEM) 10 mcg Tab PLACE 1 TABLET VAGINALLY TWICE A WEEK 24 tablet 2    fenofibrate 160 MG Tab TAKE 1 TABLET BY MOUTH ONCE DAILY. 90 tablet 1    fluticasone (FLONASE) 50 mcg/actuation nasal spray 1 spray by Each Nare route 2 (two) times daily.       gabapentin (NEURONTIN) 300 MG capsule TAKE 2 CAPSULES BY MOUTH EVERY EVENING. 180 capsule 3    letrozole (FEMARA) 2.5 mg Tab TAKE 1 TABLET BY MOUTH EVERY DAY 90 tablet 3    levothyroxine (SYNTHROID) 50 MCG tablet TAKE 1 TABLET BY MOUTH EVERY DAY 90 tablet 3    losartan (COZAAR) 50 MG tablet TAKE 1 TABLET BY MOUTH EVERY DAY 90 tablet 3    metFORMIN (GLUCOPHAGE) 500 MG tablet TAKE 1 TABLET BY MOUTH EVERY DAY WITH BREAKFAST (Patient taking differently: 2 (two) times a day.) 90 tablet 1    MULTIVITAMIN ORAL Take 1 tablet by mouth once daily.       omega-3 fatty acids-vitamin E 1,000 mg Cap Take 1 capsule by mouth once daily.       OZEMPIC 0.25 mg or 0.5 mg (2 mg/3 mL) pen injector Inject into the skin.      pantoprazole (PROTONIX) 20 MG tablet Take 1 tablet (20 mg total) by mouth before breakfast. 90 tablet 3    simvastatin (ZOCOR) 20 MG tablet TAKE 1 TABLET BY MOUTH EVERY DAY IN THE EVENING 90 tablet 3    sucralfate (CARAFATE) 100 mg/mL suspension Take 10 mLs (1 g total) by mouth 4 (four) times daily. 828 mL 1    tobramycin sulfate 0.3% (TOBREX) 0.3 % ophthalmic solution Apply to wound beds twice daily 5 mL 2    vitamin D 1000 units Tab Take 1,000 Units by mouth once daily.       denosumab (PROLIA) 60 mg/mL Syrg Inject 1 mL (60 mg total) into the skin every 6 (six) months. 2 mL 0     No current facility-administered medications for this visit.       Past Medical  History:   Diagnosis Date    Acute cystitis without hematuria 7/17/2017    Arthritis     Back pain     Breast cancer     originally dx in 02/1997- treated with surgery, chemo and radiation     Class 1 obesity due to excess calories with serious comorbidity and body mass index (BMI) of 30.0 to 30.9 in adult 11/29/2018    Elevated bilirubin 11/19/2013    Fatty liver     GERD (gastroesophageal reflux disease)     Glucose intolerance (impaired glucose tolerance)     Hyperlipidemia     Hypertension     Hypothyroid     Hypothyroidism     hypothyroidism    Malignant neoplasm of lower-outer quadrant of left female breast 11/15/2016    Obesity     Obesity, diabetes, and hypertension syndrome 12/12/2018    Osteopenia     Osteoporosis     Primary insomnia 11/2/2016    Shingles     Sleep apnea     wears CPAP nightly     Squamous cell carcinoma 2011    right forearm, sx by Dr. Corinne Ray    Stress incontinence of urine 1/11/2018    Trouble in sleeping     Type 2 diabetes mellitus with diabetic polyneuropathy, without long-term current use of insulin 12/26/2017    Urinary incontinence     Well woman exam with routine gynecological exam 11/2/2016     Past Surgical History:   Procedure Laterality Date    APPENDECTOMY  2008    removed during hysterectomy surgery     BLEPHAROPLASTY Bilateral     BREAST BIOPSY      2010    Breast implant Bilateral 1998    implants removed in 2003    BREAST LUMPECTOMY      02/1997    BREAST RECONSTRUCTION Bilateral     02/2017    BREAST SURGERY      see details below     CATARACT EXTRACTION W/  INTRAOCULAR LENS IMPLANT Left 08/13/2020    Procedure: EXTRACTION, CATARACT, WITH IOL INSERTION;  Surgeon: Ivanna Coleman MD;  Location: [facility] OR;  Service: Ophthalmology;  Laterality: Left;    CATARACT EXTRACTION W/  INTRAOCULAR LENS IMPLANT Right 08/27/2020    Procedure: EXTRACTION, CATARACT, WITH IOL INSERTION LASER;  Surgeon: Ivanna Coleman MD;  Location: [facility] OR;  Service: Ophthalmology;  Laterality:  Right;    COLONOSCOPY N/A 05/15/2018    Procedure: COLONOSCOPY;  Surgeon: Roldan Gonzales MD;  Location: [facility] ENDO (4TH FLR);  Service: Endoscopy;  Laterality: N/A;    COSMETIC SURGERY  2022    Eyelift    ESOPHAGOGASTRODUODENOSCOPY N/A 2022    Procedure: EGD (ESOPHAGOGASTRODUODENOSCOPY);  Surgeon: Omar Ambriz MD;  Location: [facility] (4TH FLR);  Service: Endoscopy;  Laterality: N/A;  instructions via portal -     EYE SURGERY      CATERACTS    HYSTERECTOMY      benign    left breast reconstruction      left modified radical mastectomy  1997    for treatment of breast cancer     LIPOMA RESECTION      removed from upper back area     MODIFIED RADICAL MASTECTOMY W/ AXILLARY LYMPH NODE DISSECTION  1997    OOPHORECTOMY          PELVIC LAPAROSCOPY      pt had endometriosis     MS REMOVAL OF OVARY/TUBE(S)      benign    reduction of mons pubis      robotic lap  hysterectomy with bso      UPPER GASTROINTESTINAL ENDOSCOPY  2013     Family History       Problem Relation (Age of Onset)    Alcohol abuse Father    Arthritis Maternal Grandmother    Breast cancer Sister (51)    Cancer Sister, Mother (65)    Diabetes Maternal Uncle, Maternal Uncle    Heart attack Father (51)    Heart disease Father    Melanoma Mother    No Known Problems Brother, Daughter, Son, Daughter, Son          Social History     Socioeconomic History    Marital status:    Tobacco Use    Smoking status: Former     Current packs/day: 0.00     Average packs/day: 0.5 packs/day for 3.0 years (1.5 ttl pk-yrs)     Types: Cigarettes     Start date: 4/15/1969     Quit date: 4/15/1972     Years since quittin.5    Smokeless tobacco: Never    Tobacco comments:     started at age 18 during college    Substance and Sexual Activity    Alcohol use: Yes     Comment: Rarely, not even 1X per week    Drug use: No    Sexual activity: Yes     Partners: Male     Birth control/protection: Post-menopausal, See  Surgical Hx     Social Determinants of Health     Financial Resource Strain: Low Risk  (10/7/2023)    Overall Financial Resource Strain (CARDIA)     Difficulty of Paying Living Expenses: Not hard at all   Food Insecurity: No Food Insecurity (10/7/2023)    Hunger Vital Sign     Worried About Running Out of Food in the Last Year: Never true     Ran Out of Food in the Last Year: Never true   Transportation Needs: No Transportation Needs (10/7/2023)    PRAPARE - Transportation     Lack of Transportation (Medical): No     Lack of Transportation (Non-Medical): No   Physical Activity: Inactive (10/7/2023)    Exercise Vital Sign     Days of Exercise per Week: 0 days     Minutes of Exercise per Session: 0 min   Stress: No Stress Concern Present (10/7/2023)    [employer] - Occupational Stress Questionnaire     Feeling of Stress : Only a little   Recent Concern: Stress - Stress Concern Present (9/18/2023)    [employer] - Occupational Stress Questionnaire     Feeling of Stress : To some extent   Social Connections: Socially Integrated (10/7/2023)    Social Connection and Isolation Panel [NHANES]     Frequency of Communication with Friends and Family: More than three times a week     Frequency of Social Gatherings with Friends and Family: Twice a week     Attends Protestant Services: 1 to 4 times per year     Active Member of Clubs or Organizations: Yes     Attends Club or Organization Meetings: More than 4 times per year     Marital Status:    Housing Stability: Low Risk  (10/7/2023)    Housing Stability Vital Sign     Unable to Pay for Housing in the Last Year: No     Number of Places Lived in the Last Year: 1     Unstable Housing in the Last Year: No       Review of Systems    OBJECTIVE:     Vital Signs  Pulse: 85  BP: (!) 144/80  Pain Score:   5  There is no height or weight on file to calculate BMI.    Neurosurgery Physical Exam  General: well developed, well  nourished, no distress.   Head: normocephalic, atraumatic  Neurologic: Alert and oriented. Thought content appropriate.  GCS: Motor: 6/Verbal: 5/Eyes: 4 GCS Total: 15  Mental Status: Awake, Alert, Oriented x 4  Language: No aphasia  Speech: No dysarthria  Cranial nerves: face symmetric, tongue midline, CN II-XII grossly intact.   Eyes: pupils equal, round, reactive to light with accomodation, EOMI.   Pulmonary: normal respirations, no signs of respiratory distress  Abdomen: soft, non-distended  Skin: Skin is warm, dry and intact.  Sensory: intact to light touch throughout  Motor Strength:Moves all extremities spontaneously with good tone.      Diagnostic Results:  I have personally reviewed the:    MRI cervical spine dated 10/4/23, which shows multilevel degenerative changes most pronounced C5-6 with moderate central and C6-C7 with impingement of the left exiting C7 nerve roots.      X-ray cervical spine dated 10/4/23, which shows mild-to-moderate DJD.     3.   X-ray shoulder left dated 10/4/23, which shows 3 x 7 mm radiodensity superimposing humeral head on the frontal exam would appear contiguous and adjacent to humeral head on the scapular Y-view and could represent focus of calcific tendinitis.    ASSESSMENT/PLAN:   Lima Maldonado is a 73 y.o. female seen in clinic today to review the image findings and discuss next steps regarding her cervical radiculopathy. We discussed that she should exhaust all conservative treatment prior to considering surgical options. I have placed a referral for pain management to discuss injections.  A referral to orthopedics to discuss shoulder x-ray findings.     I would like the patient to follow-up virtually in 3 months to discuss progress with injections. I have encouraged her to contact the clinic with any questions, concerns, or adverse clinical changes. She verbalized understanding.      JANELLE Griffin-SAVANNAH  Neurosurgery  [facility].      Note  dictated with voice recognition software, please excuse any grammatical errors.

## 2023-10-11 NOTE — H&P
Short Stay Endoscopy History and Physical    PCP - Joie Mccall MD  Referring Physician - Hermelindo Coleman MD  [address] [place] [address]    Procedure - EUS  ASA - per anesthesia  Mallampati - per anesthesia  History of Anesthesia problems - no  Family history Anesthesia problems -  no   Plan of anesthesia - General    HPI:  This is a 73 y.o. female here for evaluation of: pancreatic cysts    Reflux - no  Dysphagia - no  Abdominal pain - no  Diarrhea - no    ROS:  Constitutional: No fevers, chills, No weight loss  CV: No chest pain  Pulm: No cough, No shortness of breath  GI: see HPI    Medical History:  has a past medical history of Acute cystitis without hematuria (7/17/2017), Arthritis, Back pain, Breast cancer, Class 1 obesity due to excess calories with serious comorbidity and body mass index (BMI) of 30.0 to 30.9 in adult (11/29/2018), Elevated bilirubin (11/19/2013), Fatty liver, GERD (gastroesophageal reflux disease), Glucose intolerance (impaired glucose tolerance), Hyperlipidemia, Hypertension, Hypothyroid, Hypothyroidism, Malignant neoplasm of lower-outer quadrant of left female breast (11/15/2016), Obesity, Obesity, diabetes, and hypertension syndrome (12/12/2018), Osteopenia, Osteoporosis, Primary insomnia (11/2/2016), Shingles, Sleep apnea, Squamous cell carcinoma (2011), Stress incontinence of urine (1/11/2018), Trouble in sleeping, Type 2 diabetes mellitus with diabetic polyneuropathy, without long-term current use of insulin (12/26/2017), Urinary incontinence, and Well woman exam with routine gynecological exam (11/2/2016).    Surgical History:  has a past surgical history that includes left modified radical mastectomy (02/1997); Breast implant (Bilateral, 1998); left breast reconstruction (2005); Pelvic laparoscopy (1978); robotic lap  hysterectomy with bso (2008); Lipoma resection (2010); reduction of mons pubis (2011); Hysterectomy (2008); pr removal of ovary/tube(s)  (2008); Modified radical mastectomy w/ axillary lymph node dissection (02/1997); Appendectomy (2008); Breast surgery; Upper gastrointestinal endoscopy (08/05/2013); Colonoscopy (N/A, 05/15/2018); Cataract extraction w/  intraocular lens implant (Left, 08/13/2020); Cataract extraction w/  intraocular lens implant (Right, 08/27/2020); Eye surgery; Breast biopsy; Breast lumpectomy; Breast reconstruction (Bilateral); Oophorectomy; Cosmetic surgery (1/2022); Blepharoplasty (Bilateral); and Esophagogastroduodenoscopy (N/A, 12/20/2022).    Family History: family history includes Alcohol abuse in her father; Arthritis in her maternal grandmother; Breast cancer (age of onset: 51) in her sister; Cancer in her sister; Cancer (age of onset: 65) in her mother; Diabetes in her maternal uncle and maternal uncle; Heart attack (age of onset: 51) in her father; Heart disease in her father; Melanoma in her mother; No Known Problems in her brother, daughter, daughter, son, and son..    Social History:  reports that she quit smoking about 51 years ago. Her smoking use included cigarettes. She started smoking about 54 years ago. She has a 1.5 pack-year smoking history. She has never used smokeless tobacco. She reports current alcohol use. She reports that she does not use drugs.    Review of patient's allergies indicates:   Allergen Reactions    Erythromycin      Other reaction(s): Nausea    Erythromycin (bulk)      Other reaction(s): Nausea    Oxycodone Nausea And Vomiting    Oxycodone-acetaminophen Nausea And Vomiting     Other reaction(s): Nausea       Medications:   Medications Prior to Admission   Medication Sig Dispense Refill Last Dose    ascorbic acid, vitamin C, (VITAMIN C) 1000 MG tablet Take 1 tablet by mouth once daily.   10/10/2023    aspirin (ECOTRIN) 81 MG EC tablet Take 81 mg by mouth once daily.   Past Week    calcium-vitamin D tablet 600 mg-200 units    10/10/2023    coenzyme Q10-vitamin E 100-100 mg-unit Cap Take 1  capsule by mouth once daily.    10/10/2023    gabapentin (NEURONTIN) 300 MG capsule TAKE 2 CAPSULES BY MOUTH EVERY EVENING. 180 capsule 3 10/10/2023    letrozole (FEMARA) 2.5 mg Tab TAKE 1 TABLET BY MOUTH EVERY DAY 90 tablet 3 10/10/2023    levothyroxine (SYNTHROID) 50 MCG tablet TAKE 1 TABLET BY MOUTH EVERY DAY 90 tablet 3 10/10/2023    losartan (COZAAR) 50 MG tablet TAKE 1 TABLET BY MOUTH EVERY DAY 90 tablet 3 10/10/2023    metFORMIN (GLUCOPHAGE) 500 MG tablet TAKE 1 TABLET BY MOUTH EVERY DAY WITH BREAKFAST (Patient taking differently: 2 (two) times a day.) 90 tablet 1 10/10/2023    MULTIVITAMIN ORAL Take 1 tablet by mouth once daily.    10/10/2023    omega-3 fatty acids-vitamin E 1,000 mg Cap Take 1 capsule by mouth once daily.    10/10/2023    pantoprazole (PROTONIX) 20 MG tablet Take 1 tablet (20 mg total) by mouth before breakfast. 90 tablet 3 10/10/2023    simvastatin (ZOCOR) 20 MG tablet TAKE 1 TABLET BY MOUTH EVERY DAY IN THE EVENING 90 tablet 3 10/10/2023    vitamin D 1000 units Tab Take 1,000 Units by mouth once daily.    10/10/2023    [DISCONTINUED] fenofibrate 160 MG Tab TAKE 1 TABLET BY MOUTH ONCE DAILY. 90 tablet 1 10/10/2023    CANNABIDIOL, CBD, EXTRACT ORAL Take 1 drop by mouth Daily.       denosumab (PROLIA) 60 mg/mL Syrg Inject 1 mL (60 mg total) into the skin every 6 (six) months. 2 mL 0     estradioL (YUVAFEM) 10 mcg Tab PLACE 1 TABLET VAGINALLY TWICE A WEEK 24 tablet 2     fluticasone (FLONASE) 50 mcg/actuation nasal spray 1 spray by Each Nare route 2 (two) times daily.        OZEMPIC 0.25 mg or 0.5 mg (2 mg/3 mL) pen injector Inject into the skin.   10/3/2023    sucralfate (CARAFATE) 100 mg/mL suspension Take 10 mLs (1 g total) by mouth 4 (four) times daily. 828 mL 1     tobramycin sulfate 0.3% (TOBREX) 0.3 % ophthalmic solution Apply to wound beds twice daily 5 mL 2        Physical Exam:    Vital Signs:   Vitals:    10/11/23 1153   BP: (!) 159/74   Pulse: 96   Resp: 16   Temp: 98.1 °F (36.7  °C)       General Appearance: Well appearing in no acute distress  Lungs: no labored breathing  CVS:  regular rate  Abdomen: non tender    Labs:  Lab Results   Component Value Date    WBC 4.40 10/18/2022    HGB 13.2 10/18/2022    HCT 39.9 10/18/2022     10/18/2022    CHOL 140 10/18/2022    TRIG 192 (H) 10/18/2022    HDL 35 (L) 10/18/2022    ALT 53 (H) 05/30/2023    AST 51 (H) 05/30/2023     05/30/2023    K 4.9 05/30/2023     05/30/2023    CREATININE 0.8 05/30/2023    BUN 15 05/30/2023    CO2 26 05/30/2023    TSH 1.689 10/18/2022    INR 1.0 02/02/2017    GLUF 120 (H) 02/08/2011    HGBA1C 6.9 (H) 07/25/2023       I have explained the risks and benefits of this endoscopic procedure to the patient including but not limited to bleeding, inflammation, infection, perforation, and death.      Hermelindo Coleman MD

## 2023-10-11 NOTE — PLAN OF CARE
Discharge instructions reviewed with pt and wife, verbalized understanding. VSS, IV removed intact, no distress noted.

## 2023-10-11 NOTE — ANESTHESIA POSTPROCEDURE EVALUATION
Anesthesia Post Evaluation    Patient: Lima Maldonado    Procedure(s) Performed: Procedure(s) (LRB):  ULTRASOUND, UPPER GI TRACT, ENDOSCOPIC (N/A)    Final Anesthesia Type: general      Patient location during evaluation: PACU  Patient participation: Yes- Able to Participate  Level of consciousness: awake and alert  Post-procedure vital signs: reviewed and stable  Pain management: adequate  Airway patency: patent    PONV status at discharge: No PONV  Anesthetic complications: no      Cardiovascular status: stable  Respiratory status: spontaneous ventilation and face mask  Hydration status: euvolemic  Follow-up not needed.          Vitals Value Taken Time   /59 10/11/23 1302   Temp 37.0 10/11/23 1303   Pulse 90 10/11/23 1302   Resp 22 10/11/23 1302   SpO2 96 % 10/11/23 1302   Vitals shown include unvalidated device data.      No case tracking events are documented in the log.      Pain/Nolvia Score: No data recorded

## 2023-10-11 NOTE — TELEPHONE ENCOUNTER
No care due was identified.  [facility] Embedded Care Due Messages. Reference number: [id number].   10/11/2023 12:17:00 AM CDT

## 2023-10-11 NOTE — ANESTHESIA PREPROCEDURE EVALUATION
10/11/2023  Pre-operative evaluation for Procedure(s) (LRB):  EGD (ESOPHAGOGASTRODUODENOSCOPY) (N/A)    Lima Maldonado is a 73 y.o. female     Patient Active Problem List   Diagnosis    Hyperlipidemia    Hypothyroidism    Obstructive sleep apnea on CPAP    GERD (gastroesophageal reflux disease)    Fatty liver    Primary hypertension    Vaginal atrophy    Senile osteoporosis    Primary insomnia    Malignant neoplasm of lower-outer quadrant of left female breast    Dermatitis of eyelid of left eye due to herpes zoster    Aortic atherosclerosis    Chemotherapy-induced neuropathy    Stress incontinence of urine    Sensorineural hearing loss (SNHL) of left ear with restricted hearing of right ear    Perforation of left tympanic membrane    Allergic rhinitis    Nasal septal deviation    Tinnitus of both ears    Diabetic polyneuropathy associated with type 2 diabetes mellitus    Nuclear sclerosis, bilateral    Post-operative state    Nuclear sclerotic cataract of right eye    Early hepatic fibrosis    Type 2 diabetes mellitus with diabetic polyneuropathy, without long-term current use of insulin    Overweight (BMI 25.0-29.9)       Review of patient's allergies indicates:   Allergen Reactions    Erythromycin      Other reaction(s): Nausea    Erythromycin (bulk)      Other reaction(s): Nausea    Oxycodone Nausea And Vomiting    Oxycodone-acetaminophen Nausea And Vomiting     Other reaction(s): Nausea       Current Facility-Administered Medications on File Prior to Visit   Medication Dose Route Frequency Provider Last Rate Last Admin    0.9%  NaCl infusion   Intravenous Continuous Hermelindo Coleamn MD        sodium chloride 0.9% flush 10 mL  10 mL Intravenous PRN Hermelindo Coleman MD         Current Outpatient Medications on File Prior to Visit   Medication Sig Dispense Refill     ascorbic acid, vitamin C, (VITAMIN C) 1000 MG tablet Take 1 tablet by mouth once daily.      aspirin (ECOTRIN) 81 MG EC tablet Take 81 mg by mouth once daily.      calcium-vitamin D tablet 600 mg-200 units       CANNABIDIOL, CBD, EXTRACT ORAL Take 1 drop by mouth Daily.      coenzyme Q10-vitamin E 100-100 mg-unit Cap Take 1 capsule by mouth once daily.       denosumab (PROLIA) 60 mg/mL Syrg Inject 1 mL (60 mg total) into the skin every 6 (six) months. 2 mL 0    estradioL (YUVAFEM) 10 mcg Tab PLACE 1 TABLET VAGINALLY TWICE A WEEK 24 tablet 2    fenofibrate 160 MG Tab TAKE 1 TABLET BY MOUTH ONCE DAILY. 90 tablet 1    fluticasone (FLONASE) 50 mcg/actuation nasal spray 1 spray by Each Nare route 2 (two) times daily.       gabapentin (NEURONTIN) 300 MG capsule TAKE 2 CAPSULES BY MOUTH EVERY EVENING. 180 capsule 3    letrozole (FEMARA) 2.5 mg Tab TAKE 1 TABLET BY MOUTH EVERY DAY 90 tablet 3    levothyroxine (SYNTHROID) 50 MCG tablet TAKE 1 TABLET BY MOUTH EVERY DAY 90 tablet 3    losartan (COZAAR) 50 MG tablet TAKE 1 TABLET BY MOUTH EVERY DAY 90 tablet 3    metFORMIN (GLUCOPHAGE) 500 MG tablet TAKE 1 TABLET BY MOUTH EVERY DAY WITH BREAKFAST (Patient taking differently: 2 (two) times a day.) 90 tablet 1    MULTIVITAMIN ORAL Take 1 tablet by mouth once daily.       omega-3 fatty acids-vitamin E 1,000 mg Cap Take 1 capsule by mouth once daily.       OZEMPIC 0.25 mg or 0.5 mg (2 mg/3 mL) pen injector Inject into the skin.      pantoprazole (PROTONIX) 20 MG tablet Take 1 tablet (20 mg total) by mouth before breakfast. 90 tablet 3    simvastatin (ZOCOR) 20 MG tablet TAKE 1 TABLET BY MOUTH EVERY DAY IN THE EVENING 90 tablet 3    sucralfate (CARAFATE) 100 mg/mL suspension Take 10 mLs (1 g total) by mouth 4 (four) times daily. 828 mL 1    tobramycin sulfate 0.3% (TOBREX) 0.3 % ophthalmic solution Apply to wound beds twice daily 5 mL 2    vitamin D 1000 units Tab Take 1,000 Units by mouth once daily.           Past Surgical History:   Procedure Laterality Date    APPENDECTOMY  2008    removed during hysterectomy surgery     BLEPHAROPLASTY Bilateral     BREAST BIOPSY      2010    Breast implant Bilateral 1998    implants removed in 2003    BREAST LUMPECTOMY      02/1997    BREAST RECONSTRUCTION Bilateral     02/2017    BREAST SURGERY      see details below     CATARACT EXTRACTION W/  INTRAOCULAR LENS IMPLANT Left 08/13/2020    Procedure: EXTRACTION, CATARACT, WITH IOL INSERTION;  Surgeon: Ivanna Coleman MD;  Location: [facility] OR;  Service: Ophthalmology;  Laterality: Left;    CATARACT EXTRACTION W/  INTRAOCULAR LENS IMPLANT Right 08/27/2020    Procedure: EXTRACTION, CATARACT, WITH IOL INSERTION LASER;  Surgeon: Ivanna Coleman MD;  Location: [facility] OR;  Service: Ophthalmology;  Laterality: Right;    COLONOSCOPY N/A 05/15/2018    Procedure: COLONOSCOPY;  Surgeon: Roldan Gonzales MD;  [facility]);  Service: Endoscopy;  Laterality: N/A;    COSMETIC SURGERY  1/2022    Eyelift    ESOPHAGOGASTRODUODENOSCOPY N/A 12/20/2022    Procedure: EGD (ESOPHAGOGASTRODUODENOSCOPY);  Surgeon: Omar Ambriz MD;  [facility]  Service: Endoscopy;  Laterality: N/A;  instructions via portal -     EYE SURGERY      CATERACTS    HYSTERECTOMY  2008    benign    left breast reconstruction  2005    left modified radical mastectomy  02/1997    for treatment of breast cancer     LIPOMA RESECTION  2010    removed from upper back area     MODIFIED RADICAL MASTECTOMY W/ AXILLARY LYMPH NODE DISSECTION  02/1997    OOPHORECTOMY      2008    PELVIC LAPAROSCOPY  1978    pt had endometriosis     IN REMOVAL OF OVARY/TUBE(S)  2008    benign    reduction of mons pubis  2011    robotic lap  hysterectomy with bso  2008    UPPER GASTROINTESTINAL ENDOSCOPY  08/05/2013       Social History     Socioeconomic History    Marital status:    Tobacco Use    Smoking status: Former     Current packs/day:  0.00     Average packs/day: 0.5 packs/day for 3.0 years (1.5 ttl pk-yrs)     Types: Cigarettes     Start date: 4/15/1969     Quit date: 4/15/1972     Years since quittin.5    Smokeless tobacco: Never    Tobacco comments:     started at age 18 during college    Substance and Sexual Activity    Alcohol use: Yes     Comment: Rarely, not even 1X per week    Drug use: No    Sexual activity: Yes     Partners: Male     Birth control/protection: Post-menopausal, See Surgical Hx     Social Determinants of Health     Financial Resource Strain: Low Risk  (10/7/2023)    Overall Financial Resource Strain (CARDIA)     Difficulty of Paying Living Expenses: Not hard at all   Food Insecurity: No Food Insecurity (10/7/2023)    Hunger Vital Sign     Worried About Running Out of Food in the Last Year: Never true     Ran Out of Food in the Last Year: Never true   Transportation Needs: No Transportation Needs (10/7/2023)    PRAPARE - Transportation     Lack of Transportation (Medical): No     Lack of Transportation (Non-Medical): No   Physical Activity: Inactive (10/7/2023)    Exercise Vital Sign     Days of Exercise per Week: 0 days     Minutes of Exercise per Session: 0 min   Stress: No Stress Concern Present (10/7/2023)    [place] [employer] - Occupational Stress Questionnaire     Feeling of Stress : Only a little   Recent Concern: Stress - Stress Concern Present (2023)    [place] [employer] - Occupational Stress Questionnaire     Feeling of Stress : To some extent   Social Connections: Socially Integrated (10/7/2023)    Social Connection and Isolation Panel [NHANES]     Frequency of Communication with Friends and Family: More than three times a week     Frequency of Social Gatherings with Friends and Family: Twice a week     Attends Hindu Services: 1 to 4 times per year     Active Member of Clubs or Organizations: Yes     Attends Club or Organization  "Meetings: More than 4 times per year     Marital Status:    Housing Stability: Low Risk  (10/7/2023)    Housing Stability Vital Sign     Unable to Pay for Housing in the Last Year: No     Number of Places Lived in the Last Year: 1     Unstable Housing in the Last Year: No         CBC: No results for input(s): "WBC", "RBC", "HGB", "HCT", "PLT", "MCV", "MCH", "MCHC" in the last 72 hours.    CMP: No results for input(s): "NA", "K", "CL", "CO2", "BUN", "CREATININE", "GLU", "MG", "PHOS", "CALCIUM", "ALBUMIN", "PROT", "ALKPHOS", "ALT", "AST", "BILITOT" in the last 72 hours.    INR  No results for input(s): "PT", "INR", "PROTIME", "APTT" in the last 72 hours.        Diagnostic Studies:      EKD Echo:  Results for orders placed or performed during the hospital encounter of 17   2D Echo w/ Color Flow Doppler   Result Value Ref Range    EF + QEF 60 55 - 65    Diastolic Dysfunction No     Mitral Valve Mobility NORMAL       TTE 2022   The patient exercised for 6 minutes and 8 seconds on a high ramp protocol, corresponding to a functional capacity of 9 METS, achieving a peak heart rate of 160 bpm, which is 113% of the age predicted maximum heart rate.   The test was stopped because the patient experienced shortness of breath.   During stress, the following significant arrhythmias were observed: occasional PACs.   The patient's exercise capacity was normal.   The ECG portion of this study is positive for myocardial ischemia.   The left ventricle is normal in size with normal systolic function.   The estimated ejection fraction is 60%.   Normal left ventricular diastolic function.   Normal right ventricular size with normal right ventricular systolic function.   The left ventricular global longitudinal strain is -16%.   The estimated PA systolic pressure is 25 mmHg.   Normal central venous pressure (3 mmHg).   Trivial posterolateral pericardial effusion.   The stress echo portion of this " study is negative for myocardial ischemia.      Pre-op Assessment    I have reviewed the Patient Summary Reports.     I have reviewed the Nursing Notes. I have reviewed the NPO Status.   I have reviewed the Medications.     Review of Systems  Anesthesia Hx:  Denies Family Hx of Anesthesia complications.   Denies Personal Hx of Anesthesia complications.   Hematology/Oncology:         -- Cancer in past history: Breast chemotherapy    EENT/Dental:EENT/Dental Normal   Cardiovascular:   Hypertension    Pulmonary:   Sleep Apnea    Hepatic/GI:   GERD Liver Disease,    Endocrine:   Diabetes, type 2 Hypothyroidism        Physical Exam  General: Well nourished and Cooperative    Airway:  Mallampati: II   Mouth Opening: Normal  TM Distance: Normal  Tongue: Normal  Neck ROM: Normal ROM    Chest/Lungs:  Clear to auscultation, Normal Respiratory Rate    Heart:  Rate: Normal  Rhythm: Regular Rhythm  Sounds: Normal        Anesthesia Plan  Type of Anesthesia, risks & benefits discussed:    Anesthesia Type: Gen Natural Airway  Intra-op Monitoring Plan: Standard ASA Monitors  Post Op Pain Control Plan: multimodal analgesia and IV/PO Opioids PRN  Induction:  IV  Airway Plan: Direct and Video, Post-Induction  Informed Consent: Informed consent signed with the Patient and all parties understand the risks and agree with anesthesia plan.  All questions answered.   ASA Score: 3    Ready For Surgery From Anesthesia Perspective.     .

## 2023-10-11 NOTE — TRANSFER OF CARE
"Anesthesia Transfer of Care Note    Patient: Lima Maldonado    Procedure(s) Performed: Procedure(s) (LRB):  ULTRASOUND, UPPER GI TRACT, ENDOSCOPIC (N/A)    Patient location: [facility]    Anesthesia Type: general    Transport from OR: Transported from OR on 2-3 L/min O2 by NC with adequate spontaneous ventilation    Post pain: adequate analgesia    Post assessment: no apparent anesthetic complications    Post vital signs: stable    Level of consciousness: awake    Nausea/Vomiting: no nausea/vomiting    Complications: none    Transfer of care protocol was followed      Last vitals:   Visit Vitals  BP (!) 116/59   Pulse 90   Temp 36.7 °C (98.1 °F) (Temporal)   Resp (!) 22   Ht 5' 6" (1.676 m)   Wt 78.5 kg (173 lb)   SpO2 96%   Breastfeeding No   BMI 27.92 kg/m²     "

## 2023-10-11 NOTE — PROVATION PATIENT INSTRUCTIONS
Discharge Summary/Instructions after an Endoscopic Procedure  Patient Name: Lima Maldonado  Patient MRN: [id number]  Patient YOB: 1950 Wednesday, October 11, 2023  Hermelindo Coleman MD  Dear patient,  As a result of recent federal legislation (The Federal Cures Act), you may   receive lab or pathology results from your procedure in your [facility]   account before your physician is able to contact you. Your physician or   their representative will relay the results to you with their   recommendations at their soonest availability.  Thank you,  RESTRICTIONS:  During your procedure today, you received medications for sedation.  These   medications may affect your judgment, balance and coordination.  Therefore,   for 24 hours, you have the following restrictions:   - DO NOT drive a car, operate machinery, make legal/financial decisions,   sign important papers or drink alcohol.    ACTIVITY:  Today: no heavy lifting, straining or running due to procedural   sedation/anesthesia.  The following day: return to full activity including work.  DIET:  Eat and drink normally unless instructed otherwise.     TREATMENT FOR COMMON SIDE EFFECTS:  - Mild abdominal pain, nausea, belching, bloating or excessive gas:  rest,   eat lightly and use a heating pad.  - Sore Throat: treat with throat lozenges and/or gargle with warm salt   water.  - Because air was used during the procedure, expelling large amounts of air   from your rectum or belching is normal.  - If a bowel prep was taken, you may not have a bowel movement for 1-3 days.    This is normal.  SYMPTOMS TO WATCH FOR AND REPORT TO YOUR PHYSICIAN:  1. Abdominal pain or bloating, other than gas cramps.  2. Chest pain.  3. Back pain.  4. Signs of infection such as: chills or fever occurring within 24 hours   after the procedure.  5. Rectal bleeding, which would show as bright red, maroon, or black stools.   (A tablespoon of blood from the rectum is not serious, especially  if   hemorrhoids are present.)  6. Vomiting.  7. Weakness or dizziness.  GO DIRECTLY TO THE NEAREST EMERGENCY ROOM IF YOU HAVE ANY OF THE FOLLOWING:      Difficulty breathing              Chills and/or fever over 101 F   Persistent vomiting and/or vomiting blood   Severe abdominal pain   Severe chest pain   Black, tarry stools   Bleeding- more than one tablespoon   Any other symptom or condition that you feel may need urgent attention  Your doctor recommends these additional instructions:  If any biopsies were taken, your doctors clinic will contact you in 1 to 2   weeks with any results.  - Discharge patient to home (ambulatory).   - Resume previous diet; Discharge to home (ambulatory); Resume outpatient   medications  - Return to primary care physician as previously scheduled.   - My team will arrange for follow-up in pancreatic cyst clinic in 10 months.   Will arrange for f/u imaging surveillance at that time.  For questions, problems or results please call your physician - Hermelindo Coleman MD at Work:  [phone].  [facility] [place], EMERGENCY ROOM PHONE NUMBER: [phone]  IF A COMPLICATION OR EMERGENCY SITUATION ARISES AND YOU ARE UNABLE TO REACH   YOUR PHYSICIAN - GO DIRECTLY TO THE EMERGENCY ROOM.  Hermelindo Coleman MD  10/11/2023 1:05:08 PM  This report has been verified and signed electronically.  Dear patient,  As a result of recent federal legislation (The Federal Cures Act), you may   receive lab or pathology results from your procedure in your [facility]   account before your physician is able to contact you. Your physician or   their representative will relay the results to you with their   recommendations at their soonest availability.  Thank you,  PROVATION

## 2023-10-12 ENCOUNTER — TELEPHONE (OUTPATIENT)
Dept: PAIN MEDICINE | Facility: CLINIC | Age: 73
End: 2023-10-12
Payer: MEDICARE

## 2023-10-12 RX ORDER — SEMAGLUTIDE 0.68 MG/ML
0.5 INJECTION, SOLUTION SUBCUTANEOUS WEEKLY
Qty: 3 EACH | Refills: 1 | Status: SHIPPED | OUTPATIENT
Start: 2023-10-12 | End: 2023-12-06 | Stop reason: ALTCHOICE

## 2023-10-12 NOTE — TELEPHONE ENCOUNTER
----- Message from Pk Mckenna MD sent at 10/9/2023 11:55 AM CDT -----  Pls try finding patient sooner appointment with any provider     MG

## 2023-10-12 NOTE — TELEPHONE ENCOUNTER
Refill Routing Note   Medication(s) are not appropriate for processing by [facility] for the following reason(s):      No active prescription written by provider    [facility] action(s):  Defer Care Due:  None identified              Appointments  past 12m or future 3m with PCP    Date Provider   Last Visit   8/1/2023 Joie Mccall MD   Next Visit   12/6/2023 Joie Mccall MD   ED visits in past 90 days: 0        Note composed:2:52 PM 10/12/2023

## 2023-10-12 NOTE — TELEPHONE ENCOUNTER
No care due was identified.  [facility] Embedded Care Due Messages. Reference number: [id number].   10/12/2023 12:15:36 AM CDT

## 2023-10-19 NOTE — PROGRESS NOTES
Subjective:   Patient ID:  Lima Maldonado is a 73 y.o. female who presents for evaluation of No chief complaint on file.    PROBLEM LIST:  Breast cancer 1997 (XRT, AC+T, lumpectomy), 2016  (left total mastectomy,completed taxotere,cyclophosphamide. Letrozole)  HTN  HLD  DM, not on insulin  NELDA on cpap    HPI 8/31/22: She presents today to [facility] care.  She has a history of left-sided breast cancer diagnosed in 1997 which was treated with a lumpectomy, radiation therapy, and an anthracycline containing chemotherapy regimen.  She subsequently had a recurrence in 2016 and underwent a total left mastectomy and adjuvant chemotherapy with Taxotere and cyclophosphamide.    She is currently on letrozole.She also has treated hypertension, hyperlipidemia, and diabetes.  She denies any chest pain, shortness of breath, PND, orthopnea, or  lower extremity edema.  She reports an occasional brief flutter in her chest without any sustained symptoms.    She does not currently exercise on a regular basis.Her father had a myocardial infarction at the age of 51.    Interval history:  She has been feeling well from a cardiovascular standpoint since her last visit.  She has been having some cervical spine issues with radiculopathy into both her arms and arthritis in her thumbs.  She has not been walking on a regular basis.  She uses salt in her diet.  She denies any chest pain, shortness of breath, PND, orthopnea, or heart palpitations.  She was signed up for digital hypertension program but has not yet filled out the questionnaire.    ECG 7-SEP-2022 09:45:39: Personally reviewed by me shows NSR 88 bpm    MIRTA 9/22:  Summary    The patient exercised for 6 minutes and 8 seconds on a high ramp protocol, corresponding to a functional capacity of 9 METS, achieving a peak heart rate of 160 bpm, which is 113% of the age predicted maximum heart rate.  The test was stopped because the patient experienced shortness of breath.  During stress,  the following significant arrhythmias were observed: occasional PACs.  The patient's exercise capacity was normal.  The ECG portion of this study is positive for myocardial ischemia.  The left ventricle is normal in size with normal systolic function.  The estimated ejection fraction is 60%.  Normal left ventricular diastolic function.  Normal right ventricular size with normal right ventricular systolic function.  The left ventricular global longitudinal strain is -16%.  The estimated PA systolic pressure is 25 mmHg.  Normal central venous pressure (3 mmHg).  Trivial posterolateral pericardial effusion.  The stress echo portion of this study is negative for myocardial ischemia.      Past Medical History:   Diagnosis Date    Acute cystitis without hematuria 7/17/2017    Arthritis     Back pain     Breast cancer     originally dx in 02/1997- treated with surgery, chemo and radiation     Class 1 obesity due to excess calories with serious comorbidity and body mass index (BMI) of 30.0 to 30.9 in adult 11/29/2018    Elevated bilirubin 11/19/2013    Fatty liver     GERD (gastroesophageal reflux disease)     Glucose intolerance (impaired glucose tolerance)     Hyperlipidemia     Hypertension     Hypothyroid     Hypothyroidism     hypothyroidism    Malignant neoplasm of lower-outer quadrant of left female breast 11/15/2016    Obesity     Obesity, diabetes, and hypertension syndrome 12/12/2018    Osteopenia     Osteoporosis     Primary insomnia 11/2/2016    Shingles     Sleep apnea     wears CPAP nightly     Squamous cell carcinoma 2011    right forearm, sx by Dr. Corinne Ray    Stress incontinence of urine 1/11/2018    Trouble in sleeping     Type 2 diabetes mellitus with diabetic polyneuropathy, without long-term current use of insulin 12/26/2017    Urinary incontinence     Well woman exam with routine gynecological exam 11/2/2016       Past Surgical History:   Procedure Laterality Date    APPENDECTOMY  2008    removed  during hysterectomy surgery     BLEPHAROPLASTY Bilateral     BREAST BIOPSY      2010    Breast implant Bilateral 1998    implants removed in 2003    BREAST LUMPECTOMY      02/1997    BREAST RECONSTRUCTION Bilateral     02/2017    BREAST SURGERY      see details below     CATARACT EXTRACTION W/  INTRAOCULAR LENS IMPLANT Left 08/13/2020    Procedure: EXTRACTION, CATARACT, WITH IOL INSERTION;  Surgeon: Ivanna Coleman MD;  Location: [facility] OR;  Service: Ophthalmology;  Laterality: Left;    CATARACT EXTRACTION W/  INTRAOCULAR LENS IMPLANT Right 08/27/2020    Procedure: EXTRACTION, CATARACT, WITH IOL INSERTION LASER;  Surgeon: Ivanna Coleman MD;  Location: [facility] OR;  Service: Ophthalmology;  Laterality: Right;    COLONOSCOPY N/A 05/15/2018    Procedure: COLONOSCOPY;  Surgeon: Roldan Gonzales MD;  Location: [facility] ENDO (4TH FLR);  Service: Endoscopy;  Laterality: N/A;    COSMETIC SURGERY  1/2022    Eyelift    ENDOSCOPIC ULTRASOUND OF UPPER GASTROINTESTINAL TRACT N/A 10/11/2023    Procedure: ULTRASOUND, UPPER GI TRACT, ENDOSCOPIC;  Surgeon: Hermelindo Coleman MD;  Location: [facility] (2ND FLR);  Service: Endoscopy;  Laterality: N/A;  instr portal-ozempic or trulicity-tb  10/5-precall complete/pt verbalized understanding of holding Ozempic-MS    ESOPHAGOGASTRODUODENOSCOPY N/A 12/20/2022    Procedure: EGD (ESOPHAGOGASTRODUODENOSCOPY);  Surgeon: Omar Ambriz MD;  Location: [facility] (4TH FLR);  Service: Endoscopy;  Laterality: N/A;  instructions via portal - sm    EYE SURGERY      CATERACTS    HYSTERECTOMY  2008    benign    left breast reconstruction  2005    left modified radical mastectomy  02/1997    for treatment of breast cancer     LIPOMA RESECTION  2010    removed from upper back area     MODIFIED RADICAL MASTECTOMY W/ AXILLARY LYMPH NODE DISSECTION  02/1997    OOPHORECTOMY      2008    PELVIC LAPAROSCOPY  1978    pt had endometriosis     IN REMOVAL OF OVARY/TUBE(S)  2008    benign    reduction of mons pubis  2011     robotic lap  hysterectomy with bso  2008    UPPER GASTROINTESTINAL ENDOSCOPY  2013       Social History     Socioeconomic History    Marital status:    Tobacco Use    Smoking status: Former     Current packs/day: 0.00     Average packs/day: 0.5 packs/day for 3.0 years (1.5 ttl pk-yrs)     Types: Cigarettes     Start date: 4/15/1969     Quit date: 4/15/1972     Years since quittin.5    Smokeless tobacco: Never    Tobacco comments:     started at age 18 during college    Substance and Sexual Activity    Alcohol use: Yes     Comment: Rarely, not even 1X per week    Drug use: No    Sexual activity: Yes     Partners: Male     Birth control/protection: Post-menopausal, See Surgical Hx     Social Determinants of Health     Financial Resource Strain: Low Risk  (10/7/2023)    Overall Financial Resource Strain (CARDIA)     Difficulty of Paying Living Expenses: Not hard at all   Food Insecurity: No Food Insecurity (10/7/2023)    Hunger Vital Sign     Worried About Running Out of Food in the Last Year: Never true     Ran Out of Food in the Last Year: Never true   Transportation Needs: No Transportation Needs (10/7/2023)    PRAPARE - Transportation     Lack of Transportation (Medical): No     Lack of Transportation (Non-Medical): No   Physical Activity: Inactive (10/7/2023)    Exercise Vital Sign     Days of Exercise per Week: 0 days     Minutes of Exercise per Session: 0 min   Stress: No Stress Concern Present (10/7/2023)    [employer] [place] [employer] - Occupational Stress Questionnaire     Feeling of Stress : Only a little   Recent Concern: Stress - Stress Concern Present (2023)    [employer] [place] [employer] - Occupational Stress Questionnaire     Feeling of Stress : To some extent   Social Connections: Socially Integrated (10/7/2023)    Social Connection and Isolation Panel [NHANES]     Frequency of Communication with Friends and Family: More than three times a week      Frequency of Social Gatherings with Friends and Family: Twice a week     Attends [employer] Services: 1 to 4 times per year     Active Member of Clubs or Organizations: Yes     Attends Club or Organization Meetings: More than 4 times per year     Marital Status:    Housing Stability: Low Risk  (10/7/2023)    Housing Stability Vital Sign     Unable to Pay for Housing in the Last Year: No     Number of Places Lived in the Last Year: 1     Unstable Housing in the Last Year: No       Family History   Problem Relation Age of Onset    Heart attack Father 51    Heart disease Father         Heart [place], age 51    Alcohol abuse Father     Breast cancer Sister 51        BRCA 1/2 negative    Cancer Sister         Breast Cancer    Cancer Mother 65        Malignant melanoma    Melanoma Mother     No Known Problems Brother     No Known Problems Daughter     No Known Problems Son     No Known Problems Daughter     No Known Problems Son     Diabetes Maternal Uncle             Diabetes Maternal Uncle             Arthritis Maternal Grandmother         Severe arthitis in Knees    Uterine cancer Neg Hx     Colon cancer Neg Hx     Celiac disease Neg Hx     Esophageal cancer Neg Hx     Inflammatory bowel disease Neg Hx     Liver cancer Neg Hx     Liver disease Neg Hx     Stomach cancer Neg Hx     Ulcerative colitis Neg Hx     Hypertension Neg Hx     Cirrhosis Neg Hx     Crohn's disease Neg Hx     Rectal cancer Neg Hx     Ovarian cancer Neg Hx        Patient's Medications   New Prescriptions    No medications on file   Previous Medications    ASCORBIC ACID, VITAMIN C, (VITAMIN C) 1000 MG TABLET    Take 1 tablet by mouth once daily.    ASPIRIN (ECOTRIN) 81 MG EC TABLET    Take 81 mg by mouth once daily.    CALCIUM-VITAMIN D TABLET 600 MG-200 UNITS        CANNABIDIOL, CBD, EXTRACT ORAL    Take 1 drop by mouth Daily.    COENZYME Q10-VITAMIN E 100-100 MG-UNIT CAP    Take 1 capsule by mouth once daily.     DENOSUMAB  (PROLIA) 60 MG/ML SYRG    Inject 1 mL (60 mg total) into the skin every 6 (six) months.    ESTRADIOL (YUVAFEM) 10 MCG TAB    PLACE 1 TABLET VAGINALLY TWICE A WEEK    FENOFIBRATE 160 MG TAB    TAKE 1 TABLET BY MOUTH EVERY DAY    FLUTICASONE (FLONASE) 50 MCG/ACTUATION NASAL SPRAY    1 spray by Each Nare route 2 (two) times daily.     GABAPENTIN (NEURONTIN) 300 MG CAPSULE    TAKE 2 CAPSULES BY MOUTH EVERY EVENING.    LETROZOLE (FEMARA) 2.5 MG TAB    TAKE 1 TABLET BY MOUTH EVERY DAY    LEVOTHYROXINE (SYNTHROID) 50 MCG TABLET    TAKE 1 TABLET BY MOUTH EVERY DAY    LOSARTAN (COZAAR) 50 MG TABLET    TAKE 1 TABLET BY MOUTH EVERY DAY    METFORMIN (GLUCOPHAGE) 500 MG TABLET    TAKE 1 TABLET BY MOUTH EVERY DAY WITH BREAKFAST    MULTIVITAMIN ORAL    Take 1 tablet by mouth once daily.     OMEGA-3 FATTY ACIDS-VITAMIN E 1,000 MG CAP    Take 1 capsule by mouth once daily.     OZEMPIC 0.25 MG OR 0.5 MG (2 MG/3 ML) PEN INJECTOR    INJECT 0.5 MG INTO THE SKIN EVERY 7 DAYS.    PANTOPRAZOLE (PROTONIX) 20 MG TABLET    Take 1 tablet (20 mg total) by mouth before breakfast.    SIMVASTATIN (ZOCOR) 20 MG TABLET    TAKE 1 TABLET BY MOUTH EVERY DAY IN THE EVENING    SUCRALFATE (CARAFATE) 100 MG/ML SUSPENSION    Take 10 mLs (1 g total) by mouth 4 (four) times daily.    TOBRAMYCIN SULFATE 0.3% (TOBREX) 0.3 % OPHTHALMIC SOLUTION    Apply to wound beds twice daily    VITAMIN D 1000 UNITS TAB    Take 1,000 Units by mouth once daily.    Modified Medications    No medications on file   Discontinued Medications    No medications on file       Review of Systems   Constitutional: Negative for malaise/fatigue and weight gain.   HENT:  Negative for hearing loss.    Eyes:  Negative for visual disturbance.   Cardiovascular:  Negative for chest pain, claudication, dyspnea on exertion, leg swelling, near-syncope, orthopnea, palpitations, paroxysmal nocturnal dyspnea and syncope.   Respiratory:  Negative for cough, shortness of breath, sleep disturbances due  to breathing, snoring and wheezing.    Endocrine: Negative for cold intolerance, heat intolerance, polydipsia, polyphagia and polyuria.   Hematologic/Lymphatic: Negative for bleeding problem. Does not bruise/bleed easily.   Skin:  Negative for rash and suspicious lesions.   Musculoskeletal:  Positive for joint pain and neck pain. Negative for arthritis, falls, muscle weakness and myalgias.   Gastrointestinal:  Negative for abdominal pain, change in bowel habit, constipation, diarrhea, heartburn, hematochezia, melena and nausea.   Genitourinary:  Negative for hematuria and nocturia.   Neurological:  Positive for paresthesias. Negative for excessive daytime sleepiness, dizziness, headaches, light-headedness, loss of balance and weakness.   Psychiatric/Behavioral:  Negative for depression. The patient is not nervous/anxious.    Allergic/Immunologic: Negative for environmental allergies.       There were no vitals taken for this visit.    Objective:   Physical Exam  Constitutional:       Appearance: She is well-developed.      Comments:      HENT:      Head: Normocephalic and atraumatic.   Eyes:      General: No scleral icterus.     Conjunctiva/sclera: Conjunctivae normal.      Pupils: Pupils are equal, round, and reactive to light.   Neck:      Thyroid: No thyromegaly.      Vascular: No hepatojugular reflux or JVD.      Trachea: No tracheal deviation.   Cardiovascular:      Rate and Rhythm: Normal rate and regular rhythm.      Chest Wall: PMI is not displaced.      Pulses: Intact distal pulses.           Carotid pulses are 2+ on the right side and 2+ on the left side.       Radial pulses are 2+ on the right side and 2+ on the left side.        Dorsalis pedis pulses are 2+ on the right side and 2+ on the left side.        Posterior tibial pulses are 2+ on the right side and 2+ on the left side.      Heart sounds: Normal heart sounds.   Pulmonary:      Effort: Pulmonary effort is normal.      Breath sounds: Normal breath  sounds.   Abdominal:      General: Bowel sounds are normal. There is no distension.      Palpations: Abdomen is soft. There is no mass.      Tenderness: There is no abdominal tenderness.   Musculoskeletal:         General: No tenderness.      Cervical back: Normal range of motion and neck supple.   Lymphadenopathy:      Cervical: No cervical adenopathy.   Skin:     General: Skin is warm and dry.      Findings: No erythema or rash.      Nails: There is no clubbing.   Neurological:      Mental Status: She is alert and oriented to person, place, and time.   Psychiatric:         Speech: Speech normal.         Behavior: Behavior normal.         Lab Results   Component Value Date     05/30/2023    K 4.9 05/30/2023     05/30/2023    CO2 26 05/30/2023    BUN 15 05/30/2023    CREATININE 0.8 05/30/2023     (H) 05/30/2023    HGBA1C 6.9 (H) 07/25/2023    MG 2.0 07/25/2023    AST 51 (H) 05/30/2023    ALT 53 (H) 05/30/2023    ALBUMIN 4.1 05/30/2023    PROT 6.9 05/30/2023    BILITOT 0.8 05/30/2023    WBC 4.40 10/18/2022    HGB 13.2 10/18/2022    HCT 39.9 10/18/2022    MCV 88 10/18/2022     10/18/2022    INR 1.0 02/02/2017    TSH 1.689 10/18/2022    CHOL 140 10/18/2022    HDL 35 (L) 10/18/2022    LDLCALC 66.6 10/18/2022    TRIG 192 (H) 10/18/2022       Assessment:     1. Aortic atherosclerosis :  Continue simvastatin.   2. Primary hypertension :  Her blood pressure readings have been elevated on her last few visits.  I have asked her to increase her losartan to 100 mg daily and reminded her to fill out her questionnaire for digital hypertension program. Limit sodium intake to < 1500mg daily and follow the DASH diet plan. We discussed that 150 minutes a week of moderate intensity exercise is recommended to improve cardiovascular health.   3. Mixed hyperlipidemia :  She remains on fenofibrate and simvastatin for her mixed hyperlipidemia.  I have ordered a follow-up fasting lipid profile.   4. Malignant  neoplasm of lower-outer quadrant of left breast of female, estrogen receptor positive :  Her exercise stress echo last year was normal.  We discussed that given her history of anthracycline exposure as well as radiation to her left chest, a screening stress echocardiogram is recommended now and then every 5 years going forward.   5. Type 2 diabetes mellitus with diabetic polyneuropathy, without long-term current use of insulin :  She is on metformin and Ozempic  her last A1c was 6.9.  Her diabetes is managed by Dr. Mccall.   6. Obstructive sleep apnea on CPAP : Continue CPAP.       Plan:     Diagnoses and all orders for this visit:    Primary hypertension    Mixed hyperlipidemia    Aortic atherosclerosis    Malignant neoplasm of lower-outer quadrant of left breast of female, estrogen receptor positive    Type 2 diabetes mellitus with diabetic polyneuropathy, without long-term current use of insulin    Obstructive sleep apnea on CPAP        Thank you for allowing me to participate in this patient's care. Please do not hesitate to contact me with any questions or concerns.

## 2023-10-20 ENCOUNTER — OFFICE VISIT (OUTPATIENT)
Dept: CARDIOLOGY | Facility: CLINIC | Age: 73
End: 2023-10-20
Payer: MEDICARE

## 2023-10-20 ENCOUNTER — PATIENT MESSAGE (OUTPATIENT)
Dept: ADMINISTRATIVE | Facility: OTHER | Age: 73
End: 2023-10-20
Payer: MEDICARE

## 2023-10-20 VITALS
DIASTOLIC BLOOD PRESSURE: 72 MMHG | HEART RATE: 84 BPM | WEIGHT: 173.5 LBS | SYSTOLIC BLOOD PRESSURE: 149 MMHG | BODY MASS INDEX: 27.88 KG/M2 | OXYGEN SATURATION: 96 % | HEIGHT: 66 IN

## 2023-10-20 DIAGNOSIS — C50.512 MALIGNANT NEOPLASM OF LOWER-OUTER QUADRANT OF LEFT BREAST OF FEMALE, ESTROGEN RECEPTOR POSITIVE: ICD-10-CM

## 2023-10-20 DIAGNOSIS — G47.33 OBSTRUCTIVE SLEEP APNEA ON CPAP: ICD-10-CM

## 2023-10-20 DIAGNOSIS — E11.42 TYPE 2 DIABETES MELLITUS WITH DIABETIC POLYNEUROPATHY, WITHOUT LONG-TERM CURRENT USE OF INSULIN: ICD-10-CM

## 2023-10-20 DIAGNOSIS — E78.2 MIXED HYPERLIPIDEMIA: ICD-10-CM

## 2023-10-20 DIAGNOSIS — I70.0 AORTIC ATHEROSCLEROSIS: ICD-10-CM

## 2023-10-20 DIAGNOSIS — I10 PRIMARY HYPERTENSION: Primary | ICD-10-CM

## 2023-10-20 DIAGNOSIS — Z17.0 MALIGNANT NEOPLASM OF LOWER-OUTER QUADRANT OF LEFT BREAST OF FEMALE, ESTROGEN RECEPTOR POSITIVE: ICD-10-CM

## 2023-10-20 PROCEDURE — 3044F PR MOST RECENT HEMOGLOBIN A1C LEVEL <7.0%: ICD-10-PCS | Mod: HCNC,CPTII,S$GLB, | Performed by: INTERNAL MEDICINE

## 2023-10-20 PROCEDURE — 99214 PR OFFICE/OUTPT VISIT, EST, LEVL IV, 30-39 MIN: ICD-10-PCS | Mod: HCNC,S$GLB,, | Performed by: INTERNAL MEDICINE

## 2023-10-20 PROCEDURE — 1101F PT FALLS ASSESS-DOCD LE1/YR: CPT | Mod: HCNC,CPTII,S$GLB, | Performed by: INTERNAL MEDICINE

## 2023-10-20 PROCEDURE — 1159F MED LIST DOCD IN RCRD: CPT | Mod: HCNC,CPTII,S$GLB, | Performed by: INTERNAL MEDICINE

## 2023-10-20 PROCEDURE — 1101F PR PT FALLS ASSESS DOC 0-1 FALLS W/OUT INJ PAST YR: ICD-10-PCS | Mod: HCNC,CPTII,S$GLB, | Performed by: INTERNAL MEDICINE

## 2023-10-20 PROCEDURE — 3288F PR FALLS RISK ASSESSMENT DOCUMENTED: ICD-10-PCS | Mod: HCNC,CPTII,S$GLB, | Performed by: INTERNAL MEDICINE

## 2023-10-20 PROCEDURE — 3072F LOW RISK FOR RETINOPATHY: CPT | Mod: HCNC,CPTII,S$GLB, | Performed by: INTERNAL MEDICINE

## 2023-10-20 PROCEDURE — 1160F PR REVIEW ALL MEDS BY PRESCRIBER/CLIN PHARMACIST DOCUMENTED: ICD-10-PCS | Mod: HCNC,CPTII,S$GLB, | Performed by: INTERNAL MEDICINE

## 2023-10-20 PROCEDURE — 1125F AMNT PAIN NOTED PAIN PRSNT: CPT | Mod: HCNC,CPTII,S$GLB, | Performed by: INTERNAL MEDICINE

## 2023-10-20 PROCEDURE — 3044F HG A1C LEVEL LT 7.0%: CPT | Mod: HCNC,CPTII,S$GLB, | Performed by: INTERNAL MEDICINE

## 2023-10-20 PROCEDURE — 3077F PR MOST RECENT SYSTOLIC BLOOD PRESSURE >= 140 MM HG: ICD-10-PCS | Mod: HCNC,CPTII,S$GLB, | Performed by: INTERNAL MEDICINE

## 2023-10-20 PROCEDURE — 3008F PR BODY MASS INDEX (BMI) DOCUMENTED: ICD-10-PCS | Mod: HCNC,CPTII,S$GLB, | Performed by: INTERNAL MEDICINE

## 2023-10-20 PROCEDURE — 3077F SYST BP >= 140 MM HG: CPT | Mod: HCNC,CPTII,S$GLB, | Performed by: INTERNAL MEDICINE

## 2023-10-20 PROCEDURE — 1159F PR MEDICATION LIST DOCUMENTED IN MEDICAL RECORD: ICD-10-PCS | Mod: HCNC,CPTII,S$GLB, | Performed by: INTERNAL MEDICINE

## 2023-10-20 PROCEDURE — 3078F DIAST BP <80 MM HG: CPT | Mod: HCNC,CPTII,S$GLB, | Performed by: INTERNAL MEDICINE

## 2023-10-20 PROCEDURE — 99999 PR PBB SHADOW E&M-EST. PATIENT-LVL V: CPT | Mod: PBBFAC,HCNC,, | Performed by: INTERNAL MEDICINE

## 2023-10-20 PROCEDURE — 4010F PR ACE/ARB THEARPY RXD/TAKEN: ICD-10-PCS | Mod: HCNC,CPTII,S$GLB, | Performed by: INTERNAL MEDICINE

## 2023-10-20 PROCEDURE — 1160F RVW MEDS BY RX/DR IN RCRD: CPT | Mod: HCNC,CPTII,S$GLB, | Performed by: INTERNAL MEDICINE

## 2023-10-20 PROCEDURE — 99999 PR PBB SHADOW E&M-EST. PATIENT-LVL V: ICD-10-PCS | Mod: PBBFAC,HCNC,, | Performed by: INTERNAL MEDICINE

## 2023-10-20 PROCEDURE — 1125F PR PAIN SEVERITY QUANTIFIED, PAIN PRESENT: ICD-10-PCS | Mod: HCNC,CPTII,S$GLB, | Performed by: INTERNAL MEDICINE

## 2023-10-20 PROCEDURE — 3072F PR LOW RISK FOR RETINOPATHY: ICD-10-PCS | Mod: HCNC,CPTII,S$GLB, | Performed by: INTERNAL MEDICINE

## 2023-10-20 PROCEDURE — 3288F FALL RISK ASSESSMENT DOCD: CPT | Mod: HCNC,CPTII,S$GLB, | Performed by: INTERNAL MEDICINE

## 2023-10-20 PROCEDURE — 3008F BODY MASS INDEX DOCD: CPT | Mod: HCNC,CPTII,S$GLB, | Performed by: INTERNAL MEDICINE

## 2023-10-20 PROCEDURE — 3078F PR MOST RECENT DIASTOLIC BLOOD PRESSURE < 80 MM HG: ICD-10-PCS | Mod: HCNC,CPTII,S$GLB, | Performed by: INTERNAL MEDICINE

## 2023-10-20 PROCEDURE — 99214 OFFICE O/P EST MOD 30 MIN: CPT | Mod: HCNC,S$GLB,, | Performed by: INTERNAL MEDICINE

## 2023-10-20 PROCEDURE — 4010F ACE/ARB THERAPY RXD/TAKEN: CPT | Mod: HCNC,CPTII,S$GLB, | Performed by: INTERNAL MEDICINE

## 2023-10-20 RX ORDER — LOSARTAN POTASSIUM 100 MG/1
100 TABLET ORAL DAILY
Qty: 90 TABLET | Refills: 3 | Status: SHIPPED | OUTPATIENT
Start: 2023-10-20 | End: 2023-12-06 | Stop reason: ALTCHOICE

## 2023-10-20 NOTE — PATIENT INSTRUCTIONS
Limit sodium intake to < 1500mg daily and follow the DASH diet plan.    We discussed that 150 minutes a week of moderate intensity exercise is recommended to improve cardiovascular health.    Increase losartan to 100 mg daily.    Fill out your questionaire on the portal to sign up for the Digital HTN program.

## 2023-10-23 ENCOUNTER — TELEPHONE (OUTPATIENT)
Dept: PAIN MEDICINE | Facility: CLINIC | Age: 73
End: 2023-10-23
Payer: MEDICARE

## 2023-10-23 NOTE — TELEPHONE ENCOUNTER
----- Message from Alberto Ambriz sent at 10/23/2023  3:33 PM CDT -----  Regarding: Return Call    Who Called:  Patient      Who Left Message for Patient:  Kailee      Does the patient know what this is regarding?  About a canceled appointment         Best Call Back Number: [phone]         Additional Information: Patient is returning a call.  Please assist.

## 2023-10-23 NOTE — TELEPHONE ENCOUNTER
Contacted patient regarding her scheduled appointment with Dr. Peguero on tomorrow- He will be in surgery and we were contacting her to reschedule the appointment. There was no answer at [phone]- left detailed voicemail. Appointment has been cancelled- will also send portal message.

## 2023-11-02 DIAGNOSIS — M79.641 BILATERAL HAND PAIN: Primary | ICD-10-CM

## 2023-11-02 DIAGNOSIS — M79.642 BILATERAL HAND PAIN: Primary | ICD-10-CM

## 2023-11-03 ENCOUNTER — PATIENT MESSAGE (OUTPATIENT)
Dept: INTERNAL MEDICINE | Facility: CLINIC | Age: 73
End: 2023-11-03
Payer: MEDICARE

## 2023-11-06 ENCOUNTER — OFFICE VISIT (OUTPATIENT)
Dept: PAIN MEDICINE | Facility: CLINIC | Age: 73
End: 2023-11-06
Payer: MEDICARE

## 2023-11-06 ENCOUNTER — PATIENT MESSAGE (OUTPATIENT)
Dept: ADMINISTRATIVE | Facility: HOSPITAL | Age: 73
End: 2023-11-06
Payer: MEDICARE

## 2023-11-06 VITALS
BODY MASS INDEX: 27.58 KG/M2 | RESPIRATION RATE: 18 BRPM | DIASTOLIC BLOOD PRESSURE: 76 MMHG | WEIGHT: 170.88 LBS | OXYGEN SATURATION: 98 % | HEART RATE: 98 BPM | SYSTOLIC BLOOD PRESSURE: 129 MMHG | TEMPERATURE: 98 F

## 2023-11-06 DIAGNOSIS — M50.30 DDD (DEGENERATIVE DISC DISEASE), CERVICAL: ICD-10-CM

## 2023-11-06 DIAGNOSIS — M54.2 CERVICALGIA: ICD-10-CM

## 2023-11-06 DIAGNOSIS — M54.12 CERVICAL RADICULOPATHY: Primary | ICD-10-CM

## 2023-11-06 PROCEDURE — 3078F PR MOST RECENT DIASTOLIC BLOOD PRESSURE < 80 MM HG: ICD-10-PCS | Mod: HCNC,CPTII,S$GLB, | Performed by: STUDENT IN AN ORGANIZED HEALTH CARE EDUCATION/TRAINING PROGRAM

## 2023-11-06 PROCEDURE — 1101F PT FALLS ASSESS-DOCD LE1/YR: CPT | Mod: HCNC,CPTII,S$GLB, | Performed by: STUDENT IN AN ORGANIZED HEALTH CARE EDUCATION/TRAINING PROGRAM

## 2023-11-06 PROCEDURE — 3074F SYST BP LT 130 MM HG: CPT | Mod: HCNC,CPTII,S$GLB, | Performed by: STUDENT IN AN ORGANIZED HEALTH CARE EDUCATION/TRAINING PROGRAM

## 2023-11-06 PROCEDURE — 1160F PR REVIEW ALL MEDS BY PRESCRIBER/CLIN PHARMACIST DOCUMENTED: ICD-10-PCS | Mod: HCNC,CPTII,S$GLB, | Performed by: STUDENT IN AN ORGANIZED HEALTH CARE EDUCATION/TRAINING PROGRAM

## 2023-11-06 PROCEDURE — 3078F DIAST BP <80 MM HG: CPT | Mod: HCNC,CPTII,S$GLB, | Performed by: STUDENT IN AN ORGANIZED HEALTH CARE EDUCATION/TRAINING PROGRAM

## 2023-11-06 PROCEDURE — 1101F PR PT FALLS ASSESS DOC 0-1 FALLS W/OUT INJ PAST YR: ICD-10-PCS | Mod: HCNC,CPTII,S$GLB, | Performed by: STUDENT IN AN ORGANIZED HEALTH CARE EDUCATION/TRAINING PROGRAM

## 2023-11-06 PROCEDURE — 99204 PR OFFICE/OUTPT VISIT, NEW, LEVL IV, 45-59 MIN: ICD-10-PCS | Mod: HCNC,S$GLB,, | Performed by: STUDENT IN AN ORGANIZED HEALTH CARE EDUCATION/TRAINING PROGRAM

## 2023-11-06 PROCEDURE — 99999 PR PBB SHADOW E&M-EST. PATIENT-LVL V: CPT | Mod: PBBFAC,HCNC,, | Performed by: STUDENT IN AN ORGANIZED HEALTH CARE EDUCATION/TRAINING PROGRAM

## 2023-11-06 PROCEDURE — 99999 PR PBB SHADOW E&M-EST. PATIENT-LVL V: ICD-10-PCS | Mod: PBBFAC,HCNC,, | Performed by: STUDENT IN AN ORGANIZED HEALTH CARE EDUCATION/TRAINING PROGRAM

## 2023-11-06 PROCEDURE — 3044F PR MOST RECENT HEMOGLOBIN A1C LEVEL <7.0%: ICD-10-PCS | Mod: HCNC,CPTII,S$GLB, | Performed by: STUDENT IN AN ORGANIZED HEALTH CARE EDUCATION/TRAINING PROGRAM

## 2023-11-06 PROCEDURE — 4010F ACE/ARB THERAPY RXD/TAKEN: CPT | Mod: HCNC,CPTII,S$GLB, | Performed by: STUDENT IN AN ORGANIZED HEALTH CARE EDUCATION/TRAINING PROGRAM

## 2023-11-06 PROCEDURE — 3072F LOW RISK FOR RETINOPATHY: CPT | Mod: HCNC,CPTII,S$GLB, | Performed by: STUDENT IN AN ORGANIZED HEALTH CARE EDUCATION/TRAINING PROGRAM

## 2023-11-06 PROCEDURE — 1160F RVW MEDS BY RX/DR IN RCRD: CPT | Mod: HCNC,CPTII,S$GLB, | Performed by: STUDENT IN AN ORGANIZED HEALTH CARE EDUCATION/TRAINING PROGRAM

## 2023-11-06 PROCEDURE — 1159F MED LIST DOCD IN RCRD: CPT | Mod: HCNC,CPTII,S$GLB, | Performed by: STUDENT IN AN ORGANIZED HEALTH CARE EDUCATION/TRAINING PROGRAM

## 2023-11-06 PROCEDURE — 99204 OFFICE O/P NEW MOD 45 MIN: CPT | Mod: HCNC,S$GLB,, | Performed by: STUDENT IN AN ORGANIZED HEALTH CARE EDUCATION/TRAINING PROGRAM

## 2023-11-06 PROCEDURE — 3074F PR MOST RECENT SYSTOLIC BLOOD PRESSURE < 130 MM HG: ICD-10-PCS | Mod: HCNC,CPTII,S$GLB, | Performed by: STUDENT IN AN ORGANIZED HEALTH CARE EDUCATION/TRAINING PROGRAM

## 2023-11-06 PROCEDURE — 4010F PR ACE/ARB THEARPY RXD/TAKEN: ICD-10-PCS | Mod: HCNC,CPTII,S$GLB, | Performed by: STUDENT IN AN ORGANIZED HEALTH CARE EDUCATION/TRAINING PROGRAM

## 2023-11-06 PROCEDURE — 1125F AMNT PAIN NOTED PAIN PRSNT: CPT | Mod: HCNC,CPTII,S$GLB, | Performed by: STUDENT IN AN ORGANIZED HEALTH CARE EDUCATION/TRAINING PROGRAM

## 2023-11-06 PROCEDURE — 3044F HG A1C LEVEL LT 7.0%: CPT | Mod: HCNC,CPTII,S$GLB, | Performed by: STUDENT IN AN ORGANIZED HEALTH CARE EDUCATION/TRAINING PROGRAM

## 2023-11-06 PROCEDURE — 3288F FALL RISK ASSESSMENT DOCD: CPT | Mod: HCNC,CPTII,S$GLB, | Performed by: STUDENT IN AN ORGANIZED HEALTH CARE EDUCATION/TRAINING PROGRAM

## 2023-11-06 PROCEDURE — 1125F PR PAIN SEVERITY QUANTIFIED, PAIN PRESENT: ICD-10-PCS | Mod: HCNC,CPTII,S$GLB, | Performed by: STUDENT IN AN ORGANIZED HEALTH CARE EDUCATION/TRAINING PROGRAM

## 2023-11-06 PROCEDURE — 3008F PR BODY MASS INDEX (BMI) DOCUMENTED: ICD-10-PCS | Mod: HCNC,CPTII,S$GLB, | Performed by: STUDENT IN AN ORGANIZED HEALTH CARE EDUCATION/TRAINING PROGRAM

## 2023-11-06 PROCEDURE — 1159F PR MEDICATION LIST DOCUMENTED IN MEDICAL RECORD: ICD-10-PCS | Mod: HCNC,CPTII,S$GLB, | Performed by: STUDENT IN AN ORGANIZED HEALTH CARE EDUCATION/TRAINING PROGRAM

## 2023-11-06 PROCEDURE — 3288F PR FALLS RISK ASSESSMENT DOCUMENTED: ICD-10-PCS | Mod: HCNC,CPTII,S$GLB, | Performed by: STUDENT IN AN ORGANIZED HEALTH CARE EDUCATION/TRAINING PROGRAM

## 2023-11-06 PROCEDURE — 3008F BODY MASS INDEX DOCD: CPT | Mod: HCNC,CPTII,S$GLB, | Performed by: STUDENT IN AN ORGANIZED HEALTH CARE EDUCATION/TRAINING PROGRAM

## 2023-11-06 PROCEDURE — 3072F PR LOW RISK FOR RETINOPATHY: ICD-10-PCS | Mod: HCNC,CPTII,S$GLB, | Performed by: STUDENT IN AN ORGANIZED HEALTH CARE EDUCATION/TRAINING PROGRAM

## 2023-11-06 RX ORDER — CYCLOBENZAPRINE HCL 5 MG
5 TABLET ORAL 3 TIMES DAILY PRN
Qty: 90 TABLET | Refills: 2 | Status: SHIPPED | OUTPATIENT
Start: 2023-11-06

## 2023-11-06 NOTE — PROGRESS NOTES
Chronic Pain - New Consult    Referring Physician:     Chief Complaint:   Chief Complaint   Patient presents with    Back Pain    Mid-back Pain    Shoulder Pain    Arm Pain    Hand Pain     Thumbs hurt really bad.        SUBJECTIVE:    Lima Maldonado presents to the clinic for the evaluation of neck, left shoulder, and left hand pain. She also has right shoulder pain. The pain started 2-3 years ago following no inciting event and symptoms have been worsening in the past few months.The pain is located in the neck area and radiates to the left arm/hand.  The pain is described as aching and sharp and is rated as 5/10. The pain is rated with a score of  1/10 on the BEST day and a score of 9/10 on the WORST day.  Symptoms interfere with daily activity and sleeping. The pain is exacerbated by lying down with neck in a certain position.  The pain is mitigated by heat, and, massages medications. The patient reports 4 hours of uninterrupted sleep per night.  She admits to using a CPAP machine.    Patient denies urinary incontinence and bowel incontinence.  She admits to difficulty with buttoning buttons related to neuropathy.  She occasionally drops objects.    Physical Therapy/Home Exercise: not for neck      Pain Disability Index Review:      11/6/2023     1:57 PM   Last 3 PDI Scores   Pain Disability Index (PDI) 44       Pain Medications:   - gabapentin 600mg TID (taking for 5 - 6 years for foot/ankle pain)  - Tylenol  - ibuprofen (makes her stomach upset)  - she has some hydrocodone from past surgeries (over 5 years ago)     report:  Reviewed and consistent with medication use as prescribed.    Pain Procedures:   Had lower back injection over 30 years ago.    Imaging:   MRI CERVICAL SPINE WITHOUT CONTRAST     CLINICAL HISTORY:  Neck pain, chronic;Spinal stenosis, cervical; Cervicalgia     TECHNIQUE:  Multiplanar, multisequence MR images of the cervical spine were performed without the administration of contrast.      COMPARISON:  Same day cervical spine radiograph     FINDINGS:  C1-C2: Dens is intact.  Pre dens space is maintained.     Alignment: Straightening of the normal cervical lordosis with 2 mm of retrolisthesis of C3 on C4.     Vertebrae: No acute fracture.  No marrow signal abnormality to suggest an infiltrative process.     Discs: Mild-to-moderate degenerative disc space narrowing at C5-C6, C6-C7 and C7-T1.     Cord: Normal.     Skull base and craniocervical junction: Normal.     Degenerative findings:     C2-C3: No spinal canal stenosis.  No neural foraminal narrowing.     C3-C4: Broad-based posterior disc osteophyte complex effaces the ventral thecal sac.  Bilateral uncovertebral joint spurring.  Mild-to-moderate spinal canal stenosis.  Mild bilateral neural foraminal narrowing.     C4-C5: Broad-based posterior disc osteophyte complex partially effaces the ventral thecal sac.  Left uncovertebral joint spurring.  Mild spinal canal stenosis.  No neural foraminal narrowing.     C5-C6: Central/left subarticular disc osteophyte protrusion effaces the ventral thecal sac and compresses the spinal cord.  Bilateral uncovertebral joint spurring.  Moderate spinal canal and lateral recess stenosis.  Moderate to severe right and mild left neural foraminal narrowing.     C6-C7: Central/left subarticular disc osteophyte protrusion effaces the ventral thecal sac and left lateral recess.  Left uncovertebral joint spurring.  Mild spinal canal and severe left lateral recess stenosis with impingement of the left exiting C7 nerve roots.  Severe left neural foraminal narrowing.     C7-T1: Left facet arthropathy.  No spinal canal stenosis.  Mild left neural foraminal narrowing.     T1-T2: Posterior central disc osteophyte complex partially effaces the ventral thecal sac.  No spinal canal stenosis.  No neural foraminal narrowing.     Paraspinal muscles & soft tissues: Mucosal membrane thickening within the sphenoid sinuses.      Impression:     1. Cervical spondylosis as detailed above.  Moderate spinal canal stenosis at C5-C6.  Mild spinal canal and severe left lateral recess stenosis at C6-C7 with impingement of the left exiting C7 nerve roots.  Multilevel neural foraminal narrowing.     Electronically signed by resident: Cassie Salinas  Date:                                            10/04/2023  Time:                                           12:55     Electronically signed by: Addy Laughlin MD  Date:                                            10/04/2023  Time:                                           14:00    XR SHOULDER COMPLETE 2 OR MORE VIEWS LEFT     CLINICAL HISTORY:  Pain in left shoulder     TECHNIQUE:  Two or three views of the left shoulder were performed.     COMPARISON:  10/04/2023     FINDINGS:  Postop change mastectomy with numerous right surgical clips anterior chest wall stable.  Minimal mild DJD glenohumeral joint and AC joint, inferolateral spurring involves a chromium with encroachment sub acromial space.  Two vague calcific densities overlie greater tuberosity region on frontal view.     Impression:     No new fracture dislocation.  Additional findings above.        Electronically signed by: Brandon Linn MD  Date:                                            10/09/2023  Time:                                           15:58    XR CERVICAL SPINE AP LATERAL W/ FLEX EXT AND SWIMMERS FLEX EXT WITHOU     CLINICAL HISTORY:  Cervicalgia     TECHNIQUE:  Cervical spine four views     COMPARISON:  None     FINDINGS:  Mild-to-moderate DJD.  The disc spaces are narrowed between C5 and the T1 vertebral segments.  There is a 2 mm C3/C4 retrolisthesis.  No fracture or dislocation.  No bone destruction identified.     Impression:     See above        Electronically signed by: Francisco Mccarty MD  Date:                                            10/04/2023  Time:                                           12:24    Past Medical History:    Diagnosis Date    Acute cystitis without hematuria 7/17/2017    Arthritis     Back pain     Breast cancer     originally dx in 02/1997- treated with surgery, chemo and radiation     Class 1 obesity due to excess calories with serious comorbidity and body mass index (BMI) of 30.0 to 30.9 in adult 11/29/2018    Elevated bilirubin 11/19/2013    Fatty liver     GERD (gastroesophageal reflux disease)     Glucose intolerance (impaired glucose tolerance)     Hyperlipidemia     Hypertension     Hypothyroid     Hypothyroidism     hypothyroidism    Malignant neoplasm of lower-outer quadrant of left female breast 11/15/2016    Obesity     Obesity, diabetes, and hypertension syndrome 12/12/2018    Osteopenia     Osteoporosis     Primary insomnia 11/2/2016    Shingles     Sleep apnea     wears CPAP nightly     Squamous cell carcinoma 2011    right forearm, sx by Dr. Corinne Ray    Stress incontinence of urine 1/11/2018    Trouble in sleeping     Type 2 diabetes mellitus with diabetic polyneuropathy, without long-term current use of insulin 12/26/2017    Urinary incontinence     Well woman exam with routine gynecological exam 11/2/2016     Past Surgical History:   Procedure Laterality Date    APPENDECTOMY  2008    removed during hysterectomy surgery     BLEPHAROPLASTY Bilateral     BREAST BIOPSY      2010    Breast implant Bilateral 1998    implants removed in 2003    BREAST LUMPECTOMY      02/1997    BREAST RECONSTRUCTION Bilateral     02/2017    BREAST SURGERY      see details below     CATARACT EXTRACTION W/  INTRAOCULAR LENS IMPLANT Left 08/13/2020    Procedure: EXTRACTION, CATARACT, WITH IOL INSERTION;  Surgeon: Ivanna Coleman MD;  Location: [facility] OR;  Service: Ophthalmology;  Laterality: Left;    CATARACT EXTRACTION W/  INTRAOCULAR LENS IMPLANT Right 08/27/2020    Procedure: EXTRACTION, CATARACT, WITH IOL INSERTION LASER;  Surgeon: Ivanna Coleman MD;  Location: [facility] OR;  Service: Ophthalmology;  Laterality: Right;     COLONOSCOPY N/A 05/15/2018    Procedure: COLONOSCOPY;  Surgeon: Roldan Gonzales MD;  Location: [facility] ENDO (4TH FLR);  Service: Endoscopy;  Laterality: N/A;    COSMETIC SURGERY  2022    Eyelift    ENDOSCOPIC ULTRASOUND OF UPPER GASTROINTESTINAL TRACT N/A 10/11/2023    Procedure: ULTRASOUND, UPPER GI TRACT, ENDOSCOPIC;  Surgeon: Hermelindo Coleman MD;  Location: [facility] ENDO (2ND FLR);  Service: Endoscopy;  Laterality: N/A;  instr portal-ozempic or trulicity-tb  10/5-precall complete/pt verbalized understanding of holding Ozempic-MS    ESOPHAGOGASTRODUODENOSCOPY N/A 2022    Procedure: EGD (ESOPHAGOGASTRODUODENOSCOPY);  Surgeon: Omar Ambriz MD;  Location: [facility] ENDO (4TH FLR);  Service: Endoscopy;  Laterality: N/A;  instructions via portal - sm    EYE SURGERY      CATERACTS    HYSTERECTOMY      benign    left breast reconstruction      left modified radical mastectomy  1997    for treatment of breast cancer     LIPOMA RESECTION      removed from upper back area     MODIFIED RADICAL MASTECTOMY W/ AXILLARY LYMPH NODE DISSECTION  1997    OOPHORECTOMY          PELVIC LAPAROSCOPY      pt had endometriosis     CT REMOVAL OF OVARY/TUBE(S)      benign    reduction of mons pubis      robotic lap  hysterectomy with bso      UPPER GASTROINTESTINAL ENDOSCOPY  2013     Social History     Socioeconomic History    Marital status:    Tobacco Use    Smoking status: Former     Current packs/day: 0.00     Average packs/day: 0.5 packs/day for 3.0 years (1.5 ttl pk-yrs)     Types: Cigarettes     Start date: 4/15/1969     Quit date: 4/15/1972     Years since quittin.5    Smokeless tobacco: Never    Tobacco comments:     started at age 18 during college    Substance and Sexual Activity    Alcohol use: Yes     Comment: Rarely, not even 1X per week    Drug use: No    Sexual activity: Yes     Partners: Male     Birth control/protection: Post-menopausal, See Surgical Hx     Social  Determinants of Health     Financial Resource Strain: Low Risk  (10/20/2023)    Overall Financial Resource Strain (CARDIA)     Difficulty of Paying Living Expenses: Not hard at all   Food Insecurity: No Food Insecurity (10/20/2023)    Hunger Vital Sign     Worried About Running Out of Food in the Last Year: Never true     Ran Out of Food in the Last Year: Never true   Transportation Needs: No Transportation Needs (10/20/2023)    PRAPARE - Transportation     Lack of Transportation (Medical): No     Lack of Transportation (Non-Medical): No   Physical Activity: Inactive (10/20/2023)    Exercise Vital Sign     Days of Exercise per Week: 0 days     Minutes of Exercise per Session: 0 min   Stress: Stress Concern Present (10/20/2023)    Citizen of [place] of Occupational Health - Occupational Stress Questionnaire     Feeling of Stress : To some extent   Social Connections: Socially Integrated (10/20/2023)    Social Connection and Isolation Panel [NHANES]     Frequency of Communication with Friends and Family: More than three times a week     Frequency of Social Gatherings with Friends and Family: Twice a week     Attends Voodoo Services: 1 to 4 times per year     Active Member of Clubs or Organizations: Yes     Attends Club or Organization Meetings: More than 4 times per year     Marital Status:    Housing Stability: Low Risk  (10/20/2023)    Housing Stability Vital Sign     Unable to Pay for Housing in the Last Year: No     Number of Places Lived in the Last Year: 1     Unstable Housing in the Last Year: No     Family History   Problem Relation Age of Onset    Heart attack Father 51    Heart disease Father         Heart [place], age 51    Alcohol abuse Father     Breast cancer Sister 51        BRCA 1/2 negative    Cancer Sister         Breast Cancer    Cancer Mother 65        Malignant melanoma    Melanoma Mother     No Known Problems Brother     No Known Problems Daughter     No Known Problems Son     No Known  Problems Daughter     No Known Problems Son     Diabetes Maternal Uncle             Diabetes Maternal Uncle             Arthritis Maternal Grandmother         Severe arthitis in Knees    Uterine cancer Neg Hx     Colon cancer Neg Hx     Celiac disease Neg Hx     Esophageal cancer Neg Hx     Inflammatory bowel disease Neg Hx     Liver cancer Neg Hx     Liver disease Neg Hx     Stomach cancer Neg Hx     Ulcerative colitis Neg Hx     Hypertension Neg Hx     Cirrhosis Neg Hx     Crohn's disease Neg Hx     Rectal cancer Neg Hx     Ovarian cancer Neg Hx        Review of patient's allergies indicates:   Allergen Reactions    Erythromycin      Other reaction(s): Nausea    Erythromycin (bulk)      Other reaction(s): Nausea    Oxycodone Nausea And Vomiting    Oxycodone-acetaminophen Nausea And Vomiting     Other reaction(s): Nausea       Current Outpatient Medications   Medication Sig    ascorbic acid, vitamin C, (VITAMIN C) 1000 MG tablet Take 1 tablet by mouth once daily.    aspirin (ECOTRIN) 81 MG EC tablet Take 81 mg by mouth once daily.    calcium-vitamin D tablet 600 mg-200 units     CANNABIDIOL, CBD, EXTRACT ORAL Take 1 drop by mouth Daily.    coenzyme Q10-vitamin E 100-100 mg-unit Cap Take 1 capsule by mouth once daily.     estradioL (YUVAFEM) 10 mcg Tab PLACE 1 TABLET VAGINALLY TWICE A WEEK    fenofibrate 160 MG Tab TAKE 1 TABLET BY MOUTH EVERY DAY    fluticasone (FLONASE) 50 mcg/actuation nasal spray 1 spray by Each Nare route 2 (two) times daily.     gabapentin (NEURONTIN) 300 MG capsule TAKE 2 CAPSULES BY MOUTH EVERY EVENING.    letrozole (FEMARA) 2.5 mg Tab TAKE 1 TABLET BY MOUTH EVERY DAY    levothyroxine (SYNTHROID) 50 MCG tablet TAKE 1 TABLET BY MOUTH EVERY DAY    losartan (COZAAR) 100 MG tablet Take 1 tablet (100 mg total) by mouth once daily.    metFORMIN (GLUCOPHAGE) 500 MG tablet TAKE 1 TABLET BY MOUTH EVERY DAY WITH BREAKFAST (Patient taking differently: 2 (two) times a day.)     MULTIVITAMIN ORAL Take 1 tablet by mouth once daily.     omega-3 fatty acids-vitamin E 1,000 mg Cap Take 1 capsule by mouth once daily.     OZEMPIC 0.25 mg or 0.5 mg (2 mg/3 mL) pen injector INJECT 0.5 MG INTO THE SKIN EVERY 7 DAYS.    pantoprazole (PROTONIX) 20 MG tablet Take 1 tablet (20 mg total) by mouth before breakfast.    simvastatin (ZOCOR) 20 MG tablet TAKE 1 TABLET BY MOUTH EVERY DAY IN THE EVENING    sucralfate (CARAFATE) 100 mg/mL suspension Take 10 mLs (1 g total) by mouth 4 (four) times daily.    vitamin D 1000 units Tab Take 1,000 Units by mouth once daily.     cyclobenzaprine (FLEXERIL) 5 MG tablet Take 1 tablet (5 mg total) by mouth 3 (three) times daily as needed for Muscle spasms (muscular pain).    denosumab (PROLIA) 60 mg/mL Syrg Inject 1 mL (60 mg total) into the skin every 6 (six) months.    tobramycin sulfate 0.3% (TOBREX) 0.3 % ophthalmic solution Apply to wound beds twice daily (Patient not taking: Reported on 10/20/2023)     No current facility-administered medications for this visit.       REVIEW OF SYSTEMS:    GENERAL:  No weight loss, malaise or fevers.  HEENT:  Negative for frequent or significant headaches.  NECK:  Negative for lumps, goiter, and significant neck swelling. +neck pain  RESPIRATORY:  Negative for cough, wheezing or shortness of breath.  CARDIOVASCULAR:  Negative for chest pain, leg swelling or palpitations.  GI:  Negative for abdominal discomfort, blood in stools or black stools or change in bowel habits.  MUSCULOSKELETAL:  See HPI.  SKIN:  Negative for lesions, rash, and itching.  PSYCH:  Negative for sleep disturbance, mood disorder and recent psychosocial stressors.  HEMATOLOGY/LYMPHOLOGY:  Negative for prolonged bleeding, bruising easily or swollen nodes. +ASA 81mg  NEURO:   No history of headaches, syncope, paralysis, seizures or tremors.  All other reviewed and negative other than HPI.    OBJECTIVE:    /76   Pulse 98   Temp 97.5 °F (36.4 °C)   Resp 18    Wt 77.5 kg (170 lb 13.7 oz)   SpO2 98%   BMI 27.58 kg/m²     PHYSICAL EXAMINATION:  General appearance: Well appearing, in no acute distress, alert and appropriately communicative.  Psych:  Mood and affect appropriate.  Skin: Skin color, texture, turgor normal, no rashes or lesions, in both upper and lower body.  Head/face:  Atraumatic, normocephalic.  Neck: Slight pain to palpation over the cervical paraspinous muscles. Spurling Negative. pain with neck flexion, extension, or lateral flexion. .  Cor: regular rate  Pulm: non-labored breathing  GI: Abdomen non-distended and non-tender.  Extremities: Peripheral joint ROM is full and pain free without obvious instability or laxity in all four extremities. No deformities, edema, or skin discoloration. Good capillary refill.  Musculoskeletal:  Shoulder provocative maneuvers are negative with the exception of pain with movement of left arm behind back. Bilateral upper and lower extremity strength is normal and symmetric.  No atrophy or tone abnormalities are noted.  Neuro: Bilateral upper and lower extremity coordination and muscle stretch reflexes are physiologic and symmetric.  Negative Clonus. No loss of sensation is noted.  Gait: Normal.    ASSESSMENT: 73 y.o. year old female with neck and left > right shoulder pain, consistent with:     1. Cervical radiculopathy  cyclobenzaprine (FLEXERIL) 5 MG tablet    Ambulatory referral/consult to Physical/Occupational Therapy      2. Cervicalgia  Ambulatory referral/consult to Pain Clinic    cyclobenzaprine (FLEXERIL) 5 MG tablet    Ambulatory referral/consult to Physical/Occupational Therapy      3. DDD (degenerative disc disease), cervical  cyclobenzaprine (FLEXERIL) 5 MG tablet        IMPRESSION: Ms. Maldonado presents with neck, left shoulder, and left arm/hand pain which she has had for a few years.  She has tried some medication management for this in the past with limited relief in her pain. History and physical exam are  consistent with cervical facetogenic pain and cervical radiculopathy.  Imaging is consistent with cervical degenerative disc disease. As she has not exhausted conservative measures, we will start with this and proceed to interventions if her pain does not improve.    PLAN:   - I have stressed the importance of physical activity and a home exercise plan to help with pain and improve health.  - Patient can continue with medications for now since they are providing benefits, using them appropriately, and without side effects.  - start flexeril 5mg up to three times a day for muscular pain  - ok to increase to gabapentin 300mg to 3 times a day.  - she is scheduled to see hand doctor for the left hand  - RTC 2 - 3 months to discuss interventions as indicated (plan for C7/T1 epidural injection)  - Counseled patient regarding the importance of activity modification and physical therapy.    The above plan and management options were discussed at length with patient. Patient is in agreement with the above and verbalized understanding. It will be communicated with the referring physician via electronic record, fax, or mail.    Wayne Singh  11/06/2023

## 2023-11-07 ENCOUNTER — TELEPHONE (OUTPATIENT)
Dept: INTERNAL MEDICINE | Facility: CLINIC | Age: 73
End: 2023-11-07
Payer: MEDICARE

## 2023-11-07 VITALS — DIASTOLIC BLOOD PRESSURE: 67 MMHG | SYSTOLIC BLOOD PRESSURE: 133 MMHG

## 2023-11-07 RX ORDER — METFORMIN HYDROCHLORIDE 500 MG/1
500 TABLET ORAL 2 TIMES DAILY WITH MEALS
Qty: 90 TABLET | Refills: 1 | Status: SHIPPED | OUTPATIENT
Start: 2023-11-07 | End: 2024-01-28

## 2023-11-07 NOTE — TELEPHONE ENCOUNTER
Pt reports bp taken on 11/06/23 at 133/67.    Lobito Florentino  Panel Care Coordinator  [facility]  P: [phone]  F: [phone]

## 2023-11-07 NOTE — TELEPHONE ENCOUNTER
Care Due:                  Date            Visit Type   Department     Provider  --------------------------------------------------------------------------------                                EP -                              [facility] INTERNAL  [facility] [place]  Last Visit: 08-      CARE ([facility])   MEDICINE       Stefany                              [facility]  Next Visit: 12-      CARE ([facility])   MEDICINE       [facility]                                                            Last  Test          Frequency    Reason                     Performed    Due Date  --------------------------------------------------------------------------------    CBC.........  12 months..  fenofibrate..............  10-   10-    HBA1C.......  6 months...  OZEMPIC, metFORMIN.......  07- 01-    Lipid Panel.  12 months..  fenofibrate..............  10-   10-    [facility] Embedded Care Due Messages. Reference number: [id number].   11/07/2023 8:46:16 AM CST

## 2023-11-08 ENCOUNTER — OFFICE VISIT (OUTPATIENT)
Dept: ORTHOPEDICS | Facility: CLINIC | Age: 73
End: 2023-11-08
Payer: MEDICARE

## 2023-11-08 ENCOUNTER — HOSPITAL ENCOUNTER (OUTPATIENT)
Dept: RADIOLOGY | Facility: OTHER | Age: 73
Discharge: HOME | End: 2023-11-08
Attending: ORTHOPAEDIC SURGERY
Payer: MEDICARE

## 2023-11-08 DIAGNOSIS — M18.12 PRIMARY OSTEOARTHRITIS OF FIRST CARPOMETACARPAL JOINT OF LEFT HAND: Primary | ICD-10-CM

## 2023-11-08 DIAGNOSIS — M79.642 BILATERAL HAND PAIN: ICD-10-CM

## 2023-11-08 DIAGNOSIS — M19.041 PRIMARY OSTEOARTHRITIS OF BOTH HANDS: ICD-10-CM

## 2023-11-08 DIAGNOSIS — M79.642 CHRONIC PAIN OF LEFT HAND: ICD-10-CM

## 2023-11-08 DIAGNOSIS — M19.042 PRIMARY OSTEOARTHRITIS OF BOTH HANDS: ICD-10-CM

## 2023-11-08 DIAGNOSIS — M79.641 BILATERAL HAND PAIN: ICD-10-CM

## 2023-11-08 DIAGNOSIS — G89.29 CHRONIC PAIN OF LEFT HAND: ICD-10-CM

## 2023-11-08 PROCEDURE — 3072F PR LOW RISK FOR RETINOPATHY: ICD-10-PCS | Mod: CPTII,S$GLB,, | Performed by: ORTHOPAEDIC SURGERY

## 2023-11-08 PROCEDURE — 1159F MED LIST DOCD IN RCRD: CPT | Mod: CPTII,S$GLB,, | Performed by: ORTHOPAEDIC SURGERY

## 2023-11-08 PROCEDURE — 1125F AMNT PAIN NOTED PAIN PRSNT: CPT | Mod: CPTII,S$GLB,, | Performed by: ORTHOPAEDIC SURGERY

## 2023-11-08 PROCEDURE — 3288F FALL RISK ASSESSMENT DOCD: CPT | Mod: CPTII,S$GLB,, | Performed by: ORTHOPAEDIC SURGERY

## 2023-11-08 PROCEDURE — 1125F PR PAIN SEVERITY QUANTIFIED, PAIN PRESENT: ICD-10-PCS | Mod: CPTII,S$GLB,, | Performed by: ORTHOPAEDIC SURGERY

## 2023-11-08 PROCEDURE — 1101F PR PT FALLS ASSESS DOC 0-1 FALLS W/OUT INJ PAST YR: ICD-10-PCS | Mod: CPTII,S$GLB,, | Performed by: ORTHOPAEDIC SURGERY

## 2023-11-08 PROCEDURE — 99204 OFFICE O/P NEW MOD 45 MIN: CPT | Mod: S$GLB,,, | Performed by: ORTHOPAEDIC SURGERY

## 2023-11-08 PROCEDURE — 3066F PR DOCUMENTATION OF TREATMENT FOR NEPHROPATHY: ICD-10-PCS | Mod: CPTII,S$GLB,, | Performed by: ORTHOPAEDIC SURGERY

## 2023-11-08 PROCEDURE — 3066F NEPHROPATHY DOC TX: CPT | Mod: CPTII,S$GLB,, | Performed by: ORTHOPAEDIC SURGERY

## 2023-11-08 PROCEDURE — 1160F RVW MEDS BY RX/DR IN RCRD: CPT | Mod: CPTII,S$GLB,, | Performed by: ORTHOPAEDIC SURGERY

## 2023-11-08 PROCEDURE — 73130 X-RAY EXAM OF HAND: CPT | Mod: TC,50,HCNC,FY

## 2023-11-08 PROCEDURE — 73130 XR HAND COMPLETE 3 VIEWS BILATERAL: ICD-10-PCS | Mod: 26,50,HCNC, | Performed by: RADIOLOGY

## 2023-11-08 PROCEDURE — 4010F PR ACE/ARB THEARPY RXD/TAKEN: ICD-10-PCS | Mod: CPTII,S$GLB,, | Performed by: ORTHOPAEDIC SURGERY

## 2023-11-08 PROCEDURE — 3072F LOW RISK FOR RETINOPATHY: CPT | Mod: CPTII,S$GLB,, | Performed by: ORTHOPAEDIC SURGERY

## 2023-11-08 PROCEDURE — 1159F PR MEDICATION LIST DOCUMENTED IN MEDICAL RECORD: ICD-10-PCS | Mod: CPTII,S$GLB,, | Performed by: ORTHOPAEDIC SURGERY

## 2023-11-08 PROCEDURE — 3044F PR MOST RECENT HEMOGLOBIN A1C LEVEL <7.0%: ICD-10-PCS | Mod: CPTII,S$GLB,, | Performed by: ORTHOPAEDIC SURGERY

## 2023-11-08 PROCEDURE — 1160F PR REVIEW ALL MEDS BY PRESCRIBER/CLIN PHARMACIST DOCUMENTED: ICD-10-PCS | Mod: CPTII,S$GLB,, | Performed by: ORTHOPAEDIC SURGERY

## 2023-11-08 PROCEDURE — 3288F PR FALLS RISK ASSESSMENT DOCUMENTED: ICD-10-PCS | Mod: CPTII,S$GLB,, | Performed by: ORTHOPAEDIC SURGERY

## 2023-11-08 PROCEDURE — 3061F PR NEG MICROALBUMINURIA RESULT DOCUMENTED/REVIEW: ICD-10-PCS | Mod: CPTII,S$GLB,, | Performed by: ORTHOPAEDIC SURGERY

## 2023-11-08 PROCEDURE — 1101F PT FALLS ASSESS-DOCD LE1/YR: CPT | Mod: CPTII,S$GLB,, | Performed by: ORTHOPAEDIC SURGERY

## 2023-11-08 PROCEDURE — 4010F ACE/ARB THERAPY RXD/TAKEN: CPT | Mod: CPTII,S$GLB,, | Performed by: ORTHOPAEDIC SURGERY

## 2023-11-08 PROCEDURE — 99204 PR OFFICE/OUTPT VISIT, NEW, LEVL IV, 45-59 MIN: ICD-10-PCS | Mod: S$GLB,,, | Performed by: ORTHOPAEDIC SURGERY

## 2023-11-08 PROCEDURE — 99999 PR PBB SHADOW E&M-EST. PATIENT-LVL III: CPT | Mod: PBBFAC,,, | Performed by: ORTHOPAEDIC SURGERY

## 2023-11-08 PROCEDURE — 99999 PR PBB SHADOW E&M-EST. PATIENT-LVL III: ICD-10-PCS | Mod: PBBFAC,,, | Performed by: ORTHOPAEDIC SURGERY

## 2023-11-08 PROCEDURE — 3044F HG A1C LEVEL LT 7.0%: CPT | Mod: CPTII,S$GLB,, | Performed by: ORTHOPAEDIC SURGERY

## 2023-11-08 PROCEDURE — 3061F NEG MICROALBUMINURIA REV: CPT | Mod: CPTII,S$GLB,, | Performed by: ORTHOPAEDIC SURGERY

## 2023-11-08 PROCEDURE — 73130 X-RAY EXAM OF HAND: CPT | Mod: 26,50,HCNC, | Performed by: RADIOLOGY

## 2023-11-08 NOTE — PROGRESS NOTES
[facility]  History & Physical  Orthopedics    SUBJECTIVE:      Chief Complaint   Patient presents with    Right Hand - Pain, Swelling    Left Hand - Pain, Swelling       Referring Provider: Anuradha Smith MD     History of Present Illness:  Patient is a 73 y.o. left hand dominant female who presents today with complaints of left thumb pain.  The patient reports that she has had left thumb pain at the base of her thumb for the last several years.  Patient reports that it has gotten worse over the last several months.  The patient states that it is worse in the middle of the day when she is doing activities and lifting grasping things.  She states that she has some weakness and has been careful when performing her daily activities it is not dropped anything.  Patient states that she paints regularly cooks and cleans around the house.  She has some arthritic changes that she is noted in her hands and has taken some anti-inflammatories and Tylenol in the past with minimal improvement.  Patient states that she is tried Voltaren gel while back but does not remember if it was effective or not.  She denies ever having a brace.  She endorses some fingertip numbness associated with chemotherapy side-effects from breast cancer.      The patient denies any fevers, chills, N/V, D/C and presents for evaluation.       Past Medical History:   Diagnosis Date    Acute cystitis without hematuria 7/17/2017    Arthritis     Back pain     Breast cancer     originally dx in 02/1997- treated with surgery, chemo and radiation     Class 1 obesity due to excess calories with serious comorbidity and body mass index (BMI) of 30.0 to 30.9 in adult 11/29/2018    Elevated bilirubin 11/19/2013    Fatty liver     GERD (gastroesophageal reflux disease)     Glucose intolerance (impaired glucose tolerance)     Hyperlipidemia     Hypertension     Hypothyroid     Hypothyroidism     hypothyroidism    Malignant neoplasm of lower-outer  quadrant of left female breast 11/15/2016    Obesity     Obesity, diabetes, and hypertension syndrome 12/12/2018    Osteopenia     Osteoporosis     Primary insomnia 11/2/2016    Shingles     Sleep apnea     wears CPAP nightly     Squamous cell carcinoma 2011    right forearm, sx by Dr. Corinne Ray    Stress incontinence of urine 1/11/2018    Trouble in sleeping     Type 2 diabetes mellitus with diabetic polyneuropathy, without long-term current use of insulin 12/26/2017    Urinary incontinence     Well woman exam with routine gynecological exam 11/2/2016     Past Surgical History:   Procedure Laterality Date    APPENDECTOMY  2008    removed during hysterectomy surgery     BLEPHAROPLASTY Bilateral     BREAST BIOPSY      2010    Breast implant Bilateral 1998    implants removed in 2003    BREAST LUMPECTOMY      02/1997    BREAST RECONSTRUCTION Bilateral     02/2017    BREAST SURGERY      see details below     CATARACT EXTRACTION W/  INTRAOCULAR LENS IMPLANT Left 08/13/2020    Procedure: EXTRACTION, CATARACT, WITH IOL INSERTION;  Surgeon: Ivanna Coleman MD;  Location: [facility];  Service: Ophthalmology;  Laterality: Left;    CATARACT EXTRACTION W/  INTRAOCULAR LENS IMPLANT Right 08/27/2020    Procedure: EXTRACTION, CATARACT, WITH IOL INSERTION LASER;  Surgeon: Ivanna Coleman MD;  Location: [facility] OR;  Service: Ophthalmology;  Laterality: Right;    COLONOSCOPY N/A 05/15/2018    Procedure: COLONOSCOPY;  Surgeon: Roldan Gonzales MD;  Location: [facility] ENDO (4TH FLR);  Service: Endoscopy;  Laterality: N/A;    COSMETIC SURGERY  1/2022    Eyelift    ENDOSCOPIC ULTRASOUND OF UPPER GASTROINTESTINAL TRACT N/A 10/11/2023    Procedure: ULTRASOUND, UPPER GI TRACT, ENDOSCOPIC;  Surgeon: Hermelindo Coleman MD;  Location: [facility] ENDO (2ND FLR);  Service: Endoscopy;  Laterality: N/A;  instr portal-ozempic or trulicity-tb  10/5-precall complete/pt verbalized understanding of holding Ozempic-MS    ESOPHAGOGASTRODUODENOSCOPY N/A 12/20/2022     Procedure: EGD (ESOPHAGOGASTRODUODENOSCOPY);  Surgeon: Omar Ambriz MD;  Location: [facility] ([address] [place] [address]);  Service: Endoscopy;  Laterality: N/A;  instructions via portal -     EYE SURGERY      CATERACTS    HYSTERECTOMY  2008    benign    left breast reconstruction  2005    left modified radical mastectomy  1997    for treatment of breast cancer     LIPOMA RESECTION      removed from upper back area     MODIFIED RADICAL MASTECTOMY W/ AXILLARY LYMPH NODE DISSECTION  1997    OOPHORECTOMY          PELVIC LAPAROSCOPY      pt had endometriosis     LA REMOVAL OF OVARY/TUBE(S)      benign    reduction of mons pubis      robotic lap  hysterectomy with bso      UPPER GASTROINTESTINAL ENDOSCOPY  2013     Review of patient's allergies indicates:   Allergen Reactions    Erythromycin      Other reaction(s): Nausea    Erythromycin (bulk)      Other reaction(s): Nausea    Oxycodone Nausea And Vomiting    Oxycodone-acetaminophen Nausea And Vomiting     Other reaction(s): Nausea     Social History     Social History Narrative    Not on file     Family History   Problem Relation Age of Onset    Heart attack Father 51    Heart disease Father         Heart Att[place], age 51    Alcohol abuse Father     Breast cancer Sister 51        BRCA 1/2 negative    Cancer Sister         Breast Cancer    Cancer Mother 65        Malignant melanoma    Melanoma Mother     No Known Problems Brother     No Known Problems Daughter     No Known Problems Son     No Known Problems Daughter     No Known Problems Son     Diabetes Maternal Uncle             Diabetes Maternal Uncle             Arthritis Maternal Grandmother         Severe arthitis in Knees    Uterine cancer Neg Hx     Colon cancer Neg Hx     Celiac disease Neg Hx     Esophageal cancer Neg Hx     Inflammatory bowel disease Neg Hx     Liver cancer Neg Hx     Liver disease Neg Hx     Stomach cancer Neg Hx     Ulcerative colitis Neg Hx      Hypertension Neg Hx     Cirrhosis Neg Hx     Crohn's disease Neg Hx     Rectal cancer Neg Hx     Ovarian cancer Neg Hx          Current Outpatient Medications:     ascorbic acid, vitamin C, (VITAMIN C) 1000 MG tablet, Take 1 tablet by mouth once daily., Disp: , Rfl:     aspirin (ECOTRIN) 81 MG EC tablet, Take 81 mg by mouth once daily., Disp: , Rfl:     calcium-vitamin D tablet 600 mg-200 units, , Disp: , Rfl:     CANNABIDIOL, CBD, EXTRACT ORAL, Take 1 drop by mouth Daily., Disp: , Rfl:     coenzyme Q10-vitamin E 100-100 mg-unit Cap, Take 1 capsule by mouth once daily. , Disp: , Rfl:     cyclobenzaprine (FLEXERIL) 5 MG tablet, Take 1 tablet (5 mg total) by mouth 3 (three) times daily as needed for Muscle spasms (muscular pain)., Disp: 90 tablet, Rfl: 2    denosumab (PROLIA) 60 mg/mL Syrg, Inject 1 mL (60 mg total) into the skin every 6 (six) months., Disp: 2 mL, Rfl: 0    estradioL (YUVAFEM) 10 mcg Tab, PLACE 1 TABLET VAGINALLY TWICE A WEEK, Disp: 24 tablet, Rfl: 2    fenofibrate 160 MG Tab, TAKE 1 TABLET BY MOUTH EVERY DAY, Disp: 90 tablet, Rfl: 0    fluticasone (FLONASE) 50 mcg/actuation nasal spray, 1 spray by Each Nare route 2 (two) times daily. , Disp: , Rfl:     gabapentin (NEURONTIN) 300 MG capsule, TAKE 2 CAPSULES BY MOUTH EVERY EVENING., Disp: 180 capsule, Rfl: 3    letrozole (FEMARA) 2.5 mg Tab, TAKE 1 TABLET BY MOUTH EVERY DAY, Disp: 90 tablet, Rfl: 3    levothyroxine (SYNTHROID) 50 MCG tablet, TAKE 1 TABLET BY MOUTH EVERY DAY, Disp: 90 tablet, Rfl: 3    losartan (COZAAR) 100 MG tablet, Take 1 tablet (100 mg total) by mouth once daily., Disp: 90 tablet, Rfl: 3    metFORMIN (GLUCOPHAGE) 500 MG tablet, Take 1 tablet (500 mg total) by mouth 2 (two) times daily with meals., Disp: 90 tablet, Rfl: 1    MULTIVITAMIN ORAL, Take 1 tablet by mouth once daily. , Disp: , Rfl:     omega-3 fatty acids-vitamin E 1,000 mg Cap, Take 1 capsule by mouth once daily. , Disp: , Rfl:     OZEMPIC 0.25 mg or 0.5 mg (2 mg/3 mL)  pen injector, INJECT 0.5 MG INTO THE SKIN EVERY 7 DAYS., Disp: 3 each, Rfl: 1    pantoprazole (PROTONIX) 20 MG tablet, Take 1 tablet (20 mg total) by mouth before breakfast., Disp: 90 tablet, Rfl: 3    simvastatin (ZOCOR) 20 MG tablet, TAKE 1 TABLET BY MOUTH EVERY DAY IN THE EVENING, Disp: 90 tablet, Rfl: 3    sucralfate (CARAFATE) 100 mg/mL suspension, Take 10 mLs (1 g total) by mouth 4 (four) times daily., Disp: 828 mL, Rfl: 1    tobramycin sulfate 0.3% (TOBREX) 0.3 % ophthalmic solution, Apply to wound beds twice daily (Patient not taking: Reported on 10/20/2023), Disp: 5 mL, Rfl: 2    vitamin D 1000 units Tab, Take 1,000 Units by mouth once daily. , Disp: , Rfl:     ROS    Review of Systems:  Constitutional: no fever or chills  Eyes: no visual changes  ENT: no nasal congestion or sore throat  Respiratory: no cough or shortness of breath  Cardiovascular: no chest pain  Gastrointestinal: no nausea or vomiting, tolerating diet  Musculoskeletal: pain and soreness    OBJECTIVE:      Vital Signs (Most Recent):  There were no vitals filed for this visit.  There is no height or weight on file to calculate BMI.    Physical Exam    Physical Exam:  Constitutional: The patient appears well-developed and well-nourished. No distress.   Head: Normocephalic and atraumatic.   Nose: Nose normal.   Eyes: Conjunctivae and EOM are normal.   Neck: No tracheal deviation present.   Cardiovascular: Normal rate and intact distal pulses.    Pulmonary/Chest: Effort normal. No respiratory distress.   Abdominal: There is no guarding.   Lymphatic: Negative for adenopathy   Neurological: The patient is alert.   Psychiatric: The patient has a normal mood and affect.     Bilateral Hand/Wrist Examination:    Observation/Inspection:  Swelling  none    Deformity  none  Discoloration  none     Scars   none    Atrophy  none    HAND/WRIST EXAMINATION:  Finkelstein's Test   Neg  WHAT Test    Neg  Snuff box tenderness   Neg  Dominguez's Test    Neg  Hook  of Hamate Tenderness  Neg  CMC grind    Neg  Circumduction test   Neg  TFCC Compression Test  Neg    Good range of motion of the bilateral hands and wrist, patient does have arthritis present at her DIPJ joints of the bilateral hands.  Negative for Phalen's and Tinel's bilaterally.  She does have some tenderness to palpation over the past thumb left side.    Neurovascular Exam:  Digits WWP, brisk CR < 3s throughout  NVI motor/LTS to M/R/U nerves, radial pulse 2+  2+ biceps and brachioradialis reflexes  Tinel's Test - Carpal Tunnel  Neg  Tinel's Test - Cubital Tunnel  Neg  Phalen's Test    Neg  Median Nerve Compression Test Neg    Diagnostic Results:    X-rays AP, lateral and oblique taken today and are consistent with arthritis of the PIP joints of all of the fingers and left thumb basilar arthritis    ASSESSMENT/PLAN:      73 y.o. yo female with CMC arthritis of the left thumb  Plan:    - discussed with the patient the pathophysiology behind CMC arthritis, discussed with her the options for treatment which include creams, anti-inflammatories, corticosteroid injections and surgery.  At this time the patient wishes to just perform conservative management with creams and splinting.  We will provde the patient with a 3D thumb splint  Patient to follow up in the clinic in a couple of weeks to see how she is progressing    The patient's pathophysiology was explained in detail with reference to x-rays, models, other visual aids as appropriate.  Treatment options were discussed in detail.  Questions were invited and answered to the patient's satisfaction. I reviewed Primary care , and other specialty's notes to better coordinate patient's care.          Ines Fajardo MD    Please be aware that this note has been generated with the assistance of [employer] voice-to-text.  Please excuse any spelling or grammatical errors.

## 2023-11-16 ENCOUNTER — OFFICE VISIT (OUTPATIENT)
Dept: HEMATOLOGY/ONCOLOGY | Facility: CLINIC | Age: 73
End: 2023-11-16
Payer: MEDICARE

## 2023-11-16 ENCOUNTER — INFUSION (OUTPATIENT)
Dept: INFUSION THERAPY | Facility: HOSPITAL | Age: 73
End: 2023-11-16
Attending: INTERNAL MEDICINE
Payer: MEDICARE

## 2023-11-16 VITALS
SYSTOLIC BLOOD PRESSURE: 138 MMHG | BODY MASS INDEX: 27.69 KG/M2 | TEMPERATURE: 98 F | HEART RATE: 80 BPM | HEIGHT: 66 IN | WEIGHT: 172.31 LBS | DIASTOLIC BLOOD PRESSURE: 71 MMHG | OXYGEN SATURATION: 98 % | RESPIRATION RATE: 18 BRPM

## 2023-11-16 DIAGNOSIS — M81.0 SENILE OSTEOPOROSIS: ICD-10-CM

## 2023-11-16 DIAGNOSIS — Z17.0 MALIGNANT NEOPLASM OF LOWER-OUTER QUADRANT OF LEFT BREAST OF FEMALE, ESTROGEN RECEPTOR POSITIVE: Primary | ICD-10-CM

## 2023-11-16 DIAGNOSIS — M81.0 SENILE OSTEOPOROSIS: Primary | ICD-10-CM

## 2023-11-16 DIAGNOSIS — C50.512 MALIGNANT NEOPLASM OF LOWER-OUTER QUADRANT OF LEFT BREAST OF FEMALE, ESTROGEN RECEPTOR POSITIVE: Primary | ICD-10-CM

## 2023-11-16 PROCEDURE — 3008F BODY MASS INDEX DOCD: CPT | Mod: CPTII,S$GLB,, | Performed by: INTERNAL MEDICINE

## 2023-11-16 PROCEDURE — 63600175 PHARM REV CODE 636 W HCPCS: Mod: JZ,JG | Performed by: INTERNAL MEDICINE

## 2023-11-16 PROCEDURE — 3072F PR LOW RISK FOR RETINOPATHY: ICD-10-PCS | Mod: CPTII,S$GLB,, | Performed by: INTERNAL MEDICINE

## 2023-11-16 PROCEDURE — 99999 PR PBB SHADOW E&M-EST. PATIENT-LVL IV: ICD-10-PCS | Mod: PBBFAC,,, | Performed by: INTERNAL MEDICINE

## 2023-11-16 PROCEDURE — 99213 PR OFFICE/OUTPT VISIT, EST, LEVL III, 20-29 MIN: ICD-10-PCS | Mod: S$GLB,,, | Performed by: INTERNAL MEDICINE

## 2023-11-16 PROCEDURE — 3075F PR MOST RECENT SYSTOLIC BLOOD PRESS GE 130-139MM HG: ICD-10-PCS | Mod: CPTII,S$GLB,, | Performed by: INTERNAL MEDICINE

## 2023-11-16 PROCEDURE — 3075F SYST BP GE 130 - 139MM HG: CPT | Mod: CPTII,S$GLB,, | Performed by: INTERNAL MEDICINE

## 2023-11-16 PROCEDURE — 3044F PR MOST RECENT HEMOGLOBIN A1C LEVEL <7.0%: ICD-10-PCS | Mod: CPTII,S$GLB,, | Performed by: INTERNAL MEDICINE

## 2023-11-16 PROCEDURE — 3072F LOW RISK FOR RETINOPATHY: CPT | Mod: CPTII,S$GLB,, | Performed by: INTERNAL MEDICINE

## 2023-11-16 PROCEDURE — 3008F PR BODY MASS INDEX (BMI) DOCUMENTED: ICD-10-PCS | Mod: CPTII,S$GLB,, | Performed by: INTERNAL MEDICINE

## 2023-11-16 PROCEDURE — 96372 THER/PROPH/DIAG INJ SC/IM: CPT

## 2023-11-16 PROCEDURE — 3288F FALL RISK ASSESSMENT DOCD: CPT | Mod: CPTII,S$GLB,, | Performed by: INTERNAL MEDICINE

## 2023-11-16 PROCEDURE — 4010F PR ACE/ARB THEARPY RXD/TAKEN: ICD-10-PCS | Mod: CPTII,S$GLB,, | Performed by: INTERNAL MEDICINE

## 2023-11-16 PROCEDURE — 99213 OFFICE O/P EST LOW 20 MIN: CPT | Mod: S$GLB,,, | Performed by: INTERNAL MEDICINE

## 2023-11-16 PROCEDURE — 1126F AMNT PAIN NOTED NONE PRSNT: CPT | Mod: CPTII,S$GLB,, | Performed by: INTERNAL MEDICINE

## 2023-11-16 PROCEDURE — 1159F MED LIST DOCD IN RCRD: CPT | Mod: CPTII,S$GLB,, | Performed by: INTERNAL MEDICINE

## 2023-11-16 PROCEDURE — 1101F PR PT FALLS ASSESS DOC 0-1 FALLS W/OUT INJ PAST YR: ICD-10-PCS | Mod: CPTII,S$GLB,, | Performed by: INTERNAL MEDICINE

## 2023-11-16 PROCEDURE — 1126F PR PAIN SEVERITY QUANTIFIED, NO PAIN PRESENT: ICD-10-PCS | Mod: CPTII,S$GLB,, | Performed by: INTERNAL MEDICINE

## 2023-11-16 PROCEDURE — 99999 PR PBB SHADOW E&M-EST. PATIENT-LVL IV: CPT | Mod: PBBFAC,,, | Performed by: INTERNAL MEDICINE

## 2023-11-16 PROCEDURE — 3044F HG A1C LEVEL LT 7.0%: CPT | Mod: CPTII,S$GLB,, | Performed by: INTERNAL MEDICINE

## 2023-11-16 PROCEDURE — 3078F PR MOST RECENT DIASTOLIC BLOOD PRESSURE < 80 MM HG: ICD-10-PCS | Mod: CPTII,S$GLB,, | Performed by: INTERNAL MEDICINE

## 2023-11-16 PROCEDURE — 3288F PR FALLS RISK ASSESSMENT DOCUMENTED: ICD-10-PCS | Mod: CPTII,S$GLB,, | Performed by: INTERNAL MEDICINE

## 2023-11-16 PROCEDURE — 4010F ACE/ARB THERAPY RXD/TAKEN: CPT | Mod: CPTII,S$GLB,, | Performed by: INTERNAL MEDICINE

## 2023-11-16 PROCEDURE — 3078F DIAST BP <80 MM HG: CPT | Mod: CPTII,S$GLB,, | Performed by: INTERNAL MEDICINE

## 2023-11-16 PROCEDURE — 1101F PT FALLS ASSESS-DOCD LE1/YR: CPT | Mod: CPTII,S$GLB,, | Performed by: INTERNAL MEDICINE

## 2023-11-16 PROCEDURE — 1159F PR MEDICATION LIST DOCUMENTED IN MEDICAL RECORD: ICD-10-PCS | Mod: CPTII,S$GLB,, | Performed by: INTERNAL MEDICINE

## 2023-11-16 RX ADMIN — DENOSUMAB 60 MG: 60 INJECTION SUBCUTANEOUS at 10:11

## 2023-11-16 NOTE — NURSING
Pt here for prolia injection.  Endorses taking Ca++ and vitamin D supplements at home.  Denies jaw pain or invasive dental procedures.  Prolia administered to right abdominal tissue.  Pt tolerated.  Ambulated out unassisted by self.

## 2023-11-16 NOTE — PROGRESS NOTES
Subjective:       Patient ID: Lima Madlonado is a 73 y.o. female.    Chief Complaint: No chief complaint on file.      HPI  Ms Maldonado return to clinic for follow-up of  diagnosis of left breast cancer. She had stage I ER positive disease with an intermediate risk Oncotype score. She is currently on letrozole.   Also on Prolia    Her biggest complaint is some neck pain.  She is due to have some physical therapy for that.  She also has some arthritis in her hands and especially in her thumbs.  She is no shortness of breath.    Appetite and bowel function have been normal.      Breast history:     Prior history of left breast cancer in 1997 when she underwent left lumpectomy with complete axillary dissection at age 46 for a pT1cN1 breast cancer (Stage IIA).  47 nodes were removed, 1 of which was positive.  She received adjuvant chemotherapy, radiation and tamoxifen.       Mammogram on September 20, 2016 demonstrated a new irregular mass with spiculated margins seen in the left breast at  5 o'clock along the surgical scar site.ultrasound showed a  8 X 8 X 7 MM MASS.    A needle biopsy in September 27 showed infiltrating carcinoma, histologic grade 3, nuclear grade 2, mitotic index 1. Tumor was 90% ER positive, 70% CA positive, and 1+ HER-2.     MRI of the breast showed a 1.7 x 1.3 cm lesion in the lower outer quadrant of the left breast.  PET/CT on October 7 was negative for any evidence of distant metastasis.    On November 16, 2016 bilateral mastectomies were performed. Left breast showed a  1.5 cm intermediate grade carcinoma with ductal and lobular features. There was focal dermal invasion. Margins were negative. The right breast was without abnormality.  Oncotype score returned 25 - intermediate risk.  Adjuvant TC X 4 completed 3/31/17.    She began letrozole in April 2017.    Breast Cancer Index:12/2022  5.1% risk or recurrence with benefit to continue endocrine therapy    Dexa September 2023 -  osteoporosis  Review of Systems   Constitutional: Negative.  Negative for appetite change and unexpected weight change.   HENT:  Negative for mouth sores.    Eyes:  Negative for visual disturbance.   Respiratory: Negative.  Negative for cough and shortness of breath.    Cardiovascular:  Negative for chest pain.   Gastrointestinal:  Negative for abdominal pain and diarrhea.   Genitourinary:  Negative for frequency.   Musculoskeletal:  Positive for arthralgias and neck pain. Negative for back pain.   Integumentary:  Negative for rash.   Neurological: Negative.  Negative for headaches.   Hematological:  Negative for adenopathy.   Psychiatric/Behavioral: Negative.  The patient is not nervous/anxious.          Objective:      Physical Exam  Vitals reviewed.   Constitutional:       General: She is not in acute distress.  Cardiovascular:      Rate and Rhythm: Normal rate and regular rhythm.   Pulmonary:      Effort: Pulmonary effort is normal. No respiratory distress.      Breath sounds: Normal breath sounds. No wheezing or rales.   Chest:       Abdominal:      Palpations: Abdomen is soft. There is no mass.      Tenderness: There is no abdominal tenderness.   Lymphadenopathy:      Cervical: No cervical adenopathy.      Upper Body:      Right upper body: No supraclavicular or axillary adenopathy.      Left upper body: No supraclavicular or axillary adenopathy.   Neurological:      Mental Status: She is alert and oriented to person, place, and time.   Psychiatric:         Mood and Affect: Mood normal.         Thought Content: Thought content normal.         Judgment: Judgment normal.         Assessment:     CMP -Glu 165  Problem List Items Addressed This Visit       Malignant neoplasm of lower-outer quadrant of left female breast - Primary       Plan:     Return to clinic 6 months.      Route Chart for Scheduling    Med Onc Chart Routing      Follow up with physician 6 months.   Follow up with [facility]    Infusion scheduling note     Injection scheduling note    Labs CMP   Scheduling: Labs same day as infusion  Preferred lab:  Lab interval:     Imaging None      Pharmacy appointment No pharmacy appointment needed      Other referrals no referral to Oncology Primary Care needed -  no Massage appointment needed    No additional referrals needed                 Supportive Plan Information  OP DENOSUMAB   Francisco Baker MD   Upcoming Treatment Dates - OP DENOSUMAB    11/16/2023       Medications       denosumab (PROLIA) injection 60 mg  5/16/2024       Medications       denosumab (PROLIA) injection 60 mg

## 2023-11-28 ENCOUNTER — LAB VISIT (OUTPATIENT)
Dept: LAB | Facility: HOSPITAL | Age: 73
End: 2023-11-28
Attending: INTERNAL MEDICINE
Payer: MEDICARE

## 2023-11-28 DIAGNOSIS — E03.9 HYPOTHYROIDISM, UNSPECIFIED TYPE: ICD-10-CM

## 2023-11-28 DIAGNOSIS — E11.42 TYPE 2 DIABETES MELLITUS WITH DIABETIC POLYNEUROPATHY, WITHOUT LONG-TERM CURRENT USE OF INSULIN: ICD-10-CM

## 2023-11-28 DIAGNOSIS — I10 PRIMARY HYPERTENSION: ICD-10-CM

## 2023-11-28 LAB
ALBUMIN SERPL BCP-MCNC: 4.4 G/DL (ref 3.5–5.2)
ALP SERPL-CCNC: 52 U/L (ref 55–135)
ALT SERPL W/O P-5'-P-CCNC: 27 U/L (ref 10–44)
ANION GAP SERPL CALC-SCNC: 12 MMOL/L (ref 8–16)
AST SERPL-CCNC: 27 U/L (ref 10–40)
BASOPHILS # BLD AUTO: 0.01 K/UL (ref 0–0.2)
BASOPHILS NFR BLD: 0.2 % (ref 0–1.9)
BILIRUB SERPL-MCNC: 0.9 MG/DL (ref 0.1–1)
BUN SERPL-MCNC: 20 MG/DL (ref 8–23)
CALCIUM SERPL-MCNC: 10 MG/DL (ref 8.7–10.5)
CHLORIDE SERPL-SCNC: 106 MMOL/L (ref 95–110)
CHOLEST SERPL-MCNC: 117 MG/DL (ref 120–199)
CHOLEST/HDLC SERPL: 3.7 {RATIO} (ref 2–5)
CO2 SERPL-SCNC: 25 MMOL/L (ref 23–29)
CREAT SERPL-MCNC: 0.8 MG/DL (ref 0.5–1.4)
DIFFERENTIAL METHOD: ABNORMAL
EOSINOPHIL # BLD AUTO: 0.1 K/UL (ref 0–0.5)
EOSINOPHIL NFR BLD: 2.4 % (ref 0–8)
ERYTHROCYTE [DISTWIDTH] IN BLOOD BY AUTOMATED COUNT: 12.5 % (ref 11.5–14.5)
EST. GFR  (NO RACE VARIABLE): >60 ML/MIN/1.73 M^2
ESTIMATED AVG GLUCOSE: 137 MG/DL (ref 68–131)
GLUCOSE SERPL-MCNC: 148 MG/DL (ref 70–110)
HBA1C MFR BLD: 6.4 % (ref 4–5.6)
HCT VFR BLD AUTO: 40.9 % (ref 37–48.5)
HDLC SERPL-MCNC: 32 MG/DL (ref 40–75)
HDLC SERPL: 27.4 % (ref 20–50)
HGB BLD-MCNC: 13.7 G/DL (ref 12–16)
IMM GRANULOCYTES # BLD AUTO: 0.05 K/UL (ref 0–0.04)
IMM GRANULOCYTES NFR BLD AUTO: 1.1 % (ref 0–0.5)
LDLC SERPL CALC-MCNC: 44.4 MG/DL (ref 63–159)
LYMPHOCYTES # BLD AUTO: 1.7 K/UL (ref 1–4.8)
LYMPHOCYTES NFR BLD: 37.2 % (ref 18–48)
MCH RBC QN AUTO: 29.6 PG (ref 27–31)
MCHC RBC AUTO-ENTMCNC: 33.5 G/DL (ref 32–36)
MCV RBC AUTO: 88 FL (ref 82–98)
MONOCYTES # BLD AUTO: 0.3 K/UL (ref 0.3–1)
MONOCYTES NFR BLD: 6.4 % (ref 4–15)
NEUTROPHILS # BLD AUTO: 2.4 K/UL (ref 1.8–7.7)
NEUTROPHILS NFR BLD: 52.7 % (ref 38–73)
NONHDLC SERPL-MCNC: 85 MG/DL
NRBC BLD-RTO: 0 /100 WBC
PLATELET # BLD AUTO: 202 K/UL (ref 150–450)
PMV BLD AUTO: 10.5 FL (ref 9.2–12.9)
POTASSIUM SERPL-SCNC: 4.6 MMOL/L (ref 3.5–5.1)
PROT SERPL-MCNC: 7.3 G/DL (ref 6–8.4)
RBC # BLD AUTO: 4.63 M/UL (ref 4–5.4)
SODIUM SERPL-SCNC: 143 MMOL/L (ref 136–145)
T4 FREE SERPL-MCNC: 0.88 NG/DL (ref 0.71–1.51)
TRIGL SERPL-MCNC: 203 MG/DL (ref 30–150)
TSH SERPL DL<=0.005 MIU/L-ACNC: 2.9 UIU/ML (ref 0.4–4)
WBC # BLD AUTO: 4.52 K/UL (ref 3.9–12.7)

## 2023-11-28 PROCEDURE — 84439 ASSAY OF FREE THYROXINE: CPT | Mod: HCNC | Performed by: INTERNAL MEDICINE

## 2023-11-28 PROCEDURE — 83036 HEMOGLOBIN GLYCOSYLATED A1C: CPT | Mod: HCNC | Performed by: INTERNAL MEDICINE

## 2023-11-28 PROCEDURE — 85025 COMPLETE CBC W/AUTO DIFF WBC: CPT | Mod: HCNC | Performed by: INTERNAL MEDICINE

## 2023-11-28 PROCEDURE — 36415 COLL VENOUS BLD VENIPUNCTURE: CPT | Mod: HCNC | Performed by: INTERNAL MEDICINE

## 2023-11-28 PROCEDURE — 84443 ASSAY THYROID STIM HORMONE: CPT | Mod: HCNC | Performed by: INTERNAL MEDICINE

## 2023-11-28 PROCEDURE — 80061 LIPID PANEL: CPT | Mod: HCNC | Performed by: INTERNAL MEDICINE

## 2023-11-28 PROCEDURE — 80053 COMPREHEN METABOLIC PANEL: CPT | Mod: HCNC | Performed by: INTERNAL MEDICINE

## 2023-12-05 ENCOUNTER — CLINICAL SUPPORT (OUTPATIENT)
Dept: REHABILITATION | Facility: HOSPITAL | Age: 73
End: 2023-12-05
Attending: STUDENT IN AN ORGANIZED HEALTH CARE EDUCATION/TRAINING PROGRAM
Payer: MEDICARE

## 2023-12-05 DIAGNOSIS — M54.12 CERVICAL RADICULOPATHY: ICD-10-CM

## 2023-12-05 DIAGNOSIS — M54.2 NECK PAIN: Primary | ICD-10-CM

## 2023-12-05 DIAGNOSIS — M54.2 CERVICALGIA: ICD-10-CM

## 2023-12-05 PROCEDURE — 97112 NEUROMUSCULAR REEDUCATION: CPT | Mod: HCNC

## 2023-12-05 PROCEDURE — 97161 PT EVAL LOW COMPLEX 20 MIN: CPT | Mod: HCNC

## 2023-12-05 NOTE — PLAN OF CARE
[facility] OUTPATIENT THERAP[facility] WELLNESS   Physical Therapy Initial Evaluation      Name: Lima Maldonado  [facility] Number: [id number]    Therapy Diagnosis:   Encounter Diagnoses   Name Primary?    Cervicalgia     Cervical radiculopathy     Neck pain Yes        Physician: Wayne Singh MD    Physician Orders: PT Eval and Treat   Medical Diagnosis from Referral:   M54.2 (ICD-10-CM) - Cervicalgia   M54.12 (ICD-10-CM) - Cervical radiculopathy     Evaluation Date: 12/5/2023  Authorization Period Expiration: 11/5/24  Plan of Care Expiration: 1/19/24  Progress Note Due: 1/5/24  Visit # / Visits authorized: 1/ 1   FOTO: 1/ 3    Precautions: Standard, Diabetes, and hx of cancer,  osteoporosis     Time In: 1:45 pm   Time Out: 2:25 pm   Total Billable Time: 40 minutes    Subjective     Date of onset: approximately 6 months     History of current condition - Katerin reports: pain in back of neck that extends into (B) shoulders/proximal UE for about 6 months.  Pt stated that if she lays on her back she will experience pain extending from (L) neck all the way down lateral (L) UE in to her hand. Pt stated that if she props up (L) UE or changes position then this pain will resolve. Pt stated that she is (L) hand dominant. Pt denies specific injury to neck. Pt stated that she had an MRI on her neck and was told she has a bulging disc. Pt stated that she was having pain in her thumbs, more in (L) thumb, and she was diagnosed with arthritis in her thumbs, but also not sure if due to radiating pain. Pt stated that she has seen MD for her thumbs and gave her a brace to wear. Pt stated that she has neuropathy in (B) hands and (B) feet. Pt stated that she has had chemo 2x in the past (not currently) and also has diabetes.        Falls: pt stated that she fell last summer, didn't hit head or neck - forgot there was a step down from one room to another and stepped down and fell, fell onto hands and knees     Imaging: see imaging section     Prior  Therapy: none recently   Social History: Pt stated that she lives with her    Occupation: not currently employed   Prior Level of Function: independent  Current Level of Function: report of pain performing aggravating factors listed below     Pain:  Current 4/10, worst 8/10, best 2/10   Location: posterior neck extending into (B) shoulder/proximal UE, intermittent pain radiating into lateral (L) UE to hand  Description: Aching  Aggravating Factors: lying on her back, picking up things, picking up 6 month old grandchild  Easing Factors: Tylenol, Icy Hot, Flexeril, Gabapentin, a couple of times Hydrocodone    Patients goals: to have less pain so can function better in general, improve strength, to avoid surgery on her neck      Medical History:   Past Medical History:   Diagnosis Date    Acute cystitis without hematuria 7/17/2017    Arthritis     Back pain     Breast cancer     originally dx in 02/1997- treated with surgery, chemo and radiation     Class 1 obesity due to excess calories with serious comorbidity and body mass index (BMI) of 30.0 to 30.9 in adult 11/29/2018    Elevated bilirubin 11/19/2013    Fatty liver     GERD (gastroesophageal reflux disease)     Glucose intolerance (impaired glucose tolerance)     Hyperlipidemia     Hypertension     Hypothyroid     Hypothyroidism     hypothyroidism    Malignant neoplasm of lower-outer quadrant of left female breast 11/15/2016    Obesity     Obesity, diabetes, and hypertension syndrome 12/12/2018    Osteopenia     Osteoporosis     Primary insomnia 11/2/2016    Shingles     Sleep apnea     wears CPAP nightly     Squamous cell carcinoma 2011    right forearm, sx by Dr. Corinne Ray    Stress incontinence of urine 1/11/2018    Trouble in sleeping     Type 2 diabetes mellitus with diabetic polyneuropathy, without long-term current use of insulin 12/26/2017    Urinary incontinence     Well woman exam with routine gynecological exam 11/2/2016       Surgical  History:   Lima Maldonado  has a past surgical history that includes left modified radical mastectomy (02/1997); Breast implant (Bilateral, 1998); left breast reconstruction (2005); Pelvic laparoscopy (1978); robotic lap  hysterectomy with bso (2008); Lipoma resection (2010); reduction of mons pubis (2011); Hysterectomy (2008); pr removal of ovary/tube(s) (2008); Modified radical mastectomy w/ axillary lymph node dissection (02/1997); Appendectomy (2008); Breast surgery; Upper gastrointestinal endoscopy (08/05/2013); Colonoscopy (N/A, 05/15/2018); Cataract extraction w/  intraocular lens implant (Left, 08/13/2020); Cataract extraction w/  intraocular lens implant (Right, 08/27/2020); Eye surgery; Breast biopsy; Breast lumpectomy; Breast reconstruction (Bilateral); Oophorectomy; Cosmetic surgery (1/2022); Blepharoplasty (Bilateral); Esophagogastroduodenoscopy (N/A, 12/20/2022); and Endoscopic ultrasound of upper gastrointestinal tract (N/A, 10/11/2023).    Medications:   Lima has a current medication list which includes the following prescription(s): ascorbic acid (vitamin c), aspirin, calcium-vitamin d3, cannabidiol (cbd), coenzyme q10-vitamin e, cyclobenzaprine, denosumab, estradiol, fenofibrate, fluticasone propionate, gabapentin, letrozole, levothyroxine, losartan, metformin, multivitamin, omega-3 fatty acids-vitamin e, ozempic, pantoprazole, simvastatin, sucralfate, tobramycin sulfate 0.3%, and vitamin d.    Allergies:   Review of patient's allergies indicates:   Allergen Reactions    Erythromycin      Other reaction(s): Nausea    Erythromycin (bulk)      Other reaction(s): Nausea    Oxycodone Nausea And Vomiting    Oxycodone-acetaminophen Nausea And Vomiting     Other reaction(s): Nausea        Objective      Cervical AROM: Pain/Dysfunction with Movement:   Flexion 45 degrees Report of experiencing slight pain in posterior neck    Extension 35 degrees Report of slight ache in posterior neck    Right side  "bending 25 degrees Stretch    Left side bending 25 degrees Stretch   Right rotation 50 degrees    Left rotation 60 degrees      Shoulder Right  Left  Pain/Dysfunction with Movement    AROM MMT AROM MMT ! = pain   NT = not tested    flexion WFL 4+/5 WFL! 4+/5 (L) AROM: pain in proximal (L) UE    abduction WFL! 4+/5 WFL! 4+/5 (B) AROM: pain in proximal (B) UE    Internal rotation WFL! 4+/5 WFL! 4+/5 (B) AROM: pain in proximal (B) UE    ER at 90° abd WFL! NT WFL! NT (B) AROM: pain in proximal (B) UE    ER at 0° abd NT 4/5 NT 4/5      Elbow Right  Left  Pain/Dysfunction with Movement    AROM MMT AROM MMT    flexion WFL 5/5 WFL 5/5    extension WFL 4/5 WFL 4/5      Posture: pt demo good sitting posture     Palpation: pt reported mild tenderness to palpation along (L) UT     Intake Outcome Measure for FOTO Neck Survey    Therapist reviewed FOTO scores for Lima Maldonado on 12/5/2023.   FOTO report - see Media section or FOTO account episode details.    Intake Score: 60         Treatment     Total Treatment time (time-based codes) separate from Evaluation: 9 minutes     Katerin received the treatments listed below:      neuromuscular re-education activities to improve: Posture and stabilization for 9 minutes. The following activities were included:  Chin tucks 3" 2x10   Scap retractions 2x10   Rows w/scap retraction 2x10 3" RTB         Patient Education and Home Exercises     Education provided: pt verbalized understanding of all education provided   - Role of PT/POC  - HEP     Written Home Exercises Provided: yes. Exercises were reviewed and Katerin was able to demonstrate them prior to the end of the session.  Katerin demonstrated good  understanding of the education provided. See EMR under Patient Instructions for exercises provided during therapy sessions.    Assessment     Lima is a 73 y.o. female referred to outpatient Physical Therapy with a medical diagnosis of Cervicalgia and cervical radiculopathy. Patient presents " with report of pain when performed cervical flexion and extension AROM, report of pain noted above when performed (B) shoulder AROM note above, decreased (R) cervical rotation AROM, and mild decreased UE MMT scores. Pt will benefit from skilled PT.      Patient prognosis is Good.   Patient will benefit from skilled outpatient Physical Therapy to address the deficits stated above and in the chart below, provide patient /family education, and to maximize patientt's level of independence.     Plan of care discussed with patient: Yes  Patient's spiritual, cultural and educational needs considered and patient is agreeable to the plan of care and goals as stated below:     Anticipated Barriers for therapy: chronicity of symptoms, co-morbidities     Medical Necessity is demonstrated by the following  History  Co-morbidities and personal factors that may impact the plan of care [] LOW: no personal factors / co-morbidities  [] MODERATE: 1-2 personal factors / co-morbidities  [x] HIGH: 3+ personal factors / co-morbidities    Moderate / High Support Documentation:   Co-morbidities affecting plan of care: hx of cancer, diabetes, HTN, neuropathy     Personal Factors:   no deficits     Examination  Body Structures and Functions, activity limitations and participation restrictions that may impact the plan of care [] LOW: addressing 1-2 elements  [x] MODERATE: 3+ elements  [] HIGH: 4+ elements (please support below)    Moderate / High Support Documentation: ROM, strength, and posture      Clinical Presentation [x] LOW: stable  [] MODERATE: Evolving  [] HIGH: Unstable     Decision Making/ Complexity Score: low       Goals:  Short Term Goals: 1 week  Pt will be compliant with HEP to supplement PT with decreasing pain and improving functional mobility    Long Term Goals: 6 weeks   Pt will improve  FOTO score to 70 in order to demo improved functional mobility  Pt will demo (R) cervical rotation AROM = (L) cervical rotation AROM in  order to improve ability to perform ADLs  Pt will report neck/UE pain </= 4/10 at worst in order to be able to perform ADLs with less difficulty   Plan     Plan of care Certification: 12/5/2023 to 1/19/24.    Outpatient Physical Therapy 2 times weekly for 6 weeks to include the following interventions: Manual Therapy, Moist Heat/ Ice, Neuromuscular Re-ed, Patient Education, Self Care, Therapeutic Activities, Therapeutic Exercise, and IASTM, dry needling, and modalities prn .     Traci Katz, PT        Physician's Signature: _________________________________________ Date: ________________

## 2023-12-06 ENCOUNTER — OFFICE VISIT (OUTPATIENT)
Dept: INTERNAL MEDICINE | Facility: CLINIC | Age: 73
End: 2023-12-06
Attending: INTERNAL MEDICINE
Payer: MEDICARE

## 2023-12-06 VITALS
WEIGHT: 171.5 LBS | DIASTOLIC BLOOD PRESSURE: 73 MMHG | HEIGHT: 66 IN | HEART RATE: 101 BPM | OXYGEN SATURATION: 97 % | BODY MASS INDEX: 27.56 KG/M2 | SYSTOLIC BLOOD PRESSURE: 141 MMHG

## 2023-12-06 DIAGNOSIS — E78.2 MIXED HYPERLIPIDEMIA: ICD-10-CM

## 2023-12-06 DIAGNOSIS — I10 PRIMARY HYPERTENSION: Primary | ICD-10-CM

## 2023-12-06 DIAGNOSIS — E11.42 TYPE 2 DIABETES MELLITUS WITH DIABETIC POLYNEUROPATHY, WITHOUT LONG-TERM CURRENT USE OF INSULIN: ICD-10-CM

## 2023-12-06 PROCEDURE — 92228 DIABETIC EYE SCREENING PHOTO: ICD-10-PCS | Mod: 26,HCNC,S$GLB, | Performed by: OPTOMETRIST

## 2023-12-06 PROCEDURE — 92228 IMG RTA DETC/MNTR DS PHY/QHP: CPT | Mod: 26,HCNC,S$GLB, | Performed by: OPTOMETRIST

## 2023-12-06 PROCEDURE — 92228 DIABETIC EYE SCREENING PHOTO: ICD-10-PCS | Mod: HCNC,TC,S$GLB, | Performed by: INTERNAL MEDICINE

## 2023-12-06 PROCEDURE — 1125F AMNT PAIN NOTED PAIN PRSNT: CPT | Mod: HCNC,CPTII,S$GLB, | Performed by: INTERNAL MEDICINE

## 2023-12-06 PROCEDURE — 92228 IMG RTA DETC/MNTR DS PHY/QHP: CPT | Mod: HCNC,TC,S$GLB, | Performed by: INTERNAL MEDICINE

## 2023-12-06 PROCEDURE — 3288F FALL RISK ASSESSMENT DOCD: CPT | Mod: HCNC,CPTII,S$GLB, | Performed by: INTERNAL MEDICINE

## 2023-12-06 PROCEDURE — 2025F DIABETIC EYE SCREENING PHOTO: ICD-10-PCS | Mod: HCNC,CPTII,S$GLB, | Performed by: OPTOMETRIST

## 2023-12-06 PROCEDURE — 3008F BODY MASS INDEX DOCD: CPT | Mod: HCNC,CPTII,S$GLB, | Performed by: INTERNAL MEDICINE

## 2023-12-06 PROCEDURE — 99999 PR PBB SHADOW E&M-EST. PATIENT-LVL IV: CPT | Mod: PBBFAC,HCNC,, | Performed by: INTERNAL MEDICINE

## 2023-12-06 PROCEDURE — 2025F 7 FLD RTA PHOTO W/O RTNOPTHY: CPT | Mod: HCNC,CPTII,S$GLB, | Performed by: OPTOMETRIST

## 2023-12-06 PROCEDURE — 1101F PT FALLS ASSESS-DOCD LE1/YR: CPT | Mod: HCNC,CPTII,S$GLB, | Performed by: INTERNAL MEDICINE

## 2023-12-06 PROCEDURE — 3078F DIAST BP <80 MM HG: CPT | Mod: HCNC,CPTII,S$GLB, | Performed by: INTERNAL MEDICINE

## 2023-12-06 PROCEDURE — 3077F SYST BP >= 140 MM HG: CPT | Mod: HCNC,CPTII,S$GLB, | Performed by: INTERNAL MEDICINE

## 2023-12-06 PROCEDURE — 99214 OFFICE O/P EST MOD 30 MIN: CPT | Mod: HCNC,S$GLB,, | Performed by: INTERNAL MEDICINE

## 2023-12-06 RX ORDER — IRBESARTAN 150 MG/1
150 TABLET ORAL NIGHTLY
Qty: 90 TABLET | Refills: 3 | Status: SHIPPED | OUTPATIENT
Start: 2023-12-06 | End: 2024-12-05

## 2023-12-06 RX ORDER — TIRZEPATIDE 5 MG/.5ML
5 INJECTION, SOLUTION SUBCUTANEOUS
Qty: 1 PEN | Refills: 2 | Status: SHIPPED | OUTPATIENT
Start: 2023-12-06 | End: 2024-02-06

## 2023-12-06 NOTE — PROGRESS NOTES
Subjective:       Patient ID: Lima Maldonado is a 73 y.o. female.    Chief Complaint: Diabetes     Lima Maldonado is a 73 y.o.  female who presents for Diabetes  .  Started digital medicine for htn and diabetes.    Reports she was recently charged over 1k for her ozempic, thinks she may be in the donut hole.  She was refunded almost 800 but still finds the cost too high.  IS currently out of her meds but has some trulicity at home and would like to take this through end of year instead.  Did well when changed between the drugs previously.          Diabetes  She presents for her follow-up diabetic visit. She has type 2 diabetes mellitus. Her disease course has been stable. Pertinent negatives for diabetes include no chest pain. Symptoms are stable. Risk factors for coronary artery disease include dyslipidemia and hypertension.     Patient Active Problem List   Diagnosis    Hyperlipidemia    Hypothyroidism    Obstructive sleep apnea on CPAP    GERD (gastroesophageal reflux disease)    Fatty liver    Primary hypertension    Vaginal atrophy    Senile osteoporosis    Primary insomnia    Malignant neoplasm of lower-outer quadrant of left female breast    Dermatitis of eyelid of left eye due to herpes zoster    Aortic atherosclerosis    Chemotherapy-induced neuropathy    Stress incontinence of urine    Sensorineural hearing loss (SNHL) of left ear with restricted hearing of right ear    Perforation of left tympanic membrane    Allergic rhinitis    Nasal septal deviation    Tinnitus of both ears    Diabetic polyneuropathy associated with type 2 diabetes mellitus    Nuclear sclerosis, bilateral    Post-operative state    Nuclear sclerotic cataract of right eye    Early hepatic fibrosis    Type 2 diabetes mellitus with diabetic polyneuropathy, without long-term current use of insulin    Overweight (BMI 25.0-29.9)    Neck pain    Cervical radiculopathy    Cervicalgia           Past Medical History:   Diagnosis Date     Acute cystitis without hematuria 7/17/2017    Arthritis     Back pain     Breast cancer     originally dx in 02/1997- treated with surgery, chemo and radiation     Class 1 obesity due to excess calories with serious comorbidity and body mass index (BMI) of 30.0 to 30.9 in adult 11/29/2018    Elevated bilirubin 11/19/2013    Fatty liver     GERD (gastroesophageal reflux disease)     Glucose intolerance (impaired glucose tolerance)     Hyperlipidemia     Hypertension     Hypothyroid     Hypothyroidism     hypothyroidism    Malignant neoplasm of lower-outer quadrant of left female breast 11/15/2016    Obesity     Obesity, diabetes, and hypertension syndrome 12/12/2018    Osteopenia     Osteoporosis     Primary insomnia 11/2/2016    Shingles     Sleep apnea     wears CPAP nightly     Squamous cell carcinoma 2011    right forearm, sx by Dr. Corinne Ray    Stress incontinence of urine 1/11/2018    Trouble in sleeping     Type 2 diabetes mellitus with diabetic polyneuropathy, without long-term current use of insulin 12/26/2017    Urinary incontinence     Well woman exam with routine gynecological exam 11/2/2016       Past Surgical History:   Procedure Laterality Date    APPENDECTOMY  2008    removed during hysterectomy surgery     BLEPHAROPLASTY Bilateral     BREAST BIOPSY      2010    Breast implant Bilateral 1998    implants removed in 2003    BREAST LUMPECTOMY      02/1997    BREAST RECONSTRUCTION Bilateral     02/2017    BREAST SURGERY      see details below     CATARACT EXTRACTION W/  INTRAOCULAR LENS IMPLANT Left 08/13/2020    Procedure: EXTRACTION, CATARACT, WITH IOL INSERTION;  Surgeon: Ivanna Coleman MD;  Location: [facility], [place], operated by [facility] OR;  Service: Ophthalmology;  Laterality: Left;    CATARACT EXTRACTION W/  INTRAOCULAR LENS IMPLANT Right 08/27/2020    Procedure: EXTRACTION, CATARACT, WITH IOL INSERTION LASER;  Surgeon: Ivanna Coleman MD;  Location: [facility], [place], operated by [facility] OR;  Service: Ophthalmology;  Laterality: Right;    COLONOSCOPY N/A  05/15/2018    Procedure: COLONOSCOPY;  Surgeon: Roldan Gonzales MD;  Location: [facility] (4TH FLR);  Service: Endoscopy;  Laterality: N/A;    COSMETIC SURGERY  1/2022    Eyelift    ENDOSCOPIC ULTRASOUND OF UPPER GASTROINTESTINAL TRACT N/A 10/11/2023    Procedure: ULTRASOUND, UPPER GI TRACT, ENDOSCOPIC;  Surgeon: Hermelindo Coleman MD;  Location: [facility] (2ND FLR);  Service: Endoscopy;  Laterality: N/A;  instr portal-ozempic or trulicity-tb  10/5-precall complete/pt verbalized understanding of holding Ozempic-MS    EPIDURAL STEROID INJECTION N/A 2/1/2024    Procedure: CERVICAL C7/T1 IL GABINO *HOLD ASPIRIN FOR 5 DAYS*;  Surgeon: Wayne Singh MD;  Location: [facility] PAIN MGT;  Service: Pain Management;  Laterality: N/A;  [phone]  2 WK F/U IVANIA    ESOPHAGOGASTRODUODENOSCOPY N/A 12/20/2022    Procedure: EGD (ESOPHAGOGASTRODUODENOSCOPY);  Surgeon: Omar Ambriz MD;  Location: [facility] (4TH FLR);  Service: Endoscopy;  Laterality: N/A;  instructions via portal - sm    EYE SURGERY      CATERACTS    HYSTERECTOMY  2008    benign    left breast reconstruction  2005    left modified radical mastectomy  02/1997    for treatment of breast cancer     LIPOMA RESECTION  2010    removed from upper back area     MODIFIED RADICAL MASTECTOMY W/ AXILLARY LYMPH NODE DISSECTION  02/1997    OOPHORECTOMY      2008    PELVIC LAPAROSCOPY  1978    pt had endometriosis     AK REMOVAL OF OVARY/TUBE(S)  2008    benign    reduction of mons pubis  2011    robotic lap  hysterectomy with bso  2008    UPPER GASTROINTESTINAL ENDOSCOPY  08/05/2013       Family History   Problem Relation Age of Onset    Heart attack Father 51    Heart disease Father         Heart [place], age 51    Alcohol abuse Father     Breast cancer Sister 51        BRCA 1/2 negative    Cancer Sister         Breast Cancer    Cancer Mother 65        Malignant melanoma    Melanoma Mother     No Known Problems Brother     No Known Problems Daughter     No Known Problems Son     No Known  "Problems Daughter     No Known Problems Son     Diabetes Maternal Uncle             Diabetes Maternal Uncle             Arthritis Maternal Grandmother         Severe arthitis in Knees    Uterine cancer Neg Hx     Colon cancer Neg Hx     Celiac disease Neg Hx     Esophageal cancer Neg Hx     Inflammatory bowel disease Neg Hx     Liver cancer Neg Hx     Liver disease Neg Hx     Stomach cancer Neg Hx     Ulcerative colitis Neg Hx     Hypertension Neg Hx     Cirrhosis Neg Hx     Crohn's disease Neg Hx     Rectal cancer Neg Hx     Ovarian cancer Neg Hx        Social History     Tobacco Use    Smoking status: Former     Current packs/day: 0.00     Average packs/day: 0.5 packs/day for 3.0 years (1.5 ttl pk-yrs)     Types: Cigarettes     Start date: 4/15/1969     Quit date: 4/15/1972     Years since quittin.9    Smokeless tobacco: Never    Tobacco comments:     started at age 18 during college    Substance Use Topics    Alcohol use: Yes     Comment: Rarely, not even 1X per week    Drug use: No         Review of Systems   Constitutional:  Negative for chills, fever and unexpected weight change.   HENT:  Negative for ear pain and sore throat.    Respiratory:  Negative for cough and shortness of breath.    Cardiovascular:  Negative for chest pain and palpitations.   Gastrointestinal:  Negative for abdominal pain, nausea and vomiting.   Musculoskeletal:  Negative for arthralgias and myalgias.         Objective:   Blood pressure (!) 141/73, pulse 101, height 5' 6" (1.676 m), weight 77.8 kg (171 lb 8.3 oz), SpO2 97 %.     Physical Exam  Constitutional:       Appearance: She is obese.   Cardiovascular:      Rate and Rhythm: Normal rate and regular rhythm.      Heart sounds: Normal heart sounds.   Pulmonary:      Effort: Pulmonary effort is normal.      Breath sounds: Normal breath sounds.   Abdominal:      General: Bowel sounds are normal.      Palpations: Abdomen is soft.   Skin:     General: Skin is warm and " dry.   Neurological:      General: No focal deficit present.      Mental Status: She is alert.         Prior labs reviewed    Health Maintenance         Date Due Completion Date    TETANUS VACCINE 12/03/2023 12/3/2013    COVID-19 Vaccine (6 - 2023-24 season) 01/23/2024 11/28/2023    Hemoglobin A1c 05/28/2024 11/28/2023    Foot Exam 08/28/2024 8/28/2023    Override on 9/24/2019: Done    Override on 12/11/2018: Done    Override on 8/9/2017: Done    Diabetes Urine Screening 11/28/2024 11/28/2023    Lipid Panel 11/28/2024 11/28/2023    Eye Exam 01/03/2025 1/3/2024    High Dose Statin 02/06/2025 2/6/2024    DEXA Scan 09/01/2025 9/1/2023    Colorectal Cancer Screening 05/15/2028 5/15/2018    Override on 10/6/2006: Done            Assessment/Plan:       1. Primary hypertension  Overview:  Previously Controlled on losartan 100 mg daily  12/23 uncontrolled, stop losartan, start irbesartan 150mg    Orders:  -     irbesartan (AVAPRO) 150 MG tablet; Take 1 tablet (150 mg total) by mouth every evening.  Dispense: 90 tablet; Refill: 3    2. Type 2 diabetes mellitus with diabetic polyneuropathy, without long-term current use of insulin  Overview:  metfromin 500mg daily, increasing trulicity from 0.75mg to 1.5 mg 7/22 hba1c 6.6   10/22 nonadherent to diet, hba1c 7.2   Had GI issues, unclear eitology, work up with dr alarcon  Symptoms improved with prn sulcrafate  No significant improvement in wt or dm on higher dose of trulicity  Decreased 0.75 trulicity, increased metformin to 500 mg bid with improvement in hba1c to 6.8  cont current plan, not changing to ozempic due to GI issues  3/23 increased trulicity back to 1.5 mg metformin 500 mg bid  8/23 GI issues improved, stop trulicity, start ozempic 0.5 mg weekly, cont metfromin 500 mg bid, f/u in one month for review and titration    Orders:  -  tirzepatide (MOUNJARO) 5 mg/0.5 mL PnIj; Inject 5 mg into the skin every 7 days.  Dispense: 1 Pen; Refill: 2  -     Diabetic Eye Screening  Photo; Future    3. Mixed Hyperlipidemia  Cont statin, heart healthy diet       30 min spent in care of patient including history, physical, chart review, orders and coordination of care.     Medication List with Changes/Refills   New Medications    IRBESARTAN (AVAPRO) 150 MG TABLET    Take 1 tablet (150 mg total) by mouth every evening.    TIRZEPATIDE 7.5 MG/0.5 ML PNIJ    Inject 7.5 mg into the skin every 7 days.   Current Medications    ASCORBIC ACID, VITAMIN C, (VITAMIN C) 1000 MG TABLET    Take 1 tablet by mouth once daily.    ASPIRIN (ECOTRIN) 81 MG EC TABLET    Take 81 mg by mouth once daily.    CALCIUM-VITAMIN D TABLET 600 MG-200 UNITS        CANNABIDIOL, CBD, EXTRACT ORAL    Take 1 drop by mouth Daily.    COENZYME Q10-VITAMIN E 100-100 MG-UNIT CAP    Take 1 capsule by mouth once daily.     CYCLOBENZAPRINE (FLEXERIL) 5 MG TABLET    Take 1 tablet (5 mg total) by mouth 3 (three) times daily as needed for Muscle spasms (muscular pain).    DENOSUMAB (PROLIA) 60 MG/ML SYRG    Inject 1 mL (60 mg total) into the skin every 6 (six) months.    ESTRADIOL (YUVAFEM) 10 MCG TAB    PLACE 1 TABLET VAGINALLY TWICE A WEEK    FLUTICASONE (FLONASE) 50 MCG/ACTUATION NASAL SPRAY    1 spray by Each Nare route 2 (two) times daily.     LETROZOLE (FEMARA) 2.5 MG TAB    TAKE 1 TABLET BY MOUTH EVERY DAY    LEVOTHYROXINE (SYNTHROID) 50 MCG TABLET    TAKE 1 TABLET BY MOUTH EVERY DAY    MULTIVITAMIN ORAL    Take 1 tablet by mouth once daily.     OMEGA-3 FATTY ACIDS-VITAMIN E 1,000 MG CAP    Take 1 capsule by mouth once daily.     PANTOPRAZOLE (PROTONIX) 20 MG TABLET    Take 1 tablet (20 mg total) by mouth before breakfast.    SUCRALFATE (CARAFATE) 100 MG/ML SUSPENSION    Take 10 mLs (1 g total) by mouth 4 (four) times daily.    TOBRAMYCIN SULFATE 0.3% (TOBREX) 0.3 % OPHTHALMIC SOLUTION    Apply to wound beds twice daily    VITAMIN D 1000 UNITS TAB    Take 1,000 Units by mouth once daily.    Changed and/or Refilled Medications    Modified  Medication Previous Medication    FENOFIBRATE 160 MG TAB fenofibrate 160 MG Tab       TAKE 1 TABLET BY MOUTH EVERY DAY    TAKE 1 TABLET BY MOUTH EVERY DAY    GABAPENTIN (NEURONTIN) 300 MG CAPSULE gabapentin (NEURONTIN) 300 MG capsule       Take 1 capsule (300 mg total) by mouth 3 (three) times daily.    TAKE 2 CAPSULES BY MOUTH EVERY EVENING.    METFORMIN (GLUCOPHAGE) 500 MG TABLET metFORMIN (GLUCOPHAGE) 500 MG tablet       TAKE 1 TABLET BY MOUTH TWICE A DAY WITH MEALS    Take 1 tablet (500 mg total) by mouth 2 (two) times daily with meals.    SIMVASTATIN (ZOCOR) 20 MG TABLET simvastatin (ZOCOR) 20 MG tablet       TAKE 1 TABLET BY MOUTH EVERY DAY IN THE EVENING    TAKE 1 TABLET BY MOUTH EVERY DAY IN THE EVENING   Discontinued Medications    LOSARTAN (COZAAR) 100 MG TABLET    Take 1 tablet (100 mg total) by mouth once daily.    OZEMPIC 0.25 MG OR 0.5 MG (2 MG/3 ML) PEN INJECTOR    INJECT 0.5 MG INTO THE SKIN EVERY 7 DAYS.    TIRZEPATIDE (MOUNJARO) 5 MG/0.5 ML PNIJ    Inject 5 mg into the skin every 7 days.       Recommend patient complete vaccinations as listed in the overdue health maintenance report. Pt may obtain vaccinations at their local pharmacy, any [facility] pharmacy or request vaccination in our clinic.  Please note that some insurances will only cover vaccination cost at specific locations.Please check with your insurance provider regarding your coverage.  Vaccines that are not covered are still recommended.

## 2023-12-06 NOTE — PROGRESS NOTES
Lima Maldonado is a 73 y.o. female here for a diabetic eye screening with non-dilated fundus photos per Stefany.    Patient cooperative?: Yes  Small pupils?: Yes  Last eye exam: 1 year     For exam results, see Encounter Report.

## 2023-12-08 ENCOUNTER — CLINICAL SUPPORT (OUTPATIENT)
Dept: REHABILITATION | Facility: HOSPITAL | Age: 73
End: 2023-12-08
Attending: STUDENT IN AN ORGANIZED HEALTH CARE EDUCATION/TRAINING PROGRAM
Payer: MEDICARE

## 2023-12-08 DIAGNOSIS — M54.2 NECK PAIN: ICD-10-CM

## 2023-12-08 DIAGNOSIS — M54.2 CERVICALGIA: Primary | ICD-10-CM

## 2023-12-08 DIAGNOSIS — M54.12 CERVICAL RADICULOPATHY: ICD-10-CM

## 2023-12-08 PROCEDURE — 97112 NEUROMUSCULAR REEDUCATION: CPT | Mod: HCNC,CQ

## 2023-12-08 PROCEDURE — 97140 MANUAL THERAPY 1/> REGIONS: CPT | Mod: HCNC,CQ

## 2023-12-08 PROCEDURE — 97110 THERAPEUTIC EXERCISES: CPT | Mod: HCNC,CQ

## 2023-12-08 NOTE — PROGRESS NOTES
"[facility]   Physical Therapy Treatment Note      Name: Lima Maldonado  Clinic Number: [id number]    Therapy Diagnosis:   Encounter Diagnoses   Name Primary?    Cervicalgia Yes    Cervical radiculopathy     Neck pain      Physician: Wayne Singh MD    Visit Date: 12/8/2023       Physician Orders: PT Eval and Treat   Medical Diagnosis from Referral:   M54.2 (ICD-10-CM) - Cervicalgia   M54.12 (ICD-10-CM) - Cervical radiculopathy      Evaluation Date: 12/5/2023  Authorization Period Expiration: 11/5/24  Plan of Care Expiration: 1/19/24  Progress Note Due: 1/5/24  Visit # / Visits authorized: 1/ 1, 1/10  FOTO: 1/ 3     Precautions: Standard, Diabetes, and hx of cancer,  osteoporosis     PTA Visit #: 1/5     Time In: 11:36 am   Time Out: 12:16 pm   Total Billable Time: 40 minutes    Subjective     Pt reports: that she still has some aching in her neck and into both shoulders, but is more prevalent on the left side.  She was compliant with home exercise program.  Response to previous treatment: last session was initial eval  Functional change: none at this time     Pain: 2/10  Location: neck into shoulders L>R     Objective      Objective Measures updated at progress report unless specified.     Treatment     Katerin received the treatments listed below:      therapeutic exercises to develop strength, endurance, ROM, and flexibility for 8 minutes including:  UT stretch 3x30" Nathan   Supine wand flexion 3" 2x10   (R) rotation c/ towel assist 5" 10x     manual therapy techniques:  were applied to the: bilateral upper traps for 12 minutes, including:  STM bilateral upper trap and associated musculature        neuromuscular re-education activities to improve: Posture and stabilization for 20 minutes. The following activities were included:  Chin tucks 3" 2x10   Scap retractions 2x10   Rows w/scap retraction 2x10 3" RTB   No money YTB 2x10   Supine HSA YTB 2x10       Patient Education and Home Exercises "       Education provided:   - HEP    Written Home Exercises Provided: Patient instructed to cont prior HEP. Exercises were reviewed and Katerin was able to demonstrate them prior to the end of the session.  Katerin demonstrated good  understanding of the education provided. See EMR under Patient Instructions for exercises provided during therapy sessions    Assessment     Initiated several activities this session for improved posture and shoulder strength and cervical ROM. Pt given verbal cues and demonstrations to initiate several activities. Performed manual therapy at the end of session in order decrease hypertonicity along bilateral upper traps, however greater time spent on left side compared to right. Will continue to progress pt per her tolerance towards established goal.     Katerin Is progressing well towards her goals.   Pt prognosis is Good.     Pt will continue to benefit from skilled outpatient physical therapy to address the deficits listed in the problem list box on initial evaluation, provide pt/family education and to maximize pt's level of independence in the home and community environment.     Pt's spiritual, cultural and educational needs considered and pt agreeable to plan of care and goals.     Anticipated barriers to physical therapy:  chronicity of symptoms, co-morbidities     Goals:   Short Term Goals: 1 week  Pt will be compliant with HEP to supplement PT with decreasing pain and improving functional mobility     Long Term Goals: 6 weeks   Pt will improve  FOTO score to 70 in order to demo improved functional mobility  Pt will demo (R) cervical rotation AROM = (L) cervical rotation AROM in order to improve ability to perform ADLs  Pt will report neck/UE pain </= 4/10 at worst in order to be able to perform ADLs with less difficulty     Plan     Plan of care Certification: 12/5/2023 to 1/19/24.    Ligia Sotelo, PTA

## 2023-12-12 ENCOUNTER — CLINICAL SUPPORT (OUTPATIENT)
Dept: REHABILITATION | Facility: HOSPITAL | Age: 73
End: 2023-12-12
Payer: MEDICARE

## 2023-12-12 DIAGNOSIS — M54.12 CERVICAL RADICULOPATHY: Primary | ICD-10-CM

## 2023-12-12 PROCEDURE — 97110 THERAPEUTIC EXERCISES: CPT | Mod: HCNC,CQ

## 2023-12-12 PROCEDURE — 97112 NEUROMUSCULAR REEDUCATION: CPT | Mod: HCNC,CQ

## 2023-12-12 NOTE — PROGRESS NOTES
"[facility]   Physical Therapy Treatment Note      Name: Lima Maldonado  Clinic Number: [id number]    Therapy Diagnosis:   Encounter Diagnosis   Name Primary?    Cervical radiculopathy Yes       Physician: Wayne Singh MD    Visit Date: 12/12/2023       Physician Orders: PT Eval and Treat   Medical Diagnosis from Referral:   M54.2 (ICD-10-CM) - Cervicalgia   M54.12 (ICD-10-CM) - Cervical radiculopathy      Evaluation Date: 12/5/2023  Authorization Period Expiration: 11/5/24  Plan of Care Expiration: 1/19/24  Progress Note Due: 1/5/24  Visit # / Visits authorized: 1/ 1, 1/10  FOTO: 1/ 3     Precautions: Standard, Diabetes, and hx of cancer,  osteoporosis     PTA Visit #: 1/5     Time In: 145 pm   Time Out: 230 pm   Total Billable Time: 45 minutes    Subjective     Pt reports: the night time pain is the worse as interferes with her sleep.  She was compliant with home exercise program.  Response to previous treatment: last session was initial eval  Functional change: none at this time     Pain: 3/10  Location: neck into shoulders L>R     Objective      Objective Measures updated at progress report unless specified.     Treatment     Katerin received the treatments listed below:      therapeutic exercises to develop strength, endurance, ROM, and flexibility for 14  minutes including:  UT stretch 3x30" Nathan   Supine wand flexion 3" 2x10   (R) rotation c/ towel assist 5" 10x   Serratus 2x10 B  ER YTB 2x10 B  Tricep extension YTB 2x10    manual therapy techniques:  were applied to the: bilateral upper traps for 8 minutes, including:  STM bilateral upper trap and associated musculature        neuromuscular re-education activities to improve: Posture and stabilization for 23 minutes. The following activities were included:  Chin tucks 3" 2x10   Scap retractions 2x10   Rows w/scap retraction 2x10 3" RTB   No money YTB 2x10   Supine HSA YTB 2x10       Patient Education and Home Exercises       Education " provided:   - HEP    Written Home Exercises Provided: Patient instructed to cont prior HEP. Exercises were reviewed and Katerin was able to demonstrate them prior to the end of the session.  Katerin demonstrated good  understanding of the education provided. See EMR under Patient Instructions for exercises provided during therapy sessions    Assessment     Pt presents with ongoing symptoms improved slightly since last session. Continued with established program with addition of added TE for UE strength deficits. Good response to STM with notable UT nodules and trigger points which were relieved with manual therapy application.    Katerin Is progressing well towards her goals.   Pt prognosis is Good.     Pt will continue to benefit from skilled outpatient physical therapy to address the deficits listed in the problem list box on initial evaluation, provide pt/family education and to maximize pt's level of independence in the home and community environment.     Pt's spiritual, cultural and educational needs considered and pt agreeable to plan of care and goals.     Anticipated barriers to physical therapy:  chronicity of symptoms, co-morbidities     Goals:   Short Term Goals: 1 week  Pt will be compliant with HEP to supplement PT with decreasing pain and improving functional mobility     Long Term Goals: 6 weeks   Pt will improve  FOTO score to 70 in order to demo improved functional mobility  Pt will demo (R) cervical rotation AROM = (L) cervical rotation AROM in order to improve ability to perform ADLs  Pt will report neck/UE pain </= 4/10 at worst in order to be able to perform ADLs with less difficulty     Plan     Plan of care Certification: 12/5/2023 to 1/19/24.    Abhishek Bird, PTA

## 2023-12-13 ENCOUNTER — HOSPITAL ENCOUNTER (OUTPATIENT)
Dept: RADIOLOGY | Facility: HOSPITAL | Age: 73
Discharge: HOME | End: 2023-12-13
Attending: NURSE PRACTITIONER
Payer: MEDICARE

## 2023-12-13 DIAGNOSIS — K76.0 FATTY LIVER: ICD-10-CM

## 2023-12-13 DIAGNOSIS — K74.01 EARLY HEPATIC FIBROSIS: ICD-10-CM

## 2023-12-13 PROCEDURE — 76700 US ABDOMEN COMPLETE: ICD-10-PCS | Mod: 26,HCNC,, | Performed by: RADIOLOGY

## 2023-12-13 PROCEDURE — 76700 US EXAM ABDOM COMPLETE: CPT | Mod: 26,HCNC,, | Performed by: RADIOLOGY

## 2023-12-13 PROCEDURE — 76700 US EXAM ABDOM COMPLETE: CPT | Mod: TC,HCNC

## 2023-12-22 ENCOUNTER — TELEPHONE (OUTPATIENT)
Dept: INTERNAL MEDICINE | Facility: CLINIC | Age: 73
End: 2023-12-22
Payer: MEDICARE

## 2023-12-22 ENCOUNTER — CLINICAL SUPPORT (OUTPATIENT)
Dept: REHABILITATION | Facility: HOSPITAL | Age: 73
End: 2023-12-22
Payer: MEDICARE

## 2023-12-22 DIAGNOSIS — M54.2 NECK PAIN: Primary | ICD-10-CM

## 2023-12-22 PROCEDURE — 97110 THERAPEUTIC EXERCISES: CPT | Mod: HCNC

## 2023-12-22 PROCEDURE — 97112 NEUROMUSCULAR REEDUCATION: CPT | Mod: HCNC

## 2023-12-22 PROCEDURE — 97140 MANUAL THERAPY 1/> REGIONS: CPT | Mod: HCNC

## 2023-12-22 NOTE — TELEPHONE ENCOUNTER
----- Message from Karen Hopson LPN sent at 12/19/2023  9:49 AM CST -----  Regarding: FW: Tested Positive for Covid    ----- Message -----  From: Natali Goss  Sent: 12/13/2023  12:17 PM CST  To: Karen Hopson LPN  Subject: Tested Positive for Covid                        Tested Positive for Covid  12/13/23-Pt called wants a call back to discuss what she should do for testing positive for Covid. Contact pt at  [phone], MRN# [id number]

## 2023-12-22 NOTE — PROGRESS NOTES
"  Physical Therapy Daily Treatment Note     Name: Lima Maldonado  [facility] Number: [id number]    Therapy Diagnosis:   Encounter Diagnosis   Name Primary?    Neck pain Yes     Physician: Wayne Singh MD    Visit Date: 12/22/2023    Physician Orders: PT Eval and Treat   Medical Diagnosis from Referral:   M54.2 (ICD-10-CM) - Cervicalgia   M54.12 (ICD-10-CM) - Cervical radiculopathy      Evaluation Date: 12/5/2023  Authorization Period Expiration: 12/30/23  Plan of Care Expiration: 1/19/24  Progress Note Due: 1/5/24  Visit # / Visits authorized: 1/ 1, 3/10  FOTO: 1/ 3    Time In: 9:19 am   Time Out: 10:01 am   Total Billable Time: 42 minutes    Precautions: Standard, Diabetes, and hx of cancer,  osteoporosis     Subjective     Pt reports: that she missed some PT apts due to having Covid. Pt stated that (L) shoulder has been bothering her past few days. Pt denies neck pain currently.   She was not compliant with home exercise program.  Response to previous treatment: no adverse effects  Functional change: progressing    Pain: 4/10 (L) shoulder      Objective     therapeutic exercises and first rib assessment to develop strength,  ROM, and flexibility for 23  minutes including:  UT stretch 3x30" Nathan   LS stretch 3x30" B   (R) rotation c/ towel assist 5" 10x   Shoulder ER and IR RTB 2x10 B         manual therapy techniques:  were applied for 10 minutes, including:  STM bilateral upper trap        neuromuscular re-education activities to improve: Posture and stabilization for 9 minutes. The following activities were included:  Chin tucks 3" 3x10   Rows w/scap retraction 2x10 3" RTB   No money YTB 2x10 3"  HSA YTB 2x10       Home Exercises Provided and Patient Education Provided     Education provided:   - HEP     Written Home Exercises Provided: Patient instructed to cont prior HEP.    Katerin demonstrated good  understanding of the education provided.     See EMR under Patient Instructions for exercises provided prior " visit.      Assessment     Pt does not appear to have elevated (L) first rib. Soft tissue restrictions noted along (B) UT, (L) > (R), during manual therapy. Pt tolerated therapy session without any adverse reactions.   Katerin Is progressing towards her goals.   Pt prognosis is Good.     Pt will continue to benefit from skilled outpatient physical therapy to address the deficits listed in the problem list box on initial evaluation, provide pt/family education and to maximize pt's level of independence in the home and community environment.     Pt's spiritual, cultural and educational needs considered and pt agreeable to plan of care and goals.    Anticipated barriers to physical therapy:  chronicity of symptoms, co-morbidities     Goals:   Short Term Goals: 1 week  Pt will be compliant with HEP to supplement PT with decreasing pain and improving functional mobility     Long Term Goals: 6 weeks   Pt will improve  FOTO score to 70 in order to demo improved functional mobility  Pt will demo (R) cervical rotation AROM = (L) cervical rotation AROM in order to improve ability to perform ADLs  Pt will report neck/UE pain </= 4/10 at worst in order to be able to perform ADLs with less difficulty     Plan     Continue per POC, progress as tolerated     Traci Katz, PT

## 2023-12-29 ENCOUNTER — CLINICAL SUPPORT (OUTPATIENT)
Dept: REHABILITATION | Facility: HOSPITAL | Age: 73
End: 2023-12-29
Payer: MEDICARE

## 2023-12-29 DIAGNOSIS — M54.2 NECK PAIN: Primary | ICD-10-CM

## 2023-12-29 PROCEDURE — 97140 MANUAL THERAPY 1/> REGIONS: CPT | Mod: HCNC

## 2023-12-29 PROCEDURE — 97112 NEUROMUSCULAR REEDUCATION: CPT | Mod: HCNC

## 2023-12-29 PROCEDURE — 97110 THERAPEUTIC EXERCISES: CPT | Mod: HCNC

## 2023-12-29 NOTE — PROGRESS NOTES
"  Physical Therapy Daily Treatment Note     Name: Lima Maldonado  [facility] Number: [id number]    Therapy Diagnosis:   Encounter Diagnosis   Name Primary?    Neck pain Yes     Physician: Wayne Singh MD    Visit Date: 12/29/2023    Physician Orders: PT Eval and Treat   Medical Diagnosis from Referral:   M54.2 (ICD-10-CM) - Cervicalgia   M54.12 (ICD-10-CM) - Cervical radiculopathy      Evaluation Date: 12/5/2023  Authorization Period Expiration: 12/30/23  Plan of Care Expiration: 1/19/24  Progress Note Due: 1/5/24  Visit # / Visits authorized: 1/ 1, 4/10  FOTO: 1/ 3    Time In: 9:17 am   Time Out: 9:59 am   Total Billable Time: 42 minutes    Precautions: Standard, Diabetes, and hx of cancer,  osteoporosis     Subjective     Pt reports: that neck is not feeling too bad, but (B) shoulders are bothering her, (L) > (R). Pt stated that she did a lot of cooking over the holidays.   She  was partially  compliant with home exercise program.  Response to previous treatment: felt pretty good for a day  Functional change: progressing     Pain: 5/10 (B) shoulders,  (L) > (R)       Objective       therapeutic exercises to develop strength,  ROM, and flexibility for 24  minutes including:  UT stretch 3x30" B  LS stretch 3x30" B   (B) rotation c/ towel assist 5" 10x   Shoulder ER and IR RTB 2x10 B   Corner pec stretch 3x30"   Supine serratus punches 2x10     manual therapy techniques:  were applied for 8 minutes, including:  STM bilateral upper trap        neuromuscular re-education activities to improve: Posture and stabilization for 10 minutes. The following activities were included:  Chin tucks 3" 3x10   Rows w/scap retraction 2x10 3" RTB   No money RTB x10 3" then held  HSA YTB 2x10       Home Exercises Provided and Patient Education Provided     Education provided:   - HEP     Written Home Exercises Provided: yes.  Exercises were reviewed and Katerin was able to demonstrate them prior to the end of the session.  Katerin demonstrated " good  understanding of the education provided.     See EMR under Patient Instructions for exercises provided 12/29/2023.      Assessment     Additional set of (B) UE ER deferred due to pt report of pain in (L) shoulder when performing. Pt was able to tolerate additional therex noted above. Soft tissue restrictions noted along (B) UT during manual therapy.   Katerin Is progressing towards her goals.   Pt prognosis is Good.     Pt will continue to benefit from skilled outpatient physical therapy to address the deficits listed in the problem list box on initial evaluation, provide pt/family education and to maximize pt's level of independence in the home and community environment.     Pt's spiritual, cultural and educational needs considered and pt agreeable to plan of care and goals.    Anticipated barriers to physical therapy:  chronicity of symptoms, co-morbidities     Goals:   Short Term Goals: 1 week  Pt will be compliant with HEP to supplement PT with decreasing pain and improving functional mobility     Long Term Goals: 6 weeks   Pt will improve  FOTO score to 70 in order to demo improved functional mobility  Pt will demo (R) cervical rotation AROM = (L) cervical rotation AROM in order to improve ability to perform ADLs  Pt will report neck/UE pain </= 4/10 at worst in order to be able to perform ADLs with less difficulty     Plan     Continue per POC, progress as tolerated     Traci Katz, PT

## 2024-01-02 ENCOUNTER — CLINICAL SUPPORT (OUTPATIENT)
Dept: REHABILITATION | Facility: HOSPITAL | Age: 74
End: 2024-01-02
Payer: MEDICARE

## 2024-01-02 DIAGNOSIS — M54.2 NECK PAIN: Primary | ICD-10-CM

## 2024-01-02 PROCEDURE — 97112 NEUROMUSCULAR REEDUCATION: CPT | Mod: HCNC,CQ

## 2024-01-02 PROCEDURE — 97110 THERAPEUTIC EXERCISES: CPT | Mod: HCNC,CQ

## 2024-01-02 NOTE — PROGRESS NOTES
"  Physical Therapy Daily Treatment Note     Name: Lima Maldonado  [facility] Number: [id number]    Therapy Diagnosis:   No diagnosis found.    Physician: Wayne Singh MD    Visit Date: 1/2/2024    Physician Orders: PT Eval and Treat   Medical Diagnosis from Referral:   M54.2 (ICD-10-CM) - Cervicalgia   M54.12 (ICD-10-CM) - Cervical radiculopathy      Evaluation Date: 12/5/2023  Authorization Period Expiration: 3/30/24  Plan of Care Expiration: 1/19/24  Progress Note Due: 1/5/24  Visit # / Visits authorized: 1/ 1, 4/10, 1/20  FOTO: 1/ 3    Time In: 9:00 am   Time Out: 9:48 am   Total Billable Time: 48 minutes    Precautions: Standard, Diabetes, and hx of cancer,  osteoporosis     Subjective     Pt reports: that she continues with pain in both shoulders but left is more prevalent that the rest. Pt states that she wonders if some of the restrictions in her left shoulder ROM could be due to having lymph nodes removed about 6 years ago due to cancer. Pt states that after they were removed she did good about performing her exercises, but got away from it.   She  was partially  compliant with home exercise program.  Response to previous treatment: felt pretty good for a day  Functional change: progressing     Pain: 6/10 (B) shoulders,  (L) > (R)       Objective       therapeutic exercises to develop strength,  ROM, and flexibility for 26 minutes including:  UT stretch 3x30" B  LS stretch 3x30" B   (B) rotation c/ towel assist 5" 10x   Shoulder ER and IR RTB 2x10 B   Corner pec stretch 3x30"   Supine serratus punches 2x10   Pulleys flex/scap 2 min ea     manual therapy techniques:  were applied for 10 minutes, including:  STM bilateral upper trap        neuromuscular re-education activities to improve: Posture and stabilization for 12 minutes. The following activities were included:  Chin tucks 3" 3x10   Rows w/scap retraction 2x10 3" RTB   Supine No money RTB x10 3"   HSA YTB 2x10       Home Exercises Provided and Patient " Education Provided     Education provided:   - HEP     Written Home Exercises Provided: yes.  Exercises were reviewed and Katerin was able to demonstrate them prior to the end of the session.  Katerin demonstrated good  understanding of the education provided.     See EMR under Patient Instructions for exercises provided 12/29/2023.      Assessment     Incorporated pulleys this session in order to aid in shoulder ROM. Pt instructed to perform within pain free ROM. Pt able to complete no moneys in supine position this session with no aggravation of left shoulder pain. Will continue to monitor pt's response to therapy sessions and progress as tolerated.     Katerin Is progressing towards her goals.   Pt prognosis is Good.     Pt will continue to benefit from skilled outpatient physical therapy to address the deficits listed in the problem list box on initial evaluation, provide pt/family education and to maximize pt's level of independence in the home and community environment.     Pt's spiritual, cultural and educational needs considered and pt agreeable to plan of care and goals.    Anticipated barriers to physical therapy:  chronicity of symptoms, co-morbidities     Goals:   Short Term Goals: 1 week  Pt will be compliant with HEP to supplement PT with decreasing pain and improving functional mobility     Long Term Goals: 6 weeks   Pt will improve  FOTO score to 70 in order to demo improved functional mobility  Pt will demo (R) cervical rotation AROM = (L) cervical rotation AROM in order to improve ability to perform ADLs  Pt will report neck/UE pain </= 4/10 at worst in order to be able to perform ADLs with less difficulty     Plan     Continue per POC, progress as tolerated     Ligia Sotelo, YONG

## 2024-01-03 ENCOUNTER — OFFICE VISIT (OUTPATIENT)
Dept: OPTOMETRY | Facility: CLINIC | Age: 74
End: 2024-01-03
Payer: MEDICARE

## 2024-01-03 DIAGNOSIS — H52.203 MYOPIA OF BOTH EYES WITH ASTIGMATISM: ICD-10-CM

## 2024-01-03 DIAGNOSIS — H52.13 MYOPIA OF BOTH EYES WITH ASTIGMATISM: ICD-10-CM

## 2024-01-03 DIAGNOSIS — Z01.00 EYE EXAM, ROUTINE: Primary | ICD-10-CM

## 2024-01-03 DIAGNOSIS — E78.2 MIXED HYPERLIPIDEMIA: ICD-10-CM

## 2024-01-03 PROCEDURE — 1159F MED LIST DOCD IN RCRD: CPT | Mod: HCNC,CPTII,S$GLB, | Performed by: OPTOMETRIST

## 2024-01-03 PROCEDURE — 3288F FALL RISK ASSESSMENT DOCD: CPT | Mod: HCNC,CPTII,S$GLB, | Performed by: OPTOMETRIST

## 2024-01-03 PROCEDURE — 92014 COMPRE OPH EXAM EST PT 1/>: CPT | Mod: HCNC,S$GLB,, | Performed by: OPTOMETRIST

## 2024-01-03 PROCEDURE — 99999 PR PBB SHADOW E&M-EST. PATIENT-LVL III: CPT | Mod: PBBFAC,HCNC,, | Performed by: OPTOMETRIST

## 2024-01-03 PROCEDURE — 2023F DILAT RTA XM W/O RTNOPTHY: CPT | Mod: HCNC,CPTII,S$GLB, | Performed by: OPTOMETRIST

## 2024-01-03 PROCEDURE — 1101F PT FALLS ASSESS-DOCD LE1/YR: CPT | Mod: HCNC,CPTII,S$GLB, | Performed by: OPTOMETRIST

## 2024-01-03 PROCEDURE — 1126F AMNT PAIN NOTED NONE PRSNT: CPT | Mod: HCNC,CPTII,S$GLB, | Performed by: OPTOMETRIST

## 2024-01-03 NOTE — TELEPHONE ENCOUNTER
No care due was identified.  [facility] Embedded Care Due Messages. Reference number: [id number].   1/03/2024 11:15:09 AM CST

## 2024-01-03 NOTE — PROGRESS NOTES
HPI    Annual Diabetic Eye Exam ([facility])  Pt states vision is stable   Multifocal PCIOL     Pt denies F/F     Pt denies Dry/ Itchy/ Burning  Pt reports goopy feeling in eyes   Gtt: Yes Refresh BID- TID   Pt report little to no relief     DM Dx'ed   BS: 150  Hemoglobin A1C       Date                     Value               Ref Range             Status                11/28/2023               6.4 (H)             4.0 - 5.6 %           Final                   07/25/2023               6.9 (H)             4.0 - 5.6 %           Final                   01/25/2023               6.8 (H)             4.0 - 5.6 %           Final                  Last edited by Mark Fernández, OD on 1/3/2024 10:26 AM.            Assessment /Plan     For exam results, see Encounter Report.    Eye exam, routine  -No retinopathy noted today.  Continued control with primary care physician and annual comprehensive eye exam.  -[facility]    Myopia of both eyes with astigmatism  -no sRx necessary  -Systane or Refresh PRN     RTC 1 yr

## 2024-01-04 RX ORDER — FENOFIBRATE 160 MG/1
160 TABLET ORAL
Qty: 90 TABLET | Refills: 3 | Status: SHIPPED | OUTPATIENT
Start: 2024-01-04

## 2024-01-04 NOTE — TELEPHONE ENCOUNTER
Refill Routing Note   Medication(s) are not appropriate for processing by [facility] for the following reason(s):        Drug-disease interaction  Drug-Disease: fenofibrate and Fatty liver; Early hepatic fibrosis    [facility] action(s):  Defer               Appointments  past 12m or future 3m with PCP    Date Provider   Last Visit   12/6/2023 Joie Mccall MD   Next Visit   Visit date not found Joie Mccall MD   ED visits in past 90 days: 0        Note composed:11:21 PM 01/03/2024

## 2024-01-05 ENCOUNTER — CLINICAL SUPPORT (OUTPATIENT)
Dept: REHABILITATION | Facility: HOSPITAL | Age: 74
End: 2024-01-05
Payer: MEDICARE

## 2024-01-05 ENCOUNTER — TELEPHONE (OUTPATIENT)
Dept: INTERNAL MEDICINE | Facility: CLINIC | Age: 74
End: 2024-01-05
Payer: MEDICARE

## 2024-01-05 VITALS — DIASTOLIC BLOOD PRESSURE: 72 MMHG | SYSTOLIC BLOOD PRESSURE: 142 MMHG

## 2024-01-05 DIAGNOSIS — M54.2 NECK PAIN: Primary | ICD-10-CM

## 2024-01-05 PROCEDURE — 97140 MANUAL THERAPY 1/> REGIONS: CPT | Mod: HCNC

## 2024-01-05 PROCEDURE — 97110 THERAPEUTIC EXERCISES: CPT | Mod: HCNC

## 2024-01-05 NOTE — TELEPHONE ENCOUNTER
BP reading of 142/72 was taken from pt's [facility] flow sheet on 12/28/2023. Pt is being followed by [facility].

## 2024-01-05 NOTE — PROGRESS NOTES
Physical Therapy Daily Treatment Note     Name: Lima Maldonado  [facility] Number: [id number]    Therapy Diagnosis:   Encounter Diagnosis   Name Primary?    Neck pain Yes     Physician: Wayne Singh MD    Visit Date: 1/5/2024    Physician Orders: PT Eval and Treat   Medical Diagnosis from Referral:   M54.2 (ICD-10-CM) - Cervicalgia   M54.12 (ICD-10-CM) - Cervical radiculopathy      Evaluation Date: 12/5/2023  Authorization Period Expiration: 3/30/24  Plan of Care Expiration: 1/19/24  Progress Note Due: 1/5/24  Visit # / Visits authorized: 1/ 1, 4/10, 2/20  FOTO: 1/ 3    Time In: 9:17 am   Time Out: 10:00 am   Total Billable Time: 43 minutes    Precautions: Standard, Diabetes, and hx of cancer,  osteoporosis     Subjective     Pt reports: that she has been having a rough couple of days due to (L) shoulder. Pt stated that (L) shoulder is aching a little currently. Pt stated that she also has been experiencing pain going down back of  (L) UE to back of hand/thumb. 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 Pt stated that she feels discomfort in (L) UT. Pt stated that feels better after PT sessions.   She was compliant with home exercise program.  Response to previous treatment: no adverse effects  Functional change: see assessment     Pain: 4/10 (L) shoulder       Objective     Cervical AROM: Pain/Dysfunction with Movement:   Flexion 50 degrees    Extension 40 degrees    Right side bending 30 degrees Stretch    Left side bending 30  degrees Stretch   Right rotation 60 degrees     Left rotation 65 degrees        Shoulder Right   Left   Pain/Dysfunction with Movement     AROM MMT AROM MMT ! = pain   NT = not tested    flexion WFL 5/5 WFL! 5/5 (L) AROM: pain in shoulder   abduction WFL 5/5 WFL! 5/5 (L) AROM: pain in shoulder   Internal rotation WFL 5/5 WFL 5/5    ER at 90° abd WFL NT WFL NT    ER at 0° abd NT 4+/5 NT 4+/5          Objective measurements and therapeutic exercises to develop strength,  ROM, and  "flexibility for 33 minutes including:  UT stretch 3x30" B  LS stretch 3x30" B   (B) rotation c/ towel assist 5" 10x   Shoulder ER YTB and IR RTB 2x10 B ea   Corner pec stretch 3x30"   Supine serratus punches 2x10   Radial nerve glides 1x10 L     manual therapy techniques:  were applied for 10 minutes, including:  STM bilateral upper trap        neuromuscular re-education activities to improve: Posture and stabilization for 0 minutes. The following activities were included:  Chin tucks 3" 3x10 not performed   Rows w/scap retraction 2x10 3" RTB not performed  Supine No money RTB x10 3" not performed  HSA YTB 2x10 not performed         Home Exercises Provided and Patient Education Provided     Education provided:   - HEP  - pt was instructed to stop performing nerve glides if increases pain - pt verbalized understanding    Written Home Exercises Provided: yes.  Exercises were reviewed and Katerin was able to demonstrate them prior to the end of the session.  Katerin demonstrated good  understanding of the education provided.     See EMR under Patient Instructions for exercises provided 1/5/2024.      Assessment     Updated measurements taken and pt demo improvements in cervical AROM and performed cervical AROM in all planes without report of pain. Pt reported experiencing pain in (L) shoulder when she performed (L) shoulder flexion and abduction AROM. Added radial nerve glides due to pt subjective report above. Decreased resistance and instructed pt to perform ER in small/pain free range and pt was able to perform without pain.   Katerin Is progressing towards her goals.   Pt prognosis is Good.     Pt will continue to benefit from skilled outpatient physical therapy to address the deficits listed in the problem list box on initial evaluation, provide pt/family education and to maximize pt's level of independence in the home and community environment.     Pt's spiritual, cultural and educational needs considered and pt agreeable " to plan of care and goals.    Anticipated barriers to physical therapy:  chronicity of symptoms, co-morbidities     Goals:   Short Term Goals: 1 week  Pt will be compliant with HEP to supplement PT with decreasing pain and improving functional mobility     Long Term Goals: 6 weeks   Pt will improve  FOTO score to 70 in order to demo improved functional mobility  Pt will demo (R) cervical rotation AROM = (L) cervical rotation AROM in order to improve ability to perform ADLs  Pt will report neck/UE pain </= 4/10 at worst in order to be able to perform ADLs with less difficulty     Plan     Continue per POC, progress as tolerated     Traci Katz, PT

## 2024-01-06 ENCOUNTER — PATIENT MESSAGE (OUTPATIENT)
Dept: DERMATOLOGY | Facility: CLINIC | Age: 74
End: 2024-01-06
Payer: MEDICARE

## 2024-01-08 ENCOUNTER — OFFICE VISIT (OUTPATIENT)
Dept: PAIN MEDICINE | Facility: CLINIC | Age: 74
End: 2024-01-08
Payer: MEDICARE

## 2024-01-08 VITALS
DIASTOLIC BLOOD PRESSURE: 83 MMHG | OXYGEN SATURATION: 100 % | HEART RATE: 97 BPM | RESPIRATION RATE: 18 BRPM | BODY MASS INDEX: 27.28 KG/M2 | SYSTOLIC BLOOD PRESSURE: 137 MMHG | HEIGHT: 66 IN | WEIGHT: 169.75 LBS | TEMPERATURE: 98 F

## 2024-01-08 DIAGNOSIS — G62.9 NEUROPATHY: ICD-10-CM

## 2024-01-08 DIAGNOSIS — M54.12 CERVICAL RADICULOPATHY: Primary | ICD-10-CM

## 2024-01-08 DIAGNOSIS — M47.812 CERVICAL SPONDYLOSIS: ICD-10-CM

## 2024-01-08 DIAGNOSIS — M50.30 DDD (DEGENERATIVE DISC DISEASE), CERVICAL: ICD-10-CM

## 2024-01-08 PROCEDURE — 3008F BODY MASS INDEX DOCD: CPT | Mod: HCNC,CPTII,S$GLB, | Performed by: NURSE PRACTITIONER

## 2024-01-08 PROCEDURE — 99213 OFFICE O/P EST LOW 20 MIN: CPT | Mod: HCNC,S$GLB,, | Performed by: NURSE PRACTITIONER

## 2024-01-08 PROCEDURE — 1101F PT FALLS ASSESS-DOCD LE1/YR: CPT | Mod: HCNC,CPTII,S$GLB, | Performed by: NURSE PRACTITIONER

## 2024-01-08 PROCEDURE — 1125F AMNT PAIN NOTED PAIN PRSNT: CPT | Mod: HCNC,CPTII,S$GLB, | Performed by: NURSE PRACTITIONER

## 2024-01-08 PROCEDURE — 3079F DIAST BP 80-89 MM HG: CPT | Mod: HCNC,CPTII,S$GLB, | Performed by: NURSE PRACTITIONER

## 2024-01-08 PROCEDURE — 1160F RVW MEDS BY RX/DR IN RCRD: CPT | Mod: HCNC,CPTII,S$GLB, | Performed by: NURSE PRACTITIONER

## 2024-01-08 PROCEDURE — 3075F SYST BP GE 130 - 139MM HG: CPT | Mod: HCNC,CPTII,S$GLB, | Performed by: NURSE PRACTITIONER

## 2024-01-08 PROCEDURE — 1159F MED LIST DOCD IN RCRD: CPT | Mod: HCNC,CPTII,S$GLB, | Performed by: NURSE PRACTITIONER

## 2024-01-08 PROCEDURE — 99999 PR PBB SHADOW E&M-EST. PATIENT-LVL V: CPT | Mod: PBBFAC,HCNC,, | Performed by: NURSE PRACTITIONER

## 2024-01-08 PROCEDURE — 3288F FALL RISK ASSESSMENT DOCD: CPT | Mod: HCNC,CPTII,S$GLB, | Performed by: NURSE PRACTITIONER

## 2024-01-08 RX ORDER — GABAPENTIN 300 MG/1
300 CAPSULE ORAL 3 TIMES DAILY
Qty: 270 CAPSULE | Refills: 1 | Status: SHIPPED | OUTPATIENT
Start: 2024-01-08

## 2024-01-08 NOTE — PROGRESS NOTES
Chronic Pain - Established Visit    Referring Physician: No ref. provider found    Chief Complaint:   Chief Complaint   Patient presents with    Neck Pain    Shoulder Pain     left    Hand Pain     thumb        SUBJECTIVE:    Interval History 1/8/2024:  The patient returns to clinic today for follow up of neck pain. She continues to report neck pain that radiates into the left shoulder and arm to her hand. She denies any right arm pain. She does report pain and numbness into her thumbs, left greater than right. Her pain is worse with activity. She is participating in physical therapy with some benefit. She is taking Gabapentin twice a day. She is taking Flexeril as needed, usually at bedtime due to drowsiness side effect. She denies any other health changes. Her pain today is 7/10.    HPI:  Lima Maldonado presents to the clinic for the evaluation of neck, left shoulder, and left hand pain. She also has right shoulder pain. The pain started 2-3 years ago following no inciting event and symptoms have been worsening in the past few months.The pain is located in the neck area and radiates to the left arm/hand.  The pain is described as aching and sharp and is rated as 5/10. The pain is rated with a score of  1/10 on the BEST day and a score of 9/10 on the WORST day.  Symptoms interfere with daily activity and sleeping. The pain is exacerbated by lying down with neck in a certain position.  The pain is mitigated by heat, and, massages medications. The patient reports 4 hours of uninterrupted sleep per night.  She admits to using a CPAP machine.    Patient denies urinary incontinence and bowel incontinence.  She admits to difficulty with buttoning buttons related to neuropathy.  She occasionally drops objects.    Physical Therapy/Home Exercise: not for neck      Pain Disability Index Review:      1/8/2024    10:45 AM 11/6/2023     1:57 PM   Last 3 PDI Scores   Pain Disability Index (PDI) 9 44       Pain  Medications:  Gabapentin     report:  Reviewed and consistent with medication use as prescribed.    Pain Procedures:   Had lower back injection over 30 years ago.    Imaging:   MRI CERVICAL SPINE  10/4/2023:  FINDINGS:  C1-C2: Dens is intact.  Pre dens space is maintained.     Alignment: Straightening of the normal cervical lordosis with 2 mm of retrolisthesis of C3 on C4.     Vertebrae: No acute fracture.  No marrow signal abnormality to suggest an infiltrative process.     Discs: Mild-to-moderate degenerative disc space narrowing at C5-C6, C6-C7 and C7-T1.     Cord: Normal.     Skull base and craniocervical junction: Normal.     Degenerative findings:     C2-C3: No spinal canal stenosis.  No neural foraminal narrowing.     C3-C4: Broad-based posterior disc osteophyte complex effaces the ventral thecal sac.  Bilateral uncovertebral joint spurring.  Mild-to-moderate spinal canal stenosis.  Mild bilateral neural foraminal narrowing.     C4-C5: Broad-based posterior disc osteophyte complex partially effaces the ventral thecal sac.  Left uncovertebral joint spurring.  Mild spinal canal stenosis.  No neural foraminal narrowing.     C5-C6: Central/left subarticular disc osteophyte protrusion effaces the ventral thecal sac and compresses the spinal cord.  Bilateral uncovertebral joint spurring.  Moderate spinal canal and lateral recess stenosis.  Moderate to severe right and mild left neural foraminal narrowing.     C6-C7: Central/left subarticular disc osteophyte protrusion effaces the ventral thecal sac and left lateral recess.  Left uncovertebral joint spurring.  Mild spinal canal and severe left lateral recess stenosis with impingement of the left exiting C7 nerve roots.  Severe left neural foraminal narrowing.     C7-T1: Left facet arthropathy.  No spinal canal stenosis.  Mild left neural foraminal narrowing.     T1-T2: Posterior central disc osteophyte complex partially effaces the ventral thecal sac.  No spinal  canal stenosis.  No neural foraminal narrowing.     Paraspinal muscles & soft tissues: Mucosal membrane thickening within the sphenoid sinuses.     Impression:     1. Cervical spondylosis as detailed above.  Moderate spinal canal stenosis at C5-C6.  Mild spinal canal and severe left lateral recess stenosis at C6-C7 with impingement of the left exiting C7 nerve roots.  Multilevel neural foraminal narrowing.           XR SHOULDER COMPLETE 2 OR MORE VIEWS LEFT 10/9/2023:  COMPARISON:  10/04/2023     FINDINGS:  Postop change mastectomy with numerous right surgical clips anterior chest wall stable.  Minimal mild DJD glenohumeral joint and AC joint, inferolateral spurring involves a chromium with encroachment sub acromial space.  Two vague calcific densities overlie greater tuberosity region on frontal view.     Impression:     No new fracture dislocation.  Additional findings above.            XR CERVICAL SPINE 10/4/2023:  FINDINGS:  Mild-to-moderate DJD.  The disc spaces are narrowed between C5 and the T1 vertebral segments.  There is a 2 mm C3/C4 retrolisthesis.  No fracture or dislocation.  No bone destruction identified.     Impression:     See above          Past Medical History:   Diagnosis Date    Acute cystitis without hematuria 7/17/2017    Arthritis     Back pain     Breast cancer     originally dx in 02/1997- treated with surgery, chemo and radiation     Class 1 obesity due to excess calories with serious comorbidity and body mass index (BMI) of 30.0 to 30.9 in adult 11/29/2018    Elevated bilirubin 11/19/2013    Fatty liver     GERD (gastroesophageal reflux disease)     Glucose intolerance (impaired glucose tolerance)     Hyperlipidemia     Hypertension     Hypothyroid     Hypothyroidism     hypothyroidism    Malignant neoplasm of lower-outer quadrant of left female breast 11/15/2016    Obesity     Obesity, diabetes, and hypertension syndrome 12/12/2018    Osteopenia     Osteoporosis     Primary insomnia  11/2/2016    Shingles     Sleep apnea     wears CPAP nightly     Squamous cell carcinoma 2011    right forearm, sx by Dr. Corinne Ray    Stress incontinence of urine 1/11/2018    Trouble in sleeping     Type 2 diabetes mellitus with diabetic polyneuropathy, without long-term current use of insulin 12/26/2017    Urinary incontinence     Well woman exam with routine gynecological exam 11/2/2016     Past Surgical History:   Procedure Laterality Date    APPENDECTOMY  2008    removed during hysterectomy surgery     BLEPHAROPLASTY Bilateral     BREAST BIOPSY      2010    Breast implant Bilateral 1998    implants removed in 2003    BREAST LUMPECTOMY      02/1997    BREAST RECONSTRUCTION Bilateral     02/2017    BREAST SURGERY      see details below     CATARACT EXTRACTION W/  INTRAOCULAR LENS IMPLANT Left 08/13/2020    Procedure: EXTRACTION, CATARACT, WITH IOL INSERTION;  Surgeon: Ivanna Coleman MD;  Location: [facility];  Service: Ophthalmology;  Laterality: Left;    CATARACT EXTRACTION W/  INTRAOCULAR LENS IMPLANT Right 08/27/2020    Procedure: EXTRACTION, CATARACT, WITH IOL INSERTION LASER;  Surgeon: Ivanna Coleman MD;  Location: [facility] OR;  Service: Ophthalmology;  Laterality: Right;    COLONOSCOPY N/A 05/15/2018    Procedure: COLONOSCOPY;  Surgeon: Roldan Gonzales MD;  Location: [facility] (4TH FLR);  Service: Endoscopy;  Laterality: N/A;    COSMETIC SURGERY  1/2022    Eyelift    ENDOSCOPIC ULTRASOUND OF UPPER GASTROINTESTINAL TRACT N/A 10/11/2023    Procedure: ULTRASOUND, UPPER GI TRACT, ENDOSCOPIC;  Surgeon: Hermelindo Coleman MD;  Location: [facility] (2ND FLR);  Service: Endoscopy;  Laterality: N/A;  instr portal-ozempic or trulicity-tb  10/5-precall complete/pt verbalized understanding of holding Ozempic-MS    ESOPHAGOGASTRODUODENOSCOPY N/A 12/20/2022    Procedure: EGD (ESOPHAGOGASTRODUODENOSCOPY);  Surgeon: Omar Ambriz MD;  Location: [facility] (4TH FLR);  Service: Endoscopy;  Laterality: N/A;  instructions via  portal - sm    EYE SURGERY      CATERACTS    HYSTERECTOMY  2008    benign    left breast reconstruction  2005    left modified radical mastectomy  1997    for treatment of breast cancer     LIPOMA RESECTION  2010    removed from upper back area     MODIFIED RADICAL MASTECTOMY W/ AXILLARY LYMPH NODE DISSECTION  1997    OOPHORECTOMY          PELVIC LAPAROSCOPY      pt had endometriosis     ME REMOVAL OF OVARY/TUBE(S)      benign    reduction of mons pubis      robotic lap  hysterectomy with bso  2008    UPPER GASTROINTESTINAL ENDOSCOPY  2013     Social History     Socioeconomic History    Marital status:    Tobacco Use    Smoking status: Former     Current packs/day: 0.00     Average packs/day: 0.5 packs/day for 3.0 years (1.5 ttl pk-yrs)     Types: Cigarettes     Start date: 4/15/1969     Quit date: 4/15/1972     Years since quittin.7    Smokeless tobacco: Never    Tobacco comments:     started at age 18 during college    Substance and Sexual Activity    Alcohol use: Yes     Comment: Rarely, not even 1X per week    Drug use: No    Sexual activity: Yes     Partners: Male     Birth control/protection: Post-menopausal, See Surgical Hx     Social Determinants of Health     Financial Resource Strain: Low Risk  (2023)    Overall Financial Resource Strain (CARDIA)     Difficulty of Paying Living Expenses: Not hard at all   Food Insecurity: No Food Insecurity (2023)    Hunger Vital Sign     Worried About Running Out of Food in the Last Year: Never true     Ran Out of Food in the Last Year: Never true   Transportation Needs: No Transportation Needs (2023)    PRAPARE - Transportation     Lack of Transportation (Medical): No     Lack of Transportation (Non-Medical): No   Physical Activity: Inactive (2023)    Exercise Vital Sign     Days of Exercise per Week: 0 days     Minutes of Exercise per Session: 0 min   Stress: No Stress Concern Present (2023)     [employer] - Occupational Stress Questionnaire     Feeling of Stress : Not at all   Recent Concern: Stress - Stress Concern Present (10/20/2023)    [employer] - Occupational Stress Questionnaire     Feeling of Stress : To some extent   Social Connections: Socially Integrated (2023)    Social Connection and Isolation Panel [NHANES]     Frequency of Communication with Friends and Family: More than three times a week     Frequency of Social Gatherings with Friends and Family: More than three times a week     Attends Islam Services: 1 to 4 times per year     Active Member of Clubs or Organizations: Yes     Attends Club or Organization Meetings: More than 4 times per year     Marital Status:    Housing Stability: Low Risk  (2023)    Housing Stability Vital Sign     Unable to Pay for Housing in the Last Year: No     Number of Places Lived in the Last Year: 1     Unstable Housing in the Last Year: No     Family History   Problem Relation Age of Onset    Heart attack Father 51    Heart disease Father         Heart [place], age 51    Alcohol abuse Father     Breast cancer Sister 51        BRCA 1/2 negative    Cancer Sister         Breast Cancer    Cancer Mother 65        Malignant melanoma    Melanoma Mother     No Known Problems Brother     No Known Problems Daughter     No Known Problems Son     No Known Problems Daughter     No Known Problems Son     Diabetes Maternal Uncle             Diabetes Maternal Uncle             Arthritis Maternal Grandmother         Severe arthitis in Knees    Uterine cancer Neg Hx     Colon cancer Neg Hx     Celiac disease Neg Hx     Esophageal cancer Neg Hx     Inflammatory bowel disease Neg Hx     Liver cancer Neg Hx     Liver disease Neg Hx     Stomach cancer Neg Hx     Ulcerative colitis Neg Hx     Hypertension Neg Hx     Cirrhosis Neg Hx     Crohn's disease Neg Hx     Rectal cancer Neg Hx      Ovarian cancer Neg Hx        Review of patient's allergies indicates:   Allergen Reactions    Erythromycin      Other reaction(s): Nausea    Erythromycin (bulk)      Other reaction(s): Nausea    Oxycodone Nausea And Vomiting    Oxycodone-acetaminophen Nausea And Vomiting     Other reaction(s): Nausea       Current Outpatient Medications   Medication Sig    ascorbic acid, vitamin C, (VITAMIN C) 1000 MG tablet Take 1 tablet by mouth once daily.    aspirin (ECOTRIN) 81 MG EC tablet Take 81 mg by mouth once daily.    calcium-vitamin D tablet 600 mg-200 units     CANNABIDIOL, CBD, EXTRACT ORAL Take 1 drop by mouth Daily.    coenzyme Q10-vitamin E 100-100 mg-unit Cap Take 1 capsule by mouth once daily.     cyclobenzaprine (FLEXERIL) 5 MG tablet Take 1 tablet (5 mg total) by mouth 3 (three) times daily as needed for Muscle spasms (muscular pain).    estradioL (YUVAFEM) 10 mcg Tab PLACE 1 TABLET VAGINALLY TWICE A WEEK    fenofibrate 160 MG Tab TAKE 1 TABLET BY MOUTH EVERY DAY    fluticasone (FLONASE) 50 mcg/actuation nasal spray 1 spray by Each Nare route 2 (two) times daily.     gabapentin (NEURONTIN) 300 MG capsule TAKE 2 CAPSULES BY MOUTH EVERY EVENING.    irbesartan (AVAPRO) 150 MG tablet Take 1 tablet (150 mg total) by mouth every evening.    letrozole (FEMARA) 2.5 mg Tab TAKE 1 TABLET BY MOUTH EVERY DAY    levothyroxine (SYNTHROID) 50 MCG tablet TAKE 1 TABLET BY MOUTH EVERY DAY    metFORMIN (GLUCOPHAGE) 500 MG tablet Take 1 tablet (500 mg total) by mouth 2 (two) times daily with meals.    MULTIVITAMIN ORAL Take 1 tablet by mouth once daily.     omega-3 fatty acids-vitamin E 1,000 mg Cap Take 1 capsule by mouth once daily.     pantoprazole (PROTONIX) 20 MG tablet Take 1 tablet (20 mg total) by mouth before breakfast.    simvastatin (ZOCOR) 20 MG tablet TAKE 1 TABLET BY MOUTH EVERY DAY IN THE EVENING    sucralfate (CARAFATE) 100 mg/mL suspension Take 10 mLs (1 g total) by mouth 4 (four) times daily.    tirzepatide  "(MOUNJARO) 5 mg/0.5 mL PnIj Inject 5 mg into the skin every 7 days.    tobramycin sulfate 0.3% (TOBREX) 0.3 % ophthalmic solution Apply to wound beds twice daily    vitamin D 1000 units Tab Take 1,000 Units by mouth once daily.     denosumab (PROLIA) 60 mg/mL Syrg Inject 1 mL (60 mg total) into the skin every 6 (six) months.     No current facility-administered medications for this visit.       REVIEW OF SYSTEMS:    GENERAL:  No weight loss, malaise or fevers.  HEENT:  Negative for frequent or significant headaches. NELDA  NECK:  Negative for lumps, goiter, and significant neck swelling.   RESPIRATORY:  Negative for cough, wheezing or shortness of breath.  CARDIOVASCULAR:  Negative for chest pain, leg swelling or palpitations. HTN  GI:  Negative for abdominal discomfort, blood in stools or black stools or change in bowel habits.  ENDO: Thyroid disorder. Diabetes.   MUSCULOSKELETAL:  See HPI.  SKIN:  Negative for lesions, rash, and itching.  PSYCH:  Negative for sleep disturbance, mood disorder and recent psychosocial stressors.  HEMATOLOGY/LYMPHOLOGY:  Hx of breast cancer.   NEURO:   No history of headaches, syncope, paralysis, seizures or tremors.  All other reviewed and negative other than HPI.    OBJECTIVE:    /83   Pulse 97   Temp 98.2 °F (36.8 °C)   Resp 18   Ht 5' 6" (1.676 m)   Wt 77 kg (169 lb 12.1 oz)   SpO2 100%   BMI 27.40 kg/m²     PHYSICAL EXAMINATION:  General appearance: Well appearing, in no acute distress, alert and appropriately communicative.  Psych:  Mood and affect appropriate.  Skin: Skin color, texture, turgor normal, no rashes or lesions, in both upper and lower body.  Head/face:  Atraumatic, normocephalic.  Neck: There is pain to palpation over the cervical paraspinous muscles. Spurling Negative. Limited ROM with pain on flexion, extension, and contralateral rotation.   Cor: regular rate  Pulm: non-labored breathing  Extremities:  No deformities, edema, or skin discoloration. Good " capillary refill.  Musculoskeletal:  Shoulder provocative maneuvers are negative. Bilateral upper extremity strength is normal and symmetric.  No atrophy or tone abnormalities are noted.  Neuro:  No loss of sensation is noted.  Gait: Normal.    ASSESSMENT: 73 y.o. year old female with neck pain, consistent with:     1. Cervical radiculopathy  Procedure Order to Pain Management      2. Cervical spondylosis        3. DDD (degenerative disc disease), cervical        4. Neuropathy  gabapentin (NEURONTIN) 300 MG capsule            PLAN:     - Previous imaging was reviewed and discussed with the patient today.    - Schedule for C7/T1 IL GABINO.     - I have stressed the importance of physical activity and a home exercise plan to help with pain and improve health.    - Continue physical therapy.     - Continue Flexeril.     - Increase Gabapentin 300 mg to TID, refill provided.     - RTC 2 weeks after above procedure.     The above plan and management options were discussed at length with patient. Patient is in agreement with the above and verbalized understanding.     Luisa Zaragoza NP  01/08/2024

## 2024-01-08 NOTE — H&P (VIEW-ONLY)
Chronic Pain - Established Visit    Referring Physician: No ref. provider found    Chief Complaint:   Chief Complaint   Patient presents with    Neck Pain    Shoulder Pain     left    Hand Pain     thumb        SUBJECTIVE:    Interval History 1/8/2024:  The patient returns to clinic today for follow up of neck pain. She continues to report neck pain that radiates into the left shoulder and arm to her hand. She denies any right arm pain. She does report pain and numbness into her thumbs, left greater than right. Her pain is worse with activity. She is participating in physical therapy with some benefit. She is taking Gabapentin twice a day. She is taking Flexeril as needed, usually at bedtime due to drowsiness side effect. She denies any other health changes. Her pain today is 7/10.    HPI:  Lima Maldonado presents to the clinic for the evaluation of neck, left shoulder, and left hand pain. She also has right shoulder pain. The pain started 2-3 years ago following no inciting event and symptoms have been worsening in the past few months.The pain is located in the neck area and radiates to the left arm/hand.  The pain is described as aching and sharp and is rated as 5/10. The pain is rated with a score of  1/10 on the BEST day and a score of 9/10 on the WORST day.  Symptoms interfere with daily activity and sleeping. The pain is exacerbated by lying down with neck in a certain position.  The pain is mitigated by heat, and, massages medications. The patient reports 4 hours of uninterrupted sleep per night.  She admits to using a CPAP machine.    Patient denies urinary incontinence and bowel incontinence.  She admits to difficulty with buttoning buttons related to neuropathy.  She occasionally drops objects.    Physical Therapy/Home Exercise: not for neck      Pain Disability Index Review:      1/8/2024    10:45 AM 11/6/2023     1:57 PM   Last 3 PDI Scores   Pain Disability Index (PDI) 9 44       Pain  Medications:  Gabapentin     report:  Reviewed and consistent with medication use as prescribed.    Pain Procedures:   Had lower back injection over 30 years ago.    Imaging:   MRI CERVICAL SPINE  10/4/2023:  FINDINGS:  C1-C2: Dens is intact.  Pre dens space is maintained.     Alignment: Straightening of the normal cervical lordosis with 2 mm of retrolisthesis of C3 on C4.     Vertebrae: No acute fracture.  No marrow signal abnormality to suggest an infiltrative process.     Discs: Mild-to-moderate degenerative disc space narrowing at C5-C6, C6-C7 and C7-T1.     Cord: Normal.     Skull base and craniocervical junction: Normal.     Degenerative findings:     C2-C3: No spinal canal stenosis.  No neural foraminal narrowing.     C3-C4: Broad-based posterior disc osteophyte complex effaces the ventral thecal sac.  Bilateral uncovertebral joint spurring.  Mild-to-moderate spinal canal stenosis.  Mild bilateral neural foraminal narrowing.     C4-C5: Broad-based posterior disc osteophyte complex partially effaces the ventral thecal sac.  Left uncovertebral joint spurring.  Mild spinal canal stenosis.  No neural foraminal narrowing.     C5-C6: Central/left subarticular disc osteophyte protrusion effaces the ventral thecal sac and compresses the spinal cord.  Bilateral uncovertebral joint spurring.  Moderate spinal canal and lateral recess stenosis.  Moderate to severe right and mild left neural foraminal narrowing.     C6-C7: Central/left subarticular disc osteophyte protrusion effaces the ventral thecal sac and left lateral recess.  Left uncovertebral joint spurring.  Mild spinal canal and severe left lateral recess stenosis with impingement of the left exiting C7 nerve roots.  Severe left neural foraminal narrowing.     C7-T1: Left facet arthropathy.  No spinal canal stenosis.  Mild left neural foraminal narrowing.     T1-T2: Posterior central disc osteophyte complex partially effaces the ventral thecal sac.  No spinal  canal stenosis.  No neural foraminal narrowing.     Paraspinal muscles & soft tissues: Mucosal membrane thickening within the sphenoid sinuses.     Impression:     1. Cervical spondylosis as detailed above.  Moderate spinal canal stenosis at C5-C6.  Mild spinal canal and severe left lateral recess stenosis at C6-C7 with impingement of the left exiting C7 nerve roots.  Multilevel neural foraminal narrowing.           XR SHOULDER COMPLETE 2 OR MORE VIEWS LEFT 10/9/2023:  COMPARISON:  10/04/2023     FINDINGS:  Postop change mastectomy with numerous right surgical clips anterior chest wall stable.  Minimal mild DJD glenohumeral joint and AC joint, inferolateral spurring involves a chromium with encroachment sub acromial space.  Two vague calcific densities overlie greater tuberosity region on frontal view.     Impression:     No new fracture dislocation.  Additional findings above.            XR CERVICAL SPINE 10/4/2023:  FINDINGS:  Mild-to-moderate DJD.  The disc spaces are narrowed between C5 and the T1 vertebral segments.  There is a 2 mm C3/C4 retrolisthesis.  No fracture or dislocation.  No bone destruction identified.     Impression:     See above          Past Medical History:   Diagnosis Date    Acute cystitis without hematuria 7/17/2017    Arthritis     Back pain     Breast cancer     originally dx in 02/1997- treated with surgery, chemo and radiation     Class 1 obesity due to excess calories with serious comorbidity and body mass index (BMI) of 30.0 to 30.9 in adult 11/29/2018    Elevated bilirubin 11/19/2013    Fatty liver     GERD (gastroesophageal reflux disease)     Glucose intolerance (impaired glucose tolerance)     Hyperlipidemia     Hypertension     Hypothyroid     Hypothyroidism     hypothyroidism    Malignant neoplasm of lower-outer quadrant of left female breast 11/15/2016    Obesity     Obesity, diabetes, and hypertension syndrome 12/12/2018    Osteopenia     Osteoporosis     Primary insomnia  11/2/2016    Shingles     Sleep apnea     wears CPAP nightly     Squamous cell carcinoma 2011    right forearm, sx by Dr. Corinne Ray    Stress incontinence of urine 1/11/2018    Trouble in sleeping     Type 2 diabetes mellitus with diabetic polyneuropathy, without long-term current use of insulin 12/26/2017    Urinary incontinence     Well woman exam with routine gynecological exam 11/2/2016     Past Surgical History:   Procedure Laterality Date    APPENDECTOMY  2008    removed during hysterectomy surgery     BLEPHAROPLASTY Bilateral     BREAST BIOPSY      2010    Breast implant Bilateral 1998    implants removed in 2003    BREAST LUMPECTOMY      02/1997    BREAST RECONSTRUCTION Bilateral     02/2017    BREAST SURGERY      see details below     CATARACT EXTRACTION W/  INTRAOCULAR LENS IMPLANT Left 08/13/2020    Procedure: EXTRACTION, CATARACT, WITH IOL INSERTION;  Surgeon: Ivanna Coleman MD;  Location: [facility];  Service: Ophthalmology;  Laterality: Left;    CATARACT EXTRACTION W/  INTRAOCULAR LENS IMPLANT Right 08/27/2020    Procedure: EXTRACTION, CATARACT, WITH IOL INSERTION LASER;  Surgeon: Ivanna Coleman MD;  Location: [facility] OR;  Service: Ophthalmology;  Laterality: Right;    COLONOSCOPY N/A 05/15/2018    Procedure: COLONOSCOPY;  Surgeon: Roldan Gonzales MD;  Location: [facility] (4TH FLR);  Service: Endoscopy;  Laterality: N/A;    COSMETIC SURGERY  1/2022    Eyelift    ENDOSCOPIC ULTRASOUND OF UPPER GASTROINTESTINAL TRACT N/A 10/11/2023    Procedure: ULTRASOUND, UPPER GI TRACT, ENDOSCOPIC;  Surgeon: Hermelindo Coleman MD;  Location: [facility] (2ND FLR);  Service: Endoscopy;  Laterality: N/A;  instr portal-ozempic or trulicity-tb  10/5-precall complete/pt verbalized understanding of holding Ozempic-MS    ESOPHAGOGASTRODUODENOSCOPY N/A 12/20/2022    Procedure: EGD (ESOPHAGOGASTRODUODENOSCOPY);  Surgeon: Omar Ambriz MD;  Location: [facility] (4TH FLR);  Service: Endoscopy;  Laterality: N/A;  instructions via  portal - sm    EYE SURGERY      CATERACTS    HYSTERECTOMY  2008    benign    left breast reconstruction  2005    left modified radical mastectomy  1997    for treatment of breast cancer     LIPOMA RESECTION  2010    removed from upper back area     MODIFIED RADICAL MASTECTOMY W/ AXILLARY LYMPH NODE DISSECTION  1997    OOPHORECTOMY          PELVIC LAPAROSCOPY      pt had endometriosis     KS REMOVAL OF OVARY/TUBE(S)      benign    reduction of mons pubis      robotic lap  hysterectomy with bso  2008    UPPER GASTROINTESTINAL ENDOSCOPY  2013     Social History     Socioeconomic History    Marital status:    Tobacco Use    Smoking status: Former     Current packs/day: 0.00     Average packs/day: 0.5 packs/day for 3.0 years (1.5 ttl pk-yrs)     Types: Cigarettes     Start date: 4/15/1969     Quit date: 4/15/1972     Years since quittin.7    Smokeless tobacco: Never    Tobacco comments:     started at age 18 during college    Substance and Sexual Activity    Alcohol use: Yes     Comment: Rarely, not even 1X per week    Drug use: No    Sexual activity: Yes     Partners: Male     Birth control/protection: Post-menopausal, See Surgical Hx     Social Determinants of Health     Financial Resource Strain: Low Risk  (2023)    Overall Financial Resource Strain (CARDIA)     Difficulty of Paying Living Expenses: Not hard at all   Food Insecurity: No Food Insecurity (2023)    Hunger Vital Sign     Worried About Running Out of Food in the Last Year: Never true     Ran Out of Food in the Last Year: Never true   Transportation Needs: No Transportation Needs (2023)    PRAPARE - Transportation     Lack of Transportation (Medical): No     Lack of Transportation (Non-Medical): No   Physical Activity: Inactive (2023)    Exercise Vital Sign     Days of Exercise per Week: 0 days     Minutes of Exercise per Session: 0 min   Stress: No Stress Concern Present (2023)     [employer] - Occupational Stress Questionnaire     Feeling of Stress : Not at all   Recent Concern: Stress - Stress Concern Present (10/20/2023)    [employer] - Occupational Stress Questionnaire     Feeling of Stress : To some extent   Social Connections: Socially Integrated (2023)    Social Connection and Isolation Panel [NHANES]     Frequency of Communication with Friends and Family: More than three times a week     Frequency of Social Gatherings with Friends and Family: More than three times a week     Attends Sikh Services: 1 to 4 times per year     Active Member of Clubs or Organizations: Yes     Attends Club or Organization Meetings: More than 4 times per year     Marital Status:    Housing Stability: Low Risk  (2023)    Housing Stability Vital Sign     Unable to Pay for Housing in the Last Year: No     Number of Places Lived in the Last Year: 1     Unstable Housing in the Last Year: No     Family History   Problem Relation Age of Onset    Heart attack Father 51    Heart disease Father         Heart [place], age 51    Alcohol abuse Father     Breast cancer Sister 51        BRCA 1/2 negative    Cancer Sister         Breast Cancer    Cancer Mother 65        Malignant melanoma    Melanoma Mother     No Known Problems Brother     No Known Problems Daughter     No Known Problems Son     No Known Problems Daughter     No Known Problems Son     Diabetes Maternal Uncle             Diabetes Maternal Uncle             Arthritis Maternal Grandmother         Severe arthitis in Knees    Uterine cancer Neg Hx     Colon cancer Neg Hx     Celiac disease Neg Hx     Esophageal cancer Neg Hx     Inflammatory bowel disease Neg Hx     Liver cancer Neg Hx     Liver disease Neg Hx     Stomach cancer Neg Hx     Ulcerative colitis Neg Hx     Hypertension Neg Hx     Cirrhosis Neg Hx     Crohn's disease Neg Hx     Rectal cancer Neg Hx      Ovarian cancer Neg Hx        Review of patient's allergies indicates:   Allergen Reactions    Erythromycin      Other reaction(s): Nausea    Erythromycin (bulk)      Other reaction(s): Nausea    Oxycodone Nausea And Vomiting    Oxycodone-acetaminophen Nausea And Vomiting     Other reaction(s): Nausea       Current Outpatient Medications   Medication Sig    ascorbic acid, vitamin C, (VITAMIN C) 1000 MG tablet Take 1 tablet by mouth once daily.    aspirin (ECOTRIN) 81 MG EC tablet Take 81 mg by mouth once daily.    calcium-vitamin D tablet 600 mg-200 units     CANNABIDIOL, CBD, EXTRACT ORAL Take 1 drop by mouth Daily.    coenzyme Q10-vitamin E 100-100 mg-unit Cap Take 1 capsule by mouth once daily.     cyclobenzaprine (FLEXERIL) 5 MG tablet Take 1 tablet (5 mg total) by mouth 3 (three) times daily as needed for Muscle spasms (muscular pain).    estradioL (YUVAFEM) 10 mcg Tab PLACE 1 TABLET VAGINALLY TWICE A WEEK    fenofibrate 160 MG Tab TAKE 1 TABLET BY MOUTH EVERY DAY    fluticasone (FLONASE) 50 mcg/actuation nasal spray 1 spray by Each Nare route 2 (two) times daily.     gabapentin (NEURONTIN) 300 MG capsule TAKE 2 CAPSULES BY MOUTH EVERY EVENING.    irbesartan (AVAPRO) 150 MG tablet Take 1 tablet (150 mg total) by mouth every evening.    letrozole (FEMARA) 2.5 mg Tab TAKE 1 TABLET BY MOUTH EVERY DAY    levothyroxine (SYNTHROID) 50 MCG tablet TAKE 1 TABLET BY MOUTH EVERY DAY    metFORMIN (GLUCOPHAGE) 500 MG tablet Take 1 tablet (500 mg total) by mouth 2 (two) times daily with meals.    MULTIVITAMIN ORAL Take 1 tablet by mouth once daily.     omega-3 fatty acids-vitamin E 1,000 mg Cap Take 1 capsule by mouth once daily.     pantoprazole (PROTONIX) 20 MG tablet Take 1 tablet (20 mg total) by mouth before breakfast.    simvastatin (ZOCOR) 20 MG tablet TAKE 1 TABLET BY MOUTH EVERY DAY IN THE EVENING    sucralfate (CARAFATE) 100 mg/mL suspension Take 10 mLs (1 g total) by mouth 4 (four) times daily.    tirzepatide  "(MOUNJARO) 5 mg/0.5 mL PnIj Inject 5 mg into the skin every 7 days.    tobramycin sulfate 0.3% (TOBREX) 0.3 % ophthalmic solution Apply to wound beds twice daily    vitamin D 1000 units Tab Take 1,000 Units by mouth once daily.     denosumab (PROLIA) 60 mg/mL Syrg Inject 1 mL (60 mg total) into the skin every 6 (six) months.     No current facility-administered medications for this visit.       REVIEW OF SYSTEMS:    GENERAL:  No weight loss, malaise or fevers.  HEENT:  Negative for frequent or significant headaches. NELDA  NECK:  Negative for lumps, goiter, and significant neck swelling.   RESPIRATORY:  Negative for cough, wheezing or shortness of breath.  CARDIOVASCULAR:  Negative for chest pain, leg swelling or palpitations. HTN  GI:  Negative for abdominal discomfort, blood in stools or black stools or change in bowel habits.  ENDO: Thyroid disorder. Diabetes.   MUSCULOSKELETAL:  See HPI.  SKIN:  Negative for lesions, rash, and itching.  PSYCH:  Negative for sleep disturbance, mood disorder and recent psychosocial stressors.  HEMATOLOGY/LYMPHOLOGY:  Hx of breast cancer.   NEURO:   No history of headaches, syncope, paralysis, seizures or tremors.  All other reviewed and negative other than HPI.    OBJECTIVE:    /83   Pulse 97   Temp 98.2 °F (36.8 °C)   Resp 18   Ht 5' 6" (1.676 m)   Wt 77 kg (169 lb 12.1 oz)   SpO2 100%   BMI 27.40 kg/m²     PHYSICAL EXAMINATION:  General appearance: Well appearing, in no acute distress, alert and appropriately communicative.  Psych:  Mood and affect appropriate.  Skin: Skin color, texture, turgor normal, no rashes or lesions, in both upper and lower body.  Head/face:  Atraumatic, normocephalic.  Neck: There is pain to palpation over the cervical paraspinous muscles. Spurling Negative. Limited ROM with pain on flexion, extension, and contralateral rotation.   Cor: regular rate  Pulm: non-labored breathing  Extremities:  No deformities, edema, or skin discoloration. Good " capillary refill.  Musculoskeletal:  Shoulder provocative maneuvers are negative. Bilateral upper extremity strength is normal and symmetric.  No atrophy or tone abnormalities are noted.  Neuro:  No loss of sensation is noted.  Gait: Normal.    ASSESSMENT: 73 y.o. year old female with neck pain, consistent with:     1. Cervical radiculopathy  Procedure Order to Pain Management      2. Cervical spondylosis        3. DDD (degenerative disc disease), cervical        4. Neuropathy  gabapentin (NEURONTIN) 300 MG capsule            PLAN:     - Previous imaging was reviewed and discussed with the patient today.    - Schedule for C7/T1 IL GABINO.     - I have stressed the importance of physical activity and a home exercise plan to help with pain and improve health.    - Continue physical therapy.     - Continue Flexeril.     - Increase Gabapentin 300 mg to TID, refill provided.     - RTC 2 weeks after above procedure.     The above plan and management options were discussed at length with patient. Patient is in agreement with the above and verbalized understanding.     Luisa Zaragoza NP  01/08/2024

## 2024-01-09 ENCOUNTER — TELEPHONE (OUTPATIENT)
Dept: INTERNAL MEDICINE | Facility: CLINIC | Age: 74
End: 2024-01-09
Payer: MEDICARE

## 2024-01-09 ENCOUNTER — PATIENT MESSAGE (OUTPATIENT)
Dept: OTHER | Facility: OTHER | Age: 74
End: 2024-01-09
Payer: MEDICARE

## 2024-01-09 ENCOUNTER — CLINICAL SUPPORT (OUTPATIENT)
Dept: REHABILITATION | Facility: HOSPITAL | Age: 74
End: 2024-01-09
Payer: MEDICARE

## 2024-01-09 ENCOUNTER — PATIENT MESSAGE (OUTPATIENT)
Dept: PAIN MEDICINE | Facility: OTHER | Age: 74
End: 2024-01-09
Payer: MEDICARE

## 2024-01-09 VITALS — DIASTOLIC BLOOD PRESSURE: 83 MMHG | SYSTOLIC BLOOD PRESSURE: 136 MMHG

## 2024-01-09 DIAGNOSIS — M54.2 NECK PAIN: Primary | ICD-10-CM

## 2024-01-09 PROCEDURE — 97110 THERAPEUTIC EXERCISES: CPT | Mod: HCNC

## 2024-01-09 PROCEDURE — 97140 MANUAL THERAPY 1/> REGIONS: CPT | Mod: HCNC

## 2024-01-09 PROCEDURE — 97112 NEUROMUSCULAR REEDUCATION: CPT | Mod: HCNC

## 2024-01-09 NOTE — PROGRESS NOTES
"  Physical Therapy Daily Treatment Note     Name: Lima Maldonado  [facility] Number: [id number]    Therapy Diagnosis:   Encounter Diagnosis   Name Primary?    Neck pain Yes     Physician: Wayne Singh MD    Visit Date: 1/9/2024    Physician Orders: PT Eval and Treat   Medical Diagnosis from Referral:   M54.2 (ICD-10-CM) - Cervicalgia   M54.12 (ICD-10-CM) - Cervical radiculopathy      Evaluation Date: 12/5/2023  Authorization Period Expiration: 3/30/24  Plan of Care Expiration: 1/19/24  Progress Note Due: 1/5/24  Visit # / Visits authorized: 1/ 1, 4/10, 3/20  FOTO: 1/ 3    Time In: 10:30 am   Time Out: 11:14 am   Total Billable Time: 44 minutes    Precautions: Standard, Diabetes, and hx of cancer,  osteoporosis     Subjective     Pt reports: feeling so-so, usual pain. Pt stated that she saw MD yesterday and suggested doing an epidural in her neck. Pt stated that she already takes Gabapentin and MD prescribed her another prescription for Gabapentin. Pt stated that is scheduled to see MD for her hands tomorrow.   She was compliant with home exercise program.  Response to previous treatment: no adverse effects  Functional change: see note from 1/5/24    Pain: 5/10 (L) neck/UT/UE       Objective     therapeutic exercises to develop strength, ROM, and flexibility for 26 minutes including:  UT stretch 3x30" B  LS stretch 3x30" B   (B) rotation c/ towel assist 5" 10x   Shoulder ER YTB and IR RTB 2x10 B ea not performed  Corner pec stretch 3x30"   Supine serratus punches 2x10 not performed   Radial nerve glides 1x10 L   DNF x10 3"     manual therapy techniques: were applied for 10 minutes, including:  STM bilateral upper trap        neuromuscular re-education activities to improve: Posture and stabilization for 8 minutes. The following activities were included:  Chin tucks 3" 3x10 not performed   Rows w/scap retraction 2x10 3" RTB   No money RTB 2x10 3"   HSA YTB 2x10    Home Exercises Provided and Patient Education Provided "     Education provided:   - HEP     Written Home Exercises Provided: Patient instructed to cont prior HEP.   Katerin demonstrated good  understanding of the education provided.     See EMR under Patient Instructions for exercises provided prior visit.      Assessment     Pt received VC for proper technique when performing nerve glides. Decreased soft tissue restrictions noted along (B) UT during manual therapy today. Pt reported no increased pain at end of session.   Katerin Is progressing towards her goals.   Pt prognosis is Good.     Pt will continue to benefit from skilled outpatient physical therapy to address the deficits listed in the problem list box on initial evaluation, provide pt/family education and to maximize pt's level of independence in the home and community environment.     Pt's spiritual, cultural and educational needs considered and pt agreeable to plan of care and goals.    Anticipated barriers to physical therapy:  chronicity of symptoms, co-morbidities     Goals:   Short Term Goals: 1 week  Pt will be compliant with HEP to supplement PT with decreasing pain and improving functional mobility     Long Term Goals: 6 weeks   Pt will improve  FOTO score to 70 in order to demo improved functional mobility  Pt will demo (R) cervical rotation AROM = (L) cervical rotation AROM in order to improve ability to perform ADLs  Pt will report neck/UE pain </= 4/10 at worst in order to be able to perform ADLs with less difficulty     Plan     Continue per POC, progress as tolerated     Traci Katz, PT

## 2024-01-10 ENCOUNTER — OFFICE VISIT (OUTPATIENT)
Dept: ORTHOPEDICS | Facility: CLINIC | Age: 74
End: 2024-01-10
Payer: MEDICARE

## 2024-01-10 DIAGNOSIS — M19.041 PRIMARY OSTEOARTHRITIS OF BOTH HANDS: Primary | ICD-10-CM

## 2024-01-10 DIAGNOSIS — M19.042 PRIMARY OSTEOARTHRITIS OF BOTH HANDS: Primary | ICD-10-CM

## 2024-01-10 DIAGNOSIS — M18.12 PRIMARY OSTEOARTHRITIS OF FIRST CARPOMETACARPAL JOINT OF LEFT HAND: ICD-10-CM

## 2024-01-10 PROCEDURE — 99214 OFFICE O/P EST MOD 30 MIN: CPT | Mod: HCNC,25,S$GLB, | Performed by: ORTHOPAEDIC SURGERY

## 2024-01-10 PROCEDURE — 1101F PT FALLS ASSESS-DOCD LE1/YR: CPT | Mod: HCNC,CPTII,S$GLB, | Performed by: ORTHOPAEDIC SURGERY

## 2024-01-10 PROCEDURE — 1160F RVW MEDS BY RX/DR IN RCRD: CPT | Mod: HCNC,CPTII,S$GLB, | Performed by: ORTHOPAEDIC SURGERY

## 2024-01-10 PROCEDURE — 3288F FALL RISK ASSESSMENT DOCD: CPT | Mod: HCNC,CPTII,S$GLB, | Performed by: ORTHOPAEDIC SURGERY

## 2024-01-10 PROCEDURE — 1159F MED LIST DOCD IN RCRD: CPT | Mod: HCNC,CPTII,S$GLB, | Performed by: ORTHOPAEDIC SURGERY

## 2024-01-10 PROCEDURE — 99999 PR PBB SHADOW E&M-EST. PATIENT-LVL III: CPT | Mod: PBBFAC,HCNC,, | Performed by: ORTHOPAEDIC SURGERY

## 2024-01-10 PROCEDURE — 1125F AMNT PAIN NOTED PAIN PRSNT: CPT | Mod: HCNC,CPTII,S$GLB, | Performed by: ORTHOPAEDIC SURGERY

## 2024-01-10 PROCEDURE — 20600 DRAIN/INJ JOINT/BURSA W/O US: CPT | Mod: HCNC,LT,S$GLB, | Performed by: ORTHOPAEDIC SURGERY

## 2024-01-10 RX ADMIN — METHYLPREDNISOLONE ACETATE 40 MG: 40 INJECTION, SUSPENSION INTRA-ARTICULAR; INTRALESIONAL; INTRAMUSCULAR; SOFT TISSUE at 01:01

## 2024-01-10 NOTE — PROGRESS NOTES
Lima Maldonado presents for follow up evaluation of   Encounter Diagnoses   Name Primary?    Primary osteoarthritis of first carpometacarpal joint of left hand Yes    Primary osteoarthritis of both hands    Patient is following up regarding her left thumb basilar and MP joint pain.  Patient states that she has been wearing the brace only at night as it is hard to do anything during the day with her brace on.  Patient states that her pain is 5/10 whenever she is using it to pinch  or grasp objects.  She is also having basilar thumb and MP joint pain on the right hand but it is not as bad as the left, she says that it is rated as a 3/10 at worst.      PE:    AA&O x 4.  NAD  HEENT:  NCAT, sclera nonicteric  Lungs:  Respirations are equal and unlabored.  CV:  2+ bilateral upper and lower extremity pulses.  MSK:  Positive grind test bilateral basilar thumb joints, tender to palpation left thumb MCP joint, decreased motion MCP joint.  Neurovascularly intact bilaterally.  5/5 thenar and intrinsic musculature strength.       A/P:  Left basilar thumb and MP joint chronic pain  1) We have discussed the natural history of basilar thumb and MP joint arthritis including treatment options such as splinting, oral and topical anti-inflammatories, cortisone injections and surgery.    -I have offered her a selective injection. I have explained the risks, benefits, and alternatives of the procedure in detail.  The patient voices understanding and all questions have been answered. The patient agrees to proceed as planned. So after a sterile prep of the skin in the normal fashion the left thumb MP joint was injected using a 25 gauge needle with a combination of 1cc 1% plain lidocaine and 40 mg of methylprednisolone.  The patient is cautioned and immediate relief of pain is secondary to the local anesthetic and will be temporary.  After the anesthetic wears off there may be a increase in pain that may last for a few hours or a few  days and they should use ice to help alleviate this flair up of pain. Patient tolerated the procedure well.    - F/U in 2 weeks virtual visit  - Call with any questions/concerns in the interim           Ines Fajardo MD    Please be aware that this note has been generated with the assistance of MModal voice-to-text.  Please excuse any spelling or grammatical errors.

## 2024-01-10 NOTE — PROCEDURES
Small Joint Aspiration/Injection: L thumb MCP    Date/Time: 1/10/2024 1:15 PM    Performed by: Ines Fajardo MD  Authorized by: Ines Fajardo MD    Consent Done?:  Yes (Verbal)  Indications:  Pain  Timeout: prior to procedure the correct patient, procedure, and site was verified    Prep: patient was prepped and draped in usual sterile fashion      Local anesthesia used?: Yes    Anesthesia:  Local infiltration  Local anesthetic:  Lidocaine 1% without epinephrine  Location:  Thumb  Site:  L thumb MCP  Needle size:  25 G  Medications:  40 mg methylPREDNISolone acetate 40 mg/mL  Patient tolerance:  Patient tolerated the procedure well with no immediate complications

## 2024-01-12 ENCOUNTER — CLINICAL SUPPORT (OUTPATIENT)
Dept: REHABILITATION | Facility: HOSPITAL | Age: 74
End: 2024-01-12
Payer: MEDICARE

## 2024-01-12 DIAGNOSIS — M54.2 NECK PAIN: Primary | ICD-10-CM

## 2024-01-12 PROCEDURE — 97110 THERAPEUTIC EXERCISES: CPT

## 2024-01-12 PROCEDURE — 97112 NEUROMUSCULAR REEDUCATION: CPT

## 2024-01-12 NOTE — PROGRESS NOTES
"  Physical Therapy Daily Treatment Note     Name: Lima Maldonado  [facility] Number: [id number]    Therapy Diagnosis:   Encounter Diagnosis   Name Primary?    Neck pain Yes     Physician: Wayne Singh MD    Visit Date: 1/12/2024    Physician Orders: PT Eval and Treat   Medical Diagnosis from Referral:   M54.2 (ICD-10-CM) - Cervicalgia   M54.12 (ICD-10-CM) - Cervical radiculopathy      Evaluation Date: 12/5/2023  Authorization Period Expiration: 3/30/24  Plan of Care Expiration: 1/19/24  Progress Note Due: 1/5/24  Visit # / Visits authorized: 1/ 1, 4/10, 4/20  FOTO: 1/ 3    Time In: 9:15 am   Time Out: 9:56 am   Total Billable Time: 36 minutes    Precautions: tandard, Diabetes, and hx of cancer,  osteoporosis     Subjective     Pt reports: that her (L) shoulder is hurting/aching and she is experiencing aching in (L) forearm. Pt stated that she tried nerve glides 1x at home and didn't change symptoms.   She was compliant with home exercise program.  Response to previous treatment: no adverse effects  Functional change: 1/5/24    Pain: 4/10 (L) neck  and (L) shoulder       Objective       therapeutic exercises to develop strength, ROM, and flexibility for 20 minutes including:  UT stretch 3x30" B  LS stretch 3x30" B   (B) rotation c/ towel assist 5" 10x    Corner pec stretch 3x30"   Supine serratus punches 2x10 3"  Radial nerve glides 1x10 L not performed   DNF 2x10 3"     manual therapy techniques: were applied for 10 minutes, including:  STM bilateral upper trap        neuromuscular re-education activities to improve: Posture and stabilization for 11 minutes. The following activities were included:  Chin tucks 3" 3x10 not performed   Rows w/scap retraction 2x10 3" RTB   No money RTB 3x10 3"   HSA RTB 2x10   Ball on wall circles x20 cw/ccw  Scap clocks YTB x 10 ea B       Home Exercises Provided and Patient Education Provided     Education provided:   - HEP     Written Home Exercises Provided: Patient instructed to cont " prior HEP.   Katerin demonstrated good  understanding of the education provided.     See EMR under Patient Instructions for exercises provided prior visit.      Assessment     Pt was able to tolerate additional NMR activities noted above. Pt again demo decreased soft tissue restrictions along (B) UT during manual therapy. Pt tolerated therapy session without report of increased pain.   Katerin Is progressing well towards her goals.   Pt prognosis is Good.     Pt will continue to benefit from skilled outpatient physical therapy to address the deficits listed in the problem list box on initial evaluation, provide pt/family education and to maximize pt's level of independence in the home and community environment.     Pt's spiritual, cultural and educational needs considered and pt agreeable to plan of care and goals.    Anticipated barriers to physical therapy:  chronicity of symptoms, co-morbidities     Goals:   Short Term Goals: 1 week  Pt will be compliant with HEP to supplement PT with decreasing pain and improving functional mobility     Long Term Goals: 6 weeks   Pt will improve  FOTO score to 70 in order to demo improved functional mobility  Pt will demo (R) cervical rotation AROM = (L) cervical rotation AROM in order to improve ability to perform ADLs  Pt will report neck/UE pain </= 4/10 at worst in order to be able to perform ADLs with less difficulty     Plan     Continue per POC, progress as tolerated    Traci Katz, PT

## 2024-01-16 ENCOUNTER — OFFICE VISIT (OUTPATIENT)
Dept: NEUROSURGERY | Facility: CLINIC | Age: 74
End: 2024-01-16
Payer: MEDICARE

## 2024-01-16 DIAGNOSIS — M54.2 CERVICALGIA: Primary | ICD-10-CM

## 2024-01-16 DIAGNOSIS — M48.02 SPINAL STENOSIS, CERVICAL REGION: ICD-10-CM

## 2024-01-16 PROCEDURE — 1160F RVW MEDS BY RX/DR IN RCRD: CPT | Mod: CPTII,95,, | Performed by: NURSE PRACTITIONER

## 2024-01-16 PROCEDURE — 1159F MED LIST DOCD IN RCRD: CPT | Mod: CPTII,95,, | Performed by: NURSE PRACTITIONER

## 2024-01-16 PROCEDURE — 99213 OFFICE O/P EST LOW 20 MIN: CPT | Mod: 95,,, | Performed by: NURSE PRACTITIONER

## 2024-01-16 NOTE — PROGRESS NOTES
Neurosurgery  Established Patient    SUBJECTIVE:   Established Patient - Telehealth Visit     The patient location is: Home  The chief complaint leading to consultation is: Pain  Visit type: Virtual visit with audio and video  Total time spent with patient: 10 minutes     Each patient to whom I provide medical services by telemedicine is:  (1) informed of the relationship between the physician and patient and the respective role of any other health care provider with respect to management of the patient; and (2) notified that they may decline to receive medical services by telemedicine and may withdraw from such care at any time. Patient verbally consented to receive this service via voice-only telephone call.     This service was not originating from a related E/M service provided within the previous 7 days nor will  to an E/M service or procedure within the next 24 hours or my soonest available appointment.  Prevailing standard of care was able to be met in this audio-only visit.       History of Present Illness:  Lima Maldonado is a 73 y.o. female who presents to clinic c/o intermittent chronic neck pain that was been gradually worsening over the past several months. Pt states that initially she began having aching pain radiating into R shoulder, but has now begun to have the same pain radiate to L shoulder in a more severe manner. Denies trauma or inciting event. Her pain worsens throughout the day, keeps her from getting to sleep at night. Pain worsens when she lifts either arm above 90 degrees. Has some aching pain periodically throughout either arm.     She endorses nearly constant severe pain in both thumbs that persistently affects her daily activities. Pt endorses Hx of neuropathy in her hands that began after breast cancer chemotherapy 6 years ago, has gradually worsened since. Endorses Hx of diabetic neuropathy she believes is contributing to this problem. Pt clarifies that the pain in her  thumbs feels distinct from her chronic neuropathy. Pt endorses that she seems to be more clumsy with her hands since her neck problems began worsening.  Denies b/b dysfunction, saddle anesthesia, or gait instability.       Interval Hx 10/4/23: After the last appointment, it was recommended that the patient obtain additional imaging to evaluate her worsening neck pain. She is being seen in clinic today to review the image findings and discuss next steps. States that her symptoms remain unchanged since the last appointment. Rates her pain as a 5/10.     Interval Hx 1/16/24: The patient is being seen today to discuss progress with neck pain. State that she continues to obtain PT with mild relief. She is scheduled for a C7/T1 GABINO on 1/25/24. She is eager to obtain the injection to obtain additional relief. The patient's gabapentin was also recently increased but she hasn't noticed significant improvement yet.     Review of patient's allergies indicates:   Allergen Reactions    Erythromycin      Other reaction(s): Nausea    Erythromycin (bulk)      Other reaction(s): Nausea    Oxycodone Nausea And Vomiting    Oxycodone-acetaminophen Nausea And Vomiting     Other reaction(s): Nausea       Current Outpatient Medications   Medication Sig Dispense Refill    ascorbic acid, vitamin C, (VITAMIN C) 1000 MG tablet Take 1 tablet by mouth once daily.      aspirin (ECOTRIN) 81 MG EC tablet Take 81 mg by mouth once daily.      calcium-vitamin D tablet 600 mg-200 units       CANNABIDIOL, CBD, EXTRACT ORAL Take 1 drop by mouth Daily.      coenzyme Q10-vitamin E 100-100 mg-unit Cap Take 1 capsule by mouth once daily.       cyclobenzaprine (FLEXERIL) 5 MG tablet Take 1 tablet (5 mg total) by mouth 3 (three) times daily as needed for Muscle spasms (muscular pain). 90 tablet 2    denosumab (PROLIA) 60 mg/mL Syrg Inject 1 mL (60 mg total) into the skin every 6 (six) months. 2 mL 0    estradioL (YUVAFEM) 10 mcg Tab PLACE 1 TABLET VAGINALLY  TWICE A WEEK 24 tablet 2    fenofibrate 160 MG Tab TAKE 1 TABLET BY MOUTH EVERY DAY 90 tablet 3    fluticasone (FLONASE) 50 mcg/actuation nasal spray 1 spray by Each Nare route 2 (two) times daily.       gabapentin (NEURONTIN) 300 MG capsule Take 1 capsule (300 mg total) by mouth 3 (three) times daily. 270 capsule 1    irbesartan (AVAPRO) 150 MG tablet Take 1 tablet (150 mg total) by mouth every evening. 90 tablet 3    letrozole (FEMARA) 2.5 mg Tab TAKE 1 TABLET BY MOUTH EVERY DAY 90 tablet 3    levothyroxine (SYNTHROID) 50 MCG tablet TAKE 1 TABLET BY MOUTH EVERY DAY 90 tablet 3    metFORMIN (GLUCOPHAGE) 500 MG tablet Take 1 tablet (500 mg total) by mouth 2 (two) times daily with meals. 90 tablet 1    MULTIVITAMIN ORAL Take 1 tablet by mouth once daily.       omega-3 fatty acids-vitamin E 1,000 mg Cap Take 1 capsule by mouth once daily.       pantoprazole (PROTONIX) 20 MG tablet Take 1 tablet (20 mg total) by mouth before breakfast. 90 tablet 3    simvastatin (ZOCOR) 20 MG tablet TAKE 1 TABLET BY MOUTH EVERY DAY IN THE EVENING 90 tablet 3    sucralfate (CARAFATE) 100 mg/mL suspension Take 10 mLs (1 g total) by mouth 4 (four) times daily. 828 mL 1    tirzepatide (MOUNJARO) 5 mg/0.5 mL PnIj Inject 5 mg into the skin every 7 days. 1 Pen 2    tobramycin sulfate 0.3% (TOBREX) 0.3 % ophthalmic solution Apply to wound beds twice daily 5 mL 2    vitamin D 1000 units Tab Take 1,000 Units by mouth once daily.        No current facility-administered medications for this visit.       Past Medical History:   Diagnosis Date    Acute cystitis without hematuria 7/17/2017    Arthritis     Back pain     Breast cancer     originally dx in 02/1997- treated with surgery, chemo and radiation     Class 1 obesity due to excess calories with serious comorbidity and body mass index (BMI) of 30.0 to 30.9 in adult 11/29/2018    Elevated bilirubin 11/19/2013    Fatty liver     GERD (gastroesophageal reflux disease)     Glucose intolerance  (impaired glucose tolerance)     Hyperlipidemia     Hypertension     Hypothyroid     Hypothyroidism     hypothyroidism    Malignant neoplasm of lower-outer quadrant of left female breast 11/15/2016    Obesity     Obesity, diabetes, and hypertension syndrome 12/12/2018    Osteopenia     Osteoporosis     Primary insomnia 11/2/2016    Shingles     Sleep apnea     wears CPAP nightly     Squamous cell carcinoma 2011    right forearm, sx by Dr. Corinne Ray    Stress incontinence of urine 1/11/2018    Trouble in sleeping     Type 2 diabetes mellitus with diabetic polyneuropathy, without long-term current use of insulin 12/26/2017    Urinary incontinence     Well woman exam with routine gynecological exam 11/2/2016     Past Surgical History:   Procedure Laterality Date    APPENDECTOMY  2008    removed during hysterectomy surgery     BLEPHAROPLASTY Bilateral     BREAST BIOPSY      2010    Breast implant Bilateral 1998    implants removed in 2003    BREAST LUMPECTOMY      02/1997    BREAST RECONSTRUCTION Bilateral     02/2017    BREAST SURGERY      see details below     CATARACT EXTRACTION W/  INTRAOCULAR LENS IMPLANT Left 08/13/2020    Procedure: EXTRACTION, CATARACT, WITH IOL INSERTION;  Surgeon: Ivanna Coleman MD;  Location: [facility];  Service: Ophthalmology;  Laterality: Left;    CATARACT EXTRACTION W/  INTRAOCULAR LENS IMPLANT Right 08/27/2020    Procedure: EXTRACTION, CATARACT, WITH IOL INSERTION LASER;  Surgeon: Ivanna Coleman MD;  Location: [facility] OR;  Service: Ophthalmology;  Laterality: Right;    COLONOSCOPY N/A 05/15/2018    Procedure: COLONOSCOPY;  Surgeon: Roldan Gonzales MD;  Location: [facility] (4TH FLR);  Service: Endoscopy;  Laterality: N/A;    COSMETIC SURGERY  1/2022    Eyelift    ENDOSCOPIC ULTRASOUND OF UPPER GASTROINTESTINAL TRACT N/A 10/11/2023    Procedure: ULTRASOUND, UPPER GI TRACT, ENDOSCOPIC;  Surgeon: Hermelindo Coleman MD;  Location: [facility] (2ND FLR);  Service: Endoscopy;  Laterality: N/A;  instr  portal-ozempic or trulicity-tb  10/5-precall complete/pt verbalized understanding of holding Ozempic-MS    ESOPHAGOGASTRODUODENOSCOPY N/A 2022    Procedure: EGD (ESOPHAGOGASTRODUODENOSCOPY);  Surgeon: Omar Ambriz MD;  [facility] ([address] [place] [address]);  Service: Endoscopy;  Laterality: N/A;  instructions via portal - sm    EYE SURGERY      CATERACTS    HYSTERECTOMY      benign    left breast reconstruction      left modified radical mastectomy  1997    for treatment of breast cancer     LIPOMA RESECTION      removed from upper back area     MODIFIED RADICAL MASTECTOMY W/ AXILLARY LYMPH NODE DISSECTION  1997    OOPHORECTOMY          PELVIC LAPAROSCOPY      pt had endometriosis     SD REMOVAL OF OVARY/TUBE(S)      benign    reduction of mons pubis      robotic lap  hysterectomy with bso  2008    UPPER GASTROINTESTINAL ENDOSCOPY  2013     Family History       Problem Relation (Age of Onset)    Alcohol abuse Father    Arthritis Maternal Grandmother    Breast cancer Sister (51)    Cancer Sister, Mother (65)    Diabetes Maternal Uncle, Maternal Uncle    Heart attack Father (51)    Heart disease Father    Melanoma Mother    No Known Problems Brother, Daughter, Son, Daughter, Son          Social History     Socioeconomic History    Marital status:    Tobacco Use    Smoking status: Former     Current packs/day: 0.00     Average packs/day: 0.5 packs/day for 3.0 years (1.5 ttl pk-yrs)     Types: Cigarettes     Start date: 4/15/1969     Quit date: 4/15/1972     Years since quittin.7    Smokeless tobacco: Never    Tobacco comments:     started at age 18 during college    Substance and Sexual Activity    Alcohol use: Yes     Comment: Rarely, not even 1X per week    Drug use: No    Sexual activity: Yes     Partners: Male     Birth control/protection: Post-menopausal, See Surgical Hx     Social Determinants of Health     Financial Resource Strain: Low Risk  (2023)     Overall Financial Resource Strain (CARDIA)     Difficulty of Paying Living Expenses: Not hard at all   Food Insecurity: No Food Insecurity (11/14/2023)    Hunger Vital Sign     Worried About Running Out of Food in the Last Year: Never true     Ran Out of Food in the Last Year: Never true   Transportation Needs: No Transportation Needs (11/14/2023)    PRAPARE - Transportation     Lack of Transportation (Medical): No     Lack of Transportation (Non-Medical): No   Physical Activity: Inactive (11/14/2023)    Exercise Vital Sign     Days of Exercise per Week: 0 days     Minutes of Exercise per Session: 0 min   Stress: No Stress Concern Present (11/14/2023)    [place] [employer] - Occupational Stress Questionnaire     Feeling of Stress : Not at all   Recent Concern: Stress - Stress Concern Present (10/20/2023)    [place] [employer] - Occupational Stress Questionnaire     Feeling of Stress : To some extent   Social Connections: Socially Integrated (11/14/2023)    Social Connection and Isolation Panel [NHANES]     Frequency of Communication with Friends and Family: More than three times a week     Frequency of Social Gatherings with Friends and Family: More than three times a week     Attends Mandaeism Services: 1 to 4 times per year     Active Member of Clubs or Organizations: Yes     Attends Club or Organization Meetings: More than 4 times per year     Marital Status:    Housing Stability: Low Risk  (11/14/2023)    Housing Stability Vital Sign     Unable to Pay for Housing in the Last Year: No     Number of Places Lived in the Last Year: 1     Unstable Housing in the Last Year: No       Review of Systems    OBJECTIVE:     Vital Signs     There is no height or weight on file to calculate BMI.    Neurosurgery Physical Exam  General: well developed, well nourished, no distress.   Head: normocephalic  Neurologic: Alert and oriented. Thought content appropriate.  GCS: Motor:  6/Verbal: 5/Eyes: 4 GCS Total: 15  Mental Status: Awake, Alert, Oriented x 4  Language: No aphasia  Speech: No dysarthria  Cranial nerves: CN II-XII grossly intact.   Eyes: EOMI appear grossly intact  Pulmonary: no signs of respiratory distress  Motor Strength:Moves all extremities spontaneously with good tone. BUE and BLE are at least 3/5. No abnormal movements seen.     Diagnostic Results:  There is no new imaging to review for this encounter.      ASSESSMENT/PLAN:   Lima Maldonado is a 73 y.o. female seen today to discuss progress with neck pain. State that she continues to obtain PT with mild relief. She is scheduled for a C7/T1 GABINO on 1/25/24. She is eager to obtain the injection to obtain additional relief. The patient was encouraged to continue with PT. She was agreeable. I would like the patient to follow-up in 6 months to discuss progress with the injections. I have encouraged her to contact the clinic with any questions, concerns, or adverse clinical changes. She verbalized understanding.      AMANDA Griffin  Neurosurgery  [facility].      Note dictated with voice recognition software, please excuse any grammatical errors.

## 2024-01-16 NOTE — PROGRESS NOTES
"  Physical Therapy Daily Treatment Note     Name: Lima Maldonado  [facility] Number: [id number]    Therapy Diagnosis:   No diagnosis found.    Physician: Wayne Singh MD    Visit Date: 1/17/2024    Physician Orders: PT Eval and Treat   Medical Diagnosis from Referral:   M54.2 (ICD-10-CM) - Cervicalgia   M54.12 (ICD-10-CM) - Cervical radiculopathy      Evaluation Date: 12/5/2023  Authorization Period Expiration: 3/30/24  Plan of Care Expiration: 1/19/24  Progress Note Due: 1/5/24  Visit # / Visits authorized: 1/ 1, 4/10, 5/20  FOTO: 1/ 3    Time In: *** am   Time Out: *** am   Total Billable Time: *** minutes    Precautions: tandard, Diabetes, and hx of cancer,  osteoporosis     Subjective     Pt reports: *** that her (L) shoulder is hurting/aching and she is experiencing aching in (L) forearm. Pt stated that she tried nerve glides 1x at home and didn't change symptoms.   She was compliant with home exercise program.  Response to previous treatment: no adverse effects  Functional change: 1/5/24    Pain: 4/10 (L) neck  and (L) shoulder       Objective       therapeutic exercises to develop strength, ROM, and flexibility for *** minutes including:  UT stretch 3x30" B  LS stretch 3x30" B   (B) rotation c/ towel assist 5" 10x    Corner pec stretch 3x30"   Supine serratus punches 2x10 3"  Radial nerve glides 1x10 L not performed   DNF 2x10 3"     manual therapy techniques: were applied for *** minutes, including:  STM bilateral upper trap        neuromuscular re-education activities to improve: Posture and stabilization for *** minutes. The following activities were included:  Chin tucks 3" 3x10 not performed   Rows w/scap retraction 2x10 3" RTB   No money RTB 3x10 3"   HSA RTB 2x10   Ball on wall circles x20 cw/ccw  Scap clocks YTB x 10 ea B       Home Exercises Provided and Patient Education Provided     Education provided:   - HEP     Written Home Exercises Provided: Patient instructed to cont prior HEP.   Katerin " demonstrated good  understanding of the education provided.     See EMR under Patient Instructions for exercises provided prior visit.      Assessment     *** Pt was able to tolerate additional NMR activities noted above. Pt again demo decreased soft tissue restrictions along (B) UT during manual therapy. Pt tolerated therapy session without report of increased pain.     Katerin Is progressing well towards her goals.   Pt prognosis is Good.     Pt will continue to benefit from skilled outpatient physical therapy to address the deficits listed in the problem list box on initial evaluation, provide pt/family education and to maximize pt's level of independence in the home and community environment.     Pt's spiritual, cultural and educational needs considered and pt agreeable to plan of care and goals.    Anticipated barriers to physical therapy:  chronicity of symptoms, co-morbidities     Goals:   Short Term Goals: 1 week  Pt will be compliant with HEP to supplement PT with decreasing pain and improving functional mobility     Long Term Goals: 6 weeks   Pt will improve  FOTO score to 70 in order to demo improved functional mobility  Pt will demo (R) cervical rotation AROM = (L) cervical rotation AROM in order to improve ability to perform ADLs  Pt will report neck/UE pain </= 4/10 at worst in order to be able to perform ADLs with less difficulty     Plan     Continue per POC, progress as tolerated    Ligia Sotelo, PTA

## 2024-01-16 NOTE — TELEPHONE ENCOUNTER
Brief Postoperative Note      Patient: Shahzad Rodgers  YOB: 1958  MRN: [id number]    Date of Procedure: 1/16/2024    Pre-Op Diagnosis Codes:     * Esophageal varices without bleeding (HCC) [I85.00]     * Special screening for malignant neoplasms, colon [Z12.11]    Post-Op Diagnosis: Grade II esophageal varices, Portal hypertensive gastropathy, Colon polyps        Procedure(s):  EGD ESOPHAGOGASTRODUODENOSCOPY  COLORECTAL CANCER SCREENING, NOT HIGH RISK    Surgeon(s):  Leroy Hernandez MD    Assistant:  * No surgical staff found *    Anesthesia: Monitor Anesthesia Care    Estimated Blood Loss (mL): less than 50     Complications: None    Specimens:   ID Type Source Tests Collected by Time Destination   A : colon polyps bx x3 Tissue Tissue SURGICAL PATHOLOGY Leroy Hernandez MD 1/16/2024 1151        Implants:  * No implants in log *      Drains: * No LDAs found *    Findings:     EGD:  Grade II esophageal varices with no high risk features.  Normal Z line.  Moderate portal hypertensive gastropathy with actively bleeding focal spots treated with Gold probe.  Normal duodenum.    Colonoscopy:  Melena noted.  Congestive colopathy diffusely with tortuosity in sigmoid.  Normal terminal ileum on brief intubation.  Three small polyps resected with forceps and cold snare.   Internal rectal hemorrhoids.       Electronically signed by Leroy Hernandez MD on 1/16/2024 at 12:01 PM   Message sent to pt via my chart with lab results and updates to plan.   Please schedule cards appt asap for pre op clearance and f/u of abnormal ekg finding

## 2024-01-17 ENCOUNTER — CLINICAL SUPPORT (OUTPATIENT)
Dept: REHABILITATION | Facility: HOSPITAL | Age: 74
End: 2024-01-17
Payer: MEDICARE

## 2024-01-17 DIAGNOSIS — M54.2 NECK PAIN: Primary | ICD-10-CM

## 2024-01-17 PROCEDURE — 97112 NEUROMUSCULAR REEDUCATION: CPT

## 2024-01-17 PROCEDURE — 97110 THERAPEUTIC EXERCISES: CPT

## 2024-01-17 NOTE — PROGRESS NOTES
"  Physical Therapy Daily Treatment Note     Name: iLma Maldonado  [facility] Number: [id number]    Therapy Diagnosis:   Encounter Diagnosis   Name Primary?    Neck pain Yes     Physician: Wayne Singh MD    Visit Date: 1/17/2024    Physician Orders: PT Eval and Treat   Medical Diagnosis from Referral:   M54.2 (ICD-10-CM) - Cervicalgia   M54.12 (ICD-10-CM) - Cervical radiculopathy      Evaluation Date: 12/5/2023  Authorization Period Expiration: 3/30/24  Plan of Care Expiration: 1/19/24  Progress Note Due: 1/5/24  Visit # / Visits authorized: 1/ 1, 4/10, 5/20  FOTO: 1/ 3    Time In: 2:34 pm   Time Out: 3:17 pm   Total Billable Time: 43 minutes    Precautions: tandard, Diabetes, and hx of cancer,  osteoporosis     Subjective     Pt reports: that her neck is not feeling too bad today and didn't feel too bad yesterday, but her neck and (L) UE were really hurting on Monday. Pt stated that she had a virtual visit with neurosurgery yesterday and thought epidural in her neck was good next step. Pt stated that she is scheduled to have epidural next week. Pt stated that she is hoping to not have surgery. Pt stated that she thinks therapy helps and she is more aware of what to do to help her.   She was compliant with home exercise program.  Response to previous treatment: no adverse effects  Functional change: see note from 1/5/24    Pain: 3/10 (L) shoulder, 2/10 (R) shoulder, 3/10 (B) neck       Objective       Discussion of POC and therapeutic exercises to develop strength, ROM, and flexibility for 28 minutes including:  UT stretch 3x30" B  LS stretch 3x30" B   Scalene stretch 3x30" B   (B) rotation c/ towel assist 5" 10x  not performed   Corner pec stretch 3x30" not performed   Radial nerve glides 1x10 L not performed   DNF 2x10 3"          neuromuscular re-education activities to improve: Posture and stabilization, motor control for 15 minutes. The following activities were included:  Chin tucks 3" 3x10 not performed   Rows " "w/scap retraction 2x10 3" GTB   No money RTB 3x10 3"   HSA RTB 3x10   Ball on wall circles x20 cw/ccw  Scap clocks YTB x 10 ea B   Wall slides with foam roller RTB x10    Home Exercises Provided and Patient Education Provided     Education provided:   - HEP   - discussion of POC     Written Home Exercises Provided: Patient instructed to cont prior HEP.  Katerin demonstrated good  understanding of the education provided.     See EMR under Patient Instructions for exercises provided prior visit.      Assessment     Pt received VC to perform (B) UE ER in tolerable range to not aggravate (L) elbow.  Pt reported feeling a little better at end of session compared to beginning of session.   Katerin Is progressing well towards her goals.   Pt prognosis is Good.     Pt will continue to benefit from skilled outpatient physical therapy to address the deficits listed in the problem list box on initial evaluation, provide pt/family education and to maximize pt's level of independence in the home and community environment.     Pt's spiritual, cultural and educational needs considered and pt agreeable to plan of care and goals.    Anticipated barriers to physical therapy:  chronicity of symptoms, co-morbidities     Goals:   Short Term Goals: 1 week  Pt will be compliant with HEP to supplement PT with decreasing pain and improving functional mobility     Long Term Goals: 6 weeks   Pt will improve  FOTO score to 70 in order to demo improved functional mobility  Pt will demo (R) cervical rotation AROM = (L) cervical rotation AROM in order to improve ability to perform ADLs  Pt will report neck/UE pain </= 4/10 at worst in order to be able to perform ADLs with less difficulty     Plan     Continue per POC, progress as tolerated     Traci Katz, PT   "

## 2024-01-18 ENCOUNTER — OFFICE VISIT (OUTPATIENT)
Dept: HEPATOLOGY | Facility: CLINIC | Age: 74
End: 2024-01-18
Payer: MEDICARE

## 2024-01-18 ENCOUNTER — PATIENT MESSAGE (OUTPATIENT)
Dept: ADMINISTRATIVE | Facility: OTHER | Age: 74
End: 2024-01-18
Payer: MEDICARE

## 2024-01-18 ENCOUNTER — PROCEDURE VISIT (OUTPATIENT)
Dept: HEPATOLOGY | Facility: CLINIC | Age: 74
End: 2024-01-18
Payer: MEDICARE

## 2024-01-18 VITALS — HEIGHT: 66 IN | BODY MASS INDEX: 26.72 KG/M2 | WEIGHT: 166.25 LBS

## 2024-01-18 DIAGNOSIS — K74.01 EARLY HEPATIC FIBROSIS: ICD-10-CM

## 2024-01-18 DIAGNOSIS — K76.0 FATTY LIVER: Primary | ICD-10-CM

## 2024-01-18 DIAGNOSIS — K76.0 FATTY LIVER: ICD-10-CM

## 2024-01-18 PROCEDURE — 76981 USE PARENCHYMA: CPT | Mod: S$GLB,,, | Performed by: NURSE PRACTITIONER

## 2024-01-18 PROCEDURE — 99499 UNLISTED E&M SERVICE: CPT | Mod: S$GLB,,, | Performed by: NURSE PRACTITIONER

## 2024-01-18 NOTE — PROGRESS NOTES
"  Physical Therapy Daily Treatment Note     Name: Lima Maldonado  [facility] Number: [id number]    Therapy Diagnosis:   Encounter Diagnosis   Name Primary?    Neck pain Yes       Physician: Wayne Singh MD    Visit Date: 1/19/2024    Physician Orders: PT Eval and Treat   Medical Diagnosis from Referral:   M54.2 (ICD-10-CM) - Cervicalgia   M54.12 (ICD-10-CM) - Cervical radiculopathy      Evaluation Date: 12/5/2023  Authorization Period Expiration: 3/30/24  Plan of Care Expiration: 1/19/24  Progress Note Due: 1/5/24  Visit # / Visits authorized: 1/ 1, 4/10, 6/20  FOTO: 1/ 3    Time In: 9:20 pm   Time Out: 10:00 pm   Total Billable Time: 40 minutes    Precautions: Standard, Diabetes, and hx of cancer,  osteoporosis     Subjective     Pt reports: that she notes more pain in her shoulders than her neck, but left more than right. Pt states that she gets the most pain at night or when moving her shoulder out to the side or reaching about 90 degrees.   She was compliant with home exercise program.  Response to previous treatment: no adverse effects  Functional change: see note from 1/5/24    Pain: 3/10 (L) shoulder, 2/10 (R) shoulder, 3/10 (B) neck       Objective       Discussion of POC and therapeutic exercises to develop strength, ROM, and flexibility for 10 minutes including:  UT stretch 3x30" B  LS stretch 3x30" B   Scalene stretch 3x30" B   Pulleys (scaption) 2 min     (B) rotation c/ towel assist 5" 10x  not performed   Corner pec stretch 3x30" not performed   Radial nerve glides 1x10 L not performed   DNF 2x10 3" (not performed)          neuromuscular re-education activities to improve: Posture and stabilization, motor control for 30 minutes. The following activities were included:  Chin tucks 3" 3x10   Rows w/scap retraction 2x10 3" GTB   No money RTB 3x10 3"   HSA RTB 3x10   Ball on wall circles x30 cw/ccw  Scap clocks YTB x 10 ea B   Wall slides with foam roller RTB x10    Home Exercises Provided and Patient " Education Provided     Education provided:   - HEP   - discussion of POC     Written Home Exercises Provided: Patient instructed to cont prior HEP.  Katerin demonstrated good  understanding of the education provided.     See EMR under Patient Instructions for exercises provided prior visit.      Assessment     Continued with established activities this session without adverse effects noted. Pt cued verbally for proper form during serratus wall slides in order to decrease elbow winging. Had patient perform pulleys in order to facilitate shoulder ROM with decreased pain. Will continue to progress pt as tolerated once she returns to therapy after receiving her injection.       Katerin Is progressing well towards her goals.   Pt prognosis is Good.     Pt will continue to benefit from skilled outpatient physical therapy to address the deficits listed in the problem list box on initial evaluation, provide pt/family education and to maximize pt's level of independence in the home and community environment.     Pt's spiritual, cultural and educational needs considered and pt agreeable to plan of care and goals.    Anticipated barriers to physical therapy:  chronicity of symptoms, co-morbidities     Goals:   Short Term Goals: 1 week  Pt will be compliant with HEP to supplement PT with decreasing pain and improving functional mobility     Long Term Goals: 6 weeks   Pt will improve  FOTO score to 70 in order to demo improved functional mobility  Pt will demo (R) cervical rotation AROM = (L) cervical rotation AROM in order to improve ability to perform ADLs  Pt will report neck/UE pain </= 4/10 at worst in order to be able to perform ADLs with less difficulty     Plan     Continue per POC, progress as tolerated     Ligia Sotelo PTA

## 2024-01-18 NOTE — PROCEDURES
FibroScan [place] (Vibration Controlled Transient Elastography)    Date/Time: 1/18/2024 8:00 AM    Performed by: Jessie Palumbo NP  Authorized by: Jessie Palumbo NP    Diagnosis:  NAFLD    Probe:  XL    Universal Protocol: Patient's identity, procedure and site were verified, confirmatory pause was performed.  Discussed procedure including risks and potential complications.  Questions answered.  Patient verbalizes understanding and wishes to proceed with VCTE.     Procedure: After providing explanations of the procedure, patient was placed in the supine position with right arm in maximum abduction to allow optimal exposure of right lateral abdomen.  Patient was briefly assessed, Testing was performed in the mid-axillary location, 50Hz Shear Wave pulses were applied and the resulting Shear Wave and Propagation Speed detected with a 3.5 MHz ultrasonic signal, using the FibroScan probe, Skin to liver capsule distance and liver parenchyma were accessed during the entire examination with the FibroScan probe, Patient was instructed to breathe normally and to abstain from sudden movements during the procedure, allowing for random measurements of liver stiffness. At least 10 Shear Waves were produced, Individual measurements of each Shear Wave were calculated.  Patient tolerated the procedure well with no complications.  Meets discharge criteria as was dismissed.  Rates pain 0 out of 10.  Patient will follow up with ordering provider to review results.    Findings  Median liver stiffness score:  7  CAP Reading: dB/m:  400    IQR/med %:  16  Interpretation  Fibrosis interpretation is based on medial liver stiffness - Kilopascal (kPa).    Fibrosis Stage:  F 0-1  Steatosis interpretation is based on controlled attenuation parameter - (dB/m).    Steatosis Grade:  S3

## 2024-01-19 ENCOUNTER — CLINICAL SUPPORT (OUTPATIENT)
Dept: REHABILITATION | Facility: HOSPITAL | Age: 74
End: 2024-01-19
Payer: MEDICARE

## 2024-01-19 ENCOUNTER — TELEPHONE (OUTPATIENT)
Dept: HEPATOLOGY | Facility: CLINIC | Age: 74
End: 2024-01-19
Payer: MEDICARE

## 2024-01-19 DIAGNOSIS — E78.2 MIXED HYPERLIPIDEMIA: ICD-10-CM

## 2024-01-19 DIAGNOSIS — E11.42 TYPE 2 DIABETES MELLITUS WITH DIABETIC POLYNEUROPATHY, WITHOUT LONG-TERM CURRENT USE OF INSULIN: ICD-10-CM

## 2024-01-19 DIAGNOSIS — E66.3 OVERWEIGHT (BMI 25.0-29.9): ICD-10-CM

## 2024-01-19 DIAGNOSIS — K76.0 FATTY LIVER: Primary | ICD-10-CM

## 2024-01-19 DIAGNOSIS — M54.2 NECK PAIN: Primary | ICD-10-CM

## 2024-01-19 PROCEDURE — 97110 THERAPEUTIC EXERCISES: CPT | Mod: CQ

## 2024-01-19 PROCEDURE — 97112 NEUROMUSCULAR REEDUCATION: CPT | Mod: CQ

## 2024-01-19 NOTE — TELEPHONE ENCOUNTER
Telephone call to patient to review recent Fibroscan, US and lab results. Results reviewed in depth. Patient verbalized understanding. Recommend RTC in 1 year with Fibroscan and labs prior to visit. All questions answered to the patient's satisfaction.        Hepatology Nurse Practitioner  [facility]

## 2024-01-22 NOTE — PROGRESS NOTES
Visit cancelled.       Hepatology Nurse Practitioner  [facility]

## 2024-01-23 ENCOUNTER — OFFICE VISIT (OUTPATIENT)
Dept: DERMATOLOGY | Facility: CLINIC | Age: 74
End: 2024-01-23
Payer: MEDICARE

## 2024-01-23 ENCOUNTER — PATIENT MESSAGE (OUTPATIENT)
Dept: PAIN MEDICINE | Facility: OTHER | Age: 74
End: 2024-01-23
Payer: MEDICARE

## 2024-01-23 DIAGNOSIS — D48.9 NEOPLASM OF UNCERTAIN BEHAVIOR: Primary | ICD-10-CM

## 2024-01-23 PROCEDURE — 88305 TISSUE EXAM BY PATHOLOGIST: CPT | Mod: HCNC | Performed by: PATHOLOGY

## 2024-01-23 PROCEDURE — 99999 PR PBB SHADOW E&M-EST. PATIENT-LVL II: CPT | Mod: PBBFAC,,, | Performed by: DERMATOLOGY

## 2024-01-23 PROCEDURE — 1125F AMNT PAIN NOTED PAIN PRSNT: CPT | Mod: CPTII,S$GLB,, | Performed by: DERMATOLOGY

## 2024-01-23 PROCEDURE — 88305 TISSUE EXAM BY PATHOLOGIST: CPT | Mod: 26,HCNC,, | Performed by: PATHOLOGY

## 2024-01-23 PROCEDURE — 1160F RVW MEDS BY RX/DR IN RCRD: CPT | Mod: CPTII,S$GLB,, | Performed by: DERMATOLOGY

## 2024-01-23 PROCEDURE — 1159F MED LIST DOCD IN RCRD: CPT | Mod: CPTII,S$GLB,, | Performed by: DERMATOLOGY

## 2024-01-23 PROCEDURE — 11102 TANGNTL BX SKIN SINGLE LES: CPT | Mod: GC,S$GLB,, | Performed by: DERMATOLOGY

## 2024-01-23 PROCEDURE — 99212 OFFICE O/P EST SF 10 MIN: CPT | Mod: 25,S$GLB,, | Performed by: DERMATOLOGY

## 2024-01-23 NOTE — PROGRESS NOTES
I have seen the patient, reviewed the Resident's progress note. I have personally interviewed and examined the patient at bedside and agree with the findings.     Piper Reynolds MD  [facility]

## 2024-01-23 NOTE — PROGRESS NOTES
Subjective:      Patient ID:  Lima Maldonado is a 73 y.o. female who presents for   Chief Complaint   Patient presents with    Spot     Mrs Maldonado is a 74 yo female with a history of breast cancer and NMSC (2014) who presents to the acute care derm clinic for evaluation of a new lesion. She reports she first noticed it months ago. It has bled once. It started as a pinpoint red spot and has spread to become larger in size. She notes that it is right next to the biopsy site from where a NMSC was biopsied in 2014 with Dr. Correa. She cannot remember which type of NMSC it was or if it was treated after biopsy. No other skin complaints per patient.     Spot - Initial  Affected locations: chest  Duration: 6 months  Signs / symptoms: bleeding and growing  Severity: mild to moderate      Review of Systems   Skin:  Negative for itching and dry skin.     Objective:   Physical Exam   Skin:               Diagram Legend     Erythematous scaling macule/papule c/w actinic keratosis       Vascular papule c/w angioma      Pigmented verrucoid papule/plaque c/w seborrheic keratosis      Yellow umbilicated papule c/w sebaceous hyperplasia      Irregularly shaped tan macule c/w lentigo     1-2 mm smooth white papules consistent with Milia      Movable subcutaneous cyst with punctum c/w epidermal inclusion cyst      Subcutaneous movable cyst c/w pilar cyst      Firm pink to brown papule c/w dermatofibroma      Pedunculated fleshy papule(s) c/w skin tag(s)      Evenly pigmented macule c/w junctional nevus     Mildly variegated pigmented, slightly irregular-bordered macule c/w mildly atypical nevus      Flesh colored to evenly pigmented papule c/w intradermal nevus       Pink pearly papule/plaque c/w basal cell carcinoma      Erythematous hyperkeratotic cursted plaque c/w SCC      Surgical scar with no sign of skin cancer recurrence      Open and closed comedones      Inflammatory papules and pustules      Verrucoid papule  consistent consistent with wart     Erythematous eczematous patches and plaques     Dystrophic onycholytic nail with subungual debris c/w onychomycosis     Umbilicated papule    Erythematous-base heme-crusted tan verrucoid plaque consistent with inflamed seborrheic keratosis     Erythematous Silvery Scaling Plaque c/w Psoriasis     See annotation    Erythematous macule at superior border of previous scar site w/ arborizing vessels on dermoscopy noted on diagram with green square      Assessment / Plan:        Mrs Maldonado is a 72 yo female with family history of MM (mother passed away of melanoma), actinic keratoses, NMSC (in 2014, unable to obtain records), and breast cancer who presents with a new lesion that is concerning on exam. Biopsy was performed    Pathology Orders:       Normal Orders This Visit    Specimen to Pathology, Dermatology     Comments:    Number of Specimens:->1  ------------------------->-------------------------  Spec 1 Procedure:->Biopsy  Spec 1 Clinical Impression:->superficial BCC vs AK  Spec 1 Source:->right chest  Release to patient->Immediate    Questions:    Procedure Type: Dermatology and skin neoplasms    Number of Specimens: 1    ------------------------: -------------------------    Spec 1 Procedure: Biopsy    Spec 1 Clinical Impression: superficial BCC vs AK    Spec 1 Source: right chest    Clinical Information: 72 yo female presents with erythematous macule w/ arborizing vessels at superior border of scar of previous NMSC from 2014    Release to patient: Immediate          Neoplasm of uncertain behavior  -     Specimen to Pathology, Dermatology    Location: right chest at superior border of previous biopsy site  Size: 0.3x0.5mm   DDX: superficial basal cell vs AK    Shave biopsy procedure note:  Shave biopsy performed after verbal consent including risk of infection, scar, recurrence, need for additional treatment of site. Area prepped with alcohol, anesthetized with approximately  1.0cc of 1% lidocaine with epinephrine. Lesional tissue shaved with razor blade. Hemostasis achieved with application of aluminum chloride followed by hyfrecation. No complications. Dressing applied. Wound care explained.        F/u in 2-3 months for TBSE    No follow-ups on file.

## 2024-01-25 DIAGNOSIS — M54.12 CERVICAL RADICULOPATHY: Primary | ICD-10-CM

## 2024-01-26 LAB
FINAL PATHOLOGIC DIAGNOSIS: NORMAL
GROSS: NORMAL
Lab: NORMAL
MICROSCOPIC EXAM: NORMAL

## 2024-01-27 NOTE — TELEPHONE ENCOUNTER
No care due was identified.  [facility] Embedded Care Due Messages. Reference number: [id number].   1/27/2024 1:52:08 PM CST

## 2024-01-28 RX ORDER — METFORMIN HYDROCHLORIDE 500 MG/1
500 TABLET ORAL 2 TIMES DAILY WITH MEALS
Qty: 180 TABLET | Refills: 1 | Status: SHIPPED | OUTPATIENT
Start: 2024-01-28

## 2024-01-29 NOTE — TELEPHONE ENCOUNTER
Refill Decision Note   Lima Erica  is requesting a refill authorization.  Brief Assessment and Rationale for Refill:  Approve     Medication Therapy Plan:        Comments:     Note composed:6:56 PM 01/28/2024

## 2024-01-30 ENCOUNTER — PATIENT MESSAGE (OUTPATIENT)
Dept: PAIN MEDICINE | Facility: OTHER | Age: 74
End: 2024-01-30
Payer: MEDICARE

## 2024-01-31 ENCOUNTER — OFFICE VISIT (OUTPATIENT)
Dept: ORTHOPEDICS | Facility: CLINIC | Age: 74
End: 2024-01-31
Payer: MEDICARE

## 2024-01-31 DIAGNOSIS — M79.642 CHRONIC PAIN OF LEFT HAND: ICD-10-CM

## 2024-01-31 DIAGNOSIS — M19.042 PRIMARY OSTEOARTHRITIS OF BOTH HANDS: ICD-10-CM

## 2024-01-31 DIAGNOSIS — M18.12 PRIMARY OSTEOARTHRITIS OF FIRST CARPOMETACARPAL JOINT OF LEFT HAND: Primary | ICD-10-CM

## 2024-01-31 DIAGNOSIS — G89.29 CHRONIC PAIN OF LEFT HAND: ICD-10-CM

## 2024-01-31 DIAGNOSIS — M19.041 PRIMARY OSTEOARTHRITIS OF BOTH HANDS: ICD-10-CM

## 2024-01-31 PROCEDURE — 99441 PR PHYSICIAN TELEPHONE EVALUATION 5-10 MIN: CPT | Mod: HCNC,95,, | Performed by: ORTHOPAEDIC SURGERY

## 2024-01-31 PROCEDURE — 1160F RVW MEDS BY RX/DR IN RCRD: CPT | Mod: HCNC,CPTII,95, | Performed by: ORTHOPAEDIC SURGERY

## 2024-01-31 PROCEDURE — 1159F MED LIST DOCD IN RCRD: CPT | Mod: HCNC,CPTII,95, | Performed by: ORTHOPAEDIC SURGERY

## 2024-01-31 RX ORDER — METHYLPREDNISOLONE ACETATE 40 MG/ML
40 INJECTION, SUSPENSION INTRA-ARTICULAR; INTRALESIONAL; INTRAMUSCULAR; SOFT TISSUE
Status: DISCONTINUED | OUTPATIENT
Start: 2024-01-10 | End: 2024-01-31 | Stop reason: HOSPADM

## 2024-01-31 NOTE — PROGRESS NOTES
Established Patient - Audio Only Telehealth Visit     The patient location is: [place]  The chief complaint leading to consultation is: left thumb pain  Visit type: Virtual visit with audio only (telephone)  Total time spent with patient: 6 mins       The reason for the audio only service rather than synchronous audio and video virtual visit was related to technical difficulties or patient preference/necessity.     Each patient to whom I provide medical services by telemedicine is:  (1) informed of the relationship between the physician and patient and the respective role of any other health care provider with respect to management of the patient; and (2) notified that they may decline to receive medical services by telemedicine and may withdraw from such care at any time. Patient verbally consented to receive this service via voice-only telephone call.       HPI:  Patient states that her pain got significantly better after steroid injection.  It is started to return some in the basilar thumb joint.  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     Assessment and plan:  We have discussed treatment options for arthritis including brace wear and steroid injections as well as topical treatments.  Follow-up in a few weeks to a month if the pain continues to worsen.  She is scheduled for epidural steroid injections as well.                        This service was not originating from a related E/M service provided within the previous 7 days nor will  to an E/M service or procedure within the next 24 hours or my soonest available appointment.  Prevailing standard of care was able to be met in this audio-only visit.

## 2024-02-01 ENCOUNTER — HOSPITAL ENCOUNTER (OUTPATIENT)
Facility: OTHER | Age: 74
Discharge: HOME | End: 2024-02-01
Attending: STUDENT IN AN ORGANIZED HEALTH CARE EDUCATION/TRAINING PROGRAM | Admitting: STUDENT IN AN ORGANIZED HEALTH CARE EDUCATION/TRAINING PROGRAM
Payer: MEDICARE

## 2024-02-01 VITALS
RESPIRATION RATE: 16 BRPM | BODY MASS INDEX: 26.52 KG/M2 | OXYGEN SATURATION: 97 % | HEART RATE: 80 BPM | DIASTOLIC BLOOD PRESSURE: 84 MMHG | WEIGHT: 165 LBS | TEMPERATURE: 98 F | SYSTOLIC BLOOD PRESSURE: 176 MMHG | HEIGHT: 66 IN

## 2024-02-01 DIAGNOSIS — M47.812 CERVICAL SPONDYLOSIS: Primary | ICD-10-CM

## 2024-02-01 DIAGNOSIS — G89.29 CHRONIC PAIN: ICD-10-CM

## 2024-02-01 LAB — POCT GLUCOSE: 132 MG/DL (ref 70–110)

## 2024-02-01 PROCEDURE — 62321 NJX INTERLAMINAR CRV/THRC: CPT | Mod: HCNC | Performed by: STUDENT IN AN ORGANIZED HEALTH CARE EDUCATION/TRAINING PROGRAM

## 2024-02-01 PROCEDURE — A4216 STERILE WATER/SALINE, 10 ML: HCPCS | Mod: HCNC | Performed by: STUDENT IN AN ORGANIZED HEALTH CARE EDUCATION/TRAINING PROGRAM

## 2024-02-01 PROCEDURE — 25000003 PHARM REV CODE 250: Mod: HCNC | Performed by: STUDENT IN AN ORGANIZED HEALTH CARE EDUCATION/TRAINING PROGRAM

## 2024-02-01 PROCEDURE — 63600175 PHARM REV CODE 636 W HCPCS: Mod: HCNC | Performed by: STUDENT IN AN ORGANIZED HEALTH CARE EDUCATION/TRAINING PROGRAM

## 2024-02-01 PROCEDURE — 25500020 PHARM REV CODE 255: Mod: HCNC | Performed by: STUDENT IN AN ORGANIZED HEALTH CARE EDUCATION/TRAINING PROGRAM

## 2024-02-01 PROCEDURE — 62321 NJX INTERLAMINAR CRV/THRC: CPT | Mod: HCNC,,, | Performed by: STUDENT IN AN ORGANIZED HEALTH CARE EDUCATION/TRAINING PROGRAM

## 2024-02-01 RX ORDER — SODIUM CHLORIDE 9 MG/ML
INJECTION, SOLUTION INTRAVENOUS CONTINUOUS
Status: DISCONTINUED | OUTPATIENT
Start: 2024-02-01 | End: 2024-02-01 | Stop reason: HOSPADM

## 2024-02-01 RX ORDER — MIDAZOLAM HYDROCHLORIDE 1 MG/ML
INJECTION INTRAMUSCULAR; INTRAVENOUS
Status: DISCONTINUED | OUTPATIENT
Start: 2024-02-01 | End: 2024-02-01 | Stop reason: HOSPADM

## 2024-02-01 RX ORDER — DEXAMETHASONE SODIUM PHOSPHATE 10 MG/ML
INJECTION INTRAMUSCULAR; INTRAVENOUS
Status: DISCONTINUED | OUTPATIENT
Start: 2024-02-01 | End: 2024-02-01 | Stop reason: HOSPADM

## 2024-02-01 RX ORDER — SODIUM CHLORIDE 9 MG/ML
INJECTION, SOLUTION INTRAMUSCULAR; INTRAVENOUS; SUBCUTANEOUS
Status: DISCONTINUED | OUTPATIENT
Start: 2024-02-01 | End: 2024-02-01 | Stop reason: HOSPADM

## 2024-02-01 RX ORDER — FENTANYL CITRATE 50 UG/ML
INJECTION, SOLUTION INTRAMUSCULAR; INTRAVENOUS
Status: DISCONTINUED | OUTPATIENT
Start: 2024-02-01 | End: 2024-02-01 | Stop reason: HOSPADM

## 2024-02-01 RX ORDER — LIDOCAINE HYDROCHLORIDE 10 MG/ML
INJECTION, SOLUTION EPIDURAL; INFILTRATION; INTRACAUDAL; PERINEURAL
Status: DISCONTINUED | OUTPATIENT
Start: 2024-02-01 | End: 2024-02-01 | Stop reason: HOSPADM

## 2024-02-01 RX ORDER — LIDOCAINE HYDROCHLORIDE 20 MG/ML
INJECTION, SOLUTION INFILTRATION; PERINEURAL
Status: DISCONTINUED | OUTPATIENT
Start: 2024-02-01 | End: 2024-02-01 | Stop reason: HOSPADM

## 2024-02-01 NOTE — OP NOTE
Cervical Interlaminar Epidural Steroid Injection under Fluoroscopic Guidance    The procedure, risks, benefits, and options were discussed with the patient. There are no contraindications to the procedure. The patent expressed understanding and agreed to the procedure. Informed written consent was obtained prior to the start of the procedure and can be found in the patient's chart.     PATIENT NAME: Lima Maldonado   MRN: [id number]     DATE OF PROCEDURE: 02/01/2024    PROCEDURE: Cervical Interlaminar Epidural Steroid Injection C7/T1 under Fluoroscopic Guidance    PRE-OP DIAGNOSIS: Cervical radiculopathy [M54.12] Cervical radiculopathy [M54.12]    POST-OP DIAGNOSIS: Same    PHYSICIAN: Wayne Singh MD     ASSISTANTS: None    MEDICATIONS INJECTED: Preservative-free Decadron 10mg with 1cc of Lidocaine 1% MPF and preservative free normal saline    LOCAL ANESTHETIC INJECTED: Xylocaine 2%     SEDATION: Versed 1mg and Fentanyl 50mcg                                                                                                                                                                                     Conscious sedation ordered by M.D. Patient re-evaluation prior to administration of conscious sedation. No changes noted in patient's status from initial evaluation. The patient's vital signs were monitored by RN and patient remained hemodynamically stable throughout the procedure.    Event Time In   Sedation Start 1029   Sedation End 1035       ESTIMATED BLOOD LOSS: None    COMPLICATIONS: None    TECHNIQUE: Time-out was performed to identify the patient and procedure to be performed. With the patient laying in a prone position, the surgical area was prepped and draped in the usual sterile fashion using ChloraPrep and a fenestrated drape. The level was determined under fluoroscopy guidance. Skin anesthesia was achieved by injecting Lidocaine 2% over the injection site.  The interlaminar space was then approached with a  20 gauge, 3.5 inch Tuohy needle that was introduced under fluoroscopic guidance with AP, lateral and/or contralateral oblique imaging. Once the Ligamentum flavum was encountered loss of resistance to saline was used to enter the epidural space. With positive loss of resistance and negative aspiration for CSF or Blood, contrast dye  Omnipaque (240mg/mL) was injected to confirm placement and there was no vascular runoff. Then 3 mL of the medication mixture listed above was then injected slowly. Displacement of the radio opaque contrast after injection of the medication confirmed that the medication went into the area of the epidural space. The needles were removed, and bleeding was nil. A sterile dressing was applied. No specimens collected. The patient tolerated the procedure well.     The patient was monitored after the procedure in the recovery area. They were given post-procedure and discharge instructions to follow at home. The patient was discharged in a stable condition.    Wayne Singh MD

## 2024-02-01 NOTE — PLAN OF CARE
Pt denies pain or discomfort @ this time. Pt states she is ready to be discharged home @ this time. PIV dc'd with tip intact. Dressing placed. Discharge instructions reviewed with patient, with time allotted for questions. Pt verbalizes understanding of instructions and states they have no further questions @ this time. Procedure site is clean, dry and intact. Band-aids in place. Vital signs are stable. No acute distress noted. Staff is at bedside to assist patient. W

## 2024-02-01 NOTE — DISCHARGE SUMMARY
Discharge Note  Short Stay      SUMMARY     Admit Date: 2/1/2024    Attending Physician: Wayne Singh MD PhD    Discharge Physician: Wayne Singh      Discharge Date: 2/1/2024 10:23 AM    Procedure(s) (LRB):  CERVICAL C7/T1 IL GABINO *HOLD ASPIRIN FOR 5 DAYS* (N/A)    Final Diagnosis: Cervical radiculopathy [M54.12]    Disposition: Home or self care    Patient Instructions:   Current Discharge Medication List        CONTINUE these medications which have NOT CHANGED    Details   ascorbic acid, vitamin C, (VITAMIN C) 1000 MG tablet Take 1 tablet by mouth once daily.      aspirin (ECOTRIN) 81 MG EC tablet Take 81 mg by mouth once daily.      calcium-vitamin D tablet 600 mg-200 units       CANNABIDIOL, CBD, EXTRACT ORAL Take 1 drop by mouth Daily.      coenzyme Q10-vitamin E 100-100 mg-unit Cap Take 1 capsule by mouth once daily.       cyclobenzaprine (FLEXERIL) 5 MG tablet Take 1 tablet (5 mg total) by mouth 3 (three) times daily as needed for Muscle spasms (muscular pain).  Qty: 90 tablet, Refills: 2    Associated Diagnoses: Cervicalgia; Cervical radiculopathy; DDD (degenerative disc disease), cervical      denosumab (PROLIA) 60 mg/mL Syrg Inject 1 mL (60 mg total) into the skin every 6 (six) months.  Qty: 2 mL, Refills: 0    Associated Diagnoses: Senile osteoporosis      estradioL (YUVAFEM) 10 mcg Tab PLACE 1 TABLET VAGINALLY TWICE A WEEK  Qty: 24 tablet, Refills: 2    Associated Diagnoses: Vaginal atrophy      fenofibrate 160 MG Tab TAKE 1 TABLET BY MOUTH EVERY DAY  Qty: 90 tablet, Refills: 3    Comments: DX Code Needed  .  Associated Diagnoses: Mixed hyperlipidemia      fluticasone (FLONASE) 50 mcg/actuation nasal spray 1 spray by Each Nare route 2 (two) times daily.       gabapentin (NEURONTIN) 300 MG capsule Take 1 capsule (300 mg total) by mouth 3 (three) times daily.  Qty: 270 capsule, Refills: 1    Associated Diagnoses: Neuropathy      irbesartan (AVAPRO) 150 MG tablet Take 1 tablet (150 mg total) by mouth every  evening.  Qty: 90 tablet, Refills: 3    Comments: .  Associated Diagnoses: Primary hypertension      letrozole (FEMARA) 2.5 mg Tab TAKE 1 TABLET BY MOUTH EVERY DAY  Qty: 90 tablet, Refills: 3    Associated Diagnoses: Use of aromatase inhibitors      levothyroxine (SYNTHROID) 50 MCG tablet TAKE 1 TABLET BY MOUTH EVERY DAY  Qty: 90 tablet, Refills: 3      metFORMIN (GLUCOPHAGE) 500 MG tablet TAKE 1 TABLET BY MOUTH TWICE A DAY WITH MEALS  Qty: 180 tablet, Refills: 1      MULTIVITAMIN ORAL Take 1 tablet by mouth once daily.       omega-3 fatty acids-vitamin E 1,000 mg Cap Take 1 capsule by mouth once daily.       pantoprazole (PROTONIX) 20 MG tablet Take 1 tablet (20 mg total) by mouth before breakfast.  Qty: 90 tablet, Refills: 3    Associated Diagnoses: Esophagitis, erosive; Encounter for monitoring long-term proton pump inhibitor therapy; Hiatal hernia      simvastatin (ZOCOR) 20 MG tablet TAKE 1 TABLET BY MOUTH EVERY DAY IN THE EVENING  Qty: 90 tablet, Refills: 3    Associated Diagnoses: Mixed hyperlipidemia      sucralfate (CARAFATE) 100 mg/mL suspension Take 10 mLs (1 g total) by mouth 4 (four) times daily.  Qty: 828 mL, Refills: 1      tirzepatide (MOUNJARO) 5 mg/0.5 mL PnIj Inject 5 mg into the skin every 7 days.  Qty: 1 Pen, Refills: 2    Associated Diagnoses: Type 2 diabetes mellitus with diabetic polyneuropathy, without long-term current use of insulin      tobramycin sulfate 0.3% (TOBREX) 0.3 % ophthalmic solution Apply to wound beds twice daily  Qty: 5 mL, Refills: 2    Comments: Purposefully written off label for foot wound care      vitamin D 1000 units Tab Take 1,000 Units by mouth once daily.                  Discharge Diagnosis: Cervical radiculopathy [M54.12]  Condition on Discharge: Stable with no complications to procedure   Diet on Discharge: Same as before.  Activity: as per instruction sheet.  Discharge to: Home with a responsible adult.  Follow up: 2-4 weeks       Please call my office or pager  at [phone] if experienced any weakness or loss of sensation, fever > 101.5, pain uncontrolled with oral medications, persistent nausea/vomiting/or diarrhea, redness or drainage from the incisions, or any other worrisome concerns. If physician on call was not reached or could not communicate with our office for any reason please go to the nearest emergency department      Wayne Singh MD PhD

## 2024-02-01 NOTE — INTERVAL H&P NOTE
The patient was examined and no significant changes were noted from the updated H&P or last clinic note.    The risks and benefits of this procedure, including alternative therapies, were discussed with the patient.  The discussion of risks included infection, bleeding, need for additional procedures or surgery, nerve damage, paralysis, adverse medication reaction(s), stroke, and if appropriate for the procedure, death.  Questions regarding the procedure, risks, expected outcome, and possible side effects were solicited and answered to Katerin's satisfaction.  Katerin Babatundesung wishes to proceed with the injection or procedure as confirmed by written consent.

## 2024-02-01 NOTE — DISCHARGE INSTRUCTIONS

## 2024-02-01 NOTE — PROGRESS NOTES
Called to bedside to examine Ms. Maldonado regarding a right shoulder wound.  She had a skin punch biopsy several days ago and the wound is still healing. Wound examined and does appear to be healing with adequate granulation tissue.  No erythema or rubor noted along wound edges.    I advised Ms. Maldonado that the steroid can increase the risk of prolonged healing for the wound due to diminished immune response.  She understands the risk and would still like to proceed with the epidural steroid injection.

## 2024-02-06 ENCOUNTER — OFFICE VISIT (OUTPATIENT)
Dept: INTERNAL MEDICINE | Facility: CLINIC | Age: 74
End: 2024-02-06
Payer: MEDICARE

## 2024-02-06 VITALS
OXYGEN SATURATION: 98 % | WEIGHT: 167.75 LBS | HEART RATE: 84 BPM | BODY MASS INDEX: 26.96 KG/M2 | HEIGHT: 66 IN | SYSTOLIC BLOOD PRESSURE: 118 MMHG | DIASTOLIC BLOOD PRESSURE: 56 MMHG

## 2024-02-06 DIAGNOSIS — E11.42 TYPE 2 DIABETES MELLITUS WITH DIABETIC POLYNEUROPATHY, WITHOUT LONG-TERM CURRENT USE OF INSULIN: Primary | ICD-10-CM

## 2024-02-06 DIAGNOSIS — I10 PRIMARY HYPERTENSION: ICD-10-CM

## 2024-02-06 PROCEDURE — 3078F DIAST BP <80 MM HG: CPT | Mod: HCNC,CPTII,S$GLB, | Performed by: PHYSICIAN ASSISTANT

## 2024-02-06 PROCEDURE — 3008F BODY MASS INDEX DOCD: CPT | Mod: HCNC,CPTII,S$GLB, | Performed by: PHYSICIAN ASSISTANT

## 2024-02-06 PROCEDURE — 3074F SYST BP LT 130 MM HG: CPT | Mod: HCNC,CPTII,S$GLB, | Performed by: PHYSICIAN ASSISTANT

## 2024-02-06 PROCEDURE — 3288F FALL RISK ASSESSMENT DOCD: CPT | Mod: HCNC,CPTII,S$GLB, | Performed by: PHYSICIAN ASSISTANT

## 2024-02-06 PROCEDURE — 1101F PT FALLS ASSESS-DOCD LE1/YR: CPT | Mod: HCNC,CPTII,S$GLB, | Performed by: PHYSICIAN ASSISTANT

## 2024-02-06 PROCEDURE — 99999 PR PBB SHADOW E&M-EST. PATIENT-LVL IV: CPT | Mod: PBBFAC,HCNC,, | Performed by: PHYSICIAN ASSISTANT

## 2024-02-06 PROCEDURE — 1159F MED LIST DOCD IN RCRD: CPT | Mod: HCNC,CPTII,S$GLB, | Performed by: PHYSICIAN ASSISTANT

## 2024-02-06 PROCEDURE — 99214 OFFICE O/P EST MOD 30 MIN: CPT | Mod: HCNC,S$GLB,, | Performed by: PHYSICIAN ASSISTANT

## 2024-02-06 NOTE — PROGRESS NOTES
INTERNAL MEDICINE PROGRESS NOTE    CHIEF COMPLAINT     Chief Complaint   Patient presents with    Follow-up       HPI     Lima Maldonado is a 73 y.o. female who presents for a follow-up visit today.    PCP is Joie Mccall MD, patient is known to me.     Patient presents with complaints for DM follow-up.     HTN well controlled on irbesartan 150. She has no chest pain, sob, nausea, vomiting. She does not check her BP regularly at home.     DM - on metformin and mounjaro 5 mg, A1C was 6.4 two months ago. She reports that she is interested in increase mounjaro dosage. She has no side effects. She is checking her BS at home and reports that  readings are between 160's-130's.     She reports feeling a palpable nodule on the right side of her neck - also just had precancerous lesion removed by dermatology - expect this is reactive lymphadenopathy       Past Medical History:  Past Medical History:   Diagnosis Date    Acute cystitis without hematuria 7/17/2017    Arthritis     Back pain     Breast cancer     originally dx in 02/1997- treated with surgery, chemo and radiation     Class 1 obesity due to excess calories with serious comorbidity and body mass index (BMI) of 30.0 to 30.9 in adult 11/29/2018    Elevated bilirubin 11/19/2013    Fatty liver     GERD (gastroesophageal reflux disease)     Glucose intolerance (impaired glucose tolerance)     Hyperlipidemia     Hypertension     Hypothyroid     Hypothyroidism     hypothyroidism    Malignant neoplasm of lower-outer quadrant of left female breast 11/15/2016    Obesity     Obesity, diabetes, and hypertension syndrome 12/12/2018    Osteopenia     Osteoporosis     Primary insomnia 11/2/2016    Shingles     Sleep apnea     wears CPAP nightly     Squamous cell carcinoma 2011    right forearm, sx by Dr. Corinne Ray    Stress incontinence of urine 1/11/2018    Trouble in sleeping     Type 2 diabetes mellitus with diabetic polyneuropathy, without long-term current use  of insulin 12/26/2017    Urinary incontinence     Well woman exam with routine gynecological exam 11/2/2016       Home Medications:  Prior to Admission medications    Medication Sig Start Date End Date Taking? Authorizing Provider   ascorbic acid, vitamin C, (VITAMIN C) 1000 MG tablet Take 1 tablet by mouth once daily.   Yes Provider, Historical   aspirin (ECOTRIN) 81 MG EC tablet Take 81 mg by mouth once daily.   Yes Provider, Historical   calcium-vitamin D tablet 600 mg-200 units    Yes Provider, Historical   coenzyme Q10-vitamin E 100-100 mg-unit Cap Take 1 capsule by mouth once daily.    Yes Provider, Historical   cyclobenzaprine (FLEXERIL) 5 MG tablet Take 1 tablet (5 mg total) by mouth 3 (three) times daily as needed for Muscle spasms (muscular pain). 11/6/23  Yes Wayne Singh MD   estradioL (YUVAFEM) 10 mcg Tab PLACE 1 TABLET VAGINALLY TWICE A WEEK 8/1/23  Yes Joie Mccall MD   fenofibrate 160 MG Tab TAKE 1 TABLET BY MOUTH EVERY DAY 1/4/24  Yes Jessie Palumbo NP   fluticasone (FLONASE) 50 mcg/actuation nasal spray 1 spray by Each Nare route 2 (two) times daily.  8/29/18  Yes Provider, Historical   gabapentin (NEURONTIN) 300 MG capsule Take 1 capsule (300 mg total) by mouth 3 (three) times daily. 1/8/24  Yes Luisa Zaragoza NP   irbesartan (AVAPRO) 150 MG tablet Take 1 tablet (150 mg total) by mouth every evening. 12/6/23 12/5/24 Yes Joie Mccall MD   letrozole (FEMARA) 2.5 mg Tab TAKE 1 TABLET BY MOUTH EVERY DAY 3/31/23  Yes Mikaela Munoz NP   levothyroxine (SYNTHROID) 50 MCG tablet TAKE 1 TABLET BY MOUTH EVERY DAY 11/12/23  Yes Joie Mccall MD   metFORMIN (GLUCOPHAGE) 500 MG tablet TAKE 1 TABLET BY MOUTH TWICE A DAY WITH MEALS 1/28/24  Yes Joie Mccall MD   MULTIVITAMIN ORAL Take 1 tablet by mouth once daily.    Yes Provider, Historical   omega-3 fatty acids-vitamin E 1,000 mg Cap Take 1 capsule by mouth once daily.    Yes Provider, Historical   pantoprazole  (PROTONIX) 20 MG tablet Take 1 tablet (20 mg total) by mouth before breakfast. 8/23/23  Yes Bridgett Gabriel PA-C   simvastatin (ZOCOR) 20 MG tablet TAKE 1 TABLET BY MOUTH EVERY DAY IN THE EVENING 12/12/23  Yes Bridgett Gabriel PA-C   sucralfate (CARAFATE) 100 mg/mL suspension Take 10 mLs (1 g total) by mouth 4 (four) times daily. 10/25/22  Yes Joie Mccall MD   tirzepatide (MOUNJARO) 5 mg/0.5 mL PnIj Inject 5 mg into the skin every 7 days. 12/6/23  Yes Joie Mccall MD   vitamin D 1000 units Tab Take 1,000 Units by mouth once daily.    Yes Provider, Historical   CANNABIDIOL, CBD, EXTRACT ORAL Take 1 drop by mouth Daily.    Provider, Historical   denosumab (PROLIA) 60 mg/mL Syrg Inject 1 mL (60 mg total) into the skin every 6 (six) months. 8/5/22 12/6/23  Joie Mccall MD   tobramycin sulfate 0.3% (TOBREX) 0.3 % ophthalmic solution Apply to wound beds twice daily  Patient not taking: Reported on 2/6/2024 8/28/23   Carri Brady, DPM       Review of Systems:  Review of Systems   Constitutional:  Negative for chills and fever.   HENT:  Negative for sore throat and trouble swallowing.    Eyes:  Negative for visual disturbance.   Respiratory:  Negative for cough and shortness of breath.    Cardiovascular:  Negative for chest pain.   Gastrointestinal:  Negative for abdominal pain, constipation, diarrhea, nausea and vomiting.   Genitourinary:  Negative for dysuria and flank pain.   Musculoskeletal:  Negative for back pain, neck pain and neck stiffness.   Skin:  Negative for rash.   Neurological:  Negative for dizziness, syncope, weakness and headaches.   Psychiatric/Behavioral:  Negative for confusion.        Health Maintainence:   Immunizations:  Health Maintenance         Date Due Completion Date    TETANUS VACCINE 12/03/2023 12/3/2013    COVID-19 Vaccine (6 - 2023-24 season) 01/23/2024 11/28/2023    Hemoglobin A1c 05/28/2024 11/28/2023    Foot Exam 08/28/2024 8/28/2023    Override on 9/24/2019:  "Done    Override on 12/11/2018: Done    Override on 8/9/2017: Done    Diabetes Urine Screening 11/28/2024 11/28/2023    Lipid Panel 11/28/2024 11/28/2023    Eye Exam 01/03/2025 1/3/2024    High Dose Statin 01/23/2025 1/23/2024    DEXA Scan 09/01/2025 9/1/2023    Colorectal Cancer Screening 05/15/2028 5/15/2018    Override on 10/6/2006: Done             PHYSICAL EXAM     BP (!) 118/56   Pulse 84   Ht 5' 6" (1.676 m)   Wt 76.1 kg (167 lb 12.3 oz)   SpO2 98%   BMI 27.08 kg/m²     Physical Exam  Vitals and nursing note reviewed.   Constitutional:       Appearance: Normal appearance.      Comments: Healthy appearing female in NAD or apparent pain. She makes good eye contact, speaks in clear full sentences and ambulates with ease.          HENT:      Head: Normocephalic and atraumatic.      Nose: Nose normal.      Mouth/Throat:      Pharynx: Oropharynx is clear.   Eyes:      Conjunctiva/sclera: Conjunctivae normal.   Neck:      Comments: Right side of neck has faintly palpable mobile soft nodule in the anterior cervical chain.   Cardiovascular:      Rate and Rhythm: Normal rate and regular rhythm.      Pulses: Normal pulses.   Pulmonary:      Effort: No respiratory distress.   Abdominal:      Tenderness: There is no abdominal tenderness.   Musculoskeletal:         General: Normal range of motion.      Cervical back: No rigidity.   Skin:     General: Skin is warm and dry.      Capillary Refill: Capillary refill takes less than 2 seconds.      Findings: No rash.   Neurological:      General: No focal deficit present.      Mental Status: She is alert.      Gait: Gait normal.   Psychiatric:         Mood and Affect: Mood normal.         LABS     Lab Results   Component Value Date    HGBA1C 6.4 (H) 11/28/2023     CMP  Sodium   Date Value Ref Range Status   11/28/2023 143 136 - 145 mmol/L Final     Potassium   Date Value Ref Range Status   11/28/2023 4.6 3.5 - 5.1 mmol/L Final     Chloride   Date Value Ref Range Status "   11/28/2023 106 95 - 110 mmol/L Final     CO2   Date Value Ref Range Status   11/28/2023 25 23 - 29 mmol/L Final     Glucose   Date Value Ref Range Status   11/28/2023 148 (H) 70 - 110 mg/dL Final     BUN   Date Value Ref Range Status   11/28/2023 20 8 - 23 mg/dL Final     Creatinine   Date Value Ref Range Status   11/28/2023 0.8 0.5 - 1.4 mg/dL Final     Calcium   Date Value Ref Range Status   11/28/2023 10.0 8.7 - 10.5 mg/dL Final     Total Protein   Date Value Ref Range Status   12/13/2023 7.4 6.0 - 8.4 g/dL Final     Albumin   Date Value Ref Range Status   12/13/2023 4.4 3.5 - 5.2 g/dL Final     Total Bilirubin   Date Value Ref Range Status   12/13/2023 1.2 (H) 0.1 - 1.0 mg/dL Final     Comment:     For infants and newborns, interpretation of results should be based  on gestational age, weight and in agreement with clinical  observations.    Premature Infant recommended reference ranges:  Up to 24 hours.............<8.0 mg/dL  Up to 48 hours............<12.0 mg/dL  3-5 days..................<15.0 mg/dL  6-29 days.................<15.0 mg/dL       Alkaline Phosphatase   Date Value Ref Range Status   12/13/2023 59 55 - 135 U/L Final     AST   Date Value Ref Range Status   12/13/2023 51 (H) 10 - 40 U/L Final     ALT   Date Value Ref Range Status   12/13/2023 32 10 - 44 U/L Final     Anion Gap   Date Value Ref Range Status   11/28/2023 12 8 - 16 mmol/L Final     eGFR if    Date Value Ref Range Status   07/22/2022 >60.0 >60 mL/min/1.73 m^2 Final     eGFR if non    Date Value Ref Range Status   07/22/2022 >60.0 >60 mL/min/1.73 m^2 Final     Comment:     Calculation used to obtain the estimated glomerular filtration  rate (eGFR) is the CKD-EPI equation.        Lab Results   Component Value Date    WBC 4.52 11/28/2023    HGB 13.7 11/28/2023    HCT 40.9 11/28/2023    MCV 88 11/28/2023     11/28/2023     Lab Results   Component Value Date    CHOL 117 (L) 11/28/2023    CHOL 121  11/16/2023    CHOL 140 10/18/2022     Lab Results   Component Value Date    HDL 32 (L) 11/28/2023    HDL 28 (L) 11/16/2023    HDL 35 (L) 10/18/2022     Lab Results   Component Value Date    LDLCALC 44.4 (L) 11/28/2023    LDLCALC 56.6 (L) 11/16/2023    LDLCALC 66.6 10/18/2022     Lab Results   Component Value Date    TRIG 203 (H) 11/28/2023    TRIG 182 (H) 11/16/2023    TRIG 192 (H) 10/18/2022     Lab Results   Component Value Date    CHOLHDL 27.4 11/28/2023    CHOLHDL 23.1 11/16/2023    CHOLHDL 25.0 10/18/2022     Lab Results   Component Value Date    TSH 2.902 11/28/2023       ASSESSMENT/PLAN     Lima Maldonado is a 73 y.o. female     iLma was seen today for follow-up. Doing well, will increase dosage of mounjaro - she is tolerating this well. Will update A1C in one month.     Diagnoses and all orders for this visit:    Type 2 diabetes mellitus with diabetic polyneuropathy, without long-term current use of insulin  -     Hemoglobin A1C; Future (due in one month)     Primary hypertension    Other orders  -     tirzepatide 7.5 mg/0.5 mL PnIj; Inject 7.5 mg into the skin every 7 days.      Bridgett Gabriel PA-C   Department of Internal Medicine - [facility]   9:06 AM

## 2024-02-09 ENCOUNTER — CLINICAL SUPPORT (OUTPATIENT)
Dept: REHABILITATION | Facility: HOSPITAL | Age: 74
End: 2024-02-09
Payer: MEDICARE

## 2024-02-09 DIAGNOSIS — M54.2 NECK PAIN: Primary | ICD-10-CM

## 2024-02-09 PROCEDURE — 97110 THERAPEUTIC EXERCISES: CPT | Mod: HCNC

## 2024-02-09 PROCEDURE — 97112 NEUROMUSCULAR REEDUCATION: CPT | Mod: HCNC

## 2024-02-09 NOTE — PROGRESS NOTES
"  Physical Therapy Daily Treatment Note     Name: Lima Maldonado  [facility] Number: [id number]    Therapy Diagnosis:   Encounter Diagnosis   Name Primary?    Neck pain Yes     Physician: Wayne Singh MD    Visit Date: 2/9/2024    Physician Orders: PT Eval and Treat   Medical Diagnosis from Referral:   M54.2 (ICD-10-CM) - Cervicalgia   M54.12 (ICD-10-CM) - Cervical radiculopathy      Evaluation Date: 12/5/2023  Authorization Period Expiration: 3/30/24  Plan of Care Expiration: 1/19/24  Progress Note Due: 1/5/24  Visit # / Visits authorized: 1/ 1, 4/10, 7/20  FOTO: 1/ 3    Time In: 9:15 am   Time Out: 9:56 am   Total Billable Time: 41 minutes    Precautions: Standard, Diabetes, and hx of cancer,  osteoporosis     Subjective     Pt reports: that she got injection a week ago. Pt stated that (R) side feels better, (L) side is still bothering her. Pt stated that 2 days  after injection she felt maybe (L) side was feeling better, but then went back to feeling like it usually did. Pt stated that PT has helped and she is more conscious of what to do. Pt stated that she would like to continue with HEP at this time.   She  was partially  compliant with home exercise program.  Response to previous treatment: no adverse effects   Functional change: see treatment section     Pain: 4/10 (L) neck/UE       Objective       Discussion of POC/objective measurements and therapeutic exercises to develop  ROM and flexibility for 28 minutes including:  UT stretch 3x30" B  LS stretch 3x30" B   Corner pec stretch 3x30"     neuromuscular re-education activities to improve: Posture and stabilization, motor control for 13 minutes. The following activities were included:  Chin tucks 3" 3x10   No money RTB 3x10 3" - held   HSA RTB 3x10   Scap clocks YTB x 10 ea B   Wall slides RTB 2x10      Home Exercises Provided and Patient Education Provided     Education provided:   - HEP    Written Home Exercises Provided: yes.   Katerin demonstrated good  " understanding of the education provided.     See EMR under Patient Instructions for exercises provided prior visit.      Assessment     See treatment section       Pt's spiritual, cultural and educational needs considered and pt agreeable to plan of care and goals.    Anticipated barriers to physical therapy: chronicity of symptoms, co-morbidities     Goals: see treatment section     Plan     See treatment section     Traci Katz, PT

## 2024-03-05 ENCOUNTER — LAB VISIT (OUTPATIENT)
Dept: LAB | Facility: HOSPITAL | Age: 74
End: 2024-03-05
Attending: PHYSICIAN ASSISTANT
Payer: MEDICARE

## 2024-03-05 DIAGNOSIS — E11.42 TYPE 2 DIABETES MELLITUS WITH DIABETIC POLYNEUROPATHY, WITHOUT LONG-TERM CURRENT USE OF INSULIN: ICD-10-CM

## 2024-03-05 LAB
ESTIMATED AVG GLUCOSE: 131 MG/DL (ref 68–131)
HBA1C MFR BLD: 6.2 % (ref 4–5.6)

## 2024-03-05 PROCEDURE — 36415 COLL VENOUS BLD VENIPUNCTURE: CPT | Mod: HCNC | Performed by: PHYSICIAN ASSISTANT

## 2024-03-05 PROCEDURE — 83036 HEMOGLOBIN GLYCOSYLATED A1C: CPT | Mod: HCNC | Performed by: PHYSICIAN ASSISTANT

## 2024-03-06 ENCOUNTER — OFFICE VISIT (OUTPATIENT)
Dept: DERMATOLOGY | Facility: CLINIC | Age: 74
End: 2024-03-06
Payer: MEDICARE

## 2024-03-06 DIAGNOSIS — L57.0 ACTINIC KERATOSES: ICD-10-CM

## 2024-03-06 DIAGNOSIS — D18.01 CHERRY ANGIOMA: ICD-10-CM

## 2024-03-06 DIAGNOSIS — L82.0 INFLAMED SEBORRHEIC KERATOSIS: ICD-10-CM

## 2024-03-06 DIAGNOSIS — Z12.83 SCREENING EXAM FOR SKIN CANCER: Primary | ICD-10-CM

## 2024-03-06 DIAGNOSIS — L82.1 SEBORRHEIC KERATOSES: ICD-10-CM

## 2024-03-06 PROCEDURE — 3288F FALL RISK ASSESSMENT DOCD: CPT | Mod: HCNC,CPTII,S$GLB, | Performed by: DERMATOLOGY

## 2024-03-06 PROCEDURE — 17000 DESTRUCT PREMALG LESION: CPT | Mod: HCNC,XS,S$GLB, | Performed by: DERMATOLOGY

## 2024-03-06 PROCEDURE — 99999 PR PBB SHADOW E&M-EST. PATIENT-LVL I: CPT | Mod: PBBFAC,HCNC,, | Performed by: DERMATOLOGY

## 2024-03-06 PROCEDURE — 1101F PT FALLS ASSESS-DOCD LE1/YR: CPT | Mod: HCNC,CPTII,S$GLB, | Performed by: DERMATOLOGY

## 2024-03-06 PROCEDURE — 99213 OFFICE O/P EST LOW 20 MIN: CPT | Mod: 25,HCNC,S$GLB, | Performed by: DERMATOLOGY

## 2024-03-06 PROCEDURE — 1126F AMNT PAIN NOTED NONE PRSNT: CPT | Mod: HCNC,CPTII,S$GLB, | Performed by: DERMATOLOGY

## 2024-03-06 PROCEDURE — 17110 DESTRUCTION B9 LES UP TO 14: CPT | Mod: HCNC,S$GLB,, | Performed by: DERMATOLOGY

## 2024-03-06 PROCEDURE — 3044F HG A1C LEVEL LT 7.0%: CPT | Mod: HCNC,CPTII,S$GLB, | Performed by: DERMATOLOGY

## 2024-03-06 NOTE — PROGRESS NOTES
Subjective:      Patient ID:  Lima Maldonado is a 73 y.o. female who presents for   Chief Complaint   Patient presents with    Skin Check     Mrs Maldonado is a 74 yo female who is here for a TBSE, with a history of breast cancer and NMSC (2014)     Patient with new complaint of lesion(s)  Location: nose  Duration: year  Symptoms: rough   Relieving factors/Previous treatments: moisturizer          Spot - Initial  Affected locations: chest  Duration: 6 months  Signs / symptoms: bleeding and growing  Severity: mild to moderate    At last visit had biopsy on chest:    1 mo ago    Final Pathologic Diagnosis Skin, right chest, shave biopsy:  -ACTINIC KERATOSIS, INFLAMED     Review of Systems   Skin:  Positive for daily sunscreen use. Negative for recent sunburn.   Hematologic/Lymphatic: Bruises/bleeds easily.       Objective:   Physical Exam   Constitutional: She appears well-developed and well-nourished. No distress.   Neurological: She is alert and oriented to person, place, and time. She is not disoriented.   Psychiatric: She has a normal mood and affect.   Skin:   Areas Examined (abnormalities noted in diagram):   Scalp / Hair Palpated and Inspected  Head / Face Inspection Performed  Neck Inspection Performed  Chest / Axilla Inspection Performed  Abdomen Inspection Performed  Genitals / Buttocks / Groin Inspection Performed  Back Inspection Performed  RUE Inspected  LUE Inspection Performed  RLE Inspected  LLE Inspection Performed  Nails and Digits Inspection Performed                 Diagram Legend     Erythematous scaling macule/papule c/w actinic keratosis       Vascular papule c/w angioma      Pigmented verrucoid papule/plaque c/w seborrheic keratosis      Yellow umbilicated papule c/w sebaceous hyperplasia      Irregularly shaped tan macule c/w lentigo     1-2 mm smooth white papules consistent with Milia      Movable subcutaneous cyst with punctum c/w epidermal inclusion cyst      Subcutaneous movable cyst  c/w pilar cyst      Firm pink to brown papule c/w dermatofibroma      Pedunculated fleshy papule(s) c/w skin tag(s)      Evenly pigmented macule c/w junctional nevus     Mildly variegated pigmented, slightly irregular-bordered macule c/w mildly atypical nevus      Flesh colored to evenly pigmented papule c/w intradermal nevus       Pink pearly papule/plaque c/w basal cell carcinoma      Erythematous hyperkeratotic cursted plaque c/w SCC      Surgical scar with no sign of skin cancer recurrence      Open and closed comedones      Inflammatory papules and pustules      Verrucoid papule consistent consistent with wart     Erythematous eczematous patches and plaques     Dystrophic onycholytic nail with subungual debris c/w onychomycosis     Umbilicated papule    Erythematous-base heme-crusted tan verrucoid plaque consistent with inflamed seborrheic keratosis     Erythematous Silvery Scaling Plaque c/w Psoriasis     See annotation      Assessment / Plan:      Screening exam for skin cancer  Area of previous NMSC examined. Site well healed with no signs of recurrence.    Total body skin examination performed today including at least 12 points as noted in physical examination. No lesions suspicious for malignancy noted.    Recommend daily sun protection/avoidance, use of at least SPF 30, broad spectrum sunscreen (OTC drug), skin self examinations, and routine physician surveillance to optimize early detection     Seborrheic keratoses  These are benign inherited growths without a malignant potential. Reassurance given to patient. No treatment is necessary.      Actinic keratoses  Cryosurgery Procedure Note    Verbal consent from the patient is obtained including, but not limited to, risk of hypopigmentation/hyperpigmentation, scar, recurrence of lesion. The patient is aware of the precancerous quality and need for treatment of these lesions. Liquid nitrogen cryosurgery is applied to the 1 actinic keratoses, as detailed in  the physical exam, to produce a freeze injury. The patient is aware that blisters may form and is instructed on wound care with gentle cleansing and use of vaseline ointment to keep moist until healed. The patient is supplied a handout on cryosurgery and is instructed to call if lesions do not completely resolve.     ISK  Cryosurgery procedure note:    Verbal consent from the patient is obtained including, but not limited to, risk of hypopigmentation/hyperpigmentation, scar, recurrence of lesion. Liquid nitrogen cryosurgery is applied to 1 lesions to produce a freeze injury. The patient is aware that blisters may form and is instructed on wound care with gentle cleansing and use of vaseline ointment to keep moist until healed. The patient is supplied a handout on cryosurgery and is instructed to call if lesions do not completely resolve.     Cherry angioma  This is a benign vascular lesion. Reassurance given. No treatment required.           No follow-ups on file.  1 yr

## 2024-03-06 NOTE — PROGRESS NOTES
Subjective:      Patient ID:  Lima Maldonado is a 73 y.o. female who presents for   Chief Complaint   Patient presents with    Skin Check     Mrs Maldonado is a 72 yo female who is here for a TBSE, with a history of breast cancer and NMSC (2014)     Patient with new complaint of lesion(s)  Location: nose  Duration: year  Symptoms: rough   Relieving factors/Previous treatments: moisturizer          Spot - Initial  Affected locations: chest  Duration: 6 months  Signs / symptoms: bleeding and growing  Severity: mild to moderate    Review of Systems   Skin:  Positive for daily sunscreen use. Negative for recent sunburn.   Hematologic/Lymphatic: Bruises/bleeds easily.     Objective:   Physical Exam   Skin:             Diagram Legend     Erythematous scaling macule/papule c/w actinic keratosis       Vascular papule c/w angioma      Pigmented verrucoid papule/plaque c/w seborrheic keratosis      Yellow umbilicated papule c/w sebaceous hyperplasia      Irregularly shaped tan macule c/w lentigo     1-2 mm smooth white papules consistent with Milia      Movable subcutaneous cyst with punctum c/w epidermal inclusion cyst      Subcutaneous movable cyst c/w pilar cyst      Firm pink to brown papule c/w dermatofibroma      Pedunculated fleshy papule(s) c/w skin tag(s)      Evenly pigmented macule c/w junctional nevus     Mildly variegated pigmented, slightly irregular-bordered macule c/w mildly atypical nevus      Flesh colored to evenly pigmented papule c/w intradermal nevus       Pink pearly papule/plaque c/w basal cell carcinoma      Erythematous hyperkeratotic cursted plaque c/w SCC      Surgical scar with no sign of skin cancer recurrence      Open and closed comedones      Inflammatory papules and pustules      Verrucoid papule consistent consistent with wart     Erythematous eczematous patches and plaques     Dystrophic onycholytic nail with subungual debris c/w onychomycosis     Umbilicated papule     Erythematous-base heme-crusted tan verrucoid plaque consistent with inflamed seborrheic keratosis     Erythematous Silvery Scaling Plaque c/w Psoriasis     See annotation      Assessment / Plan:        There are no diagnoses linked to this encounter.         No follow-ups on file.

## 2024-03-23 DIAGNOSIS — Z79.811 USE OF AROMATASE INHIBITORS: ICD-10-CM

## 2024-03-25 RX ORDER — LETROZOLE 2.5 MG/1
TABLET, FILM COATED ORAL
Qty: 90 TABLET | Refills: 3 | Status: SHIPPED | OUTPATIENT
Start: 2024-03-25

## 2024-03-25 NOTE — TELEPHONE ENCOUNTER
[facility]      Pt Name: Nate Mckeon  MRN: [id number]  [place] 1955  Date of evaluation: 12/28/2021  Provider: Sav Burden MD  I independently performed a history and physical on Nate Mckeon. All diagnostic, treatment, and disposition decisions were made by myself in conjunction with the advanced practice provider. HPI: Nate Mckeon presented with   Chief Complaint   Patient presents with    Other     PT presents with concern for blood clot in right leg     Nate Mckeon has a past medical history of Bipolar affective disorder [facility]), Bladder disorder, Chronic back pain, Diabetes mellitus ([facility] [address] Fatty liver (4/29/2013), GERD (gastroesophageal reflux disease), Hypertension, and IBS (irritable bowel syndrome). She has a past surgical history that includes Hysterectomy; Dilation and curettage of uterus; Bunionectomy; Colonoscopy; Bladder surgery; Breast surgery; Carpal tunnel release (1989); and Cholecystectomy, laparoscopic (4/12/13). No current facility-administered medications on file prior to encounter.      Current Outpatient Medications on File Prior to Encounter   Medication Sig Dispense Refill    ondansetron (ZOFRAN) 4 MG tablet Take 1 tablet by mouth every 8 hours as needed for Nausea 20 tablet 0    tamsulosin (FLOMAX) 0.4 MG capsule Take 1 capsule by mouth daily for 5 doses 5 capsule 0    pentosan polysulfate (ELMIRON) 100 MG capsule Take 100 mg by mouth 3 times daily (before meals)      omeprazole (PRILOSEC) 40 MG capsule Take 40 mg by mouth daily      hyoscyamine (LEVSIN/SL) 0.125 MG SL tablet Place 0.125 mg under the tongue every 6 hours as needed for Cramping      montelukast (SINGULAIR) 10 MG tablet Take 10 mg by mouth nightly      triazolam (HALCION) 0.25 MG tablet Take 0.25 mg by mouth nightly      promethazine (PHENERGAN) 25 MG tablet Take 25 mg by mouth every 6 hours as needed for Nausea      Multiple Vitamins-Minerals (THERAPEUTIC Please see the attached refill request.   MULTIVITAMIN-MINERALS) tablet Take 1 tablet by mouth daily      calcium carbonate (OSCAL) 500 MG TABS tablet Take 500 mg by mouth daily      Probiotic Product (PROBIOTIC DAILY PO) Take by mouth      magnesium 30 MG tablet Take 30 mg by mouth daily 250      UNABLE TO FIND IODINE ONE DROP DAILY AND KELP ONE DROP DAILY      fluticasone-salmeterol (ADVAIR) 500-50 MCG/DOSE diskus inhaler Inhale 1 puff into the lungs every 12 hours      albuterol (PROVENTIL HFA;VENTOLIN HFA) 108 (90 BASE) MCG/ACT inhaler Inhale 2 puffs into the lungs every 6 hours as needed for Wheezing      Biotin 5000 MCG CAPS Take 3,000 mcg by mouth daily       cyclobenzaprine (FLEXERIL) 10 MG tablet Take 10 mg by mouth 3 times daily as needed.  Cholecalciferol (VITAMIN D-3) 5000 UNITS TABS Take  by mouth.  lamotrigine (LAMICTAL) 25 MG tablet Take 150 mg by mouth daily        PHYSICAL EXAM  Vitals: BP (!) 141/98   Pulse 76   Temp 98.1 °F (36.7 °C) (Temporal)   Resp 14   Wt 241 lb (109.3 kg)   SpO2 96%   BMI 37.75 kg/m²   Constitutional:  78 y.o. female alert  HENT:  Atraumatic, oral mucosa moist  Neck:  No visible JVD, supple  Chest/Lungs:  Respiratory effort normal, clear, regular  Abdomen:  Non-distended, soft, NT  Back:  No gross deformity  Extremities:  Normal tone and perfusion, no venous cords, trace edema    Medical Decision Making and Plan: Briefly, this is an 78 y. o.female who presented with leg pain, mild sob. U/s negative for DVT. CT chest ok. There are no clinical findings to suggest of DVT, vascular occlusion or dissection, compartment syndrome, infectious process, fracture, etc.  Other differentials include but not limited to peripheral neuropathic pain, strain, sprain, arthritis, bursitis, tendonitis, contusion, spasm, radiculopathy.   Clinically we doubt one of the more emergent etiologies for her chest symptoms though differential diagnoses included acute coronary syndrome, congestive heart failure, severe bronchospasm, aortic dissection, pulmonary embolism, pneumothorax, myocarditis, Boerhaave syndrome, pericardial tamponade, acute abdomen, profound anemia or metabolic abnormality, etc.  Kinga Austin was given appropriate discharge instructions. Referral to follow up provider. For further details of [facility] encounter, please see documentation by advanced practice provider PEACE Headley.     Labs Reviewed   COMPREHENSIVE METABOLIC PANEL W/ REFLEX TO MG FOR LOW K - Abnormal; Notable for the following components:       Result Value    Glucose 112 (*)     Alkaline Phosphatase 142 (*)     All other components within normal limits    Narrative:     Performed at:  [facility] [address],  [place], [address] [employer]   Phone [phone]   CBC WITH AUTO DIFFERENTIAL    Narrative:     Performed at:  [facility] [address],  [place], [address]   Phone [phone]   TROPONIN    Narrative:     Performed at:  [facility] [address],  [place], [address]   Phone [phone]   BRAIN NATRIURETIC PEPTIDE    Narrative:     Performed at:  [facility] [address],  [place], [address] [employer]   Phone [phone]   PROTIME-INR    Narrative:     Performed at:  [facility] [address],  [place], [address] [employer]   Phone [phone]   APTT    Narrative:     Performed at:  [facility] [address],  [place], [address]   Phone [phone]   HCG, SERUM, QUALITATIVE    Narrative:     Performed at:  [facility] [address],  [place], [address]   Phone [phone]     RADIOLOGY:   Plain x-rays were viewed by me: CT CHEST PULMONARY EMBOLISM W CONTRAST    Result Date: 12/28/2021  EXAMINATION: CTA OF THE CHEST 12/28/2021 8:39 pm TECHNIQUE: CTA of the chest was performed after the administration of intravenous contrast.  Multiplanar reformatted images are provided for review. MIP images are provided for review. Dose modulation, iterative reconstruction, and/or weight based adjustment of the mA/kV was utilized to reduce the radiation dose to as low as reasonably achievable. COMPARISON: None. HISTORY: ORDERING SYSTEM PROVIDED HISTORY: tachy - leg pain - ro PE TECHNOLOGIST PROVIDED HISTORY: Reason for exam:->tachy - leg pain - ro PE Decision Support Exception - unselect if not a suspected or confirmed emergency medical condition->Emergency Medical Condition (MA) Reason for Exam: Tachy, leg pain, ro PE. Other (PT presents with concern for blood clot in right leg). FINDINGS: Pulmonary Arteries: Pulmonary arteries are adequately opacified for evaluation. No evidence of intraluminal filling defect to suggest pulmonary embolism. Main pulmonary artery is normal in caliber. Mediastinum: No evidence of mediastinal lymphadenopathy. The heart and pericardium demonstrate no acute abnormality. There is no acute abnormality of the thoracic aorta. Small hiatal hernia. Calcified left hilar lymph nodes are consistent with prior granulomatous disease. Lungs/pleura: The lungs are without acute process. No focal consolidation or pulmonary edema. No evidence of pleural effusion or pneumothorax. Mild peripheral fibrotic change. Upper Abdomen: Limited images of the upper abdomen are unremarkable. Soft Tissues/Bones: No acute bone or soft tissue abnormality. 1. No evidence of acute pulmonary embolus. 2. No acute process in the chest. 3. Mild peripheral fibrotic change.      VL Extremity Venous Right    Result Date: 12/28/2021  Lower Extremities DVT Study  Demographics   Patient Name       Freddie Polk   Date of Study      12/28/2021         Gender              Female   Patient Number     [id number]         Date of Birth       1955   Visit Number       [id number]          Age 78 year(s)   Accession Number   [id number]         Room Number            Corporate ID       [id number]           Sonographer         Roseann Monroe RVT,                                                            [facility]   Ordering Physician Symone Merino   Interpreting        [facility]                     Adelina Doe [place]       Physician           Giselle Dixon MD,                                                            [facility]  Procedure Type of Study:   Veins:Lower Extremities DVT Study, VL EXTREMITY VENOUS DUPLEX RIGHT. Vascular Sonographer Report  Additional Indications:pain in right lower extremtiy Impressions Right Impression No evidence of deep vein or superficial vein thrombosis involving the right lower extremity and the left common femoral vein. Patient could not tolerate compression in the thigh, so utilized color augment for patentancy. Conclusions   Summary   No evidence of deep vein or superficial vein thrombosis involving the right  lower extremity and the left common femoral vein. Signature   ------------------------------------------------------------------  Electronically signed by Giselle Dixon MD, [facility] (Interpreting  physician) on 12/28/2021 at 05:40 PM  ------------------------------------------------------------------  Patient Status:ER. [address] [place] [address] Vascular Lab. Technical Quality:Adequate visualization. Velocities are measured in cm/s ; Diameters are measured in mm Right Lower Extremities DVT Study Measurements Right 2D Measurements +------------------------+----------+---------------+----------+ ! Location                ! Visualized! Compressibility! Thrombosis! +------------------------+----------+---------------+----------+ ! Sapheno Femoral Junction! Yes       ! Yes            ! None      ! +------------------------+----------+---------------+----------+ ! GSV Thigh               ! Yes !Yes            !None      ! +------------------------+----------+---------------+----------+ ! Common Femoral          !Yes       ! Yes            ! None      ! +------------------------+----------+---------------+----------+ ! Prox Femoral            !Yes       ! Yes            ! None      ! +------------------------+----------+---------------+----------+ ! Mid Femoral             !Yes       ! Yes            ! None      ! +------------------------+----------+---------------+----------+ ! Dist Femoral            !Yes       ! Yes            ! None      ! +------------------------+----------+---------------+----------+ ! Deep Femoral            !Yes       ! Yes            ! None      ! +------------------------+----------+---------------+----------+ ! Popliteal               !Yes       ! Yes            ! None      ! +------------------------+----------+---------------+----------+ ! GSV Below Knee          ! Yes       ! Yes            ! None      ! +------------------------+----------+---------------+----------+ ! Gastroc                 ! Yes       ! Yes            ! None      ! +------------------------+----------+---------------+----------+ ! Soleal                  !No        !               !          ! +------------------------+----------+---------------+----------+ ! PTV                     ! Yes       ! Yes            ! None      ! +------------------------+----------+---------------+----------+ ! Peroneal                !Yes       ! Yes            ! None      ! +------------------------+----------+---------------+----------+ ! GSV Calf                ! Yes       ! Yes            ! None      ! +------------------------+----------+---------------+----------+ ! SSV                     ! Yes       ! Yes            ! None      ! +------------------------+----------+---------------+----------+ Right Doppler Measurements +------------------------+------+------+------------+ ! Location                ! Signal!Reflux! Reflux (sec)! +------------------------+------+------+------------+ ! Sapheno Femoral Junction! Phasic! No    !            ! +------------------------+------+------+------------+ ! Common Femoral          !Phasic! No    !            ! +------------------------+------+------+------------+ ! Prox Femoral            !Phasic! No    !            ! +------------------------+------+------+------------+ ! Deep Femoral            !Phasic! No    !            ! +------------------------+------+------+------------+ ! Popliteal               !Phasic! No    !            ! +------------------------+------+------+------------+ Left Lower Extremities DVT Study Measurements Left 2D Measurements +------------------------+----------+---------------+----------+ ! Location                ! Visualized! Compressibility! Thrombosis! +------------------------+----------+---------------+----------+ ! Sapheno Femoral Junction! Yes       ! Yes            ! None      ! +------------------------+----------+---------------+----------+ ! Common Femoral          !Yes       ! Yes            ! None      ! +------------------------+----------+---------------+----------+ Left Doppler Measurements +--------------+------+------+------------+ ! Location      ! Signal!Reflux! Reflux (sec)! +--------------+------+------+------------+ ! Common Femoral!Phasic!      !            ! +--------------+------+------+------------+    EKG:  Read by me in the absence of a cardiologist shows:  Sinus rhythm, normal rate, normal conduction intervals, normal axis, no acute injury pattern, NSSTTWA, no prior EKG available      Vitals:    12/28/21 2004 12/28/21 2014 12/28/21 2034 12/28/21 2308   BP: (!) 159/104 (!) 151/96 (!) 154/106 (!) 141/98   Pulse: 76 80 74 76   Resp: 18 14 16 14   Temp:       TempSrc:       SpO2: 92% 95% 95% 96%   Weight:         Medications administered:  Medications   methylPREDNISolone sodium (SOLU-MEDROL) injection 125 mg (125 mg IntraVENous Given 12/28/21 2014)   diphenhydrAMINE (BENADRYL) injection 25 mg (25 mg IntraVENous Given 12/28/21 2014)   HYDROcodone-acetaminophen (NORCO) 5-325 MG per tablet 1 tablet (1 tablet Oral Given 12/28/21 1236)   ondansetron (ZOFRAN-ODT) disintegrating tablet 4 mg (4 mg Oral Given 12/28/21 1236)   iopamidol (ISOVUE-370) 76 % injection 75 mL (75 mLs IntraVENous Given 12/28/21 2100)     FOLLOW UP:    Tavo Sims  [address] [facility] [address]  [address] [facility]  [phone]    Schedule an appointment as soon as possible for a visit       [facility] Emergency Department  [address]  Go to   If symptoms worsen    FINAL IMPRESSION:    1. Right leg pain    2. Dyspnea, unspecified type    3.  Elevated blood pressure reading       DISPOSITION Decision To Discharge 12/28/2021 10:23:05 PM       Sneha Mims MD  12/29/21 [id number]

## 2024-03-28 RX ORDER — TIRZEPATIDE 7.5 MG/.5ML
INJECTION, SOLUTION SUBCUTANEOUS
Qty: 4 PEN | Refills: 0 | Status: SHIPPED | OUTPATIENT
Start: 2024-03-28 | End: 2024-05-01

## 2024-04-24 ENCOUNTER — PATIENT OUTREACH (OUTPATIENT)
Dept: ADMINISTRATIVE | Facility: HOSPITAL | Age: 74
End: 2024-04-24
Payer: MEDICARE

## 2024-04-29 DIAGNOSIS — G62.9 NEUROPATHY: ICD-10-CM

## 2024-04-30 RX ORDER — GABAPENTIN 300 MG/1
300 CAPSULE ORAL 3 TIMES DAILY
Qty: 270 CAPSULE | Refills: 1 | Status: SHIPPED | OUTPATIENT
Start: 2024-04-30

## 2024-05-01 RX ORDER — TIRZEPATIDE 7.5 MG/.5ML
INJECTION, SOLUTION SUBCUTANEOUS
Qty: 1 PEN | Refills: 0 | Status: SHIPPED | OUTPATIENT
Start: 2024-05-01 | End: 2024-05-06 | Stop reason: SDUPTHER

## 2024-05-04 DIAGNOSIS — N95.2 VAGINAL ATROPHY: ICD-10-CM

## 2024-05-04 NOTE — TELEPHONE ENCOUNTER
No care due was identified.  [facility] Embedded Care Due Messages. Reference number: [id number].   5/04/2024 12:50:03 AM CDT

## 2024-05-06 ENCOUNTER — OFFICE VISIT (OUTPATIENT)
Dept: INTERNAL MEDICINE | Facility: CLINIC | Age: 74
End: 2024-05-06
Payer: MEDICARE

## 2024-05-06 VITALS
OXYGEN SATURATION: 96 % | HEIGHT: 66 IN | BODY MASS INDEX: 25.97 KG/M2 | HEART RATE: 80 BPM | DIASTOLIC BLOOD PRESSURE: 70 MMHG | WEIGHT: 161.63 LBS | SYSTOLIC BLOOD PRESSURE: 126 MMHG

## 2024-05-06 DIAGNOSIS — E11.65 TYPE 2 DIABETES MELLITUS WITH HYPERGLYCEMIA, WITHOUT LONG-TERM CURRENT USE OF INSULIN: ICD-10-CM

## 2024-05-06 DIAGNOSIS — I10 PRIMARY HYPERTENSION: Primary | ICD-10-CM

## 2024-05-06 DIAGNOSIS — I70.0 AORTIC ATHEROSCLEROSIS: ICD-10-CM

## 2024-05-06 PROCEDURE — 99999 PR PBB SHADOW E&M-EST. PATIENT-LVL IV: CPT | Mod: PBBFAC,HCNC,, | Performed by: PHYSICIAN ASSISTANT

## 2024-05-06 PROCEDURE — 4010F ACE/ARB THERAPY RXD/TAKEN: CPT | Mod: HCNC,CPTII,S$GLB, | Performed by: PHYSICIAN ASSISTANT

## 2024-05-06 PROCEDURE — 1159F MED LIST DOCD IN RCRD: CPT | Mod: HCNC,CPTII,S$GLB, | Performed by: PHYSICIAN ASSISTANT

## 2024-05-06 PROCEDURE — 99214 OFFICE O/P EST MOD 30 MIN: CPT | Mod: HCNC,S$GLB,, | Performed by: PHYSICIAN ASSISTANT

## 2024-05-06 PROCEDURE — 3078F DIAST BP <80 MM HG: CPT | Mod: HCNC,CPTII,S$GLB, | Performed by: PHYSICIAN ASSISTANT

## 2024-05-06 PROCEDURE — 3288F FALL RISK ASSESSMENT DOCD: CPT | Mod: HCNC,CPTII,S$GLB, | Performed by: PHYSICIAN ASSISTANT

## 2024-05-06 PROCEDURE — 1101F PT FALLS ASSESS-DOCD LE1/YR: CPT | Mod: HCNC,CPTII,S$GLB, | Performed by: PHYSICIAN ASSISTANT

## 2024-05-06 PROCEDURE — 3044F HG A1C LEVEL LT 7.0%: CPT | Mod: HCNC,CPTII,S$GLB, | Performed by: PHYSICIAN ASSISTANT

## 2024-05-06 PROCEDURE — 3074F SYST BP LT 130 MM HG: CPT | Mod: HCNC,CPTII,S$GLB, | Performed by: PHYSICIAN ASSISTANT

## 2024-05-06 RX ORDER — ESTRADIOL 10 UG/1
TABLET VAGINAL
Qty: 24 TABLET | Refills: 2 | Status: SHIPPED | OUTPATIENT
Start: 2024-05-06

## 2024-05-06 RX ORDER — TIRZEPATIDE 7.5 MG/.5ML
7.5 INJECTION, SOLUTION SUBCUTANEOUS WEEKLY
Qty: 6 ML | Refills: 3 | Status: SHIPPED | OUTPATIENT
Start: 2024-05-06 | End: 2025-05-06

## 2024-05-06 NOTE — TELEPHONE ENCOUNTER
Refill Routing Note   Medication(s) are not appropriate for processing by [facility] for the following reason(s):        Outside of protocol    [facility] action(s):  Route        Medication Therapy Plan: Contraindicated diagnosis on problem list      Appointments  past 12m or future 3m with PCP    Date Provider   Last Visit   12/6/2023 Joie Mccall MD   Next Visit   8/6/2024 Joie Mccall MD   ED visits in past 90 days: 0        Note composed:9:30 AM 05/06/2024

## 2024-05-06 NOTE — PROGRESS NOTES
INTERNAL MEDICINE PROGRESS NOTE    CHIEF COMPLAINT     Chief Complaint   Patient presents with    Follow-up       HPI     Lima Maldonado is a 73 y.o. female who presents for an Follow-up visit today.    Her PCP is Dr. Mccall - she is known to me.     She has DM that is well controlled with mounjaro and metformin. She is active on D-Glucose program. At [facility] three months ago, we increased mounjaro from 5mg to 7.5 mg weekly. She is tolerating this well without side effect.     She has HTN that is well controlled on irbesartan 150 mg. BP is well controlled at home and in the office. She denies chest pain, SOB, headache, nausea, vision changes. She is complaint with med regimen and tolerates it well.     She has HLD that is managed with simvastatin 20mg and fenofibrate. She manages this regimen well.   The ASCVD Risk score (Oliver REYNOSO, et al., 2019) failed to calculate for the following reasons:    The valid total cholesterol range is 130 to 320 mg/dL    At last OV she had a suspected reactive lymphndoe on right side of neck? - this is now resolved.       Wt Readings from Last 10 Encounters:   05/06/24 73.3 kg (161 lb 9.6 oz)   02/06/24 76.1 kg (167 lb 12.3 oz)   02/01/24 74.8 kg (165 lb)   01/18/24 75.4 kg (166 lb 3.6 oz)   01/08/24 77 kg (169 lb 12.1 oz)   12/06/23 77.8 kg (171 lb 8.3 oz)   11/16/23 78.1 kg (172 lb 4.6 oz)   11/06/23 77.5 kg (170 lb 13.7 oz)   10/20/23 78.7 kg (173 lb 8 oz)   10/11/23 78.5 kg (173 lb)          Past Medical History:  Past Medical History:   Diagnosis Date    Acute cystitis without hematuria 7/17/2017    Arthritis     Back pain     Breast cancer     originally dx in 02/1997- treated with surgery, chemo and radiation     Class 1 obesity due to excess calories with serious comorbidity and body mass index (BMI) of 30.0 to 30.9 in adult 11/29/2018    Elevated bilirubin 11/19/2013    Fatty liver     GERD (gastroesophageal reflux disease)     Glucose intolerance (impaired glucose  Problem: Falls - Risk of  Goal: *Absence of Falls  Description: Document Demi Locket Fall Risk and appropriate interventions in the flowsheet. Outcome: Progressing Towards Goal  Note: Fall Risk Interventions:  Mobility Interventions: Bed/chair exit alarm         Medication Interventions: Bed/chair exit alarm, Patient to call before getting OOB, Teach patient to arise slowly    Elimination Interventions: Call light in reach, Bed/chair exit alarm    History of Falls Interventions: Bed/chair exit alarm, Door open when patient unattended         Problem: Patient Education: Go to Patient Education Activity  Goal: Patient/Family Education  Outcome: Progressing Towards Goal     Problem: Patient Education: Go to Patient Education Activity  Goal: Patient/Family Education  Outcome: Progressing Towards Goal     Problem: Upper and Lower GI Bleed: Day 1  Goal: Off Pathway (Use only if patient is Off Pathway)  Outcome: Not Progressing Towards Goal     Problem: Fluid Volume - Risk of, Imbalanced  Goal: *Balanced intake and output  Outcome: Progressing Towards Goal     Problem: Patient Education: Go to Patient Education Activity  Goal: Patient/Family Education  Outcome: Progressing Towards Goal     Problem: Pain  Goal: *Control of Pain  Outcome: Progressing Towards Goal  Goal: *PALLIATIVE CARE:  Alleviation of Pain  Outcome: Progressing Towards Goal     Problem: Patient Education: Go to Patient Education Activity  Goal: Patient/Family Education  Outcome: Progressing Towards Goal     Problem: Pressure Injury - Risk of  Goal: *Prevention of pressure injury  Description: Document Cornelio Scale and appropriate interventions in the flowsheet.   Outcome: Progressing Towards Goal  Note: Pressure Injury Interventions:       Moisture Interventions: Minimize layers, Maintain skin hydration (lotion/cream)    Activity Interventions: Increase time out of bed, Pressure redistribution bed/mattress(bed type)    Mobility Interventions: Pressure redistribution bed/mattress (bed type), HOB 30 degrees or less    Nutrition Interventions: Document food/fluid/supplement intake    Friction and Shear Interventions: HOB 30 degrees or less, Lift sheet, Minimize layers                Problem: Patient Education: Go to Patient Education Activity  Goal: Patient/Family Education  Outcome: Progressing Towards Goal     Problem: Patient Education: Go to Patient Education Activity  Goal: Patient/Family Education  Outcome: Progressing Towards Goal     Problem: Patient Education: Go to Patient Education Activity  Goal: Patient/Family Education  Outcome: Progressing Towards Goal tolerance)     Hyperlipidemia     Hypertension     Hypothyroid     Hypothyroidism     hypothyroidism    Malignant neoplasm of lower-outer quadrant of left female breast 11/15/2016    Obesity     Obesity, diabetes, and hypertension syndrome 12/12/2018    Osteopenia     Osteoporosis     Primary insomnia 11/2/2016    Shingles     Sleep apnea     wears CPAP nightly     Squamous cell carcinoma 2011    right forearm, sx by Dr. Corinne Ray    Stress incontinence of urine 1/11/2018    Trouble in sleeping     Type 2 diabetes mellitus with diabetic polyneuropathy, without long-term current use of insulin 12/26/2017    Urinary incontinence     Well woman exam with routine gynecological exam 11/2/2016       Home Medications:  Prior to Admission medications    Medication Sig Start Date End Date Taking? Authorizing Provider   ascorbic acid, vitamin C, (VITAMIN C) 1000 MG tablet Take 1 tablet by mouth once daily.    Provider, Historical   aspirin (ECOTRIN) 81 MG EC tablet Take 81 mg by mouth once daily.    Provider, Historical   calcium-vitamin D tablet 600 mg-200 units     Provider, Historical   CANNABIDIOL, CBD, EXTRACT ORAL Take 1 drop by mouth Daily.    Provider, Historical   coenzyme Q10-vitamin E 100-100 mg-unit Cap Take 1 capsule by mouth once daily.     Provider, Historical   cyclobenzaprine (FLEXERIL) 5 MG tablet Take 1 tablet (5 mg total) by mouth 3 (three) times daily as needed for Muscle spasms (muscular pain). 11/6/23   Wayne Singh MD   denosumab (PROLIA) 60 mg/mL Syrg Inject 1 mL (60 mg total) into the skin every 6 (six) months. 8/5/22 12/6/23  Joie Mccall MD   estradioL (YUVAFEM) 10 mcg Tab PLACE 1 TABLET VAGINALLY TWICE A WEEK 8/1/23   Joie Mccall MD   fenofibrate 160 MG Tab TAKE 1 TABLET BY MOUTH EVERY DAY 1/4/24   Jessie Palumbo, NP   fluticasone (FLONASE) 50 mcg/actuation nasal spray 1 spray by Each Nare route 2 (two) times daily.  8/29/18   Provider, Historical   gabapentin  (NEURONTIN) 300 MG capsule TAKE 1 CAPSULE BY MOUTH THREE TIMES A DAY 4/30/24   Luisa Zaragoza NP   irbesartan (AVAPRO) 150 MG tablet Take 1 tablet (150 mg total) by mouth every evening. 12/6/23 12/5/24  Joie Mccall MD   letrozole (FEMARA) 2.5 mg Tab TAKE 1 TABLET BY MOUTH EVERY DAY 3/25/24   Mikaela Munoz NP   levothyroxine (SYNTHROID) 50 MCG tablet TAKE 1 TABLET BY MOUTH EVERY DAY 11/12/23   Joie Mccall MD   metFORMIN (GLUCOPHAGE) 500 MG tablet TAKE 1 TABLET BY MOUTH TWICE A DAY WITH MEALS 1/28/24   Joie Mccall MD   MULTIVITAMIN ORAL Take 1 tablet by mouth once daily.     Provider, Historical   omega-3 fatty acids-vitamin E 1,000 mg Cap Take 1 capsule by mouth once daily.     Provider, Historical   pantoprazole (PROTONIX) 20 MG tablet Take 1 tablet (20 mg total) by mouth before breakfast. 8/23/23   Bridgett Gabriel PA-C   simvastatin (ZOCOR) 20 MG tablet TAKE 1 TABLET BY MOUTH EVERY DAY IN THE EVENING 12/12/23   Bridgett Gabriel PA-C   sucralfate (CARAFATE) 100 mg/mL suspension Take 10 mLs (1 g total) by mouth 4 (four) times daily. 10/25/22   Joie Mccall MD   tirzepatide (MOUNJARO) 7.5 mg/0.5 mL PnIj INJECT 7.5 MG SUBCUTANEOUSLY EVERY 7 DAYS 5/1/24 5/31/24  Bridgett Gabriel PA-C   tobramycin sulfate 0.3% (TOBREX) 0.3 % ophthalmic solution Apply to wound beds twice daily  Patient not taking: Reported on 2/6/2024 8/28/23   Carri Brady DPM   vitamin D 1000 units Tab Take 1,000 Units by mouth once daily.     Provider, Historical       Review of Systems:  Review of Systems   Constitutional:  Negative for chills and fever.   HENT:  Negative for sore throat and trouble swallowing.    Eyes:  Negative for visual disturbance.   Respiratory:  Negative for cough and shortness of breath.    Cardiovascular:  Negative for chest pain.   Gastrointestinal:  Negative for abdominal pain, constipation, diarrhea, nausea and vomiting.   Genitourinary:  Negative for dysuria and flank pain.    Musculoskeletal:  Negative for back pain, neck pain and neck stiffness.   Skin:  Negative for rash.   Neurological:  Negative for dizziness, syncope, weakness and headaches.   Psychiatric/Behavioral:  Negative for confusion.        Health Maintainence:   Immunizations:  Health Maintenance         Date Due Completion Date    TETANUS VACCINE 12/03/2023 12/3/2013    COVID-19 Vaccine (6 - 2023-24 season) 03/28/2024 11/28/2023    Foot Exam 08/28/2024 8/28/2023    Override on 9/24/2019: Done    Override on 12/11/2018: Done    Override on 8/9/2017: Done    Hemoglobin A1c 09/05/2024 3/5/2024    Diabetes Urine Screening 11/28/2024 11/28/2023    Lipid Panel 11/28/2024 11/28/2023    Eye Exam 01/03/2025 1/3/2024    High Dose Statin 02/06/2025 2/6/2024    DEXA Scan 09/01/2025 9/1/2023    Colorectal Cancer Screening 05/15/2028 5/15/2018    Override on 10/6/2006: Done             PHYSICAL EXAM     There were no vitals taken for this visit.    Physical Exam  Vitals and nursing note reviewed.   Constitutional:       Appearance: Normal appearance.      Comments: Healthy appearing female in NAD or apparent pain. She makes good eye contact, speaks in clear full sentences and ambulates with ease.        HENT:      Head: Normocephalic and atraumatic.      Nose: Nose normal.      Mouth/Throat:      Pharynx: Oropharynx is clear.   Eyes:      Conjunctiva/sclera: Conjunctivae normal.   Cardiovascular:      Rate and Rhythm: Normal rate and regular rhythm.      Pulses: Normal pulses.   Pulmonary:      Effort: No respiratory distress.   Abdominal:      Tenderness: There is no abdominal tenderness.   Musculoskeletal:         General: Normal range of motion.      Cervical back: No rigidity.   Skin:     General: Skin is warm and dry.      Capillary Refill: Capillary refill takes less than 2 seconds.      Findings: No rash.   Neurological:      General: No focal deficit present.      Mental Status: She is alert.      Gait: Gait normal.    Psychiatric:         Mood and Affect: Mood normal.         LABS     Lab Results   Component Value Date    HGBA1C 6.2 (H) 03/05/2024     CMP  Sodium   Date Value Ref Range Status   11/28/2023 143 136 - 145 mmol/L Final     Potassium   Date Value Ref Range Status   11/28/2023 4.6 3.5 - 5.1 mmol/L Final     Chloride   Date Value Ref Range Status   11/28/2023 106 95 - 110 mmol/L Final     CO2   Date Value Ref Range Status   11/28/2023 25 23 - 29 mmol/L Final     Glucose   Date Value Ref Range Status   11/28/2023 148 (H) 70 - 110 mg/dL Final     BUN   Date Value Ref Range Status   11/28/2023 20 8 - 23 mg/dL Final     Creatinine   Date Value Ref Range Status   11/28/2023 0.8 0.5 - 1.4 mg/dL Final     Calcium   Date Value Ref Range Status   11/28/2023 10.0 8.7 - 10.5 mg/dL Final     Total Protein   Date Value Ref Range Status   12/13/2023 7.4 6.0 - 8.4 g/dL Final     Albumin   Date Value Ref Range Status   12/13/2023 4.4 3.5 - 5.2 g/dL Final     Total Bilirubin   Date Value Ref Range Status   12/13/2023 1.2 (H) 0.1 - 1.0 mg/dL Final     Comment:     For infants and newborns, interpretation of results should be based  on gestational age, weight and in agreement with clinical  observations.    Premature Infant recommended reference ranges:  Up to 24 hours.............<8.0 mg/dL  Up to 48 hours............<12.0 mg/dL  3-5 days..................<15.0 mg/dL  6-29 days.................<15.0 mg/dL       Alkaline Phosphatase   Date Value Ref Range Status   12/13/2023 59 55 - 135 U/L Final     AST   Date Value Ref Range Status   12/13/2023 51 (H) 10 - 40 U/L Final     ALT   Date Value Ref Range Status   12/13/2023 32 10 - 44 U/L Final     Anion Gap   Date Value Ref Range Status   11/28/2023 12 8 - 16 mmol/L Final     eGFR if    Date Value Ref Range Status   07/22/2022 >60.0 >60 mL/min/1.73 m^2 Final     eGFR if non    Date Value Ref Range Status   07/22/2022 >60.0 >60 mL/min/1.73 m^2 Final      "Comment:     Calculation used to obtain the estimated glomerular filtration  rate (eGFR) is the CKD-EPI equation.        Lab Results   Component Value Date    WBC 4.52 11/28/2023    HGB 13.7 11/28/2023    HCT 40.9 11/28/2023    MCV 88 11/28/2023     11/28/2023     Lab Results   Component Value Date    CHOL 117 (L) 11/28/2023    CHOL 121 11/16/2023    CHOL 140 10/18/2022     Lab Results   Component Value Date    HDL 32 (L) 11/28/2023    HDL 28 (L) 11/16/2023    HDL 35 (L) 10/18/2022     Lab Results   Component Value Date    LDLCALC 44.4 (L) 11/28/2023    LDLCALC 56.6 (L) 11/16/2023    LDLCALC 66.6 10/18/2022     Lab Results   Component Value Date    TRIG 203 (H) 11/28/2023    TRIG 182 (H) 11/16/2023    TRIG 192 (H) 10/18/2022     Lab Results   Component Value Date    CHOLHDL 27.4 11/28/2023    CHOLHDL 23.1 11/16/2023    CHOLHDL 25.0 10/18/2022     Lab Results   Component Value Date    TSH 2.902 11/28/2023       ASSESSMENT/PLAN     Lima Maldonado is a 73 y.o. female     Lima "Katerin" was seen today for follow-up. She is doing well on increased dosage or mounjaro - will refill as is. Continue statin and HTN med regimen. Continue monitoring BS, BP and RTC in 3 months with PCP as planned.     Diagnoses and all orders for this visit:    Primary hypertension    Aortic atherosclerosis    Type 2 diabetes mellitus with hyperglycemia, without long-term current use of insulin    Other orders  -     tirzepatide (MOUNJARO) 7.5 mg/0.5 mL PnIj; Inject 7.5 mg into the skin once a week.      Patient was counseled on when and how to seek emergent care.       Bridgett Gabriel PA-C   Department of Internal Medicine - [facility]   8:50 AM     "

## 2024-05-15 NOTE — PROGRESS NOTES
Subjective:       Patient ID: Lima Maldonado is a 73 y.o. female.    Chief Complaint: No chief complaint on file.      HPI  Ms Maldonado return to clinic for follow-up of  diagnosis of left breast cancer. She had stage I ER positive disease with an intermediate risk Oncotype score. She is currently on letrozole.   Also on Prolia    Today she reports that she has been feeling fairly well.  She has had some recent sinus problems nasal drip.  She did a course of azithromycin for that.    She has no shortness of breath and no unusual pain.    Breast history:     Prior history of left breast cancer in 1997 when she underwent left lumpectomy with complete axillary dissection at age 46 for a pT1cN1 breast cancer (Stage IIA).  47 nodes were removed, 1 of which was positive.  She received adjuvant chemotherapy, radiation and tamoxifen.       Mammogram on September 20, 2016 demonstrated a new irregular mass with spiculated margins seen in the left breast at  5 o'clock along the surgical scar site.ultrasound showed a  8 X 8 X 7 MM MASS.    A needle biopsy in September 27 showed infiltrating carcinoma, histologic grade 3, nuclear grade 2, mitotic index 1. Tumor was 90% ER positive, 70% PA positive, and 1+ HER-2.     MRI of the breast showed a 1.7 x 1.3 cm lesion in the lower outer quadrant of the left breast.  PET/CT on October 7 was negative for any evidence of distant metastasis.    On November 16, 2016 bilateral mastectomies were performed. Left breast showed a  1.5 cm intermediate grade carcinoma with ductal and lobular features. There was focal dermal invasion. Margins were negative. The right breast was without abnormality.  Oncotype score returned 25 - intermediate risk.  Adjuvant TC X 4 completed 3/31/17.    She began letrozole in April 2017.    Breast Cancer Index:12/2022  5.1% risk or recurrence with benefit to continue endocrine therapy    Dexa September 2023 - osteoporosis  Review of Systems   Constitutional:  Negative.  Negative for appetite change and unexpected weight change.   HENT:  Positive for nasal congestion. Negative for mouth sores.    Eyes:  Negative for visual disturbance.   Respiratory: Negative.  Negative for cough and shortness of breath.    Cardiovascular:  Negative for chest pain.   Gastrointestinal:  Negative for abdominal pain and diarrhea.   Genitourinary:  Negative for frequency.   Musculoskeletal:  Positive for arthralgias and neck pain. Negative for back pain.   Integumentary:  Negative for rash.   Neurological: Negative.  Negative for headaches.   Hematological:  Negative for adenopathy.   Psychiatric/Behavioral: Negative.  The patient is not nervous/anxious.          Objective:      Physical Exam  Vitals reviewed.   Constitutional:       General: She is not in acute distress.  Cardiovascular:      Rate and Rhythm: Normal rate and regular rhythm.   Pulmonary:      Effort: Pulmonary effort is normal. No respiratory distress.      Breath sounds: Normal breath sounds. No wheezing or rales.   Chest:       Abdominal:      Palpations: Abdomen is soft. There is no mass.      Tenderness: There is no abdominal tenderness.   Lymphadenopathy:      Cervical: No cervical adenopathy.      Upper Body:      Right upper body: No supraclavicular or axillary adenopathy.      Left upper body: No supraclavicular or axillary adenopathy.   Neurological:      Mental Status: She is alert and oriented to person, place, and time.   Psychiatric:         Mood and Affect: Mood normal.         Thought Content: Thought content normal.         Judgment: Judgment normal.         Assessment:     CMP -  Problem List Items Addressed This Visit       Malignant neoplasm of lower-outer quadrant of left female breast - Primary       Plan:     Return to clinic 6 months.  [employer]      Route Chart for Scheduling    Med Onc Chart Routing      Follow up with physician 6 months.   Follow up with [facility]    Infusion scheduling note     Injection scheduling note    Labs CMP   Scheduling:  Preferred lab:  Lab interval:     Imaging None      Pharmacy appointment No pharmacy appointment needed      Other referrals no referral to Oncology Primary Care needed -  no Massage appointment needed    No additional referrals needed                 Supportive Plan Information  OP DENOSUMAB   Francisco Baker MD   Upcoming Treatment Dates - OP DENOSUMAB    5/16/2024       Medications       denosumab (PROLIA) injection 60 mg  11/14/2024       Medications       denosumab (PROLIA) injection 60 mg  5/15/2025       Medications       denosumab (PROLIA) injection 60 mg

## 2024-05-16 ENCOUNTER — OFFICE VISIT (OUTPATIENT)
Dept: HEMATOLOGY/ONCOLOGY | Facility: CLINIC | Age: 74
End: 2024-05-16
Payer: MEDICARE

## 2024-05-16 ENCOUNTER — INFUSION (OUTPATIENT)
Dept: INFUSION THERAPY | Facility: HOSPITAL | Age: 74
End: 2024-05-16
Attending: INTERNAL MEDICINE
Payer: MEDICARE

## 2024-05-16 VITALS
BODY MASS INDEX: 25.9 KG/M2 | HEART RATE: 88 BPM | DIASTOLIC BLOOD PRESSURE: 55 MMHG | SYSTOLIC BLOOD PRESSURE: 115 MMHG | WEIGHT: 161.19 LBS | HEIGHT: 66 IN | OXYGEN SATURATION: 97 %

## 2024-05-16 DIAGNOSIS — C50.512 MALIGNANT NEOPLASM OF LOWER-OUTER QUADRANT OF LEFT BREAST OF FEMALE, ESTROGEN RECEPTOR POSITIVE: Primary | ICD-10-CM

## 2024-05-16 DIAGNOSIS — M81.0 SENILE OSTEOPOROSIS: Primary | ICD-10-CM

## 2024-05-16 DIAGNOSIS — M81.0 SENILE OSTEOPOROSIS: ICD-10-CM

## 2024-05-16 DIAGNOSIS — Z17.0 MALIGNANT NEOPLASM OF LOWER-OUTER QUADRANT OF LEFT BREAST OF FEMALE, ESTROGEN RECEPTOR POSITIVE: Primary | ICD-10-CM

## 2024-05-16 PROCEDURE — 63600175 PHARM REV CODE 636 W HCPCS: Mod: JZ,JG,HCNC | Performed by: INTERNAL MEDICINE

## 2024-05-16 PROCEDURE — 3288F FALL RISK ASSESSMENT DOCD: CPT | Mod: HCNC,CPTII,S$GLB, | Performed by: INTERNAL MEDICINE

## 2024-05-16 PROCEDURE — 3044F HG A1C LEVEL LT 7.0%: CPT | Mod: HCNC,CPTII,S$GLB, | Performed by: INTERNAL MEDICINE

## 2024-05-16 PROCEDURE — 3078F DIAST BP <80 MM HG: CPT | Mod: HCNC,CPTII,S$GLB, | Performed by: INTERNAL MEDICINE

## 2024-05-16 PROCEDURE — 1101F PT FALLS ASSESS-DOCD LE1/YR: CPT | Mod: HCNC,CPTII,S$GLB, | Performed by: INTERNAL MEDICINE

## 2024-05-16 PROCEDURE — 1126F AMNT PAIN NOTED NONE PRSNT: CPT | Mod: HCNC,CPTII,S$GLB, | Performed by: INTERNAL MEDICINE

## 2024-05-16 PROCEDURE — 99213 OFFICE O/P EST LOW 20 MIN: CPT | Mod: HCNC,S$GLB,, | Performed by: INTERNAL MEDICINE

## 2024-05-16 PROCEDURE — 99999 PR PBB SHADOW E&M-EST. PATIENT-LVL II: CPT | Mod: PBBFAC,HCNC,, | Performed by: INTERNAL MEDICINE

## 2024-05-16 PROCEDURE — 3074F SYST BP LT 130 MM HG: CPT | Mod: HCNC,CPTII,S$GLB, | Performed by: INTERNAL MEDICINE

## 2024-05-16 PROCEDURE — 96372 THER/PROPH/DIAG INJ SC/IM: CPT | Mod: HCNC

## 2024-05-16 PROCEDURE — 4010F ACE/ARB THERAPY RXD/TAKEN: CPT | Mod: HCNC,CPTII,S$GLB, | Performed by: INTERNAL MEDICINE

## 2024-05-16 RX ADMIN — DENOSUMAB 60 MG: 60 INJECTION SUBCUTANEOUS at 09:05

## 2024-05-16 NOTE — NURSING
Pt here for Prolia injection. Assessment complete and labs reviewed. Pt endorses daily Ca++/Vit D; denies jaw pain/recent dental procedures. Administered injection in abdominal tissue. No questions or concerns. Pt ambulated out of unit unassisted.

## 2024-06-10 ENCOUNTER — PATIENT MESSAGE (OUTPATIENT)
Dept: INTERNAL MEDICINE | Facility: CLINIC | Age: 74
End: 2024-06-10
Payer: MEDICARE

## 2024-08-06 ENCOUNTER — OFFICE VISIT (OUTPATIENT)
Dept: INTERNAL MEDICINE | Facility: CLINIC | Age: 74
End: 2024-08-06
Payer: MEDICARE

## 2024-08-06 VITALS
OXYGEN SATURATION: 98 % | BODY MASS INDEX: 26.01 KG/M2 | SYSTOLIC BLOOD PRESSURE: 138 MMHG | HEIGHT: 66 IN | WEIGHT: 161.81 LBS | HEART RATE: 87 BPM | DIASTOLIC BLOOD PRESSURE: 74 MMHG

## 2024-08-06 DIAGNOSIS — M81.0 SENILE OSTEOPOROSIS: ICD-10-CM

## 2024-08-06 DIAGNOSIS — T45.1X5A CHEMOTHERAPY-INDUCED NEUROPATHY: ICD-10-CM

## 2024-08-06 DIAGNOSIS — G62.0 CHEMOTHERAPY-INDUCED NEUROPATHY: ICD-10-CM

## 2024-08-06 DIAGNOSIS — E11.42 TYPE 2 DIABETES MELLITUS WITH DIABETIC POLYNEUROPATHY, WITHOUT LONG-TERM CURRENT USE OF INSULIN: Primary | ICD-10-CM

## 2024-08-06 DIAGNOSIS — G47.33 OSA (OBSTRUCTIVE SLEEP APNEA): ICD-10-CM

## 2024-08-06 DIAGNOSIS — K76.0 FATTY LIVER: ICD-10-CM

## 2024-08-06 DIAGNOSIS — E11.9 ENCOUNTER FOR DIABETIC FOOT EXAM: ICD-10-CM

## 2024-08-06 DIAGNOSIS — G47.33 OBSTRUCTIVE SLEEP APNEA ON CPAP: ICD-10-CM

## 2024-08-06 DIAGNOSIS — E03.9 HYPOTHYROIDISM, UNSPECIFIED TYPE: ICD-10-CM

## 2024-08-06 DIAGNOSIS — I10 PRIMARY HYPERTENSION: ICD-10-CM

## 2024-08-06 DIAGNOSIS — C50.512 MALIGNANT NEOPLASM OF LOWER-OUTER QUADRANT OF LEFT BREAST OF FEMALE, ESTROGEN RECEPTOR POSITIVE: ICD-10-CM

## 2024-08-06 DIAGNOSIS — Z17.0 MALIGNANT NEOPLASM OF LOWER-OUTER QUADRANT OF LEFT BREAST OF FEMALE, ESTROGEN RECEPTOR POSITIVE: ICD-10-CM

## 2024-08-06 PROCEDURE — 99999 PR PBB SHADOW E&M-EST. PATIENT-LVL V: CPT | Mod: PBBFAC,HCNC,, | Performed by: INTERNAL MEDICINE

## 2024-08-07 PROBLEM — Z98.890 POST-OPERATIVE STATE: Status: RESOLVED | Noted: 2020-08-27 | Resolved: 2024-08-07

## 2024-08-14 ENCOUNTER — PATIENT MESSAGE (OUTPATIENT)
Dept: OTHER | Facility: OTHER | Age: 74
End: 2024-08-14
Payer: MEDICARE

## 2024-08-17 NOTE — TELEPHONE ENCOUNTER
No care due was identified.  [facility] Embedded Care Due Messages. Reference number: [id number].   8/17/2024 4:38:59 PM CDT

## 2024-08-18 RX ORDER — LEVOTHYROXINE SODIUM 50 UG/1
TABLET ORAL
Qty: 90 TABLET | Refills: 1 | Status: SHIPPED | OUTPATIENT
Start: 2024-08-18

## 2024-08-18 NOTE — TELEPHONE ENCOUNTER
Refill Decision Note   Lima Erica  is requesting a refill authorization.  Brief Assessment and Rationale for Refill:  Approve     Medication Therapy Plan:         Comments:     Note composed:4:01 AM 08/18/2024

## 2024-08-22 DIAGNOSIS — K22.10 ESOPHAGITIS, EROSIVE: ICD-10-CM

## 2024-08-22 DIAGNOSIS — Z79.899 ENCOUNTER FOR MONITORING LONG-TERM PROTON PUMP INHIBITOR THERAPY: ICD-10-CM

## 2024-08-22 DIAGNOSIS — Z51.81 ENCOUNTER FOR MONITORING LONG-TERM PROTON PUMP INHIBITOR THERAPY: ICD-10-CM

## 2024-08-22 DIAGNOSIS — K44.9 HIATAL HERNIA: ICD-10-CM

## 2024-08-22 RX ORDER — PANTOPRAZOLE SODIUM 20 MG/1
20 TABLET, DELAYED RELEASE ORAL
Qty: 90 TABLET | Refills: 3 | Status: SHIPPED | OUTPATIENT
Start: 2024-08-22

## 2024-08-22 NOTE — TELEPHONE ENCOUNTER
Refill Routing Note   Medication(s) are not appropriate for processing by [facility] for the following reason(s):        No active prescription written by provider    [facility] action(s):  Defer               Appointments  past 12m or future 3m with PCP    Date Provider   Last Visit   8/6/2024 Joie Mccall MD   Next Visit   2/11/2025 Joie Mccall MD   ED visits in past 90 days: 0        Note composed:11:10 AM 08/22/2024

## 2024-08-22 NOTE — TELEPHONE ENCOUNTER
No care due was identified.  [facility] Embedded Care Due Messages. Reference number: [id number].   8/22/2024 12:18:33 AM CDT

## 2024-09-04 ENCOUNTER — LAB VISIT (OUTPATIENT)
Dept: LAB | Facility: HOSPITAL | Age: 74
End: 2024-09-04
Attending: INTERNAL MEDICINE
Payer: MEDICARE

## 2024-09-04 DIAGNOSIS — E03.9 HYPOTHYROIDISM, UNSPECIFIED TYPE: ICD-10-CM

## 2024-09-04 DIAGNOSIS — E11.42 TYPE 2 DIABETES MELLITUS WITH DIABETIC POLYNEUROPATHY, WITHOUT LONG-TERM CURRENT USE OF INSULIN: ICD-10-CM

## 2024-09-04 LAB
ESTIMATED AVG GLUCOSE: 114 MG/DL (ref 68–131)
HBA1C MFR BLD: 5.6 % (ref 4–5.6)
T4 FREE SERPL-MCNC: 0.9 NG/DL (ref 0.71–1.51)
TSH SERPL DL<=0.005 MIU/L-ACNC: 2.7 UIU/ML (ref 0.4–4)

## 2024-09-04 PROCEDURE — 84443 ASSAY THYROID STIM HORMONE: CPT | Mod: HCNC | Performed by: INTERNAL MEDICINE

## 2024-09-04 PROCEDURE — 36415 COLL VENOUS BLD VENIPUNCTURE: CPT | Mod: HCNC | Performed by: INTERNAL MEDICINE

## 2024-09-04 PROCEDURE — 83036 HEMOGLOBIN GLYCOSYLATED A1C: CPT | Mod: HCNC | Performed by: INTERNAL MEDICINE

## 2024-09-04 PROCEDURE — 84439 ASSAY OF FREE THYROXINE: CPT | Mod: HCNC | Performed by: INTERNAL MEDICINE

## 2024-09-05 ENCOUNTER — PATIENT MESSAGE (OUTPATIENT)
Dept: INTERNAL MEDICINE | Facility: CLINIC | Age: 74
End: 2024-09-05
Payer: MEDICARE

## 2024-09-12 ENCOUNTER — PATIENT MESSAGE (OUTPATIENT)
Dept: PODIATRY | Facility: CLINIC | Age: 74
End: 2024-09-12
Payer: MEDICARE

## 2024-09-18 ENCOUNTER — OFFICE VISIT (OUTPATIENT)
Dept: PODIATRY | Facility: CLINIC | Age: 74
End: 2024-09-18
Payer: MEDICARE

## 2024-09-18 VITALS
BODY MASS INDEX: 26.01 KG/M2 | WEIGHT: 161.81 LBS | HEART RATE: 93 BPM | SYSTOLIC BLOOD PRESSURE: 135 MMHG | DIASTOLIC BLOOD PRESSURE: 74 MMHG | HEIGHT: 66 IN

## 2024-09-18 DIAGNOSIS — E11.9 ENCOUNTER FOR DIABETIC FOOT EXAM: ICD-10-CM

## 2024-09-18 PROCEDURE — 3044F HG A1C LEVEL LT 7.0%: CPT | Mod: HCNC,CPTII,S$GLB, | Performed by: PODIATRIST

## 2024-09-18 PROCEDURE — 3078F DIAST BP <80 MM HG: CPT | Mod: HCNC,CPTII,S$GLB, | Performed by: PODIATRIST

## 2024-09-18 PROCEDURE — 3288F FALL RISK ASSESSMENT DOCD: CPT | Mod: HCNC,CPTII,S$GLB, | Performed by: PODIATRIST

## 2024-09-18 PROCEDURE — 1126F AMNT PAIN NOTED NONE PRSNT: CPT | Mod: HCNC,CPTII,S$GLB, | Performed by: PODIATRIST

## 2024-09-18 PROCEDURE — 1159F MED LIST DOCD IN RCRD: CPT | Mod: HCNC,CPTII,S$GLB, | Performed by: PODIATRIST

## 2024-09-18 PROCEDURE — 4010F ACE/ARB THERAPY RXD/TAKEN: CPT | Mod: HCNC,CPTII,S$GLB, | Performed by: PODIATRIST

## 2024-09-18 PROCEDURE — 1101F PT FALLS ASSESS-DOCD LE1/YR: CPT | Mod: HCNC,CPTII,S$GLB, | Performed by: PODIATRIST

## 2024-09-18 PROCEDURE — 3075F SYST BP GE 130 - 139MM HG: CPT | Mod: HCNC,CPTII,S$GLB, | Performed by: PODIATRIST

## 2024-09-18 PROCEDURE — 99999 PR PBB SHADOW E&M-EST. PATIENT-LVL IV: CPT | Mod: PBBFAC,HCNC,, | Performed by: PODIATRIST

## 2024-09-18 PROCEDURE — 99213 OFFICE O/P EST LOW 20 MIN: CPT | Mod: HCNC,S$GLB,, | Performed by: PODIATRIST

## 2024-09-18 NOTE — PROGRESS NOTES
Subjective:      Patient ID: Lima Maldonado is a 74 y.o. female.    Chief Complaint: Diabetic Foot Exam (8/6/24 - Joie Mccall MD, PCP)    Lima is a 74 y.o. female who presents to the clinic upon referral from Dr. Mccall  for evaluation and treatment of diabetic feet. Lima has a past medical history of Acute cystitis without hematuria (7/17/2017), Arthritis, Back pain, Breast cancer, Class 1 obesity due to excess calories with serious comorbidity and body mass index (BMI) of 30.0 to 30.9 in adult (11/29/2018), Elevated bilirubin (11/19/2013), Fatty liver, GERD (gastroesophageal reflux disease), Glucose intolerance (impaired glucose tolerance), Hyperlipidemia, Hypertension, Hypothyroid, Hypothyroidism, Malignant neoplasm of lower-outer quadrant of left female breast (11/15/2016), Obesity, Obesity, diabetes, and hypertension syndrome (12/12/2018), Osteopenia, Osteoporosis, Primary insomnia (11/2/2016), Shingles, Sleep apnea, Squamous cell carcinoma (2011), Stress incontinence of urine (1/11/2018), Trouble in sleeping, Type 2 diabetes mellitus with diabetic polyneuropathy, without long-term current use of insulin (12/26/2017), Urinary incontinence, and Well woman exam with routine gynecological exam (11/2/2016). Patient relates no major problem with feet. Only complaints today consist of diabetic foot exam.    PCP: Joie Mccall MD    Date Last Seen by PCP:   Chief Complaint   Patient presents with    Diabetic Foot Exam     8/6/24 - Joie Mccall MD, PCP         Current shoe gear: Slip-on shoes    Hemoglobin A1C   Date Value Ref Range Status   09/04/2024 5.6 4.0 - 5.6 % Final     Comment:     ADA Screening Guidelines:  5.7-6.4%  Consistent with prediabetes  >or=6.5%  Consistent with diabetes    High levels of fetal hemoglobin interfere with the HbA1C  assay. Heterozygous hemoglobin variants (HbS, HgC, etc)do  not significantly interfere with this assay.   However, presence of multiple variants  may affect accuracy.     03/05/2024 6.2 (H) 4.0 - 5.6 % Final     Comment:     ADA Screening Guidelines:  5.7-6.4%  Consistent with prediabetes  >or=6.5%  Consistent with diabetes    High levels of fetal hemoglobin interfere with the HbA1C  assay. Heterozygous hemoglobin variants (HbS, HgC, etc)do  not significantly interfere with this assay.   However, presence of multiple variants may affect accuracy.     11/28/2023 6.4 (H) 4.0 - 5.6 % Final     Comment:     ADA Screening Guidelines:  5.7-6.4%  Consistent with prediabetes  >or=6.5%  Consistent with diabetes    High levels of fetal hemoglobin interfere with the HbA1C  assay. Heterozygous hemoglobin variants (HbS, HgC, etc)do  not significantly interfere with this assay.   However, presence of multiple variants may affect accuracy.           Review of Systems   Constitutional: Negative for chills, fever and malaise/fatigue.   HENT:  Negative for hearing loss.    Cardiovascular:  Negative for claudication.   Respiratory:  Negative for shortness of breath.    Skin:  Negative for flushing and rash.   Musculoskeletal:  Negative for joint pain and myalgias.   Neurological:  Negative for loss of balance, numbness, paresthesias and sensory change.   Psychiatric/Behavioral:  Negative for altered mental status.            Objective:      Physical Exam  Vitals reviewed.   Cardiovascular:      Pulses:           Dorsalis pedis pulses are 2+ on the right side and 2+ on the left side.        Posterior tibial pulses are 2+ on the right side and 2+ on the left side.      Comments: No edema noted b/L  Musculoskeletal:      Comments:        Feet:      Right foot:      Protective Sensation: 5 sites tested.  3 sites sensed.      Left foot:      Protective Sensation: 5 sites tested.  3 sites sensed.   Skin:     Comments: Normal skin tugor noted.   No open lesion noted b/L  Skin temp is warm to warm from proximal to distal b/L.  Webspaces clean, dry, and intact     Neurological:       "Mental Status: She is alert.               Assessment:       Encounter Diagnosis   Name Primary?    Encounter for diabetic foot exam          Plan:       Lima Shelton" was seen today for diabetic foot exam.    Diagnoses and all orders for this visit:    Encounter for diabetic foot exam  -     Ambulatory referral/consult to Podiatry      I counseled the patient on her conditions, their implications and medical management.    Discussed DM foot care:  Wear comfortable, proper fitting shoes. Wash feet daily. Dry well. After drying, apply moisturizer to feet (no lotion to webspaces). Inspect feet daily for skin breaks, blisters, swelling, or redness. Wear cotton socks (preferably white)  Change socks every day. Do NOT walk barefoot. Do NOT use heating pads or warm/hot water soaks      - Discussed importance of daily moisturizer to the feet such as Gold bonds diabetic foot cream     - Patient is low risk for developing lower extremity issues secondary to diabetes     - Advised the patient that fungus likes warm, dark, and moist environments such as bathrooms, gyms, and pools. Advised to spray shower, shower mat , and any shoes w/ Lysol periodically  RTC in 1 year or sooner if any new pedal problems should arise.      .           "

## 2024-09-20 ENCOUNTER — TELEPHONE (OUTPATIENT)
Dept: HEMATOLOGY/ONCOLOGY | Facility: CLINIC | Age: 74
End: 2024-09-20
Payer: MEDICARE

## 2024-09-20 NOTE — TELEPHONE ENCOUNTER
Returned patient call and offered alternative options to reschedule appointment. Patient decided to keep her currently scheduled appointment. Understanding verbalized.

## 2024-09-20 NOTE — TELEPHONE ENCOUNTER
----- Message from Davina Luna sent at 9/20/2024  2:19 PM CDT -----  Regarding: Appt  Contact: Pt  [phone]            Current Appt date:   11/14/24     Type of Appt: Est Pt      Physician:  Francisco Baker MD    Reason for rescheduling: Date no longer works      Caller: Lima      Contact Preference:  [phone]

## 2024-09-30 NOTE — PROGRESS NOTES
Gastroenterology: [facility]      SUBJECTIVE:         Chief Complaint: Here for evaluation of a pancreatic cyst     History of Present Illness:  Patient is a 74 y.o. female presents with a pancreatic cyst. The cyst was first noted on CT in 01/2023 which showed two hypoenhancing lesions within the head of the pancreas measuring 0.8cm and 0.5cm and an additional 6mm hypodensity in the pancreatic body. Subsequent EUS in 10/2023 showed four cystic lesions seen in the pancreatic head, pancreatic body and pancreatic tail. Largest measured 8mm in greatest dimension. It's located in the the head, the body. It is not symptomatic. Previous studies include CT scan and endoscopic US.   Since initial detection, the cyst has not increased in size. The pancreatic duct is not dilated.    Previous FNA of the cyst has not been done    Denies jaundice, unintentional weight loss, diarrhea, light colored stools, anorexia, abdominal pain. Has history of diabetes dx in 2016 that is well controlled with medication. Denies history of pancreatitis. Denies family history of pancreatic cancer. Denies tobacco use.     Prior Imaging:    CT Abdomen 01/17/2024  Pancreas: No mass or peripancreatic fat stranding. Two hypoenhancing lesions within the head of the pancreas measuring 0.8 cm (axial series 5, image 90) and 0.5 cm (axial series 5, image 91). Additional 6 mm hypodensity in the pancreatic body series 5, image 68. The main pancreatic duct is not dilated.    1. 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  Recommend reimaging every 2 years for 5 years to monitor for interval growth.  2. Hepatomegaly and hepatic steatosis.    EUS 10/11/2023  Impression:    - Four cystic lesions were seen in the pancreatic                          head, pancreatic body and pancreatic tail. Largest                          measured 8mm in greatest dimension.                          - No specimens collected.    Recommendation:        - Discharge patient to home (ambulatory).                          - Resume previous diet; Discharge to home                          (ambulatory); Resume outpatient medications                          - Return to primary care physician as previously                          scheduled.                          - My team will arrange for follow-up in pancreatic                          cyst clinic in 10 months. Will arrange for f/u                          imaging surveillance at that time.       Personal/family factors:    There is not a family history of pancreatic cancer     The patient does not smoke. Rarely drinks    ECOG status 0 - Asymptomatic        Review of Systems   Constitutional: no fever, chills or change in weight   Eyes: no visual changes   ENT: no sore throat or dysphagia  Respiratory: no cough or shortness of breath   Cardiovascular: no chest pain or palpitations   Gastrointestinal: as per HPI  Hematologic/Lymphatic: no easy bruising or lymphadenopathy   Musculoskeletal: no arthralgias or myalgias   Neurological: no seizures, tremors or change in mental status  Behavioral/Psych: no auditory or visual hallucinations    Past Medical History:   Diagnosis Date    Acute cystitis without hematuria 7/17/2017    Arthritis     Back pain     Breast cancer     originally dx in 02/1997- treated with surgery, chemo and radiation     Class 1 obesity due to excess calories with serious comorbidity and body mass index (BMI) of 30.0 to 30.9 in adult 11/29/2018    Elevated bilirubin 11/19/2013    Fatty liver     GERD (gastroesophageal reflux disease)     Glucose intolerance (impaired glucose tolerance)     Hyperlipidemia     Hypertension     Hypothyroid     Hypothyroidism     hypothyroidism    Malignant neoplasm of lower-outer quadrant of left female breast 11/15/2016    Obesity     Obesity, diabetes, and hypertension syndrome 12/12/2018    Osteopenia     Osteoporosis     Primary insomnia  11/2/2016    Shingles     Sleep apnea     wears CPAP nightly     Squamous cell carcinoma 2011    right forearm, sx by Dr. Corinne Ray    Stress incontinence of urine 1/11/2018    Trouble in sleeping     Type 2 diabetes mellitus with diabetic polyneuropathy, without long-term current use of insulin 12/26/2017    Urinary incontinence     Well woman exam with routine gynecological exam 11/2/2016       Past Surgical History:   Procedure Laterality Date    APPENDECTOMY  2008    removed during hysterectomy surgery     BLEPHAROPLASTY Bilateral     BREAST BIOPSY      2010    Breast implant Bilateral 1998    implants removed in 2003    BREAST LUMPECTOMY      02/1997    BREAST RECONSTRUCTION Bilateral     02/2017    BREAST SURGERY      see details below     CATARACT EXTRACTION W/  INTRAOCULAR LENS IMPLANT Left 08/13/2020    Procedure: EXTRACTION, CATARACT, WITH IOL INSERTION;  Surgeon: Ivanna Coleman MD;  Location: [facility] OR;  Service: Ophthalmology;  Laterality: Left;    CATARACT EXTRACTION W/  INTRAOCULAR LENS IMPLANT Right 08/27/2020    Procedure: EXTRACTION, CATARACT, WITH IOL INSERTION LASER;  Surgeon: Ivanna Coleman MD;  Location: [facility] OR;  Service: Ophthalmology;  Laterality: Right;    COLONOSCOPY N/A 05/15/2018    Procedure: COLONOSCOPY;  Surgeon: Roldan Gonzales MD;  Location: [facility] (4TH FLR);  Service: Endoscopy;  Laterality: N/A;    COSMETIC SURGERY  1/2022    Eyelift    ENDOSCOPIC ULTRASOUND OF UPPER GASTROINTESTINAL TRACT N/A 10/11/2023    Procedure: ULTRASOUND, UPPER GI TRACT, ENDOSCOPIC;  Surgeon: Hermelindo Coleman MD;  Location: [facility] ENDO (2ND FLR);  Service: Endoscopy;  Laterality: N/A;  instr portal-ozempic or trulicity-tb  10/5-precall complete/pt verbalized understanding of holding Ozempic-MS    EPIDURAL STEROID INJECTION N/A 2/1/2024    Procedure: CERVICAL C7/T1 IL GABINO *HOLD ASPIRIN FOR 5 DAYS*;  Surgeon: Wayne Singh MD;  Location: [facility] PAIN MGT;  Service: Pain Management;  Laterality: N/A;   [phone]  2 WK F/U IVANIA    ESOPHAGOGASTRODUODENOSCOPY N/A 2022    Procedure: EGD (ESOPHAGOGASTRODUODENOSCOPY);  Surgeon: Omar Ambriz MD;  Location: [facility] ([address] [place] [address]);  Service: Endoscopy;  Laterality: N/A;  instructions via portal -     EYE SURGERY      CATERACTS    HYSTERECTOMY      benign    left breast reconstruction  2005    left modified radical mastectomy  1997    for treatment of breast cancer     LIPOMA RESECTION  2010    removed from upper back area     MODIFIED RADICAL MASTECTOMY W/ AXILLARY LYMPH NODE DISSECTION  1997    OOPHORECTOMY          PELVIC LAPAROSCOPY      pt had endometriosis     NC REMOVAL OF OVARY/TUBE(S)      benign    reduction of mons pubis      robotic lap  hysterectomy with bso      UPPER GASTROINTESTINAL ENDOSCOPY  2013       Family History   Problem Relation Name Age of Onset    Heart attack Father Gui James 51    Heart disease Father Gui James         Heart Attac, age 51    Alcohol abuse Father Gui James     Breast cancer Sister x1 51        BRCA 1/2 negative    Cancer Sister x1         Breast Cancer    Cancer Mother Patricia 65        Malignant melanoma    Melanoma Mother Patricia     No Known Problems Brother x1     No Known Problems Daughter Chenae     No Known Problems Son Christien     No Known Problems Daughter Shelia     No Known Problems Son Samuel     Diabetes Maternal Uncle Uncle Mitchel             Diabetes Maternal Uncle Uncle Marcelo Ray             Arthritis Maternal Grandmother Annmarie Little         Severe arthitis in Knees    Uterine cancer Neg Hx      Colon cancer Neg Hx      Celiac disease Neg Hx      Esophageal cancer Neg Hx      Inflammatory bowel disease Neg Hx      Liver cancer Neg Hx      Liver disease Neg Hx      Stomach cancer Neg Hx      Ulcerative colitis Neg Hx      Hypertension Neg Hx      Cirrhosis Neg Hx      Crohn's disease Neg Hx      Rectal cancer Neg Hx      Ovarian cancer Neg Hx          Social History     Socioeconomic History    Marital status:    Tobacco Use    Smoking status: Former     Current packs/day: 0.00     Average packs/day: 0.5 packs/day for 3.0 years (1.5 ttl pk-yrs)     Types: Cigarettes     Start date: 4/15/1969     Quit date: 4/15/1972     Years since quittin.4    Smokeless tobacco: Never    Tobacco comments:     started at age 18 during college    Substance and Sexual Activity    Alcohol use: Yes     Comment: Rarely, not even 1X per week    Drug use: No    Sexual activity: Yes     Partners: Male     Birth control/protection: Post-menopausal, See Surgical Hx     Social Drivers of Health     Financial Resource Strain: Low Risk  (2023)    Overall Financial Resource Strain (CARDIA)     Difficulty of Paying Living Expenses: Not hard at all   Food Insecurity: No Food Insecurity (2023)    Hunger Vital Sign     Worried About Running Out of Food in the Last Year: Never true     Ran Out of Food in the Last Year: Never true   Transportation Needs: No Transportation Needs (2023)    PRAPARE - Transportation     Lack of Transportation (Medical): No     Lack of Transportation (Non-Medical): No   Physical Activity: Inactive (2023)    Exercise Vital Sign     Days of Exercise per Week: 0 days     Minutes of Exercise per Session: 0 min   Stress: No Stress Concern Present (2023)    [employer] [place] [employer] - Occupational Stress Questionnaire     Feeling of Stress : Not at all   Recent Concern: Stress - Stress Concern Present (10/20/2023)    [employer] [place] [employer] - Occupational Stress Questionnaire     Feeling of Stress : To some extent   Housing Stability: Low Risk  (2023)    Housing Stability Vital Sign     Unable to Pay for Housing in the Last Year: No     Number of Places Lived in the Last Year: 1     Unstable Housing in the Last Year: No       Current Outpatient Medications on File Prior to Visit   Medication  Sig Dispense Refill    ascorbic acid, vitamin C, (VITAMIN C) 1000 MG tablet Take 1 tablet by mouth once daily.      aspirin (ECOTRIN) 81 MG EC tablet Take 81 mg by mouth once daily.      calcium-vitamin D tablet 600 mg-200 units       CANNABIDIOL, CBD, EXTRACT ORAL Take 1 drop by mouth Daily.      coenzyme Q10-vitamin E 100-100 mg-unit Cap Take 1 capsule by mouth once daily.       cyclobenzaprine (FLEXERIL) 5 MG tablet Take 1 tablet (5 mg total) by mouth 3 (three) times daily as needed for Muscle spasms (muscular pain). 90 tablet 2    denosumab (PROLIA) 60 mg/mL Syrg Inject 1 mL (60 mg total) into the skin every 6 (six) months. 2 mL 0    fenofibrate 160 MG Tab TAKE 1 TABLET BY MOUTH EVERY DAY 90 tablet 3    fluticasone (FLONASE) 50 mcg/actuation nasal spray 1 spray by Each Nare route 2 (two) times daily.       gabapentin (NEURONTIN) 300 MG capsule TAKE 1 CAPSULE BY MOUTH THREE TIMES A  capsule 1    irbesartan (AVAPRO) 150 MG tablet Take 1 tablet (150 mg total) by mouth every evening. 90 tablet 3    letrozole (FEMARA) 2.5 mg Tab TAKE 1 TABLET BY MOUTH EVERY DAY 90 tablet 3    levothyroxine (SYNTHROID) 50 MCG tablet TAKE 1 TABLET BY MOUTH EVERY DAY 90 tablet 1    MULTIVITAMIN ORAL Take 1 tablet by mouth once daily.       omega-3 fatty acids-vitamin E 1,000 mg Cap Take 1 capsule by mouth once daily.       pantoprazole (PROTONIX) 20 MG tablet TAKE 1 TABLET (20 MG TOTAL) BY MOUTH BEFORE BREAKFAST. 90 tablet 3    simvastatin (ZOCOR) 20 MG tablet TAKE 1 TABLET BY MOUTH EVERY DAY IN THE EVENING 90 tablet 3    sucralfate (CARAFATE) 100 mg/mL suspension Take 10 mLs (1 g total) by mouth 4 (four) times daily. 828 mL 1    tirzepatide (MOUNJARO) 7.5 mg/0.5 mL PnIj Inject 7.5 mg into the skin once a week. 6 mL 3    tobramycin sulfate 0.3% (TOBREX) 0.3 % ophthalmic solution Apply to wound beds twice daily 5 mL 2    vitamin D 1000 units Tab Take 1,000 Units by mouth once daily.       YUVAFEM 10 mcg Tab PLACE 1 TABLET  VAGINALLY TWICE A WEEK. 24 tablet 2     No current facility-administered medications on file prior to visit.       Review of patient's allergies indicates:   Allergen Reactions    Erythromycin      Other reaction(s): Nausea    Erythromycin (bulk)      Other reaction(s): Nausea    Oxycodone Nausea And Vomiting    Oxycodone-acetaminophen Nausea And Vomiting     Other reaction(s): Nausea       Physical Exam:  General: Well-developed, well-appearing, no acute distress  Neuro: alert and oriented to person, place, time; normal appearing gait  Eyes: No scleral icterus, pupils equally round, normal-appearing conjunctiva  Neck: supple; no cervical lymphadenopathy  CVS: regular rate and rhythm; no murmurs  Lungs: clear to auscultation bilaterally; no labored breathing, no wheezes  Abdomen: soft, non-distended, non-tender, no rebound tenderness or guarding, nomal bowel sounds  Extremities: no cyanosis, edema, or clubbing  Skin: no rash, no jaundice        Laboratory:   Lab Results   Component Value Date    ALT 26 05/16/2024    AST 31 05/16/2024     (H) 11/12/2013    ALKPHOS 47 (L) 05/16/2024    BILITOT 0.7 05/16/2024     Lab Results   Component Value Date    WBC 4.52 11/28/2023    HGB 13.7 11/28/2023    HCT 40.9 11/28/2023    MCV 88 11/28/2023     11/28/2023     Lab Results   Component Value Date    INR 1.0 02/02/2017    INR 1.0 02/03/2014           Diagnostic/Imaging Results:  As above    ASSESSMENT/PLAN:     Pancreatic cyst in the the head, the body. Measuring 5mm, 6mm, 8mm in size,  unchanged    Most likely etiology at this point is undetermined    We discussed in detail the natural history of pancreatic cysts, the therapy involved, and the benefits of multimodality surveillance imaging including Ct, MRI, and EUS with FNA    1. Pancreatic cyst        Plan:      Pancreatic cyst    [place] guidelines recommends imaging with CT or MRI every 2 years. It has been one year since patient underwent surveillance for  these lesions. Patient has a lot of concern about these lesions due to her history of breast cancer and would really like to have imaging now   Ordered MRI now to assess lesions for growth or changes. If no changes, will tentatively plan to stretch surveillance interval to every 2 years. Patient agrees with this plan.   Ordered serum creatinine to assess patients kidney function prior to MRI with contrast          Kenneth Sanchez PA-C  Department of Advanced Endoscopy  [facility]

## 2024-10-01 ENCOUNTER — OFFICE VISIT (OUTPATIENT)
Dept: GASTROENTEROLOGY | Facility: CLINIC | Age: 74
End: 2024-10-01
Payer: MEDICARE

## 2024-10-01 ENCOUNTER — LAB VISIT (OUTPATIENT)
Dept: LAB | Facility: HOSPITAL | Age: 74
End: 2024-10-01
Payer: MEDICARE

## 2024-10-01 VITALS
HEIGHT: 66 IN | BODY MASS INDEX: 26.33 KG/M2 | WEIGHT: 163.81 LBS | HEART RATE: 92 BPM | DIASTOLIC BLOOD PRESSURE: 70 MMHG | SYSTOLIC BLOOD PRESSURE: 130 MMHG

## 2024-10-01 DIAGNOSIS — K86.2 PANCREATIC CYST: ICD-10-CM

## 2024-10-01 DIAGNOSIS — K86.2 PANCREATIC CYST: Primary | ICD-10-CM

## 2024-10-01 LAB
CREAT SERPL-MCNC: 0.9 MG/DL (ref 0.5–1.4)
EST. GFR  (NO RACE VARIABLE): >60 ML/MIN/1.73 M^2

## 2024-10-01 PROCEDURE — 1101F PT FALLS ASSESS-DOCD LE1/YR: CPT | Mod: HCNC,CPTII,S$GLB,

## 2024-10-01 PROCEDURE — 4010F ACE/ARB THERAPY RXD/TAKEN: CPT | Mod: HCNC,CPTII,S$GLB,

## 2024-10-01 PROCEDURE — 36415 COLL VENOUS BLD VENIPUNCTURE: CPT | Mod: HCNC

## 2024-10-01 PROCEDURE — 82565 ASSAY OF CREATININE: CPT | Mod: HCNC

## 2024-10-01 PROCEDURE — 3075F SYST BP GE 130 - 139MM HG: CPT | Mod: HCNC,CPTII,S$GLB,

## 2024-10-01 PROCEDURE — 3078F DIAST BP <80 MM HG: CPT | Mod: HCNC,CPTII,S$GLB,

## 2024-10-01 PROCEDURE — 3288F FALL RISK ASSESSMENT DOCD: CPT | Mod: HCNC,CPTII,S$GLB,

## 2024-10-01 PROCEDURE — 3044F HG A1C LEVEL LT 7.0%: CPT | Mod: HCNC,CPTII,S$GLB,

## 2024-10-01 PROCEDURE — 1159F MED LIST DOCD IN RCRD: CPT | Mod: HCNC,CPTII,S$GLB,

## 2024-10-01 PROCEDURE — 99214 OFFICE O/P EST MOD 30 MIN: CPT | Mod: HCNC,S$GLB,,

## 2024-10-01 PROCEDURE — 1126F AMNT PAIN NOTED NONE PRSNT: CPT | Mod: HCNC,CPTII,S$GLB,

## 2024-10-01 PROCEDURE — 99999 PR PBB SHADOW E&M-EST. PATIENT-LVL IV: CPT | Mod: PBBFAC,HCNC,,

## 2024-10-23 RX ORDER — METFORMIN HYDROCHLORIDE 500 MG/1
500 TABLET ORAL 2 TIMES DAILY WITH MEALS
Qty: 180 TABLET | Refills: 0 | OUTPATIENT
Start: 2024-10-23

## 2024-10-23 NOTE — TELEPHONE ENCOUNTER
Refill Decision Note   Lima Erica  is requesting a refill authorization.  Brief Assessment and Rationale for Refill:  Quick Discontinue     Medication Therapy Plan: Cem'jeremías (9/5/24)      Comments:     Note composed:7:51 AM 10/23/2024

## 2024-10-23 NOTE — TELEPHONE ENCOUNTER
No care due was identified.  Health [facility] Embedded Care Due Messages. Reference number: [id number].   10/23/2024 12:22:23 AM CDT

## 2024-10-26 ENCOUNTER — HOSPITAL ENCOUNTER (OUTPATIENT)
Dept: RADIOLOGY | Facility: HOSPITAL | Age: 74
Discharge: HOME | End: 2024-10-26
Payer: MEDICARE

## 2024-10-26 DIAGNOSIS — K86.2 PANCREATIC CYST: ICD-10-CM

## 2024-10-26 PROCEDURE — 25500020 PHARM REV CODE 255: Mod: HCNC

## 2024-10-26 PROCEDURE — 74183 MRI ABD W/O CNTR FLWD CNTR: CPT | Mod: TC,HCNC

## 2024-10-26 PROCEDURE — 76376 3D RENDER W/INTRP POSTPROCES: CPT | Mod: TC,HCNC

## 2024-10-26 PROCEDURE — A9585 GADOBUTROL INJECTION: HCPCS | Mod: HCNC

## 2024-10-26 RX ORDER — GADOBUTROL 604.72 MG/ML
10 INJECTION INTRAVENOUS
Status: COMPLETED | OUTPATIENT
Start: 2024-10-26 | End: 2024-10-26

## 2024-10-26 RX ADMIN — GADOBUTROL 10 ML: 604.72 INJECTION INTRAVENOUS at 11:10

## 2024-10-29 ENCOUNTER — PATIENT MESSAGE (OUTPATIENT)
Dept: GASTROENTEROLOGY | Facility: CLINIC | Age: 74
End: 2024-10-29
Payer: MEDICARE

## 2024-10-30 ENCOUNTER — OFFICE VISIT (OUTPATIENT)
Dept: SLEEP MEDICINE | Facility: CLINIC | Age: 74
End: 2024-10-30
Attending: INTERNAL MEDICINE
Payer: MEDICARE

## 2024-10-30 VITALS
BODY MASS INDEX: 26.5 KG/M2 | WEIGHT: 164.88 LBS | DIASTOLIC BLOOD PRESSURE: 76 MMHG | HEIGHT: 66 IN | HEART RATE: 102 BPM | SYSTOLIC BLOOD PRESSURE: 127 MMHG

## 2024-10-30 DIAGNOSIS — G47.19 EXCESSIVE DAYTIME SLEEPINESS: ICD-10-CM

## 2024-10-30 DIAGNOSIS — R35.1 NOCTURIA: ICD-10-CM

## 2024-10-30 DIAGNOSIS — G47.33 OSA (OBSTRUCTIVE SLEEP APNEA): Primary | ICD-10-CM

## 2024-10-30 DIAGNOSIS — F51.09 OTHER INSOMNIA NOT DUE TO A SUBSTANCE OR KNOWN PHYSIOLOGICAL CONDITION: ICD-10-CM

## 2024-10-30 PROCEDURE — 1160F RVW MEDS BY RX/DR IN RCRD: CPT | Mod: CPTII,S$GLB,, | Performed by: PHYSICIAN ASSISTANT

## 2024-10-30 PROCEDURE — 3074F SYST BP LT 130 MM HG: CPT | Mod: CPTII,S$GLB,, | Performed by: PHYSICIAN ASSISTANT

## 2024-10-30 PROCEDURE — 1126F AMNT PAIN NOTED NONE PRSNT: CPT | Mod: CPTII,S$GLB,, | Performed by: PHYSICIAN ASSISTANT

## 2024-10-30 PROCEDURE — 99204 OFFICE O/P NEW MOD 45 MIN: CPT | Mod: S$GLB,,, | Performed by: PHYSICIAN ASSISTANT

## 2024-10-30 PROCEDURE — 4010F ACE/ARB THERAPY RXD/TAKEN: CPT | Mod: CPTII,S$GLB,, | Performed by: PHYSICIAN ASSISTANT

## 2024-10-30 PROCEDURE — 3044F HG A1C LEVEL LT 7.0%: CPT | Mod: CPTII,S$GLB,, | Performed by: PHYSICIAN ASSISTANT

## 2024-10-30 PROCEDURE — 1101F PT FALLS ASSESS-DOCD LE1/YR: CPT | Mod: CPTII,S$GLB,, | Performed by: PHYSICIAN ASSISTANT

## 2024-10-30 PROCEDURE — 99999 PR PBB SHADOW E&M-EST. PATIENT-LVL IV: CPT | Mod: PBBFAC,HCNC,, | Performed by: PHYSICIAN ASSISTANT

## 2024-10-30 PROCEDURE — 1159F MED LIST DOCD IN RCRD: CPT | Mod: CPTII,S$GLB,, | Performed by: PHYSICIAN ASSISTANT

## 2024-10-30 PROCEDURE — 3078F DIAST BP <80 MM HG: CPT | Mod: CPTII,S$GLB,, | Performed by: PHYSICIAN ASSISTANT

## 2024-10-30 PROCEDURE — 3288F FALL RISK ASSESSMENT DOCD: CPT | Mod: CPTII,S$GLB,, | Performed by: PHYSICIAN ASSISTANT

## 2024-11-01 ENCOUNTER — TELEPHONE (OUTPATIENT)
Dept: ORTHOPEDICS | Facility: CLINIC | Age: 74
End: 2024-11-01
Payer: MEDICARE

## 2024-11-04 ENCOUNTER — HOSPITAL ENCOUNTER (OUTPATIENT)
Dept: RADIOLOGY | Facility: HOSPITAL | Age: 74
Discharge: HOME | End: 2024-11-04
Attending: ORTHOPAEDIC SURGERY
Payer: MEDICARE

## 2024-11-04 ENCOUNTER — OFFICE VISIT (OUTPATIENT)
Dept: ORTHOPEDICS | Facility: CLINIC | Age: 74
End: 2024-11-04
Payer: MEDICARE

## 2024-11-04 ENCOUNTER — CLINICAL SUPPORT (OUTPATIENT)
Dept: REHABILITATION | Facility: HOSPITAL | Age: 74
End: 2024-11-04
Payer: MEDICARE

## 2024-11-04 DIAGNOSIS — M19.042 PRIMARY OSTEOARTHRITIS OF BOTH HANDS: ICD-10-CM

## 2024-11-04 DIAGNOSIS — M18.12 PRIMARY OSTEOARTHRITIS OF FIRST CARPOMETACARPAL JOINT OF LEFT HAND: Primary | ICD-10-CM

## 2024-11-04 DIAGNOSIS — G89.29 CHRONIC PAIN OF RIGHT HAND: ICD-10-CM

## 2024-11-04 DIAGNOSIS — M19.041 PRIMARY OSTEOARTHRITIS OF BOTH HANDS: Primary | ICD-10-CM

## 2024-11-04 DIAGNOSIS — M18.12 PRIMARY OSTEOARTHRITIS OF FIRST CARPOMETACARPAL JOINT OF LEFT HAND: ICD-10-CM

## 2024-11-04 DIAGNOSIS — M19.042 PRIMARY OSTEOARTHRITIS OF BOTH HANDS: Primary | ICD-10-CM

## 2024-11-04 DIAGNOSIS — M19.041 PRIMARY OSTEOARTHRITIS OF BOTH HANDS: ICD-10-CM

## 2024-11-04 DIAGNOSIS — M18.11 PRIMARY OSTEOARTHRITIS OF FIRST CARPOMETACARPAL JOINT OF RIGHT HAND: ICD-10-CM

## 2024-11-04 DIAGNOSIS — M79.641 CHRONIC PAIN OF RIGHT HAND: ICD-10-CM

## 2024-11-04 DIAGNOSIS — G89.29 CHRONIC PAIN OF LEFT HAND: ICD-10-CM

## 2024-11-04 DIAGNOSIS — M79.642 CHRONIC PAIN OF LEFT HAND: ICD-10-CM

## 2024-11-04 PROCEDURE — L3923 HFO WITHOUT JOINTS PRE CST: HCPCS

## 2024-11-04 PROCEDURE — 1160F RVW MEDS BY RX/DR IN RCRD: CPT | Mod: HCNC,CPTII,S$GLB, | Performed by: ORTHOPAEDIC SURGERY

## 2024-11-04 PROCEDURE — 73130 X-RAY EXAM OF HAND: CPT | Mod: TC,50

## 2024-11-04 PROCEDURE — 1125F AMNT PAIN NOTED PAIN PRSNT: CPT | Mod: HCNC,CPTII,S$GLB, | Performed by: ORTHOPAEDIC SURGERY

## 2024-11-04 PROCEDURE — 99214 OFFICE O/P EST MOD 30 MIN: CPT | Mod: 25,HCNC,S$GLB, | Performed by: ORTHOPAEDIC SURGERY

## 2024-11-04 PROCEDURE — 97760 ORTHOTIC MGMT&TRAING 1ST ENC: CPT

## 2024-11-04 PROCEDURE — 3288F FALL RISK ASSESSMENT DOCD: CPT | Mod: HCNC,CPTII,S$GLB, | Performed by: ORTHOPAEDIC SURGERY

## 2024-11-04 PROCEDURE — 1159F MED LIST DOCD IN RCRD: CPT | Mod: HCNC,CPTII,S$GLB, | Performed by: ORTHOPAEDIC SURGERY

## 2024-11-04 PROCEDURE — 1101F PT FALLS ASSESS-DOCD LE1/YR: CPT | Mod: HCNC,CPTII,S$GLB, | Performed by: ORTHOPAEDIC SURGERY

## 2024-11-04 PROCEDURE — 4010F ACE/ARB THERAPY RXD/TAKEN: CPT | Mod: HCNC,CPTII,S$GLB, | Performed by: ORTHOPAEDIC SURGERY

## 2024-11-04 PROCEDURE — 99999 PR PBB SHADOW E&M-EST. PATIENT-LVL III: CPT | Mod: PBBFAC,HCNC,, | Performed by: ORTHOPAEDIC SURGERY

## 2024-11-04 PROCEDURE — 20600 DRAIN/INJ JOINT/BURSA W/O US: CPT | Mod: HCNC,50,S$GLB, | Performed by: ORTHOPAEDIC SURGERY

## 2024-11-04 PROCEDURE — 3044F HG A1C LEVEL LT 7.0%: CPT | Mod: HCNC,CPTII,S$GLB, | Performed by: ORTHOPAEDIC SURGERY

## 2024-11-04 PROCEDURE — 73130 X-RAY EXAM OF HAND: CPT | Mod: 26,50,, | Performed by: RADIOLOGY

## 2024-11-04 RX ADMIN — METHYLPREDNISOLONE ACETATE 40 MG: 40 INJECTION, SUSPENSION INTRA-ARTICULAR; INTRALESIONAL; INTRAMUSCULAR; SOFT TISSUE at 01:11

## 2024-11-04 NOTE — PROCEDURES
Small Joint Aspiration/Injection: L thumb CMC    Date/Time: 11/4/2024 1:45 PM    Performed by: Ines Fajardo MD  Authorized by: Ines Fajardo MD    Consent Done?:  Yes (Verbal)  Indications:  Pain  Timeout: prior to procedure the correct patient, procedure, and site was verified    Prep: patient was prepped and draped in usual sterile fashion      Local anesthesia used?: Yes    Anesthesia:  Local infiltration  Local anesthetic:  Lidocaine 1% without epinephrine  Location:  Thumb  Site:  L thumb CMC  Needle size:  25 G  Medications:  40 mg methylPREDNISolone acetate 40 mg/mL  Patient tolerance:  Patient tolerated the procedure well with no immediate complications  Small Joint Aspiration/Injection: R thumb CMC    Date/Time: 11/4/2024 1:45 PM    Performed by: Ines Fajardo MD  Authorized by: Ines Fajardo MD    Consent Done?:  Yes (Verbal)  Indications:  Pain  Timeout: prior to procedure the correct patient, procedure, and site was verified    Prep: patient was prepped and draped in usual sterile fashion      Local anesthesia used?: Yes    Anesthesia:  Local infiltration  Local anesthetic:  Lidocaine 1% without epinephrine  Location:  Thumb  Site:  R thumb CMC  Needle size:  25 G  Medications:  40 mg methylPREDNISolone acetate 40 mg/mL  Patient tolerance:  Patient tolerated the procedure well with no immediate complications

## 2024-11-04 NOTE — PROGRESS NOTES
Lima Maldonado presents for follow up evaluation of   Encounter Diagnoses   Name Primary?    Primary osteoarthritis of first carpometacarpal joint of left hand Yes    Primary osteoarthritis of first carpometacarpal joint of right hand     Chronic pain of left hand     Chronic pain of right hand     Primary osteoarthritis of both hands      History of Present Illness  The patient presents for evaluation of bilateral thumb pain.    She received an injection in January 2024, which provided relief for a period of time. However, over the past several months, the pain has progressively worsened, with the left thumb being more severely affected than the right. As a left-handed individual, this has significantly impacted her daily activities, such as lifting dishes.    She reports pain in all joints and a constant sensation of swelling in her hands. Attempts to alleviate the pain with braces were unsuccessful due to discomfort and interference with her activities.    Additionally, she mentions a previous epidural injection for a pinched nerve in her neck, which she believes may have exacerbated her thumb pain.     Vitals:    11/04/24 1312   PainSc:   5   PainLoc: Hand       PE:    AA&O x 4.  NAD  HEENT:  NCAT, sclera nonicteric  Lungs:  Respirations are equal and unlabored.  CV:  2+ bilateral upper and lower extremity pulses.  MSK:   Physical Exam      Positive grind test bilateral basilar thumb joints, decreased basilar thumb range of motion. Neurovascularly intact bilaterally.  5/5 thenar and intrinsic musculature strength.  Nica's and Heberden's nodes bilateral hands.  Otherwise bilateral full range of motion hands, wrists and elbows.    Diagnostic studies and other clinical records review:  Results       X-rays AP, lateral and oblique bilateral hand taken today are independently reviewed by me and shows bilateral Eaton stage II basilar thumb arthritis and finger PIP and DIP arthritis.     Assessment/Plan:    Encounter Diagnoses   Name Primary?    Primary osteoarthritis of first carpometacarpal joint of left hand Yes    Primary osteoarthritis of first carpometacarpal joint of right hand     Chronic pain of left hand     Chronic pain of right hand     Primary osteoarthritis of both hands      Assessment & Plan  The patient and I had a thorough discussion today. We discussed the working diagnosis as well as several other potential alternative diagnoses. Treatment options were discussed, both conservative and surgical. Conservative treatment options would include things such as activity modifications, workplace modifications, a period of rest, oral vs topical OTC and prescription anti-inflammatory medications, occupational therapy, splinting/bracing, immobilization, corticosteroid injections, and others. Surgical options were discussed as well.     -I have offered her a selective injection. I have explained the risks, benefits, and alternatives of the procedure in detail.  The patient voices understanding and all questions have been answered. The patient agrees to proceed as planned. So after a sterile prep of the skin in the normal fashion the bilateral thumb CMC joints injected using a 25 gauge needle with a combination of 1cc 1% plain lidocaine and 40 mg of methylprednisolone.  The patient is cautioned and immediate relief of pain is secondary to the local anesthetic and will be temporary.  After the anesthetic wears off there may be a increase in pain that may last for a few hours or a few days and they should use ice to help alleviate this flair up of pain. Patient tolerated the procedure well.    Follow up in 4 weeks virtual audio or sooner for any worsening of symptoms  Call with any questions/concerns in the interim           Ines Fajardo MD    Please be aware that this note has been generated with the assistance of [employer] voice-to-text.  Please excuse any spelling or grammatical errors.    This note was  generated with the assistance of ambient listening technology. Verbal consent was obtained by the patient and accompanying visitor(s) for the recording of patient appointment to facilitate this note. I attest to having reviewed and edited the generated note for accuracy, though some syntax or spelling errors may persist. Please contact the author of this note for any clarification.

## 2024-11-05 NOTE — PLAN OF CARE
OT Orthosis Only    TIME RECORD    Date:  11/4/2024    Start Time:  200pm  Stop Time:  215pm        Occupational Therapy Orthotic Note    Patient: Lima Maldonado  MRN: [id number]  Date of visit: 11/4/2024  Referring Physician:  Ines Fajardo MD   Primary Diagnosis:   Encounter Diagnoses   Name Primary?    Primary osteoarthritis of first carpometacarpal joint of left hand     Primary osteoarthritis of first carpometacarpal joint of right hand        Subjective:   Pt. Verbalizes comfort of fit following orthosis application.     Objective:     Patient seen by OT this session for fabrication of orthosis per MD orders. Custom molded thumb support insert and applied  Comfort Cool Orthosis to bilateral hand    Assessment:     Orthosis with good fit following fabrication. Pt. Able to alexander/doff independently.      Patient Education/Response:   Ortho Fit and Train x 8 min    Pt. Instructed to wear as needed for symptom relief as well as at night.  Patient/caregiver were provided written instructions on orthosis purpose, wear schedule, care and precautions to monitor for increased pain/edema, pressure points, skin breakdown or redness/skin irritation Patient/caregiver to contact clinic for adjustments as needed.  Patient signed copy of orthosis instructions, acknowledging delivery and understanding of wear, care, and precautions     (see Media for scanned documents)    Plans and Goals:     Goal of Orthosis to decrease symptoms and protect joint. Pt. To DC with  Orthosis. Orthosis Check PRN, f/u with MD at RTD

## 2024-11-08 NOTE — [employer] PATIENT INSTRUCTIONS
Gastroenterology, Hepatology, &  Advanced Endoscopy    Consult Note      Reason for Consult: Esophageal spasms, need for possible manometry  GI and defer for any plans of barium swallow vs esophagogram by General Surgery.  Pletal, ASA, and Plavix currently being held.  Panendoscopy performed 6/10/24   making the note of a normal esophagus without masses, ulcerations, or lesions noted.   There were no masses, polyps, or lesions noted. Upon reaching the anus, the scope was retroflexed. There were no significant hemorrhoids noted.     HPI:   Sridevi Ruiz is a 82 y.o. female w/ PMH of  has a past medical history of CAD (coronary artery disease), Fibromyalgia, Hiatal hernia, History of blood transfusion, Hyperlipidemia, Hypertension, Meniere disease, and Peripheral vascular disease (HCC). who presents to the ED  for evaluation of midsternal chest pain that started 3 days ago.  Pain sets in after food ingestion and radiates to her back and up to her jaws as well as b/l upper extremity and is improved with Tums and a GI cocktail.  Denies abdominal pain, nausea and vomiting. Patient states that she had similar symptoms 4 days ago and was seen by cardiology who recommended outpatient cardiac monitoring. Patient was seen in June 2024 for CAD and had a right and left cardiac catheterization that was negative.  She was found to have severe aortic stenosis and had a TAVR done on 7/17. She was previously seen by General surgery in June for anemia and had an EGD that was normal.  HBP was following the patient for a pancreatic mass.  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 Pmhx includes PVD s/p b/l femoral  bypass. On ASA/Plavix/Pletal. CTA chest did not show any esophageal abnormalities.       PMH:       Diagnosis Date    CAD (coronary artery disease)     Fibromyalgia     Hiatal hernia     History of blood transfusion     Hyperlipidemia     Hypertension     Meniere  Discharge Summary/Instructions after an Endoscopic Procedure  Patient Name: Lima Maldonado  Patient MRN: [id number]  Patient YOB: 1950 Tuesday, December 20, 2022  Omar Ambriz MD  Dear patient,  As a result of recent federal legislation (The Federal Cures Act), you may   receive lab or pathology results from your procedure in your [facility]   account before your physician is able to contact you. Your physician or   their representative will relay the results to you with their   recommendations at their soonest availability.  Thank you,  RESTRICTIONS:  During your procedure today, you received medications for sedation.  These   medications may affect your judgment, balance and coordination.  Therefore,   for 24 hours, you have the following restrictions:   - DO NOT drive a car, operate machinery, make legal/financial decisions,   sign important papers or drink alcohol.    ACTIVITY:  Today: no heavy lifting, straining or running due to procedural   sedation/anesthesia.  The following day: return to full activity including work.  DIET:  Eat and drink normally unless instructed otherwise.     TREATMENT FOR COMMON SIDE EFFECTS:  - Mild abdominal pain, nausea, belching, bloating or excessive gas:  rest,   eat lightly and use a heating pad.  - Sore Throat: treat with throat lozenges and/or gargle with warm salt   water.  - Because air was used during the procedure, expelling large amounts of air   from your rectum or belching is normal.  - If a bowel prep was taken, you may not have a bowel movement for 1-3 days.    This is normal.  SYMPTOMS TO WATCH FOR AND REPORT TO YOUR PHYSICIAN:  1. Abdominal pain or bloating, other than gas cramps.  2. Chest pain.  3. Back pain.  4. Signs of infection such as: chills or fever occurring within 24 hours   after the procedure.  5. Rectal bleeding, which would show as bright red, maroon, or black stools.   (A tablespoon of blood from the rectum is not serious, especially  if   hemorrhoids are present.)  6. Vomiting.  7. Weakness or dizziness.  GO DIRECTLY TO THE NEAREST EMERGENCY ROOM IF YOU HAVE ANY OF THE FOLLOWING:      Difficulty breathing              Chills and/or fever over 101 F   Persistent vomiting and/or vomiting blood   Severe abdominal pain   Severe chest pain   Black, tarry stools   Bleeding- more than one tablespoon   Any other symptom or condition that you feel may need urgent attention  Your doctor recommends these additional instructions:  If any biopsies were taken, your doctors clinic will contact you in 1 to 2   weeks with any results.  - Await pathology results.   - Return to referring physician.   - Continue present medications.   - Telephone endoscopist for pathology results in 3 weeks.   - The findings and recommendations were discussed with the patient.   - Discharge patient to home.  For questions, problems or results please call your physician - Omar Ambriz MD at Work:  [phone].  [facility] [place], EMERGENCY ROOM PHONE NUMBER: [phone]  IF A COMPLICATION OR EMERGENCY SITUATION ARISES AND YOU ARE UNABLE TO REACH   YOUR PHYSICIAN - GO DIRECTLY TO THE EMERGENCY ROOM.  Omar Ambriz MD  12/20/2022 10:35:21 AM  This report has been verified and signed electronically.  Dear patient,  As a result of recent federal legislation (The Federal Cures Act), you may   receive lab or pathology results from your procedure in your [facility]   account before your physician is able to contact you. Your physician or   their representative will relay the results to you with their   recommendations at their soonest availability.  Thank you,  [employer]

## 2024-11-14 ENCOUNTER — OFFICE VISIT (OUTPATIENT)
Dept: HEMATOLOGY/ONCOLOGY | Facility: CLINIC | Age: 74
End: 2024-11-14
Payer: MEDICARE

## 2024-11-14 ENCOUNTER — INFUSION (OUTPATIENT)
Dept: INFUSION THERAPY | Facility: HOSPITAL | Age: 74
End: 2024-11-14
Attending: INTERNAL MEDICINE
Payer: MEDICARE

## 2024-11-14 VITALS
SYSTOLIC BLOOD PRESSURE: 123 MMHG | BODY MASS INDEX: 26.56 KG/M2 | RESPIRATION RATE: 18 BRPM | DIASTOLIC BLOOD PRESSURE: 61 MMHG | WEIGHT: 165.25 LBS | HEIGHT: 66 IN | HEART RATE: 91 BPM | OXYGEN SATURATION: 97 % | TEMPERATURE: 98 F

## 2024-11-14 DIAGNOSIS — C50.512 MALIGNANT NEOPLASM OF LOWER-OUTER QUADRANT OF LEFT BREAST OF FEMALE, ESTROGEN RECEPTOR POSITIVE: Primary | ICD-10-CM

## 2024-11-14 DIAGNOSIS — M81.0 SENILE OSTEOPOROSIS: Primary | ICD-10-CM

## 2024-11-14 DIAGNOSIS — Z17.0 MALIGNANT NEOPLASM OF LOWER-OUTER QUADRANT OF LEFT BREAST OF FEMALE, ESTROGEN RECEPTOR POSITIVE: Primary | ICD-10-CM

## 2024-11-14 DIAGNOSIS — E66.01 MORBID (SEVERE) OBESITY DUE TO EXCESS CALORIES: ICD-10-CM

## 2024-11-14 PROCEDURE — 99999 PR PBB SHADOW E&M-EST. PATIENT-LVL IV: CPT | Mod: PBBFAC,HCNC,, | Performed by: INTERNAL MEDICINE

## 2024-11-14 PROCEDURE — 63600175 PHARM REV CODE 636 W HCPCS: Mod: JZ,JG,HCNC | Performed by: INTERNAL MEDICINE

## 2024-11-14 PROCEDURE — 96372 THER/PROPH/DIAG INJ SC/IM: CPT | Mod: HCNC

## 2024-11-14 RX ADMIN — DENOSUMAB 60 MG: 60 INJECTION SUBCUTANEOUS at 10:11

## 2024-11-14 NOTE — NURSING
Pt tolerated Prolia injection to ABD tissue. Pt reports no recent invasive dental work and takes Ca and Vit D. D/C in stable condition, RTC in 6 months.

## 2024-11-14 NOTE — PROGRESS NOTES
Subjective:       Patient ID: Lima Maldonado is a 74 y.o. female.    Chief Complaint: No chief complaint on file.      HPI  Ms Maldonado return to clinic for follow-up of  diagnosis of Stage 1 ER+  left breast cancer.  She is currently onadjuvant letrozole.   Also on Prolia    Today she reports she has been doing fairly well.  Her neck pain has largely resolved after an epidural injection.  She had bilateral thumb had injections for that with benefit.  She does have some arthralgias especially ankles.    She has no shortness of breath.  Appetite and bowel function good.          Breast history:     Prior history of left breast cancer in 1997 when she underwent left lumpectomy with complete axillary dissection at age 46 for a pT1cN1 breast cancer (Stage IIA).  47 nodes were removed, 1 of which was positive.  She received adjuvant chemotherapy, radiation and tamoxifen.       Mammogram on September 20, 2016 demonstrated a new irregular mass with spiculated margins seen in the left breast at  5 o'clock along the surgical scar site.ultrasound showed a  8 X 8 X 7 MM MASS.    A needle biopsy in September 27 showed infiltrating carcinoma, histologic grade 3, nuclear grade 2, mitotic index 1. Tumor was 90% ER positive, 70% NH positive, and 1+ HER-2.     MRI of the breast showed a 1.7 x 1.3 cm lesion in the lower outer quadrant of the left breast.  PET/CT on October 7 was negative for any evidence of distant metastasis.    On November 16, 2016 bilateral mastectomies were performed. Left breast showed a  1.5 cm intermediate grade carcinoma with ductal and lobular features. There was focal dermal invasion. Margins were negative. The right breast was without abnormality.  Oncotype score returned 25 - intermediate risk.  Adjuvant TC X 4 completed 3/31/17.    She began letrozole in April 2017.    Breast Cancer Index:12/2022  5.1% risk or recurrence with benefit to continue endocrine therapy    Dexa September 2023 -  osteoporosis  Review of Systems   Constitutional: Negative.  Negative for appetite change and unexpected weight change.   HENT:  Negative for mouth sores.    Eyes:  Negative for visual disturbance.   Respiratory: Negative.  Negative for cough and shortness of breath.    Cardiovascular:  Negative for chest pain.   Gastrointestinal:  Negative for abdominal pain and diarrhea.   Genitourinary:  Negative for frequency.   Musculoskeletal:  Positive for arthralgias and neck pain (minimal). Negative for back pain.   Integumentary:  Negative for rash.   Neurological: Negative.  Negative for headaches.   Hematological:  Negative for adenopathy.   Psychiatric/Behavioral: Negative.  The patient is not nervous/anxious.          Objective:      Physical Exam  Vitals reviewed.   Constitutional:       General: She is not in acute distress.  HENT:      Mouth/Throat:      Pharynx: Oropharynx is clear. No oropharyngeal exudate or posterior oropharyngeal erythema.   Cardiovascular:      Rate and Rhythm: Normal rate and regular rhythm.   Pulmonary:      Effort: Pulmonary effort is normal. No respiratory distress.      Breath sounds: Normal breath sounds. No wheezing or rales.   Chest:       Abdominal:      Palpations: Abdomen is soft. There is no mass.      Tenderness: There is no abdominal tenderness.   Lymphadenopathy:      Cervical: No cervical adenopathy.      Upper Body:      Right upper body: No supraclavicular or axillary adenopathy.      Left upper body: No supraclavicular or axillary adenopathy.   Neurological:      Mental Status: She is alert and oriented to person, place, and time.   Psychiatric:         Mood and Affect: Mood normal.         Thought Content: Thought content normal.         Judgment: Judgment normal.         Assessment:     CMP - pending  Problem List Items Addressed This Visit       Malignant neoplasm of lower-outer quadrant of left female breast - Primary       Plan:     Return to clinic 6 months.  Prolia  today    Route Chart for Scheduling    Med Onc Chart Routing      Follow up with physician 6 months.   Follow up with [facility]    Infusion scheduling note    Injection scheduling note    Labs CMP   Scheduling: Labs same day as infusion  Preferred lab:  Lab interval:     Imaging None      Pharmacy appointment No pharmacy appointment needed      Other referrals no referral to Oncology Primary Care needed -  no Massage appointment needed    No additional referrals needed                 Supportive Plan Information  OP DENOSUMAB Francisco Baker MD   Associated Diagnosis: Senile osteoporosis   noted on 5/17/2016   Line of treatment: Supportive Care   Treatment goal: Supportive     Upcoming Treatment Dates - OP DENOSUMAB    11/14/2024       Medications       denosumab (PROLIA) injection 60 mg  5/15/2025       Medications       denosumab (PROLIA) injection 60 mg

## 2024-11-18 ENCOUNTER — TELEPHONE (OUTPATIENT)
Dept: SLEEP MEDICINE | Facility: CLINIC | Age: 74
End: 2024-11-18
Payer: MEDICARE

## 2024-12-05 DIAGNOSIS — I10 PRIMARY HYPERTENSION: ICD-10-CM

## 2024-12-05 RX ORDER — METHYLPREDNISOLONE ACETATE 40 MG/ML
40 INJECTION, SUSPENSION INTRA-ARTICULAR; INTRALESIONAL; INTRAMUSCULAR; SOFT TISSUE
Status: DISCONTINUED | OUTPATIENT
Start: 2024-11-04 | End: 2024-12-05 | Stop reason: HOSPADM

## 2024-12-05 RX ORDER — IRBESARTAN 150 MG/1
150 TABLET ORAL NIGHTLY
Qty: 90 TABLET | Refills: 2 | Status: SHIPPED | OUTPATIENT
Start: 2024-12-05

## 2024-12-05 NOTE — TELEPHONE ENCOUNTER
No care due was identified.  [facility] Embedded Care Due Messages. Reference number: [id number].   12/05/2024 12:17:48 AM CST

## 2024-12-05 NOTE — TELEPHONE ENCOUNTER
Refill Routing Note   Medication(s) are not appropriate for processing by [facility] for the following reason(s):        No active prescription written by provider    [facility] action(s):  Defer        Medication Therapy Plan: Prescription ; DEFER TO PCP      Appointments  past 12m or future 3m with PCP    Date Provider   Last Visit   2024 Joie Mccall MD   Next Visit   2025 Joie Mccall MD   ED visits in past 90 days: 0        Note composed:6:28 AM 2024

## 2024-12-15 DIAGNOSIS — E78.2 MIXED HYPERLIPIDEMIA: ICD-10-CM

## 2024-12-16 RX ORDER — SIMVASTATIN 20 MG/1
TABLET, FILM COATED ORAL
Qty: 90 TABLET | Refills: 3 | Status: SHIPPED | OUTPATIENT
Start: 2024-12-16

## 2024-12-17 RX ORDER — FENOFIBRATE 160 MG/1
160 TABLET ORAL
Qty: 90 TABLET | Refills: 0 | Status: SHIPPED | OUTPATIENT
Start: 2024-12-17

## 2024-12-28 RX ORDER — AZITHROMYCIN 250 MG/1
TABLET, FILM COATED ORAL
Qty: 6 TABLET | Refills: 0 | Status: SHIPPED | OUTPATIENT
Start: 2024-12-28 | End: 2025-01-02

## 2025-01-03 ENCOUNTER — PATIENT MESSAGE (OUTPATIENT)
Dept: INTERNAL MEDICINE | Facility: CLINIC | Age: 75
End: 2025-01-03
Payer: MEDICARE

## 2025-01-06 ENCOUNTER — LAB VISIT (OUTPATIENT)
Dept: LAB | Facility: HOSPITAL | Age: 75
End: 2025-01-06
Payer: MEDICARE

## 2025-01-06 DIAGNOSIS — E78.2 MIXED HYPERLIPIDEMIA: ICD-10-CM

## 2025-01-06 DIAGNOSIS — E11.42 TYPE 2 DIABETES MELLITUS WITH DIABETIC POLYNEUROPATHY, WITHOUT LONG-TERM CURRENT USE OF INSULIN: ICD-10-CM

## 2025-01-06 DIAGNOSIS — E66.3 OVERWEIGHT (BMI 25.0-29.9): ICD-10-CM

## 2025-01-06 DIAGNOSIS — K76.0 FATTY LIVER: ICD-10-CM

## 2025-01-06 LAB
ALBUMIN SERPL BCP-MCNC: 4.3 G/DL (ref 3.5–5.2)
ALP SERPL-CCNC: 41 U/L (ref 40–150)
ALT SERPL W/O P-5'-P-CCNC: 23 U/L (ref 10–44)
AST SERPL-CCNC: 24 U/L (ref 10–40)
BILIRUB DIRECT SERPL-MCNC: 0.3 MG/DL (ref 0.1–0.3)
BILIRUB SERPL-MCNC: 0.7 MG/DL (ref 0.1–1)
PROT SERPL-MCNC: 7.2 G/DL (ref 6–8.4)

## 2025-01-06 PROCEDURE — 36415 COLL VENOUS BLD VENIPUNCTURE: CPT | Mod: HCNC | Performed by: NURSE PRACTITIONER

## 2025-01-06 PROCEDURE — 80076 HEPATIC FUNCTION PANEL: CPT | Mod: HCNC | Performed by: NURSE PRACTITIONER

## 2025-01-10 ENCOUNTER — OFFICE VISIT (OUTPATIENT)
Dept: HEPATOLOGY | Facility: CLINIC | Age: 75
End: 2025-01-10
Payer: MEDICARE

## 2025-01-10 ENCOUNTER — PROCEDURE VISIT (OUTPATIENT)
Dept: HEPATOLOGY | Facility: CLINIC | Age: 75
End: 2025-01-10
Payer: MEDICARE

## 2025-01-10 VITALS — WEIGHT: 170.44 LBS | BODY MASS INDEX: 27.39 KG/M2 | HEIGHT: 66 IN

## 2025-01-10 DIAGNOSIS — E66.3 OVERWEIGHT (BMI 25.0-29.9): ICD-10-CM

## 2025-01-10 DIAGNOSIS — E78.2 MIXED HYPERLIPIDEMIA: ICD-10-CM

## 2025-01-10 DIAGNOSIS — E11.42 TYPE 2 DIABETES MELLITUS WITH DIABETIC POLYNEUROPATHY, WITHOUT LONG-TERM CURRENT USE OF INSULIN: ICD-10-CM

## 2025-01-10 DIAGNOSIS — I10 PRIMARY HYPERTENSION: ICD-10-CM

## 2025-01-10 DIAGNOSIS — K76.0 FATTY LIVER: ICD-10-CM

## 2025-01-10 DIAGNOSIS — E03.9 HYPOTHYROIDISM, UNSPECIFIED TYPE: ICD-10-CM

## 2025-01-10 DIAGNOSIS — K76.0 METABOLIC DYSFUNCTION-ASSOCIATED STEATOTIC LIVER DISEASE (MASLD): Primary | ICD-10-CM

## 2025-01-10 PROCEDURE — 99999 PR PBB SHADOW E&M-EST. PATIENT-LVL IV: CPT | Mod: PBBFAC,HCNC,, | Performed by: NURSE PRACTITIONER

## 2025-01-10 PROCEDURE — 3288F FALL RISK ASSESSMENT DOCD: CPT | Mod: HCNC,CPTII,S$GLB, | Performed by: NURSE PRACTITIONER

## 2025-01-10 PROCEDURE — 1126F AMNT PAIN NOTED NONE PRSNT: CPT | Mod: HCNC,CPTII,S$GLB, | Performed by: NURSE PRACTITIONER

## 2025-01-10 PROCEDURE — 3072F LOW RISK FOR RETINOPATHY: CPT | Mod: HCNC,CPTII,S$GLB, | Performed by: NURSE PRACTITIONER

## 2025-01-10 PROCEDURE — 1159F MED LIST DOCD IN RCRD: CPT | Mod: HCNC,CPTII,S$GLB, | Performed by: NURSE PRACTITIONER

## 2025-01-10 PROCEDURE — 99214 OFFICE O/P EST MOD 30 MIN: CPT | Mod: HCNC,S$GLB,, | Performed by: NURSE PRACTITIONER

## 2025-01-10 PROCEDURE — 1101F PT FALLS ASSESS-DOCD LE1/YR: CPT | Mod: HCNC,CPTII,S$GLB, | Performed by: NURSE PRACTITIONER

## 2025-01-10 PROCEDURE — 1160F RVW MEDS BY RX/DR IN RCRD: CPT | Mod: HCNC,CPTII,S$GLB, | Performed by: NURSE PRACTITIONER

## 2025-01-10 NOTE — PROCEDURES
FibroScan [place] (Vibration Controlled Transient Elastography)    Date/Time: 1/10/2025 1:00 PM    Performed by: Jessie Palumbo NP  Authorized by: Jessie Palumbo NP    Diagnosis:  NAFLD    Probe:  XL    Universal Protocol: Patient's identity, procedure and site were verified, confirmatory pause was performed.  Discussed procedure including risks and potential complications.  Questions answered.  Patient verbalizes understanding and wishes to proceed with VCTE.     Procedure: After providing explanations of the procedure, patient was placed in the supine position with right arm in maximum abduction to allow optimal exposure of right lateral abdomen.  Patient was briefly assessed, Testing was performed in the mid-axillary location, 50Hz Shear Wave pulses were applied and the resulting Shear Wave and Propagation Speed detected with a 3.5 MHz ultrasonic signal, using the FibroScan probe, Skin to liver capsule distance and liver parenchyma were accessed during the entire examination with the FibroScan probe, Patient was instructed to breathe normally and to abstain from sudden movements during the procedure, allowing for random measurements of liver stiffness. At least 10 Shear Waves were produced, Individual measurements of each Shear Wave were calculated.  Patient tolerated the procedure well with no complications.  Meets discharge criteria as was dismissed.  Rates pain 0 out of 10.  Patient will follow up with ordering provider to review results.    Findings  Median liver stiffness score:  6.1  CAP Reading: dB/m:  363    IQR/med %:  9  Interpretation  Fibrosis interpretation is based on medial liver stiffness - Kilopascal (kPa).    Fibrosis Stage:  F 0-1  Steatosis interpretation is based on controlled attenuation parameter - (dB/m).    Steatosis Grade:  S3

## 2025-01-10 NOTE — PROGRESS NOTES
[facility] VISIT ESTABLISHED PT NOTE    REFERRING PROVIDER:  No ref. provider found    CHIEF COMPLAINT: Fatty Liver    HPI: Ms. Maldonado is a 74 y.o. White female with PMH noted below, presenting for follow up for fatty liver. She was last seen in clinic in 12/2022. She has a history of mildly elevated liver enzymes and mildly elevated total bilirubin, consistent with Gilbert syndrome.     Risk factors for the development of fatty liver disease include HTN, HLD, DMII and being overweight (BMI 27). She has transitioned to Mounjaro since last visit. Last HgbA1c was further improved at 5.6% (9/2024). She also has a history of NELDA and hypothyroidism. She lost  a net of 8 pounds since last visit 2 years ago.    Fibroscan in 2015 was suggestive of F2 fibrosis. Repeat Fibroscan in 2020 was suggestive of significant hepatic steatosis with F1 fibrosis and a low likelihood of cirrhosis. Fibroscan in 12/2021 was suggestive of significant hepatic steatosis with F2 (moderate) fibrosis and a low likelihood of cirrhosis. Follow up Fibroscan in 2022 was again suggestive of F2 fibrosis, however CAP score has worsened (400 dB/m), likely secondary to elevated BS. Fibroscan last year and again today is suggestive of F0-F1 fibrosis, likely improved, due to weight loss and optimal glycemic control. CAP score today is also improved from prior results (363 dB/m).     Most recent abdominal ultrasound in 12/2023 showed:    FINDINGS:    Complete scan of the patient's abdomen was obtained.  The liver is enlarged.  It measures 8.2 cm.  There is diffuse attenuation of sound through the liver suggesting a diffuse parenchymal process.  The HR I index is elevated 2.7.  This can be elevated in patients receive chemotherapy but other etiologies are possible including hepatic steatosis.  Clinical correlation is advised.  Adjacent to the gallbladder there is a 2.5 x 2 x 1.2 cm hypoechoic area.  Differential possibilities included focal  fatty sparing but other possibilities including solid lesions cannot be excluded.  The spleen is enlarged.  It measures 13.6 x 4.8 cm..  No focal defects are seen in the spleen.  The aorta tapers normally. The proximal inferior vena cava is unremarkable.   No para-aortic adenopathy is seen.  The pancreas is appears to be small and somewhat atretic.  No obvious mass lesions are noted.  The tail is obscured by gas however.  The gallbladder shows no calculi.  No dilated common bile duct is noted.  No hydronephrosis or significant mass lesion is noted.  There may be a right 1.4 cm renal cyst . Better assessment could be obtained with a retroperitoneal ultrasound.  No ascites is noted.     Impression:     Hepatosplenomegaly     Diffuse attenuation of sound through the liver suggesting a diffuse parenchymal process which could include chemotherapy and/or hip hepatic steatosis.     Hypoechoic area adjacent to the gallbladder as described above     Probable right renal cyst which can be better assessed on a retroperitoneal examination    LFT's remain normal, which is reassuring. Additionally, she is followed by the Pancreatic cyst clinic, with last MRCP completed in 10/2024.     She is well appearing, and has no signs or symptoms of decompensated liver disease including jaundice, dark urine, pruritus, abdominal distention, hematemesis, melena, or periods of confusion suggestive of hepatic encephalopathy.     Review of patient's allergies indicates:   Allergen Reactions    Erythromycin      Other reaction(s): Nausea    Erythromycin (bulk)      Other reaction(s): Nausea    Oxycodone Nausea And Vomiting    Oxycodone-acetaminophen Nausea And Vomiting     Other reaction(s): Nausea     Current Outpatient Medications on File Prior to Visit   Medication Sig Dispense Refill    ascorbic acid, vitamin C, (VITAMIN C) 1000 MG tablet Take 1 tablet by mouth once daily.      aspirin (ECOTRIN) 81 MG EC tablet Take 81 mg by mouth once daily.       calcium-vitamin D tablet 600 mg-200 units       coenzyme Q10-vitamin E 100-100 mg-unit Cap Take 1 capsule by mouth once daily.       cyclobenzaprine (FLEXERIL) 5 MG tablet Take 1 tablet (5 mg total) by mouth 3 (three) times daily as needed for Muscle spasms (muscular pain). 90 tablet 2    fenofibrate 160 MG Tab TAKE 1 TABLET BY MOUTH EVERY DAY 90 tablet 0    fluticasone (FLONASE) 50 mcg/actuation nasal spray 1 spray by Each Nare route 2 (two) times daily.       gabapentin (NEURONTIN) 300 MG capsule TAKE 1 CAPSULE BY MOUTH THREE TIMES A  capsule 1    irbesartan (AVAPRO) 150 MG tablet TAKE 1 TABLET BY MOUTH EVERY EVENING. 90 tablet 2    letrozole (FEMARA) 2.5 mg Tab TAKE 1 TABLET BY MOUTH EVERY DAY 90 tablet 3    levothyroxine (SYNTHROID) 50 MCG tablet TAKE 1 TABLET BY MOUTH EVERY DAY 90 tablet 1    MULTIVITAMIN ORAL Take 1 tablet by mouth once daily.       omega-3 fatty acids-vitamin E 1,000 mg Cap Take 1 capsule by mouth once daily.       pantoprazole (PROTONIX) 20 MG tablet TAKE 1 TABLET (20 MG TOTAL) BY MOUTH BEFORE BREAKFAST. 90 tablet 3    simvastatin (ZOCOR) 20 MG tablet TAKE 1 TABLET BY MOUTH EVERY DAY IN THE EVENING 90 tablet 3    tirzepatide (MOUNJARO) 7.5 mg/0.5 mL PnIj Inject 7.5 mg into the skin once a week. 6 mL 3    tobramycin sulfate 0.3% (TOBREX) 0.3 % ophthalmic solution Apply to wound beds twice daily 5 mL 2    vitamin D 1000 units Tab Take 1,000 Units by mouth once daily.       YUVAFEM 10 mcg Tab PLACE 1 TABLET VAGINALLY TWICE A WEEK. 24 tablet 2    CANNABIDIOL, CBD, EXTRACT ORAL Take 1 drop by mouth Daily.      denosumab (PROLIA) 60 mg/mL Syrg Inject 1 mL (60 mg total) into the skin every 6 (six) months. 2 mL 0    sucralfate (CARAFATE) 100 mg/mL suspension Take 10 mLs (1 g total) by mouth 4 (four) times daily. (Patient not taking: Reported on 1/10/2025) 828 mL 1     No current facility-administered medications on file prior to visit.     PMHX:  has a past medical history of Acute  cystitis without hematuria (7/17/2017), Arthritis, Back pain, Breast cancer, Class 1 obesity due to excess calories with serious comorbidity and body mass index (BMI) of 30.0 to 30.9 in adult (11/29/2018), Elevated bilirubin (11/19/2013), Fatty liver, GERD (gastroesophageal reflux disease), Glucose intolerance (impaired glucose tolerance), Hyperlipidemia, Hypertension, Hypothyroid, Hypothyroidism, Malignant neoplasm of lower-outer quadrant of left female breast (11/15/2016), Obesity, Obesity, diabetes, and hypertension syndrome (12/12/2018), Osteopenia, Osteoporosis, Primary insomnia (11/2/2016), Shingles, Sleep apnea, Squamous cell carcinoma (2011), Stress incontinence of urine (1/11/2018), Trouble in sleeping, Type 2 diabetes mellitus with diabetic polyneuropathy, without long-term current use of insulin (12/26/2017), Urinary incontinence, and Well woman exam with routine gynecological exam (11/2/2016).    PSHX:  has a past surgical history that includes left modified radical mastectomy (02/1997); Breast implant (Bilateral, 1998); left breast reconstruction (2005); Pelvic laparoscopy (1978); robotic lap  hysterectomy with bso (2008); Lipoma resection (2010); reduction of mons pubis (2011); Hysterectomy (2008); pr removal of ovary/tube(s) (2008); Modified radical mastectomy w/ axillary lymph node dissection (02/1997); Appendectomy (2008); Breast surgery; Upper gastrointestinal endoscopy (08/05/2013); Colonoscopy (N/A, 05/15/2018); Cataract extraction w/  intraocular lens implant (Left, 08/13/2020); Cataract extraction w/  intraocular lens implant (Right, 08/27/2020); Eye surgery; Breast biopsy; Breast lumpectomy; Breast reconstruction (Bilateral); Oophorectomy; Cosmetic surgery (1/2022); Blepharoplasty (Bilateral); Esophagogastroduodenoscopy (N/A, 12/20/2022); Endoscopic ultrasound of upper gastrointestinal tract (N/A, 10/11/2023); and Epidural steroid injection (N/A, 2/1/2024).    FAMILY HISTORY: Negative for  "liver disease, reviewed in [facility]    SOCIAL HISTORY:   Social History     Tobacco Use   Smoking Status Former    Current packs/day: 0.00    Average packs/day: 0.5 packs/day for 3.0 years (1.5 ttl pk-yrs)    Types: Cigarettes    Start date: 4/15/1969    Quit date: 4/15/1972    Years since quittin.7   Smokeless Tobacco Never   Tobacco Comments    started at age 18 during college      Social History     Substance and Sexual Activity   Alcohol Use Yes    Comment: Rarely, not even 1X per week     Social History     Substance and Sexual Activity   Drug Use No     ROS:   GENERAL: Denies fever, chills, + weight loss, fatigue  HEENT: Denies headaches, dizziness, vision/hearing changes  CARDIOVASCULAR: Denies chest pain, palpitations, or edema  RESPIRATORY: Denies dyspnea, cough  GI: Denies abdominal pain, rectal bleeding, nausea, vomiting. No change in bowel pattern or color  : Denies dysuria, hematuria   SKIN: Denies rash, itching   NEURO: Denies confusion, memory loss, or mood changes  PSYCH: Denies depression or anxiety  HEME/LYMPH: Denies easy bruising or bleeding    PHYSICAL EXAM:   Friendly White female, in no acute distress; alert and oriented to person, place and time  VITALS: Ht 5' 6" (1.676 m)   Wt 77.3 kg (170 lb 6.7 oz)   BMI 27.51 kg/m²   HENT: Normocephalic, without obvious abnormality.   EYES: Sclerae anicteric.   NECK: No obvious masses.  CARDIOVASCULAR: No peripheral edema.  RESPIRATORY: Normal respiratory effort.   GI: Soft, obese abdomen.  EXTREMITIES:  No clubbing, cyanosis or edema.  SKIN: Warm and dry. No jaundice.   NEURO:  Normal gait. No asterixis.  PSYCH:  Memory intact. Thought and speech pattern appropriate. Behavior normal. No depression or anxiety noted.    DIAGNOSTIC STUDIES:    EGD 2013:    Impression:   - Normal examined duodenum.                         - One gastric polyp. Resected and retrieved.                         - Hiatus hernia.   Recommendation:                           "    - Follow an antireflux regimen.                         - Repeat the upper endoscopy in 5 years for                         surveillance based on pathology results.     COLONOSCOPY 5/15/2018:    Findings:        The perianal and digital rectal examinations were normal.        The entire examined colon appeared normal.   Impression:           - The entire examined colon is normal.                         - No specimens collected.   Recommendation:                              - Repeat colonoscopy in 10 years for screening                         purposes.     US ABDOMEN COMPLETE 2013:    Findings: The liver is enlarged extending below the costal margin and measures 17.4 cm.  There is diffuse attenuation of sound by the liver consistent with a diffuse parenchymal process such as fatty infiltration.  No focal hepatic lesions are   identified.  No intra- or extrahepatic biliary ductal dilatation. The common bile duct measures 0.2 cm.  The gallbladder appears normal. No evidence for cholelithiasis.  Sonographic Dillon's sign is negative. The visualized portions of the pancreas,   aorta and IVC appear normal. The kidneys are normal in size and measure 11.3 cm on the right and 12.1 cm on the left.  There is an incidentally noted 1.3-cm right peripelvic renal cyst. The spleen is normal in size and measures 9.7 cm. No ascites.      Impression         1. Hepatomegaly with hepatic steatosis.      FIBROSCAN 2015:    Fibroscan readin.4 KPa     IQR/med:18%     Fibrosis:F2      FIBROSCAN 2020:    Findings  Median liver stiffness score:  6.5  CAP Reading: dB/m:  323     IQR/med %:  14  Interpretation  Fibrosis interpretation is based on medial liver stiffness - Kilopascal (kPa).     Fibrosis Stage:  F1  Steatosis interpretation is based on controlled attenuation parameter - (dB/m).     Steatosis Grade:  S3    US ABDOMEN COMPLETE 2021:    FINDINGS:    Liver: Normal in size, measuring 17.0 cm. Diffuse  increased hepatic echogenicity.  No focal hepatic lesions.  Hypoechoic adjacent to the gallbladder measuring 3.1 x 1.5 x 1 4 cm likely represent fatty sparing.     Gallbladder: No calculi, wall thickening, or pericholecystic fluid.  No sonographic Dillon's sign.     Biliary system: The common duct is not dilated, measuring 3 mm.  No intrahepatic ductal dilatation.     Spleen: Normal in size with a homogeneous echotexture, measuring 10.4 x 3.9 cm.     Pancreas: The visualized portions of pancreas appear normal.     Right kidney: Normal in size with no hydronephrosis, measuring 11.6 cm.  Midpole minimally complex renal cyst measuring 1.1 x 1.1 x 1.5 cm with 0.1 cm septum.     Left kidney:  Normal in size with no hydronephrosis, measuring 11.5 cm.     Aorta: No aneurysm.     Inferior vena cava: Normal in appearance.     Miscellaneous: No ascites.     Impression:     Hepatic steatosis.  No masses identified.     Right renal cyst.    FIBROSCAN 12/3/2021:    Findings  Median liver stiffness score:  9  CAP Reading: dB/m:  374     IQR/med %:  9  Interpretation  Fibrosis interpretation is based on medial liver stiffness - Kilopascal (kPa).     Fibrosis Stage:  F2  Steatosis interpretation is based on controlled attenuation parameter - (dB/m).     Steatosis Grade:  S3    FIBROSCAN 12/13/2022:    Findings  Median liver stiffness score:  7.8  CAP Reading: dB/m:  400     IQR/med %:  18  Interpretation  Fibrosis interpretation is based on medial liver stiffness - Kilopascal (kPa).     Fibrosis Stage:  F2  Steatosis interpretation is based on controlled attenuation parameter - (dB/m).     Steatosis Grade:  S3    FIBROSCAN 1/18/2024:      Findings  Median liver stiffness score:  7  CAP Reading: dB/m:  400     IQR/med %:  16  Interpretation  Fibrosis interpretation is based on medial liver stiffness - Kilopascal (kPa).     Fibrosis Stage:  F 0-1  Steatosis interpretation is based on controlled attenuation parameter - (dB/m).      Steatosis Grade:  S3     FIBROSCAN 1/10/2025:    Findings  Median liver stiffness score:  6.1  CAP Reading: dB/m:  363     IQR/med %:  9  Interpretation  Fibrosis interpretation is based on medial liver stiffness - Kilopascal (kPa).     Fibrosis Stage:  F 0-1  Steatosis interpretation is based on controlled attenuation parameter - (dB/m).     Steatosis Grade:  S3       ASSESSMENT & PLAN:  74 y.o. White female with:    1. Metabolic dysfunction-associated steatotic liver disease (MASLD)  FibroScan Transplant Hepatology(Vibration Controlled Transient Elastography)      2. Type 2 diabetes mellitus with diabetic polyneuropathy, without long-term current use of insulin  FibroScan Transplant Hepatology(Vibration Controlled Transient Elastography)      3. Overweight (BMI 25.0-29.9)        4. Hypothyroidism, unspecified type        5. Mixed hyperlipidemia        6. Primary hypertension          - Fibroscan today to non-invasively re-stage liver disease.  - Recommend Ultrasound of the liver every 2-3 years.  - Repeat liver function tests in 1 year.  - Recommend additional weight loss goal of 10-15 lbs, through diet and exercise.  - Recommend good control of cholesterol, blood pressure, & blood sugar levels.  - Avoid alcohol and herbal supplements/alternative remedies.  - Continue follow up with Pancreatic cyst clinic, as planned for ongoing surveillance.    Follow up in about 1 year (around 1/10/2026). with labs and Fibroscan before visit.    Thank you for allowing me to participate in the care of Lima Maldonado       Hepatology Nurse Practitioner  [facility]    CC'ed note to:   No ref. provider found  Joie Mccall MD

## 2025-01-15 ENCOUNTER — OFFICE VISIT (OUTPATIENT)
Dept: DERMATOLOGY | Facility: CLINIC | Age: 75
End: 2025-01-15
Payer: MEDICARE

## 2025-01-15 DIAGNOSIS — N95.2 VAGINAL ATROPHY: ICD-10-CM

## 2025-01-15 DIAGNOSIS — D17.1 LIPOMA OF TORSO: ICD-10-CM

## 2025-01-15 DIAGNOSIS — L82.1 SK (SEBORRHEIC KERATOSIS): Primary | ICD-10-CM

## 2025-01-15 PROCEDURE — 1101F PT FALLS ASSESS-DOCD LE1/YR: CPT | Mod: HCNC,CPTII,S$GLB, | Performed by: DERMATOLOGY

## 2025-01-15 PROCEDURE — 99999 PR PBB SHADOW E&M-EST. PATIENT-LVL III: CPT | Mod: PBBFAC,HCNC,, | Performed by: DERMATOLOGY

## 2025-01-15 PROCEDURE — 1159F MED LIST DOCD IN RCRD: CPT | Mod: HCNC,CPTII,S$GLB, | Performed by: DERMATOLOGY

## 2025-01-15 PROCEDURE — 1160F RVW MEDS BY RX/DR IN RCRD: CPT | Mod: HCNC,CPTII,S$GLB, | Performed by: DERMATOLOGY

## 2025-01-15 PROCEDURE — 99213 OFFICE O/P EST LOW 20 MIN: CPT | Mod: HCNC,25,S$GLB, | Performed by: DERMATOLOGY

## 2025-01-15 PROCEDURE — 3288F FALL RISK ASSESSMENT DOCD: CPT | Mod: HCNC,CPTII,S$GLB, | Performed by: DERMATOLOGY

## 2025-01-15 PROCEDURE — 17110 DESTRUCTION B9 LES UP TO 14: CPT | Mod: HCNC,S$GLB,, | Performed by: DERMATOLOGY

## 2025-01-15 PROCEDURE — 3072F LOW RISK FOR RETINOPATHY: CPT | Mod: HCNC,CPTII,S$GLB, | Performed by: DERMATOLOGY

## 2025-01-15 PROCEDURE — 1126F AMNT PAIN NOTED NONE PRSNT: CPT | Mod: HCNC,CPTII,S$GLB, | Performed by: DERMATOLOGY

## 2025-01-15 RX ORDER — ESTRADIOL 10 UG/1
TABLET VAGINAL
Qty: 24 TABLET | Refills: 1 | Status: SHIPPED | OUTPATIENT
Start: 2025-01-15

## 2025-01-15 NOTE — PROGRESS NOTES
Subjective:      Patient ID:  Lima Mladonado is a 74 y.o. female who presents for   Chief Complaint   Patient presents with    Lesion     Right side of the face, several months      Spot on right cheek for over a year which is irritated.  Also has area on back where lipoma was removed previously, recurring.     Lesion - Initial  Affected locations: face  Signs / symptoms: asymptomatic      Review of Systems   Constitutional:  Negative for fever, chills, weight loss, weight gain, fatigue, night sweats and malaise.   Skin:  Positive for daily sunscreen use and activity-related sunscreen use. Negative for wears hat.   Hematologic/Lymphatic: Bruises/bleeds easily.       Objective:   Physical Exam   Constitutional: She appears well-developed and well-nourished.   Neurological: She is alert and oriented to person, place, and time.   Psychiatric: She has a normal mood and affect.   Skin:   Areas Examined (abnormalities noted in diagram):   Head / Face Inspection Performed  Back Inspection Performed                 Diagram Legend     Erythematous scaling macule/papule c/w actinic keratosis       Vascular papule c/w angioma      Pigmented verrucoid papule/plaque c/w seborrheic keratosis      Yellow umbilicated papule c/w sebaceous hyperplasia      Irregularly shaped tan macule c/w lentigo     1-2 mm smooth white papules consistent with Milia      Movable subcutaneous cyst with punctum c/w epidermal inclusion cyst      Subcutaneous movable cyst c/w pilar cyst      Firm pink to brown papule c/w dermatofibroma      Pedunculated fleshy papule(s) c/w skin tag(s)      Evenly pigmented macule c/w junctional nevus     Mildly variegated pigmented, slightly irregular-bordered macule c/w mildly atypical nevus      Flesh colored to evenly pigmented papule c/w intradermal nevus       Pink pearly papule/plaque c/w basal cell carcinoma      Erythematous hyperkeratotic cursted plaque c/w SCC      Surgical scar with no sign of skin  cancer recurrence      Open and closed comedones      Inflammatory papules and pustules      Verrucoid papule consistent consistent with wart     Erythematous eczematous patches and plaques     Dystrophic onycholytic nail with subungual debris c/w onychomycosis     Umbilicated papule    Erythematous-base heme-crusted tan verrucoid plaque consistent with inflamed seborrheic keratosis     Erythematous Silvery Scaling Plaque c/w Psoriasis     See annotation      Assessment / Plan:        SK (seborrheic keratosis)  Brochure provided    Cryosurgery procedure note:    Verbal consent from the patient is obtained including, but not limited to, risk of hypopigmentation/hyperpigmentation, scar, recurrence of lesion. Liquid nitrogen cryosurgery is applied to 1 lesion on right cheek  to produce a freeze injury.      Lipoma of torso  reassurance   Call if desires removal would require appt surgery dept             Follow up in about 2 months (around 3/15/2025).

## 2025-01-15 NOTE — TELEPHONE ENCOUNTER
Refill Routing Note   Medication(s) are not appropriate for processing by [facility] for the following reason(s):        Outside of protocol: contraindicated diagnosis on problem list    [facility] action(s):  Route             Appointments  past 12m or future 3m with PCP    Date Provider   Last Visit   8/6/2024 Joie Mccall MD   Next Visit   2/11/2025 Joie Mccall MD   ED visits in past 90 days: 0        Note composed:2:48 PM 01/15/2025

## 2025-01-15 NOTE — TELEPHONE ENCOUNTER
Unable to retrieve patient chart and identify care due.  [facility] Embedded Care Due Messages. Reference number: [id number].   1/15/2025 12:18:06 AM CST

## 2025-01-29 ENCOUNTER — OFFICE VISIT (OUTPATIENT)
Dept: INTERNAL MEDICINE | Facility: CLINIC | Age: 75
End: 2025-01-29
Payer: MEDICARE

## 2025-01-29 VITALS
DIASTOLIC BLOOD PRESSURE: 62 MMHG | OXYGEN SATURATION: 96 % | HEIGHT: 66 IN | HEART RATE: 90 BPM | BODY MASS INDEX: 26.65 KG/M2 | SYSTOLIC BLOOD PRESSURE: 124 MMHG | WEIGHT: 165.81 LBS

## 2025-01-29 DIAGNOSIS — E78.5 HYPERLIPIDEMIA, UNSPECIFIED HYPERLIPIDEMIA TYPE: ICD-10-CM

## 2025-01-29 DIAGNOSIS — I10 PRIMARY HYPERTENSION: ICD-10-CM

## 2025-01-29 DIAGNOSIS — C50.512 MALIGNANT NEOPLASM OF LOWER-OUTER QUADRANT OF LEFT BREAST OF FEMALE, ESTROGEN RECEPTOR POSITIVE: ICD-10-CM

## 2025-01-29 DIAGNOSIS — T45.1X5A CHEMOTHERAPY-INDUCED NEUROPATHY: ICD-10-CM

## 2025-01-29 DIAGNOSIS — E11.42 TYPE 2 DIABETES MELLITUS WITH DIABETIC POLYNEUROPATHY, WITHOUT LONG-TERM CURRENT USE OF INSULIN: ICD-10-CM

## 2025-01-29 DIAGNOSIS — G62.0 CHEMOTHERAPY-INDUCED NEUROPATHY: ICD-10-CM

## 2025-01-29 DIAGNOSIS — Z00.00 ENCOUNTER FOR PREVENTIVE HEALTH EXAMINATION: Primary | ICD-10-CM

## 2025-01-29 DIAGNOSIS — Z17.0 MALIGNANT NEOPLASM OF LOWER-OUTER QUADRANT OF LEFT BREAST OF FEMALE, ESTROGEN RECEPTOR POSITIVE: ICD-10-CM

## 2025-01-29 DIAGNOSIS — M81.0 SENILE OSTEOPOROSIS: ICD-10-CM

## 2025-01-29 DIAGNOSIS — G47.33 OBSTRUCTIVE SLEEP APNEA ON CPAP: ICD-10-CM

## 2025-01-29 DIAGNOSIS — E03.9 HYPOTHYROIDISM, UNSPECIFIED TYPE: ICD-10-CM

## 2025-01-29 DIAGNOSIS — E11.42 DIABETIC POLYNEUROPATHY ASSOCIATED WITH TYPE 2 DIABETES MELLITUS: ICD-10-CM

## 2025-01-29 PROCEDURE — 99999 PR PBB SHADOW E&M-EST. PATIENT-LVL V: CPT | Mod: PBBFAC,HCNC,, | Performed by: NURSE PRACTITIONER

## 2025-01-29 RX ORDER — LANOLIN ALCOHOL/MO/W.PET/CERES
400 CREAM (GRAM) TOPICAL DAILY
COMMUNITY

## 2025-01-29 NOTE — PROGRESS NOTES
"  Lima Maldonado presented for a follow-up Medicare AWV today. The following components were reviewed and updated:    Medical history  Family History  Social history  Allergies and Current Medications  Health Risk Assessment  Health Maintenance  Care Team    **See Completed Assessments for Annual Wellness visit with in the encounter summary    The following assessments were completed:  Depression Screening  Cognitive function Screening    Timed Get Up Test  Whisper Test      Opioid documentation:      Patient does not have a current opioid prescription.          Vitals:    01/29/25 1005   BP: 124/62   BP Location: Right arm   Pulse: 90   SpO2: 96%   Weight: 75.2 kg (165 lb 12.6 oz)   Height: 5' 6" (1.676 m)     Body mass index is 26.76 kg/m².       Physical Exam  Constitutional:       Appearance: Normal appearance.   Cardiovascular:      Rate and Rhythm: Normal rate and regular rhythm.   Pulmonary:      Effort: Pulmonary effort is normal.      Breath sounds: Normal breath sounds.   Musculoskeletal:         General: Normal range of motion.   Skin:     General: Skin is warm and dry.   Neurological:      General: No focal deficit present.      Mental Status: She is alert.   Psychiatric:         Mood and Affect: Mood normal.           Diagnoses and health risks identified today and associated recommendations/orders:  1. Encounter for preventive health examination  Here for Health Risk Assessment/Annual Wellness Visit.  Health maintenance reviewed and updated. Follow up in one year.   Advised to schedule Diabetic Eye Exam.    2. Primary hypertension  Chronic, at goal with irbesartan . Followed by PCP.     3. Hyperlipidemia, unspecified hyperlipidemia type  Chronic, at goal with simvastatin. Followed by PCP    4. Malignant neoplasm of lower-outer quadrant of left breast of female, estrogen receptor positive  Chronic, stable with letrozole. Followed by PCP, Oncology.    5. Type 2 diabetes mellitus with diabetic " polyneuropathy, without long-term current use of insulin  Chronic, at goal with tirzepatide.  A1c 5.6. Followed by PCP.     6. Senile osteoporosis  Chronic, stable on current medications/Prolia injections. . Followed by PCP.     7. Chemotherapy-induced neuropathy  Chronic, stable with Gabapentin. . Followed by PCP.     8. Diabetic polyneuropathy associated with type 2 diabetes mellitus  Chronic, stable on current medications. Followed by PCP.     9. Hypothyroidism, unspecified type  Chronic, stable on current medication. TSH/Free T4 WNL. Followed by PCP.    10. Obstructive sleep apnea on CPAP  Chronic, stable with CPAP. Followed by Sleep Medicine, PCP.      Provided Lima with a 5-10 year written screening schedule and personal prevention plan. Recommendations were developed using the USPSTF age appropriate recommendations. Education, counseling, and referrals were provided as needed.  After Visit Summary printed and given to patient which includes a list of additional screenings\tests needed.    Follow up in 13 days (on 2/11/2025).with PCP      Romy Rosales NP

## 2025-01-29 NOTE — PATIENT INSTRUCTIONS
Counseling and Referral of Other Preventative  (Italic type indicates deductible and co-insurance are waived)    Patient Name: Lima Maldonado  Today's Date: 1/29/2025    Health Maintenance       Date Due Completion Date    Foot Exam 08/28/2024 8/28/2023    Override on 9/24/2019: Done    Override on 12/11/2018: Done    Override on 8/9/2017: Done    Diabetes Urine Screening 11/28/2024 11/28/2023    Lipid Panel 11/28/2024 11/28/2023    Diabetic Eye Exam 01/03/2025 1/3/2024    Hemoglobin A1c 03/04/2025 9/4/2024    DEXA Scan 09/01/2025 9/1/2023    Colorectal Cancer Screening 05/15/2028 5/15/2018    Override on 10/6/2006: Done    TETANUS VACCINE 05/30/2034 5/30/2024        No orders of the defined types were placed in this encounter.    The following information is provided to all patients.  This information is to help you find resources for any of the problems found today that may be affecting your health:                  Living healthy guide: www.[facility].[place].[facility]      Understanding Diabetes: www.diabetes.org      Eating healthy: www.cdc.gov/healthyweight      CDC home safety checklist: www.cdc.gov/steadi/patient.html      Agency on Aging: www.goea.[place].[facility]      Alcoholics anonymous (AA): www.aa.org      Physical Activity: www.lata.nih.gov/nm5whxo      Tobacco use: www.quitwithusla.org

## 2025-01-31 ENCOUNTER — TELEPHONE (OUTPATIENT)
Dept: SLEEP MEDICINE | Facility: CLINIC | Age: 75
End: 2025-01-31
Payer: MEDICARE

## 2025-02-03 ENCOUNTER — TELEPHONE (OUTPATIENT)
Dept: DERMATOLOGY | Facility: CLINIC | Age: 75
End: 2025-02-03
Payer: MEDICARE

## 2025-02-03 NOTE — TELEPHONE ENCOUNTER
----- Message from Med Assistant Escamilla sent at 1/31/2025  4:03 PM CST -----  Regarding: FW: reschedule request    ----- Message -----  From: Kayode Guallpa  Sent: 1/31/2025   4:02 PM CST  To: [facility] Staff  Subject: reschedule request                               PATIENT CALL    Pt called regarding appt on 04/01. 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 Please call back at [phone] w/ any addl questions or concerns

## 2025-02-04 ENCOUNTER — LAB VISIT (OUTPATIENT)
Dept: LAB | Facility: HOSPITAL | Age: 75
End: 2025-02-04
Attending: INTERNAL MEDICINE
Payer: MEDICARE

## 2025-02-04 DIAGNOSIS — E11.42 TYPE 2 DIABETES MELLITUS WITH DIABETIC POLYNEUROPATHY, WITHOUT LONG-TERM CURRENT USE OF INSULIN: ICD-10-CM

## 2025-02-04 LAB
ALBUMIN SERPL BCP-MCNC: 4.6 G/DL (ref 3.5–5.2)
ALP SERPL-CCNC: 37 U/L (ref 40–150)
ALT SERPL W/O P-5'-P-CCNC: 19 U/L (ref 10–44)
ANION GAP SERPL CALC-SCNC: 9 MMOL/L (ref 8–16)
AST SERPL-CCNC: 24 U/L (ref 10–40)
BILIRUB SERPL-MCNC: 0.7 MG/DL (ref 0.1–1)
BUN SERPL-MCNC: 22 MG/DL (ref 8–23)
CALCIUM SERPL-MCNC: 10.7 MG/DL (ref 8.7–10.5)
CHLORIDE SERPL-SCNC: 105 MMOL/L (ref 95–110)
CHOLEST SERPL-MCNC: 125 MG/DL (ref 120–199)
CHOLEST/HDLC SERPL: 4 {RATIO} (ref 2–5)
CO2 SERPL-SCNC: 26 MMOL/L (ref 23–29)
CREAT SERPL-MCNC: 1.1 MG/DL (ref 0.5–1.4)
EST. GFR  (NO RACE VARIABLE): 52.4 ML/MIN/1.73 M^2
ESTIMATED AVG GLUCOSE: 126 MG/DL (ref 68–131)
GLUCOSE SERPL-MCNC: 133 MG/DL (ref 70–110)
HBA1C MFR BLD: 6 % (ref 4–5.6)
HDLC SERPL-MCNC: 31 MG/DL (ref 40–75)
HDLC SERPL: 24.8 % (ref 20–50)
LDLC SERPL CALC-MCNC: 61.2 MG/DL (ref 63–159)
NONHDLC SERPL-MCNC: 94 MG/DL
POTASSIUM SERPL-SCNC: 4.6 MMOL/L (ref 3.5–5.1)
PROT SERPL-MCNC: 7.6 G/DL (ref 6–8.4)
SODIUM SERPL-SCNC: 140 MMOL/L (ref 136–145)
TRIGL SERPL-MCNC: 164 MG/DL (ref 30–150)

## 2025-02-04 PROCEDURE — 36415 COLL VENOUS BLD VENIPUNCTURE: CPT | Mod: HCNC | Performed by: INTERNAL MEDICINE

## 2025-02-04 PROCEDURE — 80053 COMPREHEN METABOLIC PANEL: CPT | Mod: HCNC | Performed by: INTERNAL MEDICINE

## 2025-02-04 PROCEDURE — 80061 LIPID PANEL: CPT | Mod: HCNC | Performed by: INTERNAL MEDICINE

## 2025-02-04 PROCEDURE — 83036 HEMOGLOBIN GLYCOSYLATED A1C: CPT | Mod: HCNC | Performed by: INTERNAL MEDICINE

## 2025-02-05 ENCOUNTER — TELEPHONE (OUTPATIENT)
Dept: DERMATOLOGY | Facility: CLINIC | Age: 75
End: 2025-02-05
Payer: MEDICARE

## 2025-02-05 NOTE — TELEPHONE ENCOUNTER
----- Message from [employer] sent at 2/3/2025  2:54 PM CST -----  Regarding: Appt  Contact: Pt  [phone]  Pt is returning a call to be rescheduled for appt please call

## 2025-02-10 ENCOUNTER — OFFICE VISIT (OUTPATIENT)
Dept: SLEEP MEDICINE | Facility: CLINIC | Age: 75
End: 2025-02-10
Payer: MEDICARE

## 2025-02-10 DIAGNOSIS — R35.1 NOCTURIA: ICD-10-CM

## 2025-02-10 DIAGNOSIS — G47.33 OSA (OBSTRUCTIVE SLEEP APNEA): Primary | ICD-10-CM

## 2025-02-10 DIAGNOSIS — F51.09 OTHER INSOMNIA NOT DUE TO A SUBSTANCE OR KNOWN PHYSIOLOGICAL CONDITION: ICD-10-CM

## 2025-02-10 NOTE — PROGRESS NOTES
The chief complaint leading to consultation is: NELDA     Visit type: audiovisual     The patient location is: [place]     20 minutes of total time spent on the encounter, which includes face to face time and non-face to face time preparing to see the patient (eg, review of tests), Obtaining and/or reviewing separately obtained history, Documenting clinical information in the electronic or other health record, Independently interpreting results (not separately reported) and communicating results to the patient/family/caregiver, or Care coordination (not separately reported).      Each patient to whom he or she provides medical services by telemedicine is:  (1) informed of the relationship between the physician and patient and the respective role of any other health care provider with respect to management of the patient; and (2) notified that he or she may decline to receive medical services by telemedicine and may withdraw from such care at any time.        Referred by No ref. provider found     ESTABLISHED PATIENT VISIT    Lima Maldonado  is a pleasant 74 y.o. female  with PMH significant for HTN, HLD, DM II, hypothyroidism, breast cancer, polyneuropathy, GERD, fatty liver, early hepatic fibrosis, AR, former smoker, BMI 26+, NELDA    Here today for: CPAP follow-up     Last visit 10/30/24:   Reports dx NELDA 2011 (AHI 54) following c/o snoring, poor disrupted and un-refreshing sleep, and daytime sleepiness and fatigue. Reports she has been using CPAP consistently since dx with good control of her sx. States circa CPAP is circa 2011 and no longer functioning properly. Also reports having difficulty getting equipment for CPAP since her previous sleep provider left. She presents today to re-establish care with sleep medicine and to get orders for updated CPAP and equipment.  PLAN:  -using and benefiting from CPAP therapy  -CPAP has reached the end of its usable life, patient will need a new one  -CPAP and supplies  ordered  -discussed NELDA and PAP with patient in detail, including possible complications of untreated NELDA like heart attack/stroke  -advised on strict driving precautions; advised never to drive drowsy      Since last visit:   Using CPAP nightly with continued improvement in sx. Feels good overall on therapy. Likes old machine better than this one, as water reservoir is smaller so she is having to fill nightly. Otherwise no complaints. States pressure settings and mask interface are comfortable.  States she is still having routine difficulty with sleep onset. Feels sometimes stress related, but mostly feels issues are secondary to pain. States she has difficulty with her hips hurting, currently taking aleve OTC PRN, as well as neuropathy, currently taking gabapentin. Has follow up appointment with PCP this week. I advised discussing pain management in more detail at this appointment as it is likely contributing to insomnia.       PAP history   Problems    Mask Nasal hammock w chin strap   Pressure 11-14cwp   DME HME   Machine age AS 11 11/13/24   Download 2/10/25: 30/30 x 7hrs 14mins, 11-14cwp (11.1/11.7/12.1), leak (0.5/10/76.9), AHI 0.2       SLEEP SCHEDULE   Environment    Bed Time 10:30PM-MN   Sleep Latency 30-60mins   Arousals 2-3   Nocturia 1   Back to sleep 15-30mins   Wake time 7-9AM   Naps Occasional nap   Work        Past Medical History:   Diagnosis Date    Acute cystitis without hematuria 7/17/2017    Arthritis     Back pain     Breast cancer     originally dx in 02/1997- treated with surgery, chemo and radiation     Class 1 obesity due to excess calories with serious comorbidity and body mass index (BMI) of 30.0 to 30.9 in adult 11/29/2018    Elevated bilirubin 11/19/2013    Fatty liver     GERD (gastroesophageal reflux disease)     Glucose intolerance (impaired glucose tolerance)     Hyperlipidemia     Hypertension     Hypothyroid     Hypothyroidism     hypothyroidism    Malignant neoplasm of  lower-outer quadrant of left female breast 11/15/2016    Obesity     Obesity, diabetes, and hypertension syndrome 12/12/2018    Osteopenia     Osteoporosis     Primary insomnia 11/2/2016    Shingles     Sleep apnea     wears CPAP nightly     Squamous cell carcinoma 2011    right forearm, sx by Dr. Corinne Ray    Stress incontinence of urine 1/11/2018    Trouble in sleeping     Type 2 diabetes mellitus with diabetic polyneuropathy, without long-term current use of insulin 12/26/2017    Urinary incontinence     Well woman exam with routine gynecological exam 11/2/2016     Patient Active Problem List   Diagnosis    Hyperlipidemia    Hypothyroidism    Obstructive sleep apnea on CPAP    GERD (gastroesophageal reflux disease)    Metabolic dysfunction-associated steatotic liver disease (MASLD)    Primary hypertension    Vaginal atrophy    Senile osteoporosis    Primary insomnia    Malignant neoplasm of lower-outer quadrant of left female breast    Dermatitis of eyelid of left eye due to herpes zoster    Aortic atherosclerosis    Chemotherapy-induced neuropathy    Stress incontinence of urine    Sensorineural hearing loss (SNHL) of left ear with restricted hearing of right ear    Perforation of left tympanic membrane    Allergic rhinitis    Nasal septal deviation    Tinnitus of both ears    Diabetic polyneuropathy associated with type 2 diabetes mellitus    Nuclear sclerosis, bilateral    Nuclear sclerotic cataract of right eye    Early hepatic fibrosis    Type 2 diabetes mellitus with diabetic polyneuropathy, without long-term current use of insulin    Overweight (BMI 25.0-29.9)    Neck pain    Cervical radiculopathy    Cervicalgia       Current Outpatient Medications:     ascorbic acid, vitamin C, (VITAMIN C) 1000 MG tablet, Take 1 tablet by mouth once daily., Disp: , Rfl:     aspirin (ECOTRIN) 81 MG EC tablet, Take 81 mg by mouth once daily., Disp: , Rfl:     calcium-vitamin D tablet 600 mg-200 units, , Disp: , Rfl:      coenzyme Q10-vitamin E 100-100 mg-unit Cap, Take 1 capsule by mouth once daily. , Disp: , Rfl:     cyclobenzaprine (FLEXERIL) 5 MG tablet, Take 1 tablet (5 mg total) by mouth 3 (three) times daily as needed for Muscle spasms (muscular pain)., Disp: 90 tablet, Rfl: 2    denosumab (PROLIA) 60 mg/mL Syrg, Inject 1 mL (60 mg total) into the skin every 6 (six) months., Disp: 2 mL, Rfl: 0    fenofibrate 160 MG Tab, TAKE 1 TABLET BY MOUTH EVERY DAY, Disp: 90 tablet, Rfl: 0    fluticasone (FLONASE) 50 mcg/actuation nasal spray, 1 spray by Each Nostril route 2 (two) times daily as needed., Disp: , Rfl:     gabapentin (NEURONTIN) 300 MG capsule, TAKE 1 CAPSULE BY MOUTH THREE TIMES A DAY, Disp: 270 capsule, Rfl: 1    irbesartan (AVAPRO) 150 MG tablet, TAKE 1 TABLET BY MOUTH EVERY EVENING., Disp: 90 tablet, Rfl: 2    letrozole (FEMARA) 2.5 mg Tab, TAKE 1 TABLET BY MOUTH EVERY DAY, Disp: 90 tablet, Rfl: 3    levothyroxine (SYNTHROID) 50 MCG tablet, TAKE 1 TABLET BY MOUTH EVERY DAY, Disp: 90 tablet, Rfl: 1    magnesium oxide (MAG-OX) 400 mg (241.3 mg magnesium) tablet, Take 400 mg by mouth once daily., Disp: , Rfl:     MULTIVITAMIN ORAL, Take 1 tablet by mouth once daily. , Disp: , Rfl:     omega-3 fatty acids-vitamin E 1,000 mg Cap, Take 1 capsule by mouth once daily. , Disp: , Rfl:     pantoprazole (PROTONIX) 20 MG tablet, TAKE 1 TABLET (20 MG TOTAL) BY MOUTH BEFORE BREAKFAST., Disp: 90 tablet, Rfl: 3    simvastatin (ZOCOR) 20 MG tablet, TAKE 1 TABLET BY MOUTH EVERY DAY IN THE EVENING, Disp: 90 tablet, Rfl: 3    sucralfate (CARAFATE) 100 mg/mL suspension, Take 10 mLs (1 g total) by mouth 4 (four) times daily., Disp: 828 mL, Rfl: 1    tirzepatide (MOUNJARO) 7.5 mg/0.5 mL PnIj, Inject 7.5 mg into the skin once a week., Disp: 6 mL, Rfl: 3    vitamin D 1000 units Tab, Take 1,000 Units by mouth once daily. , Disp: , Rfl:     YUVAFEM 10 mcg Tab, PLACE 1 TABLET VAGINALLY TWICE A WEEK., Disp: 24 tablet, Rfl: 1       There were no  vitals filed for this visit.    Physical Exam:    GEN:   Well-appearing  Psych:  Appropriate affect, demonstrates insight  SKIN:  No rash on the face or bridge of the nose      LABS:   Lab Results   Component Value Date    CO2 26 02/04/2025         RECORDS REVIEWED:    BASELINE 1/31/11: +NELDA, AHI 54.2, low 65%, split, titrated 11 cm; wt 193 lbs     Previous sleep notes: 1/17/14, 10/12/15, 1/24/17, 3/21/17, 11/7/18, 10/30/24    CPAP interrogation 2/10/25: 30/30 x 7hrs 14mins, 11-14cwp (11.1/11.7/12.1), leak (0.5/10/76.9), AHI 0.2    ASSESSMENT    PROBLEM DESCRIPTION/ Sx on Presentation Interval Hx STATUS   Hx NELDA   + snoring, + wakes feeling un-refreshed  Dx NELDA 2011 AHI 54   Good usage and efficiency  controlled   Daytime Sx   + sleepiness when inactive   Some daytime naps  Denies drowsiness when driving  ESS 9/24 on intake (reviewed from 1/17/14)  ESS 8/24 last visit (reviewed from  10/30/24) Feels better when using CPAP improved   Insomnia   Trouble falling asleep: 30mins-2hrs  Arousals:         3-4 x nightly  Hard to get back to sleep?: varies    Prior pertinent medications:  ambien  Current pertinent medications:   Gabapentin 300mg Sleep is more consolidated on CPAP, taking less time to fall asleep/return to sleep on average, but still has regularly difficulty with sleep onset improved   Nocturia   x 2-3 per sleep period 1 x nightly stable   Other issues:       PLAN      -using and benefiting from CPAP therapy  -continue CPAP nightly  -CPAP supplies ordered  -discussed NELDA and PAP with patient in detail, including possible complications of untreated NELDA like heart attack/stroke  -advised on strict driving precautions; advised never to drive drowsy  -discussed insomnia and sleep hygiene with patient  -recommended CBTi (gold standard tx for insomnia), patient is open  -will refer to [facility]  -recommended follow up with PCP to discuss pain management which is also likely contributing to insomnia     Advised on plan of  care. Answered all patient questions. Patient verbalized understanding and voiced agreement with plan of care.     RTC  12 months or as needed      The patient was given open opportunity to ask questions and/or express concerns about treatment plan. All questions/concerns were discussed.     Two patient identifiers used prior to evaluation.

## 2025-02-11 ENCOUNTER — OFFICE VISIT (OUTPATIENT)
Dept: INTERNAL MEDICINE | Facility: CLINIC | Age: 75
End: 2025-02-11
Payer: MEDICARE

## 2025-02-11 VITALS
WEIGHT: 167.13 LBS | HEART RATE: 78 BPM | HEIGHT: 66 IN | SYSTOLIC BLOOD PRESSURE: 128 MMHG | DIASTOLIC BLOOD PRESSURE: 74 MMHG | OXYGEN SATURATION: 97 % | BODY MASS INDEX: 26.86 KG/M2

## 2025-02-11 DIAGNOSIS — E55.9 VITAMIN D DEFICIENCY: ICD-10-CM

## 2025-02-11 DIAGNOSIS — C50.512 MALIGNANT NEOPLASM OF LOWER-OUTER QUADRANT OF LEFT BREAST OF FEMALE, ESTROGEN RECEPTOR POSITIVE: ICD-10-CM

## 2025-02-11 DIAGNOSIS — E83.52 HYPERCALCEMIA: ICD-10-CM

## 2025-02-11 DIAGNOSIS — Z17.0 MALIGNANT NEOPLASM OF LOWER-OUTER QUADRANT OF LEFT BREAST OF FEMALE, ESTROGEN RECEPTOR POSITIVE: ICD-10-CM

## 2025-02-11 DIAGNOSIS — E11.42 TYPE 2 DIABETES MELLITUS WITH DIABETIC POLYNEUROPATHY, WITHOUT LONG-TERM CURRENT USE OF INSULIN: Primary | ICD-10-CM

## 2025-02-11 DIAGNOSIS — I10 PRIMARY HYPERTENSION: ICD-10-CM

## 2025-02-11 DIAGNOSIS — E78.2 MIXED HYPERLIPIDEMIA: ICD-10-CM

## 2025-02-11 DIAGNOSIS — N17.9 AKI (ACUTE KIDNEY INJURY): ICD-10-CM

## 2025-02-11 PROCEDURE — 99999 PR PBB SHADOW E&M-EST. PATIENT-LVL V: CPT | Mod: PBBFAC,,, | Performed by: INTERNAL MEDICINE

## 2025-02-11 RX ORDER — CETIRIZINE HYDROCHLORIDE 5 MG/1
5 TABLET ORAL
COMMUNITY

## 2025-02-11 RX ORDER — GUAIFENESIN AND PHENYLEPHRINE HCL 400; 10 MG/1; MG/1
500 TABLET ORAL DAILY
COMMUNITY

## 2025-02-11 RX ORDER — ZINC OXIDE 15 MG
1 TABLET,DISINTEGRATING ORAL DAILY
COMMUNITY

## 2025-02-11 RX ORDER — ZINC GLUCONATE 50 MG
50 TABLET ORAL DAILY
COMMUNITY

## 2025-02-11 NOTE — PROGRESS NOTES
Subjective:       Patient ID: Lima Maldonado is a 74 y.o. female.    Chief Complaint: Diabetes (Follow up -- wants to adjust mounjaro to impact hunger cues more -- discuss labs)     Lima Maldonado is a 74 y.o.  female who presents for Diabetes (Follow up -- wants to adjust mounjaro to impact hunger cues more -- discuss labs)  .  HPI  History of Present Illness    Ms. Maldonado presents today for follow up of diabetes management    She reports an increase in A1C from 5.6 to 6.0, with average glucose rising from 114 to the 120s. She continues Mounjaro 7.5 mg.    Her weight fluctuates between 161-167 lbs, typically remaining around 165 lbs. She reports difficulty with diet adherence during the holiday season but expresses motivation to resume healthy eating habits now that the holidays are over.    She reports an inconsistent exercise routine but notes improvement and desires to establish more regular physical activity. She experiences hip pain requiring naproxen sodium approximately 4 times per week at bedtime for management.    She continues letrozole and Protonix. Her supplements include calcium with D3 600 mg twice daily, additional Vitamin D3, and turmeric.    Pt has HTN.  Counseled on low salt diet and graded exercise program. Denies CP, SOB, orthopnea or PND.  Currently treated with antihypertensives listed in med card and compliant most of the time. No side effects from medication noted by patient.     Patient has hyperlipidemia. Pt is complaint with risk reduction medication. Denies muscle cramping and weakness. Pt attempts to follow a low cholesterol diet and exercise. No CP, SOB, orthopnea or PND.         ROS:  Musculoskeletal: +joint pain       Patient Active Problem List   Diagnosis    Hyperlipidemia    Hypothyroidism    Obstructive sleep apnea on CPAP    GERD (gastroesophageal reflux disease)    Metabolic dysfunction-associated steatotic liver disease (MASLD)    Primary hypertension    Vaginal  atrophy    Senile osteoporosis    Primary insomnia    Malignant neoplasm of lower-outer quadrant of left female breast    Dermatitis of eyelid of left eye due to herpes zoster    Aortic atherosclerosis    Chemotherapy-induced neuropathy    Stress incontinence of urine    Sensorineural hearing loss (SNHL) of left ear with restricted hearing of right ear    Perforation of left tympanic membrane    Allergic rhinitis    Nasal septal deviation    Tinnitus of both ears    Diabetic polyneuropathy associated with type 2 diabetes mellitus    Nuclear sclerosis, bilateral    Nuclear sclerotic cataract of right eye    Early hepatic fibrosis    Type 2 diabetes mellitus with diabetic polyneuropathy, without long-term current use of insulin    Overweight (BMI 25.0-29.9)    Neck pain    Cervical radiculopathy    Cervicalgia         Past Medical History:   Diagnosis Date    Acute cystitis without hematuria 7/17/2017    Arthritis     Back pain     Breast cancer     originally dx in 02/1997- treated with surgery, chemo and radiation     Class 1 obesity due to excess calories with serious comorbidity and body mass index (BMI) of 30.0 to 30.9 in adult 11/29/2018    Elevated bilirubin 11/19/2013    Fatty liver     GERD (gastroesophageal reflux disease)     Glucose intolerance (impaired glucose tolerance)     Hyperlipidemia     Hypertension     Hypothyroid     Hypothyroidism     hypothyroidism    Malignant neoplasm of lower-outer quadrant of left female breast 11/15/2016    Obesity     Obesity, diabetes, and hypertension syndrome 12/12/2018    Osteopenia     Osteoporosis     Primary insomnia 11/2/2016    Shingles     Sleep apnea     wears CPAP nightly     Squamous cell carcinoma 2011    right forearm, sx by Dr. Corinne Ray    Stress incontinence of urine 1/11/2018    Trouble in sleeping     Type 2 diabetes mellitus with diabetic polyneuropathy, without long-term current use of insulin 12/26/2017    Urinary incontinence     Well woman  exam with routine gynecological exam 11/2/2016       Past Surgical History:   Procedure Laterality Date    APPENDECTOMY  2008    removed during hysterectomy surgery     BLEPHAROPLASTY Bilateral     BREAST BIOPSY      2010    Breast implant Bilateral 1998    implants removed in 2003    BREAST LUMPECTOMY      02/1997    BREAST RECONSTRUCTION Bilateral     02/2017    BREAST SURGERY      see details below     CATARACT EXTRACTION W/  INTRAOCULAR LENS IMPLANT Left 08/13/2020    Procedure: EXTRACTION, CATARACT, WITH IOL INSERTION;  Surgeon: Ivanna Coleman MD;  Location: [facility] OR;  Service: Ophthalmology;  Laterality: Left;    CATARACT EXTRACTION W/  INTRAOCULAR LENS IMPLANT Right 08/27/2020    Procedure: EXTRACTION, CATARACT, WITH IOL INSERTION LASER;  Surgeon: Ivanna Coleman MD;  Location: [facility] OR;  Service: Ophthalmology;  Laterality: Right;    COLONOSCOPY N/A 05/15/2018    Procedure: COLONOSCOPY;  Surgeon: Roldan Gonzales MD;  Location: [facility] (4TH FLR);  Service: Endoscopy;  Laterality: N/A;    COSMETIC SURGERY  1/2022    Eyelift    ENDOSCOPIC ULTRASOUND OF UPPER GASTROINTESTINAL TRACT N/A 10/11/2023    Procedure: ULTRASOUND, UPPER GI TRACT, ENDOSCOPIC;  Surgeon: Hermelindo Coleman MD;  Location: [facility] (2ND FLR);  Service: Endoscopy;  Laterality: N/A;  instr portal-ozempic or trulicity-tb  10/5-precall complete/pt verbalized understanding of holding Ozempic-MS    EPIDURAL STEROID INJECTION N/A 2/1/2024    Procedure: CERVICAL C7/T1 IL GABINO *HOLD ASPIRIN FOR 5 DAYS*;  Surgeon: Wayne Singh MD;  Location: [facility] PAIN MGT;  Service: Pain Management;  Laterality: N/A;  [phone]  2 WK F/U IVANIA    ESOPHAGOGASTRODUODENOSCOPY N/A 12/20/2022    Procedure: EGD (ESOPHAGOGASTRODUODENOSCOPY);  Surgeon: Omar Ambriz MD;  Location: [facility] ENDO (4TH FLR);  Service: Endoscopy;  Laterality: N/A;  instructions via portal - sm    EYE SURGERY      CATERACTS    HYSTERECTOMY  2008    benign    left breast reconstruction  2005    left  modified radical mastectomy  1997    for treatment of breast cancer     LIPOMA RESECTION  2010    removed from upper back area     MODIFIED RADICAL MASTECTOMY W/ AXILLARY LYMPH NODE DISSECTION  1997    OOPHORECTOMY          PELVIC LAPAROSCOPY      pt had endometriosis     DE REMOVAL OF OVARY/TUBE(S)      benign    reduction of mons pubis      robotic lap  hysterectomy with bso  2008    UPPER GASTROINTESTINAL ENDOSCOPY  2013       Family History   Problem Relation Name Age of Onset    Heart attack Father Gui James 51    Heart disease Father Gui James         Heart Attac, age 51    Alcohol abuse Father Gui James     Breast cancer Sister x1 51        BRCA 1/2 negative    Cancer Sister x1         Breast Cancer    Cancer Mother Patricia 65        Malignant melanoma    Melanoma Mother Patricia     No Known Problems Brother x1     No Known Problems Daughter Chenae     No Known Problems Son Christien     No Known Problems Daughter Shelia     No Known Problems Son Samuel     Diabetes Maternal Uncle Uncle Mitchel             Diabetes Maternal Uncle Uncle Marcelo Ray             Arthritis Maternal Grandmother Annmarie Little         Severe arthitis in Knees    Uterine cancer Neg Hx      Colon cancer Neg Hx      Celiac disease Neg Hx      Esophageal cancer Neg Hx      Inflammatory bowel disease Neg Hx      Liver cancer Neg Hx      Liver disease Neg Hx      Stomach cancer Neg Hx      Ulcerative colitis Neg Hx      Hypertension Neg Hx      Cirrhosis Neg Hx      Crohn's disease Neg Hx      Rectal cancer Neg Hx      Ovarian cancer Neg Hx         Social History     Tobacco Use    Smoking status: Former     Current packs/day: 0.00     Average packs/day: 0.5 packs/day for 3.0 years (1.5 ttl pk-yrs)     Types: Cigarettes     Start date: 4/15/1969     Quit date: 4/15/1972     Years since quittin.2    Smokeless tobacco: Never    Tobacco comments:     started at age 18 during college    Substance  "Use Topics    Alcohol use: Yes     Comment: Rarely, not even 1X per week    Drug use: No         Review of Systems      Objective:   Blood pressure 128/74, pulse 78, height 5' 6" (1.676 m), weight 75.8 kg (167 lb 1.7 oz), SpO2 97%.     Physical Exam  Constitutional:       Appearance: Normal appearance. She is well-developed. She is not ill-appearing.   HENT:      Head: Normocephalic and atraumatic.   Eyes:      General: No scleral icterus.     Conjunctiva/sclera: Conjunctivae normal.   Cardiovascular:      Rate and Rhythm: Normal rate.      Heart sounds: Normal heart sounds. No murmur heard.     No friction rub. No gallop.   Pulmonary:      Effort: Pulmonary effort is normal.      Breath sounds: Normal breath sounds. No wheezing or rales.   Chest:      Chest wall: No tenderness.   Musculoskeletal:         General: No tenderness or signs of injury.   Skin:     General: Skin is warm and dry.   Neurological:      Mental Status: She is alert and oriented to person, place, and time. Mental status is at baseline.   Psychiatric:         Behavior: Behavior normal.         Thought Content: Thought content normal.       Physical Exam    Vitals: Blood pressure looks great.           Prior labs reviewed    Health Maintenance         Date Due Completion Date    DEXA Scan 09/01/2025 9/1/2023    Foot Exam 09/18/2025 9/18/2024 (Done)    Override on 9/18/2024: Done    Override on 9/24/2019: Done    Override on 12/11/2018: Done    Override on 8/9/2017: Done    Hemoglobin A1c 11/06/2025 5/6/2025    Diabetes Urine Screening 02/04/2026 2/4/2025    Lipid Panel 02/04/2026 2/4/2025    Diabetic Eye Exam 05/06/2026 5/6/2025    Colorectal Cancer Screening 05/15/2028 5/15/2018    Override on 10/6/2006: Done    TETANUS VACCINE 05/06/2035 5/6/2025            Assessment/Plan:       1. Type 2 diabetes mellitus with diabetic polyneuropathy, without long-term current use of insulin  Overview:  metfromin 500mg daily, increasing trulicity from 0.75mg " to 1.5 mg 7/22 hba1c 6.6   10/22 nonadherent to diet, hba1c 7.2   Had GI issues, unclear eitology, work up with dr alarcon  Symptoms improved with prn sulcrafate  No significant improvement in wt or dm on higher dose of trulicity  Decreased 0.75 trulicity, increased metformin to 500 mg bid with improvement in hba1c to 6.8  cont current plan, not changing to ozempic due to GI issues  3/23 increased trulicity back to 1.5 mg metformin 500 mg bid  8/23 GI issues improved, stop trulicity, start ozempic 0.5 mg weekly, cont metfromin 500 mg bid, f/u in one month for review and titration  12/23 donut hole- returning to trulicity as has some at home, cont metfromin  8/24 doing well on mounjaro 7.5mg and metformin 500mg bid, goal to decrease or stop metformin  5/13/25 - now on mounjaro 10 mg weekly - tolerating well. No longer on metformin.   Discussed shift in diabetes management perspective with Mounjaro, noting that slight A1C increase to 6.0 is still within an acceptable range.  - Continued Mounjaro 7.5mg at current dose with potential to increase in the future.  - Ordered Hemoglobin A1C test before next follow-up visit.  - Referred the patient for diabetic eye screening and to podiatrist for diabetic foot exam.  - Instructed the patient to contact the office to schedule diabetic eye screening and podiatry appointments.  - Acknowledged the patient's struggle with diet and planned to address it.  Orders:  -     Diabetic Eye Screening Photo; Future  -     tirzepatide 10 mg/0.5 mL PnIj; Inject 10 mg into the skin every 7 days.  Dispense: 2 mL; Refill: 5  -     Hemoglobin A1C; Future; Expected date: 05/12/2025    2. Primary hypertension  Overview:  Previously Controlled on losartan 100 mg daily  12/23 uncontrolled, stop losartan, start irbesartan 150mg    - Continued Avapro at current dose.  - Noted that blood pressure is well-controlled.    3. Hypercalcemia  -     Comprehensive Metabolic Panel; Future; Expected date:  02/11/2025    - Discontinued calcium supplements temporarily due to slightly elevated calcium levels.  - Continued Vitamin D3 supplementation.  - Noted that alkaline phosphatase is not elevated.  - Planned to repeat labs to check calcium levels.    4. Vitamin D deficiency  Cont vit d as above     5. KATALINA (acute kidney injury)  -     Comprehensive Metabolic Panel; Future; Expected date: 02/11/2025  STOP TUMERIC  oted slightly decreased kidney function, with GFR and creatinine ratio indicating possible dehydration.  - Assessed potential causes of decreased kidney function, including medication use and dehydration.  - Planned to repeat labs to check kidney function.  - Recommend increasing water intake for better hydration.    6. Mixed hyperlipemia  - Monitored LDL levels, which increased to 61.  - Target is below 55 due to diabetes and hypertension.    Assessment & Plan    - Assessed weight management progress on Mounjaro 7.5mg; considered dose increase due to weight plateau and slight A1C elevation  - Evaluated elevated calcium levels; ruled out medication-induced causes  - Investigated slight decrease in kidney function (GFR); attributed to potential dehydration and NSAID use  - Reviewed LDL levels; noted increase to 61, above target of 55 for patient with diabetes and hypertension           - N  PAIN MANAGEMENT:  - Explained that turmeric supplements have similar biochemistry to NSAIDs, potentially affecting kidney function.  - Discontinued turmeric supplements and naproxen.  - Recommend switching to Tylenol for pain management.  - Prescribed Tylenol Arthritis strength as needed for pain, replacing naproxen sodium.     ER+ malignant neoplasm of  BREAST  On letrazole, followed by dr yadav  - Noted that the patient is keeping up with mammograms.  - Continued letrozole therapy.  - Noted that the patient had a mastectomy.    AORTIC ATHEROSCLEROSIS:  - Acknowledged presence of aortic atherosclerosis.  Cont high dose  statin, bp and diabetes control    hypothyroidism  - Planned to check TSH levels annual    WEIGHT MANAGEMENT:  - Ms. Maldonado to resume healthier eating habits post-holidays.    EXERCISE RECOMMENDATIONS:  - Ms. Maldonado to aim for 150 minutes of exercise per week.  - Recommend considering joining local walking groups for regular physical activity.    FOLLOW UP:  - Scheduled follow-up visit in 3 months.           Visit today included increased complexity associated with the care of the episodic problems and addressed and managing the longitudinal care of the patient due to the serious and/or complex managed problem(s) as above.         Medication List with Changes/Refills   New Medications    TIRZEPATIDE 10 MG/0.5 ML PNIJ    Inject 10 mg into the skin every 7 days.   Current Medications    ASCORBIC ACID, VITAMIN C, (VITAMIN C) 1000 MG TABLET    Take 1 tablet by mouth once daily.    ASHWAGANDHA EXTRACT ORAL    Take 300 mg by mouth once daily.    ASPIRIN (ECOTRIN) 81 MG EC TABLET    Take 81 mg by mouth once daily.    CALCIUM-VITAMIN D TABLET 600 MG-200 UNITS        CETIRIZINE (ZYRTEC) 5 MG TABLET    Take 5 mg by mouth as needed for Allergies.    COENZYME Q10-VITAMIN E 100-100 MG-UNIT CAP    Take 1 capsule by mouth once daily.     CYCLOBENZAPRINE (FLEXERIL) 5 MG TABLET    Take 1 tablet (5 mg total) by mouth 3 (three) times daily as needed for Muscle spasms (muscular pain).    DENOSUMAB (PROLIA) 60 MG/ML SYRG    Inject 1 mL (60 mg total) into the skin every 6 (six) months.    FENOFIBRATE 160 MG TAB    TAKE 1 TABLET BY MOUTH EVERY DAY    FIBER CHOICE ORAL    Take 2 capsules by mouth once daily.    FLUTICASONE (FLONASE) 50 MCG/ACTUATION NASAL SPRAY    1 spray by Each Nostril route 2 (two) times daily as needed.    GABAPENTIN (NEURONTIN) 300 MG CAPSULE    TAKE 1 CAPSULE BY MOUTH THREE TIMES A DAY    IRBESARTAN (AVAPRO) 150 MG TABLET    TAKE 1 TABLET BY MOUTH EVERY EVENING.    LETROZOLE (FEMARA) 2.5 MG TAB    TAKE 1 TABLET  BY MOUTH EVERY DAY    LEVOTHYROXINE (SYNTHROID) 50 MCG TABLET    TAKE 1 TABLET BY MOUTH EVERY DAY    MAGNESIUM OXIDE (MAG-OX) 400 MG (241.3 MG MAGNESIUM) TABLET    Take 400 mg by mouth once daily.    MULTIVITAMIN ORAL    Take 1 tablet by mouth once daily.     OMEGA-3 FATTY ACIDS-VITAMIN E 1,000 MG CAP    Take 1 capsule by mouth once daily.     PANTOPRAZOLE (PROTONIX) 20 MG TABLET    TAKE 1 TABLET (20 MG TOTAL) BY MOUTH BEFORE BREAKFAST.    SIMVASTATIN (ZOCOR) 20 MG TABLET    TAKE 1 TABLET BY MOUTH EVERY DAY IN THE EVENING    SUCRALFATE (CARAFATE) 100 MG/ML SUSPENSION    Take 10 mLs (1 g total) by mouth 4 (four) times daily.    TURMERIC ROOT EXTRACT 500 MG CAP    Take 500 mg by mouth once daily.    VITAMIN C-BIOTIN (HAIR-SKIN-NAILS, VIT C-BIOTIN,) 50 MG -1,250 MCG CHEW    Take 1 capsule by mouth once daily.    VITAMIN D 1000 UNITS TAB    Take 1,000 Units by mouth once daily.     YUVAFEM 10 MCG TAB    PLACE 1 TABLET VAGINALLY TWICE A WEEK.    ZINC GLUCONATE 50 MG TABLET    Take 50 mg by mouth once daily.   Discontinued Medications    TIRZEPATIDE (MOUNJARO) 7.5 MG/0.5 ML PNIJ    Inject 7.5 mg into the skin once a week.       Recommend patient complete vaccinations as listed in the overdue health maintenance report. Pt may obtain vaccinations at their local pharmacy, any [facility] pharmacy or request vaccination in our clinic.  Please note that some insurances will only cover vaccination cost at specific locations.Please check with your insurance provider regarding your coverage.  Vaccines that are not covered are still recommended.         minutes spent in care of patient including history, physical, chart review, orders and coordination of care.        This note was generated with the assistance of ambient listening technology. Verbal consent was obtained by the patient and accompanying visitor(s) for the recording of patient appointment to facilitate this note. I attest to having reviewed and edited the generated note  for accuracy, though some syntax or spelling errors may persist. Please contact the author of this note for any clarification.

## 2025-02-16 DIAGNOSIS — Z79.811 USE OF AROMATASE INHIBITORS: ICD-10-CM

## 2025-02-17 RX ORDER — LETROZOLE 2.5 MG/1
2.5 TABLET, FILM COATED ORAL
Qty: 90 TABLET | Refills: 3 | Status: SHIPPED | OUTPATIENT
Start: 2025-02-17

## 2025-02-18 ENCOUNTER — LAB VISIT (OUTPATIENT)
Dept: LAB | Facility: HOSPITAL | Age: 75
End: 2025-02-18
Attending: INTERNAL MEDICINE
Payer: MEDICARE

## 2025-02-18 DIAGNOSIS — N17.9 AKI (ACUTE KIDNEY INJURY): ICD-10-CM

## 2025-02-18 DIAGNOSIS — E83.52 HYPERCALCEMIA: ICD-10-CM

## 2025-02-18 LAB
ALBUMIN SERPL BCP-MCNC: 4.3 G/DL (ref 3.5–5.2)
ALP SERPL-CCNC: 40 U/L (ref 40–150)
ALT SERPL W/O P-5'-P-CCNC: 29 U/L (ref 10–44)
ANION GAP SERPL CALC-SCNC: 13 MMOL/L (ref 8–16)
AST SERPL-CCNC: 44 U/L (ref 10–40)
BILIRUB SERPL-MCNC: 0.6 MG/DL (ref 0.1–1)
BUN SERPL-MCNC: 16 MG/DL (ref 8–23)
CALCIUM SERPL-MCNC: 9.5 MG/DL (ref 8.7–10.5)
CHLORIDE SERPL-SCNC: 108 MMOL/L (ref 95–110)
CO2 SERPL-SCNC: 21 MMOL/L (ref 23–29)
CREAT SERPL-MCNC: 0.9 MG/DL (ref 0.5–1.4)
EST. GFR  (NO RACE VARIABLE): >60 ML/MIN/1.73 M^2
GLUCOSE SERPL-MCNC: 172 MG/DL (ref 70–110)
POTASSIUM SERPL-SCNC: 4.7 MMOL/L (ref 3.5–5.1)
PROT SERPL-MCNC: 7.1 G/DL (ref 6–8.4)
SODIUM SERPL-SCNC: 142 MMOL/L (ref 136–145)

## 2025-02-18 PROCEDURE — 36415 COLL VENOUS BLD VENIPUNCTURE: CPT | Mod: HCNC | Performed by: INTERNAL MEDICINE

## 2025-02-18 PROCEDURE — 80053 COMPREHEN METABOLIC PANEL: CPT | Mod: HCNC | Performed by: INTERNAL MEDICINE

## 2025-02-26 DIAGNOSIS — G62.9 NEUROPATHY: ICD-10-CM

## 2025-02-27 ENCOUNTER — PATIENT MESSAGE (OUTPATIENT)
Dept: SLEEP MEDICINE | Facility: CLINIC | Age: 75
End: 2025-02-27
Payer: MEDICARE

## 2025-02-27 DIAGNOSIS — G62.9 NEUROPATHY: ICD-10-CM

## 2025-02-27 RX ORDER — GABAPENTIN 300 MG/1
300 CAPSULE ORAL 3 TIMES DAILY
Qty: 270 CAPSULE | Refills: 1 | OUTPATIENT
Start: 2025-02-27

## 2025-02-27 RX ORDER — GABAPENTIN 300 MG/1
300 CAPSULE ORAL 3 TIMES DAILY
Qty: 270 CAPSULE | Refills: 1 | Status: SHIPPED | OUTPATIENT
Start: 2025-02-27

## 2025-03-14 DIAGNOSIS — E78.2 MIXED HYPERLIPIDEMIA: ICD-10-CM

## 2025-03-14 NOTE — TELEPHONE ENCOUNTER
Care Due:                  Date            Visit Type   Department     Provider  --------------------------------------------------------------------------------                                EP -                              PRIMARY      [facility] INTERNAL  Joie Sorto  Last Visit: 02-      CARE ([facility])   MEDICINE       Stefany  Next Visit: None Scheduled  None         None Found                                                            Last  Test          Frequency    Reason                     Performed    Due Date  --------------------------------------------------------------------------------    CBC.........  12 months..  fenofibrate..............  11- 11-    [facility] Embedded Care Due Messages. Reference number: [id number].   3/14/2025 12:18:57 AM CDT

## 2025-03-14 NOTE — TELEPHONE ENCOUNTER
Refill Routing Note   Medication(s) are not appropriate for processing by [facility] for the following reason(s):        Required labs outdated    [facility] action(s):  Defer   Requires labs : Yes             Appointments  past 12m or future 3m with PCP    Date Provider   Last Visit   2/11/2025 Joie Mccall MD   Next Visit   Visit date not found Joie Mccall MD   ED visits in past 90 days: 0        Note composed:9:51 AM 03/14/2025

## 2025-03-23 RX ORDER — FENOFIBRATE 160 MG/1
160 TABLET ORAL
Qty: 90 TABLET | Refills: 0 | Status: SHIPPED | OUTPATIENT
Start: 2025-03-23

## 2025-04-29 RX ORDER — LEVOTHYROXINE SODIUM 50 UG/1
50 TABLET ORAL
Qty: 90 TABLET | Refills: 1 | Status: SHIPPED | OUTPATIENT
Start: 2025-04-29

## 2025-04-29 NOTE — TELEPHONE ENCOUNTER
Refill Decision Note   Lima Erica  is requesting a refill authorization.  Brief Assessment and Rationale for Refill:  Approve     Medication Therapy Plan:         Comments:     Note composed:4:50 AM 04/29/2025

## 2025-04-29 NOTE — TELEPHONE ENCOUNTER
No care due was identified.  Health [facility] Embedded Care Due Messages. Reference number: [id number].   4/29/2025 12:17:07 AM CDT

## 2025-05-06 ENCOUNTER — TELEPHONE (OUTPATIENT)
Dept: INTERNAL MEDICINE | Facility: CLINIC | Age: 75
End: 2025-05-06

## 2025-05-06 ENCOUNTER — LAB VISIT (OUTPATIENT)
Dept: LAB | Facility: HOSPITAL | Age: 75
End: 2025-05-06
Attending: INTERNAL MEDICINE
Payer: MEDICARE

## 2025-05-06 ENCOUNTER — CLINICAL SUPPORT (OUTPATIENT)
Dept: INTERNAL MEDICINE | Facility: CLINIC | Age: 75
End: 2025-05-06
Attending: INTERNAL MEDICINE
Payer: MEDICARE

## 2025-05-06 DIAGNOSIS — E11.42 TYPE 2 DIABETES MELLITUS WITH DIABETIC POLYNEUROPATHY, WITHOUT LONG-TERM CURRENT USE OF INSULIN: ICD-10-CM

## 2025-05-06 DIAGNOSIS — E11.42 TYPE 2 DIABETES MELLITUS WITH DIABETIC POLYNEUROPATHY, WITHOUT LONG-TERM CURRENT USE OF INSULIN: Primary | ICD-10-CM

## 2025-05-06 LAB
EAG (OHS): 128 MG/DL (ref 68–131)
HBA1C MFR BLD: 6.1 % (ref 4–5.6)
HM EYE EXAM: NEGATIVE

## 2025-05-06 PROCEDURE — 36415 COLL VENOUS BLD VENIPUNCTURE: CPT

## 2025-05-06 PROCEDURE — 83036 HEMOGLOBIN GLYCOSYLATED A1C: CPT | Mod: HCNC

## 2025-05-06 PROCEDURE — 92229 IMG RTA DETC/MNTR DS POC ALY: CPT | Mod: HCNC,S$GLB,, | Performed by: INTERNAL MEDICINE

## 2025-05-06 NOTE — TELEPHONE ENCOUNTER
Pt completed AEYE cam this am -- patient is not eligible for AEYE cam according to care gap warning -- ordered referral to opto for diabetic eye exam per WOGs with Dx E11.42

## 2025-05-13 ENCOUNTER — TELEPHONE (OUTPATIENT)
Dept: INTERNAL MEDICINE | Facility: CLINIC | Age: 75
End: 2025-05-13
Payer: MEDICARE

## 2025-05-13 ENCOUNTER — OFFICE VISIT (OUTPATIENT)
Dept: INTERNAL MEDICINE | Facility: CLINIC | Age: 75
End: 2025-05-13
Payer: MEDICARE

## 2025-05-13 ENCOUNTER — OFFICE VISIT (OUTPATIENT)
Dept: HEMATOLOGY/ONCOLOGY | Facility: CLINIC | Age: 75
End: 2025-05-13
Payer: MEDICARE

## 2025-05-13 VITALS
DIASTOLIC BLOOD PRESSURE: 62 MMHG | WEIGHT: 166.25 LBS | TEMPERATURE: 98 F | HEIGHT: 66 IN | SYSTOLIC BLOOD PRESSURE: 130 MMHG | HEART RATE: 87 BPM | OXYGEN SATURATION: 7 % | RESPIRATION RATE: 17 BRPM | BODY MASS INDEX: 26.72 KG/M2

## 2025-05-13 VITALS
DIASTOLIC BLOOD PRESSURE: 64 MMHG | HEART RATE: 76 BPM | BODY MASS INDEX: 26.72 KG/M2 | HEIGHT: 66 IN | WEIGHT: 166.25 LBS | OXYGEN SATURATION: 98 % | SYSTOLIC BLOOD PRESSURE: 122 MMHG

## 2025-05-13 DIAGNOSIS — C50.512 MALIGNANT NEOPLASM OF LOWER-OUTER QUADRANT OF LEFT BREAST OF FEMALE, ESTROGEN RECEPTOR POSITIVE: Primary | ICD-10-CM

## 2025-05-13 DIAGNOSIS — M81.0 SENILE OSTEOPOROSIS: ICD-10-CM

## 2025-05-13 DIAGNOSIS — K76.0 FATTY LIVER: ICD-10-CM

## 2025-05-13 DIAGNOSIS — E11.42 TYPE 2 DIABETES MELLITUS WITH DIABETIC POLYNEUROPATHY, WITHOUT LONG-TERM CURRENT USE OF INSULIN: Primary | ICD-10-CM

## 2025-05-13 DIAGNOSIS — Z17.0 MALIGNANT NEOPLASM OF LOWER-OUTER QUADRANT OF LEFT BREAST OF FEMALE, ESTROGEN RECEPTOR POSITIVE: Primary | ICD-10-CM

## 2025-05-13 DIAGNOSIS — I10 PRIMARY HYPERTENSION: ICD-10-CM

## 2025-05-13 DIAGNOSIS — E78.2 MIXED HYPERLIPIDEMIA: ICD-10-CM

## 2025-05-13 PROCEDURE — 3074F SYST BP LT 130 MM HG: CPT | Mod: CPTII,HCNC,S$GLB, | Performed by: PHYSICIAN ASSISTANT

## 2025-05-13 PROCEDURE — 1125F AMNT PAIN NOTED PAIN PRSNT: CPT | Mod: CPTII,HCNC,S$GLB, | Performed by: PHYSICIAN ASSISTANT

## 2025-05-13 PROCEDURE — 3078F DIAST BP <80 MM HG: CPT | Mod: CPTII,HCNC,S$GLB, | Performed by: PHYSICIAN ASSISTANT

## 2025-05-13 PROCEDURE — 99999 PR PBB SHADOW E&M-EST. PATIENT-LVL IV: CPT | Mod: PBBFAC,HCNC,, | Performed by: PHYSICIAN ASSISTANT

## 2025-05-13 PROCEDURE — 99214 OFFICE O/P EST MOD 30 MIN: CPT | Mod: HCNC,S$GLB,, | Performed by: PHYSICIAN ASSISTANT

## 2025-05-13 PROCEDURE — 3044F HG A1C LEVEL LT 7.0%: CPT | Mod: CPTII,HCNC,S$GLB, | Performed by: PHYSICIAN ASSISTANT

## 2025-05-13 PROCEDURE — 1101F PT FALLS ASSESS-DOCD LE1/YR: CPT | Mod: CPTII,HCNC,S$GLB, | Performed by: PHYSICIAN ASSISTANT

## 2025-05-13 PROCEDURE — 3288F FALL RISK ASSESSMENT DOCD: CPT | Mod: CPTII,HCNC,S$GLB, | Performed by: PHYSICIAN ASSISTANT

## 2025-05-13 PROCEDURE — 99999 PR PBB SHADOW E&M-EST. PATIENT-LVL V: CPT | Mod: PBBFAC,HCNC,, | Performed by: INTERNAL MEDICINE

## 2025-05-13 NOTE — PROGRESS NOTES
Subjective:       Patient ID: Lima Maldonado is a 74 y.o. female.    Chief Complaint: No chief complaint on file.      HPI  Ms Maldonado return to clinic for follow-up of  diagnosis of Stage 1 ER+  left breast cancer.  She is currently onadjuvant letrozole.   Also on Prolia    Today she reports she has some chronic discomfort in her ankles and feet which is helped with gabapentin.  Her exercise is sporadic.  She denies any shortness of breath.  Appetite and bowel function has been unremarkable.      She is excited about going to the [place] for her 50th wedding anniversary with all of her family.        Breast history:     Prior history of left breast cancer in 1997 when she underwent left lumpectomy with complete axillary dissection at age 46 for a pT1cN1 breast cancer (Stage IIA).  47 nodes were removed, 1 of which was positive.  She received adjuvant chemotherapy, radiation and tamoxifen.       Mammogram on September 20, 2016 demonstrated a new irregular mass with spiculated margins seen in the left breast at  5 o'clock along the surgical scar site.ultrasound showed a  8 X 8 X 7 MM MASS.    A needle biopsy in September 27 showed infiltrating carcinoma, histologic grade 3, nuclear grade 2, mitotic index 1. Tumor was 90% ER positive, 70% W[place] positive, and 1+ HER-2.     MRI of the breast showed a 1.7 x 1.3 cm lesion in the lower outer quadrant of the left breast.  PET/CT on October 7 was negative for any evidence of distant metastasis.    On November 16, 2016 bilateral mastectomies were performed. Left breast showed a  1.5 cm intermediate grade carcinoma with ductal and lobular features. There was focal dermal invasion. Margins were negative. The right breast was without abnormality.  Oncotype score returned 25 - intermediate risk.  Adjuvant TC X 4 completed 3/31/17.    She began letrozole in April 2017.    Breast Cancer Index:12/2022  5.1% risk or recurrence with benefit to continue endocrine  therapy    Dexa September 2023 - osteoporosis  Review of Systems   Constitutional: Negative.  Negative for appetite change and unexpected weight change.   HENT:  Negative for mouth sores.    Eyes:  Negative for visual disturbance.   Respiratory: Negative.  Negative for cough and shortness of breath.    Cardiovascular:  Negative for chest pain.   Gastrointestinal:  Negative for abdominal pain and diarrhea.   Genitourinary:  Negative for frequency.   Musculoskeletal:  Positive for arthralgias (feet and ankles). Negative for back pain.   Integumentary:  Negative for rash.   Neurological: Negative.  Negative for headaches.   Hematological:  Negative for adenopathy.   Psychiatric/Behavioral: Negative.  The patient is not nervous/anxious.          Objective:      Physical Exam  Vitals reviewed.   Constitutional:       General: She is not in acute distress.  HENT:      Mouth/Throat:      Pharynx: Oropharynx is clear. No oropharyngeal exudate or posterior oropharyngeal erythema.   Cardiovascular:      Rate and Rhythm: Normal rate and regular rhythm.   Pulmonary:      Effort: Pulmonary effort is normal. No respiratory distress.      Breath sounds: Normal breath sounds. No wheezing or rales.   Chest:       Abdominal:      Palpations: Abdomen is soft. There is no mass.      Tenderness: There is no abdominal tenderness.   Lymphadenopathy:      Cervical: No cervical adenopathy.      Upper Body:      Right upper body: No supraclavicular or axillary adenopathy.      Left upper body: No supraclavicular or axillary adenopathy.   Neurological:      Mental Status: She is alert and oriented to person, place, and time.   Psychiatric:         Mood and Affect: Mood normal.         Thought Content: Thought content normal.         Judgment: Judgment normal.       Assessment:     CMP -  Problem List Items Addressed This Visit       Malignant neoplasm of lower-outer quadrant of left female breast - Primary       Plan:     Return to clinic 6  months.  Prolia every 6 M    Route Chart for Scheduling    Med Onc Chart Routing      Follow up with physician 6 months.   Follow up with [facility]    Infusion scheduling note    Injection scheduling note Prolia every 6 months   Labs CMP   Scheduling: Labs same day as infusion  Preferred lab:  Lab interval:     Imaging None      Pharmacy appointment No pharmacy appointment needed      Other referrals No nutrition appointment needed -  No referral to Oncology Primary Care needed -  No massage appointment needed    No additional referrals needed             Supportive Plan Information  OP DENOSUMAB Francisco Baker MD   Associated Diagnosis: Senile osteoporosis   noted on 5/17/2016   Line of treatment: Supportive Care   Treatment goal: Supportive     Upcoming Treatment Dates - OP DENOSUMAB    11/13/2025       Medications       denosumab (PROLIA) injection 60 mg

## 2025-05-13 NOTE — TELEPHONE ENCOUNTER
LVM to schedule 3 month f/u and A1c draw before 3 month f/u, and to schedule 6 month f/u with Dr. Mccall.

## 2025-05-13 NOTE — PROGRESS NOTES
INTERNAL MEDICINE PROGRESS NOTE    CHIEF COMPLAINT     Chief Complaint   Patient presents with    Follow-up       HPI     Lima Maldonado is a 74 y.o. female who presents for an Follow-up visit today.    3 month follow-up.   Last saw Dr. Mccall 3 months ago.   She has DM that she manages with Tirzepetide 10 mg -tolerating this well  Updated A1C is 6.1, in good range.     Following with hepatology -LFTs are stable     BP in good control on irbesartan     HLD well managed with simvastatin 20, tolerating this regimen well -continue.     HM; - up to date      Past Medical History:  Past Medical History:   Diagnosis Date    Acute cystitis without hematuria 7/17/2017    Arthritis     Back pain     Breast cancer     originally dx in 02/1997- treated with surgery, chemo and radiation     Class 1 obesity due to excess calories with serious comorbidity and body mass index (BMI) of 30.0 to 30.9 in adult 11/29/2018    Elevated bilirubin 11/19/2013    Fatty liver     GERD (gastroesophageal reflux disease)     Glucose intolerance (impaired glucose tolerance)     Hyperlipidemia     Hypertension     Hypothyroid     Hypothyroidism     hypothyroidism    Malignant neoplasm of lower-outer quadrant of left female breast 11/15/2016    Obesity     Obesity, diabetes, and hypertension syndrome 12/12/2018    Osteopenia     Osteoporosis     Primary insomnia 11/2/2016    Shingles     Sleep apnea     wears CPAP nightly     Squamous cell carcinoma 2011    right forearm, sx by Dr. Corinne Ray    Stress incontinence of urine 1/11/2018    Trouble in sleeping     Type 2 diabetes mellitus with diabetic polyneuropathy, without long-term current use of insulin 12/26/2017    Urinary incontinence     Well woman exam with routine gynecological exam 11/2/2016       Home Medications:  Prior to Admission medications    Medication Sig Start Date End Date Taking? Authorizing Provider   ascorbic acid, vitamin C, (VITAMIN C) 1000 MG tablet Take 1 tablet by  mouth once daily.   Yes Provider, Historical   ASHWAGANDHA EXTRACT ORAL Take 300 mg by mouth once daily.   Yes Provider, Historical   aspirin (ECOTRIN) 81 MG EC tablet Take 81 mg by mouth once daily.   Yes Provider, Historical   calcium-vitamin D tablet 600 mg-200 units    Yes Provider, Historical   cetirizine (ZYRTEC) 5 MG tablet Take 5 mg by mouth as needed for Allergies.   Yes Provider, Historical   coenzyme Q10-vitamin E 100-100 mg-unit Cap Take 1 capsule by mouth once daily.    Yes Provider, Historical   cyclobenzaprine (FLEXERIL) 5 MG tablet Take 1 tablet (5 mg total) by mouth 3 (three) times daily as needed for Muscle spasms (muscular pain). 11/6/23  Yes Wayne Singh MD   denosumab (PROLIA) 60 mg/mL Syrg Inject 1 mL (60 mg total) into the skin every 6 (six) months. 8/5/22 5/13/25 Yes Joie Mccall MD   fenofibrate 160 MG Tab TAKE 1 TABLET BY MOUTH EVERY DAY 3/23/25  Yes Joie Mccall MD   FIBER CHOICE ORAL Take 2 capsules by mouth once daily.   Yes Provider, Historical   fluticasone (FLONASE) 50 mcg/actuation nasal spray 1 spray by Each Nostril route 2 (two) times daily as needed. 8/29/18  Yes Provider, Historical   gabapentin (NEURONTIN) 300 MG capsule Take 1 capsule (300 mg total) by mouth 3 (three) times daily. 2/27/25  Yes Francisco Baker MD   irbesartan (AVAPRO) 150 MG tablet TAKE 1 TABLET BY MOUTH EVERY EVENING. 12/5/24  Yes Joie Mccall MD   letrozole (FEMARA) 2.5 mg Tab TAKE 1 TABLET BY MOUTH EVERY DAY 2/17/25  Yes Mikaela Munoz NP   levothyroxine (SYNTHROID) 50 MCG tablet TAKE 1 TABLET BY MOUTH EVERY DAY 4/29/25  Yes Joie Mccall MD   magnesium oxide (MAG-OX) 400 mg (241.3 mg magnesium) tablet Take 400 mg by mouth once daily.   Yes Provider, Historical   MULTIVITAMIN ORAL Take 1 tablet by mouth once daily.    Yes Provider, Historical   omega-3 fatty acids-vitamin E 1,000 mg Cap Take 1 capsule by mouth once daily.    Yes Provider, Historical   pantoprazole (PROTONIX) 20 MG  tablet TAKE 1 TABLET (20 MG TOTAL) BY MOUTH BEFORE BREAKFAST. 8/22/24  Yes Joie Mccall MD   simvastatin (ZOCOR) 20 MG tablet TAKE 1 TABLET BY MOUTH EVERY DAY IN THE EVENING 12/16/24  Yes Bridgett Gabriel PA-C   sucralfate (CARAFATE) 100 mg/mL suspension Take 10 mLs (1 g total) by mouth 4 (four) times daily. 10/25/22  Yes Joie Mccall MD   tirzepatide 10 mg/0.5 mL PnIj Inject 10 mg into the skin every 7 days. 5/15/25 11/11/25 Yes Joie Mccall MD   turmeric root extract 500 mg Cap Take 500 mg by mouth once daily.   Yes Provider, Historical   vitamin C-biotin (HAIR-SKIN-NAILS, VIT C-BIOTIN,) 50 mg -1,250 mcg Chew Take 1 capsule by mouth once daily.   Yes Provider, Historical   vitamin D 1000 units Tab Take 1,000 Units by mouth once daily.    Yes Provider, Historical   YUVAFEM 10 mcg Tab PLACE 1 TABLET VAGINALLY TWICE A WEEK. 1/15/25  Yes Joie Mccall MD   zinc gluconate 50 mg tablet Take 50 mg by mouth once daily.   Yes Provider, Historical       Review of Systems:  Review of Systems   Constitutional:  Negative for chills and fever.   HENT:  Negative for sore throat and trouble swallowing.    Eyes:  Negative for visual disturbance.   Respiratory:  Negative for cough and shortness of breath.    Cardiovascular:  Negative for chest pain.   Gastrointestinal:  Negative for abdominal pain, constipation, diarrhea, nausea and vomiting.   Genitourinary:  Negative for dysuria and flank pain.   Musculoskeletal:  Negative for back pain, neck pain and neck stiffness.   Skin:  Negative for rash.   Neurological:  Negative for dizziness, syncope, weakness and headaches.   Psychiatric/Behavioral:  Negative for confusion.        Health Maintainence:   Immunizations:  Health Maintenance         Date Due Completion Date    Foot Exam 08/28/2024 8/28/2023    Override on 9/24/2019: Done    Override on 12/11/2018: Done    Override on 8/9/2017: Done    DEXA Scan 09/01/2025 9/1/2023    Hemoglobin A1c 11/06/2025  "5/6/2025    Diabetes Urine Screening 02/04/2026 2/4/2025    Lipid Panel 02/04/2026 2/4/2025    Diabetic Eye Exam 05/06/2026 5/6/2025    Colorectal Cancer Screening 05/15/2028 5/15/2018    Override on 10/6/2006: Done    TETANUS VACCINE 05/06/2035 5/6/2025             PHYSICAL EXAM     /64 (BP Location: Right arm, Patient Position: Sitting)   Pulse 76   Ht 5' 6" (1.676 m)   Wt 75.4 kg (166 lb 3.6 oz)   SpO2 98%   BMI 26.83 kg/m²     Physical Exam  Vitals and nursing note reviewed.   Constitutional:       Appearance: Normal appearance.      Comments: Healthy appearing female in NAD or apparent pain. She makes good eye contact, speaks in clear full sentences and ambulates with ease.        HENT:      Head: Normocephalic and atraumatic.      Nose: Nose normal.      Mouth/Throat:      Pharynx: Oropharynx is clear.   Eyes:      Conjunctiva/sclera: Conjunctivae normal.   Cardiovascular:      Rate and Rhythm: Normal rate and regular rhythm.      Pulses: Normal pulses.   Pulmonary:      Effort: No respiratory distress.   Abdominal:      Tenderness: There is no abdominal tenderness.   Musculoskeletal:         General: Normal range of motion.      Cervical back: No rigidity.   Skin:     General: Skin is warm and dry.      Capillary Refill: Capillary refill takes less than 2 seconds.      Findings: No rash.   Neurological:      General: No focal deficit present.      Mental Status: She is alert.      Gait: Gait normal.   Psychiatric:         Mood and Affect: Mood normal.         LABS     Lab Results   Component Value Date    HGBA1C 6.1 (H) 05/06/2025     CMP  Sodium   Date Value Ref Range Status   02/18/2025 142 136 - 145 mmol/L Final     Potassium   Date Value Ref Range Status   02/18/2025 4.7 3.5 - 5.1 mmol/L Final     Chloride   Date Value Ref Range Status   02/18/2025 108 95 - 110 mmol/L Final     CO2   Date Value Ref Range Status   02/18/2025 21 (L) 23 - 29 mmol/L Final     Glucose   Date Value Ref Range Status "   02/18/2025 172 (H) 70 - 110 mg/dL Final     BUN   Date Value Ref Range Status   02/18/2025 16 8 - 23 mg/dL Final     Creatinine   Date Value Ref Range Status   02/18/2025 0.9 0.5 - 1.4 mg/dL Final     Calcium   Date Value Ref Range Status   02/18/2025 9.5 8.7 - 10.5 mg/dL Final     Total Protein   Date Value Ref Range Status   02/18/2025 7.1 6.0 - 8.4 g/dL Final     Albumin   Date Value Ref Range Status   02/18/2025 4.3 3.5 - 5.2 g/dL Final     Total Bilirubin   Date Value Ref Range Status   02/18/2025 0.6 0.1 - 1.0 mg/dL Final     Comment:     For infants and newborns, interpretation of results should be based  on gestational age, weight and in agreement with clinical  observations.    Premature Infant recommended reference ranges:  Up to 24 hours.............<8.0 mg/dL  Up to 48 hours............<12.0 mg/dL  3-5 days..................<15.0 mg/dL  6-29 days.................<15.0 mg/dL       Alkaline Phosphatase   Date Value Ref Range Status   02/18/2025 40 40 - 150 U/L Final     AST   Date Value Ref Range Status   02/18/2025 44 (H) 10 - 40 U/L Final     ALT   Date Value Ref Range Status   02/18/2025 29 10 - 44 U/L Final     Anion Gap   Date Value Ref Range Status   02/18/2025 13 8 - 16 mmol/L Final     eGFR if    Date Value Ref Range Status   07/22/2022 >60.0 >60 mL/min/1.73 m^2 Final     eGFR if non    Date Value Ref Range Status   07/22/2022 >60.0 >60 mL/min/1.73 m^2 Final     Comment:     Calculation used to obtain the estimated glomerular filtration  rate (eGFR) is the CKD-EPI equation.        Lab Results   Component Value Date    WBC 4.52 11/28/2023    HGB 13.7 11/28/2023    HCT 40.9 11/28/2023    MCV 88 11/28/2023     11/28/2023     Lab Results   Component Value Date    CHOL 125 02/04/2025    CHOL 117 (L) 11/28/2023    CHOL 121 11/16/2023     Lab Results   Component Value Date    HDL 31 (L) 02/04/2025    HDL 32 (L) 11/28/2023    HDL 28 (L) 11/16/2023     Lab Results  "  Component Value Date    LDLCALC 61.2 (L) 02/04/2025    LDLCALC 44.4 (L) 11/28/2023    LDLCALC 56.6 (L) 11/16/2023     Lab Results   Component Value Date    TRIG 164 (H) 02/04/2025    TRIG 203 (H) 11/28/2023    TRIG 182 (H) 11/16/2023     Lab Results   Component Value Date    CHOLHDL 24.8 02/04/2025    CHOLHDL 27.4 11/28/2023    CHOLHDL 23.1 11/16/2023     Lab Results   Component Value Date    TSH 2.704 09/04/2024       ASSESSMENT/PLAN     Lima Maldonado is a 74 y.o. female     Lima "Katerin" was seen today for follow-up. See me in 3 months and Dr. Mccall in 6.     Diagnoses and all orders for this visit:    Type 2 diabetes mellitus with diabetic polyneuropathy, without long-term current use of insulin  -     Hemoglobin A1C; Future    Mixed hyperlipidemia    Primary hypertension    Fatty liver      Patient education provided from [facility]. Patient was counseled on when and how to seek emergent care.       Bridgett Gabriel PA-C   Department of Internal Medicine - [facility]   9:31 AM     "

## 2025-05-14 ENCOUNTER — INFUSION (OUTPATIENT)
Dept: INFUSION THERAPY | Facility: HOSPITAL | Age: 75
End: 2025-05-14
Attending: INTERNAL MEDICINE
Payer: MEDICARE

## 2025-05-14 DIAGNOSIS — M81.0 SENILE OSTEOPOROSIS: Primary | ICD-10-CM

## 2025-05-14 PROCEDURE — 63600175 PHARM REV CODE 636 W HCPCS: Mod: JZ,TB,HCNC | Performed by: NURSE PRACTITIONER

## 2025-05-14 PROCEDURE — 96372 THER/PROPH/DIAG INJ SC/IM: CPT | Mod: HCNC

## 2025-05-14 RX ADMIN — DENOSUMAB 60 MG: 60 INJECTION SUBCUTANEOUS at 12:05

## 2025-05-19 ENCOUNTER — OFFICE VISIT (OUTPATIENT)
Dept: DERMATOLOGY | Facility: CLINIC | Age: 75
End: 2025-05-19
Payer: MEDICARE

## 2025-05-19 DIAGNOSIS — D17.9 LIPOMA, UNSPECIFIED SITE: ICD-10-CM

## 2025-05-19 DIAGNOSIS — D18.01 CHERRY ANGIOMA: ICD-10-CM

## 2025-05-19 DIAGNOSIS — L82.1 SK (SEBORRHEIC KERATOSIS): ICD-10-CM

## 2025-05-19 DIAGNOSIS — Z12.83 SCREENING EXAM FOR SKIN CANCER: Primary | ICD-10-CM

## 2025-05-19 PROCEDURE — 3044F HG A1C LEVEL LT 7.0%: CPT | Mod: CPTII,HCNC,S$GLB, | Performed by: DERMATOLOGY

## 2025-05-19 PROCEDURE — 1101F PT FALLS ASSESS-DOCD LE1/YR: CPT | Mod: CPTII,HCNC,S$GLB, | Performed by: DERMATOLOGY

## 2025-05-19 PROCEDURE — 1125F AMNT PAIN NOTED PAIN PRSNT: CPT | Mod: CPTII,HCNC,S$GLB, | Performed by: DERMATOLOGY

## 2025-05-19 PROCEDURE — 99999 PR PBB SHADOW E&M-EST. PATIENT-LVL IV: CPT | Mod: PBBFAC,HCNC,, | Performed by: DERMATOLOGY

## 2025-05-19 PROCEDURE — 3288F FALL RISK ASSESSMENT DOCD: CPT | Mod: CPTII,HCNC,S$GLB, | Performed by: DERMATOLOGY

## 2025-05-19 PROCEDURE — 99214 OFFICE O/P EST MOD 30 MIN: CPT | Mod: HCNC,S$GLB,, | Performed by: DERMATOLOGY

## 2025-05-19 PROCEDURE — 1159F MED LIST DOCD IN RCRD: CPT | Mod: CPTII,HCNC,S$GLB, | Performed by: DERMATOLOGY

## 2025-05-19 RX ORDER — KETOCONAZOLE 20 MG/ML
SHAMPOO, SUSPENSION TOPICAL
Qty: 120 ML | Refills: 3 | Status: SHIPPED | OUTPATIENT
Start: 2025-05-19

## 2025-05-19 NOTE — PROGRESS NOTES
Subjective:      Patient ID:  Lima Maldonado is a 74 y.o. female who presents for   Chief Complaint   Patient presents with    Skin Check     TBSE     HPI  Here for skin check  The patient denies any moles or growths of the skin that are rapidly growing, hurting, itching, bleeding, or changing colors.  Hx of lipoma removed > 10 yrs ago by plastic surgery  Has been regrowing and interested in removal due to pain/irritaiton on left lower back when sitting in the car    Review of Systems    Objective:   Physical Exam   Constitutional: She appears well-developed and well-nourished. No distress.   Neurological: She is alert and oriented to person, place, and time. She is not disoriented.   Psychiatric: She has a normal mood and affect.   Skin:   Areas Examined (abnormalities noted in diagram):   Scalp / Hair Palpated and Inspected  Head / Face Inspection Performed  Neck Inspection Performed  Chest / Axilla Inspection Performed  Abdomen Inspection Performed  Genitals / Buttocks / Groin Inspection Performed  Back Inspection Performed  RUE Inspected  LUE Inspection Performed  RLE Inspected  LLE Inspection Performed  Nails and Digits Inspection Performed                 Diagram Legend     Erythematous scaling macule/papule c/w actinic keratosis       Vascular papule c/w angioma      Pigmented verrucoid papule/plaque c/w seborrheic keratosis      Yellow umbilicated papule c/w sebaceous hyperplasia      Irregularly shaped tan macule c/w lentigo     1-2 mm smooth white papules consistent with Milia      Movable subcutaneous cyst with punctum c/w epidermal inclusion cyst      Subcutaneous movable cyst c/w pilar cyst      Firm pink to brown papule c/w dermatofibroma      Pedunculated fleshy papule(s) c/w skin tag(s)      Evenly pigmented macule c/w junctional nevus     Mildly variegated pigmented, slightly irregular-bordered macule c/w mildly atypical nevus      Flesh colored to evenly pigmented papule c/w intradermal nevus        Pink pearly papule/plaque c/w basal cell carcinoma      Erythematous hyperkeratotic cursted plaque c/w SCC      Surgical scar with no sign of skin cancer recurrence      Open and closed comedones      Inflammatory papules and pustules      Verrucoid papule consistent consistent with wart     Erythematous eczematous patches and plaques     Dystrophic onycholytic nail with subungual debris c/w onychomycosis     Umbilicated papule    Erythematous-base heme-crusted tan verrucoid plaque consistent with inflamed seborrheic keratosis     Erythematous Silvery Scaling Plaque c/w Psoriasis     See annotation      Assessment / Plan:        Screening exam for skin cancer  Total body skin examination performed today including at least 12 points as noted in physical examination. No lesions suspicious for malignancy noted.    Recommend daily sun protection/avoidance, use of at least SPF 30, broad spectrum sunscreen (OTC drug), skin self examinations, and routine physician surveillance to optimize early detection    Cherry angioma  SK (seborrheic keratosis)  Benign    Lipoma, unspecified site  -     Ambulatory referral/consult to Plastic Surgery; Future; Expected date: 05/26/2025    Other orders  -     ketoconazole (NIZORAL) 2 % shampoo; Apply topically every 7 days.  Dispense: 120 mL; Refill: 3             No follow-ups on file.

## 2025-05-19 NOTE — PROGRESS NOTES
Subjective:      Patient ID:  Lima Maldonado is a 74 y.o. female who presents for   Chief Complaint   Patient presents with    Skin Check     TBSE     HPI  History of Present Illness: The patient presents for TBSE.    The patient was last seen on: 1/15/2025 with Dr. Chadwick for lesion on back.    Other skin complaints: painful lipoma on back that has increased in size. Pt reports previous lipoma removed near the current location 10 years ago.    Review of Systems   Skin:  Positive for daily sunscreen use, activity-related sunscreen use and wears hat (sometimes). Negative for recent sunburn.   Hematologic/Lymphatic: Does not bruise/bleed easily (Baby ASA 81mg).     Objective:   Physical Exam   Constitutional: She appears well-developed and well-nourished. No distress.   Neurological: She is alert and oriented to person, place, and time. She is not disoriented.   Psychiatric: She has a normal mood and affect.   Skin:   Areas Examined (abnormalities noted in diagram):   Scalp / Hair Palpated and Inspected  Head / Face Inspection Performed  Neck Inspection Performed  Chest / Axilla Inspection Performed  Abdomen Inspection Performed  Genitals / Buttocks / Groin Inspection Performed  Back Inspection Performed  RUE Inspected  LUE Inspection Performed  RLE Inspected  LLE Inspection Performed  Nails and Digits Inspection Performed               Diagram Legend     Erythematous scaling macule/papule c/w actinic keratosis       Vascular papule c/w angioma      Pigmented verrucoid papule/plaque c/w seborrheic keratosis      Yellow umbilicated papule c/w sebaceous hyperplasia      Irregularly shaped tan macule c/w lentigo     1-2 mm smooth white papules consistent with Milia      Movable subcutaneous cyst with punctum c/w epidermal inclusion cyst      Subcutaneous movable cyst c/w pilar cyst      Firm pink to brown papule c/w dermatofibroma      Pedunculated fleshy papule(s) c/w skin tag(s)      Evenly pigmented macule c/w  junctional nevus     Mildly variegated pigmented, slightly irregular-bordered macule c/w mildly atypical nevus      Flesh colored to evenly pigmented papule c/w intradermal nevus       Pink pearly papule/plaque c/w basal cell carcinoma      Erythematous hyperkeratotic cursted plaque c/w SCC      Surgical scar with no sign of skin cancer recurrence      Open and closed comedones      Inflammatory papules and pustules      Verrucoid papule consistent consistent with wart     Erythematous eczematous patches and plaques     Dystrophic onycholytic nail with subungual debris c/w onychomycosis     Umbilicated papule    Erythematous-base heme-crusted tan verrucoid plaque consistent with inflamed seborrheic keratosis     Erythematous Silvery Scaling Plaque c/w Psoriasis     See annotation      Assessment / Plan:        There are no diagnoses linked to this encounter.         No follow-ups on file.

## 2025-06-19 DIAGNOSIS — E78.2 MIXED HYPERLIPIDEMIA: ICD-10-CM

## 2025-06-19 NOTE — TELEPHONE ENCOUNTER
Care Due:                  Date            Visit Type   Department     Provider  --------------------------------------------------------------------------------                                [facility]  Last Visit: 02-      CARE ([facility])   MEDICINE       Blessey                              [facility]  Next Visit: 11-      CARE ([facility])   MEDICINE       Blessey                                                            Last  Test          Frequency    Reason                     Performed    Due Date  --------------------------------------------------------------------------------    CBC.........  12 months..  fenofibrate..............  11- 11-    TSH.........  12 months..  levothyroxine............  09- 08-    [facility] Embedded Care Due Messages. Reference number: [id number].   6/19/2025 12:59:13 AM CDT

## 2025-06-19 NOTE — TELEPHONE ENCOUNTER
Refill Routing Note   Medication(s) are not appropriate for processing by [facility] for the following reason(s):        Required labs outdated    [facility] action(s):  Defer   Requires labs : Yes      Medication Therapy Plan: FOVS      Appointments  past 12m or future 3m with PCP    Date Provider   Last Visit   2/11/2025 Joie Mccall MD   Next Visit   11/18/2025 Joie Mccall MD   ED visits in past 90 days: 0        Note composed:12:31 PM 06/19/2025

## 2025-06-22 RX ORDER — FENOFIBRATE 160 MG/1
160 TABLET ORAL
Qty: 90 TABLET | Refills: 3 | Status: SHIPPED | OUTPATIENT
Start: 2025-06-22

## 2025-07-29 ENCOUNTER — OFFICE VISIT (OUTPATIENT)
Dept: PLASTIC SURGERY | Facility: CLINIC | Age: 75
End: 2025-07-29
Payer: MEDICARE

## 2025-07-29 VITALS
DIASTOLIC BLOOD PRESSURE: 71 MMHG | SYSTOLIC BLOOD PRESSURE: 126 MMHG | HEART RATE: 89 BPM | WEIGHT: 164.69 LBS | BODY MASS INDEX: 26.58 KG/M2

## 2025-07-29 DIAGNOSIS — D17.9 LIPOMA, UNSPECIFIED SITE: ICD-10-CM

## 2025-07-29 PROCEDURE — 99999 PR PBB SHADOW E&M-EST. PATIENT-LVL IV: CPT | Mod: PBBFAC,HCNC,, | Performed by: SURGERY

## 2025-07-29 NOTE — PROGRESS NOTES
Plastic and Reconstructive Surgery  History and Physical     Patient: Lima Maldonado  MRN: [id number]   : 1950  Date of Service: 2025  PCP: Joie Mccall MD  Referring Provider: Self, Aaareferral           Chief Complaint:  large mass on back     HPI:  Lima Maldonado is a 74 y.o.  female who presents for back mass.    Present for several years  History of lipoma on back excised 10 yrs ago.   Slow growing  No major pain, but uncomfortable from the pressure when she leans on it.   Hard to sit back for long periods of time.   No drainage    No tob  +DM - A1c 6.1  ASA 81 mg     Past Medical History:   Diagnosis Date    Acute cystitis without hematuria 2017    Arthritis     Back pain     Breast cancer     originally dx in 1997- treated with surgery, chemo and radiation     Class 1 obesity due to excess calories with serious comorbidity and body mass index (BMI) of 30.0 to 30.9 in adult 2018    Elevated bilirubin 2013    Fatty liver     GERD (gastroesophageal reflux disease)     Glucose intolerance (impaired glucose tolerance)     Hyperlipidemia     Hypertension     Hypothyroid     Hypothyroidism     hypothyroidism    Malignant neoplasm of lower-outer quadrant of left female breast 11/15/2016    Obesity     Obesity, diabetes, and hypertension syndrome 2018    Osteopenia     Osteoporosis     Primary insomnia 2016    Shingles     Sleep apnea     wears CPAP nightly     Squamous cell carcinoma     right forearm, sx by Dr. Corinne Ray    Stress incontinence of urine 2018    Trouble in sleeping     Type 2 diabetes mellitus with diabetic polyneuropathy, without long-term current use of insulin 2017    Urinary incontinence     Well woman exam with routine gynecological exam 2016        Past Surgical History:   Procedure Laterality Date    APPENDECTOMY  2008    removed during hysterectomy surgery     BLEPHAROPLASTY Bilateral     BREAST BIOPSY       2010    Breast implant Bilateral 1998    implants removed in 2003    BREAST LUMPECTOMY      02/1997    BREAST RECONSTRUCTION Bilateral     02/2017    BREAST SURGERY      see details below     CATARACT EXTRACTION W/  INTRAOCULAR LENS IMPLANT Left 08/13/2020    Procedure: EXTRACTION, CATARACT, WITH IOL INSERTION;  Surgeon: Ivanna Coleman MD;  Location: [facility] OR;  Service: Ophthalmology;  Laterality: Left;    CATARACT EXTRACTION W/  INTRAOCULAR LENS IMPLANT Right 08/27/2020    Procedure: EXTRACTION, CATARACT, WITH IOL INSERTION LASER;  Surgeon: Ivanna Coleman MD;  Location: [facility] OR;  Service: Ophthalmology;  Laterality: Right;    COLONOSCOPY N/A 05/15/2018    Procedure: COLONOSCOPY;  Surgeon: Roldan Gonzales MD;  Location: [facility] ENDO (4TH FLR);  Service: Endoscopy;  Laterality: N/A;    COSMETIC SURGERY  1/2022    Eyelift    ENDOSCOPIC ULTRASOUND OF UPPER GASTROINTESTINAL TRACT N/A 10/11/2023    Procedure: ULTRASOUND, UPPER GI TRACT, ENDOSCOPIC;  Surgeon: Hermelindo Coleman MD;  Location: [facility] ENDO (2ND FLR);  Service: Endoscopy;  Laterality: N/A;  instr portal-ozempic or trulicity-tb  10/5-precall complete/pt verbalized understanding of holding Ozempic-MS    EPIDURAL STEROID INJECTION N/A 2/1/2024    Procedure: CERVICAL C7/T1 IL GABINO *HOLD ASPIRIN FOR 5 DAYS*;  Surgeon: Wayne Singh MD;  Location: [facility] PAIN MGT;  Service: Pain Management;  Laterality: N/A;  [phone]  2 WK F/U IVANIA    ESOPHAGOGASTRODUODENOSCOPY N/A 12/20/2022    Procedure: EGD (ESOPHAGOGASTRODUODENOSCOPY);  Surgeon: Omar Ambriz MD;  Location: [facility] ENDO (4TH FLR);  Service: Endoscopy;  Laterality: N/A;  instructions via portal - sm    EYE SURGERY      CATERACTS    HYSTERECTOMY  2008    benign    left breast reconstruction  2005    left modified radical mastectomy  02/1997    for treatment of breast cancer     LIPOMA RESECTION  2010    removed from upper back area     MODIFIED RADICAL MASTECTOMY W/ AXILLARY LYMPH NODE DISSECTION  02/1997     OOPHORECTOMY          PELVIC LAPAROSCOPY      pt had endometriosis     CA REMOVAL OF OVARY/TUBE(S)      benign    reduction of mons pubis      robotic lap  hysterectomy with bso  2008    UPPER GASTROINTESTINAL ENDOSCOPY  2013        Family History   Problem Relation Name Age of Onset    Heart attack Father Gui James 51    Heart disease Father Gui James         Heart Attac, age 51    Alcohol abuse Father Gui James     Breast cancer Sister x1 51        BRCA 1/2 negative    Cancer Sister x1         Breast Cancer    Cancer Mother Patricia 65        Malignant melanoma    Melanoma Mother Patricia     No Known Problems Brother x1     No Known Problems Daughter Chenae     No Known Problems Son Christien     No Known Problems Daughter Shelia     No Known Problems Son Samuel     Diabetes Maternal Uncle Uncle Mitchel             Diabetes Maternal Uncle Uncle Marcelo Chadwick             Arthritis Maternal Grandmother Annmarie Little         Severe arthitis in Knees    Uterine cancer Neg Hx      Colon cancer Neg Hx      Celiac disease Neg Hx      Esophageal cancer Neg Hx      Inflammatory bowel disease Neg Hx      Liver cancer Neg Hx      Liver disease Neg Hx      Stomach cancer Neg Hx      Ulcerative colitis Neg Hx      Hypertension Neg Hx      Cirrhosis Neg Hx      Crohn's disease Neg Hx      Rectal cancer Neg Hx      Ovarian cancer Neg Hx          Social History     Socioeconomic History    Marital status:    Tobacco Use    Smoking status: Former     Current packs/day: 0.00     Average packs/day: 0.5 packs/day for 3.0 years (1.5 ttl pk-yrs)     Types: Cigarettes     Start date: 4/15/1969     Quit date: 4/15/1972     Years since quittin.3    Smokeless tobacco: Never    Tobacco comments:     started at age 18 during college    Substance and Sexual Activity    Alcohol use: Yes     Comment: Rarely, not even 1X per week    Drug use: No    Sexual activity: Yes     Partners: Male     Birth  control/protection: Post-menopausal, See Surgical Hx     Social Drivers of Health     Financial Resource Strain: Low Risk  (1/29/2025)    Overall Financial Resource Strain (CARDIA)     Difficulty of Paying Living Expenses: Not hard at all   Food Insecurity: No Food Insecurity (1/29/2025)    Hunger Vital Sign     Worried About Running Out of Food in the Last Year: Never true     Ran Out of Food in the Last Year: Never true   Transportation Needs: No Transportation Needs (1/29/2025)    PRAPARE - Transportation     Lack of Transportation (Medical): No     Lack of Transportation (Non-Medical): No   Physical Activity: Inactive (1/29/2025)    Exercise Vital Sign     Days of Exercise per Week: 0 days     Minutes of Exercise per Session: 0 min   Stress: No Stress Concern Present (1/29/2025)    [employer] - Occupational Stress Questionnaire     Feeling of Stress : Only a little   Housing Stability: Low Risk  (11/14/2023)    Housing Stability Vital Sign     Unable to Pay for Housing in the Last Year: No     Number of Places Lived in the Last Year: 1     Unstable Housing in the Last Year: No         Review of patient's allergies indicates:   Allergen Reactions    Erythromycin      Other reaction(s): Nausea    Erythromycin (bulk)      Other reaction(s): Nausea    Oxycodone Nausea And Vomiting    Oxycodone-acetaminophen Nausea And Vomiting     Other reaction(s): Nausea        Medications Ordered Prior to Encounter[1]     Review of Systems:    Negative as per HPI     Physical Exam:  Vitals:    07/29/25 1032   BP: 126/71   Pulse: 89        Gen: NAD, A&O x3  Pulm: unlabored  CV: regular rate  Back: large mass noted to loft lower back, centered around previous scar. +TTP     CT abd Jan 2023: Large mass to left lower back, appears deep to fascia     Assessment and Plan:  74 y.o. female who presents with large back mass. .     Discussed findings at length.  History of lipoma to left lower back, excised  10+ yrs ago.   Now with several year history of left lower back mass.   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   Discussed high risk for recurrence.   Discussed need for drain     I described the risks and rationale of surgical treatment, including, but not limited to, bleeding, infection, pain, scarring, injury to surrounding structures, hematoma, seroma, recurrence, and need for further surgery.  All questions were answered.  The patient verbalized understanding and wished to proceed.       We will plan to proceed with excision left lower back mass with complex repair.  This will be done in the OR under GA in lateral / prone position.  We will schedule accordingly.  Pt may call back with any questions or concerns in the interim.     Follow up: surgery  Restrictions: none     Procedure: exc L lower back mass, complex repair  CPT codes: 64059 53314 Complex repair trunk 2.5-7.5 cm and 34182 Complex repair trunk each additional 5 cm  Position: lateral / prone  Location: [facility]  OR Time: 90 min  Anesthesia: General  Blocks: n/a  Bed type: Outpatient  Orders/Clearance: n/a  Photos: n/a  Imaging: n/a  Labs: n/a  Implants: n/a  Mesh/Tissue: Artiss, [address]  Meds to be held: n/a    I spent 20 minutes on this patient encounter which included review of medical records.  Over half the time was spent with the patient face to face discussing the treatment plan, counseling and coordinating care.     Alesha Olsen MD  07/29/2025 11:22 AM     This document was transcribed using voice recognition software without a human . It may contain typographical, grammatical, and/or syntax errors.                 [1]   Current Outpatient Medications on File Prior to Visit   Medication Sig Dispense Refill    ascorbic acid, vitamin C, (VITAMIN C) 1000 MG tablet Take 1 tablet by mouth once daily.      aspirin (ECOTRIN) 81 MG EC tablet Take 81 mg by mouth once daily.      calcium-vitamin D tablet 600  mg-200 units       cetirizine (ZYRTEC) 5 MG tablet Take 5 mg by mouth as needed for Allergies.      coenzyme Q10-vitamin E 100-100 mg-unit Cap Take 1 capsule by mouth once daily.       cyclobenzaprine (FLEXERIL) 5 MG tablet Take 1 tablet (5 mg total) by mouth 3 (three) times daily as needed for Muscle spasms (muscular pain). 90 tablet 2    denosumab (PROLIA) 60 mg/mL Syrg Inject 1 mL (60 mg total) into the skin every 6 (six) months. 2 mL 0    fenofibrate 160 MG Tab TAKE 1 TABLET BY MOUTH EVERY DAY 90 tablet 3    FIBER CHOICE ORAL Take 2 capsules by mouth once daily.      fluticasone (FLONASE) 50 mcg/actuation nasal spray 1 spray by Each Nostril route 2 (two) times daily as needed.      gabapentin (NEURONTIN) 300 MG capsule Take 1 capsule (300 mg total) by mouth 3 (three) times daily. 270 capsule 1    irbesartan (AVAPRO) 150 MG tablet TAKE 1 TABLET BY MOUTH EVERY EVENING. 90 tablet 2    ketoconazole (NIZORAL) 2 % shampoo Apply topically every 7 days. 120 mL 3    letrozole (FEMARA) 2.5 mg Tab TAKE 1 TABLET BY MOUTH EVERY DAY 90 tablet 3    levothyroxine (SYNTHROID) 50 MCG tablet TAKE 1 TABLET BY MOUTH EVERY DAY 90 tablet 1    magnesium oxide (MAG-OX) 400 mg (241.3 mg magnesium) tablet Take 400 mg by mouth once daily.      MULTIVITAMIN ORAL Take 1 tablet by mouth once daily.       omega-3 fatty acids-vitamin E 1,000 mg Cap Take 1 capsule by mouth once daily.       pantoprazole (PROTONIX) 20 MG tablet TAKE 1 TABLET (20 MG TOTAL) BY MOUTH BEFORE BREAKFAST. 90 tablet 3    simvastatin (ZOCOR) 20 MG tablet TAKE 1 TABLET BY MOUTH EVERY DAY IN THE EVENING 90 tablet 3    sucralfate (CARAFATE) 100 mg/mL suspension Take 10 mLs (1 g total) by mouth 4 (four) times daily. 828 mL 1    tirzepatide 10 mg/0.5 mL PnIj Inject 10 mg into the skin every 7 days. 2 mL 5    turmeric root extract 500 mg Cap Take 500 mg by mouth once daily.      vitamin C-biotin (HAIR-SKIN-NAILS, VIT C-BIOTIN,) 50 mg -1,250 mcg Chew Take 1 capsule by mouth once  daily.      vitamin D 1000 units Tab Take 1,000 Units by mouth once daily.       YUVAFEM 10 mcg Tab PLACE 1 TABLET VAGINALLY TWICE A WEEK. 24 tablet 1    zinc gluconate 50 mg tablet Take 50 mg by mouth once daily.       No current facility-administered medications on file prior to visit.

## 2025-08-06 DIAGNOSIS — D17.1 LIPOMA OF BACK: ICD-10-CM

## 2025-08-06 DIAGNOSIS — D17.9 LIPOMA, UNSPECIFIED SITE: Primary | ICD-10-CM

## 2025-08-08 ENCOUNTER — TELEPHONE (OUTPATIENT)
Dept: HEMATOLOGY/ONCOLOGY | Facility: CLINIC | Age: 75
End: 2025-08-08
Payer: MEDICARE

## 2025-08-08 NOTE — TELEPHONE ENCOUNTER
Copied from CRM #[id number]. Topic: Appointments - Appointment Rescheduling  >> Aug 8, 2025 11:27 AM Yomaira wrote:  Reschedule Existing Appointment     Current appt date:11/13     Type of appt :EP and Injection     Physician:Samuel     Reason for rescheduling:Patient is calling to reschedule to the following week. Requesting a call back     Caller:Lima Maldonado      Contact Preference:[phone] (home)

## 2025-08-11 ENCOUNTER — LAB VISIT (OUTPATIENT)
Dept: LAB | Facility: HOSPITAL | Age: 75
End: 2025-08-11
Payer: MEDICARE

## 2025-08-11 DIAGNOSIS — E11.42 TYPE 2 DIABETES MELLITUS WITH DIABETIC POLYNEUROPATHY, WITHOUT LONG-TERM CURRENT USE OF INSULIN: ICD-10-CM

## 2025-08-11 DIAGNOSIS — K74.01 EARLY HEPATIC FIBROSIS: ICD-10-CM

## 2025-08-11 DIAGNOSIS — Z01.818 PREOPERATIVE TESTING: ICD-10-CM

## 2025-08-11 LAB
ANION GAP (OHS): 5 MMOL/L (ref 8–16)
APTT PPP: 24.4 SECONDS (ref 21–32)
BUN SERPL-MCNC: 18 MG/DL (ref 8–23)
CALCIUM SERPL-MCNC: 10 MG/DL (ref 8.7–10.5)
CHLORIDE SERPL-SCNC: 108 MMOL/L (ref 95–110)
CO2 SERPL-SCNC: 28 MMOL/L (ref 23–29)
CREAT SERPL-MCNC: 0.8 MG/DL (ref 0.5–1.4)
EAG (OHS): 123 MG/DL (ref 68–131)
ERYTHROCYTE [DISTWIDTH] IN BLOOD BY AUTOMATED COUNT: 12.6 % (ref 11.5–14.5)
GFR SERPLBLD CREATININE-BSD FMLA CKD-EPI: >60 ML/MIN/1.73/M2
GLUCOSE SERPL-MCNC: 147 MG/DL (ref 70–110)
HBA1C MFR BLD: 5.9 % (ref 4–5.6)
HCT VFR BLD AUTO: 40 % (ref 37–48.5)
HGB BLD-MCNC: 12.7 GM/DL (ref 12–16)
INR PPP: 1 (ref 0.8–1.2)
MCH RBC QN AUTO: 28.5 PG (ref 27–31)
MCHC RBC AUTO-ENTMCNC: 31.8 G/DL (ref 32–36)
MCV RBC AUTO: 90 FL (ref 82–98)
PLATELET # BLD AUTO: 196 K/UL (ref 150–450)
PMV BLD AUTO: 10.3 FL (ref 9.2–12.9)
POTASSIUM SERPL-SCNC: 5.5 MMOL/L (ref 3.5–5.1)
PROTHROMBIN TIME: 11.4 SECONDS (ref 9–12.5)
RBC # BLD AUTO: 4.45 M/UL (ref 4–5.4)
SODIUM SERPL-SCNC: 141 MMOL/L (ref 136–145)
WBC # BLD AUTO: 4.61 K/UL (ref 3.9–12.7)

## 2025-08-11 PROCEDURE — 85730 THROMBOPLASTIN TIME PARTIAL: CPT | Mod: HCNC

## 2025-08-11 PROCEDURE — 85027 COMPLETE CBC AUTOMATED: CPT | Mod: HCNC

## 2025-08-11 PROCEDURE — 83036 HEMOGLOBIN GLYCOSYLATED A1C: CPT | Mod: HCNC

## 2025-08-11 PROCEDURE — 82310 ASSAY OF CALCIUM: CPT | Mod: HCNC

## 2025-08-11 PROCEDURE — 85610 PROTHROMBIN TIME: CPT | Mod: HCNC

## 2025-08-11 PROCEDURE — 36415 COLL VENOUS BLD VENIPUNCTURE: CPT | Mod: HCNC

## 2025-08-12 DIAGNOSIS — E11.42 TYPE 2 DIABETES MELLITUS WITH DIABETIC POLYNEUROPATHY, WITHOUT LONG-TERM CURRENT USE OF INSULIN: ICD-10-CM

## 2025-08-12 RX ORDER — TIRZEPATIDE 10 MG/.5ML
10 INJECTION, SOLUTION SUBCUTANEOUS
Qty: 6 ML | Refills: 1 | Status: SHIPPED | OUTPATIENT
Start: 2025-08-12

## 2025-08-17 DIAGNOSIS — K44.9 HIATAL HERNIA: ICD-10-CM

## 2025-08-17 DIAGNOSIS — I10 PRIMARY HYPERTENSION: ICD-10-CM

## 2025-08-17 DIAGNOSIS — K22.10 ESOPHAGITIS, EROSIVE: ICD-10-CM

## 2025-08-17 DIAGNOSIS — Z51.81 ENCOUNTER FOR MONITORING LONG-TERM PROTON PUMP INHIBITOR THERAPY: ICD-10-CM

## 2025-08-17 DIAGNOSIS — Z79.899 ENCOUNTER FOR MONITORING LONG-TERM PROTON PUMP INHIBITOR THERAPY: ICD-10-CM

## 2025-08-17 RX ORDER — PANTOPRAZOLE SODIUM 20 MG/1
20 TABLET, DELAYED RELEASE ORAL
Qty: 90 TABLET | Refills: 1 | Status: SHIPPED | OUTPATIENT
Start: 2025-08-17

## 2025-08-18 ENCOUNTER — OFFICE VISIT (OUTPATIENT)
Dept: OPTOMETRY | Facility: CLINIC | Age: 75
End: 2025-08-18
Payer: MEDICARE

## 2025-08-18 DIAGNOSIS — Z13.5 GLAUCOMA SCREENING: ICD-10-CM

## 2025-08-18 DIAGNOSIS — E11.9 DIABETES MELLITUS TYPE 2 WITHOUT RETINOPATHY: Primary | ICD-10-CM

## 2025-08-18 DIAGNOSIS — Z96.1 PSEUDOPHAKIA OF BOTH EYES: ICD-10-CM

## 2025-08-18 PROCEDURE — 2023F DILAT RTA XM W/O RTNOPTHY: CPT | Mod: CPTII,HCNC,S$GLB, | Performed by: OPTOMETRIST

## 2025-08-18 PROCEDURE — 1126F AMNT PAIN NOTED NONE PRSNT: CPT | Mod: CPTII,HCNC,S$GLB, | Performed by: OPTOMETRIST

## 2025-08-18 PROCEDURE — 99999 PR PBB SHADOW E&M-EST. PATIENT-LVL III: CPT | Mod: PBBFAC,HCNC,, | Performed by: OPTOMETRIST

## 2025-08-18 PROCEDURE — 92014 COMPRE OPH EXAM EST PT 1/>: CPT | Mod: HCNC,S$GLB,, | Performed by: OPTOMETRIST

## 2025-08-18 PROCEDURE — 1159F MED LIST DOCD IN RCRD: CPT | Mod: CPTII,HCNC,S$GLB, | Performed by: OPTOMETRIST

## 2025-08-18 PROCEDURE — 1101F PT FALLS ASSESS-DOCD LE1/YR: CPT | Mod: CPTII,HCNC,S$GLB, | Performed by: OPTOMETRIST

## 2025-08-18 PROCEDURE — 3288F FALL RISK ASSESSMENT DOCD: CPT | Mod: CPTII,HCNC,S$GLB, | Performed by: OPTOMETRIST

## 2025-08-18 RX ORDER — IRBESARTAN 150 MG/1
150 TABLET ORAL NIGHTLY
Qty: 90 TABLET | Refills: 2 | Status: SHIPPED | OUTPATIENT
Start: 2025-08-18

## 2025-08-19 ENCOUNTER — OFFICE VISIT (OUTPATIENT)
Dept: INTERNAL MEDICINE | Facility: CLINIC | Age: 75
End: 2025-08-19
Payer: MEDICARE

## 2025-08-19 ENCOUNTER — LAB VISIT (OUTPATIENT)
Dept: LAB | Facility: HOSPITAL | Age: 75
End: 2025-08-19
Payer: MEDICARE

## 2025-08-19 VITALS
BODY MASS INDEX: 26.68 KG/M2 | HEIGHT: 66 IN | SYSTOLIC BLOOD PRESSURE: 124 MMHG | OXYGEN SATURATION: 97 % | HEART RATE: 79 BPM | WEIGHT: 166 LBS | DIASTOLIC BLOOD PRESSURE: 58 MMHG

## 2025-08-19 DIAGNOSIS — E11.42 TYPE 2 DIABETES MELLITUS WITH DIABETIC POLYNEUROPATHY, WITHOUT LONG-TERM CURRENT USE OF INSULIN: ICD-10-CM

## 2025-08-19 DIAGNOSIS — E87.5 HYPERKALEMIA: ICD-10-CM

## 2025-08-19 DIAGNOSIS — I10 PRIMARY HYPERTENSION: Primary | ICD-10-CM

## 2025-08-19 DIAGNOSIS — K86.2 PANCREATIC CYST: ICD-10-CM

## 2025-08-19 LAB
ANION GAP (OHS): 7 MMOL/L (ref 8–16)
BUN SERPL-MCNC: 20 MG/DL (ref 8–23)
CALCIUM SERPL-MCNC: 10.4 MG/DL (ref 8.7–10.5)
CHLORIDE SERPL-SCNC: 105 MMOL/L (ref 95–110)
CO2 SERPL-SCNC: 26 MMOL/L (ref 23–29)
CREAT SERPL-MCNC: 0.9 MG/DL (ref 0.5–1.4)
GFR SERPLBLD CREATININE-BSD FMLA CKD-EPI: >60 ML/MIN/1.73/M2
GLUCOSE SERPL-MCNC: 148 MG/DL (ref 70–110)
POTASSIUM SERPL-SCNC: 5 MMOL/L (ref 3.5–5.1)
SODIUM SERPL-SCNC: 138 MMOL/L (ref 136–145)

## 2025-08-19 PROCEDURE — 1101F PT FALLS ASSESS-DOCD LE1/YR: CPT | Mod: CPTII,HCNC,S$GLB, | Performed by: PHYSICIAN ASSISTANT

## 2025-08-19 PROCEDURE — 82310 ASSAY OF CALCIUM: CPT | Mod: HCNC

## 2025-08-19 PROCEDURE — 3078F DIAST BP <80 MM HG: CPT | Mod: CPTII,HCNC,S$GLB, | Performed by: PHYSICIAN ASSISTANT

## 2025-08-19 PROCEDURE — G2211 COMPLEX E/M VISIT ADD ON: HCPCS | Mod: HCNC,S$GLB,, | Performed by: PHYSICIAN ASSISTANT

## 2025-08-19 PROCEDURE — 3288F FALL RISK ASSESSMENT DOCD: CPT | Mod: CPTII,HCNC,S$GLB, | Performed by: PHYSICIAN ASSISTANT

## 2025-08-19 PROCEDURE — 3074F SYST BP LT 130 MM HG: CPT | Mod: CPTII,HCNC,S$GLB, | Performed by: PHYSICIAN ASSISTANT

## 2025-08-19 PROCEDURE — 1159F MED LIST DOCD IN RCRD: CPT | Mod: CPTII,HCNC,S$GLB, | Performed by: PHYSICIAN ASSISTANT

## 2025-08-19 PROCEDURE — 99999 PR PBB SHADOW E&M-EST. PATIENT-LVL IV: CPT | Mod: PBBFAC,HCNC,, | Performed by: PHYSICIAN ASSISTANT

## 2025-08-19 PROCEDURE — 36415 COLL VENOUS BLD VENIPUNCTURE: CPT | Mod: HCNC

## 2025-08-19 PROCEDURE — 99215 OFFICE O/P EST HI 40 MIN: CPT | Mod: HCNC,S$GLB,, | Performed by: PHYSICIAN ASSISTANT

## 2025-08-19 PROCEDURE — 1126F AMNT PAIN NOTED NONE PRSNT: CPT | Mod: CPTII,HCNC,S$GLB, | Performed by: PHYSICIAN ASSISTANT

## 2025-08-21 PROBLEM — K86.2 PANCREATIC CYST: Status: ACTIVE | Noted: 2025-08-21

## 2025-08-28 DIAGNOSIS — G62.9 NEUROPATHY: ICD-10-CM

## 2025-08-28 RX ORDER — GABAPENTIN 300 MG/1
300 CAPSULE ORAL 3 TIMES DAILY
Qty: 270 CAPSULE | Refills: 3 | Status: SHIPPED | OUTPATIENT
Start: 2025-08-28

## 2025-08-29 ENCOUNTER — HOSPITAL ENCOUNTER (OUTPATIENT)
Dept: CARDIOLOGY | Facility: CLINIC | Age: 75
Discharge: HOME | End: 2025-08-29
Payer: MEDICARE

## 2025-08-29 ENCOUNTER — TELEPHONE (OUTPATIENT)
Dept: GASTROENTEROLOGY | Facility: CLINIC | Age: 75
End: 2025-08-29
Payer: MEDICARE

## (undated) DEVICE — GLOVE BIOGEL SKINSENSE PI 6.5

## (undated) DEVICE — DRESSING TRANS 4X4 TEGADERM

## (undated) DEVICE — ADHESIVE MASTISOL VIAL 48/BX

## (undated) DEVICE — GLOVE BIOGEL SKINSENSE PI 7.5

## (undated) DEVICE — Device

## (undated) DEVICE — CASSETTE INFINITI

## (undated) DEVICE — GLASSES EYE PROTECTIVE

## (undated) DEVICE — SUT MONOCRYL 4-0 PS-2

## (undated) DEVICE — DRESSING LEUKOPLAST FLEX 1X3IN

## (undated) DEVICE — SEE MEDLINE ITEM 157131

## (undated) DEVICE — DRESSING TELFA N ADH 3X8

## (undated) DEVICE — SYR SLIP TIP 1CC

## (undated) DEVICE — CLOSURE SKIN STERI STRIP 1/4X3

## (undated) DEVICE — TRAY MINOR GEN SURG

## (undated) DEVICE — SOL NACL 0.9% INJ PF/50151

## (undated) DEVICE — SOL BETADINE 5%

## (undated) DEVICE — DRAPE C ARM 42 X 120 10/BX

## (undated) DEVICE — DRAPE THYROID WITH ARMBOARD

## (undated) DEVICE — ELECTRODE REM PLYHSV RETURN 9